# Patient Record
Sex: MALE | Race: WHITE | Employment: OTHER | URBAN - NONMETROPOLITAN AREA
[De-identification: names, ages, dates, MRNs, and addresses within clinical notes are randomized per-mention and may not be internally consistent; named-entity substitution may affect disease eponyms.]

---

## 2018-02-19 ENCOUNTER — HOSPITAL ENCOUNTER (OUTPATIENT)
Age: 73
Setting detail: SPECIMEN
Discharge: HOME | End: 2018-02-19
Payer: MEDICARE

## 2018-02-19 PROCEDURE — 87070 CULTURE OTHR SPECIMN AEROBIC: CPT

## 2018-02-19 PROCEDURE — 87205 SMEAR GRAM STAIN: CPT

## 2018-02-21 LAB
CULTURE: NORMAL
CULTURE: NORMAL
DIRECT EXAM: NORMAL
DIRECT EXAM: NORMAL
Lab: NORMAL
Lab: NORMAL
SPECIMEN DESCRIPTION: NORMAL
SPECIMEN DESCRIPTION: NORMAL
STATUS: NORMAL

## 2020-11-01 ENCOUNTER — APPOINTMENT (OUTPATIENT)
Dept: CT IMAGING | Age: 75
DRG: 065 | End: 2020-11-01
Payer: MEDICARE

## 2020-11-01 ENCOUNTER — APPOINTMENT (OUTPATIENT)
Dept: CT IMAGING | Age: 75
End: 2020-11-01
Payer: MEDICARE

## 2020-11-01 ENCOUNTER — HOSPITAL ENCOUNTER (EMERGENCY)
Age: 75
Discharge: ANOTHER HOSPITAL | End: 2020-11-01
Attending: FAMILY MEDICINE
Payer: MEDICARE

## 2020-11-01 ENCOUNTER — HOSPITAL ENCOUNTER (INPATIENT)
Age: 75
LOS: 2 days | Discharge: HOME | DRG: 065 | End: 2020-11-03
Attending: EMERGENCY MEDICINE | Admitting: PSYCHIATRY & NEUROLOGY
Payer: MEDICARE

## 2020-11-01 ENCOUNTER — APPOINTMENT (OUTPATIENT)
Dept: MRI IMAGING | Age: 75
DRG: 065 | End: 2020-11-01
Payer: MEDICARE

## 2020-11-01 VITALS
TEMPERATURE: 98.6 F | OXYGEN SATURATION: 98 % | WEIGHT: 258 LBS | DIASTOLIC BLOOD PRESSURE: 97 MMHG | HEIGHT: 68 IN | RESPIRATION RATE: 18 BRPM | HEART RATE: 95 BPM | SYSTOLIC BLOOD PRESSURE: 168 MMHG | BODY MASS INDEX: 39.1 KG/M2

## 2020-11-01 PROBLEM — I63.9 STROKE ABORTED BY ADMINISTRATION OF THROMBOLYTIC AGENT (HCC): Status: ACTIVE | Noted: 2020-11-01

## 2020-11-01 LAB
% CKMB: 5.1 % (ref 0–3.5)
ABSOLUTE EOS #: 0.08 K/UL (ref 0–0.44)
ABSOLUTE EOS #: 0.3 K/UL (ref 0–0.4)
ABSOLUTE IMMATURE GRANULOCYTE: 0.08 K/UL (ref 0–0.3)
ABSOLUTE IMMATURE GRANULOCYTE: ABNORMAL K/UL (ref 0–0.3)
ABSOLUTE LYMPH #: 1.5 K/UL (ref 1–4.8)
ABSOLUTE LYMPH #: 1.54 K/UL (ref 1.1–3.7)
ABSOLUTE MONO #: 0.3 K/UL (ref 0–1)
ABSOLUTE MONO #: 0.41 K/UL (ref 0.1–1.2)
ALBUMIN SERPL-MCNC: 4.4 G/DL (ref 3.5–5.2)
ALBUMIN/GLOBULIN RATIO: ABNORMAL (ref 1–2.5)
ALP BLD-CCNC: 72 U/L (ref 40–129)
ALT SERPL-CCNC: 26 U/L (ref 5–41)
ANION GAP SERPL CALCULATED.3IONS-SCNC: 12 MMOL/L (ref 9–17)
ANION GAP SERPL CALCULATED.3IONS-SCNC: 13 MMOL/L (ref 9–17)
AST SERPL-CCNC: 28 U/L
BASOPHILS # BLD: 0 % (ref 0–2)
BASOPHILS # BLD: 1 % (ref 0–2)
BASOPHILS ABSOLUTE: 0 K/UL (ref 0–0.2)
BASOPHILS ABSOLUTE: 0.08 K/UL (ref 0–0.2)
BILIRUB SERPL-MCNC: 1.14 MG/DL (ref 0.3–1.2)
BUN BLDV-MCNC: 21 MG/DL (ref 8–23)
BUN BLDV-MCNC: 23 MG/DL (ref 8–23)
BUN/CREAT BLD: 23 (ref 9–20)
BUN/CREAT BLD: ABNORMAL (ref 9–20)
CALCIUM SERPL-MCNC: 9.3 MG/DL (ref 8.6–10.4)
CALCIUM SERPL-MCNC: 9.8 MG/DL (ref 8.6–10.4)
CHLORIDE BLD-SCNC: 100 MMOL/L (ref 98–107)
CHLORIDE BLD-SCNC: 102 MMOL/L (ref 98–107)
CHP ED QC CHECK: 162
CK MB: 2.9 NG/ML
CKMB INTERPRETATION: ABNORMAL
CO2: 23 MMOL/L (ref 20–31)
CO2: 24 MMOL/L (ref 20–31)
CREAT SERPL-MCNC: 0.83 MG/DL (ref 0.7–1.2)
CREAT SERPL-MCNC: 1.02 MG/DL (ref 0.7–1.2)
DIFFERENTIAL TYPE: ABNORMAL
DIFFERENTIAL TYPE: ABNORMAL
EOSINOPHILS RELATIVE PERCENT: 1 % (ref 1–4)
EOSINOPHILS RELATIVE PERCENT: 5 % (ref 0–5)
GFR AFRICAN AMERICAN: >60 ML/MIN
GFR AFRICAN AMERICAN: >60 ML/MIN
GFR NON-AFRICAN AMERICAN: >60 ML/MIN
GFR NON-AFRICAN AMERICAN: >60 ML/MIN
GFR SERPL CREATININE-BSD FRML MDRD: ABNORMAL ML/MIN/{1.73_M2}
GLUCOSE BLD-MCNC: 160 MG/DL (ref 70–99)
GLUCOSE BLD-MCNC: 162 MG/DL (ref 65–99)
GLUCOSE BLD-MCNC: 171 MG/DL (ref 70–99)
HCT VFR BLD CALC: 33.1 % (ref 41–53)
HCT VFR BLD CALC: 36.3 % (ref 40.7–50.3)
HEMOGLOBIN: 10.6 G/DL (ref 13.5–17.5)
HEMOGLOBIN: 11.4 G/DL (ref 13–17)
IMMATURE GRANULOCYTES: 1 %
IMMATURE GRANULOCYTES: ABNORMAL %
INR BLD: 1
INR BLD: 1.1
LYMPHOCYTES # BLD: 19 % (ref 24–43)
LYMPHOCYTES # BLD: 27 % (ref 13–44)
MCH RBC QN AUTO: 20.3 PG (ref 25.2–33.5)
MCH RBC QN AUTO: 21 PG (ref 26–34)
MCHC RBC AUTO-ENTMCNC: 31.4 G/DL (ref 28.4–34.8)
MCHC RBC AUTO-ENTMCNC: 32 G/DL (ref 31–37)
MCV RBC AUTO: 64.7 FL (ref 82.6–102.9)
MCV RBC AUTO: 65.7 FL (ref 80–100)
MONOCYTES # BLD: 5 % (ref 3–12)
MONOCYTES # BLD: 6 % (ref 5–9)
MORPHOLOGY: ABNORMAL
MYOGLOBIN: 30 NG/ML (ref 28–72)
NRBC AUTOMATED: 0 PER 100 WBC
NRBC AUTOMATED: ABNORMAL PER 100 WBC
PARTIAL THROMBOPLASTIN TIME: 23.5 SEC (ref 20.5–30.5)
PDW BLD-RTO: 17.5 % (ref 12.1–15.2)
PDW BLD-RTO: 18.1 % (ref 11.8–14.4)
PLATELET # BLD: 220 K/UL (ref 140–450)
PLATELET # BLD: ABNORMAL K/UL (ref 138–453)
PLATELET ESTIMATE: ABNORMAL
PLATELET ESTIMATE: ABNORMAL
PLATELET, FLUORESCENCE: 187 K/UL (ref 138–453)
PLATELET, IMMATURE FRACTION: 5.8 % (ref 1.1–10.3)
PMV BLD AUTO: ABNORMAL FL (ref 6–12)
PMV BLD AUTO: ABNORMAL FL (ref 8.1–13.5)
POTASSIUM SERPL-SCNC: 4.2 MMOL/L (ref 3.7–5.3)
POTASSIUM SERPL-SCNC: 4.2 MMOL/L (ref 3.7–5.3)
PROTHROMBIN TIME: 10.1 SEC (ref 9–12)
PROTHROMBIN TIME: 13.6 SEC (ref 11.5–14.2)
RBC # BLD: 5.04 M/UL (ref 4.5–5.9)
RBC # BLD: 5.61 M/UL (ref 4.21–5.77)
RBC # BLD: ABNORMAL 10*6/UL
RBC # BLD: ABNORMAL 10*6/UL
SARS-COV-2, RAPID: NOT DETECTED
SARS-COV-2: NORMAL
SARS-COV-2: NORMAL
SEG NEUTROPHILS: 62 % (ref 39–75)
SEG NEUTROPHILS: 73 % (ref 36–65)
SEGMENTED NEUTROPHILS ABSOLUTE COUNT: 3.6 K/UL (ref 2.1–6.5)
SEGMENTED NEUTROPHILS ABSOLUTE COUNT: 5.91 K/UL (ref 1.5–8.1)
SODIUM BLD-SCNC: 135 MMOL/L (ref 135–144)
SODIUM BLD-SCNC: 139 MMOL/L (ref 135–144)
SOURCE: NORMAL
TOTAL CK: 57 U/L (ref 39–308)
TOTAL PROTEIN: 7.3 G/DL (ref 6.4–8.3)
TROPONIN INTERP: ABNORMAL
TROPONIN INTERP: NORMAL
TROPONIN T: <0.03 NG/ML
TROPONIN T: ABNORMAL NG/ML
TROPONIN, HIGH SENSITIVITY: 18 NG/L (ref 0–22)
TROPONIN, HIGH SENSITIVITY: NORMAL NG/L (ref 0–22)
WBC # BLD: 5.7 K/UL (ref 3.5–11)
WBC # BLD: 8.1 K/UL (ref 3.5–11.3)
WBC # BLD: ABNORMAL 10*3/UL
WBC # BLD: ABNORMAL 10*3/UL

## 2020-11-01 PROCEDURE — 2580000003 HC RX 258: Performed by: PSYCHIATRY & NEUROLOGY

## 2020-11-01 PROCEDURE — 99285 EMERGENCY DEPT VISIT HI MDM: CPT

## 2020-11-01 PROCEDURE — 84484 ASSAY OF TROPONIN QUANT: CPT

## 2020-11-01 PROCEDURE — 36415 COLL VENOUS BLD VENIPUNCTURE: CPT

## 2020-11-01 PROCEDURE — 2580000003 HC RX 258: Performed by: STUDENT IN AN ORGANIZED HEALTH CARE EDUCATION/TRAINING PROGRAM

## 2020-11-01 PROCEDURE — 99291 CRITICAL CARE FIRST HOUR: CPT

## 2020-11-01 PROCEDURE — 85610 PROTHROMBIN TIME: CPT

## 2020-11-01 PROCEDURE — 80048 BASIC METABOLIC PNL TOTAL CA: CPT

## 2020-11-01 PROCEDURE — 70496 CT ANGIOGRAPHY HEAD: CPT

## 2020-11-01 PROCEDURE — 99291 CRITICAL CARE FIRST HOUR: CPT | Performed by: PSYCHIATRY & NEUROLOGY

## 2020-11-01 PROCEDURE — 85055 RETICULATED PLATELET ASSAY: CPT

## 2020-11-01 PROCEDURE — 70551 MRI BRAIN STEM W/O DYE: CPT

## 2020-11-01 PROCEDURE — 6370000000 HC RX 637 (ALT 250 FOR IP): Performed by: PSYCHIATRY & NEUROLOGY

## 2020-11-01 PROCEDURE — 96365 THER/PROPH/DIAG IV INF INIT: CPT

## 2020-11-01 PROCEDURE — 99284 EMERGENCY DEPT VISIT MOD MDM: CPT

## 2020-11-01 PROCEDURE — U0002 COVID-19 LAB TEST NON-CDC: HCPCS

## 2020-11-01 PROCEDURE — C9803 HOPD COVID-19 SPEC COLLECT: HCPCS

## 2020-11-01 PROCEDURE — 83874 ASSAY OF MYOGLOBIN: CPT

## 2020-11-01 PROCEDURE — 99223 1ST HOSP IP/OBS HIGH 75: CPT | Performed by: PSYCHIATRY & NEUROLOGY

## 2020-11-01 PROCEDURE — 82947 ASSAY GLUCOSE BLOOD QUANT: CPT

## 2020-11-01 PROCEDURE — 82550 ASSAY OF CK (CPK): CPT

## 2020-11-01 PROCEDURE — 6370000000 HC RX 637 (ALT 250 FOR IP): Performed by: STUDENT IN AN ORGANIZED HEALTH CARE EDUCATION/TRAINING PROGRAM

## 2020-11-01 PROCEDURE — 96376 TX/PRO/DX INJ SAME DRUG ADON: CPT

## 2020-11-01 PROCEDURE — 80053 COMPREHEN METABOLIC PANEL: CPT

## 2020-11-01 PROCEDURE — 85025 COMPLETE CBC W/AUTO DIFF WBC: CPT

## 2020-11-01 PROCEDURE — 6360000002 HC RX W HCPCS: Performed by: FAMILY MEDICINE

## 2020-11-01 PROCEDURE — 6360000004 HC RX CONTRAST MEDICATION: Performed by: EMERGENCY MEDICINE

## 2020-11-01 PROCEDURE — 93005 ELECTROCARDIOGRAM TRACING: CPT | Performed by: FAMILY MEDICINE

## 2020-11-01 PROCEDURE — 70450 CT HEAD/BRAIN W/O DYE: CPT

## 2020-11-01 PROCEDURE — 82553 CREATINE MB FRACTION: CPT

## 2020-11-01 PROCEDURE — 2000000003 HC NEURO ICU R&B

## 2020-11-01 PROCEDURE — 85730 THROMBOPLASTIN TIME PARTIAL: CPT

## 2020-11-01 RX ORDER — ALLOPURINOL 300 MG/1
300 TABLET ORAL DAILY
COMMUNITY

## 2020-11-01 RX ORDER — PROMETHAZINE HYDROCHLORIDE 12.5 MG/1
12.5 TABLET ORAL EVERY 6 HOURS PRN
Status: DISCONTINUED | OUTPATIENT
Start: 2020-11-01 | End: 2020-11-03 | Stop reason: HOSPADM

## 2020-11-01 RX ORDER — ALLOPURINOL 300 MG/1
300 TABLET ORAL DAILY
Status: DISCONTINUED | OUTPATIENT
Start: 2020-11-01 | End: 2020-11-03 | Stop reason: HOSPADM

## 2020-11-01 RX ORDER — PANTOPRAZOLE SODIUM 40 MG/1
40 TABLET, DELAYED RELEASE ORAL
Status: DISCONTINUED | OUTPATIENT
Start: 2020-11-02 | End: 2020-11-03 | Stop reason: HOSPADM

## 2020-11-01 RX ORDER — DOXYCYCLINE HYCLATE 100 MG
100 TABLET ORAL EVERY 12 HOURS SCHEDULED
Status: DISCONTINUED | OUTPATIENT
Start: 2020-11-01 | End: 2020-11-03 | Stop reason: HOSPADM

## 2020-11-01 RX ORDER — ONDANSETRON 2 MG/ML
4 INJECTION INTRAMUSCULAR; INTRAVENOUS EVERY 6 HOURS PRN
Status: DISCONTINUED | OUTPATIENT
Start: 2020-11-01 | End: 2020-11-03 | Stop reason: HOSPADM

## 2020-11-01 RX ORDER — METOPROLOL TARTRATE 50 MG/1
25 TABLET, FILM COATED ORAL 2 TIMES DAILY
Status: DISCONTINUED | OUTPATIENT
Start: 2020-11-02 | End: 2020-11-03 | Stop reason: HOSPADM

## 2020-11-01 RX ORDER — LUTEIN 10 MG
25 TABLET ORAL
COMMUNITY
End: 2021-10-01

## 2020-11-01 RX ORDER — ASPIRIN 81 MG/1
81 TABLET ORAL DAILY
Status: ON HOLD | COMMUNITY
End: 2020-11-03 | Stop reason: HOSPADM

## 2020-11-01 RX ORDER — VITAMIN B COMPLEX
1 CAPSULE ORAL NIGHTLY
COMMUNITY
End: 2022-07-15

## 2020-11-01 RX ORDER — DOXYCYCLINE HYCLATE 100 MG/1
100 CAPSULE ORAL 2 TIMES DAILY
Status: ON HOLD | COMMUNITY
End: 2020-11-03 | Stop reason: HOSPADM

## 2020-11-01 RX ORDER — VITAMIN B COMPLEX
1 CAPSULE ORAL DAILY
Status: DISCONTINUED | OUTPATIENT
Start: 2020-11-01 | End: 2020-11-01

## 2020-11-01 RX ORDER — ASPIRIN 81 MG/1
81 TABLET ORAL DAILY
Status: DISCONTINUED | OUTPATIENT
Start: 2020-11-02 | End: 2020-11-02

## 2020-11-01 RX ORDER — TAMSULOSIN HYDROCHLORIDE 0.4 MG/1
0.4 CAPSULE ORAL DAILY
COMMUNITY
End: 2020-11-09

## 2020-11-01 RX ORDER — SENNOSIDES 8.8 MG/5ML
5 LIQUID ORAL 2 TIMES DAILY PRN
Status: DISCONTINUED | OUTPATIENT
Start: 2020-11-01 | End: 2020-11-03 | Stop reason: HOSPADM

## 2020-11-01 RX ORDER — SODIUM CHLORIDE 0.9 % (FLUSH) 0.9 %
10 SYRINGE (ML) INJECTION PRN
Status: DISCONTINUED | OUTPATIENT
Start: 2020-11-01 | End: 2020-11-01 | Stop reason: HOSPADM

## 2020-11-01 RX ORDER — OMEPRAZOLE 20 MG/1
20 CAPSULE, DELAYED RELEASE ORAL 2 TIMES DAILY
COMMUNITY

## 2020-11-01 RX ORDER — COVID-19 ANTIGEN TEST
220 KIT MISCELLANEOUS DAILY
COMMUNITY

## 2020-11-01 RX ORDER — SODIUM CHLORIDE 0.9 % (FLUSH) 0.9 %
10 SYRINGE (ML) INJECTION EVERY 12 HOURS SCHEDULED
Status: DISCONTINUED | OUTPATIENT
Start: 2020-11-01 | End: 2020-11-01 | Stop reason: HOSPADM

## 2020-11-01 RX ORDER — 0.9 % SODIUM CHLORIDE 0.9 %
50 INTRAVENOUS SOLUTION INTRAVENOUS ONCE
Status: DISCONTINUED | OUTPATIENT
Start: 2020-11-01 | End: 2020-11-01 | Stop reason: HOSPADM

## 2020-11-01 RX ORDER — DEXTROSE MONOHYDRATE 25 G/50ML
12.5 INJECTION, SOLUTION INTRAVENOUS
Status: DISCONTINUED | OUTPATIENT
Start: 2020-11-01 | End: 2020-11-01 | Stop reason: HOSPADM

## 2020-11-01 RX ORDER — ASPIRIN 81 MG/1
324 TABLET, CHEWABLE ORAL ONCE
Status: DISCONTINUED | OUTPATIENT
Start: 2020-11-01 | End: 2020-11-01 | Stop reason: HOSPADM

## 2020-11-01 RX ORDER — ATORVASTATIN CALCIUM 80 MG/1
80 TABLET, FILM COATED ORAL NIGHTLY
Status: DISCONTINUED | OUTPATIENT
Start: 2020-11-01 | End: 2020-11-03 | Stop reason: HOSPADM

## 2020-11-01 RX ORDER — SODIUM CHLORIDE 9 MG/ML
INJECTION, SOLUTION INTRAVENOUS CONTINUOUS
Status: DISCONTINUED | OUTPATIENT
Start: 2020-11-01 | End: 2020-11-03 | Stop reason: HOSPADM

## 2020-11-01 RX ORDER — SODIUM CHLORIDE 0.9 % (FLUSH) 0.9 %
10 SYRINGE (ML) INJECTION EVERY 12 HOURS SCHEDULED
Status: DISCONTINUED | OUTPATIENT
Start: 2020-11-01 | End: 2020-11-03 | Stop reason: HOSPADM

## 2020-11-01 RX ORDER — TAMSULOSIN HYDROCHLORIDE 0.4 MG/1
0.4 CAPSULE ORAL DAILY
Status: DISCONTINUED | OUTPATIENT
Start: 2020-11-01 | End: 2020-11-03 | Stop reason: HOSPADM

## 2020-11-01 RX ORDER — SODIUM CHLORIDE 0.9 % (FLUSH) 0.9 %
10 SYRINGE (ML) INJECTION PRN
Status: DISCONTINUED | OUTPATIENT
Start: 2020-11-01 | End: 2020-11-03 | Stop reason: HOSPADM

## 2020-11-01 RX ORDER — ASPIRIN 300 MG/1
300 SUPPOSITORY RECTAL DAILY
Status: DISCONTINUED | OUTPATIENT
Start: 2020-11-02 | End: 2020-11-02

## 2020-11-01 RX ADMIN — ALTEPLASE 9 MG: KIT at 10:46

## 2020-11-01 RX ADMIN — ALTEPLASE 81 MG: KIT at 10:47

## 2020-11-01 RX ADMIN — ALLOPURINOL 300 MG: 300 TABLET ORAL at 17:38

## 2020-11-01 RX ADMIN — SODIUM CHLORIDE: 9 INJECTION, SOLUTION INTRAVENOUS at 17:38

## 2020-11-01 RX ADMIN — DOXYCYCLINE HYCLATE 100 MG: 100 TABLET ORAL at 18:55

## 2020-11-01 RX ADMIN — IOPAMIDOL 90 ML: 755 INJECTION, SOLUTION INTRAVENOUS at 12:00

## 2020-11-01 RX ADMIN — ATORVASTATIN CALCIUM 80 MG: 80 TABLET, FILM COATED ORAL at 21:35

## 2020-11-01 RX ADMIN — SODIUM CHLORIDE, PRESERVATIVE FREE 10 ML: 5 INJECTION INTRAVENOUS at 21:36

## 2020-11-01 ASSESSMENT — PAIN SCALES - GENERAL: PAINLEVEL_OUTOF10: 0

## 2020-11-01 NOTE — ED NOTES
Patient to Ct with writer and neuro resident on continuous monitor.      Micah Tipton RN  11/01/20 9896

## 2020-11-01 NOTE — ED NOTES
Patient resting on stretcher, RR even and unlabored. Patient's wife remains at bedside. Awaiting NICU bed at this time.      Jameson Finley RN  11/01/20 5661

## 2020-11-01 NOTE — ED PROVIDER NOTES
[facility]  eMERGENCY dEPARTMENT eNCOUnter          [address] [facility]       Chief Complaint   Patient presents with    Extremity Weakness     left arm weeakness that started at 0700       Nurses Notes reviewed and I agree except as noted in the HPI. HISTORY OF PRESENT ILLNESS    Ann Marie Irving is a 70 y.o. male who presents to the emergency room via private vehicle, patient indicating he awoke 1 hour prior (~0700 hrs) with left upper extremity weakness, states went to bed without difficulties, thinks he awoke and was fine but first noticed the changes left hand when he was scratching his wife's back this morning. Patient denies any weakness in his left leg or contralateral side, denies headache lightheadedness dizziness, denies trauma or falls. Denies pain. PCP: Araceli Interiano    REVIEW OF SYSTEMS     Review of Systems   Neurological: Positive for weakness and numbness. All other systems reviewed and are negative. PAST MEDICAL HISTORY    has a past medical history of Hypertension and Kidney stone. SURGICAL HISTORY      has a past surgical history that includes Cataract removal (Left).     CURRENT MEDICATIONS       Discharge Medication List as of 11/1/2020 11:27 AM      CONTINUE these medications which have NOT CHANGED    Details   aspirin 81 MG EC tablet Take 81 mg by mouth dailyHistorical Med      Saw Palmetto, Serenoa repens, (BL SAW PALMETTO PO) Take 450 mg by mouthHistorical Med      b complex vitamins capsule Take 1 capsule by mouth dailyHistorical Med      Lutein 10 MG TABS Take 25 mg by mouthHistorical Med      tamsulosin (FLOMAX) 0.4 MG capsule Take 0.4 mg by mouth dailyHistorical Med      allopurinol (ZYLOPRIM) 300 MG tablet Take 300 mg by mouth dailyHistorical Med      omeprazole (PRILOSEC) 20 MG delayed release capsule Take 20 mg by mouth dailyHistorical Med      metoprolol tartrate (LOPRESSOR) 25 MG tablet Take 25 mg by mouth 2 times dailyHistorical Med NIHSS=2, CN II-XII grossly intact. Skin: Skin is warm and dry. Patient is not diaphoretic. Psychiatric: Patient has a normal mood and pleasant affect. Patient speech is normal and behavior is normal. Cognition and memory are normal.    DIFFERENTIAL DIAGNOSIS:   CVA/TIA, ICH, neuropraxia,    DIAGNOSTIC RESULTS     EKG: All EKG's are interpreted by the Emergency Department Physician who either signs or Co-signs this chart in the absence of a cardiologist.  EKG    The patient had an EKG which is interpreted by me in the absence of a Cardiologist.   [x] Without comparison to previous. [] With comparison to a previous EKG Dated     EKG @ 0827 hrs -sinus rhythm with first-degree AV block rate 91    normal axis, no prior for comparison      RADIOLOGY: non-plain film images(s) such as CT, Ultrasound and MRI are read by the radiologist.  CT Head WO Contrast   Final Result   No acute intracranial process. Chronic microvascular angiopathic changes have    mildly progressed since 11/13/2011. Results were called to and discussed with Dr. Juan Bird by Dr. Ree Ramirez on 11/1/2020    8:29 AM EST.                 LABS:   Labs Reviewed   CBC WITH AUTO DIFFERENTIAL - Abnormal; Notable for the following components:       Result Value    Hemoglobin 10.6 (*)     Hematocrit 33.1 (*)     MCV 65.7 (*)     MCH 21.0 (*)     RDW 17.5 (*)     All other components within normal limits   COMPREHENSIVE METABOLIC PANEL W/ REFLEX TO MG FOR LOW K - Abnormal; Notable for the following components:    Glucose 171 (*)     Bun/Cre Ratio 23 (*)     All other components within normal limits   GLUCOSE, WHOLE BLOOD - Abnormal; Notable for the following components:    POC Glucose 162 (*)     All other components within normal limits   POCT GLUCOSE - Normal   TROPONIN   PROTIME-INR   COVID-19       EMERGENCY DEPARTMENT COURSE:   Vitals:    Vitals:    11/01/20 1023 11/01/20 1037 11/01/20 1046 11/01/20 1053   BP: (!) 165/96 (!) 163/95 (!) 163/95 (!) 168/97   Pulse: 93 96  95   Resp: 17 20  18   Temp:       TempSrc:       SpO2: 98% 98%  98%   Weight:       Height:         Patient presented via private vehicle and brought to ER room #3, nursing indicates he was able to stand and pivot to bed without difficulty, initial concern for weakness with unknown onset of left upper extremity, patient sent immediately for CT noncontrast head. EMR reviewed, last ER visit 6/2012, no notes available. Upon return from CT, patient evaluated, NIH stroke scale 2 given for left upper extremity weakness primarily in the hand, some numbness in the hand and forearm, CN II-XII grossly intact. Patient significant other present states no change in his voice or mentation.   Discussed with patient concern for potential CVA/TIA, discussed reason for getting CT immediately and work-up including EKG and blood work, IV access, patient sitting low semi-Fowlers in bed, acknowledges    EKG as above    POC Glucose 162    Received phone call from Dr. Emma Thomas, radiology, called ER to advise a CT head negative    Initial lab work-up reviewed    Case was discussed with patient and patient's wife at bedside, regarding overall work-up, discussed my concerns for CVA/TIA, as I cannot exclusively rule these out despite negative work-up at this time, discussed need for transfer to facility with neurology for further work-up, wife stating although her brother was [facility] and she like the facility if she is concerned with distance, asking if [facility] would be possible, will contact the facility to discuss potential transfer, acknowledged    Further discussions with patient, patient decays he does get up at night, at one point there was concern the patient states he may have had some symptoms at 053 0 hours, this was again recanted stating it was after he got up at 070 0 hours, patient decays having some intermittent numbness of the left lower lip and that for a short while upper left upper lip ending and left lower lip that seems to go away, as well as a intermittent episode on his left tongue. Denies any difficulty swallowing or handling his own saliva at this time. I discussed my concern again the patient may be having an evolving stroke, would need to get to an interventional center, I will contact [facility] to discuss with interventional neurology at this time, acknowledged    Case was discussed with Dr. Georgette Nageotte, [facility] interventional neurology, as well as his evaluation patient via telestroke cart, advises recommendation of TPA and patient is agreeable with this, and will [facility] patient to their facility. Due to patient's weight, patient will reach maximum dosing for TPA, 9 mg bolus with an 81 mg via drip. Upon arrival, report given to Lane conroy, patient packaged for transport    Critical Care Time: 35 minutes, including direct patient interaction, discussion with family, discussion with interventional neurology, discussion with [facility] crew        FINAL IMPRESSION      1. Cerebrovascular accident (CVA), unspecified mechanism ([facility] [address]          DISPOSITION/PLAN   trn    PATIENT REFERRED TO:  No follow-up provider specified. DISCHARGE MEDICATIONS:  Discharge Medication List as of 11/1/2020 11:27 AM              Summation      Patient Course: trn    ED Medications administered this visit:    Medications   alteplase (ACTIVASE) injection 9 mg (0 mg Intravenous Patient Transferred to Other Facility 11/1/20 1127)     Followed by   alteplase (ACTIVASE) injection 71.1 mg (0 mg Intravenous Patient Transferred to Other Facility 11/1/20 1117)       New Prescriptions from this visit:    Discharge Medication List as of 11/1/2020 11:27 AM          Follow-up:  No follow-up provider specified. Final Impression:   1.  Cerebrovascular accident (CVA), unspecified mechanism ([address]               (Please note that portions of this note were completed with a voice recognition program.  Efforts were made to edit the dictations but occasionally words are mis-transcribed.)    MD Zaina Vasquez MD  11/01/20 9817

## 2020-11-01 NOTE — ED NOTES
This nurse spoke with pharmacy to clarify alteplase dose on MAR. Pt is suppose to have 81mg per Dow Rubinstein from Pharmacy. Dr. Huerta Just aware of correct dose that was given. Dose of Alteplase double checked with Ayden Murillo.      Kalpana Streeter RN  11/01/20 4303

## 2020-11-01 NOTE — FLOWSHEET NOTE
[facility] CARE DEPARTMENT - [address]     Emergency/Trauma Note    PATIENT NAME: Ira Donnelly    Shift date: 11/1/2020  Shift day: Sunday   Shift # 1    Room # 12/12   Name: Ira Donnelly            Age: 70 y.o. Gender: male          Rastafari: Religion   Place of Yarsani:     Trauma/Incident type: Stroke Alert  Admit Date & Time: 11/1/2020 11:45 AM  TRAUMA NAME:         PATIENT/EVENT DESCRIPTION:  Ira Donnelly is a 70 y.o. male who arrived via [address] from [address] as a Stroke Alert. Patient presents with left sided weakness. Pt to be admitted to 12/12. SPIRITUAL ASSESSMENT/INTERVENTION:   responded to Stroke alert page.  went to ED #12.  was ministry of presence.  informed by [facility] crew patient's wife Lala Mendoza [phone] would be coming to the hospital.   at  bedside. Patient stated he attends a [facility]. Patient hopeful of a positive outcome. Patient stated his brother was here last year for similar situation and had a good outcome.  present when patient given medical update.  prayed with patient. PATIENT BELONGINGS:  None Noted      ANY BELONGINGS OF SIGNIFICANT VALUE NOTED:  None Note    REGISTRATION STAFF NOTIFIED? No      WHAT IS YOUR SPIRITUAL CARE PLAN FOR THIS PATIENT?:   Chaplains will remain available for spiritual and emotional support as needed. Electronically signed by Neela Garcia Resident      11/01/20 3359   Encounter Summary   Services provided to: Patient   Referral/Consult From: Multi-disciplinary team   Support System Spouse; Children   Place of Yazidism   (Religion)   Continue Visiting   (11/1/2020)   Complexity of Encounter Moderate   Length of Encounter 1 hour   Spiritual Assessment Completed Yes   Crisis   Type Stroke Alert   Assessment Calm; Approachable   Intervention Active listening;Sustaining presence/ Ministry of presence   Outcome Expressed gratitude   , on 11/1/2020 at 12:06 PM.  [facility]  [phone]

## 2020-11-01 NOTE — ED NOTES
Pt was up to the bathroom at 0530 to urinate and thought he was fine until wife ask him to scratch back. While scratching back he noticed weakness in left arm.        Rene Worrell RN  11/01/20 5499

## 2020-11-01 NOTE — ED PROVIDER NOTES
[facility] ED  Emergency Department Encounter  EmergencyMedicine Resident     Pt Laura Kinney  MRN: [id number]  [place] 1945  Date of evaluation: 11/1/20  PCP:  Zachery Beltran MD    CHIEF COMPLAINT       No chief complaint on file. stroke s/p tpa    HISTORY OF PRESENT ILLNESS  (Location/Symptom, Timing/Onset, Context/Setting, Quality, Duration, Modifying Factors, Severity.)      Bal Nevarez is a 70 y.o. male who presents with complaint of sudden onset left arm weakness that began at 0700. No ha or other symptoms presented to [facility] ed ED head there was negative started on TPA. Glucose 162 NIH 2 at [facility]. Pt was transferred here a stroke alert. PAST MEDICAL / SURGICAL / SOCIAL / FAMILY HISTORY      has a past medical history of Hypertension and Kidney stone. has a past surgical history that includes Cataract removal (Left). Social History     Socioeconomic History    Marital status:      Spouse name: Not on file    Number of children: Not on file    Years of education: Not on file    Highest education level: Not on file   Occupational History    Not on file   Social Needs    Financial resource strain: Not on file    Food insecurity     Worry: Not on file     Inability: Not on file    Transportation needs     Medical: Not on file     Non-medical: Not on file   Tobacco Use    Smoking status: Never Smoker    Smokeless tobacco: Never Used   Substance and Sexual Activity    Alcohol use:  Yes     Alcohol/week: 10.0 standard drinks     Types: 10 Cans of beer per week    Drug use: Not on file    Sexual activity: Not on file   Lifestyle    Physical activity     Days per week: Not on file     Minutes per session: Not on file    Stress: Not on file   Relationships    Social connections     Talks on phone: Not on file     Gets together: Not on file     Attends Mandaen service: Not on file     Active member of club or organization: Not on file     Attends meetings of clubs or organizations: Not on file     Relationship status: Not on file    Intimate partner violence     Fear of current or ex partner: Not on file     Emotionally abused: Not on file     Physically abused: Not on file     Forced sexual activity: Not on file   Other Topics Concern    Not on file   Social History Narrative    Not on file       No family history on file. Allergies:  Adhesive tape    Home Medications:  Prior to Admission medications    Medication Sig Start Date End Date Taking? Authorizing Provider   aspirin 81 MG EC tablet Take 81 mg by mouth daily    Historical Provider, MD   Saw [place], [place] repens, (BL SAW PALMETTO PO) Take 450 mg by mouth    Historical Provider, MD   b complex vitamins capsule Take 1 capsule by mouth daily    Historical Provider, MD   Lutein 10 MG TABS Take 25 mg by mouth    Historical Provider, MD   tamsulosin (FLOMAX) 0.4 MG capsule Take 0.4 mg by mouth daily    Historical Provider, MD   allopurinol (ZYLOPRIM) 300 MG tablet Take 300 mg by mouth daily    Historical Provider, MD   omeprazole (PRILOSEC) 20 MG delayed release capsule Take 20 mg by mouth daily    Historical Provider, MD   metoprolol tartrate (LOPRESSOR) 25 MG tablet Take 25 mg by mouth 2 times daily    Historical Provider, MD   Naproxen Sodium (ALEVE) 220 MG CAPS Take by mouth    Historical Provider, MD   doxycycline hyclate (VIBRAMYCIN) 100 MG capsule Take 100 mg by mouth 2 times daily    Historical Provider, MD       REVIEW OF SYSTEMS    (2-9 systems for level 4, 10 or more for level 5)      Review of Systems   Constitutional: Negative for chills and fever. HENT: Negative for congestion, rhinorrhea and sore throat. Eyes: Negative for visual disturbance. Respiratory: Negative for shortness of breath. Cardiovascular: Negative for chest pain. Gastrointestinal: Negative for abdominal pain. Genitourinary: Negative for difficulty urinating. Musculoskeletal: Negative for neck pain. Skin: Negative for wound. Neurological: Positive for weakness and numbness. Negative for headaches. Psychiatric/Behavioral: Negative for agitation. PHYSICAL EXAM   (up to 7 for level 4, 8 or more for level 5)      INITIAL VITALS:   BP (!) 114/92   Pulse 92   Wt 258 lb (117 kg)   SpO2 93%   BMI 39.23 kg/m²     Physical Exam  Vitals signs and nursing note reviewed. Constitutional:       General: He is not in acute distress. Appearance: He is obese. He is not ill-appearing, toxic-appearing or diaphoretic. HENT:      Head: Normocephalic and atraumatic. Right Ear: External ear normal.      Left Ear: External ear normal.      Nose: Nose normal.      Mouth/Throat:      Pharynx: Oropharynx is clear. Eyes:      General:         Right eye: No discharge. Left eye: No discharge. Extraocular Movements: Extraocular movements intact. Neck:      Musculoskeletal: Normal range of motion. No muscular tenderness. Cardiovascular:      Rate and Rhythm: Normal rate. Pulses: Normal pulses. Pulmonary:      Effort: Pulmonary effort is normal.      Breath sounds: Normal breath sounds. Abdominal:      Palpations: Abdomen is soft. Tenderness: There is no abdominal tenderness. Neurological:      Mental Status: He is alert. Cranial Nerves: No cranial nerve deficit. Motor: No weakness. Comments: Loss of sensation to left hand   Psychiatric:         Mood and Affect: Mood normal.         DIFFERENTIAL  DIAGNOSIS     PLAN (LABS / IMAGING / EKG):  Orders Placed This Encounter   Procedures    CTA HEAD NECK W CONTRAST    MRI BRAIN WO CONTRAST    CT head without contrast    STROKE PANEL    Immature Platelet Fraction    CBC    Hemoglobin A1c    Lipid panel - fasting    Troponin    Immature Platelet Fraction    External Referral To Physical Therapy    Telemetry Monitoring    Inpatient consult to Neurocritical care    Pharmacy to Dose: Other - See Comments:  The pharmacist will review this patient's medication profile to evaluate IV medications and change all base solutions to 0.9% sodium chloride if possible based on compatibility and product availability. This. .. [address] [place] [address] to change base solutions.     OT eval and treat    PT evaluation and treat    Speech Language Pathology (SLP) eval and treat    Event Monitor    ECHO Complete 2D W Doppler W Color    PATIENT STATUS (FROM ED OR OR/PROCEDURAL) Inpatient    Transfer Patient    Discharge patient    DME Order for Damaris Tasneem as OP       MEDICATIONS ORDERED:  Orders Placed This Encounter   Medications    iopamidol (ISOVUE-370) 76 % injection 90 mL    DISCONTD: allopurinol (ZYLOPRIM) tablet 300 mg    DISCONTD: b complex vitamins capsule 1 capsule    DISCONTD: metoprolol tartrate (LOPRESSOR) tablet 25 mg    DISCONTD: tamsulosin (FLOMAX) capsule 0.4 mg    DISCONTD: sodium chloride flush 0.9 % injection 10 mL    DISCONTD: sodium chloride flush 0.9 % injection 10 mL    DISCONTD: promethazine (PHENERGAN) tablet 12.5 mg    DISCONTD: ondansetron (ZOFRAN) injection 4 mg    DISCONTD: enoxaparin (LOVENOX) injection 40 mg    DISCONTD: aspirin EC tablet 81 mg    DISCONTD: aspirin suppository 300 mg    DISCONTD: senna (SENOKOT) 8.8 MG/5ML syrup 8.8 mg    DISCONTD: atorvastatin (LIPITOR) tablet 80 mg    DISCONTD: pantoprazole (PROTONIX) tablet 40 mg    DISCONTD: 0.9 % sodium chloride infusion    DISCONTD: doxycycline hyclate (VIBRA-TABS) tablet 100 mg     Order Specific Question:   Antimicrobial Indications     Answer:   Surgical Site Infection    DISCONTD: enoxaparin (LOVENOX) injection 40 mg    DISCONTD: aspirin tablet 325 mg    DISCONTD: diphenhydrAMINE (BENADRYL) injection 25 mg    DISCONTD: lisinopril (PRINIVIL;ZESTRIL) tablet 5 mg    aspirin 325 MG tablet     Sig: Take 1 tablet by mouth daily     Dispense:  30 tablet     Refill:  3    atorvastatin (LIPITOR) 80 MG tablet     Sig: Take 1 tablet by mouth nightly     Dispense:  30 tablet     Refill:  3    lisinopril (PRINIVIL;ZESTRIL) 5 MG tablet     Sig: Take 1 tablet by mouth daily     Dispense:  30 tablet     Refill:  3    doxycycline hyclate (VIBRA-TABS) 100 MG tablet     Sig: Take 1 tablet by mouth every 12 hours for 10 days     Dispense:  6 tablet     Refill:  0       DDX: steoke    DIAGNOSTIC RESULTS / EMERGENCY DEPARTMENT COURSE / MDM   LAB RESULTS:  Results for orders placed or performed during the hospital encounter of 11/01/20   STROKE PANEL   Result Value Ref Range    Glucose 160 (H) 70 - 99 mg/dL    BUN 21 8 - 23 mg/dL    CREATININE 0.83 0.70 - 1.20 mg/dL    Bun/Cre Ratio NOT REPORTED 9 - 20    Calcium 9.3 8.6 - 10.4 mg/dL    Sodium 135 135 - 144 mmol/L    Potassium 4.2 3.7 - 5.3 mmol/L    Chloride 100 98 - 107 mmol/L    CO2 23 20 - 31 mmol/L    Anion Gap 12 9 - 17 mmol/L    GFR Non-African American >60 >60 mL/min    GFR African American >60 >60 mL/min    GFR Comment          GFR Staging NOT REPORTED     WBC 8.1 3.5 - 11.3 k/uL    RBC 5.61 4.21 - 5.77 m/uL    Hemoglobin 11.4 (L) 13.0 - 17.0 g/dL    Hematocrit 36.3 (L) 40.7 - 50.3 %    MCV 64.7 (L) 82.6 - 102.9 fL    MCH 20.3 (L) 25.2 - 33.5 pg    MCHC 31.4 28.4 - 34.8 g/dL    RDW 18.1 (H) 11.8 - 14.4 %    Platelets See Reflexed IPF Result 138 - 453 k/uL    MPV NOT REPORTED 8.1 - 13.5 fL    NRBC Automated 0.0 0.0 per 100 WBC    Total CK 57 39 - 308 U/L    CK-MB 2.9 <10.5 ng/mL    % CKMB 5.1 (H) 0.0 - 3.5 %    CKMB Interpretation NORMAL ISOENZYME PATTERN     Differential Type NOT REPORTED     WBC Morphology NOT REPORTED     RBC Morphology NOT REPORTED     Platelet Estimate NOT REPORTED     Immature Granulocytes 1 (H) 0 %    Seg Neutrophils 73 (H) 36 - 65 %    Lymphocytes 19 (L) 24 - 43 %    Monocytes 5 3 - 12 %    Eosinophils % 1 1 - 4 %    Basophils 1 0 - 2 %    Absolute Immature Granulocyte 0.08 0.00 - 0.30 k/uL    Segs Absolute 5.91 1.50 - 8.10 k/uL    Absolute Lymph # 1.54 1.10 - 3.70 k/uL Absolute Mono # 0.41 0.10 - 1.20 k/uL    Absolute Eos # 0.08 0.00 - 0.44 k/uL    Basophils Absolute 0.08 0.00 - 0.20 k/uL    Morphology ANISOCYTOSIS PRESENT     Morphology MICROCYTOSIS PRESENT     Morphology 1+ ELLIPTOCYTES     Morphology 1+ TEARDROPS     Myoglobin 30 28 - 72 ng/mL    Protime 10.1 9.0 - 12.0 sec    INR 1.0     PTT 23.5 20.5 - 30.5 sec    Troponin, High Sensitivity 18 0 - 22 ng/L    Troponin T NOT REPORTED <0.03 ng/mL    Troponin Interp NOT REPORTED    Immature Platelet Fraction   Result Value Ref Range    Platelet, Immature Fraction 5.8 1.1 - 10.3 %    Platelet, Fluorescence 187 138 - 453 k/uL   CBC   Result Value Ref Range    WBC 6.1 3.5 - 11.3 k/uL    RBC 4.95 4.21 - 5.77 m/uL    Hemoglobin 10.2 (L) 13.0 - 17.0 g/dL    Hematocrit 33.0 (L) 40.7 - 50.3 %    MCV 66.7 (L) 82.6 - 102.9 fL    MCH 20.6 (L) 25.2 - 33.5 pg    MCHC 30.9 28.4 - 34.8 g/dL    RDW 17.9 (H) 11.8 - 14.4 %    Platelets See Reflexed IPF Result 138 - 453 k/uL    MPV NOT REPORTED 8.1 - 13.5 fL    NRBC Automated 0.0 0.0 per 100 WBC   Hemoglobin A1c   Result Value Ref Range    Hemoglobin A1C 5.8 4.0 - 6.0 %    Estimated Avg Glucose 120 mg/dL   Lipid panel - fasting   Result Value Ref Range    Cholesterol 141 <200 mg/dL    HDL 38 (L) >40 mg/dL    LDL Cholesterol 77 0 - 130 mg/dL    Chol/HDL Ratio 3.7 <5    Triglycerides 130 <150 mg/dL    VLDL NOT REPORTED 1 - 30 mg/dL   Troponin   Result Value Ref Range    Troponin, High Sensitivity 23 (H) 0 - 22 ng/L    Troponin T NOT REPORTED <0.03 ng/mL    Troponin Interp NOT REPORTED    Immature Platelet Fraction   Result Value Ref Range    Platelet, Immature Fraction 5.2 1.1 - 10.3 %    Platelet, Fluorescence 149 138 - 453 k/uL       IMPRESSION: alert 70year old male gcs 15 s/p tpa administration for stroke with left ue weakness. No cp, sob, ha, no dysarthria, current nih 1. No pronator drift but does have some loss of sensation in his left hand. Plan will be lab and admission to neuro crit care. RADIOLOGY:  Ct Head Wo Contrast    Result Date: 11/1/2020  EXAM: CT HEAD WO CONTRAST STROKE CLINICAL INDICATION: 68-year-old male with left sided weakness COMPARISON: CT head 11/13/2011 TECHNIQUE: Axial CT images of the brain were obtained without contrast. Dose reduction techniques were achieved by using automated exposure control and/or adjustment of mA and/or kV according to patient size and/or use of iterative reconstruction technique. FINDINGS: No intracranial hemorrhage, extra-axial fluid collection, hydrocephalus, midline shift, or acute infarction. No other mass effect. Mild patchy periventricular hypoattenuation is likely on the basis of chronic microvascular angiopathic changes and have mildly progressed since 11/13/2011. Mild symmetric global volume loss without lobar predominance. Commensurate prominence of the ventricular system has also progressed in the interval. No calvarial fracture. No soft tissue abnormalities. Mild mucosal thickening throughout the ethmoid and sphenoid sinuses. Mastoid air cells are well aerated. No acute intracranial process. Chronic microvascular angiopathic changes have mildly progressed since 11/13/2011. Results were called to and discussed with Dr. Valery Giles by Dr. Emma Thomas on 11/1/2020 8:29 AM EST. Cta Head Neck W Contrast    Result Date: 11/1/2020  EXAMINATION: CTA OF THE HEAD AND NECK WITH CONTRAST 11/1/2020 11:54 am: TECHNIQUE: CTA of the head and neck was performed with the administration of intravenous contrast. Multiplanar reformatted images are provided for review. MIP images are provided for review. Stenosis of the internal carotid arteries measured using NASCET criteria. Dose modulation, iterative reconstruction, and/or weight based adjustment of the mA/kV was utilized to reduce the radiation dose to as low as reasonably achievable.  COMPARISON: Noncontrast CT head from earlier today HISTORY: ORDERING SYSTEM PROVIDED HISTORY: stroke TECHNOLOGIST PROVIDED HISTORY: stroke Reason for Exam: stroke Acuity: Unknown Type of Exam: Unknown FINDINGS: CTA NECK: AORTIC ARCH/ARCH VESSELS: No dissection or arterial injury. No significant stenosis of the brachiocephalic or subclavian arteries. CAROTID ARTERIES: No dissection, arterial injury, or hemodynamically significant stenosis by NASCET criteria. VERTEBRAL ARTERIES: No dissection, arterial injury, or significant stenosis. SOFT TISSUES: There is mild airspace opacity in the anterior right upper lobe, may be related to atelectasis versus pneumonia (series 3, image 1). There is bronchial wall thickening, likely related to small airway disease or bronchiolitis. There are calcified mediastinal lymph nodes, likely reactive to prior granulomatous disease. No cervical or superior mediastinal lymphadenopathy. The larynx and pharynx are unremarkable. No acute abnormality of the salivary and thyroid glands. BONES: No acute osseous abnormality. CTA HEAD: ANTERIOR CIRCULATION: No significant stenosis of the intracranial internal carotid, anterior cerebral, or middle cerebral arteries. No aneurysm. POSTERIOR CIRCULATION: No significant stenosis of the vertebral, basilar, or posterior cerebral arteries. No aneurysm. OTHER: No dural venous sinus thrombosis on this non-dedicated study. BRAIN: No mass effect or midline shift. No extra-axial fluid collection. The gray-white differentiation is maintained. No acute abnormality or flow-limiting stenosis of the major arteries of the head and neck. Mild airspace opacity in the anterior right upper lobe, may be related to atelectasis versus pneumonia. Results were sent to radiology results communication. Mri Brain Wo Contrast    Result Date: 11/1/2020  EXAMINATION: MRI OF THE BRAIN WITHOUT CONTRAST  11/1/2020 1:16 pm TECHNIQUE: Multiplanar multisequence MRI of the brain was performed without the administration of intravenous contrast. COMPARISON: None.  HISTORY: ORDERING SYSTEM PROVIDED HISTORY: stroke TECHNOLOGIST PROVIDED HISTORY: stroke FINDINGS: INTRACRANIAL STRUCTURES/VENTRICLES: Acute cortical infarcts are present within the right pre central and post central gyri. There is also an acute lacunar infarct within the adjacent right centrum semiovale. No mass effect or midline shift. No evidence of an acute intracranial hemorrhage. There is generalized volume loss with minimal chronic white matter microvascular ischemic change. The sellar/suprasellar regions appear unremarkable. The normal signal voids within the major intracranial vessels appear maintained. ORBITS: The visualized portion of the orbits demonstrate no acute abnormality. SINUSES: The visualized paranasal sinuses and mastoid air cells are well aerated. BONES/SOFT TISSUES: The bone marrow signal intensity appears normal. The soft tissues demonstrate no acute abnormality. Acute perirolandic microinfarcts, on the right-hand side. The findings were sent to the Radiology Results [address] at 2:10 pm on 11/1/2020to be communicated to a licensed caregiver. EKG  None prior ekg from [facility] showed prolonged pr interval but sinus without acute changes    All EKG's are interpreted by the Emergency Department Physician who either signs or Co-signs this chart in the absence of a cardiologist.    EMERGENCY DEPARTMENT COURSE:  Seen and evaluated in consultation with neuro cta and MRI admitted    PROCEDURES:  none    CONSULTS:  IP CONSULT TO NEUROCRITICAL CARE  IP CONSULT TO PHARMACY  PHARMACY TO CHANGE BASE FLUIDS    CRITICAL CARE:  Please see attending note    FINAL IMPRESSION      1. Stroke aborted by administration of thrombolytic agent ([facility] [address]    2.  Acute ischemic stroke [facility])          DISPOSITION / PLAN     DISPOSITION   admitted to neuro icu    PATIENT REFERRED TO:  Ronaldo Nguyen MD  [address] [place] [address] 2, [address] [place]  [id number]  [phone]    In 3 weeks      [address] [facility] Cardiology Specialist  [address] [place] [address]

## 2020-11-01 NOTE — ED NOTES
MRI checklist reviewed with pt. Checklist labeled and faxed.     TPA checklist initiated     Unknown GWEN Bustillo  11/01/20 [id number]

## 2020-11-01 NOTE — ED NOTES
Patient to ED via [facility] to ED room 12 from [facility]. Patient noticed this morning about 0700 that his left thumb felt numb, the numbness then spread to the other fingers of the left hand. The patient report mild weakness to left arm that he feels has improved some since this morning. The patient received a 9mL bolus of TPA prior to arrival and the 81mg infusion was completing as [facility] arrived to room 12. The patient is greeted in the room by Dr Nat Taveras and the Stroke resident. The patient denies HA, nausea or vision changes.   He is placed on full cardiac monitor upon arrival.     Temo Jensen RN  11/01/20 [id number]

## 2020-11-01 NOTE — PROGRESS NOTES
[facility] [address]  Flight Physician       Pt Yesenia Gamez  MRN: [id number]  [place] 1945  Date of evaluation: 11/1/20  PCP:  Luis Jalloh MD    REASON FOR FLIGHT      Patient was transported from [facility] to [facility] [place] [facility] - [place] due to possible stroke. Flight was indicated for access to neurocritical care post TPA. HISTORY OF PRESENT ILLNESS     Robert Renteria is a 70 y.o. male who presented to outside hospital with left upper extremity weakness, subjective paresthesias/numbness as well as paresthesias in the left face and lips. Unclear last known normal but patient recalls waking up at 7 AM without knowledge of the symptoms and that he recognized them sometime after while scratching his wife's back. Patient was brought to [facility] where head CT showed no intracranial bleed, glucose was within normal limits to hyperglycemic, neurology was consulted, Dr. Nancy Keys, who agreed with outside physician request for TPA. 9 milligram alteplase bolus was given prior to arrival with 81 mg IV infusion going upon arrival of flight team.  NIH at answering facility was 2    No previous history of CVA no recent head injury, no blood thinners but patient is on daily aspirin which she did not take today. History of hypertension for which she takes metoprolol but did not take today. No history of cardiac dysrhythmias including atrial fibrillation. No recent surgeries no history of cancer no history of intracranial bleeds. 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 Denies story of neck surgery, left neck or arm pain history of trauma. Denies chest pain shortness of breath. Patient currently on antibiotics, appears to be doxycycline in chart for soft tissue infection of possible cyst in the right deltoid area. Afebrile hemodynamically stable does not meet sepsis criteria.     Coronavirus swab negative outside facility  [address] [facility] Alteplase 90 mg  Zofran 4 mg  Metoprolol 5 mg bolus    PHYSICAL EXAM        Physical Exam  Constitutional:       General: He is not in acute distress. Appearance: Normal appearance. He is well-developed. He is not diaphoretic. HENT:      Head: Normocephalic and atraumatic. Eyes:      General:         Right eye: No discharge. Left eye: No discharge. Conjunctiva/sclera: Conjunctivae normal.      Comments: Anisocoria, right pupil larger than left, previously known blindness right eye no gaze deficit   Neck:      Trachea: No tracheal deviation. Cardiovascular:      Rate and Rhythm: Normal rate and regular rhythm. Heart sounds: Normal heart sounds. No murmur. No friction rub. Pulmonary:      Effort: No respiratory distress. Breath sounds: No stridor. No wheezing or rales. Abdominal:      General: There is no distension. Tenderness: There is no abdominal tenderness. Musculoskeletal: Normal range of motion. Skin:     General: Skin is warm and dry. Findings: Lesion present. Comments: Area of erythema of the right deltoid area where patient says he had previous cyst, indurated without fluctuance not openly draining. Neurological:      Mental Status: He is alert and oriented to person, place, and time. Cranial Nerves: No cranial nerve deficit. Sensory: Sensory deficit present. Motor: Weakness present. Coordination: Coordination normal.      Comments: Subjective paresthesias to the left forearm below the elbow, objectively patient is able to feel, seems to have issues straightening left fingers. No motor drift of upper extremities no motor drift lower extremities no dysmetria bilateral upper extremities. No facial droop, right eye with previously known blindness. No gaze preference no hemineglect no aphasia, 5 out of 5 strength lower extremities bilaterally. Sensation intact bilaterally lower extremities.   NIH 2 for left upper extremity sensation asymmetry, subjective weakness, 4 out of 5 strength to extension left wrist and fingers         MDM     Transfer request from [facility] to [facility] for neuro ICU status post TPA. NIH 2, unclear last known normal but within TPA window and alteplase given in consult with neurology. ER to ER transport due to lack of neuro ICU beds. Patient blood pressure at goal prior to transfer. During transfer, several blood pressures were above 481 systolic, 5 mg Lopressor was given as patient's blood pressure may have been temporarily high due to anxiety from flight as he was also tachycardic at 115 beats per minute. Zofran given for nausea. No headache or neurologic changes in transport. Patient alert and oriented throughout. Tolerated transport well, arrived at [facility] without issue    PROCEDURES:  None    CRITICAL CARE:  None    Destination     Patient arrived at [facility]. [facility] in stable condition no significant changes in flight, all questions receiving staff had were answered.        Arik Barone MD  [facility] Physician     (Please note that portions of this note were completed with a voicerecognition program.  Efforts were made to edit the dictations but occasionally words are mis-transcribed.)

## 2020-11-01 NOTE — ED NOTES
Bed: 12  Expected date:   Expected time:   Means of arrival:   Comments:  STEFAN Ricketts RN  11/01/20 1146

## 2020-11-01 NOTE — CONSULTS
Department of Endovascular Neurosurgery                                         Resident Consult Note    Reason for Consult:  Left sided weakness  Endovascular Neurosurgeon:   [x]Dr. Kristi Farmer  []Dr. Zoya Diaz  []Dr. Mabel Manzo  []Dr. Cat Cannon  []     History Obtained From:  patient    CHIEF COMPLAINT:       Left sided weakness    HISTORY OF PRESENT ILLNESS:       The patient is a 70 y.o. male who presents with left upper extremity weakness, presented outside hospital, subjective paresthesias/numbness as well as paresthesias in the left face and lips, last known well at 7:00 AM, he recognized the symptoms while scratching his wife's back, patient brought to [facility], initial CT: No intracranial bleed, glucose was within normal limits to hyperglycemic, Dr. Kristi Farmer was consulted who agreed to give TPA,  Initial NIH was 2, patient has no history of CVA, no recent head injury, no blood thinner, patient is on aspirin daily, he did not take it today, he has a history of hypertension for which he takes metoprolol, no history of cardiac arrhythmia or A. fib, no recent surgeries, patient also denies history of neck surgery, left neck or arm pain history of trauma, patient currently on doxycycline for soft tissue infection of possible cyst in the right deltoid area,  Upon our stroke team encounter, patient NIH dial down to 1, for left-sided numbness    LKW: 7 AM, 11/1/2020  TPA: Administered  Endovascular:   Not indicated  CT WO contrast: No acute abnormality  NIHSS: 1 for left-sided numbness   Anticoagulation: None       PAST MEDICAL HISTORY :       Past Medical History:        Diagnosis Date    Hypertension     Kidney stone        Past Surgical History:        Procedure Laterality Date    CATARACT REMOVAL Left        Social History:   Social History     Socioeconomic History    Marital status:      Spouse name: Not on file    Number of children: Not on file    Years of education: Not on file    Highest education level: Not on file   Occupational History    Not on file   Social Needs    Financial resource strain: Not on file    Food insecurity     Worry: Not on file     Inability: Not on file    Transportation needs     Medical: Not on file     Non-medical: Not on file   Tobacco Use    Smoking status: Never Smoker    Smokeless tobacco: Never Used   Substance and Sexual Activity    Alcohol use: Yes     Alcohol/week: 10.0 standard drinks     Types: 10 Cans of beer per week    Drug use: Not on file    Sexual activity: Not on file   Lifestyle    Physical activity     Days per week: Not on file     Minutes per session: Not on file    Stress: Not on file   Relationships    Social connections     Talks on phone: Not on file     Gets together: Not on file     Attends [employer] service: Not on file     Active member of club or organization: Not on file     Attends meetings of clubs or organizations: Not on file     Relationship status: Not on file    Intimate partner violence     Fear of current or ex partner: Not on file     Emotionally abused: Not on file     Physically abused: Not on file     Forced sexual activity: Not on file   Other Topics Concern    Not on file   Social History Narrative    Not on file       Family History:   History reviewed. No pertinent family history. Allergies:  Adhesive tape    Home Medications:  Prior to Admission medications    Medication Sig Start Date End Date Taking?  Authorizing Provider   aspirin 81 MG EC tablet Take 81 mg by mouth daily    Historical Provider, MD   Saw Alda, Serenoa repens, (BL SAW PALMETTO PO) Take 450 mg by mouth    Historical Provider, MD   b complex vitamins capsule Take 1 capsule by mouth daily    Historical Provider, MD   Lutein 10 MG TABS Take 25 mg by mouth    Historical Provider, MD   tamsulosin (FLOMAX) 0.4 MG capsule Take 0.4 mg by mouth daily    Historical Provider, MD   allopurinol (ZYLOPRIM) 300 MG tablet Take 300 mg by mouth daily    Historical Provider, MD   omeprazole (PRILOSEC) 20 MG delayed release capsule Take 20 mg by mouth daily    Historical Provider, MD   metoprolol tartrate (LOPRESSOR) 25 MG tablet Take 25 mg by mouth 2 times daily    Historical Provider, MD   Naproxen Sodium (ALEVE) 220 MG CAPS Take by mouth    Historical Provider, MD   doxycycline hyclate (VIBRAMYCIN) 100 MG capsule Take 100 mg by mouth 2 times daily    Historical Provider, MD       Current Medications:   No current facility-administered medications for this encounter. REVIEW OF SYSTEMS:       CONSTITUTIONAL: negative for fatigue and malaise   EYES: negative for double vision and photophobia    HEENT: negative for tinnitus and sore throat   RESPIRATORY: negative for cough, shortness of breath   CARDIOVASCULAR: negative for chest pain, palpitations   GASTROINTESTINAL: negative for nausea, vomiting   GENITOURINARY: negative for incontinence   MUSCULOSKELETAL: negative for neck or back pain   NEUROLOGICAL: negative for seizures   PSYCHIATRIC: negative for fatigue     Review of systems otherwise negative. PHYSICAL EXAM:       BP (!) 158/104   Pulse 90   Temp 97.2 °F (36.2 °C) (Oral)   Resp 17   Wt 258 lb (117 kg)   SpO2 97%   BMI 39.23 kg/m²     CONSTITUTIONAL:  Well developed, well nourished, alert and oriented x 3, in no acute distress. GCS 15, nontoxic. No dysarthria, no aphasia. EOMI.     HEAD:  normocephalic, atraumatic    EYES:  PERRLA, EOMI.   ENT:  moist mucous membranes   NECK:  supple, symmetric, no midline tenderness to palpation    BACK:  without midline tenderness, step-offs or deformities    LUNGS:  Equal air entry bilaterally   CARDIOVASCULAR:  normal s1 / s2   ABDOMEN:  Soft, no rigidity   NEUROLOGIC:  Mental Status:  A & O x3,awake             Cranial Nerves:    cranial nerves II-XII are grossly intact    Motor Exam:    Drift:  absent  Tone:  normal    Motor exam is symmetrical 5 out of 5 all extremities bilaterally, however left hand weakness, 3/5, patient was not able to grasp efficiently    Sensory:    Touch:    Right Upper Extremity:  normal  Left Upper Extremity:  abnormal -numbness  Right Lower Extremity:  normal  Left Lower Extremity:  normal    Deep Tendon Reflexes:    Right Bicep:  1+  Left Bicep:  2+  Right Knee:  2+  Left Knee:  2+    Plantar Response:  Right:   Not tested  Left:   Not tested    Clonus:  absent  Lozano's:  absent    Coordination/Dysmetria:  Heel to Shin:  Right:  normal  Left:  normal  Finger to Nose:   Right:  normal  Left:  normal   Dysdiadochokinesia:  absent    Gait:   Not tested    INITIAL NIH STROKE SCALE:        1a.  Level of consciousness:  0 - alert; keenly responsive  1b. Level of consciousness questions:  0 - answers both questions correctly  1c. Level of consciousness questions:  0 - performs both tasks correctly  2. Best Gaze:  0 - normal  3. Visual:  0 - no visual loss  4. Facial Palsy:  0 - normal symmetric movement  5a. Motor left arm:  0 - no drift, limb holds 90 (or 45) degrees for full 10 seconds  5b. Motor right arm:  0 - no drift, limb holds 90 (or 45) degrees for full 10 seconds  6a. Motor left le - no drift; leg holds 30 degree position for full 5 seconds  6b. Motor right le - no drift; leg holds 30 degree position for full 5 seconds  7. Limb Ataxia:  0 - absent  8. Sensory:  1 - mild to moderate sensory loss; patient feels pinprick is less sharp or is dull on the affected side; there is a loss of superficial pain with pinprick but patient is aware of being touched   9. Best Language:  0 - no aphasia, normal  10. Dysarthria:  0 - normal  11.   Extinction and Inattention:  0 - no abnormality    TOTAL:  0     SKIN:  no rash      LABS AND IMAGING:     CBC with Differential:    Lab Results   Component Value Date    WBC 8.1 2020    RBC 5.61 2020    RBC 4.77 2011    HGB 11.4 2020    HCT 36.3 2020    PLT See Reflexed IPF Result 2020     11/13/2011    MCV 64.7 11/01/2020    MCH 20.3 11/01/2020    MCHC 31.4 11/01/2020    RDW 18.1 11/01/2020    LYMPHOPCT 19 11/01/2020    MONOPCT 5 11/01/2020    BASOPCT 1 11/01/2020    MONOSABS 0.41 11/01/2020    LYMPHSABS 1.54 11/01/2020    EOSABS 0.08 11/01/2020    BASOSABS 0.08 11/01/2020    DIFFTYPE NOT REPORTED 11/01/2020     BMP:    Lab Results   Component Value Date     11/01/2020    K 4.2 11/01/2020     11/01/2020    CO2 23 11/01/2020    BUN 21 11/01/2020    LABALBU 4.4 11/01/2020    CREATININE 0.83 11/01/2020    CALCIUM 9.3 11/01/2020    GFRAA >60 11/01/2020    LABGLOM >60 11/01/2020    GLUCOSE 160 11/01/2020    GLUCOSE 156 11/13/2011       ASSESSMENT AND PLAN:       Patient Active Problem List   Diagnosis    Stroke aborted by administration of thrombolytic agent [facility])       The patient is a 70 y.o. male who presents with left upper extremity weakness, presented outside hospital, subjective paresthesias/numbness as well as paresthesias in the left face and lips, last known well at 7:00 AM, he recognized the symptoms while scratching his wife's back, patient brought to [facility], initial CT: No intracranial bleed, glucose was within normal limits to hyperglycemic, Dr. Lorrain Spatz was consulted who agreed to give TPA,  Initial NIH was 2, patient has no history of CVA, no recent head injury, no blood thinner, patient is on aspirin daily, he did not take it today, he has a history of hypertension for which he takes metoprolol, no history of cardiac arrhythmia or A. fib, no recent surgeries, patient also denies history of neck surgery, left neck or arm pain history of trauma, patient currently on doxycycline for soft tissue infection of possible cyst in the right deltoid area,  Upon our stroke team encounter, patient NIH dial down to 1, for left-sided numbness      - Discussed with Dr. Lorrain Spatz, after reviewing the CTA, no alarming findings, no LVO, patient will go to the ICU for post TPA monitoring    Imaging  - CTH WO: Unremarkable   - MRI Brain WO: Ordered   - CTA H&N: No acute abnormality or flow-limiting stenosis of the major arteries, mild airspace opacities in the anterior right upper lobe,     Medications   -Hold on anticoagulation and antiplatelets status post TPA              -TPA administered     Labs   - Telemetry    - Neuro checks per protocol  - We recommend SBP <180  - Blood glucose goal less than 180  - Please avoid dextrose containing solutions     Additional recommendations may follow    Please contact [facility] with any changes in patients neurologic status.            Francy Kong MD   11/1/2020  1:08 PM

## 2020-11-01 NOTE — ED PROVIDER NOTES
[facility]  Emergency Department  Faculty Attestation     I performed a history and physical examination of the patient and discussed management with the resident. I reviewed the residents note and agree with the documented findings and plan of care. Any areas of disagreement are noted on the chart. I was personally present for the key portions of any procedures. I have documented in the chart those procedures where I was not present during the key portions. I have reviewed the emergency nurses triage note. I agree with the chief complaint, past medical history, past surgical history, allergies, medications, social and family history as documented unless otherwise noted below. For Physician Assistant/ Nurse Practitioner cases/documentation I have personally evaluated this patient and have completed at least one if not all key elements of the E/M (history, physical exam, and MDM). Additional findings are as noted. Primary Care Physician:  Angelique Johnson MD    Screenings:  [unfilled]    CHIEF COMPLAINT     No chief complaint on file. RECENT VITALS:    ,  Pulse: 92,  , BP: (!) 114/92    LABS:  Labs Reviewed   STROKE PANEL - Abnormal; Notable for the following components:       Result Value    Hemoglobin 11.4 (*)     Hematocrit 36.3 (*)     MCV 64.7 (*)     MCH 20.3 (*)     RDW 18.1 (*)     All other components within normal limits   IMMATURE PLATELET FRACTION       Radiology  CTA HEAD NECK W CONTRAST    (Results Pending)       CRITICAL CARE: There was a high probability of clinically significant/life threatening deterioration in this patient's condition which required my urgent intervention. Total critical care time was 5 minutes. This excludes any time for separately reportable procedures. EKG:      Attending Physician Additional  Notes      Patient transferred from outside hospital for strokelike syndrome.   He was in his normal state this morning at 7 when he had onset of left upper extremity paresthesias and subtle weakness. No involvement of the leg or face. No change in speech. No change in vision. He does have right vision deficits chronically. No palpitations or chest pain. CT brain is negative. NIH stroke score was 2. Decision was made to start TPA which was bolused at 1045 and infusion started at 11 AM.  Patient developed hypertension during transport and was given a dose of Lopressor in route. On arrival here patient is GCS of 15 and in no distress. Vital signs are normal.  Normal pupils conjugate gaze facial symmetry. Normal pulses. Impression is left upper extremity neurologic deficits consider stroke versus cervicogenic issue. Plan is CT angiogram, anticipate admission, MRI. Gabriella Barrett.  Preeti Lockhart MD, [address] [facility]  Attending Emergency  Physician               Brianna Prince MD  11/01/20 1210

## 2020-11-01 NOTE — FLOWSHEET NOTE
Assessment:  Patient is a 70 y.o. male in ED #12. Patient welcomed  and shared his positive expectations. Patient engaged in conversation and shared feelings. Intervention:   escorted patient's family to ED #12.  provided space for patient and family members to share their     11/01/20 1419   Encounter Summary   Services provided to: Patient;Patient and family together   Referral/Consult From: Nurse   Support System Spouse; Children   Continue Visiting   (11/1/2020)   Complexity of Encounter Low   Length of Encounter 30 minutes   Spiritual Assessment Completed Yes   Routine   Type Follow up   Assessment Calm; Approachable   Intervention Active listening;Sustaining presence/ Ministry of presence   Outcome Expressed gratitude    story. Outcome:  Family expressed gratitude for support and prayer. Plan:  Chaplains will remain available for spiritual and emotional support as needed.

## 2020-11-01 NOTE — H&P
[facility]   [address] [place] [address]    HISTORY AND PHYSICAL EXAMINATION            Date:   11/1/2020  Patient name:  Mir Kirby  Date of admission:  11/1/2020 11:45 AM  MRN:   [id number]  Account:  [de-identified]  YOB: 1945  PCP:    Angelique Johnson MD  Room:   12/12  Code Status:    No Order    Chief Complaint:     Right-sided numbness    History Obtained From:     Patient. History of Present Illness: The patient is a right-handed 70 y.o. Non-/non  male who presents with No chief complaint on file. and he is admitted to the hospital for the management of left hand numbness and weakness. Patient has a past medical history of gout, hypertension and a leaky valve who states that approximately 5:30 AM after coming back from the bathroom, he was scratching his wife's back and subsequently noted right hand numbness. Initially the numbness began in the left thumb and progressed to his first and second digit and then midway up his wrist.  He waited a little while to see if his symptoms improve but that his wife drove him to OSH and he was subsequently transferred after receiving TPA. His NIH stroke scale was 2 and CT head/CTA of the head and neck did not show any vessel occlusion. Patient only takes aspirin 81 mg recommended by his primary care physician years ago. He does not have any history of stroke or seizures. He denies any visual changes or speech impairment or similar symptoms in the past.  After receiving TPA, patient was transferred to [facility] for further management. His current NIH stroke scale is 0 and his symptoms have mostly dissipated. At baseline, he is functional and able to perform his daily activities without any significant impairment. Of note, patient had had carpal tunnel surgery years ago on the right hand.     NIH stroke scale-2 for left forearm/hand sensory impairment and left hand weakness. Past Medical History:     Past Medical History:   Diagnosis Date    Hypertension     Kidney stone         Past Surgical History:     Past Surgical History:   Procedure Laterality Date    CATARACT REMOVAL Left         Medications Prior to Admission:     Prior to Admission medications    Medication Sig Start Date End Date Taking? Authorizing Provider   aspirin 81 MG EC tablet Take 81 mg by mouth daily    Historical Provider, MD   Saw [place], Serenoa repens, (BL SAW PALMETTO PO) Take 450 mg by mouth    Historical Provider, MD   b complex vitamins capsule Take 1 capsule by mouth daily    Historical Provider, MD   Lutein 10 MG TABS Take 25 mg by mouth    Historical Provider, MD   tamsulosin (FLOMAX) 0.4 MG capsule Take 0.4 mg by mouth daily    Historical Provider, MD   allopurinol (ZYLOPRIM) 300 MG tablet Take 300 mg by mouth daily    Historical Provider, MD   omeprazole (PRILOSEC) 20 MG delayed release capsule Take 20 mg by mouth daily    Historical Provider, MD   metoprolol tartrate (LOPRESSOR) 25 MG tablet Take 25 mg by mouth 2 times daily    Historical Provider, MD   Naproxen Sodium (ALEVE) 220 MG CAPS Take by mouth    Historical Provider, MD   doxycycline hyclate (VIBRAMYCIN) 100 MG capsule Take 100 mg by mouth 2 times daily    Historical Provider, MD        Allergies:     Adhesive tape    Social History:     Tobacco:    reports that he has never smoked. He has never used smokeless tobacco.  Alcohol:      reports current alcohol use of about 10.0 standard drinks of alcohol per week. Drug Use:  has no history on file for drug. Family History:     History reviewed. No pertinent family history.     Review of Systems:     ROS:  Constitutional  Negative for fever and chills    HEENT  Negative for ear discharge, ear pain, nosebleed    Eyes  Negative for photophobia, pain and discharge    Respiratory  Negative for hemoptysis and sputum    Cardiovascular  Negative for orthopnea, claudication and PND Gastrointestinal  Negative for abdominal pain, diarrhea, blood in stool    Musculoskeletal  Negative for joint pain, negative for myalgia    Skin  Negative for rash or itching    Neurological Negative for seizures, weakness, headache, dysarthria, aphasia   Endo/heme/allergies  Negative for polydipsia, environmental allergy    Psychiatric/behavioral  Negative for suicidal ideation. Patient is not anxious        Physical Exam:   BP (!) 149/95   Pulse 99   Temp 97.2 °F (36.2 °C) (Oral)   Resp 19   Wt 258 lb (117 kg)   SpO2 96%   BMI 39.23 kg/m²   Temp (24hrs), Av.9 °F (36.6 °C), Min:97.2 °F (36.2 °C), Max:98.6 °F (37 °C)    Recent Labs     20  0821   POCGLU 162*     No intake or output data in the 24 hours ending 20 1445    General Appearance:  alert, well appearing, and in no acute distress  HEENT:  normocephalic, atraumatic. Lungs: Bilateral equal air entry, clear to ausculation, no wheezing, rales or rhonchi, normal effort  Cardiovascular: normal rate, regular rhythm, no murmur, gallop, rub. Abdomen: Soft, nontender, nondistended, normal bowel sounds, no hepatomegaly or splenomegaly    NEUROLOGIC EXAMINATION  MENTAL STATUS:  Alert, Oriented x 3 (Person, Place, Time),    Good Mood, Intact memory (Recent, Remote), No confusion,    Normal speech, Normal language (Spontaneous, Comprehension, Naming, Repetition, Reading, Writing)   No hallucination or delusion   Appropriate, Follows 1, 2, 3 step command, cross over   Normal Glabellar Response   CRANIAL NERVES: II     -      Visual fields intact to confrontation (blink to threat) and reporting fingers in all 4 quadrants on each eye, Negative visual extinction  Visual Acuity: Not performed  Fundoscopic Exam: Not tested  Pupillary Reflex (2 & 3): Consensual and corneal reflex present.     III,IV,VI -  EOMs full intact, no ptosis, no diplopia, no nystagmus    V     -     Normal facial sensation bilaterally    VII    -     Normal facial symmetry    VIII   -     Intact hearing bilaterally (Finger Rub, Rinne's and Donnis Agus)    IX,X -     Symmetrical palate    XI    -     Symmetrical shoulder shrug    XII   -     Midline tongue, no atrophy   MOTOR FUNCTION: Observation: No fasciculations, no atrophy, No tremors/involuntary movements in all 4 extremities proximally and distally    Passive Movement: Normal tone and bulk in all 4 extremities proximally and distally    Active Movement:  RUE: Significant for good strength of grade 5/5 in proximal muscle groups on abduction, adduction, flexion, extension, internal and external rotation and grade 5/5 on distal muscle groups on extension and flexion, patient does have slight delay in right distal arm movements.     LUE: Significant for good strength of grade 5/5 in proximal muscle groups on abduction, adduction, flexion, extension, internal and external rotation and grade 5/5 on distal muscle groups on extension and flexion    RLE: Significant for good strength of grade 5/5 in proximal muscle groups on abduction, adduction, flexion, extension, internal and external rotation and grade 5/5 on distal muscle groups on extension and flexion    LLE: Significant for good strength of grade 5/5 in proximal muscle groups on abduction, adduction, flexion, extension, internal and external rotation and grade 5/5 on distal muscle groups on extension and flexion    Drift: No pronator drift   SENSORY FUNCTION:  RUE: Normal sensation to light touch, temperature, pin prick, vibration, proprioception (Joint position sense)     LUE: Normal sensation to light touch, temperature, pin prick, vibration, proprioception(Joint position sense)     RLE: Normal sensation to light touch, temperature, pin prick, vibration, proprioception(Joint position sense)     LLE:Normal sensation to light touch, temperature, pin prick, vibration, proprioception(Joint position sense)   CEREBELLAR FUNCTION:  Intact fine motor control over bilateral upper extremities and bilateral lower extremities (finger to nose, rapid alternating hand movement, finger tapping and heel to shin)   REFLEX FUNCTION:  Right Triceps (C7): 2/2                                             Left Tricep (C7): 2/2   Right Bicep (C5, C6): 2/2                                         Left Bicep (C5, C6): 2/2   Right Brachiocephalic (C6): 2/2                               Left Brachiocephalic (C6): 2/2     Right Patella (L4): 2/2                                              Left Patella (L4): 2/2   Right Achilles (S1): 2/2                                            Left Achilles (S1): 2/2           Negative Kerning & Brudzinski   STATION and GAIT  Normal gait and tandem station, normal tip toes and heel walking, Negative Romberg's     Investigations:      Laboratory Testing:  Recent Results (from the past 24 hour(s))   Glucose, Whole Blood    Collection Time: 11/01/20  8:21 AM   Result Value Ref Range    POC Glucose 162 (H) 65 - 99 mg/dL   CBC Auto Differential    Collection Time: 11/01/20  8:25 AM   Result Value Ref Range    WBC 5.7 3.5 - 11.0 k/uL    RBC 5.04 4.5 - 5.9 m/uL    Hemoglobin 10.6 (L) 13.5 - 17.5 g/dL    Hematocrit 33.1 (L) 41 - 53 %    MCV 65.7 (L) 80 - 100 fL    MCH 21.0 (L) 26 - 34 pg    MCHC 32.0 31 - 37 g/dL    RDW 17.5 (H) 12.1 - 15.2 %    Platelets 381 439 - 274 k/uL    MPV NOT REPORTED 6.0 - 12.0 fL    NRBC Automated NOT REPORTED per 100 WBC    Differential Type NOT REPORTED     Immature Granulocytes NOT REPORTED 0 %    Absolute Immature Granulocyte NOT REPORTED 0.00 - 0.30 k/uL    WBC Morphology NOT REPORTED     RBC Morphology NOT REPORTED     Platelet Estimate NOT REPORTED     Seg Neutrophils 62 39 - 75 %    Lymphocytes 27 13 - 44 %    Monocytes 6 5 - 9 %    Eosinophils % 5 0 - 5 %    Basophils 0 0 - 2 %    Segs Absolute 3.60 2.1 - 6.5 k/uL    Absolute Lymph # 1.50 1.0 - 4.8 k/uL    Absolute Mono # 0.30 0.0 - 1.0 k/uL    Absolute Eos # 0.30 0.0 - 0.4 k/uL    Basophils Absolute 0.00 0.0 - 0.2 k/uL    Morphology MODERATE MICROCYTOSIS     Morphology SLIGHT ANISOCYTOSIS    Comprehensive Metabolic Panel w/ Reflex to MG    Collection Time: 11/01/20  8:25 AM   Result Value Ref Range    Glucose 171 (H) 70 - 99 mg/dL    BUN 23 8 - 23 mg/dL    CREATININE 1.02 0.70 - 1.20 mg/dL    Bun/Cre Ratio 23 (H) 9 - 20    Calcium 9.8 8.6 - 10.4 mg/dL    Sodium 139 135 - 144 mmol/L    Potassium 4.2 3.7 - 5.3 mmol/L    Chloride 102 98 - 107 mmol/L    CO2 24 20 - 31 mmol/L    Anion Gap 13 9 - 17 mmol/L    Alkaline Phosphatase 72 40 - 129 U/L    ALT 26 5 - 41 U/L    AST 28 <40 U/L    Total Bilirubin 1.14 0.30 - 1.20 mg/dL    Total Protein 7.3 6.4 - 8.3 g/dL    Alb 4.4 3.5 - 5.2 g/dL    Albumin/Globulin Ratio NOT REPORTED 1.0 - 2.5    GFR Non-African American >60 >60 mL/min    GFR African American >60 >60 mL/min    GFR Comment          GFR Staging NOT REPORTED    Troponin    Collection Time: 11/01/20  8:25 AM   Result Value Ref Range    Troponin, High Sensitivity NOT REPORTED 0 - 22 ng/L    Troponin T <0.03 <0.03 ng/mL    Troponin Interp         Protime-INR    Collection Time: 11/01/20  8:25 AM   Result Value Ref Range    Protime 13.6 11.5 - 14.2 sec    INR 1.1    EKG 12 Lead    Collection Time: 11/01/20  8:27 AM   Result Value Ref Range    Ventricular Rate 91 BPM    Atrial Rate 91 BPM    P-R Interval 260 ms    QRS Duration 102 ms    Q-T Interval 368 ms    QTc Calculation (Bazett) 452 ms    P Axis 35 degrees    R Axis 28 degrees    T Axis 19 degrees   POCT Glucose    Collection Time: 11/01/20  8:35 AM   Result Value Ref Range    QC OK? 162    COVID-19, PCR    Collection Time: 11/01/20  9:05 AM    Specimen: Other   Result Value Ref Range    SARS-CoV-2          SARS-CoV-2, Rapid Not Detected Not Detected    Source . THROAT     SARS-CoV-2         STROKE PANEL    Collection Time: 11/01/20 11:55 AM   Result Value Ref Range    Glucose 160 (H) 70 - 99 mg/dL    BUN 21 8 - 23 mg/dL    CREATININE 0.83 0.70 - 1.20 mg/dL    Bun/Cre Ratio NOT REPORTED 9 - 20    Calcium 9.3 8.6 - 10.4 mg/dL    Sodium 135 135 - 144 mmol/L    Potassium 4.2 3.7 - 5.3 mmol/L    Chloride 100 98 - 107 mmol/L    CO2 23 20 - 31 mmol/L    Anion Gap 12 9 - 17 mmol/L    GFR Non-African American >60 >60 mL/min    GFR African American >60 >60 mL/min    GFR Comment          GFR Staging NOT REPORTED     WBC 8.1 3.5 - 11.3 k/uL    RBC 5.61 4.21 - 5.77 m/uL    Hemoglobin 11.4 (L) 13.0 - 17.0 g/dL    Hematocrit 36.3 (L) 40.7 - 50.3 %    MCV 64.7 (L) 82.6 - 102.9 fL    MCH 20.3 (L) 25.2 - 33.5 pg    MCHC 31.4 28.4 - 34.8 g/dL    RDW 18.1 (H) 11.8 - 14.4 %    Platelets See Reflexed IPF Result 138 - 453 k/uL    MPV NOT REPORTED 8.1 - 13.5 fL    NRBC Automated 0.0 0.0 per 100 WBC    Total CK 57 39 - 308 U/L    CK-MB 2.9 <10.5 ng/mL    % CKMB 5.1 (H) 0.0 - 3.5 %    CKMB Interpretation NORMAL ISOENZYME PATTERN     Differential Type NOT REPORTED     WBC Morphology NOT REPORTED     RBC Morphology NOT REPORTED     Platelet Estimate NOT REPORTED     Immature Granulocytes 1 (H) 0 %    Seg Neutrophils 73 (H) 36 - 65 %    Lymphocytes 19 (L) 24 - 43 %    Monocytes 5 3 - 12 %    Eosinophils % 1 1 - 4 %    Basophils 1 0 - 2 %    Absolute Immature Granulocyte 0.08 0.00 - 0.30 k/uL    Segs Absolute 5.91 1.50 - 8.10 k/uL    Absolute Lymph # 1.54 1.10 - 3.70 k/uL    Absolute Mono # 0.41 0.10 - 1.20 k/uL    Absolute Eos # 0.08 0.00 - 0.44 k/uL    Basophils Absolute 0.08 0.00 - 0.20 k/uL    Morphology ANISOCYTOSIS PRESENT     Morphology MICROCYTOSIS PRESENT     Morphology 1+ ELLIPTOCYTES     Morphology 1+ TEARDROPS     Myoglobin 30 28 - 72 ng/mL    Protime 10.1 9.0 - 12.0 sec    INR 1.0     PTT 23.5 20.5 - 30.5 sec    Troponin, High Sensitivity 18 0 - 22 ng/L    Troponin T NOT REPORTED <0.03 ng/mL    Troponin Interp NOT REPORTED    Immature Platelet Fraction    Collection Time: 11/01/20 11:55 AM   Result Value Ref Range    Platelet, Immature Fraction 5.8 1.1 - 10.3 %    Platelet, Fluorescence 187 138 - 453 k/uL       Imaging/Diagnostics:    1. Assessment & Differential Dx:      Primary Problem  <principal problem not specified>   Ischemic stroke    Active Hospital Problems    Diagnosis Date Noted    Stroke aborted by administration of thrombolytic agent [facility]) [I63.9] 11/01/2020     42-year-old male with PMH of hypertension who presents after developing sudden onset of numbness in his left hand. Patient has had no similar symptoms in the past and does not have any history of stroke or seizures. He is s/p TPA and currently does not display any focal neurological deficits after being transferred to [facility]. Marcel[facility]. Etiology remains unclear, patient does have some risk factors including hypertension but is not a smoker. Additional stroke work-up is ongoing, patient will be admitted to the ICU x24 hours with a repeat CT pending at that time. Plan:     Neurological-ischemic stroke s/p TPA. Monitor for any neurological changes. Neurochecks as recommended. Follow-up MRI  Repeat CT head in 24 hours. Hold anticoagulation till repeat CT in 24 hours. Stroke work-up including. TSH/A1c/lipid profile. Echocardiogram with bubble study  PT/OT    Cardiovascular. Continue on telemetry as directed. Permissive hypertension x24 hours and then normalize  Labetalol/hydralazine as needed if BP out of parameters. Cleviprex drip if BP persistently elevated. Echocardiogram with bubble study pending  Resume BP medications in a.m. Respiratory. Monitor for any changes in patient's respiratory status. Maintain O2 saturation greater than 94%    GI.  GI prophylaxis. BM regimen. Nursing swallow evaluation and if passed start p.o. diet. Renal.  Follow-up BUN/creatinine. Monitor urine output    Hematology. Follow-up CBC in a.m. Endocrine. Follow-up A1c and TSH    ID. Monitor for hypo and hyperthermia. Tylenol as needed if T-max greater than 101.   Panculture if

## 2020-11-02 ENCOUNTER — APPOINTMENT (OUTPATIENT)
Dept: CT IMAGING | Age: 75
DRG: 065 | End: 2020-11-02
Payer: MEDICARE

## 2020-11-02 LAB
CHOLESTEROL/HDL RATIO: 3.7
CHOLESTEROL: 141 MG/DL
EKG ATRIAL RATE: 91 BPM
EKG P AXIS: 35 DEGREES
EKG P-R INTERVAL: 260 MS
EKG Q-T INTERVAL: 368 MS
EKG QRS DURATION: 102 MS
EKG QTC CALCULATION (BAZETT): 452 MS
EKG R AXIS: 28 DEGREES
EKG T AXIS: 19 DEGREES
EKG VENTRICULAR RATE: 91 BPM
ESTIMATED AVERAGE GLUCOSE: 120 MG/DL
HBA1C MFR BLD: 5.8 % (ref 4–6)
HCT VFR BLD CALC: 33 % (ref 40.7–50.3)
HDLC SERPL-MCNC: 38 MG/DL
HEMOGLOBIN: 10.2 G/DL (ref 13–17)
LDL CHOLESTEROL: 77 MG/DL (ref 0–130)
MCH RBC QN AUTO: 20.6 PG (ref 25.2–33.5)
MCHC RBC AUTO-ENTMCNC: 30.9 G/DL (ref 28.4–34.8)
MCV RBC AUTO: 66.7 FL (ref 82.6–102.9)
NRBC AUTOMATED: 0 PER 100 WBC
PDW BLD-RTO: 17.9 % (ref 11.8–14.4)
PLATELET # BLD: ABNORMAL K/UL (ref 138–453)
PLATELET, FLUORESCENCE: 149 K/UL (ref 138–453)
PLATELET, IMMATURE FRACTION: 5.2 % (ref 1.1–10.3)
PMV BLD AUTO: ABNORMAL FL (ref 8.1–13.5)
RBC # BLD: 4.95 M/UL (ref 4.21–5.77)
TRIGL SERPL-MCNC: 130 MG/DL
TROPONIN INTERP: ABNORMAL
TROPONIN T: ABNORMAL NG/ML
TROPONIN, HIGH SENSITIVITY: 23 NG/L (ref 0–22)
VLDLC SERPL CALC-MCNC: ABNORMAL MG/DL (ref 1–30)
WBC # BLD: 6.1 K/UL (ref 3.5–11.3)

## 2020-11-02 PROCEDURE — 2580000003 HC RX 258: Performed by: PSYCHIATRY & NEUROLOGY

## 2020-11-02 PROCEDURE — 6370000000 HC RX 637 (ALT 250 FOR IP): Performed by: PSYCHIATRY & NEUROLOGY

## 2020-11-02 PROCEDURE — 94761 N-INVAS EAR/PLS OXIMETRY MLT: CPT

## 2020-11-02 PROCEDURE — 85055 RETICULATED PLATELET ASSAY: CPT

## 2020-11-02 PROCEDURE — 99232 SBSQ HOSP IP/OBS MODERATE 35: CPT | Performed by: PSYCHIATRY & NEUROLOGY

## 2020-11-02 PROCEDURE — 84484 ASSAY OF TROPONIN QUANT: CPT

## 2020-11-02 PROCEDURE — 97166 OT EVAL MOD COMPLEX 45 MIN: CPT

## 2020-11-02 PROCEDURE — 83036 HEMOGLOBIN GLYCOSYLATED A1C: CPT

## 2020-11-02 PROCEDURE — 36415 COLL VENOUS BLD VENIPUNCTURE: CPT

## 2020-11-02 PROCEDURE — 97162 PT EVAL MOD COMPLEX 30 MIN: CPT

## 2020-11-02 PROCEDURE — 2580000003 HC RX 258: Performed by: STUDENT IN AN ORGANIZED HEALTH CARE EDUCATION/TRAINING PROGRAM

## 2020-11-02 PROCEDURE — 93010 ELECTROCARDIOGRAM REPORT: CPT | Performed by: INTERNAL MEDICINE

## 2020-11-02 PROCEDURE — 97535 SELF CARE MNGMENT TRAINING: CPT

## 2020-11-02 PROCEDURE — 70450 CT HEAD/BRAIN W/O DYE: CPT

## 2020-11-02 PROCEDURE — 97530 THERAPEUTIC ACTIVITIES: CPT

## 2020-11-02 PROCEDURE — 6370000000 HC RX 637 (ALT 250 FOR IP): Performed by: STUDENT IN AN ORGANIZED HEALTH CARE EDUCATION/TRAINING PROGRAM

## 2020-11-02 PROCEDURE — 6360000002 HC RX W HCPCS: Performed by: STUDENT IN AN ORGANIZED HEALTH CARE EDUCATION/TRAINING PROGRAM

## 2020-11-02 PROCEDURE — 80061 LIPID PANEL: CPT

## 2020-11-02 PROCEDURE — 92523 SPEECH SOUND LANG COMPREHEN: CPT

## 2020-11-02 PROCEDURE — 85027 COMPLETE CBC AUTOMATED: CPT

## 2020-11-02 PROCEDURE — 2060000000 HC ICU INTERMEDIATE R&B

## 2020-11-02 RX ORDER — DIPHENHYDRAMINE HYDROCHLORIDE 50 MG/ML
25 INJECTION INTRAMUSCULAR; INTRAVENOUS NIGHTLY PRN
Status: DISCONTINUED | OUTPATIENT
Start: 2020-11-02 | End: 2020-11-03 | Stop reason: HOSPADM

## 2020-11-02 RX ORDER — ASPIRIN 325 MG
325 TABLET ORAL DAILY
Status: DISCONTINUED | OUTPATIENT
Start: 2020-11-02 | End: 2020-11-03 | Stop reason: HOSPADM

## 2020-11-02 RX ADMIN — METOPROLOL TARTRATE 25 MG: 50 TABLET, FILM COATED ORAL at 20:57

## 2020-11-02 RX ADMIN — PANTOPRAZOLE SODIUM 40 MG: 40 TABLET, DELAYED RELEASE ORAL at 06:08

## 2020-11-02 RX ADMIN — DOXYCYCLINE HYCLATE 100 MG: 100 TABLET ORAL at 20:57

## 2020-11-02 RX ADMIN — SODIUM CHLORIDE, PRESERVATIVE FREE 10 ML: 5 INJECTION INTRAVENOUS at 20:58

## 2020-11-02 RX ADMIN — ASPIRIN 325 MG: 325 TABLET ORAL at 17:15

## 2020-11-02 RX ADMIN — SODIUM CHLORIDE: 9 INJECTION, SOLUTION INTRAVENOUS at 06:21

## 2020-11-02 RX ADMIN — ATORVASTATIN CALCIUM 80 MG: 80 TABLET, FILM COATED ORAL at 20:57

## 2020-11-02 RX ADMIN — ALLOPURINOL 300 MG: 300 TABLET ORAL at 07:59

## 2020-11-02 RX ADMIN — DOXYCYCLINE HYCLATE 100 MG: 100 TABLET ORAL at 08:00

## 2020-11-02 RX ADMIN — ENOXAPARIN SODIUM 40 MG: 40 INJECTION SUBCUTANEOUS at 17:15

## 2020-11-02 RX ADMIN — METOPROLOL TARTRATE 25 MG: 50 TABLET, FILM COATED ORAL at 08:00

## 2020-11-02 ASSESSMENT — PAIN SCALES - GENERAL
PAINLEVEL_OUTOF10: 0

## 2020-11-02 NOTE — PLAN OF CARE
Problem: Infection:  Goal: Will remain free from infection  Description: Will remain free from infection  Outcome: Met This Shift     Problem: Safety:  Goal: Free from accidental physical injury  Description: Free from accidental physical injury  Outcome: Met This Shift  Goal: Free from intentional harm  Description: Free from intentional harm  Outcome: Met This Shift     Problem: Daily Care:  Goal: Daily care needs are met  Description: Daily care needs are met  Outcome: Met This Shift     Problem: Discharge Planning:  Goal: Patients continuum of care needs are met  Description: Patients continuum of care needs are met  Outcome: Met This Shift

## 2020-11-02 NOTE — PROGRESS NOTES
atelectasis versus pneumonia (series 3, image 1). There is bronchial wall thickening, likely related to small airway disease or bronchiolitis. There are calcified mediastinal lymph nodes, likely reactive to prior granulomatous disease. No cervical or superior mediastinal lymphadenopathy. The larynx and pharynx are unremarkable. No acute abnormality of the salivary and thyroid glands. BONES: No acute osseous abnormality. CTA HEAD: ANTERIOR CIRCULATION: No significant stenosis of the intracranial internal carotid, anterior cerebral, or middle cerebral arteries. No aneurysm. POSTERIOR CIRCULATION: No significant stenosis of the vertebral, basilar, or posterior cerebral arteries. No aneurysm. OTHER: No dural venous sinus thrombosis on this non-dedicated study. BRAIN: No mass effect or midline shift. No extra-axial fluid collection. The gray-white differentiation is maintained. No acute abnormality or flow-limiting stenosis of the major arteries of the head and neck. Mild airspace opacity in the anterior right upper lobe, may be related to atelectasis versus pneumonia. Results were sent to radiology results communication. Mri Brain Wo Contrast    Result Date: 11/1/2020  EXAMINATION: MRI OF THE BRAIN WITHOUT CONTRAST  11/1/2020 1:16 pm TECHNIQUE: Multiplanar multisequence MRI of the brain was performed without the administration of intravenous contrast. COMPARISON: None. HISTORY: ORDERING SYSTEM PROVIDED HISTORY: stroke TECHNOLOGIST PROVIDED HISTORY: stroke FINDINGS: INTRACRANIAL STRUCTURES/VENTRICLES: Acute cortical infarcts are present within the right pre central and post central gyri. There is also an acute lacunar infarct within the adjacent right centrum semiovale. No mass effect or midline shift. No evidence of an acute intracranial hemorrhage. There is generalized volume loss with minimal chronic white matter microvascular ischemic change. The sellar/suprasellar regions appear unremarkable.   The normal signal voids within the major intracranial vessels appear maintained. ORBITS: The visualized portion of the orbits demonstrate no acute abnormality. SINUSES: The visualized paranasal sinuses and mastoid air cells are well aerated. BONES/SOFT TISSUES: The bone marrow signal intensity appears normal. The soft tissues demonstrate no acute abnormality. Acute perirolandic microinfarcts, on the right-hand side. The findings were sent to the Radiology Results [address] at 2:10 pm on 11/1/2020to be communicated to a licensed caregiver.      Assessment//Plan           Hospital Problems           Last Modified POA    Stroke aborted by administration of thrombolytic agent ([facility] [address] 11/1/2020 Yes    Acute ischemic stroke ([facility] [address] 11/1/2020 Yes        Assessment & Plan    Electronically signed by Delroy Brennan RN on 11/2/20 at 3:26 AM EST

## 2020-11-02 NOTE — ED NOTES
Attempted report to 5B. RN to call back when available for report.      Kya Hamilton RN  11/01/20 3029

## 2020-11-02 NOTE — PROGRESS NOTES
time to complete  UE Dressing: Stand by assistance; Increased time to complete  LE Dressing: Stand by assistance; Increased time to complete  Toileting: Stand by assistance; Increased time to complete  Additional Comments: Some L hand coordination deficits noted during standing oral care task, pt switched toothpaste to right hand to squeeze it, pt sat for successful BM and performed own sitting bottom-care, pt performed [address] activity using only LUE, and feeding activity at end of session with HOB elevated  Tone RUE  RUE Tone: Normotonic  Tone LUE  LUE Tone: Normotonic  Coordination  Movements Are Fluid And Coordinated: No  Coordination and Movement description: Left UE;Decreased speed;Decreased accuracy  Quality of Movement Other  Comment: Slow finger-to-thumb test, pt performed 6 min [address] func activity with only LUE this date with fair/good results     Bed mobility  Supine to Sit: Contact guard assistance  Sit to Supine: Supervision  Scooting: Supervision  Comment: Pt supine in bed with HOB elevated upon arrival/exit this date  Transfers  Sit to stand: Contact guard assistance  Stand to sit: Stand by assistance     Cognition  Overall Cognitive Status: WFL        Sensation  Overall Sensation Status: WFL      LUE AROM (degrees)  LUE AROM : WFL  RUE AROM (degrees)  RUE AROM : WFL  LUE Strength  Gross LUE Strength: WFL  L Hand General: 5/5  RUE Strength  Gross RUE Strength: WFL  R Hand General: 5/5      Plan   Plan  Times per week: 3-5x    AM-PAC Score        AM-PAC Inpatient Daily Activity Raw Score: 19 (11/02/20 1033)  AM-PAC Inpatient ADL T-Scale Score : 40.22 (11/02/20 1033)  ADL Inpatient CMS 0-100% Score: 42.8 (11/02/20 1033)  ADL Inpatient CMS G-Code Modifier : CK (11/02/20 1033)    Goals  Short term goals  Time Frame for Short term goals: Pt will by discharge  Short term goal 1: demo good safety awareness during func mob around room using LRD and mod I  Short term goal 2: demo [address] func activity using only LUE for 9 min+ to improve ADL performance  Short term goal 3: demo ADL UB/LB dressing/bathing activity using adaptive tech's and mod I only  Short term goal 4: demo bending/reaching func activity using LRD and SUP     Therapy Time   Individual Concurrent Group Co-treatment   Time In 0843         Time Out 0935         Minutes 52         Timed Code Treatment Minutes: [address], OTR/L

## 2020-11-02 NOTE — PROGRESS NOTES
Daily Progress Note  Neuro Critical Care    Patient Name: Gracie Edwards  Patient : 1945  Room/Bed: 8857/9377-03  Code Status: full code  Allergies: Allergies   Allergen Reactions    Adhesive Tape Itching       CHIEF COMPLAINT:     Left hand numbness and weakness     INTERVAL HISTORY    The patient is a right-handed 70 y.o. Non-/non  male who presents with No chief complaint on file. and he is admitted to the hospital for the management of left hand numbness and weakness. Patient has a past medical history of gout, right retinal occlusion, hypertension and a leaky valve who states that approximately 5:30 AM after coming back from the bathroom, he was scratching his wife's back and subsequently noted right hand numbness. Initially the numbness began in the left thumb and progressed to his first and second digit and then midway up his wrist.  He waited a little while to see if his symptoms improve but that his wife drove him to [facility] and he was subsequently transferred after receiving TPA. His NIH stroke scale was 2 and CT head/CTA of the head and neck did not show any vessel occlusion. Patient only takes aspirin 81 mg recommended by his primary care physician years ago. He denies any visual changes or speech impairment or similar symptoms in the past.  After receiving TPA, patient was transferred to [facility] for further management. His current NIH stroke scale is 0 and his symptoms have mostly dissipated. Hospital Course:   See below    Last 24h:   Patient is awake and alert, and able to follow commands, his NIH stroke scale is 1 for left hand numbness. Repeat CT head is pending, at which time we will start patient on antiplatelet therapy and continue his high-dose statin therapy. Additional stroke work-up is pending.     CURRENT MEDICATIONS:  SCHEDULED MEDICATIONS:   allopurinol  300 mg Oral Daily    metoprolol tartrate  25 mg Oral BID    tamsulosin  0.4 mg Oral Daily    sodium chloride flush  10 mL Intravenous 2 times per day    atorvastatin  80 mg Oral Nightly    pantoprazole  40 mg Oral QAM AC    doxycycline hyclate  100 mg Oral 2 times per day     CONTINUOUS INFUSIONS:   sodium chloride 75 mL/hr at 20 0621     PRN MEDICATIONS:   sodium chloride flush, promethazine **OR** ondansetron, senna    VITALS:  Temperature Range: Temp: 97.5 °F (36.4 °C) Temp  Av.8 °F (36.6 °C)  Min: 97.5 °F (36.4 °C)  Max: 98 °F (36.7 °C)  BP Range: Systolic (40GRR), TLJ:311 , Min:101 , GMI:100     Diastolic (34RMY), IJK:02, Min:53, Max:123    Pulse Range: Pulse  Av  Min: 68  Max: 108  Respiration Range: Resp  Av.2  Min: 15  Max: 38  Current Pulse Ox: SpO2: 95 %  24HR Pulse Ox Range: SpO2  Av %  Min: 91 %  Max: 97 %  Patient Vitals for the past 12 hrs:   BP Temp Temp src Pulse Resp SpO2   20 1300 (!) 156/82 -- -- 74 24 95 %   20 1215 -- -- -- -- 20 95 %   20 1210 (!) 146/71 97.5 °F (36.4 °C) Oral 73 20 96 %   20 1000 122/68 -- -- 72 19 95 %   20 0800 (!) 143/63 98 °F (36.7 °C) Oral 70 17 94 %   20 0700 (!) 132/58 -- -- 70 19 93 %   20 0400 (!) 142/62 -- -- 68 16 94 %   20 0300 (!) 123/53 -- -- 71 18 95 %     Estimated body mass index is 39.23 kg/m² as calculated from the following:    Height as of an earlier encounter on 20: 5' 8\" (1.727 m).     Weight as of this encounter: 258 lb (117 kg).  []<16 Severe malnutrition  []16-16.99 Moderate malnutrition  []17-18.49 Mild malnutrition  []18.5-24.9 Normal  [x]25-29.9 Overweight (not obese)  []30-34.9 Obese class 1 (Low Risk)  []35-39.9 Obese class 2 (Moderate Risk)  []?40 Obese class 3 (High Risk)    RECENT LABS:   Lab Results   Component Value Date    WBC 6.1 2020    HGB 10.2 (L) 2020    HCT 33.0 (L) 2020    PLT See Reflexed IPF Result 2020    CHOL 141 2020    TRIG 130 2020    HDL 38 (L) 2020    ALT 26 2020    AST 0 - performs both tasks correctly  2. Best Gaze:  0 - normal  3. Visual:  0 - no visual loss  4. Facial Palsy:  0 - normal symmetric movement  5a. Motor left arm:  0 - no drift, limb holds 90 (or 45) degrees for full 10 seconds  5b. Motor right arm:  0 - no drift, limb holds 90 (or 45) degrees for full 10 seconds  6a. Motor left le - no drift; leg holds 30 degree position for full 5 seconds  6b. Motor right le - no drift; leg holds 30 degree position for full 5 seconds  7. Limb Ataxia:  0 - absent  8. Sensory:  1 - mild to moderate sensory loss; patient feels pinprick is less sharp or is dull on the affected side; there is a loss of superficial pain with pinprick but patient is aware of being touched   9. Best Language:  0 - no aphasia, normal  10. Dysarthria:  0 - normal  11. Extinction and Inattention:  0 - no abnormality  TOTAL:  1    DRAINS:  [x] There are no drains for Neuro Critical Care to monitor at this time. ASSESSMENT AND PLAN:       70-year-old male who presented with left hand numbness and weakness. S/p TPA. Patient following commands and doing well. Stroke work-up along with repeat CT pending. Patient does have small ischemic infarcts in the right cortical region. He has been cleared by PT with echocardiogram pending. Etiology remains unclear at this time. Neurological-ischemic stroke s/p TPA. Continue to monitor for any neurological changes  Repeat CT pending  Hold anticoagulation till repeat CT in 24 hours. Stroke work-up including. TSH  Echocardiogram with bubble study  PT/OT-cleared for ambulation     Cardiovascular. Continue on telemetry as directed. Can normalize BP  Labetalol/hydralazine as needed if BP out of parameters. Cleviprex drip if BP persistently elevated. Echocardiogram with bubble study pending  Continue home BP medications.     Respiratory. Monitor for any changes in patient's respiratory status.   Maintain O2 saturation greater than 94%     GI. GI prophylaxis. BM regimen. Continue p.o. diet as tolerating     Renal.  Follow-up BUN/creatinine. Monitor urine output     Hematology. Follow-up CBC in a.m.     Endocrine. A1c WNL     ID. Monitor for hypo and hyperthermia. Tylenol as needed if T-max greater than 101. Panculture if febrile    PROPHYLAXIS:  Stress ulcer: PPI    DVT PROPHYLAXIS:  - SCD sleeves - Thigh High   -Awaiting repeat CT      DISPOSITION:  [x]Awaiting echocardiogram for possible discharge home  [] OK for out of ICU from Neuro Critical Care standpoint    We will continue to follow along. For any changes in exam or patient status please contact Neuro Critical Care.       Carmita Bazzi MD  Neuro Critical Care  Pager [phone]  11/2/2020     2:00 PM

## 2020-11-02 NOTE — PROGRESS NOTES
Speech Language Pathology  Facility/Department: [facility]  Initial Speech/Language/Cognitive Assessment    NAME: Dominique Ahn  : 1945   MRN: [id number]  ADMISSION DATE: 2020  ADMITTING DIAGNOSIS: has Stroke aborted by administration of thrombolytic agent ([facility] [address] Acute ischemic stroke ([facility] [address] Bilateral carotid artery stenosis; Received tissue plasminogen activator (t-PA) less than 24 hours prior to arrival; and Left hand weakness on their problem list.    Date of Eval: 2020   Evaluating Therapist: Yossi Fernandez    Primary Complaint: The patient is a right-handed 70 y.o. Non-/non  male who presents with No chief complaint on file. and he is admitted to the hospital for the management of left hand numbness and weakness. Patient has a past medical history of gout, hypertension and a leaky valve who states that approximately 5:30 AM after coming back from the bathroom, he was scratching his wife's back and subsequently noted right hand numbness. Initially the numbness began in the left thumb and progressed to his first and second digit and then midway up his wrist.  He waited a little while to see if his symptoms improve but that his wife drove him to OSH and he was subsequently transferred after receiving TPA. His NIH stroke scale was 2 and CT head/CTA of the head and neck did not show any vessel occlusion. Patient only takes aspirin 81 mg recommended by his primary care physician years ago. He does not have any history of stroke or seizures.     He denies any visual changes or speech impairment or similar symptoms in the past.  After receiving TPA, patient was transferred to [facility] for further management. His current NIH stroke scale is 0 and his symptoms have mostly dissipated.     At baseline, he is functional and able to perform his daily activities without any significant impairment.   Of note, patient had had carpal tunnel surgery years ago on the right hand.     NIH stroke scale-2 for left forearm/hand sensory impairment and left hand weakness. Pain:  Pain Assessment  Pain Assessment: 0-10  Pain Level: 0    Assessment: Pt presents with no apparent cognitive deficits at this time. No dysarthria noted, no oral motor deficits. No further ST is recommended. Verbal education provided. Recommendations:  Requires SLP Intervention: No     D/C Recommendations: No therapy recommended at discharge. Subjective:     General  Chart Reviewed: Yes  Patient assessed for rehabilitation services?: Yes  Family / Caregiver Present: No  Social/Functional History  Lives With: Spouse  Active : Yes  Occupation: Retired  Vision  Vision: Within Functional Limits  Hearing  Hearing: Within functional limits           Objective:     Oral/Motor  Oral Motor: Within functional limits              Expression  Primary Mode of Expression: Verbal    Verbal Expression  Verbal Expression: Within functional limits         Motor Speech  Motor Speech: Within Functional Limits         Cognition:      Orientation  Overall Orientation Status: Within Normal Limits  Attention  Attention: Within Functional Limits  Memory  Memory: Within Funtional Limits  Problem Solving  Problem Solving: Within Functional Limits  Abstract Reasoning  Abstract Reasoning: Within Functional Limits  Safety/Judgement  Safety/Judgement: Within Functional Limits  Word Associations: WFL  Word Generation: WFL   Verbal Sequencing: WFL    Prognosis:  Individuals consulted  Consulted and agree with results and recommendations: Patient    Education:  Patient Education: Yes  Patient Education Response: Verbalizes understanding          Therapy Time:   Individual Concurrent Group Co-treatment   Time In 1007         Time Out 1015         Minutes 8               Completed by Gabe Riggins,  Clinician    Co-signed by Gilmer Hobbs A.CCC/SLP   11/2/2020 10:56 AM

## 2020-11-02 NOTE — PROGRESS NOTES
Physical Therapy    Facility/Department: [address]  Initial Assessment    NAME: Deforest Gitelman  : 1945  MRN: [id number]    Date of Service: 2020  No chief complaint on file. Discharge Recommendations:    Further therapy recommended at discharge. PT Equipment Recommendations  Other: CTA    Assessment   Body structures, Functions, Activity limitations: Decreased functional mobility ; Decreased balance;Decreased strength;Decreased endurance  Assessment: Pt ambulated 150' with RW and CGA with no LOB. Continued acute physical therapy is needed to address deficits. Prognosis: Good  Decision Making: Medium Complexity  PT Education: Goals; General Safety;Gait Training;PT Role;Plan of Care;Transfer Training;Functional Mobility Training  REQUIRES PT FOLLOW UP: Yes  Activity Tolerance  Activity Tolerance: Patient Tolerated treatment well;Patient limited by endurance; Patient limited by fatigue       Patient Diagnosis(es): The encounter diagnosis was Stroke aborted by administration of thrombolytic agent ([facility] [address] has a past medical history of Hypertension and Kidney stone. has a past surgical history that includes Cataract removal (Left). 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  Pt laying in bed upon arrival.  Pain Screening  Patient Currently in Pain: No  Vital Signs  Patient Currently in Pain: No Orientation  Orientation  Overall Orientation Status: Within Normal Limits  Social/Functional History  Social/Functional History  Lives With: Spouse  Type of Home: House  Home Layout: Two level, Bed/Bath upstairs, 1/2 bath on main level  Home Access: Stairs to enter without rails  Entrance Stairs - Number of Steps: 1-2 ANNE, 1-2 steps in family room as well  Bathroom Shower/Tub: Tub/Shower unit  Bathroom Toilet: Standard  Bathroom Equipment: Grab bars in shower  Home Equipment: Cane  ADL Assistance: [address] [place] [address] Avenue: Independent  Homemaking Responsibilities: Yes  Ambulation Assistance: Independent  Transfer Assistance: Independent  Active : Yes  Occupation: Retired  Leisure & Hobbies: watch sports, yardwork  Additional Comments: Pt's spouse is retired and home to assist  Cognition   Cognition  Overall Cognitive Status: WFL    Objective        AROM RLE (degrees)  RLE AROM: WFL  AROM LLE (degrees)  LLE AROM : WFL  AROM RUE (degrees)  RUE AROM : WFL  AROM LUE (degrees)  LUE AROM : WFL  Strength RLE  Strength RLE: WFL  Comment: Grossly 4+/5  Strength LLE  Strength LLE: WFL  Comment: Grossly 4+/5  Strength RUE  Strength RUE: WFL  Comment: Grossly 4+/5  Strength LUE  Strength LUE: WFL  Comment: Grossly 4+/5     Sensation  Overall Sensation Status: WFL(Pt denies any numbness or tingling at this time)  Bed mobility  Rolling to Left: Supervision  Rolling to Right: Supervision  Supine to Sit: Contact guard assistance  Sit to Supine: Supervision  Scooting: Supervision  Comment: Pt supine in bed with HOB elevated upon arrival/exit this date  Transfers  Sit to Stand: Contact guard assistance  Stand to sit: Contact guard assistance  Ambulation  Ambulation?: Yes  Ambulation 1  Surface: level tile  Device: Rolling Walker  Assistance: Contact guard assistance  Quality of Gait: Slow pamela, no LOB, increased DORINDA; pt reports mild fatigue after walking but no dizziness  Distance: 150'  Stairs/Curb  Stairs?: No     Balance  Posture: Fair  Sitting - Static: Good  Sitting - Dynamic: Good;-  Standing - Static: Fair;+  Standing - Dynamic: Fair;-  Comments: Standing balance assessed at [address]  Times per week: 5-6x/week  Current Treatment Recommendations: Strengthening, Balance Training, Endurance Training, Functional Mobility Training, Transfer Training, Gait Training, Stair training, Safety Education & Training, Patient/Caregiver Education & Training, Equipment Evaluation, Education, & procurement  Safety Devices  Type of devices: All fall risk precautions in place, Left in bed, Gait belt, Nurse notified, Call light within reach  Restraints  Initially in place: No    AM-PAC Score  AM-PAC Inpatient Mobility Raw Score : 19 (11/02/20 [facility])  AM-PAC Inpatient T-Scale Score : 45.44 (11/02/20 [facility])  Mobility Inpatient CMS 0-100% Score: 41.77 (11/02/20 [facility])  Mobility Inpatient CMS G-Code Modifier : CK (11/02/20 1353)        Goals  Short term goals  Time Frame for Short term goals: 14  Short term goal 1: Pt will perform bed mobility Aly  Short term goal 2: Pt will perform transfers Aly  Short term goal 3: Pt will ambulate 200' with least restrictive AD and CGA  Short term goal 4: Pt will ascend/descend 2 steps without a railing CGA       Therapy Time   Individual Concurrent Group Co-treatment   Time In 1143         Time Out 1207         Minutes 24         Timed Code Treatment Minutes: 8 Minutes       Meggan Link    This treatment/evaluation completed by signing SPT. Signing PT agrees with treatment and documentation.

## 2020-11-02 NOTE — CARE COORDINATION
Case Management Initial Discharge Plan  Mir Kirby             Met with:patient to discuss discharge plans. Information verified: address, contacts, phone number, , insurance Yes    Emergency Contact/Next of Kin name & number: Thomas Zimmerman [phone], Daughter Giuseppe Franco [phone]    PCP: Angelique Johnson MD  Date of last visit: this past week    Insurance Provider: Anjali Ortega    Discharge Planning    Living Arrangements:  Spouse/Significant Other   Support Systems:  Spouse/Significant Other    Home has 2 stories  2 stairs to climb to get into front door, flight stairs to climb to reach second floor  Location of bedroom/bathroom in home upper    Patient able to perform ADL's:Independent    Current Services (outpatient & in home) DME  DME equipment: cane at night to use stairs  DME provider: na    Receiving oral anticoagulation therapy? No    If indicated: baby asa  Physician managing anticoagulation treatment: none  Where does patient obtain lab work for ATC treatment? na      Potential Assistance Needed:  N/A    Patient agreeable to home care: No  [place] of choice provided:  n/a    Prior SNF/Rehab Placement and Facility: none  Agreeable to SNF/Rehab: No  [place] of choice provided: n/a     Evaluation: n/a    Expected Discharge date:  20    Patient expects to be discharged to:  home  Follow Up Appointment: Best Day/ Time:      Transportation provider: Daughter and wife  Transportation arrangements needed for discharge: No    Readmission Risk              Risk of Unplanned Readmission:        7             Does patient have a readmission risk score greater than 14?: No  If yes, follow-up appointment must be made within 7 days of discharge.      Goals of Care: able to do self care      Discharge Plan: Denies deficits, monitor pt/ot needs, probable home independent with spouse          Electronically signed by Nanette Valerio RN on 20 at 12:41 PM EST

## 2020-11-03 VITALS
OXYGEN SATURATION: 96 % | WEIGHT: 258 LBS | TEMPERATURE: 97.4 F | HEART RATE: 71 BPM | BODY MASS INDEX: 39.23 KG/M2 | SYSTOLIC BLOOD PRESSURE: 140 MMHG | RESPIRATION RATE: 20 BRPM | DIASTOLIC BLOOD PRESSURE: 70 MMHG

## 2020-11-03 LAB
LV EF: 47 %
LVEF MODALITY: NORMAL

## 2020-11-03 PROCEDURE — 6370000000 HC RX 637 (ALT 250 FOR IP): Performed by: STUDENT IN AN ORGANIZED HEALTH CARE EDUCATION/TRAINING PROGRAM

## 2020-11-03 PROCEDURE — 97110 THERAPEUTIC EXERCISES: CPT

## 2020-11-03 PROCEDURE — 97535 SELF CARE MNGMENT TRAINING: CPT

## 2020-11-03 PROCEDURE — 97116 GAIT TRAINING THERAPY: CPT

## 2020-11-03 PROCEDURE — 6360000002 HC RX W HCPCS: Performed by: STUDENT IN AN ORGANIZED HEALTH CARE EDUCATION/TRAINING PROGRAM

## 2020-11-03 PROCEDURE — 6370000000 HC RX 637 (ALT 250 FOR IP): Performed by: PSYCHIATRY & NEUROLOGY

## 2020-11-03 PROCEDURE — 93306 TTE W/DOPPLER COMPLETE: CPT

## 2020-11-03 PROCEDURE — 2580000003 HC RX 258: Performed by: PSYCHIATRY & NEUROLOGY

## 2020-11-03 RX ORDER — LISINOPRIL 5 MG/1
5 TABLET ORAL DAILY
Status: DISCONTINUED | OUTPATIENT
Start: 2020-11-03 | End: 2020-11-03 | Stop reason: HOSPADM

## 2020-11-03 RX ORDER — ATORVASTATIN CALCIUM 80 MG/1
80 TABLET, FILM COATED ORAL NIGHTLY
Qty: 30 TABLET | Refills: 3 | Status: SHIPPED | OUTPATIENT
Start: 2020-11-03 | End: 2021-02-07

## 2020-11-03 RX ORDER — LISINOPRIL 5 MG/1
5 TABLET ORAL DAILY
Qty: 30 TABLET | Refills: 3 | Status: SHIPPED | OUTPATIENT
Start: 2020-11-04 | End: 2021-02-07

## 2020-11-03 RX ORDER — DOXYCYCLINE HYCLATE 100 MG
100 TABLET ORAL EVERY 12 HOURS SCHEDULED
Qty: 6 TABLET | Refills: 0 | Status: SHIPPED | OUTPATIENT
Start: 2020-11-03 | End: 2020-11-13

## 2020-11-03 RX ORDER — ASPIRIN 325 MG
325 TABLET ORAL DAILY
Qty: 30 TABLET | Refills: 3 | Status: SHIPPED | OUTPATIENT
Start: 2020-11-04 | End: 2020-11-06 | Stop reason: ALTCHOICE

## 2020-11-03 RX ADMIN — ENOXAPARIN SODIUM 40 MG: 40 INJECTION SUBCUTANEOUS at 10:30

## 2020-11-03 RX ADMIN — ASPIRIN 325 MG: 325 TABLET ORAL at 10:25

## 2020-11-03 RX ADMIN — PANTOPRAZOLE SODIUM 40 MG: 40 TABLET, DELAYED RELEASE ORAL at 10:26

## 2020-11-03 RX ADMIN — SODIUM CHLORIDE, PRESERVATIVE FREE 10 ML: 5 INJECTION INTRAVENOUS at 12:36

## 2020-11-03 RX ADMIN — METOPROLOL TARTRATE 25 MG: 50 TABLET, FILM COATED ORAL at 10:26

## 2020-11-03 RX ADMIN — LISINOPRIL 5 MG: 5 TABLET ORAL at 13:00

## 2020-11-03 RX ADMIN — DOXYCYCLINE HYCLATE 100 MG: 100 TABLET ORAL at 10:25

## 2020-11-03 RX ADMIN — TAMSULOSIN HYDROCHLORIDE 0.4 MG: 0.4 CAPSULE ORAL at 10:25

## 2020-11-03 RX ADMIN — ALLOPURINOL 300 MG: 300 TABLET ORAL at 10:25

## 2020-11-03 ASSESSMENT — PAIN SCALES - GENERAL
PAINLEVEL_OUTOF10: 0

## 2020-11-03 NOTE — PROGRESS NOTES
Physical Therapy  Facility/Department: [facility]  Daily Treatment Note  NAME: Robert Renteria  : 1945  MRN: [id number]    Date of Service: 11/3/2020    Discharge Recommendations:  Patient would benefit from continued therapy after discharge   PT Equipment Recommendations  Equipment Needed: Yes  Mobility Devices: Curvin Prem: Rolling    Assessment   Body structures, Functions, Activity limitations: Decreased functional mobility ; Decreased endurance;Decreased strength;Decreased balance  Assessment: Pt ambulating functional distances with RW. Very unsteady standing without UE support and pt encouraged to utilize RW at home to decrease fall risk. Would benefit from OP PT to address deficits. Prognosis: Good  PT Education: Transfer Training;Home Exercise Program;Gait Training  Patient Education: importance of RW  REQUIRES PT FOLLOW UP: Yes  Activity Tolerance  Activity Tolerance: Patient Tolerated treatment well;Patient limited by endurance  Activity Tolerance: HR in 90s post ambulation; SPO2 WNL on RA     Patient Diagnosis(es): The encounter diagnosis was Stroke aborted by administration of thrombolytic agent ([address] has a past medical history of Hypertension and Kidney stone. has a past surgical history that includes Cataract removal (Left). Restrictions  Restrictions/Precautions  Restrictions/Precautions: General Precautions, Fall Risk  Required Braces or Orthoses?: No  Position Activity Restriction  Other position/activity restrictions: SBP goal <180  Subjective   General  Chart Reviewed: Yes  Response To Previous Treatment: Patient with no complaints from previous session. Family / Caregiver Present: Yes(daughter)  Subjective  Subjective: Pt in bed; offers no c/o pain. Reports improved symptoms since admission. General Comment  Comments: Pt left in chair; call light in reach.   Pain Screening  Patient Currently in Pain: Denies  Vital Signs  Patient Currently in Pain: Denies Orientation  Orientation  Overall Orientation Status: Within Normal Limits  Cognition      Objective   Bed mobility  Supine to Sit: Independent(completed with HOB flat without use of hand rail)  Scooting: Independent  Transfers  Sit to Stand: Stand by assistance(completed twice from low surface; cues for hand placement with good return)  Stand to sit: Stand by assistance  Bed to Chair: Contact guard assistance  Ambulation  Ambulation?: Yes  Ambulation 1  Surface: level tile  Device: Rolling Walker  Assistance: Stand by assistance  Quality of Gait: decreased pace, steady with no LOB  Distance: 240ft  Stairs/Curb  Stairs?: Yes  Stairs  # Steps : 10  Stairs Height: 6\"  Rails: Right ascending  Device: No Device  Assistance: Contact guard assistance  Comment: non reciprocal pattern, cues for safety     Balance  Posture: Fair  Sitting - Static: Good  Sitting - Dynamic: Good;-  Standing - Static: Fair;+(with RW; fair- without AD)  Standing - Dynamic: Fair(required BUE support for standing exercises)      Exercises:  Standing: hip flexion, mini squats x 10 reps, RW for stability, CGA, one small LOB requiring Anup to correct  Seated: LAQS, hip abd. 15x each    Goals  Short term goals  Time Frame for Short term goals: 14  Short term goal 1: Pt will perform bed mobility Aly  Short term goal 2: Pt will perform transfers Aly  Short term goal 3: Pt will ambulate 200' with least restrictive AD and CGA  Short term goal 4: Pt will ascend/descend 2 steps without a railing CGA    Plan    Plan  Times per week: 5-6x/week  Current Treatment Recommendations: Strengthening, Balance Training, Endurance Training, Functional Mobility Training, Transfer Training, Gait Training, Stair training, Safety Education & Training, Patient/Caregiver Education & Training, Equipment Evaluation, Education, & procurement  Safety Devices  Type of devices:  All fall risk precautions in place, Patient at risk for falls, Left in chair, Call light within reach, Gait belt, Nurse notified  Restraints  Initially in place: No     Therapy Time   Individual Concurrent Group Co-treatment   Time In 1140         Time Out 3160         Minutes 27         Timed Code Treatment Minutes: [address], [facility]

## 2020-11-03 NOTE — CARE COORDINATION
Spoke to patient regarding his walker and OP therapies. He states that he has an OP PT/Ot place 5 minutes from his house and he would like to do OP therapy. External referral written. . We discussed if he would like walker sent to his home or would he like to [facility] for delivery to the hospital.  He states that he would like to have it delivered to his home. We discussed his safety for transportation home. He feels that he will be able to get to the home without the walker, he has family that can help. Daughter at bedside agrees.   DME script sent to [facility] for home deilvery

## 2020-11-03 NOTE — PROCEDURES
Patient Health Questionnaire-9 (PHQ-9)    Over the last 2 weeks, how often have you been bothered by any of the following problems? 1. Little Interest or pleasure in doing things? [x] Not at all  [] Several Days  [] More than half the day  []  Nearly every day    2. Feeling down, depressed or hopeless? [x] Not at all  [] Several Days  [] More than half the day  []  Nearly every day    3. Trouble falling or staying asleep, or sleeping too much? [x] Not at all  [] Several Days  [] More than half the day  []  Nearly every day    4. Feeling tired or having little energy? [x] Not at all  [] Several Days  [] More than half the day  []  Nearly every day    5. Poor apettite or overeating? [x] Not at all  [] Several Days  [] More than half the day  []  Nearly every day    6. Feeling bad about yourself-or that you are a failure or have let yourself or your family down? [x] Not at all  [] Several Days  [] More than half the day  []  Nearly every day    7. Trouble concentrating on things, such as reading the newspaper or watching television? [x] Not at all  [] Several Days  [] More than half the day  []  Nearly every day    8. Moving or speaking so slowly that other people could have noticed? Or the opposite-being so fidgety or restless that you have been moving around a lot more than usual?   [x] Not at all  [] Several Days  [] More than half the day  []  Nearly every day    9. Thoughts that you would be better off dead or of hurting yourself in some way? [x] Not at all  [] Several Days  [] More than half the day  []  Nearly every day    Total Score:0    If you checked off any problems, how difficult have these problems made it for you to do your work, take care of things at home, or get along with other people?    [] Not difficult at all  [] Somewhat Difficult  [] Very Difficult  []  Extremely Difficult

## 2020-11-03 NOTE — DISCHARGE INSTR - COC
Continuity of Care Form    Patient Name: Dominique Ahn   :  1945  MRN:  [id number]    Admit date:  2020  Discharge date:  ***    Code Status Order: Full Code   Advance Directives:     Admitting Physician:  Israel Lange MD  PCP: Markell Nelson MD    Discharging Nurse: [facility] Unit/Room#: 1026/8606-11  Discharging Unit Phone Number: ***    Emergency Contact:   Extended Emergency Contact Information  Primary Emergency Contact: Lorenza Lopes  Address: [place] [address] Phone: [phone]  Relation: Spouse  Secondary Emergency Contact: Kristopher Marrero  Phone: [phone]  Relation: Child    Past Surgical History:  Past Surgical History:   Procedure Laterality Date    CATARACT REMOVAL Left        Immunization History: There is no immunization history on file for this patient.     Active Problems:  Patient Active Problem List   Diagnosis Code    Stroke aborted by administration of thrombolytic agent ([facility] [address] I63.9    Acute ischemic stroke ([facility] [address] I63.9    Bilateral carotid artery stenosis I65.23    Received tissue plasminogen activator (t-PA) less than 24 hours prior to arrival Z92.82    Left hand weakness R29.898       Isolation/Infection:   Isolation          No Isolation        Patient Infection Status     None to display          Nurse Assessment:  Last Vital Signs: BP (!) 140/70   Pulse 71   Temp 97.4 °F (36.3 °C) (Oral)   Resp 20   Wt 258 lb (117 kg)   SpO2 96%   BMI 39.23 kg/m²     Last documented pain score (0-10 scale): Pain Level: 0  Last Weight:   Wt Readings from Last 1 Encounters:   20 258 lb (117 kg)     Mental Status:  {IP PT MENTAL STATUS:}    IV Access:  { MARGOT IV ACCESS:274119574}    Nursing Mobility/ADLs:  Walking   {CHP DME HQRT:104106337}  Transfer  {CHP DME CCJX:229489613}  Bathing  {CHP DME EXNK:923125633}  Dressing  {CHP DME MPAC:133386648}  Toileting  {CHP DME OCSU:622278750}  Feeding  {CHP DME PBCW:336918257}  Med Admin  {CHP DME EDNM:523545754}  Med Delivery   { MARGOT MED Delivery:280295961}    Wound Care Documentation and Therapy:        Elimination:  Continence:   · Bowel: {YES / Z}  · Bladder: {YES / EP:66790}  Urinary Catheter: {Urinary Catheter:216621080}   Colostomy/Ileostomy/Ileal Conduit: {YES / GE:61381}       Date of Last BM: ***    Intake/Output Summary (Last 24 hours) at 11/3/2020 1454  Last data filed at 11/3/2020 0631  Gross per 24 hour   Intake --   Output 1500 ml   Net -1500 ml     I/O last 3 completed shifts: In: 18 [P.O.:120;  I.V.:955]  Out: 2450 [Urine:2450]    Safety Concerns:     [address] [employer] Safety Concerns:885331781}    Impairments/Disabilities:      [address] [employer] Impairments/Disabilities:419046945}    Nutrition Therapy:  Current Nutrition Therapy:   [address] [employer] Diet List:066606276}    Routes of Feeding: {[facility] DME Other Feedings:860847279}  Liquids: {Slp liquid thickness:36802}  Daily Fluid Restriction: {CHP DME Yes amt example:103994661}  Last Modified Barium Swallow with Video (Video Swallowing Test): {Done Not Done SANTHOSH:865922095}    Treatments at the Time of Hospital Discharge:   Respiratory Treatments: ***  Oxygen Therapy:  {Therapy; copd oxygen:09845}  Ventilator:    { CC Vent TZTZ:984148858}    Rehab Therapies: {THERAPEUTIC INTERVENTION:0087399447}  Weight Bearing Status/Restrictions: [address] [employer]  Weight Bearin}  Other Medical Equipment (for information only, NOT a DME order):  {EQUIPMENT:447407795}  Other Treatments: ***    Patient's personal belongings (please select all that are sent with patient):  {[facility] DME Belongings:043151713}    RN SIGNATURE:  {Esignature:884127473}    CASE MANAGEMENT/SOCIAL WORK SECTION    Inpatient Status Date: ***    Readmission Risk Assessment Score:  Readmission Risk              Risk of Unplanned Readmission:        8           Discharging to Facility/ Agency   · Name: [facility]  · [phone] · Delivery of walker to front door of home. Dialysis Facility (if applicable)   · Name:  · Address:  · Dialysis Schedule:  · Phone:  · Fax:    / signature: Electronically signed by Valaria Halsted, RN on 11/3/20 at 4:04 PM EST    PHYSICIAN SECTION    Prognosis: {Prognosis:5675218971}    Condition at Discharge: Anand Diaz Patient Condition:931970536}    Rehab Potential (if transferring to Rehab): {Prognosis:5978865560}    Recommended Labs or Other Treatments After Discharge: ***    Physician Certification: I certify the above information and transfer of Eleonora Conner  is necessary for the continuing treatment of the diagnosis listed and that he requires {Admit to Appropriate Level of Care:12655} for {GREATER/LESS:147852770} 30 days.      Update Admission H&P: {CHP DME Changes in WOKLO:031198317}    PHYSICIAN SIGNATURE:  {Esignature:835247109}

## 2020-11-03 NOTE — DISCHARGE SUMMARY
[address] [place] [address]     Department of Neurology    INPATIENT DISCHARGE SUMMARY        Patient Identification:  Nadyne Fabry is a 70 y.o. male. :  1945  MRN: [id number]     Acct: [de-identified]   Admit Date:  2020  Discharge date and time: No discharge date for patient encounter. Attending Provider: Iona Milian MD                                     Admission Diagnoses:   Stroke aborted by administration of thrombolytic agent [facility]) [I63.9]    Discharge Diagnoses: Active Problems:    Stroke aborted by administration of thrombolytic agent ([address]    Acute ischemic stroke [facility])  Resolved Problems:    * No resolved hospital problems. *       Consults:   None    Brief Inpatient course:  70-year-old male who was admitted for left hand numbness and weakness. Patient has a past medical history of gout and hypertension for with a leaky valve along with right retinal artery vision loss from years ago. Patient presents with left-handed numbness that extended from the thumb to the second third digits towards the mid elbow. Imaging studies did not show any vessel occlusion. Patient's NIH stroke scale was 2 and he subsequently received TPA. Patient's NIH stroke scale improved to 1. Patient will remain on statin therapy along with aspirin 325. We will also place the patient on lisinopril 5 mg daily. He will also require an event monitor. Follow-up with neurology in 4 weeks along with cardiology for a low ejection fraction. He is ambulatory but recommended a rolling walker and outpatient PT.       Procedures: None    Any Hospital Acquired Infections: none    Discharge Functional Status:  stable    Disposition: home    Patient Instructions:   Current Discharge Medication List      CONTINUE these medications which have NOT CHANGED    Details   omeprazole (PRILOSEC) 20 MG delayed release capsule Take 20 mg by mouth daily      aspirin 325 MG EC tablet Take  mouth daily      Saw Palmetto, Serenoa repens, (BL SAW PALMETTO PO) Take 450 mg by mouth      b complex vitamins capsule Take 1 capsule by mouth daily      Lutein 10 MG TABS Take 25 mg by mouth      tamsulosin (FLOMAX) 0.4 MG capsule Take 0.4 mg by mouth daily      allopurinol (ZYLOPRIM) 300 MG tablet Take 300 mg by mouth daily      metoprolol tartrate (LOPRESSOR) 25 MG tablet Take 25 mg by mouth 2 times daily      Naproxen Sodium (ALEVE) 220 MG CAPS Take by mouth      doxycycline hyclate (VIBRAMYCIN) 100 MG capsule Take 100 mg by mouth 2 times daily             Activity: no heavy lifting for 2 weeks    Diet: cardiac diet    Follow-up:    Sheree Deleon MD  [facility]  [facility], [address] [place] [address]    In 4 weeks      [facility] Cardiology Specialist  [address]  In 2 weeks  LOW EF    Jessica Quinteros MD  [address] [place] [address],[address] [facility] [address] [phone]    In 1 week  HTN managment      Follow up labs: none       Follow up imaging: none    Note that over 30 minutes was spent in preparing discharge papers, discussing discharge with patient, medication review, etc.      Sheree Deleon MD,  PGY 3 Neurology Resident  Department of Neurology  [address]  11/3/2020, 1:38 PM

## 2020-11-03 NOTE — PROGRESS NOTES
assistance(w/RW)  Standing Balance  Time: stood ~ 15 min for simple grooming and demo functional transfers in room  Toilet Transfers  Toilet - Technique: Ambulating  Equipment Used: Raised toilet seat with rails  Toilet Transfer: Supervision  Bed mobility  Rolling to Left: Independent  Rolling to Right: Independent  Supine to Sit: Independent  Sit to Supine: Independent  Scooting: Independent  Comment: placed bed in supine position for pt to demo bed mob to simulate bed at home  Transfers  Stand Step Transfers: Stand by assistance(w/RW)  Sit to stand: Stand by assistance  Stand to sit: Stand by assistance  Attendance  Participation: Active participation         Plan   Plan  Times per week: 3-5x       Goals  Short term goals  Time Frame for Short term goals: Pt will by discharge  Short term goal 1: demo good safety awareness during func mob around room using LRD and mod I  Short term goal 2: [facility] func activity using only LUE for 9 min+ to improve ADL performance  Short term goal 3: demo ADL UB/LB dressing/bathing activity using adaptive tech's and mod I only  Short term goal 4: demo bending/reaching func activity using LRD and SUP       Therapy Time   Individual Concurrent Group Co-treatment   Time In  15:20         Time Out  15:50         Minutes  30          time code min: 30 min       [address], [place]/L

## 2020-11-04 ENCOUNTER — CARE COORDINATION (OUTPATIENT)
Dept: CASE MANAGEMENT | Age: 75
End: 2020-11-04

## 2020-11-04 ASSESSMENT — ENCOUNTER SYMPTOMS
SHORTNESS OF BREATH: 0
RHINORRHEA: 0
ABDOMINAL PAIN: 0
SORE THROAT: 0

## 2020-11-04 NOTE — CARE COORDINATION
[facility] Transitions Initial Follow Up Call    Call within 2 business days of discharge: Yes    Patient: Gualberto Zarate Patient : 1945   MRN: [id number]  Reason for Admission: CVA/Covid-19 monitoring  Discharge Date: 11/3/20 RARS: Readmission Risk Score: 8      Last Discharge [facility]       Complaint Diagnosis Description Type Department Provider    20 Stroke Alert Stroke aborted by administration of thrombolytic agent ([facility] [address] [facility] . .. ED to Hosp-Admission (Discharged) (ADMITTED) Jackelin Roque MD; Governor Gonzalo. ..    20 Extremity Weakness Cerebrovascular accident (CVA), unspecified mechanism [facility]) ED (TRANSFER) [address] [facility] ED Timothy Rea MD        1st attempt to reach patient for Covid-19 monitoring. [facility] requesting return call. Contact information provided.  [address] Sami Kapoor: Rony Transitions 24 Hour Call    Care Transitions Interventions         Follow Up  Future Appointments   Date Time Provider Oneyda Garrido   12/15/2020  1:00 PM Adria Silva MD Neuro [facility] RN

## 2020-11-05 ENCOUNTER — CARE COORDINATION (OUTPATIENT)
Dept: CASE MANAGEMENT | Age: 75
End: 2020-11-05

## 2020-11-05 NOTE — CARE COORDINATION
[facility] Transitions Initial Follow Up Call    Call within 2 business days of discharge: Yes    Patient: Marleni Mayorga Patient : 1945   MRN: [id number]  Reason for Admission: CVA/Covid-19 monitoring  Discharge Date: 11/3/20 RARS: Readmission Risk Score: 8      Last Discharge [facility]       Complaint Diagnosis Description Type Department Provider    20 Stroke Alert Stroke aborted by administration of thrombolytic agent ([facility] [address] . .. ED to Hosp-Admission (Discharged) (ADMITTED) Dez Lee MD; Olinda Muro. ..    20 Extremity Weakness Cerebrovascular accident (CVA), unspecified mechanism [facility]) ED (TRANSFER) [facility] ED Wilber Araiza MD        Final attempt to reach patient for Covid-19 monitoring. LM requesting return call. Contact information provided. [phone]  Episode resolved at this time.      Facility: [facility]    Care Transitions 24 Hour Call    Care Transitions Interventions         Follow Up  Future Appointments   Date Time Provider Oneyda Garrido   2020  1:00 PM Oneyda Casey EKG [facility] EK Karri Cunningham   12/15/2020  1:00 PM Jessica Shelley MD Neuro [facility] RN

## 2020-11-06 RX ORDER — CLOPIDOGREL BISULFATE 75 MG/1
75 TABLET ORAL DAILY
Qty: 30 TABLET | Refills: 3 | Status: SHIPPED | OUTPATIENT
Start: 2020-11-06 | End: 2021-02-07

## 2020-11-06 RX ORDER — ASPIRIN 81 MG/1
81 TABLET ORAL DAILY
Qty: 90 TABLET | Refills: 1 | Status: SHIPPED | OUTPATIENT
Start: 2020-11-06 | End: 2021-01-04 | Stop reason: ALTCHOICE

## 2020-11-09 ENCOUNTER — OFFICE VISIT (OUTPATIENT)
Dept: CARDIOLOGY CLINIC | Age: 75
End: 2020-11-09
Payer: MEDICARE

## 2020-11-09 ENCOUNTER — HOSPITAL ENCOUNTER (OUTPATIENT)
Dept: NON INVASIVE DIAGNOSTICS | Age: 75
Discharge: HOME | End: 2020-11-09
Payer: MEDICARE

## 2020-11-09 VITALS
DIASTOLIC BLOOD PRESSURE: 80 MMHG | HEART RATE: 75 BPM | BODY MASS INDEX: 40.29 KG/M2 | WEIGHT: 265 LBS | SYSTOLIC BLOOD PRESSURE: 140 MMHG | OXYGEN SATURATION: 95 %

## 2020-11-09 PROCEDURE — 93225 XTRNL ECG REC<48 HRS REC: CPT

## 2020-11-09 PROCEDURE — 1111F DSCHRG MED/CURRENT MED MERGE: CPT | Performed by: INTERNAL MEDICINE

## 2020-11-09 PROCEDURE — 3017F COLORECTAL CA SCREEN DOC REV: CPT | Performed by: INTERNAL MEDICINE

## 2020-11-09 PROCEDURE — G8417 CALC BMI ABV UP PARAM F/U: HCPCS | Performed by: INTERNAL MEDICINE

## 2020-11-09 PROCEDURE — G8427 DOCREV CUR MEDS BY ELIG CLIN: HCPCS | Performed by: INTERNAL MEDICINE

## 2020-11-09 PROCEDURE — 4040F PNEUMOC VAC/ADMIN/RCVD: CPT | Performed by: INTERNAL MEDICINE

## 2020-11-09 PROCEDURE — 93226 XTRNL ECG REC<48 HR SCAN A/R: CPT

## 2020-11-09 PROCEDURE — 1123F ACP DISCUSS/DSCN MKR DOCD: CPT | Performed by: INTERNAL MEDICINE

## 2020-11-09 PROCEDURE — 1036F TOBACCO NON-USER: CPT | Performed by: INTERNAL MEDICINE

## 2020-11-09 PROCEDURE — G8484 FLU IMMUNIZE NO ADMIN: HCPCS | Performed by: INTERNAL MEDICINE

## 2020-11-09 PROCEDURE — 99204 OFFICE O/P NEW MOD 45 MIN: CPT | Performed by: INTERNAL MEDICINE

## 2020-11-09 NOTE — CARE COORDINATION
..Stroke Follow Up Phone Call    Called phone number on record for discharge follow up - No answer.      [x]Attempt #1    []Attempt #2 (final attempt)      Electronically signed by Uvaldo Aparicio RN on 11/9/20 at 12:44 PM EST

## 2020-11-10 NOTE — CARE COORDINATION
.Stroke Follow Up Phone Call    Called phone number on record - No answer. []Attempt #1    [x]Attempt #2 (final attempt)  Answering machine P/U. No name given. No message left.     Electronically signed by Mik Lozano RN on 11/10/20 at 9:47 AM EST

## 2020-11-10 NOTE — PROGRESS NOTES
Beth King M.D. [address] [place], [place] [address]S[place]   [phone]        2020        Tavo Mir, [address] [facility], [address] [place] [address], [address]    RE:   Ira Donnelly  :  1945    Dear Dr. Lenin Yanez:    REASON FOR CONSULT:  1. Cryptogenic CVA on 2020, treated with tPA and hospitalization at [facility]. [facility], question cardiac etiology. 2.  Hypertension. 3.  Mild LV dysfunction. HISTORY OF PRESENT ILLNESS:  I had the pleasure of meeting Mr. Kalia Gomez and his wife in the office on 2020. He is a pleasant 68-year-old gentleman who has really no cardiac history. He was a patient of Dr. Yue Jimenez approximately 10 to 15 years ago. He is now a patient of Dr. Lenin Yanez. He has been treated for hypertension but really has remained very healthy. He does have a history of right retinal artery occlusion approximately 10 years ago, unknown etiology, and at that time, he had a full workup including carotid ultrasound, stress test, etc., which were all negative. He was not anticoagulated. There was no indication of atrial flutter/fibrillation at that time. His symptoms for his CVA were left hand and finger numbness and weakness. This has resolved. He has had weakness over the last approximately 6 months. He has had no syncope or near syncope. Has occasional lightheadedness. He will occasionally feel \"skipped beats\" when checking the pulse and he will also occasionally have some \"fluttering. \"  He does not feel he has had any sustained episodes of fast heartbeat. When he was at [facility] on , he did have an echocardiogram on  that showed mild LV dysfunction, ejection fraction of 47% with a negative bubble study. He had mild aortic stenosis with a mean gradient of 12 mmHg and mild-to-moderate mitral regurgitation. Since being home, he continues to have some fatigue.   He has had no syncope or near syncope. He has developed a fairly large, reddened, erythematous, fluctuant 3 cm cellulitis on his right upper arm. He saw Dr. Juliana Klein, who recommended an appointment with a surgeon for an I and D. He was placed on an antibiotic at that time. [facility] had ordered an event recorder, but insurance has not cleared him as of yet to get the event recorder (unbelievable. ..). He is not on a regular exercise program, although he tries to stay active. CARDIAC RISK FACTORS:  Hypertension:  Positive. Hyperlipidemia:  Mildly positive. Other Family Members:  Positive. Peripheral Vascular Disease:  Negative. Diabetes:  Negative. MEDICATIONS AT THIS TIME:  He is on Zyloprim 300 mg daily, aspirin 81 mg daily, Lipitor 80 mg daily, Plavix 75 mg daily (has not picked up as of yet), doxycycline 100 mg b.i.d., lisinopril 5 mg daily, Lopressor 25 mg b.i.d., Prilosec 20 mg daily. PAST MEDICAL AND SURGICAL HISTORY:  1. 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 2.  Hypertension. 3.  He had cataract surgery of the left eye on 10/14/2020.  4. Appears to have a cellulitis with abscess on his right upper arm. FAMILY HISTORY:  Son had a myocardial infarction in March complicated by a PE.    SOCIAL HISTORY:  He is 70years old. He was a , retired 13 years ago. He drinks 2 beers a day. Does not smoke. He had 2 sons and 2 daughters, although one son Sisi Metzger passed away at age 46 of an MI and PE. His wife worked in a factory. REVIEW OF SYSTEMS:  Cardiac as above. Other systems reviewed including constitutional, eyes, ears, nose and throat, cardiovascular, respiratory, GI, , musculoskeletal, integumentary, neurologic, endocrine, hematologic and allergic/immunologic are negative except for what is described above. He is blind in his right eye. He has had loss of energy. He has some chest pain. He has a history of now mild LV dysfunction, EF of 47%. PHYSICAL EXAMINATION:  VITAL SIGNS:  His blood pressure was 140/80 with a heart rate of 75 and regular. Respiratory rate 18. O2 saturation 95%. Weight 265 pounds. GENERAL:  He is a pleasant 74-year-old gentleman. Denied pain. He was oriented to person, place and time. Answered questions appropriately. SKIN:  No unusual skin changes. HEENT:  The pupils are equally round and intact. NECK:  No JVD. Good carotid pulses. He had transmitted aortic murmur. He had no lymphadenopathy or thyromegaly. CARDIOVASCULAR EXAM:  S1 and S2 were normal.  No S3 or S4. He had a rather loud grade 3/6 systolic ejection murmur. No diastolic murmur. LUNGS:  Quite clear to auscultation and percussion. ABDOMEN:  Soft and nontender. Good bowel sounds. The aorta was not enlarged. No hepatomegaly, splenomegaly. EXTREMITIES:  Good femoral pulses. Good pedal pulses. No pedal edema. Skin was warm and dry. No calf tenderness. Nail beds pink. Good cap refill. PULSES:  Bilateral symmetrical radial, brachial and carotid pulses. No carotid bruits. Good femoral and pedal pulses. NEUROLOGIC EXAM:  Within normal limits. PSYCHIATRIC EXAM:  Within normal limits. LABORATORY DATA:  His white count was 6.1, hemoglobin 10.2 with a platelet count 496,519. His cholesterol was 141 with an HDL of 37, LDL 77, triglycerides 130. Sodium 135, potassium 4.2, BUN 21, creatinine 0.83, calcium was 9.3. Troponin was less than 0.03. EKG showed sinus rhythm with a first-degree AV block. CTA of the head and neck was negative for any carotid artery disease. MRI of the brain showed acute cortical infarcts within the right precentral and postcentral gyri, with an acute lacunar infarct consistent with multiple embolic infarcts.     Echocardiogram showed mild LV dysfunction, ejection fraction of 47% with mild aortic stenosis with a mean gradient of 12 mmHg with mild-to-moderate mitral regurgitation. IMPRESSION:  1. Status post CVA from multiple emboli on 11/01/2020 with negative CTA with negative carotid artery disease, question occult atrial fibrillation. 2.  Mild LV dysfunction, EF of 47% to 48%, question underlying coronary artery disease. 3.  Mild aortic stenosis with a mean gradient of 12 mmHg. 4.  Mild-to-moderate mitral regurgitation. 5.  Status post right retinal artery embolic occlusion 10 years ago with loss of sight in his right eye.  6.  Hypertension. 7.  3 cm abscess on right shoulder    PLAN:  1. We will do a 48-hour Holter monitor. 2.  If this does not show any arrhythmias, we will do a 30-day event recorder again looking for occult atrial fibrillation. 3.  He will fill his Plavix prescription and he will do Plavix and aspirin for 1 month and then aspirin alone thereafter along with his Lipitor. 4.  We will meet with him in 6 weeks to go over the above tests. 5.  If his event recorder is negative for atrial fibrillation, would consider a loop recorder in view of his what appears to be an embolic shower for his CVA. DISCUSSION:  Mr. Capri Arita developed CVA on 11/01 with left hand numbness and weakness. His MRI was concerning as it showed multiple infarcts within the right precentral and postcentral gyri with an acute lacunar infarct. He had no carotid artery disease and he had no ASD on a bubble study with his echocardiogram.  Therefore, atrial fibrillation is a very significant possibility. We will do a 48-hour Holter monitor and if we happen to catch atrial fibrillation at that time, we would have a clear indication to anticoagulate. If there is no atrial fibrillation, then I will do a 30-day event recorder. If no arrhythmias are detected, then I would consider a loop recorder since he did have multiple embolic CVAs.     He does have LV dysfunction, EF of 47%, and we will do a Lexiscan Cardiolite stress test.    Indications for treatment of a cryptogenic

## 2020-11-16 LAB
ACQUISITION DURATION: NORMAL S
AVERAGE HEART RATE: 66 BPM
EKG DIAGNOSIS: NORMAL
FASTEST VENTRICULAR RATE: 97 BPM
HOLTER MAX HEART RATE: 104 BPM
HOOKUP DATE: NORMAL
HOOKUP TIME: NORMAL
LONGEST VE RUN RATE: 97 BPM
MAX HEART RATE TIME/DATE: NORMAL
MIN HEART RATE TIME/DATE: NORMAL
MIN HEART RATE: 45 BPM
NUMBER OF FASTEST VENTRICULAR BEATS: 4
NUMBER OF LONGEST VENTRICULAR BEATS: 4
NUMBER OF QRS COMPLEXES: NORMAL
NUMBER OF SUPRAVENTRICULAR BEATS IN RUNS: 0
NUMBER OF SUPRAVENTRICULAR COUPLETS: 52
NUMBER OF SUPRAVENTRICULAR ECTOPICS: 893
NUMBER OF SUPRAVENTRICULAR ISOLATED BEATS: 789
NUMBER OF SUPRAVENTRICULAR RUNS: 0
NUMBER OF VENTRICULAR BEATS IN RUNS: 7
NUMBER OF VENTRICULAR BIGEMINAL CYCLES: 0
NUMBER OF VENTRICULAR COUPLETS: 3
NUMBER OF VENTRICULAR ECTOPICS: 270
NUMBER OF VENTRICULAR ISOLATED BEATS: 257
NUMBER OF VENTRICULAR RUNS: 2

## 2020-12-02 ENCOUNTER — HOSPITAL ENCOUNTER (OUTPATIENT)
Dept: NUCLEAR MEDICINE | Age: 75
Discharge: HOME | End: 2020-12-04
Payer: MEDICARE

## 2020-12-02 ENCOUNTER — HOSPITAL ENCOUNTER (OUTPATIENT)
Dept: NON INVASIVE DIAGNOSTICS | Age: 75
Discharge: HOME | End: 2020-12-02
Payer: MEDICARE

## 2020-12-02 VITALS — HEART RATE: 63 BPM | SYSTOLIC BLOOD PRESSURE: 162 MMHG | DIASTOLIC BLOOD PRESSURE: 87 MMHG

## 2020-12-02 PROCEDURE — 6360000002 HC RX W HCPCS: Performed by: INTERNAL MEDICINE

## 2020-12-02 PROCEDURE — 93017 CV STRESS TEST TRACING ONLY: CPT

## 2020-12-02 PROCEDURE — 2580000003 HC RX 258: Performed by: INTERNAL MEDICINE

## 2020-12-02 PROCEDURE — A9500 TC99M SESTAMIBI: HCPCS | Performed by: INTERNAL MEDICINE

## 2020-12-02 PROCEDURE — 78452 HT MUSCLE IMAGE SPECT MULT: CPT

## 2020-12-02 PROCEDURE — 3430000000 HC RX DIAGNOSTIC RADIOPHARMACEUTICAL: Performed by: INTERNAL MEDICINE

## 2020-12-02 RX ORDER — 0.9 % SODIUM CHLORIDE 0.9 %
10 VIAL (ML) INJECTION PRN
Status: DISCONTINUED | OUTPATIENT
Start: 2020-12-02 | End: 2020-12-03 | Stop reason: HOSPADM

## 2020-12-02 RX ORDER — AMINOPHYLLINE DIHYDRATE 25 MG/ML
50 INJECTION, SOLUTION INTRAVENOUS
Status: DISCONTINUED | OUTPATIENT
Start: 2020-12-02 | End: 2020-12-02 | Stop reason: HOSPADM

## 2020-12-02 RX ORDER — AMINOPHYLLINE DIHYDRATE 25 MG/ML
100 INJECTION, SOLUTION INTRAVENOUS
Status: DISCONTINUED | OUTPATIENT
Start: 2020-12-02 | End: 2020-12-02 | Stop reason: HOSPADM

## 2020-12-02 RX ADMIN — TETRAKIS(2-METHOXYISOBUTYLISOCYANIDE)COPPER(I) TETRAFLUOROBORATE 10 MILLICURIE: 1 INJECTION, POWDER, LYOPHILIZED, FOR SOLUTION INTRAVENOUS at 09:41

## 2020-12-02 RX ADMIN — TETRAKIS(2-METHOXYISOBUTYLISOCYANIDE)COPPER(I) TETRAFLUOROBORATE 30 MILLICURIE: 1 INJECTION, POWDER, LYOPHILIZED, FOR SOLUTION INTRAVENOUS at 09:42

## 2020-12-02 RX ADMIN — REGADENOSON 0.4 MG: 0.08 INJECTION, SOLUTION INTRAVENOUS at 10:16

## 2020-12-02 RX ADMIN — Medication 10 ML: at 10:16

## 2020-12-02 NOTE — PROGRESS NOTES
Patient reports shortness of breath is subsiding. Reports he had a brief period of feeling pressure in the back of his head - states that feeling has subsided.

## 2020-12-02 NOTE — PROGRESS NOTES
Testing complete - patient tolerated well. Patient denies any shortness of breath - denies any pain/discomfort at this time. Drink and snacks offered/provided.

## 2020-12-02 NOTE — PROGRESS NOTES
Home medications and allergies reviewed/updated with patient. Patient verbalized understanding of test/procedure.

## 2020-12-02 NOTE — PROCEDURES
[place] [address]                              CARDIAC STRESS TEST    PATIENT NAME: Adriana Coe                      :        1945  MED REC NO:   [id number]                              ROOM:  ACCOUNT NO:   [de-identified]                           ADMIT DATE: 2020  PROVIDER:     Phoebe Aldana    DATE OF STUDY:  2020    Cardiovascular Diagnostics Department    Ordering Provider:  Chely Forte MD    Primary Care Provider:  Shukri Lei. Obed Colorado MD    Interpreting Physician:  Taiwo Pham MD    MYOCARDIAL PERFUSION STRESS IMAGING    The stress ECG results are reported separately. NUCLEAR IMAGING RESULTS:  The overall quality of the study is good. Mild attenuation artifact was seen. There is no evidence of abnormal  lung uptake. Additionally, the right ventricle appears normal.  The  left ventricular cavity is noted to be normal in size on stress images. There is no evidence of transient ischemic dilatation (TID) of the left  ventricle. Gated SPECT imaging reveals normal myocardial thickening and wall motion  with a calculated left ventricular ejection fraction (EF) of 60%. The rest images demonstrated a small/moderate perfusion abnormality of  mild intensity in the anterolateral region, which is most likely due to  artifact. On stress imaging, a small perfusion abnormality of mild intensity was  noted in the inferior region, which is most likely due to artifact. IMPRESSION:  1. Most likely normal myocardial perfusion imaging with soft tissue  artifact, but without evidence of significant myocardial ischemia or  infarction. 2.  Global left ventricular systolic function was normal without  regional wall motion abnormalities. Overall these results are most consistent with a low risk for  significant coronary artery disease.     Although the patient's

## 2020-12-06 NOTE — PROCEDURES
[place] [address]                              CARDIAC STRESS TEST    PATIENT NAME: Kerline Barr                      :        1945  MED REC NO:   [id number]                              ROOM:  ACCOUNT NO:   [de-identified]                           ADMIT DATE: 2020  PROVIDER:     Zeke White    DATE OF STUDY:  2020    NAME OF TEST:  Lexiscan Cardiolite stress test.    INDICATION:  Chest pain. IMPRESSION:  1. We gave 0.4 mg of Lexiscan intravenously. 2.  This was followed in 20 seconds by Cardiolite infusion. 3.  There was no chest pain. 4.  There was no ST depression. 5.  It was an overall negative Lexiscan stress test.  6.  Cardiolite to follow.         Aminata Dalton    D: 2020 12:59:36       T: 2020 14:02:26     [id number]  Job#: [id number]     Doc#: [id number]    CC:  Nuzhat Kennedy

## 2020-12-09 ENCOUNTER — TELEPHONE (OUTPATIENT)
Dept: CARDIOLOGY CLINIC | Age: 75
End: 2020-12-09

## 2020-12-15 ENCOUNTER — VIRTUAL VISIT (OUTPATIENT)
Dept: NEUROLOGY | Age: 75
End: 2020-12-15
Payer: MEDICARE

## 2020-12-15 PROCEDURE — 4040F PNEUMOC VAC/ADMIN/RCVD: CPT | Performed by: STUDENT IN AN ORGANIZED HEALTH CARE EDUCATION/TRAINING PROGRAM

## 2020-12-15 PROCEDURE — 99214 OFFICE O/P EST MOD 30 MIN: CPT | Performed by: STUDENT IN AN ORGANIZED HEALTH CARE EDUCATION/TRAINING PROGRAM

## 2020-12-15 PROCEDURE — 3017F COLORECTAL CA SCREEN DOC REV: CPT | Performed by: STUDENT IN AN ORGANIZED HEALTH CARE EDUCATION/TRAINING PROGRAM

## 2020-12-15 PROCEDURE — 1123F ACP DISCUSS/DSCN MKR DOCD: CPT | Performed by: STUDENT IN AN ORGANIZED HEALTH CARE EDUCATION/TRAINING PROGRAM

## 2020-12-15 PROCEDURE — G8427 DOCREV CUR MEDS BY ELIG CLIN: HCPCS | Performed by: STUDENT IN AN ORGANIZED HEALTH CARE EDUCATION/TRAINING PROGRAM

## 2020-12-15 ASSESSMENT — ENCOUNTER SYMPTOMS
COLOR CHANGE: 0
SHORTNESS OF BREATH: 0
EYE DISCHARGE: 0
APNEA: 0
CONSTIPATION: 0
COUGH: 0
BACK PAIN: 0
DIARRHEA: 0
ABDOMINAL PAIN: 0
ABDOMINAL DISTENTION: 0
WHEEZING: 0

## 2020-12-15 NOTE — PROGRESS NOTES
[address] [place] [address], [facility], [facility], [address] [facility], [address] [place] [address]  P: [phone]  F: [phone]    VIRTUAL NEUROLOGY CLINIC NOTE     PATIENT NAME: Artur Karimi  PATIENT MRN: [id number]  PRIMARY CARE PHYSICIAN: Yue Mancera MD    HPI:      Artur Karimi is a 70 y.o. male who is seen for virtual visit. Patient was previously admitted to the hospital for left hand numbness and weakness, he has a past medical history of gout and hypertension along with right retinal artery vision loss from years ago. Patient states that his numbness extended to the left hand including the first and second digits. His NIH stroke scale was 2 on presentation, unable to assess but he says it is 90% better and he is able to flex and extend left hand digits with minimal sensory impairment. He had previously received TPA and is currently on aspirin and Plavix. Patient was also on aspirin 81 mg at home before his current event, he is on minimal dose of lisinopril at 5 mg, cardiology did not recommend any intervention. He is currently seen and evaluated to a virtual visit. He states that he is significantly better and is not having any weakness or numbness in that left hand, most symptoms have dissipated. At this point, it would be prudent to proceed with Plavix as patient was previously on aspirin 81 mg and would consider this a treatment failure. We will continue statin therapy along with Plavix 75 mg. Patient has been also recommended to daily check his blood pressures, he states that he has a blood pressure cuff. We will follow up in 4-8 weeks. He denies any additional weakness, paresthesias or visual impairment. Neurologicallyischemic stroke. Continue to monitor for any neurological changes. Discontinue aspirin 81 mg. Continue statin therapy. Continue Plavix 75 mg as aspirin is considered treatment failure  Frequent blood pressure checks. Obtain a blood pressure cuff if necessary. We will follow up in 6-8 weeks      PREVIOUS WORKUP:     Lab Results   Component Value Date    WBC 6.1 11/02/2020    HGB 10.2 (L) 11/02/2020    HCT 33.0 (L) 11/02/2020    MCV 66.7 (L) 11/02/2020    PLT See Reflexed IPF Result 11/02/2020       Past Medical History:   Diagnosis Date    Hypertension     Kidney stone         Past Surgical History:   Procedure Laterality Date    CATARACT REMOVAL Left         Social History     Socioeconomic History    Marital status:      Spouse name: Not on file    Number of children: Not on file    Years of education: Not on file    Highest education level: Not on file   Occupational History    Not on file   Social Needs    Financial resource strain: Not on file    Food insecurity     Worry: Not on file     Inability: Not on file    Transportation needs     Medical: Not on file     Non-medical: Not on file   Tobacco Use    Smoking status: Never Smoker    Smokeless tobacco: Never Used   Substance and Sexual Activity    Alcohol use:  Yes     Alcohol/week: 10.0 standard drinks     Types: 10 Cans of beer per week    Drug use: Not on file    Sexual activity: Not on file   Lifestyle    Physical activity     Days per week: Not on file     Minutes per session: Not on file    Stress: Not on file   Relationships    Social connections     Talks on phone: Not on file     Gets together: Not on file     Attends [employer] service: Not on file     Active member of club or organization: Not on file     Attends meetings of clubs or organizations: Not on file     Relationship status: Not on file    Intimate partner violence     Fear of current or ex partner: Not on file     Emotionally abused: Not on file     Physically abused: Not on file     Forced sexual activity: Not on file   Other Topics Concern    Not on file   Social History Narrative    Not on file        Current Outpatient Medications   Medication Sig Dispense Refill  aspirin EC 81 MG EC tablet Take 1 tablet by mouth daily 90 tablet 1    clopidogrel (PLAVIX) 75 MG tablet Take 1 tablet by mouth daily 30 tablet 3    atorvastatin (LIPITOR) 80 MG tablet Take 1 tablet by mouth nightly 30 tablet 3    lisinopril (PRINIVIL;ZESTRIL) 5 MG tablet Take 1 tablet by mouth daily 30 tablet 3    Saw Palmetto, Serenoa repens, (BL SAW PALMETTO PO) Take 450 mg by mouth      b complex vitamins capsule Take 1 capsule by mouth daily      Lutein 10 MG TABS Take 25 mg by mouth      allopurinol (ZYLOPRIM) 300 MG tablet Take 300 mg by mouth daily      omeprazole (PRILOSEC) 20 MG delayed release capsule Take 20 mg by mouth daily      metoprolol tartrate (LOPRESSOR) 25 MG tablet Take 25 mg by mouth 2 times daily      Naproxen Sodium (ALEVE) 220 MG CAPS Take 220 mg by mouth        No current facility-administered medications for this visit. Allergies   Allergen Reactions    Adhesive Tape Itching        REVIEW OF SYSTEMS:     Review of Systems   HENT: Negative for congestion, dental problem, drooling, ear discharge and ear pain. Eyes: Negative for discharge. Respiratory: Negative for apnea, cough, shortness of breath and wheezing. Cardiovascular: Negative for chest pain, palpitations and leg swelling. Gastrointestinal: Negative for abdominal distention, abdominal pain, constipation and diarrhea. Endocrine: Negative for cold intolerance. Genitourinary: Negative for difficulty urinating. Musculoskeletal: Negative for arthralgias, back pain and gait problem. Skin: Negative for color change and wound. Neurological: Negative for dizziness, tremors, facial asymmetry, weakness, light-headedness, numbness and headaches. Psychiatric/Behavioral: Negative for agitation, behavioral problems and confusion. VITALS  There were no vitals taken for this visit.      PHYSICAL EXAMINATION:     Physical Exam     NEUROLOGICAL EXAMINATION:     Neurological Exam ASSESSMENT / PLAN:     //       Mr. Yomi Quintanilla received counseling on the following healthy behaviors: medical compliance, smoking cessation, blood pressure control, regular follow up with primary doctor.         Electronically signed by Ирина Farah MD on 12/15/2020 at 1:57 PM

## 2021-01-04 ENCOUNTER — OFFICE VISIT (OUTPATIENT)
Dept: CARDIOLOGY CLINIC | Age: 76
End: 2021-01-04
Payer: MEDICARE

## 2021-01-04 VITALS
BODY MASS INDEX: 40.14 KG/M2 | OXYGEN SATURATION: 96 % | SYSTOLIC BLOOD PRESSURE: 130 MMHG | WEIGHT: 264 LBS | HEART RATE: 78 BPM | DIASTOLIC BLOOD PRESSURE: 70 MMHG

## 2021-01-04 DIAGNOSIS — I63.9 CEREBROVASCULAR ACCIDENT (CVA), UNSPECIFIED MECHANISM (HCC): Primary | ICD-10-CM

## 2021-01-04 PROCEDURE — G8427 DOCREV CUR MEDS BY ELIG CLIN: HCPCS | Performed by: INTERNAL MEDICINE

## 2021-01-04 PROCEDURE — 4040F PNEUMOC VAC/ADMIN/RCVD: CPT | Performed by: INTERNAL MEDICINE

## 2021-01-04 PROCEDURE — 3017F COLORECTAL CA SCREEN DOC REV: CPT | Performed by: INTERNAL MEDICINE

## 2021-01-04 PROCEDURE — 1123F ACP DISCUSS/DSCN MKR DOCD: CPT | Performed by: INTERNAL MEDICINE

## 2021-01-04 PROCEDURE — G8484 FLU IMMUNIZE NO ADMIN: HCPCS | Performed by: INTERNAL MEDICINE

## 2021-01-04 PROCEDURE — 99214 OFFICE O/P EST MOD 30 MIN: CPT | Performed by: INTERNAL MEDICINE

## 2021-01-04 PROCEDURE — 1036F TOBACCO NON-USER: CPT | Performed by: INTERNAL MEDICINE

## 2021-01-04 PROCEDURE — G8417 CALC BMI ABV UP PARAM F/U: HCPCS | Performed by: INTERNAL MEDICINE

## 2021-01-04 NOTE — PROGRESS NOTES
Ov Dr. Francisca Chapin for follow up  C/o bp running high at home  150/160-80-90's  No chest pain  No syncope  Loop recorder was recommended  Will discuss with wife and let us know             Recommend Loop Recorder     Will all us when ready to schedule

## 2021-01-06 NOTE — PROGRESS NOTES
Bal Duque M.D. [address] [place] [address] Albert, Sandra   [phone]        2021        Suresh Hogan MD  [address] [place] [address], [address] [facility], [address]    RE:   Tiffanie Da Silva  :  1945    Dear Dr. Camelia Cheema:    CHIEF COMPLAINT:  1.  Cryptogenic CVA on 2020, question occult atrial fibrillation. 2.  Hypertension. 3.  History of mild LV dysfunction. HISTORY OF PRESENT ILLNESS:  I had the pleasure of seeing Mr. Issac Huerta in our office on 2021. He is a pleasant 77-year-old gentleman, who was a patient of Dr. Julian Sheets 15 years ago, and now sees Dr. Camelia Cheema. 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 He had a full workup, which was negative. Of note, he had been on baby aspirin 81 mg daily and it had been stopped for 10 days because of periodontal bleeding. On the day that he restarted his baby aspirin, he had a retinal artery occlusion. Therefore, he has never, of course, wanted to stop his aspirin. There was no indication of atrial flutter or fibrillation at that time. On 2020, he came to the emergency room with left upper extremity weakness. A CT scan was done that was negative. The case was discussed with Dr. Zi Fam at [facility], who recommended tPA. He was given tPA and was life-flighted to [facility]. Marcel's on . His weakness completely resolved. He was hypertensive and he was placed on Lipitor as well as aspirin and lisinopril. He was already on metoprolol. Plavix was added after he was home and I saw him in followup on 2020. A 30-day event recorder had been ordered but amazingly enough, his insurance would not cover a 30-day event recorder. Therefore, I placed a 48-hour Holter monitor, which was negative. We also did a Lexiscan Cardiolite stress test, which was negative. I brought him back for further discussion. He has had no further episodes of TIA or CVA. He had been called by neurologist, who had recommended stopping aspirin and staying on Plavix alone. Because of his previous retinal occlusion, he has stayed on both baby aspirin and Plavix. He has had no PND, orthopnea or pedal edema. No syncope or near syncope. Denies any chest pain or chest discomfort. No unusual shortness of breath. He is not on a regular exercise program.    He has been taking his blood pressures at home and has noted that it has been higher than normal.  It is generally in the 150 to 160 range at home. He states usually at the doctor's offices, it is lower. CARDIAC RISK FACTORS:  Hypertension:  Positive. Hyperlipidemia:  Positive. Other Family Members:  Positive. Peripheral Vascular Disease:  Negative. Diabetes:  Negative. Smoking:  Negative. MEDICATIONS AT THIS TIME:  He is on Zyloprim 300 mg daily, Lipitor 80 mg daily, Plavix 75 mg daily, Prinivil 5 mg daily, Lopressor 25 mg b.i.d., Prilosec 20 mg daily, aspirin 81 mg daily. PAST MEDICAL AND SURGICAL HISTORY:  1. 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  2.  Hypertension many years. 3.  Cataract surgery in left eye on 10/14/2020.  4.  History of cellulitis with an abscess on his right upper arm, which was lanced. FAMILY HISTORY:  Son had a myocardial infarction in 42/3436, complicated by a PE.    SOCIAL HISTORY:  He is 70years old, , retired 13 years ago. Drinks 2 beers a day. Does not smoke. Has 2 sons and 2 daughters. One son, Emelia Chakraborty, passed away at 46 of an MI and PE. REVIEW OF SYSTEMS:  Cardiac as above. Other systems reviewed including constitutional, eyes, ears, nose and throat, cardiovascular, respiratory, GI, , musculoskeletal, integumentary, neurologic, endocrine, hematologic and allergic/immunologic are negative except for what is described above.     PHYSICAL EXAMINATION: VITAL SIGNS:  His blood pressure was 130/70 in both arms, with a heart rate of 70 and regular. Respiratory rate 18. O2 sat 96%. Weight 264 pounds. GENERAL:  He is a pleasant 79-year-old gentleman. Denied pain. He was oriented to person, place and time. Answered questions appropriately. SKIN:  No unusual skin changes. HEENT:  The pupils are equally round and intact. Mucous membranes were dry. NECK:  No JVD. Good carotid pulses. No carotid bruits. No lymphadenopathy or thyromegaly. CARDIOVASCULAR EXAM:  S1 and S2 were normal.  No S3 or S4. Soft systolic blowing type murmur. No diastolic murmur. PMI was normal.  No lift, thrust, or pericardial friction rub. LUNGS:  Quite clear to auscultation and percussion. ABDOMEN:  Soft and nontender. Good bowel sounds. EXTREMITIES:  Good femoral pulses. Good pedal pulses. No pedal edema. Skin was warm and dry. No calf tenderness. Nail beds pink. Good cap refill. PULSES:  Bilateral symmetrical radial, brachial and carotid pulses. No carotid bruits. Good femoral and pedal pulses. NEUROLOGIC EXAM:  Within normal limits. PSYCHIATRIC EXAM:  Within normal limits. LABORATORY DATA:  Not done at this time. Echocardiogram showed an EF of 47%, with mild aortic stenosis, with a mean gradient of 12 mmHg, with mild-to-moderate mitral regurgitation done at [facility]. [facility] MRI of the brain showed acute cortical infarcts on 11/01/2020. [facility] test was read as normal.    Holter monitor did not show any atrial fibrillation. IMPRESSION:  1. Status post right retinal artery embolic occlusion 10 years ago with loss of sight in his right eye.  2.  Status post CVA for multiple emboli on 11/01/2020, with negative CTA and negative carotid artery disease, question occult atrial fibrillation. 3.  Unremarkable 48-hour Holter monitor. 4.  Mild LV dysfunction, EF of 47% to 50%.   5.  Normal Lexiscan Cardiolite stress test. 6.  Hypertension with his blood pressure cuff at home showing pressure in the 150 to 160 range, although when I took his pressure, it was 130/70. PLAN:  1. Would recommend implantable loop recorder. 2.  If he does not wish to do a loop recorder, we would then do a 30-day event recorder but if this was negative, we would still need a loop recorder because of his 2 episodes of arterial emboli. 3.  Would go on Plavix 75 mg alone without aspirin as there is no benefit after 1 month of being on both aspirin and Plavix. 4.  We will meet in 3 months. 5.  He is meeting with Dr. David Forbes in 3 weeks and will discuss also implantable loop recorder. DISCUSSION:  Mr. Kushal Monique is rather complex. He had retinal artery occlusion 10 years ago and now had embolic CVA with multiple emboli on an MRI on 11/01/2020. I think there still is a fairly high possibility of him having occult atrial fibrillation. I had initially recommended a 30-day event recorder. However, I think it would be most helpful to proceed directly with an implantable loop recorder. If the 30-day event recorder was negative, he would still need to have the implantable loop recorder in view of his 2 arterial embolic episodes. He will discuss implantable loop recorder with both his wife and with Dr. David Forbes and let us know his decision. Again, if he decides not to do the loop recorder, then I would do the 30-day event recorder, and if negative, would still need the implantable loop recorder. We had a blood pressure in the 130 range. Therefore, I have asked him to check the accuracy of his blood pressure cuff at home. I will meet with him in 3 months, but he will call us after his meeting with Dr. David Forbes to let us know the decision concerning the loop recorder. Thank you very much for allowing me the privilege of seeing Mr. Kushal Monique. If you have any questions on my thoughts, please do not hesitate to contact me.      Sincerely, Dottie Lung    D: 01/04/2021 12:43:16     T: 01/04/2021 12:48:07     [email or web address]  Job#: [id number]   Doc#: [id number]

## 2021-02-07 RX ORDER — ATORVASTATIN CALCIUM 80 MG/1
80 TABLET, FILM COATED ORAL NIGHTLY
Qty: 30 TABLET | Refills: 3 | Status: SHIPPED | OUTPATIENT
Start: 2021-02-07 | End: 2021-02-28

## 2021-02-07 RX ORDER — CLOPIDOGREL BISULFATE 75 MG/1
TABLET ORAL
Qty: 30 TABLET | Refills: 3 | Status: SHIPPED | OUTPATIENT
Start: 2021-02-07 | End: 2021-02-28

## 2021-02-07 RX ORDER — LISINOPRIL 5 MG/1
TABLET ORAL
Qty: 30 TABLET | Refills: 3 | Status: SHIPPED | OUTPATIENT
Start: 2021-02-07 | End: 2021-02-28

## 2021-02-14 ENCOUNTER — APPOINTMENT (OUTPATIENT)
Dept: CT IMAGING | Age: 76
End: 2021-02-14
Payer: MEDICARE

## 2021-02-14 ENCOUNTER — APPOINTMENT (OUTPATIENT)
Dept: GENERAL RADIOLOGY | Age: 76
End: 2021-02-14
Payer: MEDICARE

## 2021-02-14 ENCOUNTER — HOSPITAL ENCOUNTER (EMERGENCY)
Age: 76
Discharge: HOME | End: 2021-02-14
Attending: INTERNAL MEDICINE
Payer: MEDICARE

## 2021-02-14 VITALS
RESPIRATION RATE: 25 BRPM | HEIGHT: 69 IN | TEMPERATURE: 98.6 F | OXYGEN SATURATION: 98 % | SYSTOLIC BLOOD PRESSURE: 174 MMHG | BODY MASS INDEX: 39.84 KG/M2 | DIASTOLIC BLOOD PRESSURE: 78 MMHG | HEART RATE: 72 BPM | WEIGHT: 269 LBS

## 2021-02-14 DIAGNOSIS — Z53.29 LEFT AGAINST MEDICAL ADVICE: ICD-10-CM

## 2021-02-14 DIAGNOSIS — I63.89 CEREBROVASCULAR ACCIDENT (CVA) DUE TO OTHER MECHANISM (HCC): Primary | ICD-10-CM

## 2021-02-14 DIAGNOSIS — I10 ESSENTIAL HYPERTENSION: ICD-10-CM

## 2021-02-14 PROBLEM — I63.9 STROKE OF UNKNOWN CAUSE (HCC): Status: ACTIVE | Noted: 2021-02-14

## 2021-02-14 LAB
ABSOLUTE EOS #: 0.3 K/UL (ref 0–0.4)
ABSOLUTE IMMATURE GRANULOCYTE: ABNORMAL K/UL (ref 0–0.3)
ABSOLUTE LYMPH #: 1.2 K/UL (ref 1–4.8)
ABSOLUTE MONO #: 0.4 K/UL (ref 0–1)
ALBUMIN SERPL-MCNC: 4.1 G/DL (ref 3.5–5.2)
ALBUMIN/GLOBULIN RATIO: ABNORMAL (ref 1–2.5)
ALP BLD-CCNC: 91 U/L (ref 40–129)
ALT SERPL-CCNC: 21 U/L (ref 5–41)
ANION GAP SERPL CALCULATED.3IONS-SCNC: 11 MMOL/L (ref 9–17)
AST SERPL-CCNC: 24 U/L
BASOPHILS # BLD: 1 % (ref 0–2)
BASOPHILS ABSOLUTE: 0 K/UL (ref 0–0.2)
BILIRUB SERPL-MCNC: 1.37 MG/DL (ref 0.3–1.2)
BNP INTERPRETATION: ABNORMAL
BUN BLDV-MCNC: 19 MG/DL (ref 8–23)
BUN/CREAT BLD: 19 (ref 9–20)
CALCIUM SERPL-MCNC: 9.3 MG/DL (ref 8.6–10.4)
CHLORIDE BLD-SCNC: 102 MMOL/L (ref 98–107)
CHP ED QC CHECK: NORMAL
CO2: 27 MMOL/L (ref 20–31)
CREAT SERPL-MCNC: 0.99 MG/DL (ref 0.7–1.2)
DIFFERENTIAL TYPE: ABNORMAL
EKG ATRIAL RATE: 69 BPM
EKG P AXIS: 58 DEGREES
EKG P-R INTERVAL: 228 MS
EKG Q-T INTERVAL: 420 MS
EKG QRS DURATION: 100 MS
EKG QTC CALCULATION (BAZETT): 450 MS
EKG R AXIS: 3 DEGREES
EKG T AXIS: 19 DEGREES
EKG VENTRICULAR RATE: 69 BPM
EOSINOPHILS RELATIVE PERCENT: 4 % (ref 0–5)
GFR AFRICAN AMERICAN: >60 ML/MIN
GFR NON-AFRICAN AMERICAN: >60 ML/MIN
GFR SERPL CREATININE-BSD FRML MDRD: ABNORMAL ML/MIN/{1.73_M2}
GFR SERPL CREATININE-BSD FRML MDRD: ABNORMAL ML/MIN/{1.73_M2}
GLUCOSE BLD-MCNC: 161 MG/DL
GLUCOSE BLD-MCNC: 161 MG/DL (ref 65–99)
GLUCOSE BLD-MCNC: 165 MG/DL (ref 70–99)
HCT VFR BLD CALC: 34.2 % (ref 41–53)
HEMOGLOBIN: 10.8 G/DL (ref 13.5–17.5)
IMMATURE GRANULOCYTES: ABNORMAL %
INR BLD: 1.1
LYMPHOCYTES # BLD: 18 % (ref 13–44)
MCH RBC QN AUTO: 20.5 PG (ref 26–34)
MCHC RBC AUTO-ENTMCNC: 31.4 G/DL (ref 31–37)
MCV RBC AUTO: 65.1 FL (ref 80–100)
MONOCYTES # BLD: 6 % (ref 5–9)
MORPHOLOGY: ABNORMAL
NRBC AUTOMATED: ABNORMAL PER 100 WBC
PARTIAL THROMBOPLASTIN TIME: 31.8 SEC (ref 23.9–33.8)
PDW BLD-RTO: 18.2 % (ref 12.1–15.2)
PLATELET # BLD: 124 K/UL (ref 140–450)
PLATELET ESTIMATE: ABNORMAL
PMV BLD AUTO: ABNORMAL FL (ref 6–12)
POTASSIUM SERPL-SCNC: 3.9 MMOL/L (ref 3.7–5.3)
PRO-BNP: 793 PG/ML
PROTHROMBIN TIME: 13.9 SEC (ref 11.5–14.2)
RBC # BLD: 5.26 M/UL (ref 4.5–5.9)
RBC # BLD: ABNORMAL 10*6/UL
SARS-COV-2, RAPID: NOT DETECTED
SARS-COV-2: NORMAL
SARS-COV-2: NORMAL
SEG NEUTROPHILS: 71 % (ref 39–75)
SEGMENTED NEUTROPHILS ABSOLUTE COUNT: 5 K/UL (ref 2.1–6.5)
SODIUM BLD-SCNC: 140 MMOL/L (ref 135–144)
SOURCE: NORMAL
TOTAL PROTEIN: 6.9 G/DL (ref 6.4–8.3)
TROPONIN INTERP: NORMAL
TROPONIN T: <0.03 NG/ML
TROPONIN, HIGH SENSITIVITY: NORMAL NG/L (ref 0–22)
WBC # BLD: 7 K/UL (ref 3.5–11)
WBC # BLD: ABNORMAL 10*3/UL

## 2021-02-14 PROCEDURE — 70498 CT ANGIOGRAPHY NECK: CPT

## 2021-02-14 PROCEDURE — 80053 COMPREHEN METABOLIC PANEL: CPT

## 2021-02-14 PROCEDURE — 6360000004 HC RX CONTRAST MEDICATION: Performed by: INTERNAL MEDICINE

## 2021-02-14 PROCEDURE — 82947 ASSAY GLUCOSE BLOOD QUANT: CPT

## 2021-02-14 PROCEDURE — 93010 ELECTROCARDIOGRAM REPORT: CPT | Performed by: INTERNAL MEDICINE

## 2021-02-14 PROCEDURE — 84484 ASSAY OF TROPONIN QUANT: CPT

## 2021-02-14 PROCEDURE — 83880 ASSAY OF NATRIURETIC PEPTIDE: CPT

## 2021-02-14 PROCEDURE — 85025 COMPLETE CBC W/AUTO DIFF WBC: CPT

## 2021-02-14 PROCEDURE — U0002 COVID-19 LAB TEST NON-CDC: HCPCS

## 2021-02-14 PROCEDURE — 93005 ELECTROCARDIOGRAM TRACING: CPT | Performed by: INTERNAL MEDICINE

## 2021-02-14 PROCEDURE — 99284 EMERGENCY DEPT VISIT MOD MDM: CPT

## 2021-02-14 PROCEDURE — 85730 THROMBOPLASTIN TIME PARTIAL: CPT

## 2021-02-14 PROCEDURE — 71045 X-RAY EXAM CHEST 1 VIEW: CPT

## 2021-02-14 PROCEDURE — 6370000000 HC RX 637 (ALT 250 FOR IP): Performed by: INTERNAL MEDICINE

## 2021-02-14 PROCEDURE — 70450 CT HEAD/BRAIN W/O DYE: CPT

## 2021-02-14 PROCEDURE — 85610 PROTHROMBIN TIME: CPT

## 2021-02-14 PROCEDURE — 99449 NTRPROF PH1/NTRNET/EHR 31/>: CPT | Performed by: PSYCHIATRY & NEUROLOGY

## 2021-02-14 RX ORDER — ASPIRIN 81 MG/1
243 TABLET, CHEWABLE ORAL ONCE
Status: COMPLETED | OUTPATIENT
Start: 2021-02-14 | End: 2021-02-14

## 2021-02-14 RX ADMIN — ASPIRIN 243 MG: 81 TABLET, CHEWABLE ORAL at 18:00

## 2021-02-14 RX ADMIN — IOPAMIDOL 100 ML: 755 INJECTION, SOLUTION INTRAVENOUS at 17:31

## 2021-02-14 NOTE — Clinical Note
The patient has decided to leave against medical advice, because he does not want to be transferred or admitted. He has normal mental status and adequate capacity to make medical decisions. The patient refuses hospital admission and wants to be  discharged. The risks have been explained to the patient, including worsening illness, chronic pain, permanent disability, and death. The benefits of admission have also been explained, including the availability and proximity of nurses, physi cians, monitoring, diagnostic testing, and treatment. The patient was able to understand and state the risks and benefits of hospital admission. This was witnessed by nurse Kal Andrade, patient's wife and me. He had the opportunity to ask questions  about his medical condition. The patient was treated to the extent that he would allow, and knows that he may return for care at any time.

## 2021-02-14 NOTE — VIRTUAL HEALTH
[facility]roke and Vascular Neurology Consult for  [facility] ED Stroke Alert through [address] @ 430pm 2/14/2021 2/14/2021 4:47 PM    Pt Name: Anahy Garrison  MRN: [id number]  YOB: 1945  Date of evaluation: 2/14/2021  Primary Care Physician: Author Krystal MD  Reason for Evaluation: Stroke evaluation with Phone Consult, Discussion and Review of imaging    70yo male with pmh of renal calculi, htn, b/l nonocclusive ica athero, R MCA rolandic stroke 11/1/2020 s/p tpa with no residual deficits. Pt presents again with acute L arm and face numbness and heaviness    At baseline pt has no deficits. lkn was 230pm 2/14/2021. Pt had acute L face numbness which spread into his arm, with L arm heaviness. Pt presented to ED, symptoms were improving with only residual L fingertip numbness. sbp 174, glu 161    Of note pt was admitted 11/1/2020 with similar symptoms, tpa was given at that time. MRI showed R cortical residual stroke. Pt was sent home on dapt and downgraded to plavix alone. Allergies  is allergic to adhesive tape. Medications  Prior to Admission medications    Medication Sig Start Date End Date Taking?  Authorizing Provider   lisinopril (PRINIVIL;ZESTRIL) 5 MG tablet TAKE 1 TABLET BY MOUTH EVERY DAY 2/7/21   Jose Alfredo Brown MD   atorvastatin (LIPITOR) 80 MG tablet TAKE 1 TABLET BY MOUTH NIGHTLY 2/7/21   Jose Alfredo Brown MD   clopidogrel (PLAVIX) 75 MG tablet TAKE 1 TABLET BY MOUTH EVERY DAY 2/7/21   MD Waldemar Goodman Palmetto, Serenoa repens, (BL SAW PALMETTO PO) Take 450 mg by mouth    Historical Provider, MD   b complex vitamins capsule Take 1 capsule by mouth daily    Historical Provider, MD   Lutein 10 MG TABS Take 25 mg by mouth    Historical Provider, MD   allopurinol (ZYLOPRIM) 300 MG tablet Take 300 mg by mouth daily    Historical Provider, MD   omeprazole (PRILOSEC) 20 MG delayed release capsule Take 20 mg by mouth daily    Historical Provider, MD Resp: 25   Temp:    SpO2:         Pt not seen in persona t this time. nihss calculated based on ed md report. Pt has L fingertip numbness. NIH Stroke Scale:   1a  Level of consciousness: 0 - alert; keenly responsive   1b. LOC questions:  0 - answers both questions correctly   1c. LOC commands: 0 - performs both tasks correctly   2. Best Gaze: 0 - normal   3. Visual: 0 - no visual loss   4. Facial Palsy: 0 - normal symmetric movement   5a. Motor left arm: 0 - no drift, limb holds 90 (or 45) degrees for full 10 seconds   5b. Motor right arm: 0 - no drift, limb holds 90 (or 45) degrees for full 10 seconds   6a. Motor left le - no drift; leg holds 30 degree position for full 5 seconds   6b  Motor right le - no drift; leg holds 30 degree position for full 5 seconds   7. Limb Ataxia: 0 - absent   8. Sensory: 1 - mild to moderate sensory loss; patient feels pinprick is less sharp or is dull on the affected side; there is a loss of superficial pain with pinprick but patient is aware of being touched    9. Best Language:  0 - no aphasia, normal   10. Dysarthria: 0 - normal   11. Extinction and Inattention: 0 - no abnormality         Total:   1     Premorbid MRS: 0      Imaging:  Images were personally reviewed with PACS used to review images including:  CT brain without contrast: no large territory hypodensity or bleed    Assessment  70yo male with pmh of renal calculi, htn, b/l nonocclusive ica athero, R MCA rolandic stroke 2020 s/p tpa with no residual deficits. Pt presents again with acute L arm and face numbness and heaviness, rapidly improving with only LUE fingertip numbness. Ct head neg. ddx includes acute stroke, stroke recrudescence, sz.    Recommendations:  --tpa offered, mild symptoms. --no mt, symptoms not suggestive of LVO  --check cta head and neck w con stat. If there is lvo consider mt.  --additional recs to follow.     Addendum: CTA completed, no LVO. After discussion, pt decided against tpa given rapidly improving symptoms. recs updated as below:    --pt took additional asa 81 at home, on plavix 75. Continue plavix 75. Load asa 325 once then continue asa 81  --transfer to [facility] floor (medicine or neurology)  --sbp < 220 x24hr, then sbp < 140  --mri brain wo con. Remainder of workup and care as per neuro. Discussed with ED Physician    At least 30 min of Telemedicine and time in conversation directly with ED staff and physician for the patient who is in imminent and life threatening deterioration without further treatment and evaluation. This Virtual Visit was conducted with patient's (and/or legal guardian's) consent, to provide telestroke consultation and necessary medical care. Time spent examining patient, reviewing the images personally, reviewing the chart, perform high complexity decision making and speaking with the nursing staff regarding recommendations    This is a Phone Consult, I have not seen the patient face to face, the telemedicine device was not utilized.     Tesha Sherman MD PhD  Stroke, Neurocritical Care And/or [address] [place] [address] Stroke Network  [facility]  Electronically signed 2/14/2021 at 4:47 PM
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
## 2021-02-28 RX ORDER — CLOPIDOGREL BISULFATE 75 MG/1
TABLET ORAL
Qty: 30 TABLET | Refills: 3 | Status: SHIPPED | OUTPATIENT
Start: 2021-02-28 | End: 2021-09-27 | Stop reason: ALTCHOICE

## 2021-02-28 RX ORDER — LISINOPRIL 5 MG/1
TABLET ORAL
Qty: 30 TABLET | Refills: 3 | Status: SHIPPED | OUTPATIENT
Start: 2021-02-28 | End: 2022-01-18

## 2021-02-28 RX ORDER — ATORVASTATIN CALCIUM 80 MG/1
80 TABLET, FILM COATED ORAL NIGHTLY
Qty: 30 TABLET | Refills: 3 | Status: SHIPPED | OUTPATIENT
Start: 2021-02-28

## 2021-04-21 ENCOUNTER — OFFICE VISIT (OUTPATIENT)
Dept: CARDIOLOGY CLINIC | Age: 76
End: 2021-04-21
Payer: MEDICARE

## 2021-04-21 VITALS
BODY MASS INDEX: 38.99 KG/M2 | WEIGHT: 264 LBS | HEART RATE: 76 BPM | DIASTOLIC BLOOD PRESSURE: 60 MMHG | SYSTOLIC BLOOD PRESSURE: 140 MMHG | OXYGEN SATURATION: 96 %

## 2021-04-21 DIAGNOSIS — I63.9 CEREBROVASCULAR ACCIDENT (CVA), UNSPECIFIED MECHANISM (HCC): ICD-10-CM

## 2021-04-21 DIAGNOSIS — Z01.818 PRE-OP EXAMINATION: Primary | ICD-10-CM

## 2021-04-21 PROCEDURE — G8417 CALC BMI ABV UP PARAM F/U: HCPCS | Performed by: INTERNAL MEDICINE

## 2021-04-21 PROCEDURE — G8427 DOCREV CUR MEDS BY ELIG CLIN: HCPCS | Performed by: INTERNAL MEDICINE

## 2021-04-21 PROCEDURE — 1123F ACP DISCUSS/DSCN MKR DOCD: CPT | Performed by: INTERNAL MEDICINE

## 2021-04-21 PROCEDURE — 4040F PNEUMOC VAC/ADMIN/RCVD: CPT | Performed by: INTERNAL MEDICINE

## 2021-04-21 PROCEDURE — 3017F COLORECTAL CA SCREEN DOC REV: CPT | Performed by: INTERNAL MEDICINE

## 2021-04-21 PROCEDURE — 99214 OFFICE O/P EST MOD 30 MIN: CPT | Performed by: INTERNAL MEDICINE

## 2021-04-21 PROCEDURE — 1036F TOBACCO NON-USER: CPT | Performed by: INTERNAL MEDICINE

## 2021-04-21 RX ORDER — AMOXICILLIN 500 MG/1
CAPSULE ORAL
Status: ON HOLD | COMMUNITY
Start: 2021-04-16 | End: 2021-05-12 | Stop reason: ALTCHOICE

## 2021-04-21 RX ORDER — ASPIRIN 325 MG
325 TABLET ORAL DAILY
COMMUNITY
End: 2021-10-01

## 2021-04-21 RX ORDER — ASPIRIN 81 MG/1
81 TABLET, CHEWABLE ORAL 2 TIMES DAILY
COMMUNITY

## 2021-04-21 NOTE — PROGRESS NOTES
Ov DR Ioana Ambriz 3 mths. Was in ED on 2/14  Episode like cva  Lt arm numbness   Given aspirin  Better   Wanted pt to be   Transferred pt refused. Then at home started   Taking asa 81 mg in am   And asa 325 mg at night. No further problems. Except some lightheadedness. Will be having a tooth extraction   Next week. Per DR Katheryn Zelaya to hold plavix for 5 days and  Continue taking asa   No chest pain or sob  Pt did talk with Dr Macy Gilmore   Regarding loop recorder   Pt agrees to having this done. Decreased alcohol stopped [place]   Drinking 2 beers a night instead of   3 or 4. Will set up loop recorder insert   For may 12 T F battery change. To hold plavix 5 days prior and   Continue taking asa do not hold.

## 2021-04-25 NOTE — PROGRESS NOTES
Reynaldo Webb M.D. [address] [place] [address] Albert, Sandra   [phone]      2021        Mercedes Rogers MD  [address] [place] [address], [address] [place] [address], [address]      RE:   Steven Hess  :  1945    Dear Dr. Doyle Lee:    CHIEF COMPLAINT:  1.  Cryptogenic CVA on 2020, and on 2021, no etiology found. 2.  Consideration for an implantable loop recorder to monitor for occult atrial fibrillation. HISTORY OF PRESENT ILLNESS:  I had the pleasure of seeing Mr. Katelyn French in our office on 2021. He is a pleasant 82-year-old gentleman, who sees Dr. Doyle Lee. He had seen Dr. Cece Meade approximately 15 years ago. Ten years ago, he had a right retinal artery occlusion of unknown etiology. He had a full workup, which was negative. He had been on baby aspirin 81 mg daily, which was stopped for 10 days because of periodontal bleeding. On the day he restarted aspirin, he had a retinal artery occlusion, and therefore, he has been hesitant to stop his aspirin. There has never been an indication of atrial flutter or fibrillation. On 2020, he went to the emergency room with left upper extremity weakness. A CT scan was negative. Dr. Finch Body at [facility]. Marcel's recommended tPA, which was given, and he was life-flighted to [facility]. Marcel's on 2020, with his weakness fully resolving. He was hypertensive and he was placed on Lipitor as well as aspirin and lisinopril. He was already on metoprolol. Plavix was added and I saw him on 2020. An event recorder has been ordered but not done. We ordered a Lexiscan Cardiolite stress test, which was negative. I saw him then on 2021. At that time, his blood pressure had been elevated. I had recommended an implantable loop recorder, which he did not want to do at that time. He continued Plavix and aspirin.     He had another neurologic episode on 2021, where he had left arm numbness, which resolved. It was recommended for transfer, which he declined. He met with Dr. Etta Santos, who also recommended implantable loop recorder and he is seeing me today to discuss. He has been taking Plavix and 81 mg of aspirin in the morning with 5 grains at night. He has had no further episodes. He does have some lightheadedness. He will be having a tooth extraction this coming week. He denies any chest pain or chest discomfort. He has had no unusual shortness of breath. He has had no PND or orthopnea. No syncope or near syncope. CARDIAC RISK FACTORS:  Hypertension:  Positive. Hyperlipidemia:  Positive. Other Family Members:  Positive. Peripheral Vascular Disease:  Negative. Diabetes:  Negative. Smoking:  Negative. MEDICATIONS AT THIS TIME:  He is on Zyloprim 300 mg daily, amoxicillin 500 mg t.i.d. for 7 days, aspirin 5 grains daily in the evening with 81 mg in the morning, Lipitor 80 mg daily, Plavix 75 mg daily, lisinopril 5 mg daily, Lopressor 25 mg b.i.d., Prilosec 20 mg daily, naproxen p.r.n. PAST MEDICAL AND SURGICAL HISTORY:  1. Retinal occlusion 10 years ago in the right eye.  2.  Hypertension many years. 3.  Cataract surgery in the left eye on 10/14/2020.  4.  Cellulitis with an abscess on the right upper arm, which was lanced. FAMILY HISTORY:  Son had an MI in 98/8988, complicated by a PE.    SOCIAL HISTORY:  He is 78years old, , retired 13 years ago. He was drinking beer and bourbon, he has stopped the bourbon. He drinks 2 beers a day. Does not smoke. He has 2 sons and 2 daughters. One son, Robson Art, passed away at 46 of an MI and PE. He is . REVIEW OF SYSTEMS:  Cardiac as above.   Other systems reviewed including constitutional, eyes, ears, nose and throat, cardiovascular, respiratory, GI, , musculoskeletal, integumentary, neurologic, endocrine, hematologic and allergic/immunologic are negative except for what is described above. PHYSICAL EXAMINATION:  VITAL SIGNS:  His blood pressure was 140/60, with a heart rate of 70 and regular. Respiratory rate 18. O2 sat 96%. Weight 264 pounds. GENERAL:  He is a pleasant 79-year-old gentleman. Denied pain. He was oriented to person, place and time. Answered questions appropriately. SKIN:  No unusual skin changes. HEENT:  The pupils are equally round and intact. Mucous membranes were dry. NECK:  No JVD. Good carotid pulses. No carotid bruits. No lymphadenopathy or thyromegaly. CARDIOVASCULAR EXAM:  S1 and S2 were normal.  No S3 or S4. Soft systolic blowing type murmur. No diastolic murmur. PMI was normal.  No lift, thrust, or pericardial friction rub. LUNGS:  Quite clear to auscultation and percussion. ABDOMEN:  Soft and nontender. Good bowel sounds. The aorta was not enlarged. No hepatomegaly, splenomegaly. EXTREMITIES:  Good femoral pulses. Good pedal pulses. No pedal edema. Skin was warm and dry. No calf tenderness. Nail beds pink. Good cap refill. PULSES:  Bilateral symmetrical radial, brachial and carotid pulses. No carotid bruits. Good femoral and pedal pulses. NEUROLOGIC EXAM:  Within normal limits. PSYCHIATRIC EXAM:  Within normal limits. LABORATORY DATA:  From 02/14, sodium 140, potassium 3.9, BUN 19, creatinine 0.99, GFR greater than 60. Glucose 165, calcium was 9.3. His ALT was 21, AST was 24. White count 7.1, hemoglobin 10.8 with a platelet count of 200,379. His EKG showed normal sinus rhythm with first-degree AV block, otherwise normal.    Echocardiogram on 11/03/2020, showed an EF of 47%, with mild-to-moderate aortic stenosis, with a mean gradient of 12 mmHg, known tricuspid regurgitation. IMPRESSION:  1. Cryptogenic CVA for multiple emboli on 11/01/2020, with negative CTA and negative carotid artery disease. 2.  TIA 02/14/2021, with left arm weakness, which resolved after 2 hours.   3.  History of right retinal artery occlusion 10 years ago. 4.  Hypertension. 5.  Mild LV dysfunction, EF of 47% to 50%. 6.  Mild-to-moderate aortic stenosis. 7.  Normal Lexiscan Cardiolite stress test.    PLAN:  1. We would proceed on with a loop recorder on 05/12/2021 to monitor for atrial fibrillation. 2.  We will hold Plavix but will continue aspirin in view of his history of 2 CVAs and his retinal occlusion. DISCUSSION:  Mr. Giuseppe Garnett had a second cryptogenic CVA, although symptoms resolved after 2 hours. There is still no etiology found. He has agreed to proceed with a loop recorder, which I highly recommend. He is having a tooth removed this coming week. I would hold Plavix for 5 days and aspirin for 1 day in view of his history. He will do the same when we do our implantable loop recorder. He has aortic stenosis and LV function will need to be monitored with echos on a yearly basis. He is on guideline medical therapy with his blood pressure control, aspirin and Lipitor. He does continue to take Plavix, which is not unreasonable in view of his 2 CVAs. Thank you very much for allowing me the privilege of seeing Mr. Giuseppe Garnett. If you have any questions on my thoughts, please do not hesitate to contact me.      Sincerely,      Dom Dean    D: 04/22/2021 0:36:36     T: 04/22/2021 9:58:09     [email or web address]  Job#: [id number]   Doc#: [id number]

## 2021-05-07 ENCOUNTER — HOSPITAL ENCOUNTER (OUTPATIENT)
Age: 76
Discharge: HOME | End: 2021-05-07
Payer: MEDICARE

## 2021-05-07 DIAGNOSIS — Z01.818 PRE-OP EXAMINATION: ICD-10-CM

## 2021-05-07 DIAGNOSIS — I63.9 CEREBROVASCULAR ACCIDENT (CVA), UNSPECIFIED MECHANISM (HCC): ICD-10-CM

## 2021-05-07 LAB
ABSOLUTE EOS #: 0.2 K/UL (ref 0–0.4)
ABSOLUTE IMMATURE GRANULOCYTE: ABNORMAL K/UL (ref 0–0.3)
ABSOLUTE LYMPH #: 1.5 K/UL (ref 1–4.8)
ABSOLUTE MONO #: 0.2 K/UL (ref 0–1)
ALBUMIN SERPL-MCNC: 4.1 G/DL (ref 3.5–5.2)
ALBUMIN/GLOBULIN RATIO: ABNORMAL (ref 1–2.5)
ALP BLD-CCNC: 81 U/L (ref 40–129)
ALT SERPL-CCNC: 25 U/L (ref 5–41)
ANION GAP SERPL CALCULATED.3IONS-SCNC: 11 MMOL/L (ref 9–17)
AST SERPL-CCNC: 26 U/L
BASOPHILS # BLD: 1 % (ref 0–2)
BASOPHILS ABSOLUTE: 0.1 K/UL (ref 0–0.2)
BILIRUB SERPL-MCNC: 1.55 MG/DL (ref 0.3–1.2)
BUN BLDV-MCNC: 16 MG/DL (ref 8–23)
BUN/CREAT BLD: 15 (ref 9–20)
CALCIUM SERPL-MCNC: 9.4 MG/DL (ref 8.6–10.4)
CHLORIDE BLD-SCNC: 105 MMOL/L (ref 98–107)
CHOLESTEROL/HDL RATIO: 2.3
CHOLESTEROL: 88 MG/DL
CO2: 26 MMOL/L (ref 20–31)
CREAT SERPL-MCNC: 1.09 MG/DL (ref 0.7–1.2)
DIFFERENTIAL TYPE: ABNORMAL
EOSINOPHILS RELATIVE PERCENT: 4 % (ref 0–5)
GFR AFRICAN AMERICAN: >60 ML/MIN
GFR NON-AFRICAN AMERICAN: >60 ML/MIN
GFR SERPL CREATININE-BSD FRML MDRD: ABNORMAL ML/MIN/{1.73_M2}
GFR SERPL CREATININE-BSD FRML MDRD: ABNORMAL ML/MIN/{1.73_M2}
GLUCOSE BLD-MCNC: 164 MG/DL (ref 70–99)
HCT VFR BLD CALC: 35.8 % (ref 41–53)
HDLC SERPL-MCNC: 39 MG/DL
HEMOGLOBIN: 11.1 G/DL (ref 13.5–17.5)
IMMATURE GRANULOCYTES: ABNORMAL %
LDL CHOLESTEROL: 27 MG/DL (ref 0–130)
LYMPHOCYTES # BLD: 24 % (ref 13–44)
MCH RBC QN AUTO: 20 PG (ref 26–34)
MCHC RBC AUTO-ENTMCNC: 30.9 G/DL (ref 31–37)
MCV RBC AUTO: 64.7 FL (ref 80–100)
MONOCYTES # BLD: 3 % (ref 5–9)
MORPHOLOGY: ABNORMAL
NRBC AUTOMATED: ABNORMAL PER 100 WBC
PATIENT FASTING?: YES
PDW BLD-RTO: 17.5 % (ref 12.1–15.2)
PLATELET # BLD: 139 K/UL (ref 140–450)
PLATELET ESTIMATE: ABNORMAL
PMV BLD AUTO: ABNORMAL FL (ref 6–12)
POTASSIUM SERPL-SCNC: 3.8 MMOL/L (ref 3.7–5.3)
RBC # BLD: 5.54 M/UL (ref 4.5–5.9)
RBC # BLD: ABNORMAL 10*6/UL
SEG NEUTROPHILS: 68 % (ref 39–75)
SEGMENTED NEUTROPHILS ABSOLUTE COUNT: 4.3 K/UL (ref 2.1–6.5)
SODIUM BLD-SCNC: 142 MMOL/L (ref 135–144)
TOTAL PROTEIN: 7.3 G/DL (ref 6.4–8.3)
TRIGL SERPL-MCNC: 110 MG/DL
URIC ACID: 4.8 MG/DL (ref 3.4–7)
VLDLC SERPL CALC-MCNC: ABNORMAL MG/DL (ref 1–30)
WBC # BLD: 6.3 K/UL (ref 3.5–11)
WBC # BLD: ABNORMAL 10*3/UL

## 2021-05-07 PROCEDURE — 36415 COLL VENOUS BLD VENIPUNCTURE: CPT

## 2021-05-07 PROCEDURE — 85025 COMPLETE CBC W/AUTO DIFF WBC: CPT

## 2021-05-07 PROCEDURE — 80061 LIPID PANEL: CPT

## 2021-05-07 PROCEDURE — 80053 COMPREHEN METABOLIC PANEL: CPT

## 2021-05-07 PROCEDURE — 93005 ELECTROCARDIOGRAM TRACING: CPT

## 2021-05-07 PROCEDURE — 84550 ASSAY OF BLOOD/URIC ACID: CPT

## 2021-05-07 PROCEDURE — 83036 HEMOGLOBIN GLYCOSYLATED A1C: CPT

## 2021-05-08 LAB
ESTIMATED AVERAGE GLUCOSE: 123 MG/DL
HBA1C MFR BLD: 5.9 % (ref 4–6)

## 2021-05-09 LAB
EKG ATRIAL RATE: 76 BPM
EKG Q-T INTERVAL: 410 MS
EKG QRS DURATION: 102 MS
EKG QTC CALCULATION (BAZETT): 416 MS
EKG R AXIS: 18 DEGREES
EKG T AXIS: 72 DEGREES
EKG VENTRICULAR RATE: 62 BPM

## 2021-05-09 PROCEDURE — 93010 ELECTROCARDIOGRAM REPORT: CPT | Performed by: INTERNAL MEDICINE

## 2021-05-10 ENCOUNTER — ANESTHESIA EVENT (OUTPATIENT)
Dept: OPERATING ROOM | Age: 76
End: 2021-05-10
Payer: MEDICARE

## 2021-05-12 ENCOUNTER — HOSPITAL ENCOUNTER (OUTPATIENT)
Dept: PREADMISSION TESTING | Age: 76
Setting detail: SPECIMEN
Discharge: HOME | End: 2021-05-12
Payer: MEDICARE

## 2021-05-12 ENCOUNTER — HOSPITAL ENCOUNTER (OUTPATIENT)
Age: 76
Setting detail: OUTPATIENT SURGERY
Discharge: HOME | End: 2021-05-12
Attending: INTERNAL MEDICINE | Admitting: INTERNAL MEDICINE
Payer: MEDICARE

## 2021-05-12 ENCOUNTER — ANESTHESIA (OUTPATIENT)
Dept: OPERATING ROOM | Age: 76
End: 2021-05-12
Payer: MEDICARE

## 2021-05-12 VITALS — SYSTOLIC BLOOD PRESSURE: 164 MMHG | DIASTOLIC BLOOD PRESSURE: 78 MMHG | OXYGEN SATURATION: 100 %

## 2021-05-12 VITALS
WEIGHT: 262 LBS | BODY MASS INDEX: 39.71 KG/M2 | OXYGEN SATURATION: 100 % | TEMPERATURE: 97.3 F | HEART RATE: 68 BPM | RESPIRATION RATE: 18 BRPM | DIASTOLIC BLOOD PRESSURE: 73 MMHG | HEIGHT: 68 IN | SYSTOLIC BLOOD PRESSURE: 160 MMHG

## 2021-05-12 LAB
SARS-COV-2, RAPID: NOT DETECTED
SPECIMEN DESCRIPTION: NORMAL

## 2021-05-12 PROCEDURE — C9803 HOPD COVID-19 SPEC COLLECT: HCPCS

## 2021-05-12 PROCEDURE — 2500000003 HC RX 250 WO HCPCS: Performed by: NURSE ANESTHETIST, CERTIFIED REGISTERED

## 2021-05-12 PROCEDURE — 87635 SARS-COV-2 COVID-19 AMP PRB: CPT

## 2021-05-12 PROCEDURE — 2580000003 HC RX 258: Performed by: INTERNAL MEDICINE

## 2021-05-12 PROCEDURE — 3600000014 HC SURGERY LEVEL 4 ADDTL 15MIN: Performed by: INTERNAL MEDICINE

## 2021-05-12 PROCEDURE — 7100000011 HC PHASE II RECOVERY - ADDTL 15 MIN: Performed by: INTERNAL MEDICINE

## 2021-05-12 PROCEDURE — 6360000002 HC RX W HCPCS: Performed by: NURSE ANESTHETIST, CERTIFIED REGISTERED

## 2021-05-12 PROCEDURE — 3600000004 HC SURGERY LEVEL 4 BASE: Performed by: INTERNAL MEDICINE

## 2021-05-12 PROCEDURE — 33285 INSJ SUBQ CAR RHYTHM MNTR: CPT | Performed by: INTERNAL MEDICINE

## 2021-05-12 PROCEDURE — 2709999900 HC NON-CHARGEABLE SUPPLY: Performed by: INTERNAL MEDICINE

## 2021-05-12 PROCEDURE — 7100000010 HC PHASE II RECOVERY - FIRST 15 MIN: Performed by: INTERNAL MEDICINE

## 2021-05-12 PROCEDURE — C1764 EVENT RECORDER, CARDIAC: HCPCS | Performed by: INTERNAL MEDICINE

## 2021-05-12 PROCEDURE — 3700000000 HC ANESTHESIA ATTENDED CARE: Performed by: INTERNAL MEDICINE

## 2021-05-12 PROCEDURE — 3700000001 HC ADD 15 MINUTES (ANESTHESIA): Performed by: INTERNAL MEDICINE

## 2021-05-12 PROCEDURE — 2500000003 HC RX 250 WO HCPCS: Performed by: INTERNAL MEDICINE

## 2021-05-12 DEVICE — MONITOR CARD IMPL 12CC 448X72X4MM REVEAL LINQ: Type: IMPLANTABLE DEVICE | Status: FUNCTIONAL

## 2021-05-12 RX ORDER — PROPOFOL 10 MG/ML
INJECTION, EMULSION INTRAVENOUS CONTINUOUS PRN
Status: DISCONTINUED | OUTPATIENT
Start: 2021-05-12 | End: 2021-05-12 | Stop reason: SDUPTHER

## 2021-05-12 RX ORDER — PROPOFOL 10 MG/ML
INJECTION, EMULSION INTRAVENOUS PRN
Status: DISCONTINUED | OUTPATIENT
Start: 2021-05-12 | End: 2021-05-12 | Stop reason: SDUPTHER

## 2021-05-12 RX ORDER — PHENYLEPHRINE HYDROCHLORIDE 10 MG/ML
INJECTION INTRAVENOUS PRN
Status: DISCONTINUED | OUTPATIENT
Start: 2021-05-12 | End: 2021-05-12 | Stop reason: SDUPTHER

## 2021-05-12 RX ORDER — MIDAZOLAM HYDROCHLORIDE 2 MG/2ML
INJECTION, SOLUTION INTRAMUSCULAR; INTRAVENOUS PRN
Status: DISCONTINUED | OUTPATIENT
Start: 2021-05-12 | End: 2021-05-12 | Stop reason: SDUPTHER

## 2021-05-12 RX ORDER — SODIUM CHLORIDE 9 MG/ML
INJECTION, SOLUTION INTRAVENOUS CONTINUOUS
Status: DISCONTINUED | OUTPATIENT
Start: 2021-05-12 | End: 2021-05-12 | Stop reason: HOSPADM

## 2021-05-12 RX ORDER — CEFAZOLIN SODIUM 2 G/50ML
2000 SOLUTION INTRAVENOUS
Status: DISCONTINUED | OUTPATIENT
Start: 2021-05-12 | End: 2021-05-12 | Stop reason: HOSPADM

## 2021-05-12 RX ORDER — FENTANYL CITRATE 50 UG/ML
INJECTION, SOLUTION INTRAMUSCULAR; INTRAVENOUS PRN
Status: DISCONTINUED | OUTPATIENT
Start: 2021-05-12 | End: 2021-05-12 | Stop reason: SDUPTHER

## 2021-05-12 RX ORDER — BUPIVACAINE HYDROCHLORIDE 2.5 MG/ML
INJECTION, SOLUTION EPIDURAL; INFILTRATION; INTRACAUDAL PRN
Status: DISCONTINUED | OUTPATIENT
Start: 2021-05-12 | End: 2021-05-12 | Stop reason: ALTCHOICE

## 2021-05-12 RX ORDER — LIDOCAINE HYDROCHLORIDE 10 MG/ML
INJECTION, SOLUTION EPIDURAL; INFILTRATION; INTRACAUDAL; PERINEURAL PRN
Status: DISCONTINUED | OUTPATIENT
Start: 2021-05-12 | End: 2021-05-12 | Stop reason: SDUPTHER

## 2021-05-12 RX ADMIN — FENTANYL CITRATE 25 MCG: 50 INJECTION, SOLUTION INTRAMUSCULAR; INTRAVENOUS at 09:33

## 2021-05-12 RX ADMIN — FENTANYL CITRATE 25 MCG: 50 INJECTION, SOLUTION INTRAMUSCULAR; INTRAVENOUS at 08:57

## 2021-05-12 RX ADMIN — FENTANYL CITRATE 25 MCG: 50 INJECTION, SOLUTION INTRAMUSCULAR; INTRAVENOUS at 09:19

## 2021-05-12 RX ADMIN — PROPOFOL 40 MG: 10 INJECTION, EMULSION INTRAVENOUS at 09:04

## 2021-05-12 RX ADMIN — PROPOFOL 30 MG: 10 INJECTION, EMULSION INTRAVENOUS at 09:09

## 2021-05-12 RX ADMIN — PHENYLEPHRINE HYDROCHLORIDE 200 MCG: 10 INJECTION INTRAVENOUS at 09:27

## 2021-05-12 RX ADMIN — LIDOCAINE HYDROCHLORIDE 100 MG: 10 INJECTION, SOLUTION EPIDURAL; INFILTRATION; INTRACAUDAL; PERINEURAL at 09:04

## 2021-05-12 RX ADMIN — MIDAZOLAM HYDROCHLORIDE 2 MG: 1 INJECTION, SOLUTION INTRAMUSCULAR; INTRAVENOUS at 08:57

## 2021-05-12 RX ADMIN — FENTANYL CITRATE 25 MCG: 50 INJECTION, SOLUTION INTRAMUSCULAR; INTRAVENOUS at 09:04

## 2021-05-12 RX ADMIN — SODIUM CHLORIDE: 9 INJECTION, SOLUTION INTRAVENOUS at 07:34

## 2021-05-12 RX ADMIN — PROPOFOL 50 MCG/KG/MIN: 10 INJECTION, EMULSION INTRAVENOUS at 09:04

## 2021-05-12 ASSESSMENT — PAIN - FUNCTIONAL ASSESSMENT: PAIN_FUNCTIONAL_ASSESSMENT: 0-10

## 2021-05-12 NOTE — BRIEF OP NOTE
Brief Postoperative Note      Patient: Denise Blanca  YOB: 1945  MRN: [id number]    Date of Procedure: 5/12/2021    Pre-Op Diagnosis: CVA    Post-Op Diagnosis: Same       Procedure(s):  LOOP RECORDER INSERT    Surgeon(s):  Denice Camacho MD    Assistant:  Surgical Assistant: Carmen De La O    Anesthesia: Monitor Anesthesia Care    Estimated Blood Loss (mL): Minimal    Complications: None    Specimens:       Implants:  Implant Name Type Inv.  Item Serial No.  Lot No. LRB No. Used Action   MONITOR CARD IMPL 12 [id number] REVEAL Monserrat Winston CARD IMPL [address] [facility] [id number] [address] [place] [address] [id number] N/A 1 Implanted         Drains: * No LDAs found *    Full note dictated    Electronically signed by Denice Camacho MD on 5/12/2021 at 9:30 AM

## 2021-05-12 NOTE — PROGRESS NOTES
Discharge instructions reviewed with pt and wife, verbalized understanding. Pt able to take liquids and void prior to discharge.  Discharged in wheelchair with wife driving

## 2021-05-12 NOTE — PROGRESS NOTES

## 2021-05-12 NOTE — H&P
Issa Shea MD  [address] [place] [address], [address] [facility], [address]        RE:   Amy Donaldson  :  1945     Dear Dr. Sabrina Recio:  Joshua Fagan:  1.  Cryptogenic CVA on 2020, and on 2021, no etiology found. 2.  Consideration for an implantable loop recorder to monitor for occult atrial fibrillation.     HISTORY OF PRESENT ILLNESS:  I had the pleasure of seeing Mr. Dottie Wright in our office on 2021. He is a pleasant 79-year-old gentleman, who sees Dr. Sabrina Recio. He had seen Dr. Keri Pedraza approximately 15 years ago.     Ten years ago, he had a right retinal artery occlusion of unknown etiology. He had a full workup, which was negative. He had been on baby aspirin 81 mg daily, which was stopped for 10 days because of periodontal bleeding. On the day he restarted aspirin, he had a retinal artery occlusion, and therefore, he has been hesitant to stop his aspirin. There has never been an indication of atrial flutter or fibrillation.     On 2020, he went to the emergency room with left upper extremity weakness. A CT scan was negative. Dr. Quinton French at [facility]. Marcel's recommended tPA, which was given, and he was life-flighted to [facility]. Marcel's on 2020, with his weakness fully resolving. He was hypertensive and he was placed on Lipitor as well as aspirin and lisinopril. He was already on metoprolol. Plavix was added and I saw him on 2020. An event recorder has been ordered but not done. We ordered a Lexiscan Cardiolite stress test, which was negative.     I saw him then on 2021. At that time, his blood pressure had been elevated. I had recommended an implantable loop recorder, which he did not want to do at that time. He continued Plavix and aspirin.     He had another neurologic episode on 2021, where he had left arm numbness, which resolved.   It was recommended for transfer, which he declined.     He met with Dr. Sabrina Recio, who also recommended implantable loop recorder and he is seeing me today to discuss.     He has been taking Plavix and 81 mg of aspirin in the morning with 5 grains at night. He has had no further episodes. He does have some lightheadedness.     He will be having a tooth extraction this coming week.     He denies any chest pain or chest discomfort. He has had no unusual shortness of breath. He has had no PND or orthopnea. No syncope or near syncope.      CARDIAC RISK FACTORS:  Hypertension:  Positive. Hyperlipidemia:  Positive. Other Family Members:  Positive. Peripheral Vascular Disease:  Negative. Diabetes:  Negative. Smoking:  Negative.     MEDICATIONS AT THIS TIME:  He is on Zyloprim 300 mg daily, amoxicillin 500 mg t.i.d. for 7 days, aspirin 5 grains daily in the evening with 81 mg in the morning, Lipitor 80 mg daily, Plavix 75 mg daily, lisinopril 5 mg daily, Lopressor 25 mg b.i.d., Prilosec 20 mg daily, naproxen p.r.n.     PAST MEDICAL AND SURGICAL HISTORY:  1. Retinal occlusion 10 years ago in the right eye.  2.  Hypertension many years. 3.  Cataract surgery in the left eye on 10/14/2020.  4.  Cellulitis with an abscess on the right upper arm, which was lanced.     FAMILY HISTORY:  Son had an MI in 82/6403, complicated by a PE.     SOCIAL HISTORY:  He is 78years old, , retired 13 years ago. He was drinking beer and bourbon, he has stopped the bourbon. He drinks 2 beers a day. Does not smoke. He has 2 sons and 2 daughters. One son, Kayley Salinas, passed away at 46 of an MI and PE. He is .     REVIEW OF SYSTEMS:  Cardiac as above. Other systems reviewed including constitutional, eyes, ears, nose and throat, cardiovascular, respiratory, GI, , musculoskeletal, integumentary, neurologic, endocrine, hematologic and allergic/immunologic are negative except for what is described above.     PHYSICAL EXAMINATION:  VITAL SIGNS:  His blood pressure was 140/60, with a heart rate of 70 and regular. Respiratory rate 18.   O2 sat 96%.  Weight 264 pounds. GENERAL:  He is a pleasant 30-year-old gentleman. Denied pain. He was oriented to person, place and time. Answered questions appropriately. SKIN:  No unusual skin changes. HEENT:  The pupils are equally round and intact. Mucous membranes were dry. NECK:  No JVD. Good carotid pulses. No carotid bruits. No lymphadenopathy or thyromegaly. CARDIOVASCULAR EXAM:  S1 and S2 were normal.  No S3 or S4. Soft systolic blowing type murmur. No diastolic murmur. PMI was normal.  No lift, thrust, or pericardial friction rub. LUNGS:  Quite clear to auscultation and percussion. ABDOMEN:  Soft and nontender. Good bowel sounds. The aorta was not enlarged. No hepatomegaly, splenomegaly. EXTREMITIES:  Good femoral pulses. Good pedal pulses. No pedal edema. Skin was warm and dry. No calf tenderness. Nail beds pink. Good cap refill. PULSES:  Bilateral symmetrical radial, brachial and carotid pulses. No carotid bruits. Good femoral and pedal pulses. NEUROLOGIC EXAM:  Within normal limits. PSYCHIATRIC EXAM:  Within normal limits.     LABORATORY DATA:  From 02/14, sodium 140, potassium 3.9, BUN 19, creatinine 0.99, GFR greater than 60. Glucose 165, calcium was 9.3. His ALT was 21, AST was 24. White count 7.1, hemoglobin 10.8 with a platelet count of 036,104.     His EKG showed normal sinus rhythm with first-degree AV block, otherwise normal.     Echocardiogram on 11/03/2020, showed an EF of 47%, with mild-to-moderate aortic stenosis, with a mean gradient of 12 mmHg, known tricuspid regurgitation.     IMPRESSION:  1. Cryptogenic CVA for multiple emboli on 11/01/2020, with negative CTA and negative carotid artery disease. 2.  TIA 02/14/2021, with left arm weakness, which resolved after 2 hours. 3.  History of right retinal artery occlusion 10 years ago. 4.  Hypertension. 5.  Mild LV dysfunction, EF of 47% to 50%. 6.  Mild-to-moderate aortic stenosis.   7.  Normal Lexiscan Cardiolite stress test.     PLAN:  1. We would proceed on with a loop recorder on 05/12/2021 to monitor for atrial fibrillation. 2.  We will hold Plavix but will continue aspirin in view of his history of 2 CVAs and his retinal occlusion.     DISCUSSION:  Mr. Linnea Merritt had a second cryptogenic CVA, although symptoms resolved after 2 hours. There is still no etiology found.     He has agreed to proceed with a loop recorder, which I highly recommend.     He is having a tooth removed this coming week. I would hold Plavix for 5 days and aspirin for 1 day in view of his history. He will do the same when we do our implantable loop recorder.     He has aortic stenosis and LV function will need to be monitored with echos on a yearly basis. He is on guideline medical therapy with his blood pressure control, aspirin and Lipitor. He does continue to take Plavix, which is not unreasonable in view of his 2 CVAs.     Thank you very much for allowing me the privilege of seeing Mr. Linnea Merritt.   If you have any questions on my thoughts, please do not hesitate to contact me.      Sincerely,        Nadine Hartley

## 2021-05-12 NOTE — PROCEDURES
[place] [address]                            CARDIAC CATHETERIZATION    PATIENT NAME: Khari Canchola                      :        1945  MED REC NO:   [id number]                              ROOM:  ACCOUNT NO:   [de-identified]                           ADMIT DATE: 2021  PROVIDER:     Ramila Cheung      DATE OF PROCEDURE:  2021    NAME OF PROCEDURE:  Implantation of implantable loop recorder. INDICATION FOR PROCEDURE:  Cryptogenic CVA. PROCEDURE:  We brought him to Surgical.  Under light sedation, I made an  incision on the fourth intercostal space just to the left of the  sternum. We then implanted the recording device per protocol under the  skin. Skin was sutured with 3-0 silk. Steri-Strips were applied. There were no complications. Model number is [id number], serial number  [id number],  [employer], implant date 2021. There was  good sensing of R waves. IMPRESSION:  Successful implantation of [employer] implantable loop  recorder for cryptogenic stroke.         Татьяна Mae    D: 2021 9:35:25       T: 2021 10:26:49     [email or web address]  Job#: [id number]     Doc#: [id number]    CC:

## 2021-05-12 NOTE — ANESTHESIA PRE PROCEDURE
Department of Anesthesiology  Preprocedure Note       Name:  Denise Blanca   Age:  78 y.o.  :  1945                                          MRN:  [id number]         Date:  2021      Surgeon: Denis Johnson):  Denice Camacho MD    Procedure: Procedure(s):  LOOP RECORDER INSERT    Medications prior to admission:   Prior to Admission medications    Medication Sig Start Date End Date Taking?  Authorizing Provider   aspirin 81 MG chewable tablet Take 81 mg by mouth daily In AM   Yes Historical Provider, MD   aspirin 325 MG tablet Take 325 mg by mouth daily Evening   Yes Historical Provider, MD   clopidogrel (PLAVIX) 75 MG tablet TAKE 1 TABLET BY MOUTH EVERY DAY 21  Yes Karen Tomlinson MD   lisinopril (PRINIVIL;ZESTRIL) 5 MG tablet TAKE 1 TABLET BY MOUTH EVERY DAY 21  Yes Karen Tomlinson MD   atorvastatin (LIPITOR) 80 MG tablet TAKE 1 TABLET BY MOUTH NIGHTLY 21  Yes MD Waldemar [place] Palmetto, Serenoa repens, (BL SAW PALMETTO PO) Take 450 mg by mouth   Yes Historical Provider, MD   b complex vitamins capsule Take 1 capsule by mouth daily   Yes Historical Provider, MD   Lutein 10 MG TABS Take 25 mg by mouth   Yes Historical Provider, MD   allopurinol (ZYLOPRIM) 300 MG tablet Take 300 mg by mouth daily   Yes Historical Provider, MD   omeprazole (PRILOSEC) 20 MG delayed release capsule Take 20 mg by mouth daily   Yes Historical Provider, MD   metoprolol tartrate (LOPRESSOR) 25 MG tablet Take 25 mg by mouth 2 times daily   Yes Historical Provider, MD   Naproxen Sodium (ALEVE) 220 MG CAPS Take 220 mg by mouth    Yes Historical Provider, MD       Current medications:    Current Facility-Administered Medications   Medication Dose Route Frequency Provider Last Rate Last Admin    0.9 % sodium chloride infusion   Intravenous Continuous Dencie Camacho  mL/hr at 21 0734 New Bag at 21 0734    ceFAZolin (ANCEF) 2000 mg in dextrose 3 % 50 mL IVPB (duplex)  2,000 mg Intravenous On Call to [address] [place] [address] Odilia Saavedra MD           Allergies: Allergies   Allergen Reactions    Adhesive Tape Itching       Problem List:    Patient Active Problem List   Diagnosis Code    Stroke aborted by administration of thrombolytic agent ([facility] I63.9    Acute ischemic stroke ([facility] [address] I63.9    Bilateral carotid artery stenosis I65.23    Received tissue plasminogen activator (t-PA) less than 24 hours prior to arrival Z92.82    Left hand weakness R29.898    Stroke of unknown cause ([facility] [address] [facility] I63.9    Essential hypertension I10    Left against medical advice Z53.29       Past Medical History:        Diagnosis Date    Cerebral artery occlusion with cerebral infarction ([facility]     Hypertension     Kidney stone        Past Surgical History:        Procedure Laterality Date    CATARACT REMOVAL Left     LITHOTRIPSY         Social History:    Social History     Tobacco Use    Smoking status: Never Smoker    Smokeless tobacco: Never Used   Substance Use Topics    Alcohol use: Yes     Alcohol/week: 10.0 standard drinks     Types: 10 Cans of beer per week                                Counseling given: Not Answered      Vital Signs (Current):   Vitals:    05/12/21 0721   BP: (!) 154/55   Pulse: 63   Resp: 18   Temp: 36.1 °C (97 °F)   TempSrc: Temporal   SpO2: 98%   Weight: 262 lb (118.8 kg)   Height: 5' 8\" (1.727 m)                                              BP Readings from Last 3 Encounters:   05/12/21 (!) 154/55   04/21/21 (!) 140/60   02/14/21 (!) 174/78       NPO Status: Time of last liquid consumption: 0600                        Time of last solid consumption: 2030                        Date of last liquid consumption: 05/12/21                        Date of last solid food consumption: 05/11/21    BMI:   Wt Readings from Last 3 Encounters:   05/12/21 262 lb (118.8 kg)   04/21/21 264 lb (119.7 kg)   02/14/21 269 lb (122 kg)     Body mass index is 39.84 kg/m².     CBC:   Lab Results   Component Value Date    WBC 6.3 05/07/2021 RBC 5.54 05/07/2021    RBC 4.77 11/13/2011    HGB 11.1 05/07/2021    HCT 35.8 05/07/2021    MCV 64.7 05/07/2021    RDW 17.5 05/07/2021     05/07/2021     11/13/2011       CMP:   Lab Results   Component Value Date     05/07/2021    K 3.8 05/07/2021     05/07/2021    CO2 26 05/07/2021    BUN 16 05/07/2021    CREATININE 1.09 05/07/2021    GFRAA >60 05/07/2021    LABGLOM >60 05/07/2021    GLUCOSE 164 05/07/2021    GLUCOSE 156 11/13/2011    PROT 7.3 05/07/2021    CALCIUM 9.4 05/07/2021    BILITOT 1.55 05/07/2021    ALKPHOS 81 05/07/2021    AST 26 05/07/2021    ALT 25 05/07/2021       POC Tests: No results for input(s): POCGLU, POCNA, POCK, POCCL, POCBUN, POCHEMO, POCHCT in the last 72 hours.     Coags:   Lab Results   Component Value Date    PROTIME 13.9 02/14/2021    INR 1.1 02/14/2021    APTT 31.8 02/14/2021       HCG (If Applicable): No results found for: PREGTESTUR, PREGSERUM, HCG, HCGQUANT     ABGs: No results found for: PHART, PO2ART, QOV3FHW, EZE3NMC, BEART, D0RGOTNR     Type & Screen (If Applicable):  No results found for: LABABO, LABRH    Drug/Infectious Status (If Applicable):  No results found for: HIV, HEPCAB    COVID-19 Screening (If Applicable):   Lab Results   Component Value Date    COVID19 Not Detected 05/12/2021           Anesthesia Evaluation  Patient summary reviewed and Nursing notes reviewed  Airway: Mallampati: II  TM distance: >3 FB   Neck ROM: full  Mouth opening: > = 3 FB Dental: normal exam         Pulmonary:Negative Pulmonary ROS and normal exam  breath sounds clear to auscultation                             Cardiovascular:  Exercise tolerance: no interval change,   (+) hypertension:,       ECG reviewed  Rhythm: regular  Rate: normal                    Neuro/Psych:   (+) CVA (residual symptoms of right hand numbness and tingling that comes and goes, some occassional facial numbness):, TIA (self history of small strokes after last november), GI/Hepatic/Renal:   (+) renal disease (remote history): kidney stones, morbid obesity          Endo/Other: Negative Endo/Other ROS                    Abdominal:   (+) obese,     Abdomen: soft. Vascular:   + PVD, aortic or cerebral (bilateral carotic artery stenosis), . Anesthesia Plan      MAC     ASA 3       Induction: intravenous. MIPS: Postoperative opioids intended and Prophylactic antiemetics administered. Anesthetic plan and risks discussed with patient. Plan discussed with attending.                   LEXI Colin - CRNA   5/12/2021

## 2021-05-20 ENCOUNTER — OFFICE VISIT (OUTPATIENT)
Dept: CARDIOLOGY CLINIC | Age: 76
End: 2021-05-20
Payer: MEDICARE

## 2021-05-20 DIAGNOSIS — R94.31 EKG, ABNORMAL: ICD-10-CM

## 2021-05-20 DIAGNOSIS — I63.9 CEREBROVASCULAR ACCIDENT (CVA), UNSPECIFIED MECHANISM (HCC): Primary | ICD-10-CM

## 2021-05-20 DIAGNOSIS — E55.9 VITAMIN D DEFICIENCY DISEASE: ICD-10-CM

## 2021-05-20 DIAGNOSIS — I10 ESSENTIAL HYPERTENSION: ICD-10-CM

## 2021-05-20 PROCEDURE — 99999 PR OFFICE/OUTPT VISIT,PROCEDURE ONLY: CPT | Performed by: INTERNAL MEDICINE

## 2021-05-20 PROCEDURE — 93291 INTERROG DEV EVAL SCRMS IP: CPT | Performed by: INTERNAL MEDICINE

## 2021-09-27 ENCOUNTER — TELEPHONE (OUTPATIENT)
Dept: CARDIOLOGY CLINIC | Age: 76
End: 2021-09-27

## 2021-09-27 DIAGNOSIS — I48.91 ATRIAL FIBRILLATION, NEW ONSET (HCC): Primary | ICD-10-CM

## 2021-09-27 RX ORDER — WARFARIN SODIUM 5 MG/1
5 TABLET ORAL DAILY
Qty: 30 TABLET | Refills: 11 | Status: SHIPPED | OUTPATIENT
Start: 2021-09-27 | End: 2021-10-04

## 2021-09-27 NOTE — TELEPHONE ENCOUNTER
Pt called after DR Aubree Mcelroy reviewed loop recorder reading  A fib,  To stop plavix and stay on asa   Start eliquis 5 mg bid

## 2021-09-27 NOTE — TELEPHONE ENCOUNTER
Pt called back unable to afford Eliquis xarelt or pradaxa all over $500   Will change to coumadin 5 mg daily   Pt ok with this, [facility]   Order sent to [facility]madin clinic.

## 2021-09-29 ENCOUNTER — TELEPHONE (OUTPATIENT)
Dept: PHARMACY | Age: 76
End: 2021-09-29

## 2021-09-29 PROBLEM — I48.91 ATRIAL FIBRILLATION (HCC): Status: ACTIVE | Noted: 2021-09-29

## 2021-09-29 NOTE — TELEPHONE ENCOUNTER
Paris Chavez returned phone call. Hasn't picked up warfarin prescription yet but will tonight and will start taking 1 tablet daily in the evening. He has continued plavix and took one today, but will stop after today.  Paris Chavez states he takes BOTH asa 81 mg daily in AM and asa 325 mg daily in PM. Will check INR after 2 doses of warfarin 5 mg on 10/1 230 pm.

## 2021-10-01 ENCOUNTER — HOSPITAL ENCOUNTER (OUTPATIENT)
Dept: PHARMACY | Age: 76
Setting detail: THERAPIES SERIES
Discharge: HOME | End: 2021-10-01
Payer: MEDICARE

## 2021-10-01 VITALS
SYSTOLIC BLOOD PRESSURE: 116 MMHG | DIASTOLIC BLOOD PRESSURE: 59 MMHG | BODY MASS INDEX: 38.89 KG/M2 | WEIGHT: 255.8 LBS | HEART RATE: 68 BPM

## 2021-10-01 DIAGNOSIS — I63.9 STROKE OF UNKNOWN CAUSE (HCC): ICD-10-CM

## 2021-10-01 DIAGNOSIS — I48.91 ATRIAL FIBRILLATION, UNSPECIFIED TYPE (HCC): ICD-10-CM

## 2021-10-01 DIAGNOSIS — I63.9 ACUTE ISCHEMIC STROKE (HCC): Primary | ICD-10-CM

## 2021-10-01 LAB — INR BLD: 1.2

## 2021-10-01 PROCEDURE — 85610 PROTHROMBIN TIME: CPT

## 2021-10-01 PROCEDURE — 99202 OFFICE O/P NEW SF 15 MIN: CPT

## 2021-10-01 RX ORDER — GLUCOSAMINE/CHONDR SU A SOD 750-600 MG
1 TABLET ORAL NIGHTLY
COMMUNITY

## 2021-10-01 RX ORDER — VITC/E/ZINC/COPPER/LUTEIN/ZEAX 250 MG-200
1 TABLET,CHEWABLE ORAL DAILY
COMMUNITY

## 2021-10-01 NOTE — PROGRESS NOTES
[address]/[place]  Medication Management  ANTICOAGULATION    Referring Provider: Dr. Juanjo Law INR: 2-3     TODAY'S INR: 2-3    WARFARIN Dosage: 7.5 mg x 2 doses (10/1/2021, 10/2/2021), then 5 mg x 1 dose (10/3/2021)    INR (no units)   Date Value   10/01/2021 1.2   02/14/2021 1.1   11/01/2020 1.0   11/01/2020 1.1       Medication changes:  Stopped Plavix 75 mg daily on 9/29/2021  Took last dose of Aspirin 325 mg on 9/29/2021, but plans to start taking Aspirin 81 mg nightly as well as continue taking Aspirin 81 mg daily in the AM    Notes:    Initial visit. Patient was extensively educated about warfarin and diet and about the potential for interaction with other medications as well as side effects to monitor while on warfarin therapy. Fingerstick INR drawn per clinic protocol. Patient states no visible blood in urine and no black tarry stool. Diane Betts was started on warfarin 5 mg daily on 9/29/2021 and has had 2 doses as instructed prior to this INR check today. He had been wearing a loop recorder (inserted 5/12/2021 per Dr. Silvana Phelps) and was diagnosed with new onset atrial fibrillation on 9/27/2021. He was to start Eliquis 5 mg BID; however the medication was \"over 500 dollars\" so he was switched to warfarin therapy and referred to our clinic for dose management. Diane Betts had been taking Plavix 75 mg daily, but was told to stop this when he started warfarin so he took his last dose of Plavix on \"9/29/2021\". 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  At this time, he prefers to take Aspirin 81 mg daily in the AM and the PM until his warfarin dose is more stabilized and he can discuss this further with Dr. Mirza Moctezuma. Cedric Aldrich says he plans to start taking Aspirin 81 mg in the PM starting tonight. All other medications reviewed for accuracy. No recent changes in other maintenance medications. Since his INR is 1.2 today after 2 doses, we will have him take 7.5 mg warfarin x 2 doses (10/1/2021 and 10/2/2021) and then 5 mg warfarin x 1 dose (10/3/2021) as noted on his dosing calendar. We will recheck INR in 3 days on 10/4/2021 to reassess further warfarin dosing at that time. Patient acknowledges working in consult agreement with pharmacist as referred by his/her physician.                   For Pharmacy Admin Tracking Only     Intervention Detail: Adherence Monitorin and Dose Adjustment: 1, reason: Therapy Optimization   Total # of Interventions Recommended: 2   Total # of Interventions Accepted: 2   Time Spent (min): [address] Darrene Sofiya Santoyo, [facility], PharmD

## 2021-10-04 ENCOUNTER — HOSPITAL ENCOUNTER (OUTPATIENT)
Dept: PHARMACY | Age: 76
Setting detail: THERAPIES SERIES
Discharge: HOME | End: 2021-10-04
Payer: MEDICARE

## 2021-10-04 VITALS
BODY MASS INDEX: 38.68 KG/M2 | WEIGHT: 254.4 LBS | HEART RATE: 66 BPM | SYSTOLIC BLOOD PRESSURE: 131 MMHG | DIASTOLIC BLOOD PRESSURE: 65 MMHG

## 2021-10-04 DIAGNOSIS — I63.9 ACUTE ISCHEMIC STROKE (HCC): Primary | ICD-10-CM

## 2021-10-04 DIAGNOSIS — I63.9 STROKE OF UNKNOWN CAUSE (HCC): ICD-10-CM

## 2021-10-04 DIAGNOSIS — I48.91 ATRIAL FIBRILLATION, UNSPECIFIED TYPE (HCC): ICD-10-CM

## 2021-10-04 LAB — INR BLD: 2.5

## 2021-10-04 PROCEDURE — 85610 PROTHROMBIN TIME: CPT

## 2021-10-04 PROCEDURE — 99211 OFF/OP EST MAY X REQ PHY/QHP: CPT

## 2021-10-04 RX ORDER — WARFARIN SODIUM 5 MG/1
TABLET ORAL
Qty: 30 TABLET | Refills: 11 | Status: SHIPPED
Start: 2021-10-04 | End: 2021-10-07 | Stop reason: DRUGHIGH

## 2021-10-04 NOTE — PROGRESS NOTES
[address]/[place]  Medication Management  ANTICOAGULATION    Referring Provider: Dr. Shaila Carballo     GOAL INR: 2-3     TODAY'S INR: 2.5     WARFARIN Dosage: 2.5 mg x 1 dose (10/4/2021), then 5 mg x 2 doses (10/5/2021, 10/6/2021)    INR (no units)   Date Value   10/04/2021 2.5   10/01/2021 1.2   02/14/2021 1.1   11/01/2020 1.0   11/01/2020 1.1       Medication changes:  Stopped Plavix 75 mg daily on 9/29/2021  Took last dose of Aspirin 325 mg in the PM on 9/29/2021, but started Aspirin 81 mg nightly on 10/1/2021 and continues to take Aspirin 81 mg daily in the AM    Notes:   Fingerstick INR drawn per clinic protocol. Patient states no visible blood in urine and no black tarry stool. As discussed last week, Gaetano Fuller was started on warfarin 5 mg daily on 9/29/2021 and has had 5 doses (5 mg x 3 doses and 7.5 mg x 2 doses) as instructed prior to this INR check today. He had been wearing a loop recorder (inserted 5/12/2021 per Dr. Niraj Clark) and was diagnosed with new onset atrial fibrillation on 9/27/2021. Gaetano Fuller had been taking Plavix 75 mg daily, but was told to stop this when he started warfarin so he took his last dose of Plavix on \"9/29/2021\". Gaetano Fuller had also been taking Aspirin 81 mg in the AM and Aspirin 325 mg in the PM but says he took his last dose of Aspirin 325 mg on 9/29/2021 as well, but is still taking Aspirin 81 mg daily in the AM. 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 No changes in other maintenance medications or in diet. Since his INR has jumped from 1.2 to 2.5 in the past 3 days, we will decrease warfarin regimen to 2.5 mg warfarin tonight and 5 mg warfarin daily for 2 additional doses.  We will recheck INR again in 3 days on 10/7/2021 and reassess further warfarin dosing at that time.  Patient acknowledges working in consult agreement with pharmacist as referred by his/her physician                 For Pharmacy Admin Tracking Only     Intervention Detail: Adherence Monitorin and Dose Adjustment: 1, reason: Therapy Optimization   Total # of Interventions Recommended: 2   Total # of Interventions Accepted: 2   Time Spent (min): [address] [place] [address], PharmD

## 2021-10-07 ENCOUNTER — HOSPITAL ENCOUNTER (OUTPATIENT)
Dept: PHARMACY | Age: 76
Setting detail: THERAPIES SERIES
Discharge: HOME | End: 2021-10-07
Payer: MEDICARE

## 2021-10-07 VITALS
WEIGHT: 254.2 LBS | HEART RATE: 63 BPM | SYSTOLIC BLOOD PRESSURE: 132 MMHG | DIASTOLIC BLOOD PRESSURE: 71 MMHG | BODY MASS INDEX: 38.65 KG/M2

## 2021-10-07 DIAGNOSIS — I63.9 STROKE OF UNKNOWN CAUSE (HCC): ICD-10-CM

## 2021-10-07 DIAGNOSIS — I63.9 ACUTE ISCHEMIC STROKE (HCC): Primary | ICD-10-CM

## 2021-10-07 LAB — INR BLD: 2.9

## 2021-10-07 PROCEDURE — 99212 OFFICE O/P EST SF 10 MIN: CPT

## 2021-10-07 PROCEDURE — 85610 PROTHROMBIN TIME: CPT

## 2021-10-07 RX ORDER — WARFARIN SODIUM 5 MG/1
TABLET ORAL
Qty: 30 TABLET | Refills: 11 | Status: SHIPPED
Start: 2021-10-07 | End: 2021-10-27 | Stop reason: DRUGHIGH

## 2021-10-07 NOTE — PROGRESS NOTES
[address] [place] [address]/[place]  Medication Management  ANTICOAGULATION    Referring Provider: Dr. Montiel Brain     GOAL INR: 2-3     TODAY'S INR: 2.9     WARFARIN Dosage: 2.5 mg Thur and Sun and 5 mg daily all other days of the week     INR (no units)   Date Value   10/07/2021 2.9   10/04/2021 2.5   10/01/2021 1.2   2021 1.1   2020 1.0   2020 1.1       Medication changes:  none  Notes:    Fingerstick INR drawn per clinic protocol. Patient states no visible blood in urine and no black tarry stool. Denies any missed doses of warfarin. As discussed last week, Meggan Hdz was started on warfarin 5 mg daily on 2021 and has had 8 doses (5 mg x 3 doses and 7.5 mg x 2 doses, 2.5, 5,5) as instructed prior to this INR check today. No change in other maintenance medications or in diet. Since Quique's INR remains therapeutic, we will continue 2.5 mg on Thur and Sun and 5 mg daily all other days and will recheck INR in 1 week because of schedule conflicts. Patient acknowledges working in consult agreement with pharmacist as referred by his/her physician.                   For Pharmacy Admin Tracking Only     Intervention Detail: Adherence Monitorin and Dose Adjustment: 1, reason: Therapy De-escalation   Total # of Interventions Recommended: 1   Total # of Interventions Accepted: 1   Time Spent (min): [address], [facility], PharmD

## 2021-10-14 ENCOUNTER — HOSPITAL ENCOUNTER (OUTPATIENT)
Dept: PHARMACY | Age: 76
Setting detail: THERAPIES SERIES
Discharge: HOME | End: 2021-10-14
Payer: MEDICARE

## 2021-10-14 VITALS
WEIGHT: 254.4 LBS | BODY MASS INDEX: 38.68 KG/M2 | HEART RATE: 67 BPM | DIASTOLIC BLOOD PRESSURE: 68 MMHG | SYSTOLIC BLOOD PRESSURE: 144 MMHG

## 2021-10-14 DIAGNOSIS — I63.9 ACUTE ISCHEMIC STROKE (HCC): Primary | ICD-10-CM

## 2021-10-14 DIAGNOSIS — I63.9 STROKE OF UNKNOWN CAUSE (HCC): ICD-10-CM

## 2021-10-14 LAB — INR BLD: 2.8

## 2021-10-14 PROCEDURE — 85610 PROTHROMBIN TIME: CPT

## 2021-10-14 PROCEDURE — 99211 OFF/OP EST MAY X REQ PHY/QHP: CPT

## 2021-10-14 NOTE — PROGRESS NOTES
[address] [place] [address]/[place]  Medication Management  ANTICOAGULATION    Referring Provider: Dr. Andre Patel     GOAL INR: 2-3     TODAY'S INR: 2.8     WARFARIN Dosage: 2.5 mg Thur and Sun and 5 mg daily all other days of the week     INR (no units)   Date Value   10/14/2021 2.8   10/07/2021 2.9   10/04/2021 2.5   10/01/2021 1.2   2021 1.1   2020 1.0   2020 1.1       Medication changes:  none  Notes:    Fingerstick INR drawn per clinic protocol. Patient states no visible blood in urine and no black tarry stool. Denies any missed doses of warfarin. No change in other maintenance medications or in diet. Constance Potter states he had an episode this week where he felt like some was \"going through his hair\" and he checked his pulse and he felt like his \"heart was out of beat. \" Constance Potter has had 15 doses of warfarin prior to today's INR check. Since Quique's INR remains therapeutic, we will continue current weekly warfarin regimen and will recheck INR in 1 and 1/2 weeks. Patient acknowledges working in consult agreement with pharmacist as referred by his/her physician.                   For Pharmacy Admin Tracking Only     Intervention Detail: Adherence Monitorin   Total # of Interventions Recommended: 0   Total # of Interventions Accepted: 0   Time Spent (min): [address], [facility], PharmD

## 2021-10-27 ENCOUNTER — HOSPITAL ENCOUNTER (OUTPATIENT)
Dept: PHARMACY | Age: 76
Setting detail: THERAPIES SERIES
Discharge: HOME | End: 2021-10-27
Payer: MEDICARE

## 2021-10-27 VITALS
WEIGHT: 256.6 LBS | DIASTOLIC BLOOD PRESSURE: 57 MMHG | HEART RATE: 67 BPM | SYSTOLIC BLOOD PRESSURE: 113 MMHG | BODY MASS INDEX: 39.02 KG/M2

## 2021-10-27 DIAGNOSIS — I63.9 ACUTE ISCHEMIC STROKE (HCC): Primary | ICD-10-CM

## 2021-10-27 DIAGNOSIS — I63.9 STROKE OF UNKNOWN CAUSE (HCC): ICD-10-CM

## 2021-10-27 LAB — INR BLD: 1.9

## 2021-10-27 PROCEDURE — 85610 PROTHROMBIN TIME: CPT

## 2021-10-27 PROCEDURE — 99211 OFF/OP EST MAY X REQ PHY/QHP: CPT

## 2021-10-27 RX ORDER — WARFARIN SODIUM 5 MG/1
TABLET ORAL
Qty: 30 TABLET | Refills: 11 | Status: SHIPPED
Start: 2021-10-27 | End: 2021-12-09 | Stop reason: DRUGHIGH

## 2021-10-27 NOTE — PROGRESS NOTES
[address] [place] [address]/[place]  Medication Management  ANTICOAGULATION    Referring Provider: Dr. Aly Party     GOAL INR: 2-3     TODAY'S INR: 1.9     WARFARIN Dosage: 2.5 mg Geanie Cons and Sun and 5 mg daily all other days of the week    INR (no units)   Date Value   10/27/2021 1.9   10/14/2021 2.8   10/07/2021 2.9   10/04/2021 2.5   10/01/2021 1.2   2021 1.1   2020 1.0       Medication changes:  None    Notes:    Fingerstick INR drawn per clinic protocol. Patient states no visible blood in urine and no black tarry stool. Denies any missed doses of warfarin. No change in other maintenance medications. Upon reviewing his diet, Amanda Le says he did have \"mixed\" salad at a restaurant that had some \"spinach\" leaves this past Monday (2 days ago). He also says he usually drinks \"1 or 2 beers\" about 5 days weekly, but hasn't had any alcohol in the past 2 days. Since his INR has dropped from 2.8 to 1.9 over the course of the past 2 weeks, we will increase current weekly warfarin regimen (8%) as noted on his dosing calendar. He will take 2.5 mg warfarin on  and 5 mg warfarin all other days of the week and we will recheck INR again in 1 week. Patient acknowledges working in consult agreement with pharmacist as referred by his/her physician.                   For Pharmacy Admin Tracking Only     Intervention Detail: Adherence Monitorin and Dose Adjustment: 1, reason: Therapy Optimization   Total # of Interventions Recommended: 2   Total # of Interventions Accepted: 2   Time Spent (min): [address] [facility], PharmD

## 2021-11-02 ENCOUNTER — TELEPHONE (OUTPATIENT)
Dept: PHARMACY | Age: 76
End: 2021-11-02

## 2021-11-02 NOTE — TELEPHONE ENCOUNTER
COVID-19 phone screening     Call placed to screen patient prior to upcoming Medication Management visit for Anticoagulation. Does patient have fever and/or lower respiratory symptoms (SOB, difficulty breathing, cough)? [] Yes    [x] No    If yes, complete Travel Screening and ask the following:   [] [Fever OR s/sxs of lower respiratory illness]  AND  [close contact with lab-confirmed COVID-19 patient within 14 days of symptom onset   [] [Fever AND s/sxs of lower respiratory illness requiring hospitalization] AND [history of travel to [place], [place], [place], [facility], [place] within 14 days of sx onset]  [] [Fever with severe acute lower respiratory illness (I.e. PNA, ARDS) requiring hospitalization and without alternative explanatory diagnosis (I.e. Influenza)] AND [no source of exposure identified]    [x] None of the following; patient confirmed for regularly scheduled INR check and instructed to call the clinic immediately if any symptoms develop prior to upcoming appointment.

## 2021-11-03 ENCOUNTER — HOSPITAL ENCOUNTER (OUTPATIENT)
Dept: PHARMACY | Age: 76
Setting detail: THERAPIES SERIES
Discharge: HOME | End: 2021-11-03
Payer: MEDICARE

## 2021-11-03 VITALS
BODY MASS INDEX: 38.99 KG/M2 | DIASTOLIC BLOOD PRESSURE: 72 MMHG | WEIGHT: 256.4 LBS | HEART RATE: 59 BPM | SYSTOLIC BLOOD PRESSURE: 146 MMHG

## 2021-11-03 DIAGNOSIS — I63.9 STROKE OF UNKNOWN CAUSE (HCC): ICD-10-CM

## 2021-11-03 DIAGNOSIS — I48.91 ATRIAL FIBRILLATION, UNSPECIFIED TYPE (HCC): ICD-10-CM

## 2021-11-03 DIAGNOSIS — I63.9 ACUTE ISCHEMIC STROKE (HCC): Primary | ICD-10-CM

## 2021-11-03 LAB — INR BLD: 2.4

## 2021-11-03 PROCEDURE — 85610 PROTHROMBIN TIME: CPT | Performed by: FAMILY MEDICINE

## 2021-11-03 PROCEDURE — 99211 OFF/OP EST MAY X REQ PHY/QHP: CPT | Performed by: FAMILY MEDICINE

## 2021-11-03 NOTE — PROGRESS NOTES
[address] [place] [address]/[place]  Medication Management  ANTICOAGULATION    Referring Provider: Dr Lesley Fierro INR: 2.0-3.0    TODAY'S INR: 2.4    WARFARIN Dosage: 5mg daily     INR (no units)   Date Value   11/03/2021 2.4   10/27/2021 1.9   10/14/2021 2.8   10/07/2021 2.9   10/04/2021 2.5   10/01/2021 1.2   02/14/2021 1.1       Medication changes:  No changes  Notes:    Fingerstick INR drawn per clinic protocol. Patient states no visible blood in urine and no black tarry stool. Denies any missed doses of warfarin. No change in other maintenance medications or in diet. Will recheck INR in 2 weeks. Patient was originally instructed to take 2.5mg warfarin on Sunday but felt he \"was having some stroke like symptoms so (he) took an extra 2.5mg\". Patient will continue 5mg daily at this time. Patient acknowledges working in consult agreement with pharmacist as referred by his/her physician.                   For Pharmacy Admin Tracking Only     Intervention Detail: Dose Adjustment: 1, reason: Therapy Optimization   Total # of Interventions Recommended: 2   Total # of Interventions Accepted: 2   Time Spent (min): 30    Ning Fisher R.Ph., 11/3/2021,2:44 PM

## 2021-11-03 NOTE — PATIENT INSTRUCTIONS
Please continue to take warfarin 5mg (1 tablet) every day. Continue to monitor for signs of bleeding.  Return to coumadin clinic in 2 weeks

## 2021-11-16 ENCOUNTER — TELEPHONE (OUTPATIENT)
Dept: PHARMACY | Age: 76
End: 2021-11-16

## 2021-11-16 NOTE — TELEPHONE ENCOUNTER
COVID-19 phone screening     Call placed to screen patient prior to upcoming Medication Management visit for Anticoagulation. Does patient have fever and/or lower respiratory symptoms (SOB, difficulty breathing, cough)? [] Yes    [] No    If yes, complete Travel Screening and ask the following:   [] [Fever OR s/sxs of lower respiratory illness]  AND  [close contact with lab-confirmed COVID-19 patient within 14 days of symptom onset   [] [Fever AND s/sxs of lower respiratory illness requiring hospitalization] AND [history of travel to [place], [place], [place], [facility], [place], [place] within 14 days of sx onset]  [] [Fever with severe acute lower respiratory illness (I.e. PNA, ARDS) requiring hospitalization and without alternative explanatory diagnosis (I.e. Influenza)] AND [no source of exposure identified]    [x] None of the following; patient confirmed for regularly scheduled INR check and instructed to call the clinic immediately if any symptoms develop prior to upcoming appointment.

## 2021-11-17 ENCOUNTER — HOSPITAL ENCOUNTER (OUTPATIENT)
Dept: PHARMACY | Age: 76
Setting detail: THERAPIES SERIES
Discharge: HOME | End: 2021-11-17
Payer: MEDICARE

## 2021-11-17 VITALS
BODY MASS INDEX: 39.02 KG/M2 | WEIGHT: 256.6 LBS | DIASTOLIC BLOOD PRESSURE: 66 MMHG | SYSTOLIC BLOOD PRESSURE: 126 MMHG | HEART RATE: 63 BPM

## 2021-11-17 DIAGNOSIS — I63.9 STROKE OF UNKNOWN CAUSE (HCC): ICD-10-CM

## 2021-11-17 DIAGNOSIS — I63.9 ACUTE ISCHEMIC STROKE (HCC): Primary | ICD-10-CM

## 2021-11-17 DIAGNOSIS — I48.91 ATRIAL FIBRILLATION, UNSPECIFIED TYPE (HCC): ICD-10-CM

## 2021-11-17 LAB — INR BLD: 3

## 2021-11-17 PROCEDURE — 85610 PROTHROMBIN TIME: CPT | Performed by: FAMILY MEDICINE

## 2021-11-17 PROCEDURE — 99211 OFF/OP EST MAY X REQ PHY/QHP: CPT | Performed by: FAMILY MEDICINE

## 2021-11-17 NOTE — PROGRESS NOTES
[address]/[place]  Medication Management  ANTICOAGULATION    Referring Provider: Dr Mikey Calvert INR: 2.0-3.0    TODAY'S INR: 3.0    WARFARIN Dosage: 5mg daily    INR (no units)   Date Value   2021 3   2021 2.4   10/27/2021 1.9   10/14/2021 2.8   10/07/2021 2.9   10/04/2021 2.5   10/01/2021 1.2       Medication changes:  No changes  Notes:    Fingerstick INR drawn per clinic protocol. Patient states no visible blood in urine and no black tarry stool. Denies any missed doses of warfarin. No change in other maintenance medications or in diet. Will recheck INR in 3 weeks. Patient acknowledges working in consult agreement with pharmacist as referred by his/her physician.                   For Pharmacy Admin Tracking Only     Intervention Detail: Adherence Monitorin   Total # of Interventions Recommended: 2   Total # of Interventions Accepted: 2   Time Spent (min): 30      KELBY Mathews.Ph., 2021,2:23 PM

## 2021-11-17 NOTE — PATIENT INSTRUCTIONS
Continue warfarin 5mg every day. Continue to monitor for signs of bleeding.  Return to coumadin clinic in 3 weeks

## 2021-11-20 ENCOUNTER — APPOINTMENT (OUTPATIENT)
Dept: CT IMAGING | Age: 76
End: 2021-11-20
Payer: MEDICARE

## 2021-11-20 ENCOUNTER — HOSPITAL ENCOUNTER (EMERGENCY)
Age: 76
Discharge: HOME | End: 2021-11-20
Attending: FAMILY MEDICINE
Payer: MEDICARE

## 2021-11-20 VITALS
SYSTOLIC BLOOD PRESSURE: 116 MMHG | WEIGHT: 248 LBS | BODY MASS INDEX: 37.71 KG/M2 | RESPIRATION RATE: 18 BRPM | DIASTOLIC BLOOD PRESSURE: 67 MMHG | OXYGEN SATURATION: 96 % | HEART RATE: 75 BPM

## 2021-11-20 DIAGNOSIS — I48.0 PAROXYSMAL ATRIAL FIBRILLATION (HCC): ICD-10-CM

## 2021-11-20 DIAGNOSIS — G45.9 TIA (TRANSIENT ISCHEMIC ATTACK): Primary | ICD-10-CM

## 2021-11-20 LAB
ABSOLUTE BANDS #: 0.2 K/UL (ref 0–1)
ABSOLUTE EOS #: ABNORMAL K/UL (ref 0–0.4)
ABSOLUTE IMMATURE GRANULOCYTE: ABNORMAL K/UL (ref 0–0.3)
ABSOLUTE LYMPH #: 0.74 K/UL (ref 1–4.8)
ABSOLUTE MONO #: 0.34 K/UL (ref 0–1)
ALBUMIN SERPL-MCNC: 4.2 G/DL (ref 3.5–5.2)
ALBUMIN/GLOBULIN RATIO: ABNORMAL (ref 1–2.5)
ALP BLD-CCNC: 81 U/L (ref 40–129)
ALT SERPL-CCNC: 31 U/L (ref 5–41)
ANION GAP SERPL CALCULATED.3IONS-SCNC: 13 MMOL/L (ref 9–17)
AST SERPL-CCNC: 28 U/L
BANDS: 3 % (ref 0–10)
BASOPHILS # BLD: ABNORMAL % (ref 0–2)
BASOPHILS ABSOLUTE: ABNORMAL K/UL (ref 0–0.2)
BILIRUB SERPL-MCNC: 1.22 MG/DL (ref 0.3–1.2)
BUN BLDV-MCNC: 19 MG/DL (ref 8–23)
BUN/CREAT BLD: 21 (ref 9–20)
CALCIUM SERPL-MCNC: 9.1 MG/DL (ref 8.6–10.4)
CHLORIDE BLD-SCNC: 100 MMOL/L (ref 98–107)
CO2: 26 MMOL/L (ref 20–31)
CREAT SERPL-MCNC: 0.91 MG/DL (ref 0.7–1.2)
DIFFERENTIAL TYPE: ABNORMAL
EOSINOPHILS RELATIVE PERCENT: ABNORMAL % (ref 0–5)
GFR AFRICAN AMERICAN: >60 ML/MIN
GFR NON-AFRICAN AMERICAN: >60 ML/MIN
GFR SERPL CREATININE-BSD FRML MDRD: ABNORMAL ML/MIN/{1.73_M2}
GFR SERPL CREATININE-BSD FRML MDRD: ABNORMAL ML/MIN/{1.73_M2}
GLUCOSE BLD-MCNC: 128 MG/DL (ref 70–99)
HCT VFR BLD CALC: 35.5 % (ref 41–53)
HEMOGLOBIN: 11.2 G/DL (ref 13.5–17.5)
IMMATURE GRANULOCYTES: ABNORMAL %
INR BLD: 2.8
LYMPHOCYTES # BLD: 11 % (ref 13–44)
MCH RBC QN AUTO: 20.3 PG (ref 26–34)
MCHC RBC AUTO-ENTMCNC: 31.6 G/DL (ref 31–37)
MCV RBC AUTO: 64.4 FL (ref 80–100)
MONOCYTES # BLD: 5 % (ref 5–9)
MORPHOLOGY: ABNORMAL
MORPHOLOGY: ABNORMAL
NRBC AUTOMATED: ABNORMAL PER 100 WBC
PARTIAL THROMBOPLASTIN TIME: 41.9 SEC (ref 23.9–33.8)
PDW BLD-RTO: 18.7 % (ref 12.1–15.2)
PLATELET # BLD: 149 K/UL (ref 140–450)
PLATELET ESTIMATE: ABNORMAL
PMV BLD AUTO: ABNORMAL FL (ref 6–12)
POTASSIUM SERPL-SCNC: 4 MMOL/L (ref 3.7–5.3)
PROTHROMBIN TIME: 28.1 SEC (ref 11.5–14.2)
RBC # BLD: 5.51 M/UL (ref 4.5–5.9)
RBC # BLD: ABNORMAL 10*6/UL
SEG NEUTROPHILS: 81 % (ref 39–75)
SEGMENTED NEUTROPHILS ABSOLUTE COUNT: 5.42 K/UL (ref 2.1–6.5)
SODIUM BLD-SCNC: 139 MMOL/L (ref 135–144)
TOTAL PROTEIN: 7 G/DL (ref 6.4–8.3)
TROPONIN INTERP: NORMAL
TROPONIN T: NORMAL NG/ML
TROPONIN, HIGH SENSITIVITY: 17 NG/L (ref 0–22)
WBC # BLD: 6.7 K/UL (ref 3.5–11)
WBC # BLD: ABNORMAL 10*3/UL

## 2021-11-20 PROCEDURE — 85025 COMPLETE CBC W/AUTO DIFF WBC: CPT

## 2021-11-20 PROCEDURE — 84484 ASSAY OF TROPONIN QUANT: CPT

## 2021-11-20 PROCEDURE — 70450 CT HEAD/BRAIN W/O DYE: CPT

## 2021-11-20 PROCEDURE — 80053 COMPREHEN METABOLIC PANEL: CPT

## 2021-11-20 PROCEDURE — 85610 PROTHROMBIN TIME: CPT

## 2021-11-20 PROCEDURE — 93005 ELECTROCARDIOGRAM TRACING: CPT | Performed by: FAMILY MEDICINE

## 2021-11-20 PROCEDURE — 99283 EMERGENCY DEPT VISIT LOW MDM: CPT

## 2021-11-20 PROCEDURE — 85730 THROMBOPLASTIN TIME PARTIAL: CPT

## 2021-11-21 LAB
EKG ATRIAL RATE: 64 BPM
EKG P AXIS: 28 DEGREES
EKG P-R INTERVAL: 260 MS
EKG Q-T INTERVAL: 432 MS
EKG QRS DURATION: 104 MS
EKG QTC CALCULATION (BAZETT): 445 MS
EKG R AXIS: 5 DEGREES
EKG T AXIS: 54 DEGREES
EKG VENTRICULAR RATE: 64 BPM

## 2021-11-21 PROCEDURE — 93010 ELECTROCARDIOGRAM REPORT: CPT | Performed by: INTERNAL MEDICINE

## 2021-11-21 NOTE — ED PROVIDER NOTES
eMERGENCY dEPARTMENT eNCOUnter        [facility]  Chief Complaint   Patient presents with    Extremity Weakness     Left sided weakness. Last known well 2024. Slurred speach. Patient has history of stroke. Take warfarin for history of afib. Bloos sugar 130       hospitals    Amanda Barr is a 78 y.o. male who presents with left upper extremity weakness and left upper extremity numbness which began 30 minutes prior to presentation to the ER. Symptoms were described as being moderate severity. Patient was also experiencing bradycardia 4 hours prior to this episode. This was occurring off and on. Patient has a history of strokes in the past.  He has a history of atrial fibrillation. Symptoms improved after arriving to the ER. Patient had drooping of his mouth initially, but this resolved upon arrival to the ER. He should also initially had some speech problems, but this also resolved upon arrival to the ER. REVIEW OF SYSTEMS    All systems reviewed and positives are in the HPI. PAST MEDICAL HISTORY    Past Medical History:   Diagnosis Date    Cerebral artery occlusion with cerebral infarction ([address]     Hypertension     Kidney stone        SURGICAL HISTORY    Past Surgical History:   Procedure Laterality Date    CATARACT REMOVAL Left     INSERTABLE CARDIAC MONITOR N/A 5/12/2021    LOOP RECORDER INSERT performed by Cyndie Griffith MD at [facility]    Current Outpatient Rx   Medication Sig Dispense Refill    warfarin (COUMADIN) 5 MG tablet Take 1/2 tablet on Sundays and 1 whole tablet all other days of the week or as directed by [facility] Medication Management.  30 tablet 11    Multiple Vitamins-Minerals (SYSTANE ICAPS AREDS2) CAPS Take 1 capsule by mouth daily      Lutein-Zeaxanthin 25-5 MG CAPS Take 1 capsule by mouth nightly      aspirin 81 MG chewable tablet Take 81 mg by mouth 2 times daily In AM (and PM since stopping 325 mg nightly on 9/29/21)  lisinopril (PRINIVIL;ZESTRIL) 5 MG tablet TAKE 1 TABLET BY MOUTH EVERY DAY 30 tablet 3    atorvastatin (LIPITOR) 80 MG tablet TAKE 1 TABLET BY MOUTH NIGHTLY 30 tablet 3    Saw Palmetto, Serenoa repens, (BL SAW PALMETTO PO) Take 450 mg by mouth nightly       b complex vitamins capsule Take 1 capsule by mouth nightly       allopurinol (ZYLOPRIM) 300 MG tablet Take 300 mg by mouth daily      omeprazole (PRILOSEC) 20 MG delayed release capsule Take 20 mg by mouth nightly       metoprolol tartrate (LOPRESSOR) 25 MG tablet Take 25 mg by mouth 2 times daily      Naproxen Sodium (ALEVE) 220 MG CAPS Take 220 mg by mouth every other day FOR \"JOINTS\"         ALLERGIES    Allergies   Allergen Reactions    Adhesive Tape Itching       FAMILY HISTORY    No family history on file. SOCIAL HISTORY    Social History     Socioeconomic History    Marital status:      Spouse name: Not on file    Number of children: Not on file    Years of education: Not on file    Highest education level: Not on file   Occupational History    Not on file   Tobacco Use    Smoking status: Never Smoker    Smokeless tobacco: Never Used   Substance and Sexual Activity    Alcohol use: Yes     Alcohol/week: 10.0 standard drinks     Types: 10 Cans of beer per week    Drug use: Not on file    Sexual activity: Not on file   Other Topics Concern    Not on file   Social History Narrative    Not on file     Social Determinants of Health     Financial Resource Strain:     Difficulty of Paying Living Expenses: Not on file   Food Insecurity:     Worried About Running Out of Food in the Last Year: Not on file    [place] of Food in the Last Year: Not on file   Transportation Needs:     Lack of Transportation (Medical): Not on file    Lack of Transportation (Non-Medical):  Not on file   Physical Activity:     Days of Exercise per Week: Not on file    Minutes of Exercise per Session: Not on file   Stress:     Feeling of Stress : Not on file   Social Connections:     Frequency of Communication with Friends and Family: Not on file    Frequency of Social Gatherings with Friends and Family: Not on file    Attends Taoism Services: Not on file    Active Member of Clubs or Organizations: Not on file    Attends Club or Organization Meetings: Not on file    Marital Status: Not on file   Intimate Partner Violence:     Fear of Current or Ex-Partner: Not on file    Emotionally Abused: Not on file    Physically Abused: Not on file    Sexually Abused: Not on file   Housing Stability:     Unable to Pay for Housing in the Last Year: Not on file    Number of [place] in the Last Year: Not on file    Unstable Housing in the Last Year: Not on file       PHYSICAL EXAM    VITAL SIGNS: /67   Pulse 75   Resp 18   Wt 248 lb (112.5 kg)   SpO2 96%   BMI 37.71 kg/m²   Constitutional:  Well developed, well nourished, mild acute distress, non-toxic appearance   Eyes:  Normal  HENT:  Atraumatic, external ears normal, nose normal, oropharynx moist. Neck- normal range of motion, no tenderness, supple. Respiratory:  No respiratory distress, normal breath sounds, no rales, no wheezing   Cardiovascular:  Normal rate, normal rhythm, no murmurs, no gallops, no rubs. Carotid exam normal  GI:  Soft, nondistended, normal bowel sounds, nontender, no organomegaly, no mass, no rebound, no guarding   Musculoskeletal:  No edema, no tenderness, no  deformities. Back- no tenderness   Integument:  Well hydrated, no rash   Neurologic: Grossly intact    EKG    Sinus rhythm with marked sinus arrhythmia with first-degree AV block. Incomplete right bundle branch block. RADIOLOGY/PROCEDURES    CT of head was negative for acute changes. ED COURSE & MEDICAL DECISION MAKING    Pertinent Labs & Imaging studies reviewed.  (See chart for details)  Labs Reviewed   CBC WITH AUTO DIFFERENTIAL - Abnormal; Notable for the following components:       Result Value Hemoglobin 11.2 (*)     Hematocrit 35.5 (*)     MCV 64.4 (*)     MCH 20.3 (*)     RDW 18.7 (*)     Seg Neutrophils 81 (*)     Lymphocytes 11 (*)     Absolute Lymph # 0.74 (*)     All other components within normal limits   COMPREHENSIVE METABOLIC PANEL - Abnormal; Notable for the following components:    Glucose 128 (*)     Bun/Cre Ratio 21 (*)     Total Bilirubin 1.22 (*)     All other components within normal limits   PROTIME-INR - Abnormal; Notable for the following components:    Protime 28.1 (*)     All other components within normal limits   APTT - Abnormal; Notable for the following components:    PTT 41.9 (*)     All other components within normal limits   TROPONIN     Neurological exam was normal prior to discharge from the ER. Patient preferred not to be transferred. He was stable on discharge. He will increase his aspirin to 325 mg once daily. Patient was stable on discharge. FINAL IMPRESSION    1. TIA  2. Atrial fibrillation    Summation      Patient Course: Patient was stable on discharge. Neurological symptoms were resolved. Neuro exam was normal prior to discharge. CT of the head was negative for acute changes. ED Medications administered this visit:  Medications - No data to display    New Prescriptions from this visit:    Current Discharge Medication List          Follow-up:  Julisa Vazquez MD  [address] [facility] [address]  [phone]    Call in 2 days      Aiden Wallace MD  [address] [facility] [address]  [phone]    Call in 2 days          Final Impression:   1. TIA (transient ischemic attack)    2.  Paroxysmal atrial fibrillation (HCC)               (Please note that portions of this note were completed with a voice recognition program.  Efforts were made to edit the dictations but occasionally words are mis-transcribed.)     Jammie Blood MD  11/20/21 9049

## 2021-11-24 ENCOUNTER — TELEPHONE (OUTPATIENT)
Dept: CARDIOLOGY CLINIC | Age: 76
End: 2021-11-24

## 2021-11-24 NOTE — TELEPHONE ENCOUNTER
Pt linq read bradycardia dr aware pt   C/o irreg beat. Will decrease lopressor 25 mg bid  To 1/2 tab bid.

## 2021-12-01 ENCOUNTER — TELEPHONE (OUTPATIENT)
Dept: CARDIOLOGY CLINIC | Age: 76
End: 2021-12-01

## 2021-12-01 NOTE — TELEPHONE ENCOUNTER
Received reading from loop recorder episode bradycardia and a 3 sec pause. Reviewed per DR Villalobos Laughter   Will stop metoprolol completely.   Pt has occ dizziness when hr low   bp normal 130/60's

## 2021-12-09 ENCOUNTER — HOSPITAL ENCOUNTER (OUTPATIENT)
Dept: PHARMACY | Age: 76
Setting detail: THERAPIES SERIES
Discharge: HOME | End: 2021-12-09
Payer: MEDICARE

## 2021-12-09 VITALS
BODY MASS INDEX: 39.08 KG/M2 | WEIGHT: 257 LBS | SYSTOLIC BLOOD PRESSURE: 144 MMHG | HEART RATE: 78 BPM | DIASTOLIC BLOOD PRESSURE: 76 MMHG

## 2021-12-09 DIAGNOSIS — I63.9 ACUTE ISCHEMIC STROKE (HCC): Primary | ICD-10-CM

## 2021-12-09 DIAGNOSIS — I48.91 ATRIAL FIBRILLATION, UNSPECIFIED TYPE (HCC): ICD-10-CM

## 2021-12-09 DIAGNOSIS — I63.9 STROKE OF UNKNOWN CAUSE (HCC): ICD-10-CM

## 2021-12-09 LAB — INR BLD: 2.9

## 2021-12-09 PROCEDURE — 85610 PROTHROMBIN TIME: CPT

## 2021-12-09 PROCEDURE — 99211 OFF/OP EST MAY X REQ PHY/QHP: CPT

## 2021-12-09 RX ORDER — WARFARIN SODIUM 5 MG/1
TABLET ORAL
Qty: 30 TABLET | Refills: 11 | Status: SHIPPED
Start: 2021-12-09 | End: 2022-08-15

## 2021-12-09 NOTE — PROGRESS NOTES
[facility]  Medication Management  ANTICOAGULATION    Referring Provider: Dr Mirza Moctezuma     GOAL INR: 2.0-3.0     TODAY'S INR: 2.9     WARFARIN Dosage: 5 mg daily    INR (no units)   Date Value   2021 2.9   2021 2.8   2021 3   2021 2.4   10/27/2021 1.9   10/14/2021 2.8   10/07/2021 2.9       Medication changes:  None    Notes:    Fingerstick INR drawn per clinic protocol. Patient states no visible blood in urine and no black tarry stool. Denies any missed doses of warfarin. Cedric Aldrich says he came to the ER on 2021 and was told he had a TIA. He says his Metoprolol was decreased to 12.5 mg BID on 2021 and then stopped completely on 2021 per Dr. Mirza Moctezuma. No change in other maintenance medications or in diet. Will recheck INR in 4 weeks. Patient acknowledges working in consult agreement with pharmacist as referred by his/her physician.                   For Pharmacy Admin Tracking Only     Intervention Detail: Adherence Monitorin   Total # of Interventions Recommended: 1   Total # of Interventions Accepted: 1   Time Spent (min): [address], [address] [facility], [place]

## 2021-12-15 ENCOUNTER — TELEPHONE (OUTPATIENT)
Dept: CARDIOLOGY CLINIC | Age: 76
End: 2021-12-15

## 2022-01-14 ENCOUNTER — HOSPITAL ENCOUNTER (OUTPATIENT)
Age: 77
Discharge: HOME | End: 2022-01-14
Payer: MEDICARE

## 2022-01-14 ENCOUNTER — HOSPITAL ENCOUNTER (OUTPATIENT)
Dept: GENERAL RADIOLOGY | Age: 77
Discharge: HOME | End: 2022-01-16
Payer: MEDICARE

## 2022-01-14 ENCOUNTER — HOSPITAL ENCOUNTER (OUTPATIENT)
Age: 77
Discharge: HOME | End: 2022-01-16
Payer: MEDICARE

## 2022-01-14 DIAGNOSIS — I63.9 CEREBROVASCULAR ACCIDENT (CVA), UNSPECIFIED MECHANISM (HCC): ICD-10-CM

## 2022-01-14 DIAGNOSIS — R94.31 EKG, ABNORMAL: ICD-10-CM

## 2022-01-14 DIAGNOSIS — I10 ESSENTIAL HYPERTENSION: ICD-10-CM

## 2022-01-14 DIAGNOSIS — E55.9 VITAMIN D DEFICIENCY DISEASE: ICD-10-CM

## 2022-01-14 DIAGNOSIS — Z01.818 PRE-OP EXAMINATION: ICD-10-CM

## 2022-01-14 LAB
ABSOLUTE EOS #: 0.2 K/UL (ref 0–0.4)
ABSOLUTE IMMATURE GRANULOCYTE: ABNORMAL K/UL (ref 0–0.3)
ABSOLUTE LYMPH #: 1.4 K/UL (ref 1–4.8)
ABSOLUTE MONO #: 0.3 K/UL (ref 0–1)
ALBUMIN SERPL-MCNC: 4.1 G/DL (ref 3.5–5.2)
ALBUMIN/GLOBULIN RATIO: ABNORMAL (ref 1–2.5)
ALP BLD-CCNC: 75 U/L (ref 40–129)
ALT SERPL-CCNC: 22 U/L (ref 5–41)
ANION GAP SERPL CALCULATED.3IONS-SCNC: 15 MMOL/L (ref 9–17)
AST SERPL-CCNC: 26 U/L
BASOPHILS # BLD: 1 % (ref 0–2)
BASOPHILS ABSOLUTE: 0.1 K/UL (ref 0–0.2)
BILIRUB SERPL-MCNC: 1.92 MG/DL (ref 0.3–1.2)
BUN BLDV-MCNC: 17 MG/DL (ref 8–23)
BUN/CREAT BLD: 19 (ref 9–20)
CALCIUM SERPL-MCNC: 9 MG/DL (ref 8.6–10.4)
CHLORIDE BLD-SCNC: 103 MMOL/L (ref 98–107)
CHOLESTEROL/HDL RATIO: 2.3
CHOLESTEROL: 100 MG/DL
CO2: 22 MMOL/L (ref 20–31)
CREAT SERPL-MCNC: 0.9 MG/DL (ref 0.7–1.2)
DIFFERENTIAL TYPE: ABNORMAL
EOSINOPHILS RELATIVE PERCENT: 4 % (ref 0–5)
GFR AFRICAN AMERICAN: >60 ML/MIN
GFR NON-AFRICAN AMERICAN: >60 ML/MIN
GFR SERPL CREATININE-BSD FRML MDRD: ABNORMAL ML/MIN/{1.73_M2}
GFR SERPL CREATININE-BSD FRML MDRD: ABNORMAL ML/MIN/{1.73_M2}
GLUCOSE BLD-MCNC: 150 MG/DL (ref 70–99)
HCT VFR BLD CALC: 35 % (ref 41–53)
HDLC SERPL-MCNC: 43 MG/DL
HEMOGLOBIN: 11.1 G/DL (ref 13.5–17.5)
IMMATURE GRANULOCYTES: ABNORMAL %
LDL CHOLESTEROL: 40 MG/DL (ref 0–130)
LYMPHOCYTES # BLD: 25 % (ref 13–44)
MAGNESIUM: 1.7 MG/DL (ref 1.6–2.6)
MCH RBC QN AUTO: 20.4 PG (ref 26–34)
MCHC RBC AUTO-ENTMCNC: 31.6 G/DL (ref 31–37)
MCV RBC AUTO: 64.5 FL (ref 80–100)
MONOCYTES # BLD: 6 % (ref 5–9)
MORPHOLOGY: ABNORMAL
NRBC AUTOMATED: ABNORMAL PER 100 WBC
PATIENT FASTING?: YES
PDW BLD-RTO: 18.4 % (ref 12.1–15.2)
PLATELET # BLD: 152 K/UL (ref 140–450)
PLATELET ESTIMATE: ABNORMAL
PMV BLD AUTO: ABNORMAL FL (ref 6–12)
POTASSIUM SERPL-SCNC: 4 MMOL/L (ref 3.7–5.3)
RBC # BLD: 5.42 M/UL (ref 4.5–5.9)
RBC # BLD: ABNORMAL 10*6/UL
SEG NEUTROPHILS: 64 % (ref 39–75)
SEGMENTED NEUTROPHILS ABSOLUTE COUNT: 3.6 K/UL (ref 2.1–6.5)
SODIUM BLD-SCNC: 140 MMOL/L (ref 135–144)
TOTAL PROTEIN: 7.4 G/DL (ref 6.4–8.3)
TRIGL SERPL-MCNC: 87 MG/DL
TSH SERPL DL<=0.05 MIU/L-ACNC: 1.58 MIU/L (ref 0.3–5)
VITAMIN D 25-HYDROXY: 10.4 NG/ML (ref 30–100)
VLDLC SERPL CALC-MCNC: NORMAL MG/DL (ref 1–30)
WBC # BLD: 5.7 K/UL (ref 3.5–11)
WBC # BLD: ABNORMAL 10*3/UL

## 2022-01-14 PROCEDURE — 82306 VITAMIN D 25 HYDROXY: CPT

## 2022-01-14 PROCEDURE — 84443 ASSAY THYROID STIM HORMONE: CPT

## 2022-01-14 PROCEDURE — 71046 X-RAY EXAM CHEST 2 VIEWS: CPT

## 2022-01-14 PROCEDURE — 36415 COLL VENOUS BLD VENIPUNCTURE: CPT

## 2022-01-14 PROCEDURE — 80061 LIPID PANEL: CPT

## 2022-01-14 PROCEDURE — 83036 HEMOGLOBIN GLYCOSYLATED A1C: CPT

## 2022-01-14 PROCEDURE — 83735 ASSAY OF MAGNESIUM: CPT

## 2022-01-14 PROCEDURE — 93005 ELECTROCARDIOGRAM TRACING: CPT

## 2022-01-14 PROCEDURE — 80053 COMPREHEN METABOLIC PANEL: CPT

## 2022-01-14 PROCEDURE — 85025 COMPLETE CBC W/AUTO DIFF WBC: CPT

## 2022-01-16 LAB
EKG ATRIAL RATE: 77 BPM
EKG P AXIS: 71 DEGREES
EKG P-R INTERVAL: 224 MS
EKG Q-T INTERVAL: 382 MS
EKG QRS DURATION: 102 MS
EKG QTC CALCULATION (BAZETT): 432 MS
EKG R AXIS: 11 DEGREES
EKG T AXIS: 58 DEGREES
EKG VENTRICULAR RATE: 77 BPM

## 2022-01-16 PROCEDURE — 93010 ELECTROCARDIOGRAM REPORT: CPT | Performed by: INTERNAL MEDICINE

## 2022-01-18 ENCOUNTER — OFFICE VISIT (OUTPATIENT)
Dept: CARDIOLOGY CLINIC | Age: 77
End: 2022-01-18
Payer: MEDICARE

## 2022-01-18 ENCOUNTER — HOSPITAL ENCOUNTER (OUTPATIENT)
Dept: PHARMACY | Age: 77
Setting detail: THERAPIES SERIES
Discharge: HOME | End: 2022-01-18
Payer: MEDICARE

## 2022-01-18 VITALS
DIASTOLIC BLOOD PRESSURE: 76 MMHG | BODY MASS INDEX: 39.47 KG/M2 | WEIGHT: 259.6 LBS | SYSTOLIC BLOOD PRESSURE: 137 MMHG | HEART RATE: 80 BPM

## 2022-01-18 VITALS
DIASTOLIC BLOOD PRESSURE: 80 MMHG | SYSTOLIC BLOOD PRESSURE: 170 MMHG | OXYGEN SATURATION: 95 % | WEIGHT: 260 LBS | HEART RATE: 100 BPM | BODY MASS INDEX: 39.53 KG/M2

## 2022-01-18 DIAGNOSIS — I63.9 CEREBROVASCULAR ACCIDENT (CVA), UNSPECIFIED MECHANISM (HCC): ICD-10-CM

## 2022-01-18 DIAGNOSIS — I63.9 ACUTE ISCHEMIC STROKE (HCC): Primary | ICD-10-CM

## 2022-01-18 DIAGNOSIS — E55.9 VITAMIN D DEFICIENCY DISEASE: ICD-10-CM

## 2022-01-18 DIAGNOSIS — R73.09 ELEVATED GLUCOSE: ICD-10-CM

## 2022-01-18 DIAGNOSIS — I63.9 STROKE OF UNKNOWN CAUSE (HCC): ICD-10-CM

## 2022-01-18 DIAGNOSIS — I10 ESSENTIAL HYPERTENSION: ICD-10-CM

## 2022-01-18 DIAGNOSIS — I48.91 ATRIAL FIBRILLATION, NEW ONSET (HCC): Primary | ICD-10-CM

## 2022-01-18 LAB — INR BLD: 2.5

## 2022-01-18 PROCEDURE — 99214 OFFICE O/P EST MOD 30 MIN: CPT | Performed by: INTERNAL MEDICINE

## 2022-01-18 PROCEDURE — 85610 PROTHROMBIN TIME: CPT

## 2022-01-18 PROCEDURE — 3017F COLORECTAL CA SCREEN DOC REV: CPT | Performed by: INTERNAL MEDICINE

## 2022-01-18 PROCEDURE — 1036F TOBACCO NON-USER: CPT | Performed by: INTERNAL MEDICINE

## 2022-01-18 PROCEDURE — G8417 CALC BMI ABV UP PARAM F/U: HCPCS | Performed by: INTERNAL MEDICINE

## 2022-01-18 PROCEDURE — 1123F ACP DISCUSS/DSCN MKR DOCD: CPT | Performed by: INTERNAL MEDICINE

## 2022-01-18 PROCEDURE — 4040F PNEUMOC VAC/ADMIN/RCVD: CPT | Performed by: INTERNAL MEDICINE

## 2022-01-18 PROCEDURE — G8484 FLU IMMUNIZE NO ADMIN: HCPCS | Performed by: INTERNAL MEDICINE

## 2022-01-18 PROCEDURE — 99211 OFF/OP EST MAY X REQ PHY/QHP: CPT

## 2022-01-18 PROCEDURE — G8427 DOCREV CUR MEDS BY ELIG CLIN: HCPCS | Performed by: INTERNAL MEDICINE

## 2022-01-18 RX ORDER — LISINOPRIL 5 MG/1
5 TABLET ORAL 2 TIMES DAILY
Qty: 60 TABLET | Refills: 11 | Status: SHIPPED | OUTPATIENT
Start: 2022-01-18

## 2022-01-18 RX ORDER — TADALAFIL 10 MG/1
10 TABLET ORAL DAILY PRN
Qty: 10 TABLET | Refills: 5 | Status: SHIPPED | OUTPATIENT
Start: 2022-01-18

## 2022-01-18 NOTE — PROGRESS NOTES
Ov DR Karri Canchola 6 mths follow up   We had stopped metoprolol due   To pauses and len. Was in ED on 11/20 for TIA  Lt sided weakness. No episodes since. occ numbness kush hand   And fingers not at the same  Time. occ dizziness. No chest pain  Or Sob. bp been higher since  Stopping metoprolol  Dizziness Is less. Seen DR Veronica Gomez Oct 11 2021  Rt foot numb and cold at times in bed. Start vitamin D 5000 iu daily  Will add A1C today. Will increase lisinopril to 5mg bid  Call in 2 weeks with bp. Will give cialis  See in 6 mths.

## 2022-01-18 NOTE — PROGRESS NOTES
Riri [address] [place] [address]/Dileep  Medication Management  ANTICOAGULATION    Referring Provider: Dr Galan     GOAL INR: 2.0-3.0     TODAY'S INR: 2.5     WARFARIN Dosage: 5 mg daily    INR (no units)   Date Value   2022 2.5   2021 2.9   2021 2.8   2021 3   2021 2.4   10/27/2021 1.9   10/14/2021 2.8       Medication changes:  None    Notes:    Fingerstick INR drawn per clinic protocol. Patient states no visible blood in urine and no black tarry stool. Denies any missed doses of warfarin. No change in other maintenance medications or in diet. Will recheck INR in 5 weeks. Patient acknowledges working in consult agreement with pharmacist as referred by his/her physician.                   For Pharmacy Admin Tracking Only     Intervention Detail: Adherence Monitorin   Total # of Interventions Recommended: 1   Total # of Interventions Accepted: 1   Time Spent (min): [address], [facility], PharmD

## 2022-01-19 LAB
ESTIMATED AVERAGE GLUCOSE: 114 MG/DL
HBA1C MFR BLD: 5.6 % (ref 4–6)

## 2022-01-19 NOTE — PROGRESS NOTES
Gianna Godwin M.D. [address] [place] [address] Sandra Price [address]  [phone]          2022          Issa Shea MD  [address] [place] [address], [address] [place] [address], [address]      RE:   Houston Krishna  :  1945      Dear Dr. Sabrina Recio:    CHIEF COMPLAINT:  1.  Cryptogenic CVA on 2020 and 2021, with no etiology found. 2.  Status post implantable loop recorder placed to monitor for atrial fibrillation. HISTORY OF PRESENT ILLNESS:  I had the pleasure of seeing Mr. Houston Krishna in the office on 2022. He is a very pleasant 79-year-old gentleman who sees Dr. Sabrina Recio. Eleven years ago, he had right retinal artery occlusion of unknown etiology with a full workup, which was negative. He had been on baby aspirin 81 mg, which he stopped for 10 days because of periodontal bleeding. On the day he restarted aspirin 11 years ago, he had retinal artery occlusion, and therefore, he has been hesitant to stop aspirin. There has never been any indication of atrial flutter/fibrillation. On 2020, he went to the emergency room with left upper extremity weakness. CT scan was negative. Dr. Quinton French at [facility]. Marcel's recommended tPA, which was given and he was life-flighted to [facility]. Marcel's on 2020. His weakness fully resolved. He was hypertensive and placed on Lipitor, aspirin, and lisinopril. He was already on metoprolol, Plavix, and Xanax. I saw him on 2020. An event recorder had been ordered but not done. We did a Lexiscan stress test that was negative. I saw him on 2021, and recommended a loop recorder, which he did not want to do. We continued Plavix and aspirin. He had another neurologic event on 2021, where he had left arm numbness, which resolved. He met with Dr. Sabrina Recio, who also recommended an implantable loop recorder, and he came back on 2021 to discuss.     He did agree to proceed with a loop recorder, which was done on 05/12/2021, for atrial fibrillation. We got a notice on 12/01/2021 that he developed bradycardia with heart rates in the 25 range. This was at 9 o'clock in the morning of 12/01. We called him and stopped his Lopressor and we are seeing him today in followup. He has had no further episodes of bradycardia. He has had no syncope or near syncope, lightheadedness or dizziness. He has noted, however, that his pressure has been elevated since going off Lopressor, which had been at 25 mg b.i.d. He has never had a myocardial infarction, never had a cardiac catheterization. We did get a call on his loop recorder on 09/27/2021, that showed that he was in atrial fibrillation. We had him stop Plavix and aspirin. We started Eliquis 5 mg b.i.d. He could not afford Eliquis, and therefore, we changed to Coumadin, which he has remained. CARDIAC RISK FACTORS:  Hypertension:  Positive. Hyperlipidemia:  Positive. Other Family Members:  Positive. Peripheral Vascular Disease:  Negative. Diabetes:  Negative. Smoking:  Negative. MEDICATIONS AT THIS TIME:  He is on Zyloprim 300 mg daily, aspirin 81 mg daily, Lipitor 80 mg daily, lisinopril 5 mg daily, Prilosec 20 mg nightly, Coumadin as directed. PAST MEDICAL AND SURGICAL HISTORY:  1. Retinal occlusion 11 years ago in the right eye.  2.  Hypertension many years. 3.  Cataract surgery of the left eye on 10/14/2020.  4.  Cellulitis with abscess on his right upper arm, which was lanced. FAMILY HISTORY:  Son had an MI in 45/0163 complicated by PE.    SOCIAL HISTORY:  He is 78years old. , retired 14 years ago. He drinks two beers a day. Does not smoke. He has two son and two daughters. One son, Yokasta Ochoa, passed away at 46 of an MI and PE. Mr. Theron Kebede is . REVIEW OF SYSTEMS:  Cardiac as above.   Other systems reviewed including constitutional, eyes, ears, nose and throat, cardiovascular, respiratory, GI, , musculoskeletal, integumentary, neurologic, endocrine, hematologic and allergic/immunologic are negative except for what is described above. No weight loss or weight gain. No change in bowel habits. No blood in stool. No fevers, sweats or chills. He does have erectile dysfunction and wondering about treatment. PHYSICAL EXAMINATION:  VITAL SIGNS:  His blood pressure was 170/80 with a heart rate of 100 and regular. Respiratory rate 18. O2 sat 95%. Weight 260 pounds. GENERAL:  He is a pleasant 44-year-old gentleman. Denied pain. He was oriented to person, place and time. Answered questions appropriately. SKIN:  No unusual skin changes. HEENT:  The pupils are equally round and intact. Mucous membranes were dry. NECK:  No JVD. Good carotid pulses. No carotid bruits. No lymphadenopathy or thyromegaly. CARDIOVASCULAR EXAM:  S1 and S2 were normal.  No S3 or S4. Soft systolic blowing type murmur. No diastolic murmur. PMI was normal.  No lift, thrust, or pericardial friction rub. LUNGS:  Clear to auscultation and percussion. ABDOMEN:  Soft and nontender. Good bowel sounds. EXTREMITIES:  Good femoral pulses. Good pedal pulses. No pedal edema. Skin was warm and dry. No calf tenderness. Nail beds pink. Good cap refill. PULSES:  Bilateral symmetrical radial, brachial and carotid pulses. No carotid bruits. Good femoral and pedal pulses. NEUROLOGIC EXAM:  Within normal limits. PSYCHIATRIC EXAM:  Within normal limits. LABORATORY DATA:  His sodium was 140, potassium 4.0, BUN 17, creatinine 0.90, GFR greater than 60. Magnesium was 1.7, glucose 150. Cholesterol 100, HDL was 43, LDL 40, triglycerides 87. ALT was 22, AST was 26. His TSH was 1.58. Vitamin D was 10.4. White count 5.7, hemoglobin 11.1 with a platelet count 980,825.     His EKG showed sinus rhythm with first-degree AV block, otherwise normal.    In reviewing his loop recorder, he has had no further episodes of bradycardia or tachycardia. His last episode was on 12/01, with his heart rate in the 30 range. This occurred at 9 o'clock in the morning. He was asymptomatic. IMPRESSION:  1. Cryptogenic CVA for multiple emboli on 11/01/2020, with negative CTA and negative carotid artery disease, however, most likely secondary to paroxysmal atrial fibrillation, which was discovered on 09/27/2021, on his loop recorder. 2.  TIA on 02/14/2021 with left arm weakness, which resolved. 3.  Right retinal artery occlusion 11 years ago. 4.  Hypertension, uncontrolled now since Lopressor has been stopped. 5.  Mild LV dysfunction, EF of 50%. 6.  Mild-to-moderate aortic stenosis. 7.  Normal Lexiscan Cardiolite stress test.  8.  Status post loop recorder implantation on 05/12/2021.  9.  Episode of bradycardia on 12/01/2021, with us stopping metoprolol at that time. 10.  Sick sinus syndrome. 11. On Coumadin for anticoagulation. 12.  Sick sinus syndrome with heart rates in the 30 at 9 o'clock in the morning on 12/01, with paroxysmal atrial fibrillation with heart rates in the 90s and 100s when he is in atrial fibrillation. PLAN:  1. No change in medications. 2.  We will see in 6 months. 3.  We will increase lisinopril to 5 mg b.i.d.  4.  He will call in two months with the blood pressures. 5.  We will try Cialis 10 mg p.r.n. for his erectile dysfunction. DISCUSSION:  Mr. Theron Kebede, from a cardiac standpoint, is doing well. He does have sick sinus syndrome and did have bradycardia with heart rates in the 30s at 9 o'clock in the morning on 12/01, while he was on Lopressor 25 mg b.i.d.    I suspect that he will need a pacemaker at some point. However, we will continue to monitor his loop recorder and monitor for symptoms. If he would have any lightheadedness or dizziness, we would want to reevaluate his loop recorder. He has been hypertensive since stopping Lopressor.   We will increase lisinopril to 5 mg b.i.d. and he will call in two weeks with his blood pressures. We may need to increase it to 10 mg b.i.d. We will follow up in 6 months unless he becomes symptomatic. Thank you very much for allowing me the privilege of seeing Mr. Lynette Masterson. If you have any questions on my thoughts, please do not hesitate to contact me.     Sincerely,        Mckenzie Solis    D: 01/18/2022 14:53:31     T: 01/18/2022 15:00:48     [email or web address]  Job#: [id number]   Doc#: [id number]

## 2022-02-22 ENCOUNTER — HOSPITAL ENCOUNTER (OUTPATIENT)
Dept: PHARMACY | Age: 77
Setting detail: THERAPIES SERIES
Discharge: HOME | End: 2022-02-22
Payer: MEDICARE

## 2022-02-22 VITALS
SYSTOLIC BLOOD PRESSURE: 140 MMHG | HEART RATE: 91 BPM | DIASTOLIC BLOOD PRESSURE: 81 MMHG | BODY MASS INDEX: 39.53 KG/M2 | WEIGHT: 260 LBS

## 2022-02-22 DIAGNOSIS — I63.9 ACUTE ISCHEMIC STROKE (HCC): Primary | ICD-10-CM

## 2022-02-22 DIAGNOSIS — I63.9 STROKE OF UNKNOWN CAUSE (HCC): ICD-10-CM

## 2022-02-22 LAB — INR BLD: 2.3

## 2022-02-22 PROCEDURE — 99211 OFF/OP EST MAY X REQ PHY/QHP: CPT

## 2022-02-22 PROCEDURE — 85610 PROTHROMBIN TIME: CPT

## 2022-02-22 RX ORDER — ERGOCALCIFEROL 1.25 MG/1
125 CAPSULE ORAL DAILY
COMMUNITY
Start: 2022-02-15 | End: 2022-10-07 | Stop reason: CLARIF

## 2022-02-22 NOTE — PATIENT INSTRUCTIONS
Continue to monitor urine and stool. Continue to monitor for signs of bleeding. Return to clinic in 6 weeks. Continue warfarin whole 5 mg tablet daily.

## 2022-02-22 NOTE — PROGRESS NOTES
Riri [address] [place] [address]/Dileep  Medication Management  ANTICOAGULATION    Referring Provider: Dr Galan     GOAL INR: 2.0-3.0     TODAY'S INR: 2.3     WARFARIN Dosage: Continue warfarin whole 5 mg tablet daily. INR (no units)   Date Value   2022 2.5   2021 2.9   2021 2.8   2021 3   2021 2.4   10/27/2021 1.9   10/14/2021 2.8     Medication changes:  Started Vitamin D    Notes:    Fingerstick INR drawn per clinic protocol. Patient states no visible blood in urine and no black tarry stool. Denies any missed doses of warfarin. No change in other maintenance medications or in diet. Will recheck INR in 6 weeks. Patient acknowledges working in consult agreement with pharmacist as referred by his/her physician.                   For Pharmacy Admin Tracking Only     Intervention Detail: Adherence Monitorin and New Rx: 1, reason: Needs Additional Therapy   Total # of Interventions Recommended: 1   Total # of Interventions Accepted: 1   Time Spent (min):  Jin Gonzales [facility], PharmD
Call bell

## 2022-04-01 ENCOUNTER — HOSPITAL ENCOUNTER (OUTPATIENT)
Dept: PHARMACY | Age: 77
Setting detail: THERAPIES SERIES
Discharge: HOME | End: 2022-04-01
Payer: MEDICARE

## 2022-04-01 VITALS
WEIGHT: 262 LBS | DIASTOLIC BLOOD PRESSURE: 77 MMHG | HEART RATE: 83 BPM | BODY MASS INDEX: 39.84 KG/M2 | SYSTOLIC BLOOD PRESSURE: 137 MMHG

## 2022-04-01 DIAGNOSIS — I63.9 STROKE OF UNKNOWN CAUSE (HCC): ICD-10-CM

## 2022-04-01 DIAGNOSIS — I63.9 ACUTE ISCHEMIC STROKE (HCC): Primary | ICD-10-CM

## 2022-04-01 DIAGNOSIS — I48.91 ATRIAL FIBRILLATION, UNSPECIFIED TYPE (HCC): ICD-10-CM

## 2022-04-01 LAB — INR BLD: 2.4

## 2022-04-01 PROCEDURE — 85610 PROTHROMBIN TIME: CPT

## 2022-04-01 PROCEDURE — 99211 OFF/OP EST MAY X REQ PHY/QHP: CPT

## 2022-04-01 NOTE — PROGRESS NOTES
Riri [address] [place] [address]/Dileep  Medication Management  ANTICOAGULATION    Referring Provider: Dr Galan     GOAL INR: 2.0-3.0     TODAY'S INR: 2.4     WARFARIN Dosage: Continue warfarin whole 5 mg tablet daily. INR (no units)   Date Value   2022 2.3   2022 2.5   2021 2.9   2021 2.8   2021 3   2021 2.4   10/27/2021 1.9       Medication changes:  Stopped [place]  Increased Protonix to BID    Notes:    Fingerstick INR drawn per clinic protocol. Patient states no visible blood in urine and no black tarry stool. Denies any missed doses of warfarin. No change in other maintenance medications or in diet. Will recheck INR in 6 weeks. Patient acknowledges working in consult agreement with pharmacist as referred by his/her physician.                   For Pharmacy Admin Tracking Only     Intervention Detail: Adherence Monitorin and New Rx: 2, reason: Patient Preference   Total # of Interventions Recommended: 2   Total # of Interventions Accepted: 2   Time Spent (min): 601 [facility], [address] [place] [address], PharmD

## 2022-05-13 ENCOUNTER — HOSPITAL ENCOUNTER (OUTPATIENT)
Dept: PHARMACY | Age: 77
Setting detail: THERAPIES SERIES
Discharge: HOME | End: 2022-05-13
Payer: MEDICARE

## 2022-05-13 VITALS
DIASTOLIC BLOOD PRESSURE: 75 MMHG | HEART RATE: 76 BPM | WEIGHT: 264.2 LBS | BODY MASS INDEX: 40.17 KG/M2 | SYSTOLIC BLOOD PRESSURE: 136 MMHG

## 2022-05-13 DIAGNOSIS — I48.91 ATRIAL FIBRILLATION, UNSPECIFIED TYPE (HCC): ICD-10-CM

## 2022-05-13 DIAGNOSIS — I63.9 STROKE OF UNKNOWN CAUSE (HCC): ICD-10-CM

## 2022-05-13 DIAGNOSIS — I63.9 ACUTE ISCHEMIC STROKE (HCC): Primary | ICD-10-CM

## 2022-05-13 LAB — INR BLD: 2.5

## 2022-05-13 PROCEDURE — 85610 PROTHROMBIN TIME: CPT

## 2022-05-13 PROCEDURE — 99211 OFF/OP EST MAY X REQ PHY/QHP: CPT

## 2022-05-13 NOTE — PROGRESS NOTES
[address]/Dileep  Medication Management  ANTICOAGULATION    Referring Provider: Dr Glo Booker     GOAL INR: 2.0-3.0     TODAY'S INR: 2.5     WARFARIN Dosage: Continue warfarin whole 5 mg tablet daily.       INR (no units)   Date Value   2022 2.5   2022 2.4   2022 2.3   2022 2.5   2021 2.9   2021 2.8   2021 3       Medication changes:  None    Notes:    Fingerstick INR drawn per clinic protocol. Patient states no visible blood in urine and no black tarry stool. Denies any missed doses of warfarin. No change in other maintenance medications or in diet. Will recheck INR in 6 weeks. Patient acknowledges working in consult agreement with pharmacist as referred by his/her physician.                   For Pharmacy Admin Tracking Only     Intervention Detail: Adherence Monitorin   Total # of Interventions Recommended: 1   Total # of Interventions Accepted: 1   Time Spent (min): [address] [place] [address], PharmD

## 2022-05-18 ENCOUNTER — OFFICE VISIT (OUTPATIENT)
Dept: CARDIOLOGY CLINIC | Age: 77
End: 2022-05-18
Payer: MEDICARE

## 2022-05-18 VITALS
BODY MASS INDEX: 40.14 KG/M2 | SYSTOLIC BLOOD PRESSURE: 140 MMHG | OXYGEN SATURATION: 96 % | HEART RATE: 88 BPM | DIASTOLIC BLOOD PRESSURE: 80 MMHG | WEIGHT: 264 LBS

## 2022-05-18 DIAGNOSIS — I48.91 ATRIAL FIBRILLATION, NEW ONSET (HCC): Primary | ICD-10-CM

## 2022-05-18 DIAGNOSIS — I10 ESSENTIAL HYPERTENSION: ICD-10-CM

## 2022-05-18 DIAGNOSIS — I63.9 CEREBROVASCULAR ACCIDENT (CVA), UNSPECIFIED MECHANISM (HCC): ICD-10-CM

## 2022-05-18 PROCEDURE — G8417 CALC BMI ABV UP PARAM F/U: HCPCS | Performed by: INTERNAL MEDICINE

## 2022-05-18 PROCEDURE — 3017F COLORECTAL CA SCREEN DOC REV: CPT | Performed by: INTERNAL MEDICINE

## 2022-05-18 PROCEDURE — 99214 OFFICE O/P EST MOD 30 MIN: CPT | Performed by: INTERNAL MEDICINE

## 2022-05-18 PROCEDURE — 1036F TOBACCO NON-USER: CPT | Performed by: INTERNAL MEDICINE

## 2022-05-18 PROCEDURE — G8427 DOCREV CUR MEDS BY ELIG CLIN: HCPCS | Performed by: INTERNAL MEDICINE

## 2022-05-18 PROCEDURE — 1123F ACP DISCUSS/DSCN MKR DOCD: CPT | Performed by: INTERNAL MEDICINE

## 2022-05-18 NOTE — PROGRESS NOTES
Ov DR Gianni Benson follow up   Having pauses on loop recorder  Reading   No chest pain   C/o sob with walking. Feeling some \"missed beats\"  No dizziness. Discussed pacemaker  Set up for  June 29 at 730   Hold coumadin for 5 days.    Coumadin clinic informed

## 2022-05-23 NOTE — PROGRESS NOTES
Diana Wynn MD  [id number] [facility]  [address] Sandra Price [address]  [phone]      May 18, 2022      Radha Bates MD  [address] [place] [address], [address] [facility], [address]      RE:   Jameson Torres  :  1945      Dear Dr. Sybil Landon:    CHIEF COMPLAINT:  1.  Cryptogenic CVA on 2020 and 2021.  2.  Status post implantable loop recorder, which detected atrial fibrillation on 2021, at which time we started Eliquis, which was subsequently changed to Coumadin. 3.  Sick sinus syndrome with 5 second pauses detected on his implantable loop recorder. HISTORY OF PRESENT ILLNESS:  I had the pleasure of seeing Mr. Maritza Alegria in the office on 2022. He is a pleasant 72-year-old gentleman who had a right retinal artery occlusion with a full workup, which was negative 11 years ago. He had been on baby aspirin, which he had stopped for 10 days because of periodontal bleeding. On the day that he restarted aspirin 11 years ago, he had a retinal artery occlusion and therefore has been very hesitant to stop aspirin. There was never any indication of atrial fib/flutter. On 2020, he went to the emergency room with left upper extremity weakness. CT scan was negative. Dr. Kallie Olmedo at [facility]. Marcel's recommended tPA, which was given, and he was life-flighted to [facility]. Marcel's on 2020. His weakness resolved. He was placed on Lipitor, aspirin, and lisinopril. We did a stress test that was negative. I saw him on 2021, and recommended a loop recorder, which he did not wish to do. He therefore continued Plavix and aspirin. He had another neurologic event on 2021, where he had left arm numbness, which resolved. He met with you and you also recommended an implantable loop recorder and he came back to me on 2021, to discuss. He did agree to proceed with a loop recorder, which was done on 2021.     On 2021, his loop recorder showed that he was in atrial fibrillation. He converted spontaneously back to sinus rhythm. We started Eliquis 5 mg b.i.d. and stopped Plavix and aspirin. His Eliquis was changed to Coumadin. On 12/01, he developed bradycardia with heart rates in the 25 range at 9 o'clock in the morning. We stopped his Lopressor and I saw him on 01/18/2022. At that time, he was doing well. We continued to monitor his loop recorder. However, he began developing spontaneous sinus pauses. Initially, they were in a 3-second range. However, they have been occurring approximately once a month and his last episode of sinus arrest was on 05/07, which was almost a 6-second pause. He has very few symptoms and feels occasionally very mildly lightheaded but otherwise is asymptomatic. He has had no syncope or near syncope. He does have shortness of breath with walking, although it is no significant change. He occasionally feels some \"missed beats. \"    He has never had a myocardial infarction or cardiac catheterization. CARDIAC RISK FACTORS:  Hypertension:  Positive. Hyperlipidemia:  Positive. Other Family Members:  Positive. Peripheral Vascular Disease:  Negative. Diabetes:  Negative. Smoking:  Negative. MEDICATIONS AT THIS TIME:  He is on Zyloprim 300 mg daily, Lipitor 80 mg daily, lisinopril 5 mg b.i.d., Prilosec 20 mg daily, Cialis 10 mg p.r.n., vitamin D 5000 units daily, Coumadin as directed. PAST MEDICAL AND SURGICAL HISTORY:  1. Retinal occlusion 11 years ago on the right eye.  2.  Hypertension for many years. 3.  Cataract surgery in the left eye on 10/14/2020.  4.  Cellulitis with abscess of his right upper arm, which was lanced.   5.  Two CVAs, with the first on 11/01/2020, and the second on 02/14/2021.  6.  Loop recorder as stated previously, which detected atrial fibrillation and now sinus arrest.    FAMILY HISTORY:  Son had an MI in 08/5141, complicated by PE.    SOCIAL HISTORY: He is 78years old, , retired 14 years ago. Drinks two beers a day. Does not smoke. Has two sons and two daughters. One son, Ewa Hidalgo, passed away at 46 of an MI and PE. Mr. Lakhwinder Swain is . REVIEW OF SYSTEMS:  Cardiac as above. Other systems reviewed including constitutional, eyes, ears, nose and throat, cardiovascular, respiratory, GI, , musculoskeletal, integumentary, neurologic, endocrine, hematologic and allergic/immunologic are negative except for what is described above. No weight loss or weight gain. No change in bowel habits. No blood in stools. No fevers, sweats or chills. PHYSICAL EXAMINATION:  VITAL SIGNS:  His blood pressure was 140/80 with a heart rate of 88 and regular. Respirations 18. O2 sat 96%. Weight 264 pounds. GENERAL:  He is a very pleasant 82-year-old gentleman. Denied pain. He was oriented to person, place and time. Answered questions appropriately. SKIN:  No unusual skin changes. HEENT:  The pupils are equally round and intact. Mucous membranes were dry. NECK:  No JVD. Good carotid pulses. No carotid bruits. No lymphadenopathy or thyromegaly. CARDIOVASCULAR EXAM:  S1 and S2 were normal.  No S3 or S4. Soft systolic blowing type murmur. No diastolic murmur. PMI was normal.  No lift, thrust, or pericardial friction rub. LUNGS:  Clear to auscultation and percussion. ABDOMEN:  Soft and nontender. Good bowel sounds. EXTREMITIES:  Good femoral pulses. Good pedal pulses. No pedal edema. Skin was warm and dry. No calf tenderness. Nail beds pink. Good cap refill. PULSES:  Bilateral symmetrical radial, brachial and carotid pulses. No carotid bruits. Good femoral and pedal pulses. NEUROLOGIC EXAM:  Within normal limits. PSYCHIATRIC EXAM:  Within normal limits. IMPRESSION:  1. Sinus pauses occurring approximately once a month, with his last pause on 05/07, of 6 seconds. 2.  Occasional lightheadedness.   3.  Cryptogenic CVA from multiple emboli on 11/01/2020, with negative CTA, negative carotid artery disease, most likely secondary to atrial fibrillation, which was discovered on 09/27/2021, on his loop recorder. 4.  TIA on 02/14/2021, with left arm weakness, which resolved. 5.  Right retinal artery occlusion 11 years ago. 6.  Loop recorder placed on 05/12/2021, with his atrial fibrillation discovered on 09/27/2021 on his loop recorder. 7.  Episode of bradycardia on 12/01/2021, with us stopping metoprolol at that time and no further negative chronotropic medications. 8.  Sinus pauses up to 6 seconds on 05/07/2022.  9.  On Coumadin for anticoagulation. PLAN:  We will proceed with a DDD permanent pacemaker. DISCUSSION:  Mr. Dexter Ibrahim is developing sinus pauses. His last one on 05/07 was almost 6 seconds. He has very mild symptoms. However, they are becoming longer and occurring approximately once a month. We had stopped his Lopressor in December because of bradycardia. Therefore, he is on no negative chronotropic agents. I discussed the risk of sinus pauses with the main risk that of falling with fractures. In addition, I cannot treat his paroxysmal atrial fibrillation. At this point, we cannot place him on any beta-blocker or calcium-channel blocker because of his sinus pauses. He has agreed to proceed with a DDD permanent pacemaker, which we will do in June. He will continue his Coumadin, although we will hold it for five days prior to his pacemaker implantation. We have gone over risks and benefits and he understands. He does not have any chest pain or chest discomfort to indicate that we need to repeat a Lexiscan Cardiolite stress test.    Thank you very much for allowing me the privilege of seeing Mr. Dexter Ibrahim. If you have any questions on my thoughts, please do not hesitate to contact me.     Sincerely,        [facility]    D: 05/18/2022 9:27:50     T: 05/18/2022 9:34:17     [email or web address]  Job#: [id number] Doc#: [id number]

## 2022-06-13 ENCOUNTER — HOSPITAL ENCOUNTER (OUTPATIENT)
Age: 77
Discharge: HOME | End: 2022-06-13
Payer: MEDICARE

## 2022-06-13 DIAGNOSIS — I48.91 ATRIAL FIBRILLATION, NEW ONSET (HCC): ICD-10-CM

## 2022-06-13 DIAGNOSIS — I63.9 CEREBROVASCULAR ACCIDENT (CVA), UNSPECIFIED MECHANISM (HCC): ICD-10-CM

## 2022-06-13 DIAGNOSIS — I10 ESSENTIAL HYPERTENSION: ICD-10-CM

## 2022-06-13 LAB
ABSOLUTE EOS #: 0.4 K/UL (ref 0–0.4)
ABSOLUTE LYMPH #: 1.7 K/UL (ref 1–4.8)
ABSOLUTE MONO #: 0.3 K/UL (ref 0–1)
ALBUMIN SERPL-MCNC: 4.2 G/DL (ref 3.5–5.2)
ALP BLD-CCNC: 69 U/L (ref 40–129)
ALT SERPL-CCNC: 20 U/L (ref 5–41)
ANION GAP SERPL CALCULATED.3IONS-SCNC: 11 MMOL/L (ref 9–17)
AST SERPL-CCNC: 24 U/L
BASOPHILS # BLD: 2 % (ref 0–2)
BASOPHILS ABSOLUTE: 0.1 K/UL (ref 0–0.2)
BILIRUB SERPL-MCNC: 1.49 MG/DL (ref 0.3–1.2)
BUN BLDV-MCNC: 20 MG/DL (ref 8–23)
BUN/CREAT BLD: 19 (ref 9–20)
CALCIUM SERPL-MCNC: 9.4 MG/DL (ref 8.6–10.4)
CHLORIDE BLD-SCNC: 102 MMOL/L (ref 98–107)
CHOLESTEROL/HDL RATIO: 2.7
CHOLESTEROL: 100 MG/DL
CO2: 26 MMOL/L (ref 20–31)
CREAT SERPL-MCNC: 1.04 MG/DL (ref 0.7–1.2)
EKG ATRIAL RATE: 73 BPM
EKG P AXIS: 52 DEGREES
EKG P-R INTERVAL: 226 MS
EKG Q-T INTERVAL: 394 MS
EKG QRS DURATION: 100 MS
EKG QTC CALCULATION (BAZETT): 434 MS
EKG R AXIS: -4 DEGREES
EKG T AXIS: 50 DEGREES
EKG VENTRICULAR RATE: 73 BPM
EOSINOPHILS RELATIVE PERCENT: 7 % (ref 0–5)
GFR AFRICAN AMERICAN: >60 ML/MIN
GFR NON-AFRICAN AMERICAN: >60 ML/MIN
GFR SERPL CREATININE-BSD FRML MDRD: ABNORMAL ML/MIN/{1.73_M2}
GLUCOSE BLD-MCNC: 138 MG/DL (ref 70–99)
HCT VFR BLD CALC: 34.5 % (ref 41–53)
HDLC SERPL-MCNC: 37 MG/DL
HEMOGLOBIN: 10.8 G/DL (ref 13.5–17.5)
LDL CHOLESTEROL: 36 MG/DL (ref 0–130)
LYMPHOCYTES # BLD: 29 % (ref 13–44)
MCH RBC QN AUTO: 20 PG (ref 26–34)
MCHC RBC AUTO-ENTMCNC: 31.5 G/DL (ref 31–37)
MCV RBC AUTO: 63.4 FL (ref 80–100)
MONOCYTES # BLD: 5 % (ref 5–9)
MORPHOLOGY: ABNORMAL
PATIENT FASTING?: YES
PDW BLD-RTO: 17.5 % (ref 12.1–15.2)
PLATELET # BLD: 122 K/UL (ref 140–450)
POTASSIUM SERPL-SCNC: 4.3 MMOL/L (ref 3.7–5.3)
RBC # BLD: 5.44 M/UL (ref 4.5–5.9)
SEG NEUTROPHILS: 57 % (ref 39–75)
SEGMENTED NEUTROPHILS ABSOLUTE COUNT: 3.2 K/UL (ref 2.1–6.5)
SODIUM BLD-SCNC: 139 MMOL/L (ref 135–144)
TOTAL PROTEIN: 7.2 G/DL (ref 6.4–8.3)
TRIGL SERPL-MCNC: 135 MG/DL
URIC ACID: 3.9 MG/DL (ref 3.4–7)
VITAMIN D 25-HYDROXY: 31.8 NG/ML
WBC # BLD: 5.7 K/UL (ref 3.5–11)

## 2022-06-13 PROCEDURE — 93005 ELECTROCARDIOGRAM TRACING: CPT

## 2022-06-13 PROCEDURE — 82306 VITAMIN D 25 HYDROXY: CPT

## 2022-06-13 PROCEDURE — 85025 COMPLETE CBC W/AUTO DIFF WBC: CPT

## 2022-06-13 PROCEDURE — 93010 ELECTROCARDIOGRAM REPORT: CPT | Performed by: INTERNAL MEDICINE

## 2022-06-13 PROCEDURE — 36415 COLL VENOUS BLD VENIPUNCTURE: CPT

## 2022-06-13 PROCEDURE — 83036 HEMOGLOBIN GLYCOSYLATED A1C: CPT

## 2022-06-13 PROCEDURE — 84550 ASSAY OF BLOOD/URIC ACID: CPT

## 2022-06-13 PROCEDURE — 80061 LIPID PANEL: CPT

## 2022-06-13 PROCEDURE — 80053 COMPREHEN METABOLIC PANEL: CPT

## 2022-06-14 LAB
ESTIMATED AVERAGE GLUCOSE: 128 MG/DL
HBA1C MFR BLD: 6.1 % (ref 4–6)

## 2022-06-21 NOTE — PROGRESS NOTES
[facility] YOUNG   Preadmission Testing    Name: Jade Ascnecio  : 1945  Patient Phone: [phone] (home)     Procedure: Pacemaker insertion  Date of Procedure:   Surgeon: Leonard Foreman MD    Ht:  5' 8.5\" (174 cm)  Wt: 260 lb (117.9 kg)  Wt method: Stated    Allergies: Allergies   Allergen Reactions    Adhesive Tape Itching                There were no vitals filed for this visit. No LMP for male patient. Do you take blood thinners? [x] Yes    [] No         Instructed to stop blood thinners prior to procedure? [x] Yes    [] No      [] N/A  Told to stop coumadin x 5 days not mentioned aspirin. ... office called and instructions clarified. Do you have sleep apnea? [] Yes    [x] No     Do you have acid reflux ? [x] Yes    [] No     Do you have  hiatal hernia? [] Yes    [x] No    Do you ever experience motion sickness? [x] Yes    [] No     Have you had a respiratory infection or sore throat in last 4 weeks before surgery? [] Yes    [x] No     Do you have poorly controlled asthma or COPD? Difficulty with intubation in past? [] Yes    [x] No      [] Yes    [x] No       Do you have a history of angina in the last month or symptomatic arrhythmia? [] Yes    [x] No     Do you have significant central nervous system disease? [] Yes    [x] No     Have you had an EKG, labs, or chest xray in last 12 months? If yes provide copies to anesthesia   [x] Yes    [] No       [x] Lab    [x] EKG    [] CXR     Have you had a stress test?     [] Yes    [] No    When/where:    Was it normal?    [] Yes    [] No     Do you or your family have a history of Malignant Hyperthermia? [] Yes    [x] No           Do you smoke? [] Yes    [x] No      Please refrain from smoking on the day of surgery.       Patient instructed on: [x] NPO Status   [x] Meds to Take  [x] Ride Home  [x]No Jewelry/Contact Lenses/Nail [place]  [] Prep/Lax/Clear Liquids    [] Chlorhexidene     DOS Patient Needs [] HCG   [] Blood Sugar  [] PT/INR    [] T&S       COVID Vaccinated? [x] Yes    [] No           PAT Call/Visit Questions  Person Interviewed: Rashmi Space  Relationship to Patient: Patient  Surgery Location Verified: Yes  NPO Status Reinforced: Yes  Ride and Caregiver Arranged: Yes         Patient instructed on the pre-operative, intra-operative, and post-operative process? Yes  Medication instructions reviewed with patient?   Yes

## 2022-06-22 ENCOUNTER — TELEPHONE (OUTPATIENT)
Dept: CARDIOLOGY CLINIC | Age: 77
End: 2022-06-22

## 2022-06-22 NOTE — TELEPHONE ENCOUNTER
Pt called concerned about stopping coumadin and asa for 5 days  Prior   Pt ok with hold coumadin but conserned about asa said stopped asa for short time 12 yrs ago then had a stroke. Talked with DR Abby Sanchez can do 3 days if pt would consider   Pt informed explain risk of bleeding if not holding asa also pt agreed to holding 3 days.

## 2022-06-23 ENCOUNTER — ANESTHESIA EVENT (OUTPATIENT)
Dept: OPERATING ROOM | Age: 77
End: 2022-06-23
Payer: MEDICARE

## 2022-06-29 ENCOUNTER — ANESTHESIA (OUTPATIENT)
Dept: OPERATING ROOM | Age: 77
End: 2022-06-29
Payer: MEDICARE

## 2022-06-29 ENCOUNTER — HOSPITAL ENCOUNTER (OUTPATIENT)
Dept: PREADMISSION TESTING | Age: 77
Setting detail: SPECIMEN
Discharge: HOME | End: 2022-06-29
Payer: MEDICARE

## 2022-06-29 ENCOUNTER — APPOINTMENT (OUTPATIENT)
Dept: GENERAL RADIOLOGY | Age: 77
End: 2022-06-29
Attending: INTERNAL MEDICINE
Payer: MEDICARE

## 2022-06-29 ENCOUNTER — HOSPITAL ENCOUNTER (OUTPATIENT)
Age: 77
Discharge: HOME | End: 2022-06-30
Attending: INTERNAL MEDICINE | Admitting: INTERNAL MEDICINE
Payer: MEDICARE

## 2022-06-29 LAB
INR BLD: 1.1
PROTHROMBIN TIME: 14.7 SEC (ref 11.5–14.2)
SARS-COV-2, RAPID: NOT DETECTED
SPECIMEN DESCRIPTION: NORMAL

## 2022-06-29 PROCEDURE — 2580000003 HC RX 258: Performed by: INTERNAL MEDICINE

## 2022-06-29 PROCEDURE — 94761 N-INVAS EAR/PLS OXIMETRY MLT: CPT

## 2022-06-29 PROCEDURE — 85610 PROTHROMBIN TIME: CPT

## 2022-06-29 PROCEDURE — 3600000004 HC SURGERY LEVEL 4 BASE: Performed by: INTERNAL MEDICINE

## 2022-06-29 PROCEDURE — 33208 INSRT HEART PM ATRIAL & VENT: CPT | Performed by: INTERNAL MEDICINE

## 2022-06-29 PROCEDURE — C1898 LEAD, PMKR, OTHER THAN TRANS: HCPCS | Performed by: INTERNAL MEDICINE

## 2022-06-29 PROCEDURE — 3700000001 HC ADD 15 MINUTES (ANESTHESIA): Performed by: INTERNAL MEDICINE

## 2022-06-29 PROCEDURE — 71045 X-RAY EXAM CHEST 1 VIEW: CPT

## 2022-06-29 PROCEDURE — 6370000000 HC RX 637 (ALT 250 FOR IP): Performed by: INTERNAL MEDICINE

## 2022-06-29 PROCEDURE — 2500000003 HC RX 250 WO HCPCS: Performed by: NURSE ANESTHETIST, CERTIFIED REGISTERED

## 2022-06-29 PROCEDURE — 3700000000 HC ANESTHESIA ATTENDED CARE: Performed by: INTERNAL MEDICINE

## 2022-06-29 PROCEDURE — 2500000003 HC RX 250 WO HCPCS: Performed by: INTERNAL MEDICINE

## 2022-06-29 PROCEDURE — 7100000001 HC PACU RECOVERY - ADDTL 15 MIN: Performed by: INTERNAL MEDICINE

## 2022-06-29 PROCEDURE — C1892 INTRO/SHEATH,FIXED,PEEL-AWAY: HCPCS | Performed by: INTERNAL MEDICINE

## 2022-06-29 PROCEDURE — 6360000002 HC RX W HCPCS: Performed by: NURSE ANESTHETIST, CERTIFIED REGISTERED

## 2022-06-29 PROCEDURE — C1785 PMKR, DUAL, RATE-RESP: HCPCS | Performed by: INTERNAL MEDICINE

## 2022-06-29 PROCEDURE — 7100000000 HC PACU RECOVERY - FIRST 15 MIN: Performed by: INTERNAL MEDICINE

## 2022-06-29 PROCEDURE — 87635 SARS-COV-2 COVID-19 AMP PRB: CPT

## 2022-06-29 PROCEDURE — 76000 FLUOROSCOPY <1 HR PHYS/QHP: CPT

## 2022-06-29 PROCEDURE — 3600000014 HC SURGERY LEVEL 4 ADDTL 15MIN: Performed by: INTERNAL MEDICINE

## 2022-06-29 PROCEDURE — 6360000002 HC RX W HCPCS: Performed by: INTERNAL MEDICINE

## 2022-06-29 PROCEDURE — 2709999900 HC NON-CHARGEABLE SUPPLY: Performed by: INTERNAL MEDICINE

## 2022-06-29 PROCEDURE — C9803 HOPD COVID-19 SPEC COLLECT: HCPCS

## 2022-06-29 DEVICE — LEAD PACE 5.7FR L45CM VENT SIL PLAT INSUL BPLR STR: Type: IMPLANTABLE DEVICE | Status: FUNCTIONAL

## 2022-06-29 DEVICE — LEAD PACE 5.7FR L52CM VENT SIL PLAT INSUL BPLR STEROID: Type: IMPLANTABLE DEVICE | Status: FUNCTIONAL

## 2022-06-29 DEVICE — PACEMAKER CARD 22.5GM W50.8XH46.6MM D7.4MM TI POLYUR SIL: Type: IMPLANTABLE DEVICE | Status: FUNCTIONAL

## 2022-06-29 RX ORDER — SODIUM CHLORIDE 0.9 % (FLUSH) 0.9 %
5-40 SYRINGE (ML) INJECTION PRN
Status: DISCONTINUED | OUTPATIENT
Start: 2022-06-29 | End: 2022-06-30 | Stop reason: HOSPADM

## 2022-06-29 RX ORDER — HEPARIN SODIUM 1000 [USP'U]/ML
INJECTION, SOLUTION INTRAVENOUS; SUBCUTANEOUS PRN
Status: DISCONTINUED | OUTPATIENT
Start: 2022-06-29 | End: 2022-06-29 | Stop reason: HOSPADM

## 2022-06-29 RX ORDER — ACETAMINOPHEN 325 MG/1
650 TABLET ORAL EVERY 4 HOURS PRN
Status: DISCONTINUED | OUTPATIENT
Start: 2022-06-29 | End: 2022-06-30 | Stop reason: HOSPADM

## 2022-06-29 RX ORDER — OXYCODONE HYDROCHLORIDE 5 MG/1
10 TABLET ORAL EVERY 4 HOURS PRN
Status: DISCONTINUED | OUTPATIENT
Start: 2022-06-29 | End: 2022-06-30 | Stop reason: HOSPADM

## 2022-06-29 RX ORDER — LIDOCAINE HYDROCHLORIDE 10 MG/ML
INJECTION, SOLUTION EPIDURAL; INFILTRATION; INTRACAUDAL; PERINEURAL PRN
Status: DISCONTINUED | OUTPATIENT
Start: 2022-06-29 | End: 2022-06-29 | Stop reason: SDUPTHER

## 2022-06-29 RX ORDER — LISINOPRIL 5 MG/1
5 TABLET ORAL 2 TIMES DAILY
Status: DISCONTINUED | OUTPATIENT
Start: 2022-06-29 | End: 2022-06-30 | Stop reason: HOSPADM

## 2022-06-29 RX ORDER — CEFAZOLIN SODIUM 2 G/50ML
2000 SOLUTION INTRAVENOUS
Status: COMPLETED | OUTPATIENT
Start: 2022-06-29 | End: 2022-06-29

## 2022-06-29 RX ORDER — PANTOPRAZOLE SODIUM 40 MG/1
40 TABLET, DELAYED RELEASE ORAL
Status: DISCONTINUED | OUTPATIENT
Start: 2022-06-29 | End: 2022-06-30 | Stop reason: HOSPADM

## 2022-06-29 RX ORDER — ONDANSETRON 2 MG/ML
4 INJECTION INTRAMUSCULAR; INTRAVENOUS EVERY 6 HOURS PRN
Status: DISCONTINUED | OUTPATIENT
Start: 2022-06-29 | End: 2022-06-30 | Stop reason: HOSPADM

## 2022-06-29 RX ORDER — VANCOMYCIN HYDROCHLORIDE 1 G/20ML
INJECTION, POWDER, LYOPHILIZED, FOR SOLUTION INTRAVENOUS PRN
Status: DISCONTINUED | OUTPATIENT
Start: 2022-06-29 | End: 2022-06-29 | Stop reason: HOSPADM

## 2022-06-29 RX ORDER — MIDAZOLAM HYDROCHLORIDE 1 MG/ML
INJECTION INTRAMUSCULAR; INTRAVENOUS PRN
Status: DISCONTINUED | OUTPATIENT
Start: 2022-06-29 | End: 2022-06-29 | Stop reason: SDUPTHER

## 2022-06-29 RX ORDER — SODIUM CHLORIDE 9 MG/ML
INJECTION, SOLUTION INTRAVENOUS CONTINUOUS
Status: DISCONTINUED | OUTPATIENT
Start: 2022-06-29 | End: 2022-06-29 | Stop reason: HOSPADM

## 2022-06-29 RX ORDER — ATORVASTATIN CALCIUM 40 MG/1
80 TABLET, FILM COATED ORAL NIGHTLY
Status: DISCONTINUED | OUTPATIENT
Start: 2022-06-29 | End: 2022-06-30 | Stop reason: HOSPADM

## 2022-06-29 RX ORDER — BUPIVACAINE HYDROCHLORIDE 2.5 MG/ML
INJECTION, SOLUTION EPIDURAL; INFILTRATION; INTRACAUDAL PRN
Status: DISCONTINUED | OUTPATIENT
Start: 2022-06-29 | End: 2022-06-29 | Stop reason: HOSPADM

## 2022-06-29 RX ORDER — FENTANYL CITRATE 50 UG/ML
INJECTION, SOLUTION INTRAMUSCULAR; INTRAVENOUS PRN
Status: DISCONTINUED | OUTPATIENT
Start: 2022-06-29 | End: 2022-06-29 | Stop reason: SDUPTHER

## 2022-06-29 RX ORDER — PROPOFOL 10 MG/ML
INJECTION, EMULSION INTRAVENOUS CONTINUOUS PRN
Status: DISCONTINUED | OUTPATIENT
Start: 2022-06-29 | End: 2022-06-29 | Stop reason: SDUPTHER

## 2022-06-29 RX ORDER — PROPOFOL 10 MG/ML
INJECTION, EMULSION INTRAVENOUS PRN
Status: DISCONTINUED | OUTPATIENT
Start: 2022-06-29 | End: 2022-06-29 | Stop reason: SDUPTHER

## 2022-06-29 RX ORDER — ALLOPURINOL 100 MG/1
300 TABLET ORAL DAILY
Status: DISCONTINUED | OUTPATIENT
Start: 2022-06-29 | End: 2022-06-30 | Stop reason: HOSPADM

## 2022-06-29 RX ORDER — SODIUM CHLORIDE 0.9 % (FLUSH) 0.9 %
5-40 SYRINGE (ML) INJECTION EVERY 12 HOURS SCHEDULED
Status: DISCONTINUED | OUTPATIENT
Start: 2022-06-29 | End: 2022-06-30 | Stop reason: HOSPADM

## 2022-06-29 RX ORDER — SODIUM CHLORIDE 9 MG/ML
INJECTION, SOLUTION INTRAVENOUS PRN
Status: DISCONTINUED | OUTPATIENT
Start: 2022-06-29 | End: 2022-06-30 | Stop reason: HOSPADM

## 2022-06-29 RX ORDER — OXYCODONE HYDROCHLORIDE 5 MG/1
5 TABLET ORAL EVERY 4 HOURS PRN
Status: DISCONTINUED | OUTPATIENT
Start: 2022-06-29 | End: 2022-06-30 | Stop reason: HOSPADM

## 2022-06-29 RX ORDER — ASPIRIN 81 MG/1
81 TABLET, CHEWABLE ORAL 2 TIMES DAILY
Status: DISCONTINUED | OUTPATIENT
Start: 2022-06-29 | End: 2022-06-30 | Stop reason: HOSPADM

## 2022-06-29 RX ADMIN — PROPOFOL 40 MG: 10 INJECTION, EMULSION INTRAVENOUS at 07:32

## 2022-06-29 RX ADMIN — ALLOPURINOL 300 MG: 100 TABLET ORAL at 11:52

## 2022-06-29 RX ADMIN — PANTOPRAZOLE SODIUM 40 MG: 40 TABLET, DELAYED RELEASE ORAL at 16:57

## 2022-06-29 RX ADMIN — SODIUM CHLORIDE: 9 INJECTION, SOLUTION INTRAVENOUS at 06:51

## 2022-06-29 RX ADMIN — ASPIRIN 81 MG 81 MG: 81 TABLET ORAL at 11:52

## 2022-06-29 RX ADMIN — SODIUM CHLORIDE, PRESERVATIVE FREE 10 ML: 5 INJECTION INTRAVENOUS at 21:02

## 2022-06-29 RX ADMIN — PROPOFOL 75 MCG/KG/MIN: 10 INJECTION, EMULSION INTRAVENOUS at 07:32

## 2022-06-29 RX ADMIN — FENTANYL CITRATE 50 MCG: 50 INJECTION, SOLUTION INTRAMUSCULAR; INTRAVENOUS at 07:35

## 2022-06-29 RX ADMIN — SODIUM CHLORIDE, PRESERVATIVE FREE 10 ML: 5 INJECTION INTRAVENOUS at 11:55

## 2022-06-29 RX ADMIN — LISINOPRIL 5 MG: 5 TABLET ORAL at 21:03

## 2022-06-29 RX ADMIN — LIDOCAINE HYDROCHLORIDE 30 MG: 10 INJECTION, SOLUTION EPIDURAL; INFILTRATION; INTRACAUDAL; PERINEURAL at 07:40

## 2022-06-29 RX ADMIN — ACETAMINOPHEN 650 MG: 325 TABLET, FILM COATED ORAL at 21:03

## 2022-06-29 RX ADMIN — ATORVASTATIN CALCIUM 80 MG: 40 TABLET, FILM COATED ORAL at 21:02

## 2022-06-29 RX ADMIN — FENTANYL CITRATE 50 MCG: 50 INJECTION, SOLUTION INTRAMUSCULAR; INTRAVENOUS at 07:28

## 2022-06-29 RX ADMIN — ACETAMINOPHEN 650 MG: 325 TABLET, FILM COATED ORAL at 16:57

## 2022-06-29 RX ADMIN — CEFAZOLIN SODIUM 2000 MG: 2 SOLUTION INTRAVENOUS at 07:24

## 2022-06-29 RX ADMIN — LIDOCAINE HYDROCHLORIDE 50 MG: 10 INJECTION, SOLUTION EPIDURAL; INFILTRATION; INTRACAUDAL; PERINEURAL at 07:32

## 2022-06-29 RX ADMIN — LIDOCAINE HYDROCHLORIDE 30 MG: 10 INJECTION, SOLUTION EPIDURAL; INFILTRATION; INTRACAUDAL; PERINEURAL at 07:47

## 2022-06-29 RX ADMIN — MIDAZOLAM 2 MG: 1 INJECTION INTRAMUSCULAR; INTRAVENOUS at 07:28

## 2022-06-29 RX ADMIN — ASPIRIN 81 MG 81 MG: 81 TABLET ORAL at 21:02

## 2022-06-29 ASSESSMENT — PAIN SCALES - GENERAL
PAINLEVEL_OUTOF10: 0
PAINLEVEL_OUTOF10: 2
PAINLEVEL_OUTOF10: 2
PAINLEVEL_OUTOF10: 0
PAINLEVEL_OUTOF10: 0
PAINLEVEL_OUTOF10: 4
PAINLEVEL_OUTOF10: 0

## 2022-06-29 ASSESSMENT — PAIN - FUNCTIONAL ASSESSMENT
PAIN_FUNCTIONAL_ASSESSMENT: ACTIVITIES ARE NOT PREVENTED
PAIN_FUNCTIONAL_ASSESSMENT: PREVENTS OR INTERFERES WITH ALL ACTIVE AND SOME PASSIVE ACTIVITIES
PAIN_FUNCTIONAL_ASSESSMENT: NONE - DENIES PAIN

## 2022-06-29 ASSESSMENT — PAIN DESCRIPTION - LOCATION
LOCATION: CHEST
LOCATION: CHEST

## 2022-06-29 ASSESSMENT — PAIN DESCRIPTION - ORIENTATION
ORIENTATION: LEFT
ORIENTATION: LEFT

## 2022-06-29 ASSESSMENT — ENCOUNTER SYMPTOMS: DYSPNEA ACTIVITY LEVEL: AFTER AMBULATING 1 FLIGHT OF STAIRS

## 2022-06-29 ASSESSMENT — PAIN DESCRIPTION - DESCRIPTORS
DESCRIPTORS: ACHING
DESCRIPTORS: ACHING

## 2022-06-29 NOTE — ANESTHESIA POSTPROCEDURE EVALUATION
Department of Anesthesiology  Postprocedure Note    Patient: Ira Donnelly  MRN: [id number]  YOB: 1945  Date of evaluation: 6/29/2022      Procedure Summary     Date: 06/29/22 Room / Location: [address] / [facility]    Anesthesia Start: 1142 Anesthesia Stop: 0900    Procedure: PACEMAKER INSERTION PERMANENT (Left Chest) Diagnosis: (SICK SINUS SYNDROME)    Surgeons: Willa Tan MD Responsible Provider: LEXI Hernandez CRNA    Anesthesia Type: MAC ASA Status: 4          Anesthesia Type: No value filed.     Kiya Phase I: Kiya Score: 10    Kiya Phase II:        Anesthesia Post Evaluation    Patient location during evaluation: PACU  Patient participation: complete - patient participated  Level of consciousness: awake and alert  Pain score: 0  Airway patency: patent  Nausea & Vomiting: no nausea and no vomiting  Complications: no  Cardiovascular status: blood pressure returned to baseline and hemodynamically stable  Respiratory status: acceptable, room air and spontaneous ventilation  Hydration status: euvolemic  Multimodal analgesia pain management approach

## 2022-06-29 NOTE — H&P
CHIEF COMPLAINT:  1.  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  2.  Status post implantable loop recorder, which detected atrial fibrillation on 09/27/2021, at which time we started Eliquis, which was subsequently changed to Coumadin. 3.  Sick sinus syndrome with 5 second pauses detected on his implantable loop recorder.     HISTORY OF PRESENT ILLNESS:  I had the pleasure of seeing Mr. Heather Lundberg in the office on 05/18/2022. He is a pleasant 81-year-old gentleman who had a right retinal artery occlusion with a full workup, which was negative 11 years ago. He had been on baby aspirin, which he had stopped for 10 days because of periodontal bleeding. On the day that he restarted aspirin 11 years ago, he had a retinal artery occlusion and therefore has been very hesitant to stop aspirin. There was never any indication of atrial fib/flutter.     On 11/01/2020, he went to the emergency room with left upper extremity weakness. CT scan was negative. Dr. Urbano Roberson at [facility]. [facility] recommended tPA, which was given, and he was life-flighted to [facility]. [facility] on 11/01/2020. His weakness resolved. He was placed on Lipitor, aspirin, and lisinopril. We did a stress test that was negative.     I saw him on 01/04/2021, and recommended a loop recorder, which he did not wish to do. He therefore continued Plavix and aspirin.     He had another neurologic event on 02/14/2021, where he had left arm numbness, which resolved. He met with you and you also recommended an implantable loop recorder and he came back to me on 04/21/2021, to discuss.     He did agree to proceed with a loop recorder, which was done on 05/12/2021.     On 09/27/2021, his loop recorder showed that he was in atrial fibrillation. He converted spontaneously back to sinus rhythm. We started Eliquis 5 mg b.i.d. and stopped Plavix and aspirin.   His Eliquis was changed to Coumadin.     On 12/01, he developed bradycardia with heart rates in the 25 range at 9 including constitutional, eyes, ears, nose and throat, cardiovascular, respiratory, GI, , musculoskeletal, integumentary, neurologic, endocrine, hematologic and allergic/immunologic are negative except for what is described above. No weight loss or weight gain. No change in bowel habits. No blood in stools. No fevers, sweats or chills.     PHYSICAL EXAMINATION:  VITAL SIGNS:  His blood pressure was 140/80 with a heart rate of 88 and regular. Respirations 18. O2 sat 96%. Weight 264 pounds. GENERAL:  He is a very pleasant 51-year-old gentleman. Denied pain. He was oriented to person, place and time. Answered questions appropriately. SKIN:  No unusual skin changes. HEENT:  The pupils are equally round and intact. Mucous membranes were dry. NECK:  No JVD. Good carotid pulses. No carotid bruits. No lymphadenopathy or thyromegaly. CARDIOVASCULAR EXAM:  S1 and S2 were normal.  No S3 or S4. Soft systolic blowing type murmur. No diastolic murmur. PMI was normal.  No lift, thrust, or pericardial friction rub. LUNGS:  Clear to auscultation and percussion. ABDOMEN:  Soft and nontender. Good bowel sounds. EXTREMITIES:  Good femoral pulses. Good pedal pulses. No pedal edema. Skin was warm and dry. No calf tenderness. Nail beds pink. Good cap refill. PULSES:  Bilateral symmetrical radial, brachial and carotid pulses. No carotid bruits. Good femoral and pedal pulses. NEUROLOGIC EXAM:  Within normal limits. PSYCHIATRIC EXAM:  Within normal limits.     IMPRESSION:  1. Sinus pauses occurring approximately once a month, with his last pause on 05/07, of 6 seconds. 2.  Occasional lightheadedness. 3.  Cryptogenic CVA from multiple emboli on 11/01/2020, with negative CTA, negative carotid artery disease, most likely secondary to atrial fibrillation, which was discovered on 09/27/2021, on his loop recorder. 4.  TIA on 02/14/2021, with left arm weakness, which resolved.   5.  Right retinal artery occlusion 11 years ago. 6.  Loop recorder placed on 05/12/2021, with his atrial fibrillation discovered on 09/27/2021 on his loop recorder. 7.  Episode of bradycardia on 12/01/2021, with us stopping metoprolol at that time and no further negative chronotropic medications. 8.  Sinus pauses up to 6 seconds on 05/07/2022.  9.  On Coumadin for anticoagulation.     PLAN:  We will proceed with a DDD permanent pacemaker.     DISCUSSION:  Mr. Nickie Dee is developing sinus pauses. His last one on 05/07 was almost 6 seconds. He has very mild symptoms. However, they are becoming longer and occurring approximately once a month.     We had stopped his Lopressor in December because of bradycardia. Therefore, he is on no negative chronotropic agents.     I discussed the risk of sinus pauses with the main risk that of falling with fractures.     In addition, I cannot treat his paroxysmal atrial fibrillation. At this point, we cannot place him on any beta-blocker or calcium-channel blocker because of his sinus pauses.     He has agreed to proceed with a DDD permanent pacemaker, which we will do in June.     He will continue his Coumadin, although we will hold it for five days prior to his pacemaker implantation. We have gone over risks and benefits and he understands.     He does not have any chest pain or chest discomfort to indicate that we need to repeat a Lexiscan Cardiolite stress test.     Thank you very much for allowing me the privilege of seeing Mr. Nickie Dee.   If you have any questions on my thoughts, please do not hesitate to contact me.     Sincerely,           Chapis King

## 2022-06-29 NOTE — PROGRESS NOTES
Dr. Lidia Lopez appointment made for hoarseness and throat pain per family preference vs [facility].

## 2022-06-29 NOTE — ANESTHESIA PRE PROCEDURE
Department of Anesthesiology  Preprocedure Note       Name:  Jessica Zamudio   Age:  78 y.o.  :  1945                                          MRN:  [id number]         Date:  2022      Surgeon: Shanae White):  Dawit Horan MD    Procedure: Procedure(s):  PACEMAKER INSERTION PERMANENT    Medications prior to admission:   Prior to Admission medications    Medication Sig Start Date End Date Taking? Authorizing Provider   vitamin D (ERGOCALCIFEROL) 1.25 MG (60857 UT) CAPS capsule Take 125 mcg by mouth daily 2/15/22   Historical Provider, MD   lisinopril (PRINIVIL;ZESTRIL) 5 MG tablet Take 1 tablet by mouth 2 times daily 22   Dawit Horan MD   tadalafil (CIALIS) 10 MG tablet Take 1 tablet by mouth daily as needed for Erectile Dysfunction 22   Dawit Horan MD   warfarin (COUMADIN) 5 MG tablet Take 1 whole tablet EVERYDAY of the week or as directed by [facility].  21   Dawit Horan MD   Multiple Vitamins-Minerals (SYSTANE ICAPS AREDS2) CAPS Take 1 capsule by mouth daily    Historical Provider, MD   Lutein-Zeaxanthin 25-5 MG CAPS Take 1 capsule by mouth nightly    Historical Provider, MD   aspirin 81 MG chewable tablet Take 81 mg by mouth 2 times daily In AM (and PM since stopping 325 mg nightly on 21)    Historical Provider, MD   atorvastatin (LIPITOR) 80 MG tablet TAKE 1 TABLET BY MOUTH NIGHTLY 21   Lucy Lee MD   b complex vitamins capsule Take 1 capsule by mouth nightly     Historical Provider, MD   allopurinol (ZYLOPRIM) 300 MG tablet Take 300 mg by mouth daily    Historical Provider, MD   omeprazole (PRILOSEC) 20 MG delayed release capsule Take 20 mg by mouth in the morning and at bedtime     Historical Provider, MD   Naproxen Sodium (ALEVE) 220 MG CAPS Take 220 mg by mouth daily FOR \"JOINTS\"    Historical Provider, MD       Current medications:    Current Facility-Administered Medications   Medication Dose Route Frequency Provider Last Rate Last Admin    0.9 % sodium chloride infusion   IntraVENous Continuous Rohini Weeks  mL/hr at 06/29/22 0651 New Bag at 06/29/22 0651    ceFAZolin (ANCEF) 2000 mg in dextrose 3 % 50 mL IVPB (duplex)  2,000 mg IntraVENous On Call to [address] MD Alissa 100 mL/hr at 06/29/22 0724 2,000 mg at 06/29/22 0724       Allergies: Allergies   Allergen Reactions    Adhesive Tape Itching       Problem List:    Patient Active Problem List   Diagnosis Code    Stroke aborted by administration of thrombolytic agent ([facility] I63.9    Acute ischemic stroke ([facility] I63.9    Bilateral carotid artery stenosis I65.23    Received tissue plasminogen activator (t-PA) less than 24 hours prior to arrival Z92.82    Left hand weakness R29.898    Stroke of unknown cause ([facility] I63.9    Essential hypertension I10    Left against medical advice Z53.29    Atrial fibrillation ([facility] I48.91       Past Medical History:        Diagnosis Date    Cerebral artery occlusion with cerebral infarction ([facility]     Hypertension     Kidney stone        Past Surgical History:        Procedure Laterality Date    CATARACT REMOVAL Left     INSERTABLE CARDIAC MONITOR N/A 5/12/2021    LOOP RECORDER INSERT performed by Rohini Weeks MD at [address] [place] [address] ARTHROSCOPY Right     LITHOTRIPSY         Social History:    Social History     Tobacco Use    Smoking status: Never Smoker    Smokeless tobacco: Never Used   Substance Use Topics    Alcohol use:  Yes     Alcohol/week: 10.0 standard drinks     Types: 10 Cans of beer per week     Comment: 1-2 beers/day states this is cut back                                 Counseling given: Not Answered      Vital Signs (Current):   Vitals:    06/21/22 1102 06/29/22 0633 06/29/22 0643 06/29/22 0646   BP:   137/79    Pulse:   73    Resp:    20   Temp:   36.2 °C (97.2 °F)    TempSrc:   Temporal    SpO2:   95%    Weight: 260 lb (117.9 kg) 261 lb 3.2 oz (118.5 kg)     Height: 5' 8.5\" (1.74 m) 5' 8\" (1.727 m) BP Readings from Last 3 Encounters:   06/29/22 137/79   05/18/22 (!) 140/80   05/13/22 136/75       NPO Status: Time of last liquid consumption: 2100                        Time of last solid consumption: 2100                        Date of last liquid consumption: 06/28/22                        Date of last solid food consumption: 06/28/22    BMI:   Wt Readings from Last 3 Encounters:   06/29/22 261 lb 3.2 oz (118.5 kg)   05/18/22 264 lb (119.7 kg)   05/13/22 264 lb 3.2 oz (119.8 kg)     Body mass index is 39.72 kg/m². CBC:   Lab Results   Component Value Date    WBC 5.7 06/13/2022    RBC 5.44 06/13/2022    RBC 4.77 11/13/2011    HGB 10.8 06/13/2022    HCT 34.5 06/13/2022    MCV 63.4 06/13/2022    RDW 17.5 06/13/2022     06/13/2022     11/13/2011       CMP:   Lab Results   Component Value Date     06/13/2022    K 4.3 06/13/2022     06/13/2022    CO2 26 06/13/2022    BUN 20 06/13/2022    CREATININE 1.04 06/13/2022    GFRAA >60 06/13/2022    LABGLOM >60 06/13/2022    GLUCOSE 138 06/13/2022    GLUCOSE 156 11/13/2011    PROT 7.2 06/13/2022    CALCIUM 9.4 06/13/2022    BILITOT 1.49 06/13/2022    ALKPHOS 69 06/13/2022    AST 24 06/13/2022    ALT 20 06/13/2022       POC Tests: No results for input(s): POCGLU, POCNA, POCK, POCCL, POCBUN, POCHEMO, POCHCT in the last 72 hours.     Coags:   Lab Results   Component Value Date    PROTIME 14.7 06/29/2022    INR 1.1 06/29/2022    APTT 41.9 11/20/2021       HCG (If Applicable): No results found for: PREGTESTUR, PREGSERUM, HCG, HCGQUANT     ABGs: No results found for: PHART, PO2ART, KFP5ZFT, OIC8QXP, BEART, O6UJOSHA     Type & Screen (If Applicable):  No results found for: LABABO, LABRH    Drug/Infectious Status (If Applicable):  No results found for: HIV, HEPCAB    COVID-19 Screening (If Applicable):   Lab Results   Component Value Date    COVID19 Not Detected 06/29/2022           Anesthesia Evaluation  Patient summary reviewed and Nursing notes reviewed  Airway: Mallampati: III  TM distance: >3 FB   Neck ROM: full  Mouth opening: > = 3 FB   Dental:    (+) partials  Comment: Partials left at home    Pulmonary:Negative Pulmonary ROS and normal exam  breath sounds clear to auscultation                             Cardiovascular:  Exercise tolerance: no interval change,   (+) hypertension:, dysrhythmias (history of recorded SSS with 5-6 second pauses): atrial fibrillation, BROTHERS: after ambulating 1 flight of stairs and no interval change,       ECG reviewed  Rhythm: regular  Rate: normal  Echocardiogram reviewed  Stress test reviewed  Cleared by cardiology              Neuro/Psych:   (+) CVA (history of CVA X2): no interval change,             GI/Hepatic/Renal:   (+) renal disease: kidney stones, morbid obesity          Endo/Other: Negative Endo/Other ROS                    Abdominal:   (+) obese,     Abdomen: soft. Vascular: negative vascular ROS. Other Findings:           Anesthesia Plan      MAC     ASA 4       Induction: intravenous. MIPS: Postoperative opioids intended. Anesthetic plan and risks discussed with patient. Plan discussed with attending.                     LEXI Fuentes - CRNA   6/29/2022

## 2022-06-29 NOTE — PROGRESS NOTES
SW met with pt and spouse and daughters. Pt is alert and oriented and cooperative with assessment. Pt is a 78year old  male admitted for placement of DDD permanent pacemaker. Pt lives with his spouse in their home in [facility]. Pt reports that he uses a cane to get around at times but has no other DME. Pt was not utilizing any community services prior to admission. Pt drives and spouse can assist as well to get pt to appointments. Pt is a full code and follows with Dr Ayesha Whitfield as PCP. Dr Sheryle Kell is his cardiologist and he implanted the pacemaker this date. Pt states that he has a POA and his wife is the decision maker if he is unable. Copy requested. ACP note completed with pt. Pt reports that his medications are affordable so far. Pt plans to discharge to home tomorrow with family. Pt and spouse identify no discharge needs or concerns at this time. SW will follow and remain available as needed.  Naa Sandoval MSW PHILLIPW 6/29/2022

## 2022-06-29 NOTE — BRIEF OP NOTE
Brief Postoperative Note      Patient: Paulo Polanco  YOB: 1945  MRN: [id number]    Date of Procedure: 6/29/2022    Pre-Op Diagnosis: SICK SINUS SYNDROME    Post-Op Diagnosis: Same       Procedure(s):  PACEMAKER INSERTION PERMANENT    Surgeon(s):  Tricia Carreno MD    Assistant:  * No surgical staff found *    Anesthesia: Monitor Anesthesia Care    Estimated Blood Loss (mL): less than 50     Complications: None    Specimens:   * No specimens in log *    Implants:  Implant Name Type Inv.  Item Serial No.  Lot No. LRB No. Used Action   LEAD PACE 5.7FR L52CM VENT LISA PLAT INSUL BPLR STEROID - [id number] Cardiac Lead LEAD PACE 5.7FR L52CM VENT LISA PLAT INSUL BPLR STEROID [id number] [facility] CARDIAC [facility]  Left 1 Implanted   LEAD PACE 5.7FR L45CM VENT LISA PLAT INSUL BPLR STR - [id number] Cardiac Lead LEAD PACE 5.7FR L45CM VENT LISA PLAT INSUL BPLR STR [id number] [facility] CARDIAC [facility]  Left 1 Implanted   PACEMAKER CARD 22.5GM W50.8XH46.6MM D7.4MM TI POLYUR LISA - [id number] Cardiac PPM/CRT-P PACEMAKER CARD 22.5GM W50.8XH46.6MM D7.4MM TI POLYUR LISA [id number] [facility]  Left 1 Implanted          Full note dictated      Electronically signed by Tricia Carreno MD on 6/29/2022 at 8:59 AM

## 2022-06-29 NOTE — PROCEDURES
[place] [address]                            CARDIAC CATHETERIZATION    PATIENT NAME: Araceli Oneill                      :        1945  MED REC NO:   [id number]                              ROOM:       1300  ACCOUNT NO:   [de-identified]                           ADMIT DATE: 2022  PROVIDER:     Babar Velasquez    DATE OF PROCEDURE:  2022    NAME OF PROCEDURE:  DDD permanent pacemaker. INDICATION:  Sick sinus syndrome with 5-second pauses with symptoms. PROCEDURE:  We prepped and draped in the usual manner for a pacemaker on  the left subclavicular area. After he was asleep, I used Marcaine for  local analgesia. I made an incision and using blunt dissection, I made  a pocket for the pacemaker. We irrigated this pocket with Ancef  solution. We were careful to cauterize any bleeding. He did have a  fair amount of bleeders, which we carefully cauterized. I then packed it with a gauze. We cannulated the left subclavian vein twice, placing a guidewire each  time. We then placed a sheath and removed the guidewire and through the  sheath placed a ventricular lead. We tested several different areas  before we found an area in the apex that had good thresholds and  sensing. We screwed this in place and sutured the lead with 1 silk. We then placed a sheath in the other wire and through the sheath placed  an atrial lead. We were able to get into the atrial appendage fairly  easily and screwed the lead in place. There was good thresholds and we  sutured this with 1 silk also. We then carefully irrigated the pocket again and cauterized any  bleeding. We attached the generator to the leads, placed the generator  within the pocket and sutured the generator to his posterior wall with 1  silk.   We cauterized again any bleeding on the subcuticular region and  closed with 2-0 Vicryl and 4-0 Vicryl. Steri-Strips were placed and  pressure dressing. He left the OR in good condition. The generator is a Medtronic model number [id number], serial number  [id number]. Implant date, 06/29/2022. Right atrial lead is a model  number [id number], length 45 cm, serial number [id number] in the right  atrial appendage. Right ventricular lead is [id number], length 52 cm,  [id number] Medtronic. Thresholds in atrium 0.4 milliseconds, 0.7 volts,  399 ohms, P wave of 3.375. Right ventricular lead threshold is 0.4  milliseconds, _____ volts, impedance 1178, R wave was 7.750. Final  settings, lower rate 60, upper rate 130. IMPRESSION:  Successful implantation of DDD MRI-compatible Medtronic  pacemaker for sick sinus syndrome without complications. He has an implantable loop recorder, which is a Medtronic [id number], serial  number [id number], implant date was 05/12/2021. We discussed the loop  recorder and since it was causing no distress, he did not want it  removed. Therefore, I did not remove his implantable loop recorder.         Kenya Altamirano    D: 06/29/2022 9:05:40       T: 06/29/2022 9:09:06     [email or web address]  Job#: [id number]     Doc#: [id number]    CC:  Jenn Estrella

## 2022-06-29 NOTE — ACP (ADVANCE CARE PLANNING)
Advance Care Planning     Advance Care Planning Activator (Inpatient)  Conversation Note      Date of ACP Conversation: 6/29/2022     Conversation Conducted with: Patient with Decision Making Capacity    ACP Activator: Sindy Hdez, [address] [facility] Care Decision Maker:     Current Designated Health Care Decision Maker:     Primary Decision Maker: Paulette [facility] [phone]  Click here to complete Healthcare Decision Makers including section of the Healthcare Decision Maker Relationship (ie \"Primary\")  Today we documented Decision Maker(s) consistent with Legal Next of Kin hierarchy. Care Preferences    Ventilation: \"If you were in your present state of health and suddenly became very ill and were unable to breathe on your own, what would your preference be about the use of a ventilator (breathing machine) if it were available to you? \"      Would the patient desire the use of ventilator (breathing machine)?: yes    \"If your health worsens and it becomes clear that your chance of recovery is unlikely, what would your preference be about the use of a ventilator (breathing machine) if it were available to you? \"     Would the patient desire the use of ventilator (breathing machine)?: Pt unsure, would depend on time frame and situation      Resuscitation  \"CPR works best to restart the heart when there is a sudden event, like a heart attack, in someone who is otherwise healthy. Unfortunately, CPR does not typically restart the heart for people who have serious health conditions or who are very sick. \"    \"In the event your heart stopped as a result of an underlying serious health condition, would you want attempts to be made to restart your heart (answer \"yes\" for attempt to resuscitate) or would you prefer a natural death (answer \"no\" for do not attempt to resuscitate)? \" yes       [] Yes   [x] No   Educated Patient / [facility] regarding differences between Advance Directives and portable DNR orders.     Length of ACP Conversation in minutes:  5    Conversation Outcomes:  [x] ACP discussion completed  [] Existing advance directive reviewed with patient; no changes to patient's previously recorded wishes  [] New Advance Directive completed  [] Portable Do Not Rescitate prepared for Provider review and signature  [] POLST/POST/MOLST/MOST prepared for Provider review and signature      Follow-up plan:    [] Schedule follow-up conversation to continue planning  [] Referred individual to Provider for additional questions/concerns   [] Advised patient/agent/surrogate to review completed ACP document and update if needed with changes in condition, patient preferences or care setting    [x] This note routed to one or more involved healthcare providers

## 2022-06-30 ENCOUNTER — APPOINTMENT (OUTPATIENT)
Dept: GENERAL RADIOLOGY | Age: 77
End: 2022-06-30
Attending: INTERNAL MEDICINE
Payer: MEDICARE

## 2022-06-30 VITALS
BODY MASS INDEX: 39.59 KG/M2 | HEART RATE: 70 BPM | TEMPERATURE: 98.6 F | HEIGHT: 68 IN | RESPIRATION RATE: 16 BRPM | OXYGEN SATURATION: 96 % | SYSTOLIC BLOOD PRESSURE: 130 MMHG | WEIGHT: 261.2 LBS | DIASTOLIC BLOOD PRESSURE: 57 MMHG

## 2022-06-30 PROBLEM — I49.5 SSS (SICK SINUS SYNDROME) (HCC): Status: ACTIVE | Noted: 2022-06-30

## 2022-06-30 LAB
ANION GAP SERPL CALCULATED.3IONS-SCNC: 11 MMOL/L (ref 9–17)
BUN BLDV-MCNC: 18 MG/DL (ref 8–23)
BUN/CREAT BLD: 19 (ref 9–20)
CALCIUM SERPL-MCNC: 8.9 MG/DL (ref 8.6–10.4)
CHLORIDE BLD-SCNC: 106 MMOL/L (ref 98–107)
CO2: 26 MMOL/L (ref 20–31)
CREAT SERPL-MCNC: 0.94 MG/DL (ref 0.7–1.2)
GFR AFRICAN AMERICAN: >60 ML/MIN
GFR NON-AFRICAN AMERICAN: >60 ML/MIN
GFR SERPL CREATININE-BSD FRML MDRD: ABNORMAL ML/MIN/{1.73_M2}
GLUCOSE BLD-MCNC: 134 MG/DL (ref 70–99)
HCT VFR BLD CALC: 29.2 % (ref 41–53)
HEMOGLOBIN: 9.2 G/DL (ref 13.5–17.5)
MCH RBC QN AUTO: 20.1 PG (ref 26–34)
MCHC RBC AUTO-ENTMCNC: 31.3 G/DL (ref 31–37)
MCV RBC AUTO: 64.1 FL (ref 80–100)
MORPHOLOGY: NORMAL
PDW BLD-RTO: 18.3 % (ref 12.1–15.2)
PLATELET # BLD: 108 K/UL (ref 140–450)
POTASSIUM SERPL-SCNC: 4 MMOL/L (ref 3.7–5.3)
RBC # BLD: 4.56 M/UL (ref 4.5–5.9)
SODIUM BLD-SCNC: 143 MMOL/L (ref 135–144)
WBC # BLD: 5.8 K/UL (ref 3.5–11)

## 2022-06-30 PROCEDURE — 36415 COLL VENOUS BLD VENIPUNCTURE: CPT

## 2022-06-30 PROCEDURE — 71046 X-RAY EXAM CHEST 2 VIEWS: CPT

## 2022-06-30 PROCEDURE — 93005 ELECTROCARDIOGRAM TRACING: CPT | Performed by: INTERNAL MEDICINE

## 2022-06-30 PROCEDURE — 2580000003 HC RX 258: Performed by: INTERNAL MEDICINE

## 2022-06-30 PROCEDURE — 94761 N-INVAS EAR/PLS OXIMETRY MLT: CPT

## 2022-06-30 PROCEDURE — 80048 BASIC METABOLIC PNL TOTAL CA: CPT

## 2022-06-30 PROCEDURE — 6370000000 HC RX 637 (ALT 250 FOR IP): Performed by: INTERNAL MEDICINE

## 2022-06-30 PROCEDURE — 85027 COMPLETE CBC AUTOMATED: CPT

## 2022-06-30 RX ADMIN — ACETAMINOPHEN 650 MG: 325 TABLET, FILM COATED ORAL at 06:30

## 2022-06-30 RX ADMIN — ALLOPURINOL 300 MG: 100 TABLET ORAL at 08:40

## 2022-06-30 RX ADMIN — ASPIRIN 81 MG 81 MG: 81 TABLET ORAL at 08:39

## 2022-06-30 RX ADMIN — SODIUM CHLORIDE, PRESERVATIVE FREE 10 ML: 5 INJECTION INTRAVENOUS at 08:40

## 2022-06-30 RX ADMIN — PANTOPRAZOLE SODIUM 40 MG: 40 TABLET, DELAYED RELEASE ORAL at 06:30

## 2022-06-30 RX ADMIN — LISINOPRIL 5 MG: 5 TABLET ORAL at 08:40

## 2022-06-30 ASSESSMENT — PAIN SCALES - GENERAL
PAINLEVEL_OUTOF10: 1
PAINLEVEL_OUTOF10: 2
PAINLEVEL_OUTOF10: 0
PAINLEVEL_OUTOF10: 0

## 2022-06-30 ASSESSMENT — PAIN DESCRIPTION - LOCATION: LOCATION: CHEST

## 2022-06-30 ASSESSMENT — PAIN DESCRIPTION - DESCRIPTORS: DESCRIPTORS: ACHING

## 2022-06-30 ASSESSMENT — PAIN DESCRIPTION - ORIENTATION: ORIENTATION: LEFT

## 2022-06-30 NOTE — PROGRESS NOTES
Documents belongings and discharge instructions given, demonstrates understanding of discharge instructions. Transported to private vehicle via wheelchair.

## 2022-06-30 NOTE — PROGRESS NOTES
Cardiology    Resting comfortably. He passed a kidney stone last night! .  No sob. PPM site looks good. Will check ppm.  CXR looks good with good placement of leads. Home today if ppm is ok.     Edgard Martines MD

## 2022-06-30 NOTE — PROGRESS NOTES
Patient HR up to 102 on monitor. RN enters room and patient is up in [facility] with wife. Reports \"I think I passed a kidney stone\". Hx of kidney stones per patient. No pain reported to RN prior to event. Approx. 2-3 mm stone in toilet. Patient reports feeling relieved after. Returned to bed and sling re adjusted on left arm. Denies any needs at this time. HR returned to 60's after passing kidney stone. Call light in reach.

## 2022-07-03 LAB
EKG ATRIAL RATE: 277 BPM
EKG Q-T INTERVAL: 422 MS
EKG QRS DURATION: 100 MS
EKG QTC CALCULATION (BAZETT): 442 MS
EKG R AXIS: -3 DEGREES
EKG T AXIS: 40 DEGREES
EKG VENTRICULAR RATE: 66 BPM

## 2022-07-03 PROCEDURE — 93010 ELECTROCARDIOGRAM REPORT: CPT | Performed by: INTERNAL MEDICINE

## 2022-07-06 ENCOUNTER — OFFICE VISIT (OUTPATIENT)
Dept: CARDIOLOGY CLINIC | Age: 77
End: 2022-07-06
Payer: MEDICARE

## 2022-07-06 DIAGNOSIS — Z95.0 PACEMAKER: ICD-10-CM

## 2022-07-06 PROCEDURE — 93288 INTERROG EVL PM/LDLS PM IP: CPT | Performed by: INTERNAL MEDICINE

## 2022-07-06 RX ORDER — BETAMETHASONE DIPROPIONATE 0.05 %
OINTMENT (GRAM) TOPICAL
Qty: 1 EACH | Refills: 0 | Status: SHIPPED | OUTPATIENT
Start: 2022-07-06 | End: 2022-07-15 | Stop reason: ALTCHOICE

## 2022-07-06 NOTE — PROGRESS NOTES
Pt here for 1 week follow up post pacemaker insert   Restarted coumadin yesterday. Some soreness   Had trouble with pressure drsg staying on. Dressings removed. Will give betamethasone oint.   Will keep appt in August.

## 2022-07-11 NOTE — PROGRESS NOTES
Waleska Pat MD  [facility]  [facility]  [address] Sandra Price [address]  [phone]      2022      Meryle Paddy, MD  [address] [place] [address], [facility], [address]      RE:   Duncan Conception  :  1945      Dear Dr. Chad Lomeli:    CHIEF COMPLAINT:  Sick sinus syndrome. HISTORY OF PRESENT ILLNESS:  I saw Mr. Joseph Fregoso and his wife to take off the bandages for his pacemaker. He did have a reaction to the adhesive tape and does have a red area where the tape was outlined. His incision itself looks good with no infection present. There is also no hematoma. He is back on his Coumadin. He has an appointment in August, which we will keep. I will not do blood work as he just had a complete set of blood work. Thank you very much.      Sincerely,        Cherelle Vera    D: 2022 9:29:21     T: 2022 9:52:03     [email or web address]  Job#: [id number]   Doc#: [id number]

## 2022-07-15 ENCOUNTER — HOSPITAL ENCOUNTER (OUTPATIENT)
Dept: PHARMACY | Age: 77
Setting detail: THERAPIES SERIES
Discharge: HOME | End: 2022-07-15
Payer: MEDICARE

## 2022-07-15 VITALS
HEART RATE: 72 BPM | SYSTOLIC BLOOD PRESSURE: 144 MMHG | WEIGHT: 263 LBS | DIASTOLIC BLOOD PRESSURE: 62 MMHG | BODY MASS INDEX: 39.99 KG/M2

## 2022-07-15 DIAGNOSIS — I63.9 ACUTE ISCHEMIC STROKE (HCC): Primary | ICD-10-CM

## 2022-07-15 DIAGNOSIS — I63.9 STROKE OF UNKNOWN CAUSE (HCC): ICD-10-CM

## 2022-07-15 LAB — INR BLD: 2.4

## 2022-07-15 PROCEDURE — 99211 OFF/OP EST MAY X REQ PHY/QHP: CPT

## 2022-07-15 PROCEDURE — 85610 PROTHROMBIN TIME: CPT

## 2022-07-15 NOTE — PROGRESS NOTES
[address]/[place]  Medication Management  ANTICOAGULATION    Referring Provider: Dr Ronni Ruiz INR: 2.0-3.0     TODAY'S INR: 2.4     WARFARIN Dosage: Continue warfarin whole 5 mg tablet daily. INR (no units)   Date Value   2022 1.1   2022 2.5   2022 2.4   2022 2.3   2022 2.5   2021 2.9   2021 2.8       Medication changes:  Stopped B complex  Stopped betamethasone    Notes:    Fingerstick INR drawn per clinic protocol. Patient states no visible blood in urine and no black tarry stool. Denies any missed doses of warfarin. No change in other maintenance medications or in diet. Will recheck INR in 6 weeks. Patient acknowledges working in consult agreement with pharmacist as referred by his/her physician. Restarted warfarin 22 after pacemaker placement.              For Pharmacy Admin Tracking Only    Intervention Detail: Adherence Monitorin and New Rx: 2, reason: Patient Preference  Total # of Interventions Recommended: 2  Total # of Interventions Accepted: 2  Time Spent (min): Shreya Sebastian 91, [facility], PharmD

## 2022-08-09 ENCOUNTER — OFFICE VISIT (OUTPATIENT)
Dept: CARDIOLOGY CLINIC | Age: 77
End: 2022-08-09
Payer: MEDICARE

## 2022-08-09 VITALS
WEIGHT: 261 LBS | DIASTOLIC BLOOD PRESSURE: 77 MMHG | HEART RATE: 110 BPM | BODY MASS INDEX: 39.68 KG/M2 | OXYGEN SATURATION: 95 % | SYSTOLIC BLOOD PRESSURE: 159 MMHG

## 2022-08-09 DIAGNOSIS — Z95.0 PACEMAKER: Primary | ICD-10-CM

## 2022-08-09 DIAGNOSIS — I48.91 ATRIAL FIBRILLATION, NEW ONSET (HCC): ICD-10-CM

## 2022-08-09 DIAGNOSIS — I10 ESSENTIAL HYPERTENSION: ICD-10-CM

## 2022-08-09 PROCEDURE — G8417 CALC BMI ABV UP PARAM F/U: HCPCS | Performed by: INTERNAL MEDICINE

## 2022-08-09 PROCEDURE — G8427 DOCREV CUR MEDS BY ELIG CLIN: HCPCS | Performed by: INTERNAL MEDICINE

## 2022-08-09 PROCEDURE — 1123F ACP DISCUSS/DSCN MKR DOCD: CPT | Performed by: INTERNAL MEDICINE

## 2022-08-09 PROCEDURE — 99024 POSTOP FOLLOW-UP VISIT: CPT | Performed by: INTERNAL MEDICINE

## 2022-08-09 PROCEDURE — 3017F COLORECTAL CA SCREEN DOC REV: CPT | Performed by: INTERNAL MEDICINE

## 2022-08-09 PROCEDURE — 1036F TOBACCO NON-USER: CPT | Performed by: INTERNAL MEDICINE

## 2022-08-09 NOTE — PROGRESS NOTES
OV DR Goncalves Im follow up   No chest pain or sob  Has some tenderness   At incision site. No ankle edema. Will see in 6 mths.

## 2022-08-14 NOTE — PROGRESS NOTES
Suhas Garland M.D. [address] [place] [address] Sandra Price   [phone]        2022        Ayala Adams MD  [address] [place] [address], [address] [place] [address], [address]    RE:   Mahesh Zuniga  :  1945    Dear Dr. Nathan Quintanilla:    CHIEF COMPLAINT:  1. Sick sinus syndrome. 2.  Five-second pauses. 3.  Status post Medtronic Tori XT MRI-compatible pacemaker placed on 2022. HISTORY OF PRESENT ILLNESS:  I had the pleasure of seeing Mr. Michael Mosquera in the office on 2022. He is a pleasant 53-year-old gentleman who had a right retinal artery occlusion with a full workup, which was negative, 11 years ago. He had been on baby aspirin, which was stopped for 10 days because of periodontal bleeding. On the day he restarted aspirin 11 years ago, he had retinal artery occlusion and therefore he has been very hesitant to stop aspirin. On 2020, he went to the emergency room with left upper extremity weakness. CT scan was negative. Dr. Francisco Judd at [facility]. [facility] recommended tPA, which he was given. He was life-flighted to [facility] [place]. Marcel[facility] on 2020, weakness resolved. He was placed on Lipitor, aspirin, and lisinopril. On 2020, I recommended a loop recorder, which he did not wish to do. He had another neurologic event on 2021, where he had left arm numbness. He met with you and you also recommended an implantable loop recorder. He came back to me on 2021, to discuss. We did do a loop recorder on 2021. On 2021, his loop recorder showed he was in atrial fibrillation and he converted spontaneously to sinus rhythm. We started Eliquis 5 mg b.i.d. and stopped Plavix and aspirin. His Eliquis was changed to Coumadin. On , he developed bradycardia with heart rates at 29 beats per minute. We stopped his Lopressor, and at that time he was doing well. We continue to monitor his loop recorder. However, he developed a spontaneous sinus pauses up in the 3 to 4-second and finally 6-second pause. I saw him on 05/18/2022, and recommended that we proceed with a pacemaker. We did implant a Medtronic [place] MRI-compatible pacemaker on 06/29/2022, without difficulty. I brought him back today to reevaluate. He is doing well with no soreness in his pacemaker. He is now able to do everything. He has had no lightheaded spells or dizzy spells. He denies any syncope or near syncope. Denies any chest pain or chest discomfort. CARDIAC RISK FACTORS:  Hypertension:  Positive. Hyperlipidemia:  Positive. Other Family Members:  Positive. Peripheral Vascular Disease:  Negative. Diabetes:  Negative. Smoking:  Negative. MEDICATIONS AT HOME:  He is currently on Zyloprim daily, aspirin 81 mg daily, Lipitor 80 mg daily, lisinopril 5 mg b.i.d., Cialis 10 mg p.r.n., Coumadin as directed, vitamin D 5000 units daily. PAST MEDICAL AND SURGICAL HISTORY:  1. Retinal occlusion 11 years ago in the right eye.  2.  Hypertension many years. 3.  Cataract surgery in the left eye on 10/14/2021. 4.  Cellulitis with an abscess on the right upper arm, which was lanced. 5.  Two CVAs, one on 11/01/2020, and 02/14/2021.  6.  Sinus arrest, which resolved on a pacemaker. 7.  Atrial fibrillation for which he is on Coumadin. FAMILY HISTORY:  Son had an MI in 62/4713, complicated by a PE.    SOCIAL HISTORY:  He is 78years old, , retired 14 years ago. Drinks two beers a day. He does not smoke. Two sons and two daughters. One son, Inder Dumont, passed away at 46 of an MI and PE. Mr. Mirna Melton is . REVIEW OF SYSTEMS:  Cardiac as above. Other systems reviewed including constitutional, eyes, ears, nose and throat, cardiovascular, respiratory, GI, , musculoskeletal, integumentary, neurologic, endocrine, hematologic and allergic/immunologic are negative except for what is described above.    No weight loss or weight gain. No change in bowel habits. No blood in stools. No fevers, sweats or chills. PHYSICAL EXAMINATION:  VITAL SIGNS:  His blood pressure was 130/70 with a heart rate of 100 and regular. Respirations 18. O2 sat 95%. Weight 261 pounds. GENERAL:  He is a pleasant 72-year-old gentleman. Denied pain. He was oriented to person, place and time. Answered questions appropriately. SKIN:  No unusual skin changes. HEENT:  The pupils are equally round and intact. Mucous membranes were dry. NECK:  No JVD. Good carotid pulses. No carotid bruits. No lymphadenopathy or thyromegaly. CARDIOVASCULAR EXAM:  S1 and S2 were normal.  No S3 or S4. Soft systolic blowing type murmur. No diastolic murmur. PMI was normal.  No lift, thrust, or pericardial friction rub. LUNGS:  Clear to auscultation and percussion. ABDOMEN:  Soft and nontender. Good bowel sounds. EXTREMITIES:  Good femoral pulses. Good pedal pulses. No pedal edema. Skin was warm and dry. No calf tenderness. Nail beds pink. Good cap refill. PULSES:  Bilateral symmetrical radial, brachial and carotid pulses. No carotid bruits. Good femoral and pedal pulses. NEUROLOGIC EXAM:  Within normal limits. PSYCHIATRIC EXAM:  Within normal limits. Pacemaker was interrogated, still at beginning of life. It appears that he may have had episodes of atrial fibrillation. IMPRESSION:  1. Sinus pauses, occurring approximately once a month up to 6 seconds. Pauses up to 6 seconds on 05/07/2022.  2.  Occasional lightheadedness. 3.  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 4.  TIA on 02/14/2021, with left arm weakness, which resolved. 5.  Right retinal artery occlusion 11 years ago.   6.  Loop recorder placed on 05/12/2021, with atrial fibrillation discovered on 09/27/2021, on his loop recorder. 7.  Episode of bradycardia on 12/01/2021.  8.  On Coumadin for anticoagulation. 9.  Status post [place] MRI-compatible pacemaker, DDD implanted on 06/29/2022, working properly. PLAN:  It does appear that he has had atrial fibrillation; I will place him on Lopressor 25 mg b.i.d.    DISCUSSION:  Mr. Eliot King overall is doing well. He has had no chest pain or chest discomfort or any unusual shortness of breath. He really has no complaints as I see him today. His pacemaker site has healed very nicely, still at beginning of life. I suspect that we will need to place him back on Lopressor 25 mg b.i.d. for his atrial fibrillation. We will check this today and make a final determination. Otherwise, we will see him in 6 months and if he is doing well at that time, we will see him on a yearly basis. The pacemaker was interrogated and is working properly with no tachyarrhythmias. We will make no change in medications. Thank you very much for allowing me the privilege of seeing Mr. Eliot King. If you have any questions on my thoughts, please do not hesitate to contact me.     Sincerely,        Jannet Lion    D: 08/09/2022 12:49:38     T: 08/09/2022 12:54:30     [email or web address]  Job#: [id number]   Doc#: [id number]

## 2022-08-15 RX ORDER — WARFARIN SODIUM 5 MG/1
TABLET ORAL
Qty: 30 TABLET | Refills: 11 | Status: SHIPPED | OUTPATIENT
Start: 2022-08-15 | End: 2022-08-15 | Stop reason: SDUPTHER

## 2022-08-15 RX ORDER — WARFARIN SODIUM 5 MG/1
TABLET ORAL
Qty: 90 TABLET | Refills: 3 | Status: SHIPPED | OUTPATIENT
Start: 2022-08-15

## 2022-08-26 ENCOUNTER — HOSPITAL ENCOUNTER (OUTPATIENT)
Dept: PHARMACY | Age: 77
Setting detail: THERAPIES SERIES
Discharge: HOME | End: 2022-08-26
Payer: MEDICARE

## 2022-08-26 VITALS
WEIGHT: 264.2 LBS | HEART RATE: 88 BPM | SYSTOLIC BLOOD PRESSURE: 102 MMHG | BODY MASS INDEX: 40.17 KG/M2 | DIASTOLIC BLOOD PRESSURE: 75 MMHG

## 2022-08-26 DIAGNOSIS — I48.91 ATRIAL FIBRILLATION, UNSPECIFIED TYPE (HCC): ICD-10-CM

## 2022-08-26 DIAGNOSIS — I63.9 STROKE OF UNKNOWN CAUSE (HCC): ICD-10-CM

## 2022-08-26 DIAGNOSIS — I63.9 ACUTE ISCHEMIC STROKE (HCC): Primary | ICD-10-CM

## 2022-08-26 LAB — INR BLD: 2.9

## 2022-08-26 PROCEDURE — 85610 PROTHROMBIN TIME: CPT

## 2022-08-26 PROCEDURE — 99211 OFF/OP EST MAY X REQ PHY/QHP: CPT

## 2022-08-26 RX ORDER — VITAMIN B COMPLEX
1 CAPSULE ORAL DAILY
COMMUNITY

## 2022-08-26 NOTE — PROGRESS NOTES
[address]/[place]  Medication Management  ANTICOAGULATION    Referring Provider: Dr Cira Cruz INR: 2.0-3.0     TODAY'S INR: 2.9     WARFARIN Dosage: Continue warfarin whole 5 mg tablet daily. INR (no units)   Date Value   2022 2.9   07/15/2022 2.4   2022 1.1   2022 2.5   2022 2.4   2022 2.3   2022 2.5       Medication changes:  Was out of B complex for a bit but has purchased more and has restarted taking daily    Notes:    Fingerstick INR drawn per clinic protocol. Patient states no visible blood in urine and no black tarry stool. Denies any missed or extra doses of warfarin. Was out of B complex for a bit but has purchased more and has restarted taking daily. No change in other maintenance medications or in diet. Will recheck INR in 6 weeks. Patient acknowledges working in consult agreement with pharmacist as referred by his/her physician.                   For Pharmacy Admin Tracking Only    Intervention Detail: Adherence Monitorin  Total # of Interventions Recommended: 1  Total # of Interventions Accepted: 1  Time Spent (min): 20    Otf Monahan PharmD 2022 4:15 PM

## 2022-10-07 ENCOUNTER — HOSPITAL ENCOUNTER (OUTPATIENT)
Dept: PHARMACY | Age: 77
Setting detail: THERAPIES SERIES
Discharge: HOME | End: 2022-10-07
Payer: MEDICARE

## 2022-10-07 VITALS
BODY MASS INDEX: 40.9 KG/M2 | HEART RATE: 77 BPM | WEIGHT: 269 LBS | DIASTOLIC BLOOD PRESSURE: 76 MMHG | SYSTOLIC BLOOD PRESSURE: 139 MMHG

## 2022-10-07 DIAGNOSIS — I63.9 ACUTE ISCHEMIC STROKE (HCC): Primary | ICD-10-CM

## 2022-10-07 DIAGNOSIS — I63.9 STROKE OF UNKNOWN CAUSE (HCC): ICD-10-CM

## 2022-10-07 DIAGNOSIS — I48.91 ATRIAL FIBRILLATION, UNSPECIFIED TYPE (HCC): ICD-10-CM

## 2022-10-07 LAB — INR BLD: 2.7

## 2022-10-07 PROCEDURE — 99211 OFF/OP EST MAY X REQ PHY/QHP: CPT | Performed by: FAMILY MEDICINE

## 2022-10-07 PROCEDURE — 85610 PROTHROMBIN TIME: CPT | Performed by: FAMILY MEDICINE

## 2022-10-07 NOTE — PROGRESS NOTES
[address]/[place]  Medication Management  ANTICOAGULATION    Referring Provider: Dr Shala Cardona INR: 2.0-3.0    TODAY'S INR: 2.7    WARFARIN Dosage: continue 5mg daily    INR (no units)   Date Value   10/07/2022 2.7   2022 2.9   07/15/2022 2.4   2022 1.1   2022 2.5   2022 2.4   2022 2.3       Medication changes:  No changes  Notes:    Fingerstick INR drawn per clinic protocol. Patient states no visible blood in urine and no black tarry stool. Denies any missed doses of warfarin. No change in other maintenance medications or in diet. Will recheck INR in 6 weeks. Patient acknowledges working in consult agreement with pharmacist as referred by his/her physician.                   For Pharmacy Admin Tracking Only    Intervention Detail: Adherence Monitorin  Total # of Interventions Recommended: 2  Total # of Interventions Accepted: 2  Time Spent (min): 20      LAURA DunnPh., 10/7/2022,2:25 PM

## 2022-11-18 ENCOUNTER — HOSPITAL ENCOUNTER (OUTPATIENT)
Dept: PHARMACY | Age: 77
Setting detail: THERAPIES SERIES
Discharge: HOME | End: 2022-11-18
Payer: MEDICARE

## 2022-11-18 VITALS
HEART RATE: 96 BPM | BODY MASS INDEX: 40.32 KG/M2 | SYSTOLIC BLOOD PRESSURE: 133 MMHG | DIASTOLIC BLOOD PRESSURE: 72 MMHG | WEIGHT: 265.2 LBS

## 2022-11-18 DIAGNOSIS — I63.9 ACUTE ISCHEMIC STROKE (HCC): Primary | ICD-10-CM

## 2022-11-18 DIAGNOSIS — I63.9 STROKE OF UNKNOWN CAUSE (HCC): ICD-10-CM

## 2022-11-18 DIAGNOSIS — I48.91 ATRIAL FIBRILLATION, UNSPECIFIED TYPE (HCC): ICD-10-CM

## 2022-11-18 LAB — INR BLD: 2.9

## 2022-11-18 PROCEDURE — 99211 OFF/OP EST MAY X REQ PHY/QHP: CPT

## 2022-11-18 PROCEDURE — 85610 PROTHROMBIN TIME: CPT

## 2022-11-18 NOTE — PROGRESS NOTES
[address] [place] [address]/[place]  Medication Management  ANTICOAGULATION    Referring Provider: Dr Geeta Ac INR: 2.0-3.0     TODAY'S INR: 2.9     WARFARIN Dosage: Continue 5 mg tablet daily    INR (no units)   Date Value   2022 2.9   10/07/2022 2.7   2022 2.9   07/15/2022 2.4   2022 1.1   2022 2.5   2022 2.4       Medication changes:  Stopped atorvastatin    Notes:    Fingerstick INR drawn per clinic protocol. Patient states no visible blood in urine and no black tarry stool. Denies any missed or extra doses of warfarin. Stopped warfarin after discussing side effects with PCP. Opted to stop versus lower dose or switch. No change in other maintenance medications or in diet. Continue current dosing. Will recheck INR in 6 weeks. Patient acknowledges working in consult agreement with pharmacist as referred by his/her physician.                   For Pharmacy Admin Tracking Only    Intervention Detail: Adherence Monitorin  Total # of Interventions Recommended: 1  Total # of Interventions Accepted: 1  Time Spent (min): Allen Fernandes, PharmMAIN 2022 2:30 PM

## 2022-11-28 RX ORDER — LISINOPRIL 5 MG/1
5 TABLET ORAL 2 TIMES DAILY
Qty: 60 TABLET | Refills: 11 | Status: SHIPPED | OUTPATIENT
Start: 2022-11-28

## 2022-12-29 ENCOUNTER — HOSPITAL ENCOUNTER (OUTPATIENT)
Dept: PHARMACY | Age: 77
Setting detail: THERAPIES SERIES
Discharge: HOME | End: 2022-12-29
Payer: MEDICARE

## 2022-12-29 DIAGNOSIS — I48.91 ATRIAL FIBRILLATION, UNSPECIFIED TYPE (HCC): ICD-10-CM

## 2022-12-29 DIAGNOSIS — I63.9 STROKE OF UNKNOWN CAUSE (HCC): ICD-10-CM

## 2022-12-29 DIAGNOSIS — I63.9 ACUTE ISCHEMIC STROKE (HCC): Primary | ICD-10-CM

## 2022-12-29 LAB — INR BLD: 2.7

## 2022-12-29 PROCEDURE — 85610 PROTHROMBIN TIME: CPT | Performed by: FAMILY MEDICINE

## 2022-12-29 PROCEDURE — 99211 OFF/OP EST MAY X REQ PHY/QHP: CPT | Performed by: FAMILY MEDICINE

## 2022-12-29 NOTE — PATIENT INSTRUCTIONS
Please continue to take warfarin 5mg (1 tablet) every day. Continue to monitor for signs of bleeding. Return to coumadin clinic in 7 weeks.

## 2022-12-29 NOTE — PROGRESS NOTES
[facility] [address] [place] [address]/[place]  Medication Management  ANTICOAGULATION    Referring Provider: Dr Nini Grijalva INR: 2.0-3.0    TODAY'S INR: 2.7    WARFARIN Dosage: 5mg daily    INR (no units)   Date Value   2022 2.7   2022 2.9   10/07/2022 2.7   2022 2.9   07/15/2022 2.4   2022 1.1   2022 2.5       Medication changes:  Continues to hold atorvastatin due to side effects  Notes:    Fingerstick INR drawn per clinic protocol. Patient states no visible blood in urine and no black tarry stool. Denies any missed doses of warfarin. No change in other maintenance medications or in diet. Will recheck INR in 7 weeks per pt request. Patient states he \"had a mini stroke on Thanksgiving day. I didn't go to the ER but I took a 325mg aspirin right away, and again 2 more times later in the day. \"  States he has left sided head pain then his left arm went numb. States he could not control his arm \"for a little bit\" but his hands \"were fine\". Patient states \"it all came back after a while\". I have strongly encouraged patient to seek medical attention if this occurs again. He states he has had this happen the last 3  and once last February and takes aspirin each time and \"comes out of it\". Patient will discuss atorvastatin with Dr Peace Mosley at next appointment. Has not been taking for several months due to side effects of muscle and joint pain. Patient states his hip pain continues to increase and he will likely see orthopedic for this in future. Patient acknowledges working in consult agreement with pharmacist as referred by his/her physician.                   For Pharmacy Admin Tracking Only    Intervention Detail: Adherence Monitorin  Total # of Interventions Recommended: 2  Total # of Interventions Accepted: 2  Time Spent (min): 20    Liat Ornelas R.Ph., 2022,3:10 PM

## 2023-01-31 ENCOUNTER — HOSPITAL ENCOUNTER (OUTPATIENT)
Age: 78
Discharge: HOME | End: 2023-01-31
Payer: MEDICARE

## 2023-01-31 DIAGNOSIS — I48.91 ATRIAL FIBRILLATION, NEW ONSET (HCC): ICD-10-CM

## 2023-01-31 DIAGNOSIS — Z95.0 PACEMAKER: ICD-10-CM

## 2023-01-31 DIAGNOSIS — I10 ESSENTIAL HYPERTENSION: ICD-10-CM

## 2023-01-31 LAB
ABSOLUTE BANDS #: 0.25 K/UL (ref 0–1)
ABSOLUTE EOS #: 0.57 K/UL (ref 0–0.4)
ABSOLUTE LYMPH #: 1.15 K/UL (ref 1–4.8)
ABSOLUTE MONO #: 0.25 K/UL (ref 0–1)
ALBUMIN SERPL-MCNC: 4.3 G/DL (ref 3.5–5.2)
ALP BLD-CCNC: 73 U/L (ref 40–129)
ALT SERPL-CCNC: 11 U/L (ref 5–41)
ANION GAP SERPL CALCULATED.3IONS-SCNC: 13 MMOL/L (ref 9–17)
AST SERPL-CCNC: 24 U/L
ATYPICAL LYMPHOCYTE ABSOLUTE COUNT: 0.25 K/UL (ref 0–1)
ATYPICAL LYMPHOCYTES: 3 %
BANDS: 3 % (ref 0–10)
BASOPHILS # BLD: ABNORMAL % (ref 0–2)
BASOPHILS ABSOLUTE: ABNORMAL K/UL (ref 0–0.2)
BILIRUB SERPL-MCNC: 1.2 MG/DL (ref 0.3–1.2)
BUN BLDV-MCNC: 19 MG/DL (ref 8–23)
BUN/CREAT BLD: 17 (ref 9–20)
CALCIUM SERPL-MCNC: 9.6 MG/DL (ref 8.6–10.4)
CHLORIDE BLD-SCNC: 101 MMOL/L (ref 98–107)
CHOLEST SERPL-MCNC: 186 MG/DL
CHOLESTEROL/HDL RATIO: 5
CO2: 25 MMOL/L (ref 20–31)
CREAT SERPL-MCNC: 1.1 MG/DL (ref 0.7–1.2)
EOSINOPHILS RELATIVE PERCENT: 7 % (ref 0–5)
GFR SERPL CREATININE-BSD FRML MDRD: >60 ML/MIN/1.73M2
GLUCOSE BLD-MCNC: 164 MG/DL (ref 70–99)
HCT VFR BLD CALC: 36.5 % (ref 41–53)
HDLC SERPL-MCNC: 37 MG/DL
HEMOGLOBIN: 11.4 G/DL (ref 13.5–17.5)
LDLC SERPL CALC-MCNC: 112 MG/DL (ref 0–130)
LYMPHOCYTES # BLD: 14 % (ref 13–44)
MAGNESIUM: 1.8 MG/DL (ref 1.6–2.6)
MCH RBC QN AUTO: 20.2 PG (ref 26–34)
MCHC RBC AUTO-ENTMCNC: 31.2 G/DL (ref 31–37)
MCV RBC AUTO: 64.5 FL (ref 80–100)
MONOCYTES # BLD: 3 % (ref 5–9)
MORPHOLOGY: ABNORMAL
PATIENT FASTING?: YES
PDW BLD-RTO: 18.2 % (ref 12.1–15.2)
PLATELET # BLD: 228 K/UL (ref 140–450)
POTASSIUM SERPL-SCNC: 4.4 MMOL/L (ref 3.7–5.3)
RBC # BLD: 5.66 M/UL (ref 4.5–5.9)
SEG NEUTROPHILS: 70 % (ref 39–75)
SEGMENTED NEUTROPHILS ABSOLUTE COUNT: 5.73 K/UL (ref 2.1–6.5)
SODIUM BLD-SCNC: 139 MMOL/L (ref 135–144)
TOTAL PROTEIN: 7.6 G/DL (ref 6.4–8.3)
TRIGL SERPL-MCNC: 186 MG/DL
TSH SERPL DL<=0.05 MIU/L-ACNC: 1.53 UIU/ML (ref 0.3–5)
WBC # BLD: 8.2 K/UL (ref 3.5–11)

## 2023-01-31 PROCEDURE — 84443 ASSAY THYROID STIM HORMONE: CPT

## 2023-01-31 PROCEDURE — 83735 ASSAY OF MAGNESIUM: CPT

## 2023-01-31 PROCEDURE — 80061 LIPID PANEL: CPT

## 2023-01-31 PROCEDURE — 36415 COLL VENOUS BLD VENIPUNCTURE: CPT

## 2023-01-31 PROCEDURE — 80053 COMPREHEN METABOLIC PANEL: CPT

## 2023-01-31 PROCEDURE — 93005 ELECTROCARDIOGRAM TRACING: CPT

## 2023-01-31 PROCEDURE — 85025 COMPLETE CBC W/AUTO DIFF WBC: CPT

## 2023-02-01 LAB
EKG ATRIAL RATE: 98 BPM
EKG P AXIS: 69 DEGREES
EKG P-R INTERVAL: 244 MS
EKG Q-T INTERVAL: 336 MS
EKG QRS DURATION: 94 MS
EKG QTC CALCULATION (BAZETT): 428 MS
EKG R AXIS: -2 DEGREES
EKG T AXIS: 83 DEGREES
EKG VENTRICULAR RATE: 98 BPM

## 2023-02-01 PROCEDURE — 93010 ELECTROCARDIOGRAM REPORT: CPT | Performed by: INTERNAL MEDICINE

## 2023-02-07 ENCOUNTER — OFFICE VISIT (OUTPATIENT)
Dept: CARDIOLOGY CLINIC | Age: 78
End: 2023-02-07
Payer: MEDICARE

## 2023-02-07 ENCOUNTER — HOSPITAL ENCOUNTER (OUTPATIENT)
Age: 78
Discharge: HOME | End: 2023-02-07
Payer: MEDICARE

## 2023-02-07 VITALS
BODY MASS INDEX: 39.68 KG/M2 | DIASTOLIC BLOOD PRESSURE: 70 MMHG | WEIGHT: 261 LBS | SYSTOLIC BLOOD PRESSURE: 120 MMHG | HEART RATE: 118 BPM | OXYGEN SATURATION: 95 %

## 2023-02-07 DIAGNOSIS — R73.09 ELEVATED GLUCOSE: Primary | ICD-10-CM

## 2023-02-07 DIAGNOSIS — R73.09 ELEVATED GLUCOSE: ICD-10-CM

## 2023-02-07 DIAGNOSIS — I48.91 ATRIAL FIBRILLATION, NEW ONSET (HCC): ICD-10-CM

## 2023-02-07 DIAGNOSIS — Z95.0 PACEMAKER: ICD-10-CM

## 2023-02-07 DIAGNOSIS — I10 ESSENTIAL HYPERTENSION: ICD-10-CM

## 2023-02-07 PROCEDURE — G8417 CALC BMI ABV UP PARAM F/U: HCPCS | Performed by: INTERNAL MEDICINE

## 2023-02-07 PROCEDURE — 36415 COLL VENOUS BLD VENIPUNCTURE: CPT

## 2023-02-07 PROCEDURE — 1123F ACP DISCUSS/DSCN MKR DOCD: CPT | Performed by: INTERNAL MEDICINE

## 2023-02-07 PROCEDURE — 3078F DIAST BP <80 MM HG: CPT | Performed by: INTERNAL MEDICINE

## 2023-02-07 PROCEDURE — G8428 CUR MEDS NOT DOCUMENT: HCPCS | Performed by: INTERNAL MEDICINE

## 2023-02-07 PROCEDURE — G8484 FLU IMMUNIZE NO ADMIN: HCPCS | Performed by: INTERNAL MEDICINE

## 2023-02-07 PROCEDURE — 99214 OFFICE O/P EST MOD 30 MIN: CPT | Performed by: INTERNAL MEDICINE

## 2023-02-07 PROCEDURE — 1036F TOBACCO NON-USER: CPT | Performed by: INTERNAL MEDICINE

## 2023-02-07 PROCEDURE — 3074F SYST BP LT 130 MM HG: CPT | Performed by: INTERNAL MEDICINE

## 2023-02-07 PROCEDURE — 83036 HEMOGLOBIN GLYCOSYLATED A1C: CPT

## 2023-02-07 RX ORDER — ATORVASTATIN CALCIUM 20 MG/1
20 TABLET, FILM COATED ORAL DAILY
Qty: 90 TABLET | Refills: 3 | Status: SHIPPED | OUTPATIENT
Start: 2023-02-07

## 2023-02-07 RX ORDER — CLOPIDOGREL BISULFATE 75 MG/1
75 TABLET ORAL DAILY
Qty: 90 TABLET | Refills: 3 | Status: SHIPPED | OUTPATIENT
Start: 2023-02-07

## 2023-02-07 RX ORDER — ATORVASTATIN CALCIUM 20 MG/1
20 TABLET, FILM COATED ORAL DAILY
COMMUNITY
End: 2023-02-16 | Stop reason: SDUPTHER

## 2023-02-07 NOTE — PATIENT INSTRUCTIONS
Will STOP ASA and SWITCH to   PLAVIX 75 mg one daily     Recommend to restart Atorvastatin    -will start at 20 mg one daily     Will RESTART Metoprolol Tart 25 mg   1/2 tablet twice a day     Monitor BP daily - different times of the day -  Report readings back to us in 2 weeks     Follow up in 6 months

## 2023-02-07 NOTE — PROGRESS NOTES
Ov Dr. Mumtaz Song for 6 month follow up   Pacer checked by [employer]   Had TIA on thanksgiving -left arm - didn't go to er   Not taking Atorvastatin   \"Stoke was caused by a fib not plaque from cholesterol\"   No chest heaviness   Has acid reflux taking omeprazole - \"helps\"   Watches his watch for high HR   No edema    Will STOP ASA and SWITCH to   PLAVIX 75 mg one daily     Recommend to restart Atorvastatin    -will start at 20 mg one daily     Will RESTART Metoprolol Tart 25 mg   1/2 tablet twice a day     Monitor BP daily - different times of the day -  Report readings back to us in 2 weeks     Follow up in 6 months

## 2023-02-08 LAB
EST. AVERAGE GLUCOSE BLD GHB EST-MCNC: 126 MG/DL
HBA1C MFR BLD: 6 % (ref 4–6)

## 2023-02-15 NOTE — PROGRESS NOTES
Meenakshi Hernandez M.D. [address] [place], [place] [address]Sandra [address]  [phone]        2023        Yonis Levy MD  [address] [place] [address], [address] [place] [address], [address]    RE:   Conrad Roger  :  1945    Dear Dr. Maren Valdivia:    CHIEF COMPLAINT:  1. TIA on Thanksgiving with left arm weakness, which resolved. 2.  Five-second pauses status post Medtronic Tori XT MRI-compatible pacemaker placed on 2022. 3.  Paroxysmal atrial fibrillation, on Coumadin. HISTORY OF PRESENT ILLNESS:  I had the pleasure of seeing Mr. Conrad Roger in our office on 2023. He is a pleasant 61-year-old gentleman who had right retinal artery occlusion with a full workup, which was negative 12 years ago. He had been on baby aspirin and was stopped for 10 days for periodontal bleeding. On the day you restarted aspirin 11 years ago, he had a retinal artery occlusion and therefore he has been very reluctant to stop aspirin. On 2020, he went to the emergency room with left upper extremity weakness. CT was negative. Dr. Eric Marr recommended tPA, which he was given, he was [facility]flighted to [facility]. Marcel's on 2020. His weakness resolved. He was placed on Lipitor, aspirin, and lisinopril. On 2020, I recommended a loop recorder, which he did not wish to do. He had another neurologic event on 2021, with left arm numbness. He met with Dr. Maren Valdivia, who recommended an implantable loop recorder. He came back to me on 2021, to discuss and we proceeded with a loop recorder implant on 2021. On 2021, his loop recorder showed he was in atrial fibrillation and converted spontaneously to sinus rhythm. We started Eliquis 5 mg b.i.d. and stopped aspirin and Plavix. His Eliquis was changed to Coumadin because of finances.     On 2021, he developed bradycardia with heart rates in the lowest 29 beats per minute, we stopped his Lopressor. We continued to monitor his loop recorder; however, he developed spontaneous 3 to 4-second pauses and a 6-second pause and I recommended a pacemaker, which was done on 06/29/2022, which was a Medtronic Tori MRI-compatible pacemaker, which is working well. I last saw him on 08/09/2022, and at that time, he was doing well. He denied any lightheadedness or dizzy spells. No syncope or near syncope. On 11/25/2022, he had a TIA on Thanksgiving with his left arm weakness. It was similar to his previous TIAs. He did not go to ER. He had not been taking atorvastatin. He felt that Liam Neil was caused by AFib, not a plaque from cholesterol. \"  He denied any chest heaviness and discomfort. He has had no edema. He continues to be anticoagulated with Coumadin; his Coumadin levels have been therapeutic. He denies any chest pain or chest discomfort. He denies any syncope or near syncope. CARDIAC RISK FACTORS:  Hypertension:  Positive. Hyperlipidemia:  Positive. Other Family Members:  Positive. Peripheral Vascular Disease:  Negative. Diabetes:  Negative. Smoking:  Negative. MEDICATIONS AT THIS TIME:  He is on Zyloprim 300 mg daily, vitamin D 5000 units daily, aspirin 81 mg daily, lisinopril 5 mg b.i.d., metoprolol 25 mg half a tablet b.i.d., omeprazole 20 mg b.i.d., Coumadin as directed. PAST MEDICAL AND SURGICAL HISTORY:  1. Retinal occlusion 12 years ago in the right eye.  2.  Hypertension many years. 3.  Cataract surgery in the left eye on 10/14/2021. 4.  Cellulitis with abscess on the right upper arm, which was lanced. 5.  Two CVAs on 11/01/2020, and 02/14/2021, with a TIA on 11/25/2022.  6.  Sinus arrest, which resolved with pacemaker. 7.  Paroxysmal atrial fibrillation for which he is on Coumadin. FAMILY HISTORY:  Son had an MI in 17/3867, complicated by PE.    SOCIAL HISTORY:  He is 70years old, , retired 15 years ago. Drinks two beers a day.   Does not smoke.  One son passed away at 51 of an MI and PE.  Mr. Wray is .  He had two sons and two daughters.    REVIEW OF SYSTEMS:  Cardiac as above.  Other systems reviewed including constitutional, eyes, ears, nose and throat, cardiovascular, respiratory, GI, , musculoskeletal, integumentary, neurologic, endocrine, hematologic and allergic/immunologic are negative except for what is described above.  No weight loss or weight gain.  No change in bowel habits.  No blood in stool.  No fevers, sweats or chills.    PHYSICAL EXAMINATION:  VITAL SIGNS:  His blood pressure was 120/70 with a heart rate of 118 and regular.  Respirations 18.  O2 sat 95%.  Weight 261 pounds.  GENERAL:  He is a pleasant 78-year-old gentleman.  Denied pain.  He was oriented to person, place and time.  Answered questions appropriately.  SKIN:  No unusual skin changes.  HEENT:  The pupils are equally round and intact.  Mucous membranes were dry.  NECK:  No JVD.  Good carotid pulses.  No carotid bruits.  No lymphadenopathy or thyromegaly.  CARDIOVASCULAR EXAM:  S1 and S2 were normal.  No S3 or S4.  Soft systolic blowing type murmur.  No diastolic murmur.  PMI was normal.  No lift, thrust, or pericardial friction rub.  LUNGS:  Clear to auscultation and percussion.  ABDOMEN:  Soft and nontender.  Good bowel sounds.  EXTREMITIES:  Good femoral pulses.  Good pedal pulses.  No pedal edema.  Skin was warm and dry.  No calf tenderness.  Nail beds pink.  Good cap refill.  PULSES:  Bilateral symmetrical radial, brachial and carotid pulses.  No carotid bruits.  Good femoral and pedal pulses.  NEUROLOGIC EXAM:  Within normal limits.  PSYCHIATRIC EXAM:  Within normal limits.      LABORATORY DATA:  Sodium was 139, potassium 4.4, BUN 19, creatinine 1.10, GFR greater than 60, magnesium 1.8.  His calcium was 9.6.  Cholesterol 186, HDL 37, , triglycerides 186.  ALT was 11, AST was 24.  Hemoglobin A1c was 6.0.  TSH 1.53.  White count 8.2, hemoglobin  11.4 with a platelet count of 091,043. His EKG showed sinus rhythm with first-degree AV block and nonspecific ST changes. IMPRESSION:  1. TIA on 11/25/2022, with left arm weakness, which resolved spontaneously. 2.  Not taking atorvastatin. 3.  Not taking metoprolol. 4.  Status post CVA on 11/01/2020, secondary to paroxysmal atrial fibrillation. 5.  TIA on 02/14/2021, with left arm weakness, which resolved. 6.  Loop recorder placed on 05/12/2021, with atrial fibrillation discovered on 09/21/2021.  7.  Bradycardia on 12/01/2021.  8.  Status post Tori MRI-compatible pacemaker implanted on 06/29/2022, working properly. 9.  On Coumadin for anticoagulation. PLAN:  1. Stop aspirin. 2.  Start Plavix 75 mg daily because of his TIA while he was on Coumadin and aspirin. 3.  Start atorvastatin 20 mg daily. 4.  Restart metoprolol 25 mg half a tablet b.i.d.  5.  Monitor blood pressure daily at different times of the day as well as heart rates and report readings back to us in two weeks. 6.  Follow up in 6 months. DISCUSSION:  Mr. Zahida Clemons had another TIA on 11/25/2022. This was while he was on Coumadin, which was therapeutic, as well as baby aspirin. It is interesting that his strokes and CVA and his TIAs have always involved his left arm. He did not start taking his atorvastatin as he felt his Joseph Mayur was caused by AFib, not a plaque from cholesterol. \"    I went over with him that it would be unlikely that he had a stroke from his atrial fibrillation as he was on full-dose Coumadin and aspirin. Therefore, in spite of what I felt was protection, he still had a TIA. Therefore, I felt it is very important that he start Plavix 75 mg daily and stop his aspirin. I also felt it is very important that he start Lipitor 20 mg daily. I would of course recommend 80 mg but he wished to start at 20 mg. He was fairly tachycardic and he has not been taking his metoprolol.     I believe that he will do the above recommendations. I will plan on seeing him in 6 months in followup. Thank you very much for allowing me the privilege of seeing Mr. Kathy Boone. If you have any questions on my thoughts, please do not hesitate to contact me.      Sincerely,        Searcy Siemens, MD    D: 02/12/2023 7:55:26     T: 02/12/2023 7:59:19     [email or web address]  Job#: [id number]   Doc#: [id number]      <>

## 2023-02-16 ENCOUNTER — HOSPITAL ENCOUNTER (OUTPATIENT)
Dept: PHARMACY | Age: 78
Setting detail: THERAPIES SERIES
Discharge: HOME | End: 2023-02-16
Payer: MEDICARE

## 2023-02-16 VITALS
HEART RATE: 64 BPM | SYSTOLIC BLOOD PRESSURE: 129 MMHG | WEIGHT: 265.4 LBS | BODY MASS INDEX: 40.35 KG/M2 | DIASTOLIC BLOOD PRESSURE: 65 MMHG

## 2023-02-16 DIAGNOSIS — I63.9 ACUTE ISCHEMIC STROKE (HCC): Primary | ICD-10-CM

## 2023-02-16 DIAGNOSIS — I63.9 STROKE OF UNKNOWN CAUSE (HCC): ICD-10-CM

## 2023-02-16 DIAGNOSIS — I48.91 ATRIAL FIBRILLATION, UNSPECIFIED TYPE (HCC): ICD-10-CM

## 2023-02-16 LAB — INR BLD: 4

## 2023-02-16 PROCEDURE — 99211 OFF/OP EST MAY X REQ PHY/QHP: CPT

## 2023-02-16 PROCEDURE — 85610 PROTHROMBIN TIME: CPT

## 2023-02-16 RX ORDER — WARFARIN SODIUM 5 MG/1
TABLET ORAL
Qty: 90 TABLET | Refills: 3 | Status: SHIPPED
Start: 2023-02-16

## 2023-02-16 NOTE — PROGRESS NOTES
[address]/[place]  Medication Management  ANTICOAGULATION    Referring Provider: Dr Ryder Burkett INR: 2.0-3.0     TODAY'S INR: 4.0     WARFARIN Dosage: HOLD x 1, then decrease dosing to 2.5 mg Mondays and 5 mg all other days of the week     INR (no units)   Date Value   02/16/2023 4.0   12/29/2022 2.7   11/18/2022 2.9   10/07/2022 2.7   08/26/2022 2.9   07/15/2022 2.4   06/29/2022 1.1   05/13/2022 2.5       Medication changes:  Stopped aspirin   Started Plavix 75 mg daily as of \"last Wednesday\" (2/8/2023) per Dr. Jesus Blood  Restarted Atorvastatin \"40 mg\" daily (half of 80 mg tablet) last week  Restarted Metoprolol 12.5 mg BID    Notes:    Fingerstick INR drawn per clinic protocol. Patient states no visible blood in urine and no black tarry stool. Denies any missed doses of warfarin. [facility] tells me that he had appointment with Dr. Jesus Blood on 2/7/2023 and was instructed to stop aspirin and start taking Plavix 75 mg daily (which can increase INR and is likely contributing to his supra-therapeutic INR today). He had a TIA on 11/25/2023 while he was taking warfarin and aspirin last fall, but also tells me that he had stopped taking his Atorvastatin \"last October\". Per the instruction of Dr. Jesus Blood, he says he has restarted his Atorvastatin and is currently taking \"40 mg\" daily (half of an 80 mg tablet). He says he will eventually be switching to Atorvastatin 20 mg daily as soon as his 80 mg tablets are gone. Also, he restarted Metoprolol 12.5 mg BID and is monitoring his BP's and HR's at different times of the day to report back to Dr. Jesus Blood in 2 weeks. No change in other maintenance medications or in diet. Since his INR is supra-therapeutic today, we will HOLD his warfarin dosage tonight and decrease weekly warfarin regimen (7%) as noted on his dosing calendar. We will recheck INR in 2 weeks. Patient acknowledges working in consult agreement with pharmacist as referred by his/her physician. For Pharmacy Admin Tracking Only    Intervention Detail: Adherence Monitorin and Dose Adjustment: 1, reason: Therapy Optimization  Total # of Interventions Recommended: 2  Total # of Interventions Accepted: 2  Time Spent (min): [address] [facility], PharmD

## 2023-02-28 ENCOUNTER — TELEPHONE (OUTPATIENT)
Dept: CARDIOLOGY CLINIC | Age: 78
End: 2023-02-28

## 2023-03-03 ENCOUNTER — HOSPITAL ENCOUNTER (OUTPATIENT)
Dept: PHARMACY | Age: 78
Setting detail: THERAPIES SERIES
Discharge: HOME | End: 2023-03-03
Payer: MEDICARE

## 2023-03-03 VITALS
DIASTOLIC BLOOD PRESSURE: 65 MMHG | HEART RATE: 72 BPM | WEIGHT: 266 LBS | SYSTOLIC BLOOD PRESSURE: 114 MMHG | BODY MASS INDEX: 40.45 KG/M2

## 2023-03-03 DIAGNOSIS — I48.91 ATRIAL FIBRILLATION, UNSPECIFIED TYPE (HCC): ICD-10-CM

## 2023-03-03 DIAGNOSIS — I63.9 ACUTE ISCHEMIC STROKE (HCC): Primary | ICD-10-CM

## 2023-03-03 DIAGNOSIS — I63.9 STROKE OF UNKNOWN CAUSE (HCC): ICD-10-CM

## 2023-03-03 LAB — INR BLD: 3.5

## 2023-03-03 PROCEDURE — 85610 PROTHROMBIN TIME: CPT | Performed by: FAMILY MEDICINE

## 2023-03-03 PROCEDURE — 99212 OFFICE O/P EST SF 10 MIN: CPT | Performed by: FAMILY MEDICINE

## 2023-03-03 RX ORDER — MULTIVIT,TX WITH IRON,MINERALS
250 TABLET, EXTENDED RELEASE ORAL 2 TIMES DAILY
COMMUNITY
Start: 2023-02-23

## 2023-03-03 NOTE — PROGRESS NOTES
[address]/[place]  Medication Management  ANTICOAGULATION    Referring Provider: Dr Kamran Quinn INR: 2.0-3.0    TODAY'S INR: 3.5    WARFARIN Dosage: hold x1 then decrease to 2.5mg MF, 5mg all other days    INR (no units)   Date Value   2023 3.5   2023 4   2022 2.7   2022 2.9   10/07/2022 2.7   2022 2.9   07/15/2022 2.4       Medication changes:  Restarted atorvastatin 40mg daily (will decrease to 20mg daily when out of this bottle of medication)  Restarted metoprolol 25mg 1/2 tablet BID  Plavix 75mg daily  Notes:    Fingerstick INR drawn per clinic protocol. Patient states no visible blood in urine and no black tarry stool. Denies any missed doses of warfarin. No change in other maintenance medications or in diet. Will recheck INR in 2 weeks. Patient has initial consult with Dr Ignacia Bal next week for possible hip replacement. Patient acknowledges working in consult agreement with pharmacist as referred by his/her physician.                   For Pharmacy Admin Tracking Only    Intervention Detail: Adherence Monitorin and Dose Adjustment: 2, reason: Therapy De-escalation  Total # of Interventions Recommended: 4  Total # of Interventions Accepted: 4  Time Spent (min): 30    Víctor Ornelas R.Ph., 3/3/2023,3:22 PM

## 2023-03-03 NOTE — PATIENT INSTRUCTIONS
Please do not take warfarin today then decrease your dosing to take 2.5mg (1/2 tablet) on Monday and Friday and 5mg (1 tablet) all other days. Continue to monitor for signs of bleeding. Return to coumadin clinic in 2 weeks.

## 2023-03-17 ENCOUNTER — HOSPITAL ENCOUNTER (OUTPATIENT)
Dept: PHARMACY | Age: 78
Setting detail: THERAPIES SERIES
Discharge: HOME | End: 2023-03-17
Payer: MEDICARE

## 2023-03-17 VITALS
DIASTOLIC BLOOD PRESSURE: 61 MMHG | BODY MASS INDEX: 40.08 KG/M2 | SYSTOLIC BLOOD PRESSURE: 121 MMHG | HEART RATE: 73 BPM | WEIGHT: 263.6 LBS

## 2023-03-17 DIAGNOSIS — I63.9 STROKE OF UNKNOWN CAUSE (HCC): ICD-10-CM

## 2023-03-17 DIAGNOSIS — I63.9 ACUTE ISCHEMIC STROKE (HCC): Primary | ICD-10-CM

## 2023-03-17 DIAGNOSIS — I48.91 ATRIAL FIBRILLATION, UNSPECIFIED TYPE (HCC): ICD-10-CM

## 2023-03-17 LAB — INR BLD: 2.7

## 2023-03-17 PROCEDURE — 85610 PROTHROMBIN TIME: CPT

## 2023-03-17 PROCEDURE — 99211 OFF/OP EST MAY X REQ PHY/QHP: CPT

## 2023-03-17 NOTE — PROGRESS NOTES
[address] [place] [address]/[place]  Medication Management  ANTICOAGULATION    Referring Provider: Dr Ulysses Natter INR: 2.0-3.0     TODAY'S INR: 2.7     WARFARIN Dosage: Continue 2.5mg MF, 5mg all other days    INR (no units)   Date Value   2023 2.7   2023 3.5   2023 4   2022 2.7   2022 2.9   10/07/2022 2.7   2022 2.9       Medication changes:  None    Notes:    Fingerstick INR drawn per clinic protocol. Patient states no visible blood in urine, no black tarry stool and no falls but does state that he has a rash in his groin that is irritated and has bled a few times. No significant bleeding but encouraged him to seek help if his at home treatment doesn't help. Denies any missed or extra doses of warfarin. No change in other maintenance medications. Has started dieting by eating the same foods but smaller portions. Therapeutic INR so will continue current dosing and will recheck INR in 4 weeks. Patient acknowledges working in consult agreement with pharmacist as referred by his/her physician.                   For Pharmacy Admin Tracking Only    Intervention Detail: Adherence Monitorin  Total # of Interventions Recommended: 1  Total # of Interventions Accepted: 1  Time Spent (min): 20    Caroline PerezD 3/17/2023 2:48 PM

## 2023-04-25 RX ORDER — WARFARIN SODIUM 5 MG/1
TABLET ORAL
Qty: 90 TABLET | Refills: 3 | Status: SHIPPED | OUTPATIENT
Start: 2023-04-25

## 2023-05-26 ENCOUNTER — HOSPITAL ENCOUNTER (OUTPATIENT)
Dept: PHARMACY | Age: 78
Setting detail: THERAPIES SERIES
Discharge: HOME | End: 2023-05-26
Payer: MEDICARE

## 2023-05-26 VITALS
HEART RATE: 76 BPM | SYSTOLIC BLOOD PRESSURE: 152 MMHG | WEIGHT: 264.2 LBS | BODY MASS INDEX: 40.17 KG/M2 | DIASTOLIC BLOOD PRESSURE: 75 MMHG

## 2023-05-26 DIAGNOSIS — I63.9 ACUTE ISCHEMIC STROKE (HCC): Primary | ICD-10-CM

## 2023-05-26 DIAGNOSIS — I63.9 STROKE OF UNKNOWN CAUSE (HCC): ICD-10-CM

## 2023-05-26 LAB — INR BLD: 2.4

## 2023-05-26 PROCEDURE — 85610 PROTHROMBIN TIME: CPT

## 2023-05-26 PROCEDURE — 99211 OFF/OP EST MAY X REQ PHY/QHP: CPT

## 2023-05-26 NOTE — PROGRESS NOTES
[address]/[place]  Medication Management  ANTICOAGULATION    Referring Provider: Dr Jose Arenas INR: 2.0-3.0     TODAY'S INR: 2.4     WARFARIN Dosage: Continue 2.5 mg MF, 5 mg all other days    INR (no units)   Date Value   2023 2.4   2023 2.7   2023 2.7   2023 3.5   2023 4   2022 2.7   2022 2.9       Hemoglobin   Date Value Ref Range Status   2023 11.4 (L) 13.5 - 17.5 g/dL Final     Hematocrit   Date Value Ref Range Status   2023 36.5 (L) 41 - 53 % Final     ALT   Date Value Ref Range Status   2023 11 5 - 41 U/L Final     AST   Date Value Ref Range Status   2023 24 <40 U/L Final       Medication changes:  Removed magnesium gluconate and tadalafil from list at pt request (never filled either)    Notes:    Fingerstick INR drawn per clinic protocol. Patient states no visible blood in urine, no black tarry stool, no falls. Denies any missed or extra doses of warfarin. Removed magnesium gluconate and tadalafil from list at patient request as he never filled either. No change in other medications or in diet. States that he is having a residual bladder scan test on . Will notify clinic if he has to have any more invasive procedures after the results. INR remains therapeutic so will continue current dosing of 2.5 mg MF, 5 mg all other days and will recheck INR in 6 weeks. Patient acknowledges working in consult agreement with pharmacist as referred by his/her physician.                   For Pharmacy Admin Tracking Only    Intervention Detail: Adherence Monitorin  Total # of Interventions Recommended: 1  Total # of Interventions Accepted: 1  Time Spent (min): 20    Caroline OrrD 2023 2:45 PM

## 2023-07-11 ENCOUNTER — HOSPITAL ENCOUNTER (OUTPATIENT)
Dept: PHARMACY | Age: 78
Setting detail: THERAPIES SERIES
Discharge: HOME | End: 2023-07-11
Payer: MEDICARE

## 2023-07-11 VITALS
HEART RATE: 72 BPM | SYSTOLIC BLOOD PRESSURE: 106 MMHG | BODY MASS INDEX: 38.04 KG/M2 | WEIGHT: 250.2 LBS | DIASTOLIC BLOOD PRESSURE: 58 MMHG

## 2023-07-11 DIAGNOSIS — I63.9 ACUTE ISCHEMIC STROKE (HCC): Primary | ICD-10-CM

## 2023-07-11 DIAGNOSIS — I63.9 STROKE OF UNKNOWN CAUSE (HCC): ICD-10-CM

## 2023-07-11 DIAGNOSIS — I48.91 ATRIAL FIBRILLATION, UNSPECIFIED TYPE (HCC): ICD-10-CM

## 2023-07-11 LAB — INR BLD: 2.1

## 2023-07-11 PROCEDURE — 99211 OFF/OP EST MAY X REQ PHY/QHP: CPT

## 2023-07-11 PROCEDURE — 85610 PROTHROMBIN TIME: CPT

## 2023-07-11 NOTE — PROGRESS NOTES
[facility]  Medication Management  ANTICOAGULATION    Referring Provider: Dr Blake Chavez INR: 2.0-3.0     TODAY'S INR: 2.1     WARFARIN Dosage: Continue 2.5 mg MF, 5 mg all other days    INR (no units)   Date Value   2023 2.1   2023 2.4   2023 2.7   2023 2.7   2023 3.5   2023 4   2022 2.7   2022 2.9       Hemoglobin   Date Value Ref Range Status   2023 11.4 (L) 13.5 - 17.5 g/dL Final     Hematocrit   Date Value Ref Range Status   2023 36.5 (L) 41 - 53 % Final     ALT   Date Value Ref Range Status   2023 16 5 - 41 U/L Final     AST   Date Value Ref Range Status   2023 21 <40 U/L Final       Medication changes:  None      Notes:    Fingerstick INR drawn per clinic protocol. Patient states no visible blood in urine and no black tarry stool. Denies any missed doses of warfarin. No change in other maintenance medications or in diet. Will recheck INR in 6 weeks. Patient acknowledges working in consult agreement with pharmacist as referred by his/her physician.                   For Pharmacy Admin Tracking Only    Intervention Detail: Adherence Monitorin  Total # of Interventions Recommended: 1  Total # of Interventions Accepted: 1  Time Spent (min): IRIS Garcia [facility], PharmD

## 2023-08-23 ENCOUNTER — HOSPITAL ENCOUNTER (OUTPATIENT)
Dept: PHARMACY | Age: 78
Setting detail: THERAPIES SERIES
Discharge: HOME | End: 2023-08-23
Payer: MEDICARE

## 2023-08-23 VITALS
DIASTOLIC BLOOD PRESSURE: 60 MMHG | WEIGHT: 252.6 LBS | HEART RATE: 74 BPM | BODY MASS INDEX: 38.41 KG/M2 | SYSTOLIC BLOOD PRESSURE: 122 MMHG

## 2023-08-23 DIAGNOSIS — I63.9 ACUTE ISCHEMIC STROKE (HCC): Primary | ICD-10-CM

## 2023-08-23 DIAGNOSIS — I63.9 STROKE OF UNKNOWN CAUSE (HCC): ICD-10-CM

## 2023-08-23 LAB — INR BLD: 2.9

## 2023-08-23 PROCEDURE — 85610 PROTHROMBIN TIME: CPT

## 2023-08-23 PROCEDURE — 99211 OFF/OP EST MAY X REQ PHY/QHP: CPT

## 2023-08-23 NOTE — PROGRESS NOTES
[facility]  [facility]ication [facility]  ANTICOAGULATION    Referring Provider: Dr Anibal Jimenez INR: 2.0-3.0     TODAY'S INR: 2.9     WARFARIN Dosage: 2.5 mg W, 5 mg all other days    INR (no units)   Date Value   2023 2.9   2023 2.1   2023 2.4   2023 2.7   2023 2.7   2023 3.5   2023 4       Hemoglobin   Date Value Ref Range Status   2023 11.4 (L) 13.5 - 17.5 g/dL Final     Hematocrit   Date Value Ref Range Status   2023 36.5 (L) 41 - 53 % Final     ALT   Date Value Ref Range Status   2023 16 5 - 41 U/L Final     AST   Date Value Ref Range Status   2023 21 <40 U/L Final       Medication changes:  None     Notes:    Fingerstick INR drawn per clinic protocol. Patient states no visible blood in urine, black tarry stool, falls or ER visits. Advises that he had epistaxis x 1 about a month ago but very minor and no medical intervention needed. States that he had a hip injection on  and had to hold his Plavix and warfarin for 5 days prior. Since he restarted , he has taken 5 mg daily (was ordered to take 2.5 mg on MF at last visit) because he was concerned he was going to drop subtherapeutic. He denies any other missed or extra doses of warfarin. Encouraged him to contact our clinic if he needs to hold for any other future procedures. No change in other maintenance medications or in diet. Surprisingly his INR is therapeutic today so will meet in the middle on dosing and will order 2.5 mg W, 5 mg all other days. Will recheck INR in 6 weeks. Patient acknowledges working in consult agreement with pharmacist as referred by his/her physician.      For Pharmacy Admin Tracking Only    Intervention Detail: Adherence Monitorin and Dose Adjustment: 1, reason: Therapy Optimization  Total # of Interventions Recommended: 2  Total # of Interventions Accepted: 2  Time Spent (min): 30    Pati Hunter PharmD 2023 2:42

## 2023-09-07 ENCOUNTER — HOSPITAL ENCOUNTER (OUTPATIENT)
Age: 78
Discharge: HOME | End: 2023-09-07
Payer: MEDICARE

## 2023-09-07 ENCOUNTER — HOSPITAL ENCOUNTER (OUTPATIENT)
Age: 78
Discharge: HOME | End: 2023-09-09
Payer: MEDICARE

## 2023-09-07 DIAGNOSIS — R73.09 ELEVATED GLUCOSE: ICD-10-CM

## 2023-09-07 DIAGNOSIS — I10 ESSENTIAL HYPERTENSION: ICD-10-CM

## 2023-09-07 DIAGNOSIS — Z95.0 PACEMAKER: ICD-10-CM

## 2023-09-07 DIAGNOSIS — I48.91 ATRIAL FIBRILLATION, NEW ONSET (HCC): ICD-10-CM

## 2023-09-07 LAB
ALBUMIN SERPL-MCNC: 4 G/DL (ref 3.5–5.2)
ALP SERPL-CCNC: 63 U/L (ref 40–129)
ALT SERPL-CCNC: 16 U/L (ref 5–41)
ANION GAP SERPL CALCULATED.3IONS-SCNC: 12 MMOL/L (ref 9–17)
AST SERPL-CCNC: 19 U/L
BASOPHILS # BLD: ABNORMAL K/UL (ref 0–0.2)
BASOPHILS NFR BLD: ABNORMAL % (ref 0–2)
BILIRUB SERPL-MCNC: 1.4 MG/DL (ref 0.3–1.2)
BUN SERPL-MCNC: 20 MG/DL (ref 8–23)
BUN/CREAT SERPL: 22 (ref 9–20)
CALCIUM SERPL-MCNC: 9.4 MG/DL (ref 8.6–10.4)
CHLORIDE SERPL-SCNC: 102 MMOL/L (ref 98–107)
CO2 SERPL-SCNC: 25 MMOL/L (ref 20–31)
CREAT SERPL-MCNC: 0.9 MG/DL (ref 0.7–1.2)
EOSINOPHIL # BLD: 0.17 K/UL (ref 0–0.4)
EOSINOPHILS RELATIVE PERCENT: 3 % (ref 0–5)
ERYTHROCYTE [DISTWIDTH] IN BLOOD BY AUTOMATED COUNT: 19 % (ref 12.1–15.2)
GFR SERPL CREATININE-BSD FRML MDRD: >60 ML/MIN/1.73M2
GLUCOSE SERPL-MCNC: 140 MG/DL (ref 70–99)
HCT VFR BLD AUTO: 31.9 % (ref 41–53)
HGB BLD-MCNC: 9.9 G/DL (ref 13.5–17.5)
IMM GRANULOCYTES # BLD AUTO: ABNORMAL K/UL (ref 0–0.3)
IMM GRANULOCYTES NFR BLD: ABNORMAL %
LYMPHOCYTES NFR BLD: 1.18 K/UL (ref 1–4.8)
LYMPHOCYTES RELATIVE PERCENT: 21 % (ref 13–44)
MAGNESIUM SERPL-MCNC: 1.8 MG/DL (ref 1.6–2.6)
MCH RBC QN AUTO: 20.6 PG (ref 26–34)
MCHC RBC AUTO-ENTMCNC: 31.2 G/DL (ref 31–37)
MCV RBC AUTO: 66.1 FL (ref 80–100)
MONOCYTES NFR BLD: 0.45 K/UL (ref 0–1)
MONOCYTES NFR BLD: 8 % (ref 5–9)
MORPHOLOGY: ABNORMAL
NEUTROPHILS NFR BLD: 68 % (ref 39–75)
NEUTS SEG NFR BLD: 3.8 K/UL (ref 2.1–6.5)
PATIENT FASTING?: YES
PLATELET # BLD AUTO: 190 K/UL (ref 140–450)
POTASSIUM SERPL-SCNC: 4.1 MMOL/L (ref 3.7–5.3)
PROT SERPL-MCNC: 6.7 G/DL (ref 6.4–8.3)
RBC # BLD AUTO: 4.82 M/UL (ref 4.5–5.9)
SODIUM SERPL-SCNC: 139 MMOL/L (ref 135–144)
TSH SERPL DL<=0.05 MIU/L-ACNC: 1.36 UIU/ML (ref 0.3–5)
WBC OTHER # BLD: 5.6 K/UL (ref 3.5–11)

## 2023-09-07 PROCEDURE — 80053 COMPREHEN METABOLIC PANEL: CPT

## 2023-09-07 PROCEDURE — 80061 LIPID PANEL: CPT

## 2023-09-07 PROCEDURE — 83735 ASSAY OF MAGNESIUM: CPT

## 2023-09-07 PROCEDURE — 36415 COLL VENOUS BLD VENIPUNCTURE: CPT

## 2023-09-07 PROCEDURE — 83036 HEMOGLOBIN GLYCOSYLATED A1C: CPT

## 2023-09-07 PROCEDURE — 84443 ASSAY THYROID STIM HORMONE: CPT

## 2023-09-07 PROCEDURE — 85025 COMPLETE CBC W/AUTO DIFF WBC: CPT

## 2023-09-07 PROCEDURE — 93005 ELECTROCARDIOGRAM TRACING: CPT

## 2023-09-08 LAB
CHOLEST SERPL-MCNC: 108 MG/DL
CHOLESTEROL/HDL RATIO: 2.9
EKG ATRIAL RATE: 68 BPM
EKG P-R INTERVAL: 178 MS
EKG Q-T INTERVAL: 462 MS
EKG QRS DURATION: 174 MS
EKG QTC CALCULATION (BAZETT): 502 MS
EKG R AXIS: -75 DEGREES
EKG T AXIS: 85 DEGREES
EKG VENTRICULAR RATE: 71 BPM
EST. AVERAGE GLUCOSE BLD GHB EST-MCNC: 111 MG/DL
HBA1C MFR BLD: 5.5 % (ref 4–6)
HDLC SERPL-MCNC: 37 MG/DL
LDLC SERPL CALC-MCNC: 48 MG/DL (ref 0–130)
TRIGL SERPL-MCNC: 115 MG/DL

## 2023-09-12 ENCOUNTER — OFFICE VISIT (OUTPATIENT)
Dept: CARDIOLOGY CLINIC | Age: 78
End: 2023-09-12

## 2023-09-12 VITALS
SYSTOLIC BLOOD PRESSURE: 130 MMHG | OXYGEN SATURATION: 96 % | BODY MASS INDEX: 38.32 KG/M2 | DIASTOLIC BLOOD PRESSURE: 60 MMHG | HEART RATE: 89 BPM | WEIGHT: 252 LBS

## 2023-09-12 DIAGNOSIS — Z95.0 PACEMAKER: ICD-10-CM

## 2023-09-12 DIAGNOSIS — E55.9 VITAMIN D DEFICIENCY DISEASE: ICD-10-CM

## 2023-09-12 DIAGNOSIS — Z95.0 ARTIFICIAL PACEMAKER: Primary | ICD-10-CM

## 2023-09-12 DIAGNOSIS — I48.91 ATRIAL FIBRILLATION, NEW ONSET (HCC): ICD-10-CM

## 2023-09-12 DIAGNOSIS — I10 ESSENTIAL HYPERTENSION: ICD-10-CM

## 2023-09-12 NOTE — PROGRESS NOTES
Ov Dr. Diane Madsen for 6 month follow up   Trying to hip replacement but getting   Run around   Started with Dr. Jose Barrera - didn't get any answers  Went to [facility] -CNP had injections   Which have worn off   Has another appt with CNP next week   Still has not seen Dr. Durand Scheuermann -  Also seeing Dr. Anton Reed - Mri done -  Not seeing him again   Pacer checked by Chetna Tripp today   No chest pain   No palpitations   Sob d/t pain back/knee  Has [employer] box by bed   To transmit       No changes    Follow up in one year

## 2023-10-05 ENCOUNTER — HOSPITAL ENCOUNTER (OUTPATIENT)
Dept: PHARMACY | Age: 78
Setting detail: THERAPIES SERIES
Discharge: HOME | End: 2023-10-05
Payer: MEDICARE

## 2023-10-05 VITALS
WEIGHT: 255.6 LBS | SYSTOLIC BLOOD PRESSURE: 126 MMHG | DIASTOLIC BLOOD PRESSURE: 62 MMHG | BODY MASS INDEX: 38.86 KG/M2 | HEART RATE: 72 BPM

## 2023-10-05 DIAGNOSIS — I63.9 STROKE OF UNKNOWN CAUSE (HCC): ICD-10-CM

## 2023-10-05 DIAGNOSIS — I48.91 ATRIAL FIBRILLATION, UNSPECIFIED TYPE (HCC): ICD-10-CM

## 2023-10-05 DIAGNOSIS — I63.9 ACUTE ISCHEMIC STROKE (HCC): Primary | ICD-10-CM

## 2023-10-05 LAB — INR BLD: 3.2

## 2023-10-05 PROCEDURE — 99211 OFF/OP EST MAY X REQ PHY/QHP: CPT

## 2023-10-05 PROCEDURE — 85610 PROTHROMBIN TIME: CPT

## 2023-10-05 NOTE — PROGRESS NOTES
[facility]  Medication Management  ANTICOAGULATION    Referring Provider: Dr Melissa Lewis INR: 2.0 - 3.0     TODAY'S INR: 3.2     WARFARIN Dosage: decrease dosage to 2.5 mg  and  and 5 mg all other days of week     INR (no units)   Date Value   10/05/2023 3.2   2023 2.9   2023 2.1   2023 2.4   2023 2.7   2023 2.7   2023 3.5       Hemoglobin   Date Value Ref Range Status   2023 9.9 (L) 13.5 - 17.5 g/dL Final     Hematocrit   Date Value Ref Range Status   2023 31.9 (L) 41 - 53 % Final     ALT   Date Value Ref Range Status   2023 16 5 - 41 U/L Final     AST   Date Value Ref Range Status   2023 19 <40 U/L Final       Medication changes:  None      Notes:    Fingerstick INR drawn per clinic protocol. Patient states no visible blood in urine and no black tarry stool. Denies any missed doses of warfarin. No change in other maintenance medications or in diet. Since his INR has jumped supra-therapeutic today, we will decrease weekly warfarin regimen (7.7%) back to previously stable dosage of 2.5 mg  and  and 5 mg all other days of the week. We will recheck INR in 5 weeks. Patient acknowledges working in consult agreement with pharmacist as referred by his/her physician.                   For Pharmacy Admin Tracking Only    Intervention Detail: Adherence Monitorin and Dose Adjustment: 1, reason: Therapy Optimization  Total # of Interventions Recommended: 2  Total # of Interventions Accepted: 2  Time Spent (min): [address] [facility]Los [facility], PharmD

## 2023-10-12 ENCOUNTER — HOSPITAL ENCOUNTER (OUTPATIENT)
Dept: PHYSICAL THERAPY | Age: 78
Setting detail: THERAPIES SERIES
Discharge: HOME | End: 2023-10-12
Payer: MEDICARE

## 2023-10-12 PROCEDURE — 97110 THERAPEUTIC EXERCISES: CPT

## 2023-10-12 PROCEDURE — 97162 PT EVAL MOD COMPLEX 30 MIN: CPT

## 2023-10-12 ASSESSMENT — PAIN DESCRIPTION - LOCATION: LOCATION: HIP;KNEE

## 2023-10-12 ASSESSMENT — PAIN SCALES - GENERAL: PAINLEVEL_OUTOF10: 6

## 2023-10-13 NOTE — PROGRESS NOTES
Phone: [phone] [facility]         Fax: [phone]                      Outpatient Physical Therapy                                                                      Evaluation    Date: 10/12/2023  Patient: Rosi Nolasco  : 1945  ACCT #: [de-identified]    Referring Provider (secondary): Pedro Vickers CNP    Diagnosis: Primary osteoarthritis of right hip : M16.11    Treatment Diagnosis: R hip pain, Left knee pain  Onset Date: 23 (Referral)  PT Insurance Information: MCR, [employer]  Total # of Visits Approved: 8 Per Physician Order  Total # of Visits to Date: 1     Subjective     Additional Pertinent Hx: Patient main c/o R hip pain. Has pain in L hip and knee as well; but R hip is the worse. Has two story house, bedroom and shower up stairs. Uses cane everywhere. For longer distances uses wheelchair. Doesn't go places that will be far walk. Patient reports has had hip pain several yrs, but worsened in past 6 months. Did have hip injection in July but only worked for 2 wks. Pain 1/10 with sitting and increases to 6/10 with walking . Can only walk or stand 5 minutes before needs to sit. Hx- chronic low back pain, OA, obesity, R knee scope, Afib, pacemaker, stroke, on blood thinners. 2023 is scheduled for R hip replacment with Dr. Yasmine Altamirano.   Pain Assessment  Pain Level: 6  Pain Location: Hip, Knee  Social/Functional History  Occupation: Retired       Objective           Strength RLE  Strength RLE: Exception  R Hip Flexion: 3+/5  R Hip Extension: 3+/5  R Hip ABduction: 3+/5  R Hip ADduction: 4/5  R Knee Flexion: 5/5  R Knee Extension: 5/5  R Ankle Dorsiflexion: 5/5  Strength LLE  Strength LLE: WFL                PROM LLE (degrees)  LLE PROM: Exceptions  L Knee Extension (0): lacks full extension; -8 degrees from neutral  PROM RLE (degrees)  RLE PROM: [facility]     Additional Measures  Special Tests: LEFS score 23/80           WB Status: cane and

## 2023-10-13 NOTE — PLAN OF CARE
[place] [facility]       Phone: [phone]   Date: 10/12/2023                      Outpatient Physical Therapy  Fax: [phone]    ACCT #: [de-identified]                     Plan of Care  [facility]#: [id number]  Patient: Bubba Le  : 1945    Referring Provider (secondary): Yadiel Parson CNP    Diagnosis: Primary osteoarthritis of right hip : M16.11  Onset Date: 23 (Referral)  Treatment Diagnosis: R hip pain, Left knee pain    Assessment  Body Structures, Functions, Activity Limitations Requiring Skilled Therapeutic Intervention: Decreased functional mobility , Decreased ROM, Decreased strength, Increased pain, Decreased balance  Assessment: Patient presents with c/o pain in multiple  joints from OA- worse R hip and L knee. Completed manual therapy and therex per Doc Flow. Educated patient on and issued hEP handout. Plan for therex, HEP, manual therapy. Therapy Prognosis: Good    Treatment Plan   Days: 2 times per week Weeks: 4 weeks Total # of Visits Approved: 8    Patient Education/HEP, Therapeutic Exercise, and Manual Therapy: Mobilization/Manipulation     Goals  Short Term Goals  Time Frame for Short Term Goals: 6  Short Term Goal 1: Patient candice be educated on and independent with HEP  Short Term Goal 2: Increase ROM L knee extension within 4 degrees of neutral to improve gait pattern  Long Term Goals  Time Frame for Long Term Goals : 8  Long Term Goal 1: Increase strength R hip abd 4/5 to improve stability with gait  Long Term Goal 2: Improve functional mobility with LEFS score >30/80     Lucie Burnett, PT   Date: 10/12/2023    ______________________________________ Date: 10/12/2023  Physician Signature  By signing above or cosigning electronically, I have reviewed this Plan of Care and certify a need for medically necessary rehabilitation services.

## 2023-10-17 ENCOUNTER — HOSPITAL ENCOUNTER (OUTPATIENT)
Dept: PHYSICAL THERAPY | Age: 78
Setting detail: THERAPIES SERIES
End: 2023-10-17
Payer: MEDICARE

## 2023-10-18 ENCOUNTER — HOSPITAL ENCOUNTER (OUTPATIENT)
Dept: PHYSICAL THERAPY | Age: 78
Setting detail: THERAPIES SERIES
Discharge: HOME | End: 2023-10-18
Payer: MEDICARE

## 2023-10-18 PROCEDURE — 97110 THERAPEUTIC EXERCISES: CPT

## 2023-10-18 PROCEDURE — 97140 MANUAL THERAPY 1/> REGIONS: CPT

## 2023-10-18 ASSESSMENT — PAIN DESCRIPTION - LOCATION: LOCATION: HIP;KNEE

## 2023-10-18 ASSESSMENT — PAIN SCALES - GENERAL: PAINLEVEL_OUTOF10: 5

## 2023-10-18 NOTE — PROGRESS NOTES
Minutes  Total Treatment Time: [address] [facility] Lalit, PT     Date: 10/18/2023

## 2023-10-23 ENCOUNTER — HOSPITAL ENCOUNTER (OUTPATIENT)
Dept: PHYSICAL THERAPY | Age: 78
Setting detail: THERAPIES SERIES
Discharge: HOME | End: 2023-10-23
Payer: MEDICARE

## 2023-10-23 NOTE — PROGRESS NOTES
Physical Therapy  [address] [facility]    Date: 10/23/2023  Patient Name: Bubba Le        : 1945       Patient is not able to make appointment, he has Covid at this time.       Thomas Contreras Shock Date: 10/23/2023

## 2023-10-25 ENCOUNTER — APPOINTMENT (OUTPATIENT)
Dept: PHYSICAL THERAPY | Age: 78
End: 2023-10-25
Payer: MEDICARE

## 2023-11-02 RX ORDER — LISINOPRIL 5 MG/1
5 TABLET ORAL 2 TIMES DAILY
Qty: 60 TABLET | Refills: 11 | Status: SHIPPED | OUTPATIENT
Start: 2023-11-02

## 2023-11-10 ENCOUNTER — HOSPITAL ENCOUNTER (OUTPATIENT)
Dept: PHARMACY | Age: 78
Setting detail: THERAPIES SERIES
Discharge: HOME | End: 2023-11-10
Payer: MEDICARE

## 2023-11-10 VITALS — BODY MASS INDEX: 38.59 KG/M2 | WEIGHT: 253.8 LBS

## 2023-11-10 DIAGNOSIS — I63.9 STROKE OF UNKNOWN CAUSE (HCC): ICD-10-CM

## 2023-11-10 DIAGNOSIS — I63.9 ACUTE ISCHEMIC STROKE (HCC): Primary | ICD-10-CM

## 2023-11-10 LAB — INR BLD: 6.3

## 2023-11-10 PROCEDURE — 85610 PROTHROMBIN TIME: CPT

## 2023-11-10 PROCEDURE — 99211 OFF/OP EST MAY X REQ PHY/QHP: CPT

## 2023-11-10 RX ORDER — ENOXAPARIN SODIUM 300 MG/3ML
30 INJECTION INTRAVENOUS; SUBCUTANEOUS 2 TIMES DAILY
COMMUNITY
Start: 2023-11-11 | End: 2023-11-13

## 2023-11-10 NOTE — PROGRESS NOTES
[facility]  Medication Management  ANTICOAGULATION    Referring Provider: Dr David Tena     GOAL INR: 2.0-3.0     TODAY'S INR: 6.3     WARFARIN Dosage: Follow surgeon's orders    INR (no units)   Date Value   11/10/2023 6.3   10/05/2023 3.2   2023 2.9   2023 2.1   2023 2.4   2023 2.7   2023 2.7       Hemoglobin   Date Value Ref Range Status   2023 9.9 (L) 13.5 - 17.5 g/dL Final     Hematocrit   Date Value Ref Range Status   2023 31.9 (L) 41 - 53 % Final     ALT   Date Value Ref Range Status   2023 16 5 - 41 U/L Final     AST   Date Value Ref Range Status   2023 19 <40 U/L Final       Medication changes: Will be taking Lovenox 30 mg BID from -    Notes:    Fingerstick INR drawn per clinic protocol. Patient states no visible blood in urine, black tarry stool, falls or ER visits and denies any missed or extra doses of warfarin. Will be taking Lovenox 30 mg BID from -. No change in other maintenance medications or in diet. He will be holding his warfarin starting today through his hip replacement surgery with Dr Juan Miguel Vences on .  is handling the warfarin dosing and bridging. Surprisingly his INR is very supratherapeutic today but he will be holding for the procedure so nothing additional can be done at this time and he is not having any bruising or bleeding issues so no Vitamin K is needed. Will recheck his INR 1 week after restarting. Patient acknowledges working in consult agreement with pharmacist as referred by his/her physician.     For Pharmacy Admin Tracking Only    Intervention Detail: Adherence Monitorin and Dose Adjustment: 1, reason: Therapy Optimization  Total # of Interventions Recommended: 2  Total # of Interventions Accepted: 2  Time Spent (min): [address], PharmD 11/10/2023 3:25 PM

## 2023-11-10 NOTE — PATIENT INSTRUCTIONS
Continue to monitor urine and stool for signs and symptoms of bleeding. Please notify the clinic of any medication changes. Please remember to bring all medications (both prescription and OTC) to your next visit. Kindly notify the clinic if you are unable to make to your next appointment. Follow warfarin dosing instructions on dosing calendar provided.

## 2023-11-17 LAB — INTERNATIONAL NORMALIZATION RATIO, POC: 2.4

## 2023-11-20 ENCOUNTER — TELEPHONE (OUTPATIENT)
Dept: PHARMACY | Age: 78
End: 2023-11-20

## 2023-11-20 NOTE — TELEPHONE ENCOUNTER
Spoke with patient regarding INR check in our clinic scheduled for 11/21/2023. Patient states he had hip replacement surgery per Dr. Evan Ferguson on 11/14/2023 and was discharged home on 11/17/2023 with [facility]. I spoke with Belinda Loredo at [facility] who tells me that Dr. Evan Ferguson had called their office and stated that the [facility] and warfarin therapy management would be done through Dr. Evan Ferguson for the next \"2 weeks\" while he has [facility] (2 times weekly x 2 weeks). After 2 weeks, he will be released from Dr. Evan Ferguson and can make a follow up appointment with our clinic at that time. I spoke with Esther Lindsey regarding Dr. Chula Cervantes instructions and he voices understanding at this time.

## 2023-11-21 ENCOUNTER — TELEPHONE (OUTPATIENT)
Dept: PHARMACY | Age: 78
End: 2023-11-21

## 2023-11-21 ENCOUNTER — HOSPITAL ENCOUNTER (OUTPATIENT)
Dept: PHARMACY | Age: 78
Setting detail: THERAPIES SERIES
Discharge: HOME | End: 2023-11-21
Payer: MEDICARE

## 2023-11-21 DIAGNOSIS — I63.9 ACUTE ISCHEMIC STROKE (HCC): Primary | ICD-10-CM

## 2023-11-21 DIAGNOSIS — I63.9 STROKE OF UNKNOWN CAUSE (HCC): ICD-10-CM

## 2023-11-21 LAB — INTERNATIONAL NORMALIZATION RATIO, POC: 2.9

## 2023-11-21 NOTE — PROGRESS NOTES
[address] [facility]  Medication Management  ANTICOAGULATION    Referring Provider: Dr Ema Medrano INR: 2.0-3.0    TODAY'S INR: 2.9     WARFARIN Dosage: Continue 2.5 mg MF, 5 mg all other days    INR (no units)   Date Value   11/10/2023 6.3   10/05/2023 3.2   08/23/2023 2.9   07/11/2023 2.1   05/26/2023 2.4   04/14/2023 2.7   03/17/2023 2.7       Hemoglobin   Date Value Ref Range Status   09/07/2023 9.9 (L) 13.5 - 17.5 g/dL Final     Hematocrit   Date Value Ref Range Status   09/07/2023 31.9 (L) 41 - 53 % Final     ALT   Date Value Ref Range Status   09/07/2023 16 5 - 41 U/L Final     AST   Date Value Ref Range Status   09/07/2023 19 <40 U/L Final       Medication changes:  None     Notes:   Fingerstick INR drawn per [facility] protocol and result called to Coumadin Clinic. All communication is with the patient's nurse, Petra Stewart, who verifies no visible blood in urine, black tarry stool, falls or ER visits. Confirms no missed or extra doses of warfarin and that he took 2.5 mg last night as recommended by clinic. He had a hip replacement with Dr Luis Alberto Sears who typically takes over warfarin dosing for a couple of weeks post-op. Per [facility] nurse, Dr Luis Alberto Sears declined to further monitor this patient's warfarin at this time stating that the patient doesn't take the dose that he tells him to and turned Rafia Landis back over to the Coumadin Clinic. Per notes, his surgeon ordered him to take 5 mg daily. Previous clinic dosing was 2.5 mg MF, 5 mg all other days. A clinic pharmacist recommended to the patient to only take 2.5 mg vs 5 mg since his INR was already up to 2.4 on Friday, 3 days after restarting. [facility] was concerned that he would go supratherapeutic if he took another 5 mg dose. The patient did take as recommended fortunately and was 2.9 today. No change in medication or diet.  Now that he is back with the clinic and has a therapeutic INR, we will resume his previous dosing and will

## 2023-11-28 ENCOUNTER — APPOINTMENT (OUTPATIENT)
Dept: PHARMACY | Age: 78
End: 2023-11-28
Payer: MEDICARE

## 2023-11-28 DIAGNOSIS — I63.9 ACUTE ISCHEMIC STROKE (HCC): Primary | ICD-10-CM

## 2023-11-28 DIAGNOSIS — I63.9 STROKE OF UNKNOWN CAUSE (HCC): ICD-10-CM

## 2023-11-28 LAB — INTERNATIONAL NORMALIZATION RATIO, POC: 2.6

## 2023-11-28 NOTE — PROGRESS NOTES
[address] [facility]  Medication Management  ANTICOAGULATION    Referring Provider: Dr Giacomo Davis INR: 2.0-3.0     TODAY'S INR: 2.6     WARFARIN Dosage: Continue 2.5 mg MF, 5 mg all other days    INR (no units)   Date Value   11/10/2023 6.3   10/05/2023 3.2   2023 2.9   2023 2.1   2023 2.4   2023 2.7   2023 2.7     INR,(POC) (no units)   Date Value   2023 2.6   2023 2.9   2023 2.4       Hemoglobin   Date Value Ref Range Status   2023 9.9 (L) 13.5 - 17.5 g/dL Final     Hematocrit   Date Value Ref Range Status   2023 31.9 (L) 41 - 53 % Final     ALT   Date Value Ref Range Status   2023 16 5 - 41 U/L Final     AST   Date Value Ref Range Status   2023 19 <40 U/L Final       Medication changes:  None     Notes:   Fingerstick INR drawn per [facility] protocol and result noted in patient's electronic health record. All communication is with the patient's nurse, Aydin Jack and his wife who verify no visible blood in urine, black tarry stool, falls or ER visits. Confirms no missed or extra doses of warfarin. No change in medication or diet. INR is therapeutic today so will continue 2.5 mg MF, 5 mg all other days and will recheck INR in 2 weeks. Patient acknowledges working in consult agreement with pharmacist as referred by his/her physician. We want to confirm that, for purposes of billing, this is a virtual visit with your provider for which we will submit a claim for reimbursement with your insurance company. You may be responsible for any copays, coinsurance amounts or other amounts not covered by your insurance company. If you do not accept this, unfortunately we will not be able to schedule a virtual visit with the provider.               For Pharmacy Admin Tracking Only    Intervention Detail: Adherence Monitorin  Total # of Interventions Recommended: 1  Total # of Interventions Accepted:

## 2023-12-13 ENCOUNTER — HOSPITAL ENCOUNTER (OUTPATIENT)
Dept: PHARMACY | Age: 78
Setting detail: THERAPIES SERIES
Discharge: HOME | End: 2023-12-13
Payer: MEDICARE

## 2023-12-13 VITALS
BODY MASS INDEX: 37.46 KG/M2 | HEART RATE: 85 BPM | SYSTOLIC BLOOD PRESSURE: 115 MMHG | WEIGHT: 246.4 LBS | DIASTOLIC BLOOD PRESSURE: 65 MMHG

## 2023-12-13 DIAGNOSIS — I63.9 ACUTE ISCHEMIC STROKE (HCC): Primary | ICD-10-CM

## 2023-12-13 DIAGNOSIS — I63.9 STROKE OF UNKNOWN CAUSE (HCC): ICD-10-CM

## 2023-12-13 LAB — INR BLD: 2.6

## 2023-12-13 PROCEDURE — 85610 PROTHROMBIN TIME: CPT

## 2023-12-13 PROCEDURE — 99211 OFF/OP EST MAY X REQ PHY/QHP: CPT

## 2023-12-13 RX ORDER — ACETAMINOPHEN 500 MG
1000 TABLET ORAL EVERY 6 HOURS PRN
COMMUNITY

## 2023-12-13 RX ORDER — HYDROCODONE BITARTRATE AND ACETAMINOPHEN 5; 325 MG/1; MG/1
1 TABLET ORAL EVERY 6 HOURS PRN
COMMUNITY

## 2023-12-13 RX ORDER — ACETAMINOPHEN/DIPHENHYDRAMINE 500MG-25MG
1 TABLET ORAL NIGHTLY PRN
COMMUNITY

## 2023-12-13 NOTE — PROGRESS NOTES
[facility]  Medication Management  ANTICOAGULATION    Referring Provider: Dr Daniela Roberson INR: 2.0-3.0     TODAY'S INR: 2.6     WARFARIN Dosage: Continue 2.5 mg MF, 5 mg all other days    INR (no units)   Date Value   2023 2.6   11/10/2023 6.3   10/05/2023 3.2   2023 2.9   2023 2.1   2023 2.4   2023 2.7     INR,(POC) (no units)   Date Value   2023 2.6   2023 2.9   2023 2.4       Hemoglobin   Date Value Ref Range Status   2023 9.9 (L) 13.5 - 17.5 g/dL Final     Hematocrit   Date Value Ref Range Status   2023 31.9 (L) 41 - 53 % Final     ALT   Date Value Ref Range Status   2023 16 5 - 41 U/L Final     AST   Date Value Ref Range Status   2023 19 <40 U/L Final       Medication changes:  Stopped Plavix and Aleve prior to surgery, neither restarted at this time  Currently using APAP, Norco or Tylenol PM for pain    Notes:    Fingerstick INR drawn per clinic protocol. Patient states no visible blood in urine, black tarry stool, falls (gently slid out of a chair x 1 with no injuries) or ER visits and denies any missed or extra doses of warfarin. Dr Efren Vera stopped his Plavix and Aleve prior to the surgery but neither one has been restarted at this time. Encouraged him to try to stay off of the Aleve if possible. Also encouraged him to contact Dr Efren Vera, who wanted him to stay off of the Plavix for \"a couple more weeks,\" to see when he is ok to restart. He is currently using APAP, Norco or Tylenol PM for pain and averages around 1500 mg/day at this time. No change in other maintenance medications or in diet. INR is therapeutic so will continue 2.5 mg MF, 5 mg all other days and will recheck his INR in 4 weeks. Patient acknowledges working in consult agreement with pharmacist as referred by his/her physician.     For Pharmacy Admin Tracking Only    Intervention Detail: Adherence Monitorin  Total # of

## 2024-01-17 ENCOUNTER — HOSPITAL ENCOUNTER (OUTPATIENT)
Dept: PHARMACY | Age: 79
Setting detail: THERAPIES SERIES
Discharge: HOME | End: 2024-01-17
Payer: MEDICARE

## 2024-01-17 VITALS — BODY MASS INDEX: 37.92 KG/M2 | WEIGHT: 249.4 LBS

## 2024-01-17 DIAGNOSIS — I63.9 ACUTE ISCHEMIC STROKE (HCC): Primary | ICD-10-CM

## 2024-01-17 DIAGNOSIS — I63.9 STROKE OF UNKNOWN CAUSE (HCC): ICD-10-CM

## 2024-01-17 LAB — INR BLD: 3.3

## 2024-01-17 PROCEDURE — 99211 OFF/OP EST MAY X REQ PHY/QHP: CPT

## 2024-01-17 PROCEDURE — 85610 PROTHROMBIN TIME: CPT

## 2024-01-17 NOTE — PROGRESS NOTES
[facility]  [facility]ication [facility]  ANTICOAGULATION    Referring Provider: Dr Davide Galan     GOAL INR: 2.0-3.0     TODAY'S INR: 3.3     WARFARIN Dosage: Decrease dose to 2.5 mg MWF, 5 mg all other days    INR (no units)   Date Value   2024 3.3   2023 2.6   11/10/2023 6.3   10/05/2023 3.2   2023 2.9   2023 2.1   2023 2.4     INR,(POC) (no units)   Date Value   2023 2.6   2023 2.9   2023 2.4       Hemoglobin   Date Value Ref Range Status   2023 9.9 (L) 13.5 - 17.5 g/dL Final     Hematocrit   Date Value Ref Range Status   2023 31.9 (L) 41 - 53 % Final     ALT   Date Value Ref Range Status   2023 16 5 - 41 U/L Final     AST   Date Value Ref Range Status   2023 19 <40 U/L Final       Medication changes:  restarted Plavix on     Notes:    Fingerstick INR drawn per clinic protocol. Patient states no visible blood in urine, black tarry stool, falls or ER visits and denies any missed or extra doses of warfarin. He still is having L knee pain but has been able to decrease the amount of acetaminophen he has been taking as his hip replacement pain is much better. Advises he did restart Plavix on  but no other changes in medications or diet. With the restart of Plavix, the decreased acetaminophen usage and the supratherapeutic INR today, a dose decrease down to 2.5 mg MWF, 5 mg all other days was ordered and a recheck of his INR was scheduled for 4 weeks. Patient acknowledges working in consult agreement with pharmacist as referred by his/her physician.    For Pharmacy Admin Tracking Only    Intervention Detail: Adherence Monitorin and Dose Adjustment: 1, reason: Therapy Optimization  Total # of Interventions Recommended: 2  Total # of Interventions Accepted: 2  Time Spent (min): 30    Giuseppe Blakely PharmD 2024 3:45 PM

## 2024-01-17 NOTE — PATIENT INSTRUCTIONS
Decrease current dose of warfarin as instructed on dosing calendar provided.   Continue to monitor urine and stool for signs and symptoms of bleeding.   Please notify the clinic of any medication changes.   Please remember to bring all medications (both prescription and OTC) to your next visit. Kindly notify the clinic if you are unable to make to your next appointment.

## 2024-02-16 ENCOUNTER — HOSPITAL ENCOUNTER (OUTPATIENT)
Dept: PHARMACY | Age: 79
Setting detail: THERAPIES SERIES
Discharge: HOME | End: 2024-02-16
Payer: MEDICARE

## 2024-02-16 VITALS
DIASTOLIC BLOOD PRESSURE: 72 MMHG | SYSTOLIC BLOOD PRESSURE: 130 MMHG | HEART RATE: 78 BPM | BODY MASS INDEX: 38.01 KG/M2 | WEIGHT: 250 LBS

## 2024-02-16 DIAGNOSIS — I63.9 STROKE OF UNKNOWN CAUSE (HCC): ICD-10-CM

## 2024-02-16 DIAGNOSIS — I63.9 ACUTE ISCHEMIC STROKE (HCC): Primary | ICD-10-CM

## 2024-02-16 LAB — INR BLD: 2.4

## 2024-02-16 PROCEDURE — 85610 PROTHROMBIN TIME: CPT

## 2024-02-16 PROCEDURE — 99211 OFF/OP EST MAY X REQ PHY/QHP: CPT

## 2024-02-16 NOTE — PROGRESS NOTES
[facility]  Medication Management  ANTICOAGULATION    Referring Provider: Dr Davide Galan     GOAL INR: 2.0-3.0     TODAY'S INR: 2.4     WARFARIN Dosage: Continue 2.5 mg MWF, 5 mg all other days    INR (no units)   Date Value   2024 2.4   2024 3.3   2023 2.6   11/10/2023 6.3   10/05/2023 3.2   2023 2.9   2023 2.1     INR,(POC) (no units)   Date Value   2023 2.6   2023 2.9   2023 2.4       Hemoglobin   Date Value Ref Range Status   2023 9.9 (L) 13.5 - 17.5 g/dL Final     Hematocrit   Date Value Ref Range Status   2023 31.9 (L) 41 - 53 % Final     ALT   Date Value Ref Range Status   2023 16 5 - 41 U/L Final     AST   Date Value Ref Range Status   2023 19 <40 U/L Final       Medication changes:  Needing/taking less pain medication  Wants to try diclofenac gel OTC for knee pain in nikko of frankincense oil    Notes:    Fingerstick INR drawn per clinic protocol. Patient states no visible blood in urine, black tarry stool, falls or ER visits and denies any missed or extra doses of warfarin. States that he is needing/taking less pain medication (Norco and Tylenol) and stated that he wants to try diclofenac gel OTC for knee pain in nikko of frankincense oil. This should have no effect on INR. No other changes in maintenance medications or diet. INR is therapeutic so will continue 2.5 mg MWF, 5 mg all other days and will recheck INR in 6 weeks. He states that he is considering having a surgery for hammer toes in a month or 2 but will advise the clinic if he does get scheduled. Patient acknowledges working in consult agreement with pharmacist as referred by his/her physician.    For Pharmacy Admin Tracking Only    Intervention Detail: Adherence Monitorin  Total # of Interventions Recommended: 1  Total # of Interventions Accepted: 1  Time Spent (min): 20    Giuseppe Blakely, PharmD 2024 2:26 PM

## 2024-03-27 ENCOUNTER — HOSPITAL ENCOUNTER (OUTPATIENT)
Dept: PHARMACY | Age: 79
Setting detail: THERAPIES SERIES
Discharge: HOME | End: 2024-03-27
Payer: MEDICARE

## 2024-03-27 VITALS
WEIGHT: 252 LBS | SYSTOLIC BLOOD PRESSURE: 125 MMHG | BODY MASS INDEX: 38.32 KG/M2 | HEART RATE: 82 BPM | DIASTOLIC BLOOD PRESSURE: 70 MMHG

## 2024-03-27 DIAGNOSIS — I63.9 ACUTE ISCHEMIC STROKE (HCC): Primary | ICD-10-CM

## 2024-03-27 DIAGNOSIS — I63.9 STROKE OF UNKNOWN CAUSE (HCC): ICD-10-CM

## 2024-03-27 LAB — INR BLD: 2.1

## 2024-03-27 PROCEDURE — 85610 PROTHROMBIN TIME: CPT

## 2024-03-27 PROCEDURE — 99211 OFF/OP EST MAY X REQ PHY/QHP: CPT

## 2024-03-27 NOTE — PROGRESS NOTES
[facility]  [facility]ication Management  ANTICOAGULATION    Referring Provider: Dr Davide Galan     GOAL INR: 2.0-3.0     TODAY'S INR: 2.1     WARFARIN Dosage: Continue 2.5 mg MWF, 5 mg all other days    INR (no units)   Date Value   2024 2.1   2024 2.4   2024 3.3   2023 2.6   11/10/2023 6.3   10/05/2023 3.2   2023 2.9     INR,(POC) (no units)   Date Value   2023 2.6   2023 2.9   2023 2.4       Hemoglobin   Date Value Ref Range Status   2023 9.9 (L) 13.5 - 17.5 g/dL Final     Hematocrit   Date Value Ref Range Status   2023 31.9 (L) 41 - 53 % Final     ALT   Date Value Ref Range Status   2023 16 5 - 41 U/L Final     AST   Date Value Ref Range Status   2023 19 <40 U/L Final       Medication changes:  Stopped using Frankincense Oil and started using Voltaren Gel OTC on his joints at night    Notes:    Fingerstick INR drawn per clinic protocol. Patient states no visible blood in urine, black tarry stool, falls or ER visits and denies any missed or extra doses of warfarin. States he stopped using Frankincense Oil and started using Voltaren Gel OTC on his joints at night but no other changes in medication or diet. INR is therapeutic so will continue 2.5 mg MWF, 5 mg all other days and will recheck INR in 6 weeks. Patient acknowledges working in consult agreement with pharmacist as referred by his/her physician.    For Pharmacy Admin Tracking Only    Intervention Detail: Adherence Monitorin  Total # of Interventions Recommended: 1  Total # of Interventions Accepted: 1  Time Spent (min): 20    Giuseppe Blakely, PharmD 3/27/2024 4:14 PM

## 2024-04-15 RX ORDER — ATORVASTATIN CALCIUM 20 MG/1
20 TABLET, FILM COATED ORAL DAILY
Qty: 90 TABLET | Refills: 5 | Status: SHIPPED | OUTPATIENT
Start: 2024-04-15

## 2024-04-16 RX ORDER — WARFARIN SODIUM 5 MG/1
TABLET ORAL
Qty: 90 TABLET | Refills: 3 | Status: SHIPPED | OUTPATIENT
Start: 2024-04-16

## 2024-04-30 RX ORDER — CLOPIDOGREL BISULFATE 75 MG/1
75 TABLET ORAL DAILY
Qty: 90 TABLET | Refills: 3 | Status: SHIPPED | OUTPATIENT
Start: 2024-04-30

## 2024-05-08 ENCOUNTER — HOSPITAL ENCOUNTER (OUTPATIENT)
Dept: PHARMACY | Age: 79
Setting detail: THERAPIES SERIES
Discharge: HOME | End: 2024-05-08
Payer: MEDICARE

## 2024-05-08 VITALS
DIASTOLIC BLOOD PRESSURE: 66 MMHG | SYSTOLIC BLOOD PRESSURE: 119 MMHG | WEIGHT: 249.8 LBS | HEART RATE: 83 BPM | BODY MASS INDEX: 37.98 KG/M2

## 2024-05-08 DIAGNOSIS — I63.9 STROKE OF UNKNOWN CAUSE (HCC): ICD-10-CM

## 2024-05-08 DIAGNOSIS — I63.9 ACUTE ISCHEMIC STROKE (HCC): Primary | ICD-10-CM

## 2024-05-08 LAB — INR BLD: 2.5

## 2024-05-08 PROCEDURE — 85610 PROTHROMBIN TIME: CPT

## 2024-05-08 PROCEDURE — 99211 OFF/OP EST MAY X REQ PHY/QHP: CPT

## 2024-05-08 RX ORDER — PREDNISONE 5 MG/1
5 TABLET ORAL DAILY
COMMUNITY
Start: 2024-05-09 | End: 2024-05-23

## 2024-05-08 NOTE — PROGRESS NOTES
[facility]  [facility]ication [facility]  ANTICOAGULATION    Referring Provider: Dr Davide Galan     GOAL INR: 2.0-3.0     TODAY'S INR: 2.5     WARFARIN Dosage: 2.5 mg x 2 days before resuming 2.5 mg MWF, 5 mg all other days    INR (no units)   Date Value   2024 2.5   2024 2.1   2024 2.4   2024 3.3   2023 2.6   11/10/2023 6.3   10/05/2023 3.2     INR,(POC) (no units)   Date Value   2023 2.6   2023 2.9   2023 2.4       Hemoglobin   Date Value Ref Range Status   2023 9.9 (L) 13.5 - 17.5 g/dL Final     Hematocrit   Date Value Ref Range Status   2023 31.9 (L) 41 - 53 % Final     ALT   Date Value Ref Range Status   2023 16 5 - 41 U/L Final     AST   Date Value Ref Range Status   2023 19 <40 U/L Final       Medication changes:  Starting a 15 day prednisone taper tomorrow for joint and hand swelling/pain    Notes:    Fingerstick INR drawn per clinic protocol. Patient states no visible blood in urine, black tarry stool, falls or ER visits. States he missed a half tablet a couple of weeks ago (found the half tablet on the floor) but denies any other known missed or extra doses of warfarin. He is starting a 15 day prednisone taper tomorrow for joint and hand swelling and pain but confirms no other changes in medication or diet. INR is therapeutic but ordering only 2.5 mg tomorrow vs 5 mg because of the steroid (has never taken prednisone before) and will continue 2.5 mg MWF, 5 mg all other days thereafter. Will recheck INR in 1 week. Patient acknowledges working in consult agreement with pharmacist as referred by his/her physician.    For Pharmacy Admin Tracking Only    Intervention Detail: Adherence Monitorin and Dose Adjustment: 1, reason: Therapy Optimization  Total # of Interventions Recommended: 2  Total # of Interventions Accepted: 2  Time Spent (min): 30    Giuseppe Blakely, PharmD 2024 2:18 PM

## 2024-05-17 ENCOUNTER — APPOINTMENT (OUTPATIENT)
Dept: PHARMACY | Age: 79
End: 2024-05-17
Payer: MEDICARE

## 2024-05-17 VITALS
DIASTOLIC BLOOD PRESSURE: 70 MMHG | HEART RATE: 70 BPM | SYSTOLIC BLOOD PRESSURE: 141 MMHG | BODY MASS INDEX: 38.23 KG/M2 | WEIGHT: 251.4 LBS

## 2024-05-17 DIAGNOSIS — I48.91 ATRIAL FIBRILLATION, UNSPECIFIED TYPE (HCC): ICD-10-CM

## 2024-05-17 DIAGNOSIS — I63.9 ACUTE ISCHEMIC STROKE (HCC): Primary | ICD-10-CM

## 2024-05-17 DIAGNOSIS — I63.9 STROKE OF UNKNOWN CAUSE (HCC): ICD-10-CM

## 2024-05-17 LAB — INR BLD: 2.2

## 2024-05-17 PROCEDURE — 99211 OFF/OP EST MAY X REQ PHY/QHP: CPT | Performed by: FAMILY MEDICINE

## 2024-05-17 PROCEDURE — 85610 PROTHROMBIN TIME: CPT | Performed by: FAMILY MEDICINE

## 2024-05-17 NOTE — PATIENT INSTRUCTIONS
Please continue to take warfarin 2.5mg (1/2 tablet) Monday Wednesday and Friday and 5mg (1 tablet) all other days.  Continue to monitor urine and stool for signs and symptoms of bleeding.   Please notify the clinic of any medication changes.   Please remember to bring all medications (both prescription and OTC) to your next visit.   Kindly notify the clinic if you are unable to make to your next appointment.   Follow warfarin dosing instructions on dosing calendar provided.

## 2024-05-17 NOTE — PROGRESS NOTES
[facility]  ANTICOAGULATION    Referring Provider: Dr Galan    GOAL INR: 2.0-3.0    TODAY'S INR: 2.2    WARFARIN Dosage: continue 2.5mg MWF, 5mg all other days    INR (no units)   Date Value   2024 2.2   2024 2.5   2024 2.1   2024 2.4   2024 3.3   2023 2.6   11/10/2023 6.3     INR,(POC) (no units)   Date Value   2023 2.6   2023 2.9   2023 2.4       Hemoglobin   Date Value Ref Range Status   2023 9.9 (L) 13.5 - 17.5 g/dL Final     Hematocrit   Date Value Ref Range Status   2023 31.9 (L) 41 - 53 % Final     ALT   Date Value Ref Range Status   2023 16 5 - 41 U/L Final     AST   Date Value Ref Range Status   2023 19 <40 U/L Final       Medication changes:  Continues prednisone taper, will complete on     Notes:    Fingerstick INR drawn per clinic protocol. Patient states no visible blood in urine and no black tarry stool. Denies any missed doses of warfarin. No change in other maintenance medications or in diet. Will recheck INR in 2 weeks. Patient has followed warfarin dosing adjustments as instructed for prednisone taper. Patient states he is able to make fists with both hands in the last several days which he has been unable to do prior to prednisone taper.  Patient is to complete prednisone taper on  and would like to continue prednisone daily. We discussed this potential medication would be determined by orthopedic provider at his follow up appointment. Patient did have MOHS procedure early this week on left cheek just below eye with no complications. Patient acknowledges working in consult agreement with pharmacist as referred by his/her physician.                  For Pharmacy Admin Tracking Only    Intervention Detail: Adherence Monitorin  Total # of Interventions Recommended: 2  Total # of Interventions Accepted: 2  Time Spent (min): 20      KELBY Robles.Ph.,

## 2024-05-30 ENCOUNTER — TELEPHONE (OUTPATIENT)
Dept: CARDIOLOGY CLINIC | Age: 79
End: 2024-05-30

## 2024-05-30 NOTE — TELEPHONE ENCOUNTER
Pt called had small runs vtach on pacemaker   Reviewed per Dr Galan   Will increase metoprolol to a full tab bid  Pt informed no palpitations or dizziness  Felt fine.

## 2024-06-18 ENCOUNTER — HOSPITAL ENCOUNTER (OUTPATIENT)
Dept: PHARMACY | Age: 79
Setting detail: THERAPIES SERIES
Discharge: HOME | End: 2024-06-18
Payer: MEDICARE

## 2024-06-18 VITALS
HEART RATE: 65 BPM | SYSTOLIC BLOOD PRESSURE: 110 MMHG | DIASTOLIC BLOOD PRESSURE: 54 MMHG | WEIGHT: 248.4 LBS | BODY MASS INDEX: 37.77 KG/M2

## 2024-06-18 DIAGNOSIS — I48.91 ATRIAL FIBRILLATION, UNSPECIFIED TYPE (HCC): ICD-10-CM

## 2024-06-18 DIAGNOSIS — I63.9 ACUTE ISCHEMIC STROKE (HCC): Primary | ICD-10-CM

## 2024-06-18 DIAGNOSIS — I63.9 STROKE OF UNKNOWN CAUSE (HCC): ICD-10-CM

## 2024-06-18 LAB — INR BLD: 2.4

## 2024-06-18 PROCEDURE — 85610 PROTHROMBIN TIME: CPT

## 2024-06-18 PROCEDURE — 99211 OFF/OP EST MAY X REQ PHY/QHP: CPT

## 2024-06-18 NOTE — PROGRESS NOTES
[facility]  [facility]  ANTICOAGULATION    Referring Provider: Dr Galan     GOAL INR: 2.0 - 3.0     TODAY'S INR: 2.4     WARFARIN Dosage: continue 2.5mg MWF, 5mg all other days      INR (no units)   Date Value   05/17/2024 2.2   05/08/2024 2.5   03/27/2024 2.1   02/16/2024 2.4   01/17/2024 3.3   12/13/2023 2.6   11/10/2023 6.3     INR,(POC) (no units)   Date Value   11/28/2023 2.6   11/21/2023 2.9   11/17/2023 2.4       Hemoglobin   Date Value Ref Range Status   09/07/2023 9.9 (L) 13.5 - 17.5 g/dL Final     Hematocrit   Date Value Ref Range Status   09/07/2023 31.9 (L) 41 - 53 % Final     ALT   Date Value Ref Range Status   09/07/2023 16 5 - 41 U/L Final     AST   Date Value Ref Range Status   09/07/2023 19 <40 U/L Final       Medication changes:  None      Notes:    Fingerstick INR drawn per clinic protocol. Patient states no visible blood in urine and no black tarry stool. Denies any missed doses of warfarin. Quique had been taking a Prednisone taper for bilateral hand numbness and tingling per DONATO Earl in orthopedics at [facility]. He completed the course of this regimen on 5/29/2024. Then, he received a call from Cassie Wei LPN at Dr. Galan's office on 5/30/2024 noting that he had small runs of Vtach on his pacemaker and was instructed to increase his Metoprolol from 12.5 mg BID to 25 mg BID at that time. Quique and his wife are wondering if the Prednisone could have caused his tachycardia at the time. I have recommended that he check his BP's and HR's over the course of the next 2 weeks and report back to Dr. Galan's office to see if any additional changes are necessary. No change in other maintenance medications or in diet. Since his INR remains therapeutic today, we will continue current weekly warfarin regimen and recheck INR in 6 weeks. Patient acknowledges working in consult agreement with pharmacist as referred by his/her physician.

## 2024-07-31 ENCOUNTER — HOSPITAL ENCOUNTER (OUTPATIENT)
Dept: PHARMACY | Age: 79
Setting detail: THERAPIES SERIES
Discharge: HOME | End: 2024-07-31
Payer: MEDICARE

## 2024-07-31 VITALS
WEIGHT: 253.6 LBS | DIASTOLIC BLOOD PRESSURE: 65 MMHG | SYSTOLIC BLOOD PRESSURE: 130 MMHG | BODY MASS INDEX: 38.56 KG/M2 | HEART RATE: 65 BPM

## 2024-07-31 DIAGNOSIS — I63.9 ACUTE ISCHEMIC STROKE (HCC): Primary | ICD-10-CM

## 2024-07-31 DIAGNOSIS — I63.9 STROKE OF UNKNOWN CAUSE (HCC): ICD-10-CM

## 2024-07-31 LAB — INR BLD: 2.5

## 2024-07-31 PROCEDURE — 99211 OFF/OP EST MAY X REQ PHY/QHP: CPT | Performed by: PHARMACIST

## 2024-07-31 PROCEDURE — 85610 PROTHROMBIN TIME: CPT | Performed by: PHARMACIST

## 2024-07-31 NOTE — PROGRESS NOTES
[facility]  ANTICOAGULATION    Referring Provider: Dr Davide Galan     GOAL INR: 2.0-3.0     TODAY'S INR: 2.5     WARFARIN Dosage: Continue 2.5 mg MWF, 5 mg all other days    INR (no units)   Date Value   2024 2.5   2024 2.4   2024 2.2   2024 2.5   2024 2.1   2024 2.4   2024 3.3       Hemoglobin   Date Value Ref Range Status   2023 9.9 (L) 13.5 - 17.5 g/dL Final     Hematocrit   Date Value Ref Range Status   2023 31.9 (L) 41 - 53 % Final     ALT   Date Value Ref Range Status   2023 16 5 - 41 U/L Final     AST   Date Value Ref Range Status   2023 19 <40 U/L Final       Medication changes:  None     Notes:    Fingerstick INR drawn per clinic protocol. Patient states no visible blood in urine, black tarry stool, falls or ER visits and denies any missed or extra doses of warfarin. No change in other maintenance medications or in diet. INR is therapeutic so will continue 2.5 mg MWF, 5 mg all other days and will recheck INR in 6 weeks. Patient acknowledges working in consult agreement with pharmacist as referred by his/her physician.    For Pharmacy Admin Tracking Only    Intervention Detail: Adherence Monitorin  Total # of Interventions Recommended: 1  Total # of Interventions Accepted: 1  Time Spent (min): 20    Giuseppe Blakely, PharmD 2024 3:17 PM

## 2024-09-05 ENCOUNTER — HOSPITAL ENCOUNTER (OUTPATIENT)
Dept: GENERAL RADIOLOGY | Age: 79
Discharge: HOME | End: 2024-09-07
Payer: MEDICARE

## 2024-09-05 ENCOUNTER — HOSPITAL ENCOUNTER (OUTPATIENT)
Age: 79
Discharge: HOME | End: 2024-09-07
Payer: MEDICARE

## 2024-09-05 ENCOUNTER — HOSPITAL ENCOUNTER (OUTPATIENT)
Age: 79
Discharge: HOME | End: 2024-09-05
Payer: MEDICARE

## 2024-09-05 DIAGNOSIS — I48.91 ATRIAL FIBRILLATION, NEW ONSET (HCC): ICD-10-CM

## 2024-09-05 DIAGNOSIS — E55.9 VITAMIN D DEFICIENCY DISEASE: ICD-10-CM

## 2024-09-05 DIAGNOSIS — Z95.0 ARTIFICIAL PACEMAKER: ICD-10-CM

## 2024-09-05 DIAGNOSIS — I10 ESSENTIAL HYPERTENSION: ICD-10-CM

## 2024-09-05 DIAGNOSIS — Z95.0 PACEMAKER: ICD-10-CM

## 2024-09-05 LAB
ALBUMIN SERPL-MCNC: 4.2 G/DL (ref 3.5–5.2)
ALP SERPL-CCNC: 78 U/L (ref 40–129)
ALT SERPL-CCNC: 14 U/L (ref 5–41)
ANION GAP SERPL CALCULATED.3IONS-SCNC: 16 MMOL/L (ref 9–17)
AST SERPL-CCNC: 21 U/L
BASOPHILS # BLD: 0.05 K/UL (ref 0–0.2)
BASOPHILS NFR BLD: 1 % (ref 0–2)
BILIRUB SERPL-MCNC: 1.6 MG/DL (ref 0.3–1.2)
BUN SERPL-MCNC: 16 MG/DL (ref 8–23)
BUN/CREAT SERPL: 13 (ref 9–20)
CALCIUM SERPL-MCNC: 9.2 MG/DL (ref 8.6–10.4)
CHLORIDE SERPL-SCNC: 101 MMOL/L (ref 98–107)
CO2 SERPL-SCNC: 24 MMOL/L (ref 20–31)
CREAT SERPL-MCNC: 1.2 MG/DL (ref 0.7–1.2)
EKG ATRIAL RATE: 75 BPM
EKG P-R INTERVAL: 176 MS
EKG Q-T INTERVAL: 454 MS
EKG QRS DURATION: 174 MS
EKG QTC CALCULATION (BAZETT): 506 MS
EKG R AXIS: -78 DEGREES
EKG T AXIS: 87 DEGREES
EKG VENTRICULAR RATE: 75 BPM
EOSINOPHIL # BLD: 0.17 K/UL (ref 0–0.4)
EOSINOPHILS RELATIVE PERCENT: 2 % (ref 0–5)
ERYTHROCYTE [DISTWIDTH] IN BLOOD BY AUTOMATED COUNT: 18.7 % (ref 12.1–15.2)
GFR, ESTIMATED: 62 ML/MIN/1.73M2
GLUCOSE SERPL-MCNC: 155 MG/DL (ref 70–99)
HCT VFR BLD AUTO: 33.6 % (ref 41–53)
HGB BLD-MCNC: 11 G/DL (ref 13.5–17.5)
IMM GRANULOCYTES # BLD AUTO: 0.02 K/UL (ref 0–0.3)
IMM GRANULOCYTES NFR BLD: 0 % (ref 0–5)
LYMPHOCYTES NFR BLD: 1.61 K/UL (ref 1–4.8)
LYMPHOCYTES RELATIVE PERCENT: 22 % (ref 13–44)
MAGNESIUM SERPL-MCNC: 1.7 MG/DL (ref 1.6–2.6)
MCH RBC QN AUTO: 20.4 PG (ref 26–34)
MCHC RBC AUTO-ENTMCNC: 32.7 G/DL (ref 31–37)
MCV RBC AUTO: 62.2 FL (ref 80–100)
MONOCYTES NFR BLD: 0.42 K/UL (ref 0–1)
MONOCYTES NFR BLD: 6 % (ref 5–9)
MORPHOLOGY: ABNORMAL
NEUTROPHILS NFR BLD: 70 % (ref 39–75)
NEUTS SEG NFR BLD: 5.18 K/UL (ref 2.1–6.5)
PLATELET # BLD AUTO: 291 K/UL (ref 140–450)
PMV BLD AUTO: ABNORMAL FL (ref 6–12)
POTASSIUM SERPL-SCNC: 4.1 MMOL/L (ref 3.7–5.3)
PROT SERPL-MCNC: 7.1 G/DL (ref 6.4–8.3)
RBC # BLD AUTO: 5.4 M/UL (ref 4.5–5.9)
SODIUM SERPL-SCNC: 141 MMOL/L (ref 135–144)
TSH SERPL DL<=0.05 MIU/L-ACNC: 1.69 UIU/ML (ref 0.3–5)
WBC OTHER # BLD: 7.5 K/UL (ref 3.5–11)

## 2024-09-05 PROCEDURE — 84443 ASSAY THYROID STIM HORMONE: CPT

## 2024-09-05 PROCEDURE — 85025 COMPLETE CBC W/AUTO DIFF WBC: CPT

## 2024-09-05 PROCEDURE — 36415 COLL VENOUS BLD VENIPUNCTURE: CPT

## 2024-09-05 PROCEDURE — 71046 X-RAY EXAM CHEST 2 VIEWS: CPT

## 2024-09-05 PROCEDURE — 80061 LIPID PANEL: CPT

## 2024-09-05 PROCEDURE — 80053 COMPREHEN METABOLIC PANEL: CPT

## 2024-09-05 PROCEDURE — 83735 ASSAY OF MAGNESIUM: CPT

## 2024-09-05 PROCEDURE — 82306 VITAMIN D 25 HYDROXY: CPT

## 2024-09-05 PROCEDURE — 93005 ELECTROCARDIOGRAM TRACING: CPT

## 2024-09-06 LAB
25(OH)D3 SERPL-MCNC: 40.8 NG/ML (ref 30–100)
CHOLEST SERPL-MCNC: 117 MG/DL (ref 0–199)
CHOLESTEROL/HDL RATIO: 3
HDLC SERPL-MCNC: 39 MG/DL
LDLC SERPL CALC-MCNC: 50 MG/DL (ref 0–100)
TRIGL SERPL-MCNC: 140 MG/DL
VLDLC SERPL CALC-MCNC: 28 MG/DL

## 2024-09-10 ENCOUNTER — OFFICE VISIT (OUTPATIENT)
Dept: CARDIOLOGY CLINIC | Age: 79
End: 2024-09-10
Payer: MEDICARE

## 2024-09-10 VITALS — DIASTOLIC BLOOD PRESSURE: 70 MMHG | SYSTOLIC BLOOD PRESSURE: 120 MMHG

## 2024-09-10 DIAGNOSIS — Z95.0 ARTIFICIAL PACEMAKER: Primary | ICD-10-CM

## 2024-09-10 DIAGNOSIS — E55.9 VITAMIN D DEFICIENCY DISEASE: ICD-10-CM

## 2024-09-10 DIAGNOSIS — I48.91 ATRIAL FIBRILLATION, NEW ONSET (HCC): ICD-10-CM

## 2024-09-10 DIAGNOSIS — R73.09 ELEVATED GLUCOSE: ICD-10-CM

## 2024-09-10 DIAGNOSIS — I10 ESSENTIAL HYPERTENSION: ICD-10-CM

## 2024-09-10 LAB
EST. AVERAGE GLUCOSE BLD GHB EST-MCNC: 120 MG/DL
HBA1C MFR BLD: 5.8 % (ref 4–6)

## 2024-09-10 PROCEDURE — 3078F DIAST BP <80 MM HG: CPT | Performed by: INTERNAL MEDICINE

## 2024-09-10 PROCEDURE — 1036F TOBACCO NON-USER: CPT | Performed by: INTERNAL MEDICINE

## 2024-09-10 PROCEDURE — 1123F ACP DISCUSS/DSCN MKR DOCD: CPT | Performed by: INTERNAL MEDICINE

## 2024-09-10 PROCEDURE — G8427 DOCREV CUR MEDS BY ELIG CLIN: HCPCS | Performed by: INTERNAL MEDICINE

## 2024-09-10 PROCEDURE — 3074F SYST BP LT 130 MM HG: CPT | Performed by: INTERNAL MEDICINE

## 2024-09-10 PROCEDURE — G8417 CALC BMI ABV UP PARAM F/U: HCPCS | Performed by: INTERNAL MEDICINE

## 2024-09-10 PROCEDURE — 99214 OFFICE O/P EST MOD 30 MIN: CPT | Performed by: INTERNAL MEDICINE

## 2024-09-10 RX ORDER — LISINOPRIL 5 MG/1
5 TABLET ORAL 2 TIMES DAILY
Qty: 180 TABLET | Refills: 3 | Status: SHIPPED | OUTPATIENT
Start: 2024-09-10

## 2024-09-13 ENCOUNTER — ANTI-COAG VISIT (OUTPATIENT)
Age: 79
End: 2024-09-13
Payer: MEDICARE

## 2024-09-13 VITALS
WEIGHT: 255 LBS | BODY MASS INDEX: 38.77 KG/M2 | HEART RATE: 64 BPM | SYSTOLIC BLOOD PRESSURE: 117 MMHG | DIASTOLIC BLOOD PRESSURE: 60 MMHG

## 2024-09-13 DIAGNOSIS — I63.9 STROKE OF UNKNOWN CAUSE (HCC): ICD-10-CM

## 2024-09-13 DIAGNOSIS — I63.9 ACUTE ISCHEMIC STROKE (HCC): Primary | ICD-10-CM

## 2024-09-13 LAB
INTERNATIONAL NORMALIZATION RATIO, POC: 2.9
PROTHROMBIN TIME, POC: 0

## 2024-09-13 PROCEDURE — 99211 OFF/OP EST MAY X REQ PHY/QHP: CPT | Performed by: PHARMACIST

## 2024-09-13 PROCEDURE — 85610 PROTHROMBIN TIME: CPT | Performed by: PHARMACIST

## 2024-09-16 RX ORDER — METOPROLOL TARTRATE 25 MG/1
25 TABLET, FILM COATED ORAL 2 TIMES DAILY
Qty: 180 TABLET | Refills: 5 | Status: SHIPPED | OUTPATIENT
Start: 2024-09-16

## 2024-10-13 ENCOUNTER — HOSPITAL ENCOUNTER (EMERGENCY)
Age: 79
Discharge: ANOTHER HOSPITAL | End: 2024-10-14
Attending: FAMILY MEDICINE
Payer: MEDICARE

## 2024-10-13 DIAGNOSIS — G45.9 TIA (TRANSIENT ISCHEMIC ATTACK): Primary | ICD-10-CM

## 2024-10-13 DIAGNOSIS — Z79.01 LONG TERM CURRENT USE OF ANTICOAGULANT: ICD-10-CM

## 2024-10-13 DIAGNOSIS — Z86.73 HISTORY OF TIA (TRANSIENT ISCHEMIC ATTACK): ICD-10-CM

## 2024-10-13 PROCEDURE — 82947 ASSAY GLUCOSE BLOOD QUANT: CPT

## 2024-10-13 PROCEDURE — 99285 EMERGENCY DEPT VISIT HI MDM: CPT

## 2024-10-13 ASSESSMENT — PAIN - FUNCTIONAL ASSESSMENT: PAIN_FUNCTIONAL_ASSESSMENT: NONE - DENIES PAIN

## 2024-10-13 ASSESSMENT — LIFESTYLE VARIABLES
HOW MANY STANDARD DRINKS CONTAINING ALCOHOL DO YOU HAVE ON A TYPICAL DAY: PATIENT DOES NOT DRINK
HOW OFTEN DO YOU HAVE A DRINK CONTAINING ALCOHOL: NEVER

## 2024-10-14 ENCOUNTER — APPOINTMENT (OUTPATIENT)
Dept: CT IMAGING | Age: 79
End: 2024-10-14
Payer: MEDICARE

## 2024-10-14 VITALS
TEMPERATURE: 97.4 F | HEART RATE: 71 BPM | OXYGEN SATURATION: 96 % | DIASTOLIC BLOOD PRESSURE: 77 MMHG | HEIGHT: 68 IN | SYSTOLIC BLOOD PRESSURE: 154 MMHG | WEIGHT: 225.5 LBS | RESPIRATION RATE: 16 BRPM | BODY MASS INDEX: 34.17 KG/M2

## 2024-10-14 LAB
ALBUMIN SERPL-MCNC: 3.9 G/DL (ref 3.5–5.2)
ALP SERPL-CCNC: 83 U/L (ref 40–129)
ALT SERPL-CCNC: 14 U/L (ref 5–41)
ANION GAP SERPL CALCULATED.3IONS-SCNC: 10 MMOL/L (ref 9–17)
AST SERPL-CCNC: 21 U/L
BASOPHILS # BLD: 0.03 K/UL (ref 0–0.2)
BASOPHILS NFR BLD: 0 % (ref 0–2)
BILIRUB SERPL-MCNC: 1.1 MG/DL (ref 0.3–1.2)
BUN SERPL-MCNC: 16 MG/DL (ref 8–23)
CALCIUM SERPL-MCNC: 9 MG/DL (ref 8.6–10.4)
CHLORIDE SERPL-SCNC: 106 MMOL/L (ref 98–107)
CO2 SERPL-SCNC: 28 MMOL/L (ref 20–31)
CREAT SERPL-MCNC: 1.1 MG/DL (ref 0.7–1.2)
EKG ATRIAL RATE: 73 BPM
EKG P AXIS: 41 DEGREES
EKG Q-T INTERVAL: 460 MS
EKG QRS DURATION: 186 MS
EKG QTC CALCULATION (BAZETT): 489 MS
EKG R AXIS: -69 DEGREES
EKG T AXIS: 102 DEGREES
EKG VENTRICULAR RATE: 68 BPM
EOSINOPHIL # BLD: 0.23 K/UL (ref 0–0.4)
EOSINOPHILS RELATIVE PERCENT: 3 % (ref 0–5)
ERYTHROCYTE [DISTWIDTH] IN BLOOD BY AUTOMATED COUNT: 19.1 % (ref 12.1–15.2)
GFR, ESTIMATED: 69 ML/MIN/1.73M2
GLUCOSE BLD-MCNC: 180 MG/DL (ref 65–99)
GLUCOSE SERPL-MCNC: 175 MG/DL (ref 70–99)
HCT VFR BLD AUTO: 32.3 % (ref 41–53)
HGB BLD-MCNC: 10.6 G/DL (ref 13.5–17.5)
IMM GRANULOCYTES # BLD AUTO: 0.02 K/UL (ref 0–0.3)
IMM GRANULOCYTES NFR BLD: 0 % (ref 0–5)
INR PPP: 3.3
LYMPHOCYTES NFR BLD: 2.01 K/UL (ref 1–4.8)
LYMPHOCYTES RELATIVE PERCENT: 26 % (ref 13–44)
MCH RBC QN AUTO: 20.2 PG (ref 26–34)
MCHC RBC AUTO-ENTMCNC: 32.8 G/DL (ref 31–37)
MCV RBC AUTO: 61.4 FL (ref 80–100)
MONOCYTES NFR BLD: 0.52 K/UL (ref 0–1)
MONOCYTES NFR BLD: 7 % (ref 5–9)
NEUTROPHILS NFR BLD: 63 % (ref 39–75)
NEUTS SEG NFR BLD: 4.85 K/UL (ref 2.1–6.5)
PLATELET # BLD AUTO: 274 K/UL (ref 140–450)
PMV BLD AUTO: ABNORMAL FL (ref 6–12)
POTASSIUM SERPL-SCNC: 3.7 MMOL/L (ref 3.7–5.3)
PROT SERPL-MCNC: 6.9 G/DL (ref 6.4–8.3)
PROTHROMBIN TIME: 33.1 SEC (ref 11.5–14.2)
RBC # BLD AUTO: 5.26 M/UL (ref 4.5–5.9)
SODIUM SERPL-SCNC: 144 MMOL/L (ref 135–144)
TROPONIN I SERPL HS-MCNC: 32 NG/L (ref 0–22)
TROPONIN I SERPL HS-MCNC: 35 NG/L (ref 0–22)
WBC OTHER # BLD: 7.7 K/UL (ref 3.5–11)

## 2024-10-14 PROCEDURE — 93010 ELECTROCARDIOGRAM REPORT: CPT | Performed by: INTERNAL MEDICINE

## 2024-10-14 PROCEDURE — 85025 COMPLETE CBC W/AUTO DIFF WBC: CPT

## 2024-10-14 PROCEDURE — 70450 CT HEAD/BRAIN W/O DYE: CPT

## 2024-10-14 PROCEDURE — 36415 COLL VENOUS BLD VENIPUNCTURE: CPT

## 2024-10-14 PROCEDURE — 93005 ELECTROCARDIOGRAM TRACING: CPT | Performed by: FAMILY MEDICINE

## 2024-10-14 PROCEDURE — 70498 CT ANGIOGRAPHY NECK: CPT

## 2024-10-14 PROCEDURE — 6360000004 HC RX CONTRAST MEDICATION: Performed by: FAMILY MEDICINE

## 2024-10-14 PROCEDURE — 85610 PROTHROMBIN TIME: CPT

## 2024-10-14 PROCEDURE — 80053 COMPREHEN METABOLIC PANEL: CPT

## 2024-10-14 PROCEDURE — 84484 ASSAY OF TROPONIN QUANT: CPT

## 2024-10-14 RX ORDER — IOPAMIDOL 755 MG/ML
100 INJECTION, SOLUTION INTRAVASCULAR
Status: COMPLETED | OUTPATIENT
Start: 2024-10-14 | End: 2024-10-14

## 2024-10-14 RX ADMIN — IOPAMIDOL 100 ML: 755 INJECTION, SOLUTION INTRAVENOUS at 00:26

## 2024-10-14 NOTE — ED PROVIDER NOTES
[facility]  EMERGENCY DEPARTMENT ENCOUNTER      Pt Name: Quique Wray  MRN: [id number]  Birthdate 1945  Date of evaluation: 10/13/2024  Provider: Gurmeet Bryant MD    CHIEF COMPLAINT       Chief Complaint   Patient presents with    Transient Ischemic Attack     LKW was 2300. Pt stated previous stoke in past and just started having symptom of weakness when going to bed. Left arm unable to move         HISTORY OF PRESENT ILLNESS      Quique Wray is a 79 y.o. male who presents to the emergency department to the emergency room via private vehicle, patient states that 2300 hrs. began noticing left upper extremity weakness, states has a history of TIAs in the past.  Patient states he does take blood thinners, did take 2 aspirin prior to arrival.  Denies trauma falls, denies alcohol or drug use.    PCP: Rayo  Cards: Adama        REVIEW OF SYSTEMS       Review of Systems   All other systems reviewed and are negative.        PAST MEDICAL HISTORY     Past Medical History:   Diagnosis Date    Cerebral artery occlusion with cerebral infarction (HCC)     Hypertension     Kidney stone          SURGICAL HISTORY       Past Surgical History:   Procedure Laterality Date    CATARACT REMOVAL Left     INSERTABLE CARDIAC MONITOR N/A 5/12/2021    LOOP RECORDER INSERT performed by Andrea Galan MD at [facility] OR    KNEE ARTHROSCOPY Right     LITHOTRIPSY      PACEMAKER INSERTION Left 6/29/2022    PACEMAKER INSERTION PERMANENT performed by Andrea Galan MD at [facility] OR         CURRENT MEDICATIONS       Previous Medications    ACETAMINOPHEN (TYLENOL) 500 MG TABLET    Take 2 tablets by mouth every 6 hours as needed for Pain    ALLOPURINOL (ZYLOPRIM) 300 MG TABLET    Take 1 tablet by mouth daily    ATORVASTATIN (LIPITOR) 20 MG TABLET    TAKE 1 TABLET BY MOUTH DAILY    B COMPLEX VITAMINS CAPSULE    Take 1 capsule by mouth daily    CHOLECALCIFEROL (VITAMIN D3) 125 MCG (5000 UT) TABS    Take 1 tablet by mouth daily

## 2024-10-17 ENCOUNTER — TELEPHONE (OUTPATIENT)
Dept: CARDIOLOGY CLINIC | Age: 79
End: 2024-10-17

## 2024-10-17 NOTE — TELEPHONE ENCOUNTER
Quique called to state that he was getting released from [facility] today (TIA/CVA)? He said that he went to the ED at [place] and was transported to [facility].  He wanted Dr. Galan to know that his Atorvastatin was changed back to 80mg and his Plavix was discontinued and Aspirin 81 was restarted. He also wanted to know if he needed an appointment to see Dr. Galan.      Please call Quique

## 2024-10-21 ENCOUNTER — HOSPITAL ENCOUNTER (OUTPATIENT)
Dept: PHYSICAL THERAPY | Age: 79
Setting detail: THERAPIES SERIES
Discharge: HOME | End: 2024-10-21
Payer: MEDICARE

## 2024-10-21 PROCEDURE — 97110 THERAPEUTIC EXERCISES: CPT

## 2024-10-21 PROCEDURE — 97163 PT EVAL HIGH COMPLEX 45 MIN: CPT

## 2024-10-21 ASSESSMENT — PAIN DESCRIPTION - LOCATION: LOCATION: HIP;KNEE;NECK

## 2024-10-21 ASSESSMENT — PAIN SCALES - GENERAL: PAINLEVEL_OUTOF10: 2

## 2024-10-21 NOTE — THERAPY EVALUATION
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 Completed therex per Doc Flow.Plan for therex, HEP, manual therapy.  Therapy Prognosis: Good    Clinical Presentation:  Evolving  The Following Comorbities will impact the patient’s progression and Plan of Care:   Stroke, Cardiac Disease/Pacemaker, Obesity, and Previous Orthopedic Injury/Surgery       High Complexity    Education: On POC        Goals  Short Term Goals  Time Frame for Short Term Goals: 8  Short Term Goal 1: Patient to be educated on and independent with HEP  Short Term Goal 2: Increase strength R hip flexion 4+/5 to drive without difficulty  Short Term Goal 3: Increase strength L shld flexion 4+/5 to lift overhead    Long Term Goals  Time Frame for Long Term Goals : 10  Long Term Goal 1: Improve functional mobility with LEFS score >36/80 (from 29/80)    Patient's Goal:   Regain strength in R LE to drive without difficulty    Timed Code Treatment Minutes: 15 Minutes  Total Treatment Time: 45     Time In: 14:36  Time Out: 15:21    Carmen Cohn, PT Date: 10/21/2024

## 2024-10-21 NOTE — PLAN OF CARE
[facility] [place]       Phone: [phone]   Date: 10/21/2024                      Outpatient Physical Therapy  Fax: [phone]    ACCT #: [id number]                     Plan of Care  [facility]#: [id number]  Patient: Quique Wray  : 1945    Referring Provider (secondary): Dr. 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 Completed therex per Doc Flow.Plan for therex, HEP, manual therapy.  Therapy Prognosis: Good    Treatment Plan   Days: 2 times per week Weeks: 5 weeks Total # of Visits Approved: 10    Patient Education/HEP, Therapeutic Exercise, Manual Therapy: Myofacial Release/Cupping, Manual Therapy: Mobilization/Manipulation, and Neuro Re-ed     Goals  Short Term Goals  Time Frame for Short Term Goals: 8  Short Term Goal 1: Patient to be educated on and independent with HEP  Short Term Goal 2: Increase strength R hip flexion 4+/5 to drive without difficulty  Short Term Goal 3: Increase strength L shld flexion 4+/5 to lift overhead  Long Term Goals  Time Frame for Long Term Goals : 10  Long Term Goal 1: Improve functional mobility with LEFS score >36/80 (from )     Carmen Cohn, PT   Date: 10/21/2024    ______________________________________ Date: 10/21/2024  Physician Signature  By signing above or cosigning electronically, I have reviewed this Plan of Care and certify a need for medically necessary rehabilitation services.

## 2024-10-24 ENCOUNTER — HOSPITAL ENCOUNTER (OUTPATIENT)
Dept: PHYSICAL THERAPY | Age: 79
Setting detail: THERAPIES SERIES
Discharge: HOME | End: 2024-10-24
Payer: MEDICARE

## 2024-10-24 PROCEDURE — 97110 THERAPEUTIC EXERCISES: CPT

## 2024-10-24 PROCEDURE — 97112 NEUROMUSCULAR REEDUCATION: CPT

## 2024-10-24 ASSESSMENT — PAIN SCALES - GENERAL: PAINLEVEL_OUTOF10: 1

## 2024-10-24 ASSESSMENT — PAIN DESCRIPTION - LOCATION: LOCATION: KNEE

## 2024-10-24 NOTE — PROGRESS NOTES
Phone: [phone]                 [facility]      Fax: [phone]                            Outpatient Physical Therapy                                                                            Daily Note    Date: 10/24/2024  Patient Name: Quique Wray        MRN: [id number]   ACCT#:  [id number]  : 1945  (79 y.o.)    Referring Provider (secondary): Dr. Cade         Diagnosis: Generalized Weakness, CVA  Treatment Diagnosis: Generalized weakness    Onset Date: 10/13/24  PT Insurance Information: MCR, [employer]  Total # of Visits Approved: 10 Per Physician Order  Total # of Visits to Date: 2  Plan of Care/Certification Expiration Date: 24    Pre-Treatment Pain:  1/10     Assessment  Assessment: Patient reports pain 1-2/10 in knees. Completed therex and neuro re-ed per Doc Flow. Educated patient on and issued handout for HEP. Patient reports he has not ex on own in a long time, that he tires easily and doesn't feel motivated. Discussed goal setting and working toward goal . Continue per plan.    Plan  Continue with current plan of care    Exercises/Modalities/Manual:  See DocFlow Sheet    Education: Access Code: [email or web address]  URL: [email or web address]  Date: 10/24/2024  Prepared by: Carmen Cohn    Exercises  - Active Straight Leg Raise with Quad Set  - 1 x daily - 7 x weekly - 10 reps  - Supine Bridge  - 1 x daily - 7 x weekly - 10 reps  - Heel Raises with Counter Support  - 1 x daily - 7 x weekly - 20 reps  - Side Stepping with Resistance at Feet  - 1 x daily - 7 x weekly - 10 reps          Goals  (Total # of Visits to Date: 2)   Short Term Goals  Time Frame for Short Term Goals: 8  Short Term Goal 1: Patient to be educated on and independent with HEP  Short Term Goal 2: Increase strength R hip flexion 4+/5 to drive without difficulty  Short Term Goal 3: Increase strength L shld flexion 4+/5 to lift overhead    Long Term Goals  Time Frame for Long Term Goals : 10  Long Term Goal 1:

## 2024-10-25 ENCOUNTER — ANTI-COAG VISIT (OUTPATIENT)
Age: 79
End: 2024-10-25
Payer: MEDICARE

## 2024-10-25 VITALS
DIASTOLIC BLOOD PRESSURE: 69 MMHG | HEART RATE: 63 BPM | WEIGHT: 258.2 LBS | BODY MASS INDEX: 39.26 KG/M2 | SYSTOLIC BLOOD PRESSURE: 135 MMHG

## 2024-10-25 DIAGNOSIS — I63.9 ACUTE ISCHEMIC STROKE (HCC): Primary | ICD-10-CM

## 2024-10-25 DIAGNOSIS — I63.9 STROKE OF UNKNOWN CAUSE (HCC): ICD-10-CM

## 2024-10-25 LAB
INTERNATIONAL NORMALIZATION RATIO, POC: 2.8
PROTHROMBIN TIME, POC: 0

## 2024-10-25 PROCEDURE — 99211 OFF/OP EST MAY X REQ PHY/QHP: CPT | Performed by: PHARMACIST

## 2024-10-25 PROCEDURE — 85610 PROTHROMBIN TIME: CPT | Performed by: PHARMACIST

## 2024-10-25 RX ORDER — ASPIRIN 81 MG/1
81 TABLET ORAL DAILY
COMMUNITY

## 2024-10-25 NOTE — PROGRESS NOTES
[facility]  Medication Management  ANTICOAGULATION    Referring Provider: Dr Davide Galan     GOAL INR: 2.0-3.0     TODAY'S INR: 2.8     WARFARIN Dosage: Continue 2.5 mg MWF, 5 mg all other days    INR (no units)   Date Value   10/14/2024 3.3   2024 2.5   2024 2.4   2024 2.2   2024 2.5   2024 2.1   2024 2.4     INR,(POC) (no units)   Date Value   10/25/2024 2.8   2024 2.9       Hemoglobin   Date Value Ref Range Status   10/14/2024 10.6 (L) 13.5 - 17.5 g/dL Final     Hematocrit   Date Value Ref Range Status   10/14/2024 32.3 (L) 41.0 - 53.0 % Final     ALT   Date Value Ref Range Status   10/14/2024 14 5 - 41 U/L Final     AST   Date Value Ref Range Status   10/14/2024 21 <40 U/L Final       Medication changes:  Plavix stopped, ASA 81 mg started    Notes:    Fingerstick INR drawn per clinic protocol. Patient states no visible blood in urine, black tarry stool or falls. Denies any missed or extra doses of warfarin. He did have an ER visit on 10/13 where he was diagnoses as having a TIA. He was transferred to [place] for an inpatient stay where his Plavix was stopped and he was put back on Aspirin 81 mg daily. No other changes in medication or diet. INR is therapeutic so will continue 2.5 mg MWF, 5 mg all other days and will recheck INR in 6 weeks. He is tentatively scheduled for a hammer toe operation on 12/10 and he will keep clinic up to date with any changes. Patient acknowledges working in consult agreement with pharmacist as referred by his/her physician.    For Pharmacy Admin Tracking Only    Intervention Detail: Adherence Monitorin  Total # of Interventions Recommended: 1  Total # of Interventions Accepted: 1  Time Spent (min): 20    Giuseppe Blakely, Patricia 10/25/2024 2:28 PM

## 2024-10-28 ENCOUNTER — HOSPITAL ENCOUNTER (OUTPATIENT)
Dept: PHYSICAL THERAPY | Age: 79
Setting detail: THERAPIES SERIES
Discharge: HOME | End: 2024-10-28
Payer: MEDICARE

## 2024-10-28 PROCEDURE — 97110 THERAPEUTIC EXERCISES: CPT

## 2024-10-28 PROCEDURE — 97112 NEUROMUSCULAR REEDUCATION: CPT

## 2024-10-28 ASSESSMENT — PAIN SCALES - GENERAL: PAINLEVEL_OUTOF10: 2

## 2024-10-28 ASSESSMENT — PAIN DESCRIPTION - LOCATION: LOCATION: KNEE;HIP;BACK

## 2024-10-28 NOTE — PROGRESS NOTES
Phone: [phone]                 [facility]      Fax: [phone]                            Outpatient Physical Therapy                                                                            Daily Note    Date: 10/28/2024  Patient Name: Quique Wray        MRN: [id number]   ACCT#:  [id number]  : 1945  (79 y.o.)    Referring Provider (secondary): Dr. Cade         Diagnosis: Generalized Weakness, CVA  Treatment Diagnosis: Generalized weakness    Onset Date: 10/13/24  PT Insurance Information: MCR, Aetna  Total # of Visits Approved: 10 Per Physician Order  Total # of Visits to Date: 3  Plan of Care/Certification Expiration Date: 24    Pre-Treatment Pain:  2/10     Assessment  Assessment: Patient c/o knee pain was aggrivated after llast PT session. Pain 2/10 in knees and hips today. Completed therex and neuro re-ed per Doc flow. Decrease resistance with shuttle due to c/o knee pain. Patient c/o difficulty moving R leg with driving persist.Strength R hip flexion 4-/5. continue per plan.    Plan  Continue with current plan of care    Exercises/Modalities/Manual:  See DocFlow Sheet    Education: On ex form and importance of stretching ex to help decrease pain       Goals  (Total # of Visits to Date: 3)   Short Term Goals  Time Frame for Short Term Goals: 8  Short Term Goal 1: Patient to be educated on and independent with HEP  Short Term Goal 2: Increase strength R hip flexion 4+/5 to drive without difficulty  Short Term Goal 3: Increase strength L shld flexion 4+/5 to lift overhead    Long Term Goals  Time Frame for Long Term Goals : 10  Long Term Goal 1: Improve functional mobility with LEFS score >36/80 (from 29/80)    Post Treatment Pain:  2/10    Time In: 15;30    Time Out : 16;15        Timed Code Treatment Minutes: 45 Minutes  Total Treatment Time: 45 Minutes    Carmen Cohn, PT     Date: 10/28/2024

## 2024-10-31 ENCOUNTER — APPOINTMENT (OUTPATIENT)
Dept: PHYSICAL THERAPY | Age: 79
End: 2024-10-31
Payer: MEDICARE

## 2024-11-01 ENCOUNTER — HOSPITAL ENCOUNTER (OUTPATIENT)
Dept: PHYSICAL THERAPY | Age: 79
Setting detail: THERAPIES SERIES
Discharge: HOME | End: 2024-11-01
Payer: MEDICARE

## 2024-11-01 PROCEDURE — 97110 THERAPEUTIC EXERCISES: CPT

## 2024-11-01 ASSESSMENT — PAIN SCALES - GENERAL: PAINLEVEL_OUTOF10: 2

## 2024-11-01 NOTE — PROGRESS NOTES
Phone: [phone]                 [facility]      Fax: [phone]                            Outpatient Physical Therapy                                                                            Daily Note    Date: 2024  Patient Name: Quique Wray        MRN: [id number]   ACCT#:  [id number]  : 1945  (79 y.o.)    Referring Provider (secondary): Dr. 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  Ambulates with cane for stability and also due to hip weakness.  Good tolerance to ex however weakness and decreased control noted of left UE.   Progress with strengthening    Plan  Continue with current plan of care    Exercises/Modalities/Manual:  See DocFlow Sheet    Education: Bridges          Goals  (Total # of Visits to Date: 4)   Short Term Goals  Time Frame for Short Term Goals: 8  Short Term Goal 1: Patient to be educated on and independent with HEP  Short Term Goal 2: Increase strength R hip flexion 4+/5 to drive without difficulty  Short Term Goal 3: Increase strength L shld flexion 4+/5 to lift overhead    Long Term Goals  Time Frame for Long Term Goals : 10  Long Term Goal 1: Improve functional mobility with LEFS score >36/80 (from 29/80)    Post Treatment Pain:  4-5/10    Time In: 1415    Time Out : 1505        Timed Code Treatment Minutes: 45 Minutes  Total Treatment Time: 50  Minutes    CASSIE MOREAU, PT     Date: 2024

## 2024-11-05 ENCOUNTER — HOSPITAL ENCOUNTER (OUTPATIENT)
Dept: PHYSICAL THERAPY | Age: 79
Setting detail: THERAPIES SERIES
Discharge: HOME | End: 2024-11-05
Payer: MEDICARE

## 2024-11-05 PROCEDURE — 97112 NEUROMUSCULAR REEDUCATION: CPT

## 2024-11-05 PROCEDURE — 97110 THERAPEUTIC EXERCISES: CPT

## 2024-11-05 NOTE — PROGRESS NOTES
Phone: [phone]                 [facility]      Fax: [phone]                            Outpatient Physical Therapy                                                                            Daily Note    Date: 2024  Patient Name: Quique Wray        MRN: [id number]   ACCT#:  [id number]  : 1945  (79 y.o.)    Referring Provider (secondary): Dr. Cade         Diagnosis: Generalized Weakness, CVA  Treatment Diagnosis: Generalized weakness    Onset Date: 10/13/24  PT Insurance Information: MCR, Aetna  Total # of Visits Approved: 10 Per Physician Order  Total # of Visits to Date: 5  Plan of Care/Certification Expiration Date: 24    Pre-Treatment Pain:  5/10     Assessment  Assessment: Patient reports experiencing a mild TIA on Saturday affecting the left hand/UE.   Utilized 1# around left wrist for increased proprioception.   Good tolerance to additional LE strengthening ex noting only mild left knee discomfort related to advanced DJD.   Did require several seated rest breaks due to progressive lumbar pain with standing.    Plan  Continue with current plan of care    Exercises/Modalities/Manual:  See DocFlow Sheet    Education:  Purpose of weight around left wrist          Goals  (Total # of Visits to Date: 5)   Short Term Goals  Time Frame for Short Term Goals: 8  Short Term Goal 1: Patient to be educated on and independent with HEP  Short Term Goal 2: Increase strength R hip flexion 4+/5 to drive without difficulty  Short Term Goal 3: Increase strength L shld flexion 4+/5 to lift overhead    Long Term Goals  Time Frame for Long Term Goals : 10  Long Term Goal 1: Improve functional mobility with LEFS score >36/80 (from 29/80)    Post Treatment Pain:  4-10    Time In: 1415    Time Out : 1505        Timed Code Treatment Minutes: 45 Minutes  Total Treatment Time: 50 Minutes    CASSIE MOREAU, PT     Date: 2024

## 2024-11-08 ENCOUNTER — HOSPITAL ENCOUNTER (OUTPATIENT)
Dept: PHYSICAL THERAPY | Age: 79
Setting detail: THERAPIES SERIES
Discharge: HOME | End: 2024-11-08
Payer: MEDICARE

## 2024-11-08 PROCEDURE — 97110 THERAPEUTIC EXERCISES: CPT

## 2024-11-08 PROCEDURE — 97112 NEUROMUSCULAR REEDUCATION: CPT

## 2024-11-08 ASSESSMENT — PAIN SCALES - GENERAL: PAINLEVEL_OUTOF10: 3

## 2024-11-08 NOTE — PROGRESS NOTES
Phone: [phone]                 [facility]      Fax: [phone]                            Outpatient Physical Therapy                                                                            Daily Note    Date: 2024  Patient Name: Quique Wray        MRN: [id number]   ACCT#:  [id number]  : 1945  (79 y.o.)  Referring Provider (secondary): Dr. Cade         Diagnosis: Generalized Weakness, CVA  Treatment Diagnosis: Generalized weakness    Onset Date: 10/13/24  PT Insurance Information: MCR, Aetna  Total # of Visits Approved: 10 Per Physician Order  Total # of Visits to Date: 6  Plan of Care/Certification Expiration Date: 24    Pre-Treatment Pain:  3/10     Assessment  Assessment: Pain 3/10 in low back today, a little worse than usual. Patient walking with cane and moderate limp. Completed therex and neuro re-ed per Doc Flow. Patient admits to non-compliance with HEP. Patient reports he was able to walk up and down flight of stairs at dentist office, so feels he is getting a little stronger. Continue per plan.    Plan  Continue with current plan of care    Exercises/Modalities/Manual:  See DocFlow Sheet    Education: On ex form and importance of regular ex       Goals  (Total # of Visits to Date: 6)   Short Term Goals  Time Frame for Short Term Goals: 8  Short Term Goal 1: Patient to be educated on and independent with HEP  Short Term Goal 2: Increase strength R hip flexion 4+/5 to drive without difficulty  Short Term Goal 3: Increase strength L shld flexion 4+/5 to lift overhead    Long Term Goals  Time Frame for Long Term Goals : 10  Long Term Goal 1: Improve functional mobility with LEFS score >36/80 (from 29/80)    Post Treatment Pain:  3/10    Time In: 14;20    Time Out : 15:05        Timed Code Treatment Minutes: 45 Minutes  Total Treatment Time: 45 Minutes    Carmen Cohn, PT     Date: 2024

## 2024-11-12 ENCOUNTER — HOSPITAL ENCOUNTER (OUTPATIENT)
Dept: PHYSICAL THERAPY | Age: 79
Setting detail: THERAPIES SERIES
Discharge: HOME | End: 2024-11-12
Payer: MEDICARE

## 2024-11-12 ENCOUNTER — TELEPHONE (OUTPATIENT)
Dept: CARDIOLOGY CLINIC | Age: 79
End: 2024-11-12

## 2024-11-12 NOTE — PROGRESS NOTES
Physical Therapy  [facility]    Date: 2024  Patient Name: Quique Wray        : 1945       Patient called and he is not feeling like he is able to make it today.  He will return on Friday.        Juliana S Shock Date: 2024

## 2024-11-12 NOTE — TELEPHONE ENCOUNTER
Pt called in stating he is having hammer toe surgery per DR Almodovar on 12/10  Needing cleared and to hold coumadin   Dr Galan made aware pt had a recent   TIA    Per DR Galan pt to hold coumadin for 5 days  And will need bridged.      Coumadin clinic called informed Perez of bridging.

## 2024-11-15 ENCOUNTER — HOSPITAL ENCOUNTER (OUTPATIENT)
Dept: PHYSICAL THERAPY | Age: 79
Setting detail: THERAPIES SERIES
Discharge: HOME | End: 2024-11-15
Payer: MEDICARE

## 2024-11-15 PROCEDURE — 97110 THERAPEUTIC EXERCISES: CPT

## 2024-11-15 ASSESSMENT — PAIN SCALES - GENERAL: PAINLEVEL_OUTOF10: 3

## 2024-11-15 NOTE — PROGRESS NOTES
Phone: [phone]                 [facility]      Fax: [phone]                            Outpatient Physical Therapy                                                                            Daily Note    Date: 11/15/2024  Patient Name: Quique Wray        MRN: [id number]   ACCT#:  [id number]  : 1945  (79 y.o.)  Referring Provider (secondary): Dr. Cade         Diagnosis: Generalized Weakness, CVA  Treatment Diagnosis: Generalized weakness    Onset Date: 10/13/24  PT Insurance Information: MCR, Aetna  Total # of Visits Approved: 10 Per Physician Order  Total # of Visits to Date: 7  Canceled Appointment: 1  Plan of Care/Certification Expiration Date: 24    Pre-Treatment Pain:  3/10     Assessment  Assessment: Pain 3/10 in low back and legs. Patient walking with cane and moderate limp. Completed therex and neuro re-ed per Doc Flow. Strength L shld flexion 4+/5. Patient c/o L hand feels clumbsy/ lack of coordination makes activities difficult. Continue per plan.    Plan  Continue with current plan of care    Exercises/Modalities/Manual:  See DocFlow Sheet    Education: On ex form        Goals  (Total # of Visits to Date: 7)   Short Term Goals  Time Frame for Short Term Goals: 8  Short Term Goal 1: Patient to be educated on and independent with HEP  Short Term Goal 2: Increase strength R hip flexion 4+/5 to drive without difficulty  Short Term Goal 3: Increase strength L shld flexion 4+/5 to lift overhead-Met    Long Term Goals  Time Frame for Long Term Goals : 10  Long Term Goal 1: Improve functional mobility with LEFS score >36/80 (from 29/80)    Post Treatment Pain:  4/10    Time In: 1;35    Time Out : 14:20        Timed Code Treatment Minutes: 45 Minutes  Total Treatment Time: 45 Minutes    Carmen Cohn, PT     Date: 11/15/2024

## 2024-11-20 ENCOUNTER — HOSPITAL ENCOUNTER (OUTPATIENT)
Dept: PHYSICAL THERAPY | Age: 79
Setting detail: THERAPIES SERIES
Discharge: HOME | End: 2024-11-20
Payer: MEDICARE

## 2024-11-20 PROCEDURE — 97110 THERAPEUTIC EXERCISES: CPT

## 2024-11-20 RX ORDER — ENOXAPARIN SODIUM 150 MG/ML
120 INJECTION SUBCUTANEOUS EVERY 12 HOURS
Qty: 25 EACH | Refills: 1 | OUTPATIENT
Start: 2024-12-06

## 2024-11-20 ASSESSMENT — PAIN SCALES - GENERAL: PAINLEVEL_OUTOF10: 2

## 2024-11-20 NOTE — PROGRESS NOTES
Phone: [phone]                 [facility]      Fax: [phone]                            Outpatient Physical Therapy                                                                            Daily Note    Date: 2024  Patient Name: Quique Wray        MRN: [id number]   ACCT#:  [id number]  : 1945  (79 y.o.)    Referring Provider (secondary): Dr. Cade         Diagnosis: Generalized Weakness, CVA  Treatment Diagnosis: Generalized weakness    Onset Date: 10/13/24  PT Insurance Information: MCR, Aetna  Total # of Visits Approved: 10 Per Physician Order  Total # of Visits to Date: 8  Canceled Appointment: 1  Plan of Care/Certification Expiration Date: 24    Pre-Treatment Pain:  2/10     Assessment  Assessment: pt reports going to chiropractor d/t increased pain in his back. After adjustment feels improved but not completely better. Modified activity d/t pt request. Good tolerance overall.    Plan  Continue with current plan of care    Exercises/Modalities/Manual:  See DocFlow Sheet    Education: HEP          Goals  (Total # of Visits to Date: 8)   Short Term Goals  Time Frame for Short Term Goals: 8  Short Term Goal 1: Patient to be educated on and independent with HEP  STG Goal 1 Status:: Met  Short Term Goal 2: Increase strength R hip flexion 4+/5 to drive without difficulty  STG Goal 2 Status:: Not Met  Short Term Goal 3: Increase strength L shld flexion 4+/5 to lift overhead-Met    Long Term Goals  Time Frame for Long Term Goals : 10  Long Term Goal 1: Improve functional mobility with LEFS score >36/80 (from 29/80)    Post Treatment Pain:  2-3/10    Time In: 1417  Time Out: 1457  Timed Code Treatment Minutes: 40 Minutes  Total Treatment Time: 40 Minutes    Tomas Oreilly PTA     Date: 2024

## 2024-11-20 NOTE — PROGRESS NOTES
[facility]  Medication Management  ANTICOAGULATION BRIDGE        Referring Doctor: Dr Almodovar    Procedure: Hammer Toe Surgery     Procedure date: 12/10/24    Provider Clearance Dr Galan on 11/12/24     Bleeding risk (HasBled): 3    [id number] Score for Atrial Fibrillation Stroke Risk   Risk   Factors  Component Value   C CHF No 0   H HTN Yes 1   A2 Age >= 75 Yes,  (79 y.o.) 2   D DM No 0   S2 Prior Stroke/TIA Yes 2   V Vascular Disease No 0   A Age 65-74 No,  (79 y.o.) 0   Sc Sex male 0    [id number]  Score  5   Score last updated 11/20/24 8:42 AM EST    Click here for a link to the UpToDate guideline \"Atrial Fibrillation: Anticoagulation therapy to prevent embolization    Disclaimer: Risk Score calculation is dependent on accuracy of patient problem list and past encounter diagnosis.     Mr. Wray is a 79 y.o. male    Wt Readings from Last 1 Encounters:   10/25/24 117.1 kg (258 lb 3.2 oz)      Ht Readings from Last 1 Encounters:   10/14/24 1.727 m (5' 8\")     Ideal body weight: 68.4 kg (150 lb 12.7 oz)  Adjusted ideal body weight: 87.9 kg (193 lb 12.1 oz)      Creatinine (mg/dL)   Date Value   10/14/2024 1.1   09/05/2024 1.2   09/07/2023 0.9      Estimated Creatinine Clearance: 70 mL/min (based on SCr of 1.1 mg/dL).    INR (no units)   Date Value   10/14/2024 3.3   07/31/2024 2.5   06/18/2024 2.4   05/17/2024 2.2     INR,(POC) (no units)   Date Value   10/25/2024 2.8   09/13/2024 2.9       Hemoglobin (g/dL)   Date Value   10/14/2024 10.6 (L)   09/05/2024 11.0 (L)   09/07/2023 9.9 (L)     Hematocrit (%)   Date Value   10/14/2024 32.3 (L)   09/05/2024 33.6 (L)   09/07/2023 31.9 (L)     Platelet Count (k/uL)   Date Value   11/13/2011 137     Platelets (k/uL)   Date Value   10/14/2024 274   09/05/2024 291   09/07/2023 190         Lovenox Bridge Protocol      Patient to hold warfarin 5 days prior to surgery.  Last warfarin dose on 12/4/24.    Start Lovenox 120 mg twice daily (wt

## 2024-11-22 ENCOUNTER — HOSPITAL ENCOUNTER (OUTPATIENT)
Dept: PHYSICAL THERAPY | Age: 79
Setting detail: THERAPIES SERIES
Discharge: HOME | End: 2024-11-22
Payer: MEDICARE

## 2024-11-22 PROCEDURE — 97530 THERAPEUTIC ACTIVITIES: CPT

## 2024-11-22 ASSESSMENT — PAIN SCALES - GENERAL: PAINLEVEL_OUTOF10: 4

## 2024-11-22 NOTE — PROGRESS NOTES
Phone: [phone]                 [facility]      Fax: [phone]                            Outpatient Physical Therapy                                                                            Daily Note    Date: 2024  Patient Name: Quique Wray        MRN: [id number]   ACCT#:  [id number]  : 1945  (79 y.o.)    Referring Provider (secondary): Dr. Cade         Diagnosis: Generalized Weakness, CVA  Treatment Diagnosis: Generalized weakness    Onset Date: 10/13/24  PT Insurance Information: MCR, Aetna  Total # of Visits Approved: 10 Per Physician Order  Total # of Visits to Date: 9  Canceled Appointment: 1  Plan of Care/Certification Expiration Date: 24    Pre-Treatment Pain:  10     Assessment  Assessment: Pt reports increased hip pain left today. He fatigues with session requiring rests. Modified exercises with band as c/o too much pain in hip. Fair tolerance overall.    Plan  Continue with current plan of care    Exercises/Modalities/Manual:  See DocFlow Sheet    Education: HEP      Goals  (Total # of Visits to Date: 9)   Short Term Goals  Time Frame for Short Term Goals: 8  Short Term Goal 1: Patient to be educated on and independent with HEP  STG Goal 1 Status:: Met  Short Term Goal 2: Increase strength R hip flexion 4+/5 to drive without difficulty  STG Goal 2 Status:: Not Met  Short Term Goal 3: Increase strength L shld flexion 4+/5 to lift overhead-Met    Long Term Goals  Time Frame for Long Term Goals : 10  Long Term Goal 1: Improve functional mobility with LEFS score >36/80 (from 29/80)    Post Treatment Pain:  4-5/10    Time In: 1418  Time Out: 1457  Timed Code Treatment Minutes: 39 Minutes  Total Treatment Time: 39 Minutes    Tomas Oreilly PTA     Date: 2024

## 2024-11-25 ENCOUNTER — HOSPITAL ENCOUNTER (OUTPATIENT)
Dept: PHYSICAL THERAPY | Age: 79
Setting detail: THERAPIES SERIES
Discharge: HOME | End: 2024-11-25
Payer: MEDICARE

## 2024-11-25 PROCEDURE — 97110 THERAPEUTIC EXERCISES: CPT

## 2024-11-25 ASSESSMENT — PAIN SCALES - GENERAL: PAINLEVEL_OUTOF10: 4

## 2024-11-25 NOTE — PROGRESS NOTES
Phone: [phone]                 [facility]      Fax: [phone]                            Outpatient Physical Therapy                                                                            Daily Note    Date: 2024  Patient Name: Quique Wray        MRN: [id number]   ACCT#:  [id number]  : 1945  (79 y.o.)    Referring Provider (secondary): Dr. Cade         Diagnosis: Generalized Weakness, CVA  Treatment Diagnosis: Generalized weakness    Onset Date: 10/13/24  PT Insurance Information: MCR, Aetna  Total # of Visits Approved: 10 Per Physician Order  Total # of Visits to Date: 10  Canceled Appointment: 1  Plan of Care/Certification Expiration Date: 24    Pre-Treatment Pain:  4/10     Assessment  Assessment: Patient has completed 10 treatment sessions consisting of strengthening ex for the UE/LEs.  Subjectively, he notes improved UE strength however continues to use a cane with deviated gait pattern noting left LE weakness and sciatica pain.    Patient was issued written HEP Access Code [email or web address] for UE strengthening.   Based on limited progress as well as follow through at home, plan to hold/discharge further PT.    Plan  Place pt on hold/Discharge    Exercises/Modalities/Manual:  See DocFlow Sheet    Education: Issued written HEP for UE strengthening with weights and tband Access Code [email or web address]          Goals  (Total # of Visits to Date: 10)   Short Term Goals  Time Frame for Short Term Goals: 8  Short Term Goal 1: Patient to be educated on and independent with HEP  STG Goal 1 Status:: Met  Short Term Goal 2: Increase strength R hip flexion 4+/5 to drive without difficulty  STG Goal 2 Status:: Not Met  Short Term Goal 3: Increase strength L shld flexion 4+/5 to lift overhead-Met    Long Term Goals  Time Frame for Long Term Goals : 10  Long Term Goal 1: Improve functional mobility with LEFS score >36/80 (from )- NOT MET    Post Treatment Pain:  4/10    Time In: 1305    Time

## 2024-12-04 ENCOUNTER — ANTI-COAG VISIT (OUTPATIENT)
Age: 79
End: 2024-12-04
Payer: MEDICARE

## 2024-12-04 VITALS
DIASTOLIC BLOOD PRESSURE: 79 MMHG | WEIGHT: 256.2 LBS | SYSTOLIC BLOOD PRESSURE: 137 MMHG | HEART RATE: 73 BPM | BODY MASS INDEX: 38.96 KG/M2

## 2024-12-04 DIAGNOSIS — I63.9 STROKE OF UNKNOWN CAUSE (HCC): ICD-10-CM

## 2024-12-04 DIAGNOSIS — I63.9 ACUTE ISCHEMIC STROKE (HCC): Primary | ICD-10-CM

## 2024-12-04 LAB
INTERNATIONAL NORMALIZATION RATIO, POC: 2.9
PROTHROMBIN TIME, POC: 0

## 2024-12-04 PROCEDURE — 99211 OFF/OP EST MAY X REQ PHY/QHP: CPT | Performed by: PHARMACIST

## 2024-12-04 PROCEDURE — 85610 PROTHROMBIN TIME: CPT | Performed by: PHARMACIST

## 2024-12-04 RX ORDER — HYDROCODONE BITARTRATE AND ACETAMINOPHEN 5; 325 MG/1; MG/1
1 TABLET ORAL EVERY 6 HOURS PRN
COMMUNITY

## 2024-12-04 NOTE — PROGRESS NOTES
[facility]  Medication Management  ANTICOAGULATION    Referring Provider: Dr Davide Galan     GOAL INR: 2.0-3.0     TODAY'S INR: 2.9     WARFARIN Dosage: Continue 2.5 mg MWF, 5 mg all other days    INR (no units)   Date Value   10/14/2024 3.3   2024 2.5   2024 2.4   2024 2.2   2024 2.5   2024 2.1   2024 2.4     INR,(POC) (no units)   Date Value   2024 2.9   10/25/2024 2.8   2024 2.9       Hemoglobin   Date Value Ref Range Status   10/14/2024 10.6 (L) 13.5 - 17.5 g/dL Final     Hematocrit   Date Value Ref Range Status   10/14/2024 32.3 (L) 41.0 - 53.0 % Final     ALT   Date Value Ref Range Status   10/14/2024 14 5 - 41 U/L Final     AST   Date Value Ref Range Status   10/14/2024 21 <40 U/L Final       Medication changes:  Restarted Norco 5/325 mg and is taking 1 at bedtime as needed    Notes:    Fingerstick INR drawn per clinic protocol. Patient states no visible blood in urine, black tarry stool, falls or ER visits. Advises he had a slight bloody nose on  so only took 2.5 mg (vs 5 mg) on  but denies any other missed or extra doses of warfarin. He was restarted on Norco 5/325 mg and is taking 1 at bedtime as needed. No change in other maintenance medications or diet. Unfortunately his foot surgery scheduled for 12/10 was cancelled due to kidney stones so he will be scheduled for a lithotripsy prior to the foot surgery but no scheduled date yet. For now, his INR is therapeutic so will continue 2.5 mg MWF, 5 mg all other days and will recheck INR in 6 weeks. Patient acknowledges working in consult agreement with pharmacist as referred by his/her physician.    For Pharmacy Admin Tracking Only    Intervention Detail: Adherence Monitorin  Total # of Interventions Recommended: 1  Total # of Interventions Accepted: 1  Time Spent (min): 45    Giuseppe Blakely, PharmD 2024 2:51 PM

## 2024-12-11 ENCOUNTER — PROCEDURE VISIT (OUTPATIENT)
Dept: CARDIOLOGY CLINIC | Age: 79
End: 2024-12-11

## 2024-12-11 DIAGNOSIS — Z95.0 PACEMAKER: Primary | ICD-10-CM

## 2024-12-17 ENCOUNTER — APPOINTMENT (OUTPATIENT)
Age: 79
End: 2024-12-17
Payer: MEDICARE

## 2025-01-15 ENCOUNTER — ANTI-COAG VISIT (OUTPATIENT)
Age: 80
End: 2025-01-15
Payer: MEDICARE

## 2025-01-15 ENCOUNTER — HOSPITAL ENCOUNTER (OUTPATIENT)
Age: 80
Discharge: HOME | End: 2025-01-15
Payer: MEDICARE

## 2025-01-15 VITALS — BODY MASS INDEX: 39.35 KG/M2 | WEIGHT: 258.8 LBS

## 2025-01-15 DIAGNOSIS — I63.9 ACUTE ISCHEMIC STROKE (HCC): Primary | ICD-10-CM

## 2025-01-15 DIAGNOSIS — I63.9 STROKE OF UNKNOWN CAUSE (HCC): ICD-10-CM

## 2025-01-15 LAB
ANION GAP SERPL CALCULATED.3IONS-SCNC: 9 MMOL/L (ref 9–17)
BASOPHILS # BLD: 0.06 K/UL (ref 0–0.2)
BASOPHILS NFR BLD: 1 % (ref 0–2)
BUN SERPL-MCNC: 16 MG/DL (ref 8–23)
BUN/CREAT SERPL: 15 (ref 9–20)
CALCIUM SERPL-MCNC: 9 MG/DL (ref 8.6–10.4)
CHLORIDE SERPL-SCNC: 101 MMOL/L (ref 98–107)
CO2 SERPL-SCNC: 29 MMOL/L (ref 20–31)
CREAT SERPL-MCNC: 1.1 MG/DL (ref 0.7–1.2)
EOSINOPHIL # BLD: 0.22 K/UL (ref 0–0.4)
EOSINOPHILS RELATIVE PERCENT: 3 % (ref 0–5)
ERYTHROCYTE [DISTWIDTH] IN BLOOD BY AUTOMATED COUNT: 19 % (ref 12.1–15.2)
GFR, ESTIMATED: 69 ML/MIN/1.73M2
GLUCOSE SERPL-MCNC: 127 MG/DL (ref 70–99)
HCT VFR BLD AUTO: 33.5 % (ref 41–53)
HGB BLD-MCNC: 10.9 G/DL (ref 13.5–17.5)
IMM GRANULOCYTES # BLD AUTO: 0.01 K/UL (ref 0–0.3)
IMM GRANULOCYTES NFR BLD: 0 % (ref 0–5)
INTERNATIONAL NORMALIZATION RATIO, POC: 3.2
LYMPHOCYTES NFR BLD: 1.72 K/UL (ref 1–4.8)
LYMPHOCYTES RELATIVE PERCENT: 23 % (ref 13–44)
MCH RBC QN AUTO: 19.4 PG (ref 26–34)
MCHC RBC AUTO-ENTMCNC: 32.5 G/DL (ref 31–37)
MCV RBC AUTO: 59.6 FL (ref 80–100)
MONOCYTES NFR BLD: 0.46 K/UL (ref 0–1)
MONOCYTES NFR BLD: 6 % (ref 5–9)
MORPHOLOGY: ABNORMAL
NEUTROPHILS NFR BLD: 67 % (ref 39–75)
NEUTS SEG NFR BLD: 4.95 K/UL (ref 2.1–6.5)
PLATELET # BLD AUTO: 256 K/UL (ref 140–450)
PMV BLD AUTO: ABNORMAL FL (ref 6–12)
POTASSIUM SERPL-SCNC: 4.4 MMOL/L (ref 3.7–5.3)
PROTHROMBIN TIME, POC: 0
RBC # BLD AUTO: 5.62 M/UL (ref 4.5–5.9)
SODIUM SERPL-SCNC: 139 MMOL/L (ref 135–144)
WBC OTHER # BLD: 7.4 K/UL (ref 3.5–11)

## 2025-01-15 PROCEDURE — 99211 OFF/OP EST MAY X REQ PHY/QHP: CPT | Performed by: PHARMACIST

## 2025-01-15 PROCEDURE — 36415 COLL VENOUS BLD VENIPUNCTURE: CPT

## 2025-01-15 PROCEDURE — 80048 BASIC METABOLIC PNL TOTAL CA: CPT

## 2025-01-15 PROCEDURE — 85025 COMPLETE CBC W/AUTO DIFF WBC: CPT

## 2025-01-15 PROCEDURE — 85610 PROTHROMBIN TIME: CPT | Performed by: PHARMACIST

## 2025-01-16 PROCEDURE — 99211 OFF/OP EST MAY X REQ PHY/QHP: CPT | Performed by: PHARMACIST

## 2025-01-16 PROCEDURE — 85610 PROTHROMBIN TIME: CPT | Performed by: PHARMACIST

## 2025-01-16 NOTE — PROGRESS NOTES
physician.    For Pharmacy Admin Tracking Only    Intervention Detail: Adherence Monitorin and Dose Adjustment: 1, reason: Therapy Optimization  Total # of Interventions Recommended: 2  Total # of Interventions Accepted: 2  Time Spent (min): 30    Giuseppe Blakely PharmD 2025 12:12 PM

## 2025-01-29 ENCOUNTER — ANTI-COAG VISIT (OUTPATIENT)
Age: 80
End: 2025-01-29
Payer: MEDICARE

## 2025-01-29 VITALS
HEART RATE: 73 BPM | SYSTOLIC BLOOD PRESSURE: 103 MMHG | BODY MASS INDEX: 38.35 KG/M2 | WEIGHT: 252.2 LBS | DIASTOLIC BLOOD PRESSURE: 52 MMHG

## 2025-01-29 DIAGNOSIS — I63.9 STROKE OF UNKNOWN CAUSE (HCC): ICD-10-CM

## 2025-01-29 DIAGNOSIS — I63.9 ACUTE ISCHEMIC STROKE (HCC): Primary | ICD-10-CM

## 2025-01-29 LAB
INTERNATIONAL NORMALIZATION RATIO, POC: 1.8
PROTHROMBIN TIME, POC: 0

## 2025-01-29 PROCEDURE — 99211 OFF/OP EST MAY X REQ PHY/QHP: CPT | Performed by: PHARMACIST

## 2025-01-29 PROCEDURE — 85610 PROTHROMBIN TIME: CPT | Performed by: PHARMACIST

## 2025-01-29 NOTE — PROGRESS NOTES
[facility]ication [facility]  ANTICOAGULATION    Referring Provider: Dr Davide Galan     GOAL INR: 2.0-3.0     TODAY'S INR: 1.8     WARFARIN Dosage: 5 mg x 1, then resume 2.5 mg MWF, 5 mg all other days       INR (no units)   Date Value   10/14/2024 3.3   2024 2.5   2024 2.4   2024 2.2   2024 2.5   2024 2.1   2024 2.4     INR,(POC) (no units)   Date Value   2025 1.8   01/15/2025 3.2   2024 2.9   10/25/2024 2.8   2024 2.9       Hemoglobin   Date Value Ref Range Status   01/15/2025 10.9 (L) 13.5 - 17.5 g/dL Final     Hematocrit   Date Value Ref Range Status   01/15/2025 33.5 (L) 41.0 - 53.0 % Final     ALT   Date Value Ref Range Status   10/14/2024 14 5 - 41 U/L Final     AST   Date Value Ref Range Status   10/14/2024 21 <40 U/L Final       Medication changes:  None     Notes:    Fingerstick INR drawn per clinic protocol. Patient states no visible black tarry stool, falls or ER visits. Advises he has bruising from Lovenox shots and that he had blood in his urine after the procedure, as expected, which did clear up. States he only took 5 mg vs 7.5 mg on  because of the blood that he saw in his urine but denies any other missed or extra doses of warfarin. No change in other maintenance medications or in diet. INR is slightly subtherapeutic so will order 5 mg this evening before resuming 2.5 mg MWF, 5 mg all other days and will recheck INR in 3 weeks. Patient acknowledges working in consult agreement with pharmacist as referred by his/her physician.    For Pharmacy Admin Tracking Only    Intervention Detail: Adherence Monitorin and Dose Adjustment: 1, reason: Therapy Optimization  Total # of Interventions Recommended: 2  Total # of Interventions Accepted: 2  Time Spent (min): 20    Giuseppe Blakely, PharmD 2025 1:37 PM

## 2025-02-19 ENCOUNTER — ANTI-COAG VISIT (OUTPATIENT)
Age: 80
End: 2025-02-19
Payer: MEDICARE

## 2025-02-19 VITALS
DIASTOLIC BLOOD PRESSURE: 57 MMHG | HEART RATE: 81 BPM | WEIGHT: 242 LBS | SYSTOLIC BLOOD PRESSURE: 110 MMHG | BODY MASS INDEX: 36.8 KG/M2

## 2025-02-19 DIAGNOSIS — I63.9 STROKE OF UNKNOWN CAUSE (HCC): ICD-10-CM

## 2025-02-19 DIAGNOSIS — I63.9 ACUTE ISCHEMIC STROKE (HCC): Primary | ICD-10-CM

## 2025-02-19 LAB
INTERNATIONAL NORMALIZATION RATIO, POC: 7.6
PROTHROMBIN TIME, POC: 0

## 2025-02-19 PROCEDURE — 99211 OFF/OP EST MAY X REQ PHY/QHP: CPT | Performed by: PHARMACIST

## 2025-02-19 PROCEDURE — 85610 PROTHROMBIN TIME: CPT | Performed by: PHARMACIST

## 2025-02-19 RX ORDER — ACETAMINOPHEN/DIPHENHYDRAMINE 500MG-25MG
1 TABLET ORAL NIGHTLY PRN
COMMUNITY

## 2025-02-19 NOTE — PROGRESS NOTES
[facility]  Medication Management  ANTICOAGULATION    Referring Provider: Dr Davide Galan     GOAL INR: 2.0-3.0     TODAY'S INR: 7.6     WARFARIN Dosage: HOLD x 3, 2.5 mg x 1, then resume 2.5 mg MWF, 5 mg all other days     INR (no units)   Date Value   10/14/2024 3.3   07/31/2024 2.5   06/18/2024 2.4   05/17/2024 2.2   05/08/2024 2.5   03/27/2024 2.1   02/16/2024 2.4     INR,(POC) (no units)   Date Value   02/19/2025 7.6   01/29/2025 1.8   01/15/2025 3.2   12/04/2024 2.9   10/25/2024 2.8   09/13/2024 2.9       Hemoglobin   Date Value Ref Range Status   01/15/2025 10.9 (L) 13.5 - 17.5 g/dL Final     Hematocrit   Date Value Ref Range Status   01/15/2025 33.5 (L) 41.0 - 53.0 % Final     ALT   Date Value Ref Range Status   10/14/2024 14 5 - 41 U/L Final     AST   Date Value Ref Range Status   10/14/2024 21 <40 U/L Final       Medication changes:  Was on nitrofurantoin 100 mg BID from 2/7-2/14  Started drinking a BOOST daily  Started taking Tylenol PM at bedtime for sleep    Notes:    Fingerstick INR drawn per clinic protocol. Patient states no visible blood in urine, black tarry stool, falls or ER visits and denies any missed or extra doses of warfarin. States he was on nitrofurantoin 100 mg twice daily from 2/7-2/14 for a UTI. Has a follow up appointment on Friday. States he also started drinking a BOOST daily, started taking Tylenol PM at bedtime for sleep and that he has had decreased appetite/consumption. He confirms no other change in medication or diet. INR is very supratherapeutic today so will hold his dose for 3 days and will restart on Saturday with a half dose of 2.5 mg. After this, he is to resume 2.5 mg MWF, 5 mg all other days and will recheck INR in 2 weeks. He is also instructed to try to eat some high Vitamin K containing foods over the next couple days. Patient acknowledges working in consult agreement with pharmacist as referred by his/her physician.    For Pharmacy

## 2025-03-05 ENCOUNTER — ANTI-COAG VISIT (OUTPATIENT)
Age: 80
End: 2025-03-05
Payer: MEDICARE

## 2025-03-05 VITALS
WEIGHT: 239.6 LBS | DIASTOLIC BLOOD PRESSURE: 61 MMHG | SYSTOLIC BLOOD PRESSURE: 110 MMHG | HEART RATE: 87 BPM | BODY MASS INDEX: 36.43 KG/M2

## 2025-03-05 DIAGNOSIS — I63.9 ACUTE ISCHEMIC STROKE (HCC): Primary | ICD-10-CM

## 2025-03-05 DIAGNOSIS — I63.9 STROKE OF UNKNOWN CAUSE (HCC): ICD-10-CM

## 2025-03-05 LAB
INTERNATIONAL NORMALIZATION RATIO, POC: 3.8
PROTHROMBIN TIME, POC: 0

## 2025-03-05 PROCEDURE — 99211 OFF/OP EST MAY X REQ PHY/QHP: CPT | Performed by: PHARMACIST

## 2025-03-05 PROCEDURE — 85610 PROTHROMBIN TIME: CPT | Performed by: PHARMACIST

## 2025-03-05 NOTE — PROGRESS NOTES
[facility]ication Management  ANTICOAGULATION    Referring Provider: Dr Davide Galan     GOAL INR: 2.0-3.0     TODAY'S INR: 3.8     WARFARIN Dosage: HOLD x 1, then decrease dose to 2.5 mg MWF, 5 mg all other days     INR (no units)   Date Value   10/14/2024 3.3   2024 2.5   2024 2.4   2024 2.2   2024 2.5   2024 2.1   2024 2.4     INR,(POC) (no units)   Date Value   2025 3.8   2025 7.6   2025 1.8   01/15/2025 3.2   2024 2.9   10/25/2024 2.8   2024 2.9       Hemoglobin   Date Value Ref Range Status   01/15/2025 10.9 (L) 13.5 - 17.5 g/dL Final     Hematocrit   Date Value Ref Range Status   01/15/2025 33.5 (L) 41.0 - 53.0 % Final     ALT   Date Value Ref Range Status   10/14/2024 14 5 - 41 U/L Final     AST   Date Value Ref Range Status   10/14/2024 21 <40 U/L Final       Medication changes:  Was on an antibiotic from - but unsure of what it was    Notes:    Fingerstick INR drawn per clinic protocol. Patient states no visible blood in urine, black tarry stool, falls or ER visits and denies any missed or extra doses of warfarin. Was on an antibiotic from - but unsure of what it was. Also states that he stopped drinking a daily Boost. No other changes in medication or diet. INR is still supratherapeutic so will hold tonight's dose before decreasing his maintenance dose to 5 mg SuTT, 2.5 mg all other days and will recheck INR in 3 weeks. Patient acknowledges working in consult agreement with pharmacist as referred by his/her physician.    For Pharmacy Admin Tracking Only    Intervention Detail: Adherence Monitorin and Dose Adjustment: 1, reason: Therapy Optimization  Total # of Interventions Recommended: 2  Total # of Interventions Accepted: 2  Time Spent (min): 20    Caroline SmartD 3/5/2025 1:20 PM

## 2025-03-22 ENCOUNTER — HOSPITAL ENCOUNTER (EMERGENCY)
Age: 80
Discharge: HOME | End: 2025-03-22
Attending: FAMILY MEDICINE
Payer: MEDICARE

## 2025-03-22 ENCOUNTER — APPOINTMENT (OUTPATIENT)
Dept: GENERAL RADIOLOGY | Age: 80
End: 2025-03-22
Payer: MEDICARE

## 2025-03-22 VITALS
OXYGEN SATURATION: 94 % | RESPIRATION RATE: 18 BRPM | HEIGHT: 68 IN | HEART RATE: 83 BPM | DIASTOLIC BLOOD PRESSURE: 74 MMHG | BODY MASS INDEX: 35.46 KG/M2 | TEMPERATURE: 98.7 F | SYSTOLIC BLOOD PRESSURE: 121 MMHG | WEIGHT: 234 LBS

## 2025-03-22 DIAGNOSIS — R07.9 CHEST PAIN, UNSPECIFIED TYPE: Primary | ICD-10-CM

## 2025-03-22 LAB
ANION GAP SERPL CALCULATED.3IONS-SCNC: 10 MMOL/L (ref 9–17)
BASOPHILS # BLD: 0.03 K/UL (ref 0–0.2)
BASOPHILS NFR BLD: 0 % (ref 0–2)
BUN SERPL-MCNC: 15 MG/DL (ref 8–23)
CALCIUM SERPL-MCNC: 8.9 MG/DL (ref 8.6–10.4)
CHLORIDE SERPL-SCNC: 97 MMOL/L (ref 98–107)
CO2 SERPL-SCNC: 27 MMOL/L (ref 20–31)
CREAT SERPL-MCNC: 1.2 MG/DL (ref 0.7–1.2)
EKG ATRIAL RATE: 99 BPM
EKG P AXIS: 64 DEGREES
EKG P-R INTERVAL: 216 MS
EKG Q-T INTERVAL: 344 MS
EKG Q-T INTERVAL: 350 MS
EKG QRS DURATION: 106 MS
EKG QRS DURATION: 108 MS
EKG QTC CALCULATION (BAZETT): 418 MS
EKG QTC CALCULATION (BAZETT): 441 MS
EKG R AXIS: -3 DEGREES
EKG R AXIS: -6 DEGREES
EKG T AXIS: -106 DEGREES
EKG T AXIS: 104 DEGREES
EKG VENTRICULAR RATE: 86 BPM
EKG VENTRICULAR RATE: 99 BPM
EOSINOPHIL # BLD: 0.04 K/UL (ref 0–0.4)
EOSINOPHILS RELATIVE PERCENT: 0 % (ref 0–5)
ERYTHROCYTE [DISTWIDTH] IN BLOOD BY AUTOMATED COUNT: 20.4 % (ref 12.1–15.2)
GFR, ESTIMATED: 62 ML/MIN/1.73M2
GLUCOSE SERPL-MCNC: 164 MG/DL (ref 70–99)
HCT VFR BLD AUTO: 27.2 % (ref 41–53)
HGB BLD-MCNC: 8.6 G/DL (ref 13.5–17.5)
IMM GRANULOCYTES # BLD AUTO: 0.04 K/UL (ref 0–0.3)
IMM GRANULOCYTES NFR BLD: 0 % (ref 0–5)
INR PPP: 3.3
LYMPHOCYTES NFR BLD: 0.98 K/UL (ref 1–4.8)
LYMPHOCYTES RELATIVE PERCENT: 11 % (ref 13–44)
MCH RBC QN AUTO: 18.4 PG (ref 26–34)
MCHC RBC AUTO-ENTMCNC: 31.6 G/DL (ref 31–37)
MCV RBC AUTO: 58.1 FL (ref 80–100)
MONOCYTES NFR BLD: 0.53 K/UL (ref 0–1)
MONOCYTES NFR BLD: 6 % (ref 5–9)
MORPHOLOGY: ABNORMAL
NEUTROPHILS NFR BLD: 83 % (ref 39–75)
NEUTS SEG NFR BLD: 7.72 K/UL (ref 2.1–6.5)
PLATELET # BLD AUTO: 356 K/UL (ref 140–450)
PMV BLD AUTO: ABNORMAL FL (ref 6–12)
POTASSIUM SERPL-SCNC: 4 MMOL/L (ref 3.7–5.3)
PROTHROMBIN TIME: 32.6 SEC (ref 11.5–14.2)
RBC # BLD AUTO: 4.68 M/UL (ref 4.5–5.9)
SODIUM SERPL-SCNC: 134 MMOL/L (ref 135–144)
TROPONIN I SERPL HS-MCNC: 54 NG/L (ref 0–22)
TROPONIN I SERPL HS-MCNC: 58 NG/L (ref 0–22)
TROPONIN I SERPL HS-MCNC: 60 NG/L (ref 0–22)
WBC OTHER # BLD: 9.3 K/UL (ref 3.5–11)

## 2025-03-22 PROCEDURE — 80048 BASIC METABOLIC PNL TOTAL CA: CPT

## 2025-03-22 PROCEDURE — 71045 X-RAY EXAM CHEST 1 VIEW: CPT

## 2025-03-22 PROCEDURE — 36415 COLL VENOUS BLD VENIPUNCTURE: CPT

## 2025-03-22 PROCEDURE — 85610 PROTHROMBIN TIME: CPT

## 2025-03-22 PROCEDURE — 84484 ASSAY OF TROPONIN QUANT: CPT

## 2025-03-22 PROCEDURE — 99285 EMERGENCY DEPT VISIT HI MDM: CPT

## 2025-03-22 PROCEDURE — 85025 COMPLETE CBC W/AUTO DIFF WBC: CPT

## 2025-03-22 PROCEDURE — 93005 ELECTROCARDIOGRAM TRACING: CPT | Performed by: FAMILY MEDICINE

## 2025-03-22 RX ORDER — ASPIRIN 81 MG/1
324 TABLET, CHEWABLE ORAL ONCE
Status: DISCONTINUED | OUTPATIENT
Start: 2025-03-22 | End: 2025-03-23 | Stop reason: HOSPADM

## 2025-03-22 ASSESSMENT — PAIN - FUNCTIONAL ASSESSMENT: PAIN_FUNCTIONAL_ASSESSMENT: 0-10

## 2025-03-22 ASSESSMENT — HEART SCORE: ECG: NORMAL

## 2025-03-22 ASSESSMENT — PAIN DESCRIPTION - LOCATION: LOCATION: CHEST

## 2025-03-22 ASSESSMENT — PAIN DESCRIPTION - PAIN TYPE: TYPE: ACUTE PAIN

## 2025-03-22 ASSESSMENT — PAIN DESCRIPTION - DESCRIPTORS: DESCRIPTORS: ACHING

## 2025-03-22 ASSESSMENT — PAIN SCALES - GENERAL: PAINLEVEL_OUTOF10: 2

## 2025-03-22 ASSESSMENT — PAIN DESCRIPTION - ORIENTATION: ORIENTATION: LEFT

## 2025-03-22 ASSESSMENT — PAIN DESCRIPTION - FREQUENCY: FREQUENCY: INTERMITTENT

## 2025-03-22 NOTE — ED PROVIDER NOTES
[facility]  EMERGENCY DEPARTMENT ENCOUNTER      Pt Name: Quique Wray  MRN: [id number]  Birthdate 1945  Date of evaluation: 3/22/2025  Provider: Gurmeet Bryant MD    CHIEF COMPLAINT       Chief Complaint   Patient presents with    Chest Pain     Pt here for L sided cp, pt states pain began on Thursday. Saw a chiropracter today and pt reports improved pain.          HISTORY OF PRESENT ILLNESS      Quique Wray is a 80 y.o. male who presents to the emergency department via private vehicle with wife, patient requiring assistance to get from the vehicle to wheelchair, patient states been having some pain in the hand posterior left shoulder area 2 days prior, with some pain rating towards his left anterior lower chest, patient chiropractor actually came out and saw him and adjusted him, states initially had improvement of the discomfort though there was still some chest pain, he was advised to go to emergency room for evaluation.  Patient Nuys any shortness of breath nausea or diaphoresis.  Denies trauma or falls.  Patient does have known cardiac history including pacer device.    PCP: Rayo  Cards: Adama  Chiro: Stamp (sp?)        REVIEW OF SYSTEMS       Review of Systems   All other systems reviewed and are negative.        PAST MEDICAL HISTORY     Past Medical History:   Diagnosis Date    Cerebral artery occlusion with cerebral infarction (HCC)     Hypertension     Kidney stone          SURGICAL HISTORY       Past Surgical History:   Procedure Laterality Date    CATARACT REMOVAL Left     INSERTABLE CARDIAC MONITOR N/A 5/12/2021    LOOP RECORDER INSERT performed by Andrea Galan MD at [facility] OR    KNEE ARTHROSCOPY Right     LITHOTRIPSY      PACEMAKER INSERTION Left 6/29/2022    PACEMAKER INSERTION PERMANENT performed by Andrea Galan MD at [facility] OR         CURRENT MEDICATIONS       Discharge Medication List as of 3/22/2025 10:22 PM        CONTINUE these medications which have NOT CHANGED

## 2025-03-24 ENCOUNTER — TELEPHONE (OUTPATIENT)
Dept: CARDIOLOGY CLINIC | Age: 80
End: 2025-03-24

## 2025-03-24 LAB
EKG ATRIAL RATE: 99 BPM
EKG P AXIS: 64 DEGREES
EKG P-R INTERVAL: 216 MS
EKG Q-T INTERVAL: 344 MS
EKG Q-T INTERVAL: 350 MS
EKG QRS DURATION: 106 MS
EKG QRS DURATION: 108 MS
EKG QTC CALCULATION (BAZETT): 418 MS
EKG QTC CALCULATION (BAZETT): 441 MS
EKG R AXIS: -3 DEGREES
EKG R AXIS: -6 DEGREES
EKG T AXIS: -106 DEGREES
EKG T AXIS: 104 DEGREES
EKG VENTRICULAR RATE: 86 BPM
EKG VENTRICULAR RATE: 99 BPM

## 2025-03-24 PROCEDURE — 93010 ELECTROCARDIOGRAM REPORT: CPT | Performed by: INTERNAL MEDICINE

## 2025-03-24 NOTE — TELEPHONE ENCOUNTER
LAITH Bryant stopped over from ED this morning.  He had seen patient over the weekend.  Please review chart. He ordered a Lexiscan Stress test to be done and asked that we follow up to make sure scheduled.   The test has been scheduled for 3/31/25. Patient does not have an appt scheduled until Sept 2025.  Please advise if needs sooner appt.

## 2025-03-25 ENCOUNTER — APPOINTMENT (OUTPATIENT)
Dept: CT IMAGING | Age: 80
End: 2025-03-25
Payer: MEDICARE

## 2025-03-25 ENCOUNTER — HOSPITAL ENCOUNTER (EMERGENCY)
Age: 80
Discharge: ANOTHER HOSPITAL | End: 2025-03-25
Attending: EMERGENCY MEDICINE
Payer: MEDICARE

## 2025-03-25 VITALS
BODY MASS INDEX: 35.58 KG/M2 | RESPIRATION RATE: 22 BRPM | HEART RATE: 84 BPM | WEIGHT: 234 LBS | SYSTOLIC BLOOD PRESSURE: 119 MMHG | TEMPERATURE: 98.7 F | OXYGEN SATURATION: 94 % | DIASTOLIC BLOOD PRESSURE: 65 MMHG

## 2025-03-25 DIAGNOSIS — D50.9 IRON DEFICIENCY ANEMIA, UNSPECIFIED IRON DEFICIENCY ANEMIA TYPE: ICD-10-CM

## 2025-03-25 DIAGNOSIS — M54.59 INTRACTABLE LOW BACK PAIN: Primary | ICD-10-CM

## 2025-03-25 DIAGNOSIS — N30.00 ACUTE CYSTITIS WITHOUT HEMATURIA: ICD-10-CM

## 2025-03-25 LAB
-: ABNORMAL
ALBUMIN SERPL-MCNC: 3.1 G/DL (ref 3.5–5.2)
ALBUMIN/GLOB SERPL: 0.7 {RATIO} (ref 1–2.5)
ALP SERPL-CCNC: 104 U/L (ref 40–129)
ALT SERPL-CCNC: 33 U/L (ref 5–41)
ANION GAP SERPL CALCULATED.3IONS-SCNC: 12 MMOL/L (ref 9–17)
AST SERPL-CCNC: 53 U/L
BACTERIA URNS QL MICRO: ABNORMAL
BASOPHILS # BLD: 0.03 K/UL (ref 0–0.2)
BASOPHILS NFR BLD: 0 % (ref 0–2)
BILIRUB SERPL-MCNC: 1.6 MG/DL (ref 0.3–1.2)
BILIRUB UR QL STRIP: NEGATIVE
BUN SERPL-MCNC: 16 MG/DL (ref 8–23)
CALCIUM SERPL-MCNC: 8.7 MG/DL (ref 8.6–10.4)
CHLORIDE SERPL-SCNC: 95 MMOL/L (ref 98–107)
CLARITY UR: ABNORMAL
CO2 SERPL-SCNC: 23 MMOL/L (ref 20–31)
COLOR UR: YELLOW
COMMENT: ABNORMAL
CREAT SERPL-MCNC: 1.1 MG/DL (ref 0.7–1.2)
CRP SERPL HS-MCNC: 96.9 MG/L (ref 0–5)
EOSINOPHIL # BLD: 0.01 K/UL (ref 0–0.4)
EOSINOPHILS RELATIVE PERCENT: 0 % (ref 0–5)
EPI CELLS #/AREA URNS HPF: ABNORMAL /HPF
ERYTHROCYTE [DISTWIDTH] IN BLOOD BY AUTOMATED COUNT: 20.3 % (ref 12.1–15.2)
GFR, ESTIMATED: 69 ML/MIN/1.73M2
GLUCOSE SERPL-MCNC: 207 MG/DL (ref 70–99)
GLUCOSE UR STRIP-MCNC: NEGATIVE MG/DL
HCT VFR BLD AUTO: 25.8 % (ref 41–53)
HGB BLD-MCNC: 8.6 G/DL (ref 13.5–17.5)
HGB UR QL STRIP.AUTO: ABNORMAL
IMM GRANULOCYTES # BLD AUTO: 0.07 K/UL (ref 0–0.3)
IMM GRANULOCYTES NFR BLD: 1 % (ref 0–5)
INR PPP: 5
KETONES UR STRIP-MCNC: ABNORMAL MG/DL
LEUKOCYTE ESTERASE UR QL STRIP: ABNORMAL
LYMPHOCYTES NFR BLD: 1.29 K/UL (ref 1–4.8)
LYMPHOCYTES RELATIVE PERCENT: 10 % (ref 13–44)
MCH RBC QN AUTO: 18.8 PG (ref 26–34)
MCHC RBC AUTO-ENTMCNC: 33.3 G/DL (ref 31–37)
MCV RBC AUTO: 56.5 FL (ref 80–100)
MONOCYTES NFR BLD: 0.71 K/UL (ref 0–1)
MONOCYTES NFR BLD: 6 % (ref 5–9)
MORPHOLOGY: ABNORMAL
NEUTROPHILS NFR BLD: 83 % (ref 39–75)
NEUTS SEG NFR BLD: 10.53 K/UL (ref 2.1–6.5)
NITRITE UR QL STRIP: NEGATIVE
PH UR STRIP: 5 [PH] (ref 5–8)
PLATELET # BLD AUTO: 276 K/UL (ref 140–450)
PMV BLD AUTO: ABNORMAL FL (ref 6–12)
POTASSIUM SERPL-SCNC: 4 MMOL/L (ref 3.7–5.3)
PROT SERPL-MCNC: 7.5 G/DL (ref 6.4–8.3)
PROT UR STRIP-MCNC: ABNORMAL MG/DL
PROTHROMBIN TIME: 45.2 SEC (ref 11.5–14.2)
RBC # BLD AUTO: 4.57 M/UL (ref 4.5–5.9)
RBC #/AREA URNS HPF: ABNORMAL /HPF (ref 0–2)
SODIUM SERPL-SCNC: 130 MMOL/L (ref 135–144)
SP GR UR STRIP: 1.01 (ref 1–1.03)
TROPONIN I SERPL HS-MCNC: 69 NG/L (ref 0–22)
UROBILINOGEN UR STRIP-ACNC: ABNORMAL EU/DL (ref 0–1)
WBC #/AREA URNS HPF: ABNORMAL /HPF
WBC OTHER # BLD: 12.6 K/UL (ref 3.5–11)

## 2025-03-25 PROCEDURE — 86140 C-REACTIVE PROTEIN: CPT

## 2025-03-25 PROCEDURE — 80053 COMPREHEN METABOLIC PANEL: CPT

## 2025-03-25 PROCEDURE — 85610 PROTHROMBIN TIME: CPT

## 2025-03-25 PROCEDURE — 84484 ASSAY OF TROPONIN QUANT: CPT

## 2025-03-25 PROCEDURE — 96375 TX/PRO/DX INJ NEW DRUG ADDON: CPT

## 2025-03-25 PROCEDURE — 99285 EMERGENCY DEPT VISIT HI MDM: CPT

## 2025-03-25 PROCEDURE — 2500000003 HC RX 250 WO HCPCS: Performed by: EMERGENCY MEDICINE

## 2025-03-25 PROCEDURE — 96374 THER/PROPH/DIAG INJ IV PUSH: CPT

## 2025-03-25 PROCEDURE — 96376 TX/PRO/DX INJ SAME DRUG ADON: CPT

## 2025-03-25 PROCEDURE — 72131 CT LUMBAR SPINE W/O DYE: CPT

## 2025-03-25 PROCEDURE — 6360000002 HC RX W HCPCS: Performed by: EMERGENCY MEDICINE

## 2025-03-25 PROCEDURE — 74177 CT ABD & PELVIS W/CONTRAST: CPT

## 2025-03-25 PROCEDURE — 6360000004 HC RX CONTRAST MEDICATION: Performed by: EMERGENCY MEDICINE

## 2025-03-25 PROCEDURE — 87077 CULTURE AEROBIC IDENTIFY: CPT

## 2025-03-25 PROCEDURE — 87186 SC STD MICRODIL/AGAR DIL: CPT

## 2025-03-25 PROCEDURE — 81001 URINALYSIS AUTO W/SCOPE: CPT

## 2025-03-25 PROCEDURE — 87086 URINE CULTURE/COLONY COUNT: CPT

## 2025-03-25 PROCEDURE — 85025 COMPLETE CBC W/AUTO DIFF WBC: CPT

## 2025-03-25 PROCEDURE — 93005 ELECTROCARDIOGRAM TRACING: CPT | Performed by: EMERGENCY MEDICINE

## 2025-03-25 PROCEDURE — 2580000003 HC RX 258: Performed by: EMERGENCY MEDICINE

## 2025-03-25 RX ORDER — 0.9 % SODIUM CHLORIDE 0.9 %
1000 INTRAVENOUS SOLUTION INTRAVENOUS ONCE
Status: COMPLETED | OUTPATIENT
Start: 2025-03-25 | End: 2025-03-25

## 2025-03-25 RX ORDER — IOPAMIDOL 755 MG/ML
75 INJECTION, SOLUTION INTRAVASCULAR
Status: COMPLETED | OUTPATIENT
Start: 2025-03-25 | End: 2025-03-25

## 2025-03-25 RX ORDER — FENTANYL CITRATE 50 UG/ML
100 INJECTION, SOLUTION INTRAMUSCULAR; INTRAVENOUS ONCE
Refills: 0 | Status: COMPLETED | OUTPATIENT
Start: 2025-03-25 | End: 2025-03-25

## 2025-03-25 RX ORDER — ONDANSETRON 2 MG/ML
4 INJECTION INTRAMUSCULAR; INTRAVENOUS ONCE
Status: COMPLETED | OUTPATIENT
Start: 2025-03-25 | End: 2025-03-25

## 2025-03-25 RX ADMIN — ONDANSETRON 4 MG: 2 INJECTION INTRAMUSCULAR; INTRAVENOUS at 10:56

## 2025-03-25 RX ADMIN — ONDANSETRON 4 MG: 2 INJECTION INTRAMUSCULAR; INTRAVENOUS at 17:16

## 2025-03-25 RX ADMIN — SODIUM CHLORIDE 1000 ML: 9 INJECTION, SOLUTION INTRAVENOUS at 10:58

## 2025-03-25 RX ADMIN — IOPAMIDOL 75 ML: 755 INJECTION, SOLUTION INTRAVENOUS at 09:56

## 2025-03-25 RX ADMIN — HYDROMORPHONE HYDROCHLORIDE 1 MG: 1 INJECTION, SOLUTION INTRAMUSCULAR; INTRAVENOUS; SUBCUTANEOUS at 17:16

## 2025-03-25 RX ADMIN — FENTANYL CITRATE 100 MCG: 50 INJECTION, SOLUTION INTRAMUSCULAR; INTRAVENOUS at 09:18

## 2025-03-25 RX ADMIN — HYDROMORPHONE HYDROCHLORIDE 1 MG: 1 INJECTION, SOLUTION INTRAMUSCULAR; INTRAVENOUS; SUBCUTANEOUS at 12:19

## 2025-03-25 RX ADMIN — WATER 1000 MG: 1 INJECTION INTRAMUSCULAR; INTRAVENOUS; SUBCUTANEOUS at 15:48

## 2025-03-25 ASSESSMENT — PAIN SCALES - GENERAL
PAINLEVEL_OUTOF10: 2
PAINLEVEL_OUTOF10: 4
PAINLEVEL_OUTOF10: 3
PAINLEVEL_OUTOF10: 3
PAINLEVEL_OUTOF10: 2
PAINLEVEL_OUTOF10: 2

## 2025-03-25 ASSESSMENT — PAIN DESCRIPTION - LOCATION
LOCATION: BACK

## 2025-03-25 ASSESSMENT — PAIN DESCRIPTION - ORIENTATION
ORIENTATION: LOWER;MID
ORIENTATION: MID;LOWER
ORIENTATION: LOWER;MID

## 2025-03-25 ASSESSMENT — PAIN DESCRIPTION - PAIN TYPE
TYPE: ACUTE PAIN

## 2025-03-25 ASSESSMENT — PAIN DESCRIPTION - DESCRIPTORS
DESCRIPTORS: DULL
DESCRIPTORS: DULL;SHARP
DESCRIPTORS: DULL;SHARP
DESCRIPTORS: DULL

## 2025-03-25 ASSESSMENT — LIFESTYLE VARIABLES
HOW OFTEN DO YOU HAVE A DRINK CONTAINING ALCOHOL: NEVER
HOW MANY STANDARD DRINKS CONTAINING ALCOHOL DO YOU HAVE ON A TYPICAL DAY: PATIENT DOES NOT DRINK

## 2025-03-25 ASSESSMENT — PAIN - FUNCTIONAL ASSESSMENT
PAIN_FUNCTIONAL_ASSESSMENT: PREVENTS OR INTERFERES WITH ALL ACTIVE AND SOME PASSIVE ACTIVITIES
PAIN_FUNCTIONAL_ASSESSMENT: 0-10

## 2025-03-25 ASSESSMENT — PAIN DESCRIPTION - FREQUENCY: FREQUENCY: INTERMITTENT

## 2025-03-25 NOTE — ED NOTES
Pt unable to sit up in cart due to pain when moved. States that he feels nauseous. Dr. Werner aware.

## 2025-03-25 NOTE — ED NOTES
Attempted to raise head of bed and pt could not tolerate movement. He screamed out in pain. Dr. Werner at cart side.

## 2025-03-25 NOTE — ED PROVIDER NOTES
EMERGENCY DEPARTMENT ENCOUNTER      CHIEF COMPLAINT    Chief Complaint   Patient presents with    Back Pain     Back pain since Friday. Here over the weekend with chest pain after an appointment with chiropractor.       HPI    Quique Wray is a 80 y.o. male who presentsto ED via EMS with low back pain since Friday.  Patient was seen by chiropractor on Friday.  Patient was seen on Saturday for chest pain.  Today patient was not able to get out of bed.  Patient has severe low back pain.  Patient has prior history of hypertension kidney stones and CVA without any residual deficits.  PAST MEDICAL HISTORY    Past Medical History:   Diagnosis Date    Cerebral artery occlusion with cerebral infarction (HCC)     Hypertension     Kidney stone        SURGICAL HISTORY    Past Surgical History:   Procedure Laterality Date    CATARACT REMOVAL Left     INSERTABLE CARDIAC MONITOR N/A 5/12/2021    LOOP RECORDER INSERT performed by Andrea Galan MD at [facility]    KNEE ARTHROSCOPY Right     LITHOTRIPSY      PACEMAKER INSERTION Left 6/29/2022    PACEMAKER INSERTION PERMANENT performed by Andrea Galan MD at [facility] OR       CURRENT MEDICATIONS    Current Outpatient Rx   Medication Sig Dispense Refill    diphenhydrAMINE-APAP, sleep, (TYLENOL PM EXTRA STRENGTH)  MG tablet Take 1 tablet by mouth nightly as needed for Sleep      aspirin 81 MG EC tablet Take 1 tablet by mouth daily      metoprolol tartrate (LOPRESSOR) 25 MG tablet Take 1 tablet by mouth 2 times daily 180 tablet 5    lisinopril (PRINIVIL;ZESTRIL) 5 MG tablet Take 1 tablet by mouth 2 times daily 180 tablet 3    warfarin (COUMADIN) 5 MG tablet TAKE 1 TABLET BY MOUTH DAILY 90 tablet 3    atorvastatin (LIPITOR) 20 MG tablet TAKE 1 TABLET BY MOUTH DAILY 90 tablet 5    diphenhydrAMINE-APAP, sleep, (TYLENOL PM EXTRA STRENGTH)  MG tablet Take 1 tablet by mouth nightly as needed for Sleep      Cholecalciferol (VITAMIN D3) 125 MCG (5000 UT) TABS Take 1 tablet by mouth

## 2025-03-26 LAB
EKG ATRIAL RATE: 93 BPM
EKG P AXIS: 28 DEGREES
EKG P-R INTERVAL: 202 MS
EKG Q-T INTERVAL: 358 MS
EKG QRS DURATION: 122 MS
EKG QTC CALCULATION (BAZETT): 445 MS
EKG R AXIS: -28 DEGREES
EKG T AXIS: 88 DEGREES
EKG VENTRICULAR RATE: 93 BPM

## 2025-03-26 PROCEDURE — 93010 ELECTROCARDIOGRAM REPORT: CPT | Performed by: INTERNAL MEDICINE

## 2025-03-27 ENCOUNTER — RESULTS FOLLOW-UP (OUTPATIENT)
Dept: EMERGENCY DEPT | Age: 80
End: 2025-03-27

## 2025-03-27 LAB
MICROORGANISM SPEC CULT: ABNORMAL
SPECIMEN DESCRIPTION: ABNORMAL

## 2025-03-27 RX ORDER — AMINOPHYLLINE 25 MG/ML
50 INJECTION, SOLUTION INTRAVENOUS PRN
OUTPATIENT
Start: 2025-03-27 | End: 2025-03-27

## 2025-03-27 RX ORDER — REGADENOSON 0.08 MG/ML
0.4 INJECTION, SOLUTION INTRAVENOUS
OUTPATIENT
Start: 2025-03-27

## 2025-03-27 RX ORDER — SODIUM CHLORIDE 0.9 % (FLUSH) 0.9 %
5-40 SYRINGE (ML) INJECTION PRN
OUTPATIENT
Start: 2025-03-27 | End: 2025-03-27

## 2025-03-28 ENCOUNTER — APPOINTMENT (OUTPATIENT)
Age: 80
End: 2025-03-28
Payer: MEDICARE

## 2025-03-31 ENCOUNTER — HOSPITAL ENCOUNTER (OUTPATIENT)
Age: 80
Discharge: HOME | End: 2025-03-31
Attending: FAMILY MEDICINE

## 2025-04-11 ENCOUNTER — HOSPITAL ENCOUNTER (INPATIENT)
Age: 80
LOS: 38 days | Discharge: HOME HEALTH | DRG: 947 | End: 2025-05-19
Attending: INTERNAL MEDICINE | Admitting: INTERNAL MEDICINE
Payer: MEDICARE

## 2025-04-11 DIAGNOSIS — R53.1 WEAKNESS: ICD-10-CM

## 2025-04-11 DIAGNOSIS — I10 ESSENTIAL HYPERTENSION: ICD-10-CM

## 2025-04-11 DIAGNOSIS — E44.0 MODERATE MALNUTRITION: Primary | ICD-10-CM

## 2025-04-11 LAB
C DIFF GDH + TOXINS A+B STL QL IA.RAPID: NEGATIVE
INR PPP: 1.9
PROTHROMBIN TIME: 22.7 SEC (ref 11.5–14.2)
SPECIMEN DESCRIPTION: NORMAL

## 2025-04-11 PROCEDURE — 1200000002 HC SEMI PRIVATE SWING BED

## 2025-04-11 PROCEDURE — 85610 PROTHROMBIN TIME: CPT

## 2025-04-11 PROCEDURE — 6360000002 HC RX W HCPCS: Performed by: INTERNAL MEDICINE

## 2025-04-11 PROCEDURE — 87324 CLOSTRIDIUM AG IA: CPT

## 2025-04-11 PROCEDURE — 2580000003 HC RX 258: Performed by: INTERNAL MEDICINE

## 2025-04-11 PROCEDURE — 87449 NOS EACH ORGANISM AG IA: CPT

## 2025-04-11 PROCEDURE — 6370000000 HC RX 637 (ALT 250 FOR IP): Performed by: INTERNAL MEDICINE

## 2025-04-11 PROCEDURE — 36415 COLL VENOUS BLD VENIPUNCTURE: CPT

## 2025-04-11 PROCEDURE — 2500000003 HC RX 250 WO HCPCS: Performed by: INTERNAL MEDICINE

## 2025-04-11 RX ORDER — SODIUM CHLORIDE 0.9 % (FLUSH) 0.9 %
5-40 SYRINGE (ML) INJECTION PRN
Status: DISCONTINUED | OUTPATIENT
Start: 2025-04-11 | End: 2025-05-19 | Stop reason: HOSPADM

## 2025-04-11 RX ORDER — POLYETHYLENE GLYCOL 3350 17 G/17G
17 POWDER, FOR SOLUTION ORAL DAILY
Status: DISPENSED | OUTPATIENT
Start: 2025-04-11 | End: 2025-04-26

## 2025-04-11 RX ORDER — ATORVASTATIN CALCIUM 20 MG/1
20 TABLET, FILM COATED ORAL NIGHTLY
Status: DISCONTINUED | OUTPATIENT
Start: 2025-04-11 | End: 2025-05-19 | Stop reason: HOSPADM

## 2025-04-11 RX ORDER — ALLOPURINOL 100 MG/1
300 TABLET ORAL
Status: DISCONTINUED | OUTPATIENT
Start: 2025-04-11 | End: 2025-05-19 | Stop reason: HOSPADM

## 2025-04-11 RX ORDER — GLUCOSAMINE/CHONDR SU A SOD 750-600 MG
1 TABLET ORAL NIGHTLY
Status: DISCONTINUED | OUTPATIENT
Start: 2025-04-11 | End: 2025-04-11 | Stop reason: CLARIF

## 2025-04-11 RX ORDER — QUETIAPINE FUMARATE 25 MG/1
25 TABLET, FILM COATED ORAL NIGHTLY
Status: DISCONTINUED | OUTPATIENT
Start: 2025-04-11 | End: 2025-05-19 | Stop reason: HOSPADM

## 2025-04-11 RX ORDER — VITAMIN B COMPLEX
5000 TABLET ORAL
Status: DISCONTINUED | OUTPATIENT
Start: 2025-04-11 | End: 2025-05-19 | Stop reason: HOSPADM

## 2025-04-11 RX ORDER — LANSOPRAZOLE 30 MG/1
30 TABLET, ORALLY DISINTEGRATING, DELAYED RELEASE ORAL
Status: DISCONTINUED | OUTPATIENT
Start: 2025-04-12 | End: 2025-05-19 | Stop reason: HOSPADM

## 2025-04-11 RX ORDER — SODIUM CHLORIDE 0.9 % (FLUSH) 0.9 %
5-40 SYRINGE (ML) INJECTION EVERY 12 HOURS SCHEDULED
Status: DISCONTINUED | OUTPATIENT
Start: 2025-04-11 | End: 2025-05-19 | Stop reason: HOSPADM

## 2025-04-11 RX ORDER — VITAMIN B COMPLEX/FOLIC ACID 0.4 MG
1 TABLET ORAL
Status: DISCONTINUED | OUTPATIENT
Start: 2025-04-11 | End: 2025-05-19 | Stop reason: HOSPADM

## 2025-04-11 RX ORDER — WARFARIN SODIUM 5 MG/1
5 TABLET ORAL
Status: COMPLETED | OUTPATIENT
Start: 2025-04-11 | End: 2025-04-11

## 2025-04-11 RX ORDER — SODIUM CHLORIDE 9 MG/ML
INJECTION, SOLUTION INTRAVENOUS PRN
Status: DISCONTINUED | OUTPATIENT
Start: 2025-04-11 | End: 2025-05-19 | Stop reason: HOSPADM

## 2025-04-11 RX ORDER — SENNA AND DOCUSATE SODIUM 50; 8.6 MG/1; MG/1
1 TABLET, FILM COATED ORAL 2 TIMES DAILY
Status: DISCONTINUED | OUTPATIENT
Start: 2025-04-11 | End: 2025-05-19 | Stop reason: HOSPADM

## 2025-04-11 RX ORDER — VITS A,C,E/LUTEIN/MINERALS 300MCG-200
1 TABLET ORAL
Status: DISCONTINUED | OUTPATIENT
Start: 2025-04-11 | End: 2025-05-19 | Stop reason: HOSPADM

## 2025-04-11 RX ORDER — LACTOBACILLUS RHAMNOSUS GG 10B CELL
1 CAPSULE ORAL 2 TIMES DAILY WITH MEALS
Status: DISCONTINUED | OUTPATIENT
Start: 2025-04-11 | End: 2025-05-19 | Stop reason: HOSPADM

## 2025-04-11 RX ORDER — METOPROLOL TARTRATE 25 MG/1
25 TABLET, FILM COATED ORAL 2 TIMES DAILY
Status: DISCONTINUED | OUTPATIENT
Start: 2025-04-11 | End: 2025-05-19 | Stop reason: HOSPADM

## 2025-04-11 RX ADMIN — METOPROLOL TARTRATE 25 MG: 25 TABLET, FILM COATED ORAL at 20:42

## 2025-04-11 RX ADMIN — Medication 1 TABLET: at 15:47

## 2025-04-11 RX ADMIN — ATORVASTATIN CALCIUM 20 MG: 20 TABLET, FILM COATED ORAL at 20:42

## 2025-04-11 RX ADMIN — ALLOPURINOL 300 MG: 100 TABLET ORAL at 15:48

## 2025-04-11 RX ADMIN — SODIUM CHLORIDE, PRESERVATIVE FREE 10 ML: 5 INJECTION INTRAVENOUS at 20:42

## 2025-04-11 RX ADMIN — WATER 2000 MG: 1 INJECTION INTRAMUSCULAR; INTRAVENOUS; SUBCUTANEOUS at 20:42

## 2025-04-11 RX ADMIN — Medication 1 CAPSULE: at 15:48

## 2025-04-11 RX ADMIN — QUETIAPINE 25 MG: 25 TABLET ORAL at 20:43

## 2025-04-11 RX ADMIN — WATER 2000 MG: 1 INJECTION INTRAMUSCULAR; INTRAVENOUS; SUBCUTANEOUS at 15:42

## 2025-04-11 RX ADMIN — AMPICILLIN SODIUM 2000 MG: 2 INJECTION, POWDER, FOR SOLUTION INTRAMUSCULAR; INTRAVENOUS at 21:22

## 2025-04-11 RX ADMIN — Medication 1 TABLET: at 15:51

## 2025-04-11 RX ADMIN — WARFARIN SODIUM 5 MG: 5 TABLET ORAL at 18:27

## 2025-04-11 RX ADMIN — CHOLECALCIFEROL TAB 25 MCG (1000 UNIT) 5000 UNITS: 25 TAB at 15:47

## 2025-04-11 RX ADMIN — SENNOSIDES AND DOCUSATE SODIUM 1 TABLET: 50; 8.6 TABLET ORAL at 20:42

## 2025-04-11 ASSESSMENT — PAIN SCALES - GENERAL
PAINLEVEL_OUTOF10: 0
PAINLEVEL_OUTOF10: 4

## 2025-04-12 LAB
ANION GAP SERPL CALCULATED.3IONS-SCNC: 11 MMOL/L (ref 9–17)
BASOPHILS # BLD: 0.03 K/UL (ref 0–0.2)
BASOPHILS NFR BLD: 1 % (ref 0–2)
BUN SERPL-MCNC: 15 MG/DL (ref 8–23)
CALCIUM SERPL-MCNC: 8.9 MG/DL (ref 8.6–10.4)
CHLORIDE SERPL-SCNC: 101 MMOL/L (ref 98–107)
CO2 SERPL-SCNC: 25 MMOL/L (ref 20–31)
CREAT SERPL-MCNC: 1.2 MG/DL (ref 0.7–1.2)
CRP SERPL HS-MCNC: 17.7 MG/L (ref 0–5)
EOSINOPHIL # BLD: 0.22 K/UL (ref 0–0.4)
EOSINOPHILS RELATIVE PERCENT: 4 % (ref 0–5)
ERYTHROCYTE [DISTWIDTH] IN BLOOD BY AUTOMATED COUNT: 27.6 % (ref 12.1–15.2)
ERYTHROCYTE [SEDIMENTATION RATE] IN BLOOD BY PHOTOMETRIC METHOD: 123 MM/HR (ref 0–20)
GFR, ESTIMATED: 62 ML/MIN/1.73M2
GLUCOSE SERPL-MCNC: 146 MG/DL (ref 70–99)
HCT VFR BLD AUTO: 25.9 % (ref 41–53)
HGB BLD-MCNC: 8.3 G/DL (ref 13.5–17.5)
IMM GRANULOCYTES # BLD AUTO: 0.03 K/UL (ref 0–0.3)
IMM GRANULOCYTES NFR BLD: 1 % (ref 0–5)
INR PPP: 2.2
LYMPHOCYTES NFR BLD: 1.2 K/UL (ref 1–4.8)
LYMPHOCYTES RELATIVE PERCENT: 19 % (ref 13–44)
MCH RBC QN AUTO: 20.5 PG (ref 26–34)
MCHC RBC AUTO-ENTMCNC: 32 G/DL (ref 31–37)
MCV RBC AUTO: 64.1 FL (ref 80–100)
MONOCYTES NFR BLD: 0.34 K/UL (ref 0–1)
MONOCYTES NFR BLD: 5 % (ref 5–9)
MORPHOLOGY: ABNORMAL
NEUTROPHILS NFR BLD: 71 % (ref 39–75)
NEUTS SEG NFR BLD: 4.44 K/UL (ref 2.1–6.5)
PLATELET # BLD AUTO: 212 K/UL (ref 140–450)
PMV BLD AUTO: ABNORMAL FL (ref 6–12)
POTASSIUM SERPL-SCNC: 4.3 MMOL/L (ref 3.7–5.3)
PROTHROMBIN TIME: 25.1 SEC (ref 11.5–14.2)
RBC # BLD AUTO: 4.04 M/UL (ref 4.5–5.9)
SODIUM SERPL-SCNC: 137 MMOL/L (ref 135–144)
WBC OTHER # BLD: 6.3 K/UL (ref 3.5–11)

## 2025-04-12 PROCEDURE — 94664 DEMO&/EVAL PT USE INHALER: CPT

## 2025-04-12 PROCEDURE — 6360000002 HC RX W HCPCS: Performed by: INTERNAL MEDICINE

## 2025-04-12 PROCEDURE — 97162 PT EVAL MOD COMPLEX 30 MIN: CPT

## 2025-04-12 PROCEDURE — 85025 COMPLETE CBC W/AUTO DIFF WBC: CPT

## 2025-04-12 PROCEDURE — 1200000002 HC SEMI PRIVATE SWING BED

## 2025-04-12 PROCEDURE — 2580000003 HC RX 258: Performed by: INTERNAL MEDICINE

## 2025-04-12 PROCEDURE — 85610 PROTHROMBIN TIME: CPT

## 2025-04-12 PROCEDURE — 94761 N-INVAS EAR/PLS OXIMETRY MLT: CPT

## 2025-04-12 PROCEDURE — 2500000003 HC RX 250 WO HCPCS: Performed by: INTERNAL MEDICINE

## 2025-04-12 PROCEDURE — 6370000000 HC RX 637 (ALT 250 FOR IP): Performed by: INTERNAL MEDICINE

## 2025-04-12 PROCEDURE — 97166 OT EVAL MOD COMPLEX 45 MIN: CPT

## 2025-04-12 PROCEDURE — 36415 COLL VENOUS BLD VENIPUNCTURE: CPT

## 2025-04-12 PROCEDURE — 94640 AIRWAY INHALATION TREATMENT: CPT

## 2025-04-12 PROCEDURE — 85652 RBC SED RATE AUTOMATED: CPT

## 2025-04-12 PROCEDURE — 80048 BASIC METABOLIC PNL TOTAL CA: CPT

## 2025-04-12 PROCEDURE — 86140 C-REACTIVE PROTEIN: CPT

## 2025-04-12 RX ORDER — WARFARIN SODIUM 2.5 MG/1
2.5 TABLET ORAL
Status: COMPLETED | OUTPATIENT
Start: 2025-04-12 | End: 2025-04-12

## 2025-04-12 RX ORDER — LIDOCAINE 4 G/G
1 PATCH TOPICAL DAILY
Status: DISCONTINUED | OUTPATIENT
Start: 2025-04-12 | End: 2025-04-30

## 2025-04-12 RX ORDER — ACETAMINOPHEN 325 MG/1
650 TABLET ORAL EVERY 4 HOURS PRN
Status: DISCONTINUED | OUTPATIENT
Start: 2025-04-12 | End: 2025-05-19 | Stop reason: HOSPADM

## 2025-04-12 RX ADMIN — Medication 5 MG: at 21:59

## 2025-04-12 RX ADMIN — METOPROLOL TARTRATE 25 MG: 25 TABLET, FILM COATED ORAL at 08:26

## 2025-04-12 RX ADMIN — ATORVASTATIN CALCIUM 20 MG: 20 TABLET, FILM COATED ORAL at 19:55

## 2025-04-12 RX ADMIN — WATER 2000 MG: 1 INJECTION INTRAMUSCULAR; INTRAVENOUS; SUBCUTANEOUS at 08:24

## 2025-04-12 RX ADMIN — LANSOPRAZOLE 30 MG: 30 TABLET, ORALLY DISINTEGRATING, DELAYED RELEASE ORAL at 06:12

## 2025-04-12 RX ADMIN — Medication 1 TABLET: at 11:30

## 2025-04-12 RX ADMIN — AMPICILLIN SODIUM 2000 MG: 2 INJECTION, POWDER, FOR SOLUTION INTRAMUSCULAR; INTRAVENOUS at 14:37

## 2025-04-12 RX ADMIN — AMPICILLIN SODIUM 2000 MG: 2 INJECTION, POWDER, FOR SOLUTION INTRAMUSCULAR; INTRAVENOUS at 03:38

## 2025-04-12 RX ADMIN — QUETIAPINE 25 MG: 25 TABLET ORAL at 19:55

## 2025-04-12 RX ADMIN — ALLOPURINOL 300 MG: 100 TABLET ORAL at 11:30

## 2025-04-12 RX ADMIN — ACETAMINOPHEN 650 MG: 325 TABLET, FILM COATED ORAL at 14:39

## 2025-04-12 RX ADMIN — AMPICILLIN SODIUM 2000 MG: 2 INJECTION, POWDER, FOR SOLUTION INTRAMUSCULAR; INTRAVENOUS at 21:28

## 2025-04-12 RX ADMIN — WARFARIN SODIUM 2.5 MG: 2.5 TABLET ORAL at 17:02

## 2025-04-12 RX ADMIN — Medication 1 CAPSULE: at 17:02

## 2025-04-12 RX ADMIN — METOPROLOL TARTRATE 25 MG: 25 TABLET, FILM COATED ORAL at 19:55

## 2025-04-12 RX ADMIN — Medication 1 CAPSULE: at 08:25

## 2025-04-12 RX ADMIN — SODIUM CHLORIDE, PRESERVATIVE FREE 10 ML: 5 INJECTION INTRAVENOUS at 08:25

## 2025-04-12 RX ADMIN — CHOLECALCIFEROL TAB 25 MCG (1000 UNIT) 5000 UNITS: 25 TAB at 11:30

## 2025-04-12 RX ADMIN — WATER 2000 MG: 1 INJECTION INTRAMUSCULAR; INTRAVENOUS; SUBCUTANEOUS at 20:31

## 2025-04-12 RX ADMIN — SODIUM CHLORIDE, PRESERVATIVE FREE 10 ML: 5 INJECTION INTRAVENOUS at 19:55

## 2025-04-12 RX ADMIN — AMPICILLIN SODIUM 2000 MG: 2 INJECTION, POWDER, FOR SOLUTION INTRAMUSCULAR; INTRAVENOUS at 09:07

## 2025-04-12 ASSESSMENT — PAIN DESCRIPTION - ORIENTATION: ORIENTATION: RIGHT

## 2025-04-12 ASSESSMENT — PAIN SCALES - GENERAL
PAINLEVEL_OUTOF10: 0
PAINLEVEL_OUTOF10: 5
PAINLEVEL_OUTOF10: 4
PAINLEVEL_OUTOF10: 0

## 2025-04-12 ASSESSMENT — PAIN DESCRIPTION - DESCRIPTORS: DESCRIPTORS: ACHING;DULL

## 2025-04-12 ASSESSMENT — PAIN DESCRIPTION - LOCATION: LOCATION: HIP

## 2025-04-12 ASSESSMENT — PAIN - FUNCTIONAL ASSESSMENT: PAIN_FUNCTIONAL_ASSESSMENT: PREVENTS OR INTERFERES SOME ACTIVE ACTIVITIES AND ADLS

## 2025-04-12 NOTE — THERAPY EVALUATION
[facility]  Phone: [phone]    Occupational Therapy Evaluation    Date: 2025  Patient Name: Quique Wray        MRN: [id number]    : 1945  (80 y.o.)  Gender: male   Referring Practitioner: Dr. Bowens  Diagnosis: Weakness  Additional Pertinent Hx: Admitted to [facility] on 2025 due to weakness. Referred to OT services to assess ability to care for self.    Past Medical History:   Diagnosis Date    Cerebral artery occlusion with cerebral infarction (HCC)     Hypertension     Kidney stone      Past Surgical History:   Procedure Laterality Date    CATARACT REMOVAL Left     INSERTABLE CARDIAC MONITOR N/A 2021    LOOP RECORDER INSERT performed by Andrea Galan MD at [facility] OR    KNEE ARTHROSCOPY Right     LITHOTRIPSY      PACEMAKER INSERTION Left 2022    PACEMAKER INSERTION PERMANENT performed by Andrea Galan MD at [facility] OR     Subjective:     Subjective: Patient was lying supine in bed upon OT arrival. Was agreeable to OT evaluation.    Objective:     Social/Functional History:  Lives With: Spouse  Type of Home: House  Home Layout: Two level, Bed/Bath upstairs  Home Access: Stairs to enter with rails  Bathroom Shower/Tub: Walk-in shower  Bathroom Toilet: Handicap height  Bathroom Equipment: Grab bars around toilet, Shower chair  Home Equipment: Cane    PLOF:  Receives Help From: Family  Prior Level of Assist for ADLs: Needs assistance  Prior Level of Assist for Homemaking: Needs assistance  Prior Level of Assist for Transfers: Needs assistance    ADLs:  Feeding: Setup, Supervision  Grooming: Minimal assistance (in sitting due to safety, MIN A due to decreased sitting balance)  UE Bathing: Minimal assistance (due to decreased sitting balance)  LE Bathing: Maximum assistance  UE Dressing: Minimal assistance  LE Dressing: Maximum assistance  Toileting: Maximum assistance    Transfers:  Supine to Sit: 2 Person assistance, Substantial/Maximal assistance  Sit to Supine:

## 2025-04-12 NOTE — H&P
reconciliation; the patient is in an emergent medical situation where delaying treatment would jeopardize the patient's health.  (MIPS Performance exception / exclusion)  ( )  I did not confirm, update or review the patient's current list of medications today.  (Does not satisfy MIPS Performance)        Advanced Care Plan    (x)  I confirmed that the patient's Advanced Care Plan is present, code status documented, or surrogate decision maker is listed in the patient's medical record.  ( )  The patient's advanced care plan is not present because:  (select)   ( ) I confirmed today that the patient does not wish or was not able to name a surrogate decision maker or provide an Advance Care Plan.   ( ) Hospice care is currently being provided or has been provided this calender year.  ( )  I did not confirm today the presence of an Advance Care Plan or surrogate decision maker documented within the patient's medical record. (Does not satisfy MIPS performance).            Rob Bowens MD, MD  Admitting Hospitalist

## 2025-04-12 NOTE — THERAPY EVALUATION
[facility]  Physical Therapy  Evaluation  Date: 2025  Patient Name: Quique Wray        MRN: [id number]    : 1945  (80 y.o.)  Gender: male         Additional Pertinent Hx: Patient admitted with weakness and confusion.  Gives prior level of function but often off topic. Appears confused, question accuracy today.  Will have to verify with his wife if able.   Past Medical History:   Diagnosis Date    Cerebral artery occlusion with cerebral infarction (HCC)     Hypertension     Kidney stone      Past Surgical History:   Procedure Laterality Date    CATARACT REMOVAL Left     INSERTABLE CARDIAC MONITOR N/A 2021    LOOP RECORDER INSERT performed by Andrea Galan MD at [facility] OR    KNEE ARTHROSCOPY Right     LITHOTRIPSY      PACEMAKER INSERTION Left 2022    PACEMAKER INSERTION PERMANENT performed by Anrdea Galan MD at [facility] OR       Restrictions         Subjective         Pain Level: 0    Orientation       Home Living / Prior Level of Function  Social/Functional History  Lives With: Spouse  Type of Home: House  Home Layout: Two level, Bed/Bath upstairs  Home Access: Stairs to enter with rails  Entrance Stairs - Number of Steps: 3 with railing on top 2  Bathroom Shower/Tub: Walk-in shower  Bathroom Toilet: Handicap height  Bathroom Equipment: Grab bars around toilet, Shower chair  Home Equipment: Cane  Receives Help From: Family  Prior Level of Assist for ADLs: Needs assistance  Prior Level of Assist for Homemaking: Needs assistance  Prior Level of Assist for Ambulation: Independent household ambulator, with or without device  Prior Level of Assist for Transfers: Needs assistance    Objective                                     Bed mobility  Supine to Sit: 2 Person assistance, Substantial/Maximal assistance  Sit to Supine: Partial/Moderate assistance (impulsivity noted)  Bed Mobility Comments: Unsafe with sit to supine.  Move abruptly to lie onto right side.  Requires Max A to roll from

## 2025-04-13 ENCOUNTER — APPOINTMENT (OUTPATIENT)
Dept: GENERAL RADIOLOGY | Age: 80
DRG: 947 | End: 2025-04-13
Attending: INTERNAL MEDICINE
Payer: MEDICARE

## 2025-04-13 LAB
INR PPP: 2.8
PROTHROMBIN TIME: 30.5 SEC (ref 11.5–14.2)

## 2025-04-13 PROCEDURE — 2580000003 HC RX 258: Performed by: INTERNAL MEDICINE

## 2025-04-13 PROCEDURE — 85610 PROTHROMBIN TIME: CPT

## 2025-04-13 PROCEDURE — 1200000002 HC SEMI PRIVATE SWING BED

## 2025-04-13 PROCEDURE — 71045 X-RAY EXAM CHEST 1 VIEW: CPT

## 2025-04-13 PROCEDURE — 94640 AIRWAY INHALATION TREATMENT: CPT

## 2025-04-13 PROCEDURE — 2500000003 HC RX 250 WO HCPCS: Performed by: INTERNAL MEDICINE

## 2025-04-13 PROCEDURE — 94761 N-INVAS EAR/PLS OXIMETRY MLT: CPT

## 2025-04-13 PROCEDURE — 6370000000 HC RX 637 (ALT 250 FOR IP): Performed by: INTERNAL MEDICINE

## 2025-04-13 PROCEDURE — 36415 COLL VENOUS BLD VENIPUNCTURE: CPT

## 2025-04-13 PROCEDURE — 97110 THERAPEUTIC EXERCISES: CPT

## 2025-04-13 PROCEDURE — 6360000002 HC RX W HCPCS: Performed by: INTERNAL MEDICINE

## 2025-04-13 RX ORDER — TRAMADOL HYDROCHLORIDE 50 MG/1
50 TABLET ORAL EVERY 6 HOURS PRN
Status: DISCONTINUED | OUTPATIENT
Start: 2025-04-13 | End: 2025-04-18

## 2025-04-13 RX ORDER — SODIUM CHLORIDE 9 MG/ML
INJECTION, SOLUTION INTRAVENOUS CONTINUOUS
Status: DISCONTINUED | OUTPATIENT
Start: 2025-04-13 | End: 2025-04-18

## 2025-04-13 RX ORDER — WARFARIN SODIUM 1 MG/1
0.5 TABLET ORAL
Status: COMPLETED | OUTPATIENT
Start: 2025-04-13 | End: 2025-04-13

## 2025-04-13 RX ADMIN — ACETAMINOPHEN 650 MG: 325 TABLET, FILM COATED ORAL at 03:34

## 2025-04-13 RX ADMIN — WATER 2000 MG: 1 INJECTION INTRAMUSCULAR; INTRAVENOUS; SUBCUTANEOUS at 10:16

## 2025-04-13 RX ADMIN — CHOLECALCIFEROL TAB 25 MCG (1000 UNIT) 5000 UNITS: 25 TAB at 12:41

## 2025-04-13 RX ADMIN — WARFARIN SODIUM 0.5 MG: 1 TABLET ORAL at 18:47

## 2025-04-13 RX ADMIN — WATER 2000 MG: 1 INJECTION INTRAMUSCULAR; INTRAVENOUS; SUBCUTANEOUS at 20:49

## 2025-04-13 RX ADMIN — AMPICILLIN SODIUM 2000 MG: 2 INJECTION, POWDER, FOR SOLUTION INTRAMUSCULAR; INTRAVENOUS at 10:28

## 2025-04-13 RX ADMIN — METOPROLOL TARTRATE 25 MG: 25 TABLET, FILM COATED ORAL at 10:17

## 2025-04-13 RX ADMIN — Medication 1 CAPSULE: at 10:16

## 2025-04-13 RX ADMIN — METOPROLOL TARTRATE 25 MG: 25 TABLET, FILM COATED ORAL at 20:48

## 2025-04-13 RX ADMIN — TRAMADOL HYDROCHLORIDE 50 MG: 50 TABLET, COATED ORAL at 18:49

## 2025-04-13 RX ADMIN — ATORVASTATIN CALCIUM 20 MG: 20 TABLET, FILM COATED ORAL at 20:49

## 2025-04-13 RX ADMIN — AMPICILLIN SODIUM 2000 MG: 2 INJECTION, POWDER, FOR SOLUTION INTRAMUSCULAR; INTRAVENOUS at 15:57

## 2025-04-13 RX ADMIN — SODIUM CHLORIDE, PRESERVATIVE FREE 10 ML: 5 INJECTION INTRAVENOUS at 11:00

## 2025-04-13 RX ADMIN — SODIUM CHLORIDE, PRESERVATIVE FREE 10 ML: 5 INJECTION INTRAVENOUS at 20:49

## 2025-04-13 RX ADMIN — Medication 1 TABLET: at 12:42

## 2025-04-13 RX ADMIN — SODIUM CHLORIDE, PRESERVATIVE FREE 10 ML: 5 INJECTION INTRAVENOUS at 10:18

## 2025-04-13 RX ADMIN — Medication 5 MG: at 20:48

## 2025-04-13 RX ADMIN — AMPICILLIN SODIUM 2000 MG: 2 INJECTION, POWDER, FOR SOLUTION INTRAMUSCULAR; INTRAVENOUS at 20:52

## 2025-04-13 RX ADMIN — SODIUM CHLORIDE: 0.9 INJECTION, SOLUTION INTRAVENOUS at 15:53

## 2025-04-13 RX ADMIN — LANSOPRAZOLE 30 MG: 30 TABLET, ORALLY DISINTEGRATING, DELAYED RELEASE ORAL at 06:07

## 2025-04-13 RX ADMIN — Medication 1 TABLET: at 12:41

## 2025-04-13 RX ADMIN — ALLOPURINOL 300 MG: 100 TABLET ORAL at 12:42

## 2025-04-13 RX ADMIN — ACETAMINOPHEN 650 MG: 325 TABLET, FILM COATED ORAL at 15:51

## 2025-04-13 RX ADMIN — Medication 1 CAPSULE: at 18:47

## 2025-04-13 RX ADMIN — ACETAMINOPHEN 650 MG: 325 TABLET, FILM COATED ORAL at 10:17

## 2025-04-13 RX ADMIN — QUETIAPINE 25 MG: 25 TABLET ORAL at 21:08

## 2025-04-13 RX ADMIN — AMPICILLIN SODIUM 2000 MG: 2 INJECTION, POWDER, FOR SOLUTION INTRAMUSCULAR; INTRAVENOUS at 03:31

## 2025-04-13 ASSESSMENT — PAIN DESCRIPTION - ORIENTATION
ORIENTATION: RIGHT;LEFT
ORIENTATION: RIGHT

## 2025-04-13 ASSESSMENT — PAIN - FUNCTIONAL ASSESSMENT
PAIN_FUNCTIONAL_ASSESSMENT: PREVENTS OR INTERFERES SOME ACTIVE ACTIVITIES AND ADLS
PAIN_FUNCTIONAL_ASSESSMENT: ACTIVITIES ARE NOT PREVENTED
PAIN_FUNCTIONAL_ASSESSMENT: PREVENTS OR INTERFERES SOME ACTIVE ACTIVITIES AND ADLS

## 2025-04-13 ASSESSMENT — PAIN DESCRIPTION - DESCRIPTORS
DESCRIPTORS: ACHING

## 2025-04-13 ASSESSMENT — PAIN SCALES - GENERAL
PAINLEVEL_OUTOF10: 1
PAINLEVEL_OUTOF10: 2
PAINLEVEL_OUTOF10: 7
PAINLEVEL_OUTOF10: 3
PAINLEVEL_OUTOF10: 4

## 2025-04-13 ASSESSMENT — PAIN DESCRIPTION - LOCATION
LOCATION: LEG
LOCATION: BACK
LOCATION: BACK
LOCATION: HIP

## 2025-04-13 NOTE — FLOWSHEET NOTE
04/13/25 1525   Treatment Team Notification   Reason for Communication Review case   Name of Team Member Notified Dr. Bowens   Treatment Team Role Attending Provider   Method of Communication Secure Message     Decreased urine output for this shift. Bladder scan reveals 185 mL. Patient's mouth is dry. Oral intake encouraged by this RN and family numerous times. Fine crackles to bases. Dr. Bowens notified of such. New order given to start IVF and CXR.

## 2025-04-14 PROBLEM — E44.0 MODERATE MALNUTRITION: Status: ACTIVE | Noted: 2025-04-14

## 2025-04-14 LAB
ANION GAP SERPL CALCULATED.3IONS-SCNC: 10 MMOL/L (ref 9–17)
BASOPHILS # BLD: ABNORMAL K/UL (ref 0–0.2)
BASOPHILS NFR BLD: ABNORMAL % (ref 0–2)
BUN SERPL-MCNC: 15 MG/DL (ref 8–23)
CALCIUM SERPL-MCNC: 8.3 MG/DL (ref 8.6–10.4)
CHLORIDE SERPL-SCNC: 102 MMOL/L (ref 98–107)
CO2 SERPL-SCNC: 26 MMOL/L (ref 20–31)
CREAT SERPL-MCNC: 0.9 MG/DL (ref 0.7–1.2)
EOSINOPHIL # BLD: 0.34 K/UL (ref 0–0.4)
EOSINOPHILS RELATIVE PERCENT: 6 % (ref 0–5)
ERYTHROCYTE [DISTWIDTH] IN BLOOD BY AUTOMATED COUNT: 26.9 % (ref 12.1–15.2)
GFR, ESTIMATED: 87 ML/MIN/1.73M2
GLUCOSE SERPL-MCNC: 126 MG/DL (ref 70–99)
HCT VFR BLD AUTO: 23.8 % (ref 41–53)
HGB BLD-MCNC: 7.6 G/DL (ref 13.5–17.5)
IMM GRANULOCYTES # BLD AUTO: ABNORMAL K/UL (ref 0–0.3)
IMM GRANULOCYTES NFR BLD: ABNORMAL %
INR PPP: 2.9
LYMPHOCYTES NFR BLD: 1.34 K/UL (ref 1–4.8)
LYMPHOCYTES RELATIVE PERCENT: 24 % (ref 13–44)
MCH RBC QN AUTO: 20.7 PG (ref 26–34)
MCHC RBC AUTO-ENTMCNC: 31.9 G/DL (ref 31–37)
MCV RBC AUTO: 64.9 FL (ref 80–100)
MONOCYTES NFR BLD: 0.22 K/UL (ref 0–1)
MONOCYTES NFR BLD: 4 % (ref 5–9)
MORPHOLOGY: ABNORMAL
NEUTROPHILS NFR BLD: 66 % (ref 39–75)
NEUTS SEG NFR BLD: 3.7 K/UL (ref 2.1–6.5)
PLATELET # BLD AUTO: 200 K/UL (ref 140–450)
PMV BLD AUTO: ABNORMAL FL (ref 6–12)
POTASSIUM SERPL-SCNC: 4.1 MMOL/L (ref 3.7–5.3)
PROTHROMBIN TIME: 31.2 SEC (ref 11.5–14.2)
RBC # BLD AUTO: 3.67 M/UL (ref 4.5–5.9)
SODIUM SERPL-SCNC: 138 MMOL/L (ref 135–144)
WBC OTHER # BLD: 5.6 K/UL (ref 3.5–11)

## 2025-04-14 PROCEDURE — 94640 AIRWAY INHALATION TREATMENT: CPT

## 2025-04-14 PROCEDURE — 92610 EVALUATE SWALLOWING FUNCTION: CPT

## 2025-04-14 PROCEDURE — 2500000003 HC RX 250 WO HCPCS: Performed by: INTERNAL MEDICINE

## 2025-04-14 PROCEDURE — 97535 SELF CARE MNGMENT TRAINING: CPT

## 2025-04-14 PROCEDURE — 36415 COLL VENOUS BLD VENIPUNCTURE: CPT

## 2025-04-14 PROCEDURE — 94761 N-INVAS EAR/PLS OXIMETRY MLT: CPT

## 2025-04-14 PROCEDURE — 80048 BASIC METABOLIC PNL TOTAL CA: CPT

## 2025-04-14 PROCEDURE — 6360000002 HC RX W HCPCS: Performed by: INTERNAL MEDICINE

## 2025-04-14 PROCEDURE — 1200000002 HC SEMI PRIVATE SWING BED

## 2025-04-14 PROCEDURE — 85025 COMPLETE CBC W/AUTO DIFF WBC: CPT

## 2025-04-14 PROCEDURE — 97530 THERAPEUTIC ACTIVITIES: CPT

## 2025-04-14 PROCEDURE — 2580000003 HC RX 258: Performed by: INTERNAL MEDICINE

## 2025-04-14 PROCEDURE — 6370000000 HC RX 637 (ALT 250 FOR IP): Performed by: INTERNAL MEDICINE

## 2025-04-14 PROCEDURE — 85610 PROTHROMBIN TIME: CPT

## 2025-04-14 RX ORDER — WARFARIN SODIUM 1 MG/1
2 TABLET ORAL
Status: COMPLETED | OUTPATIENT
Start: 2025-04-14 | End: 2025-04-14

## 2025-04-14 RX ADMIN — LANSOPRAZOLE 30 MG: 30 TABLET, ORALLY DISINTEGRATING, DELAYED RELEASE ORAL at 06:03

## 2025-04-14 RX ADMIN — AMPICILLIN SODIUM 2000 MG: 2 INJECTION, POWDER, FOR SOLUTION INTRAMUSCULAR; INTRAVENOUS at 09:01

## 2025-04-14 RX ADMIN — CHOLECALCIFEROL TAB 25 MCG (1000 UNIT) 5000 UNITS: 25 TAB at 11:46

## 2025-04-14 RX ADMIN — TRAMADOL HYDROCHLORIDE 50 MG: 50 TABLET, COATED ORAL at 11:46

## 2025-04-14 RX ADMIN — AMPICILLIN SODIUM 2000 MG: 2 INJECTION, POWDER, FOR SOLUTION INTRAMUSCULAR; INTRAVENOUS at 21:43

## 2025-04-14 RX ADMIN — AMPICILLIN SODIUM 2000 MG: 2 INJECTION, POWDER, FOR SOLUTION INTRAMUSCULAR; INTRAVENOUS at 15:36

## 2025-04-14 RX ADMIN — AMPICILLIN SODIUM 2000 MG: 2 INJECTION, POWDER, FOR SOLUTION INTRAMUSCULAR; INTRAVENOUS at 04:12

## 2025-04-14 RX ADMIN — ALLOPURINOL 300 MG: 100 TABLET ORAL at 11:46

## 2025-04-14 RX ADMIN — WATER 2000 MG: 1 INJECTION INTRAMUSCULAR; INTRAVENOUS; SUBCUTANEOUS at 08:14

## 2025-04-14 RX ADMIN — Medication 1 CAPSULE: at 15:36

## 2025-04-14 RX ADMIN — ACETAMINOPHEN 650 MG: 325 TABLET, FILM COATED ORAL at 15:40

## 2025-04-14 RX ADMIN — WATER 2000 MG: 1 INJECTION INTRAMUSCULAR; INTRAVENOUS; SUBCUTANEOUS at 21:23

## 2025-04-14 RX ADMIN — SODIUM CHLORIDE: 0.9 INJECTION, SOLUTION INTRAVENOUS at 13:59

## 2025-04-14 RX ADMIN — METOPROLOL TARTRATE 25 MG: 25 TABLET, FILM COATED ORAL at 08:13

## 2025-04-14 RX ADMIN — ACETAMINOPHEN 650 MG: 325 TABLET, FILM COATED ORAL at 08:13

## 2025-04-14 RX ADMIN — ATORVASTATIN CALCIUM 20 MG: 20 TABLET, FILM COATED ORAL at 21:24

## 2025-04-14 RX ADMIN — SODIUM CHLORIDE, PRESERVATIVE FREE 10 ML: 5 INJECTION INTRAVENOUS at 21:23

## 2025-04-14 RX ADMIN — Medication 1 CAPSULE: at 08:13

## 2025-04-14 RX ADMIN — TRAMADOL HYDROCHLORIDE 50 MG: 50 TABLET, COATED ORAL at 04:12

## 2025-04-14 RX ADMIN — QUETIAPINE 25 MG: 25 TABLET ORAL at 21:24

## 2025-04-14 RX ADMIN — Medication 1 TABLET: at 11:49

## 2025-04-14 RX ADMIN — Medication 1 TABLET: at 11:46

## 2025-04-14 RX ADMIN — METOPROLOL TARTRATE 25 MG: 25 TABLET, FILM COATED ORAL at 21:24

## 2025-04-14 RX ADMIN — WARFARIN SODIUM 2 MG: 1 TABLET ORAL at 17:40

## 2025-04-14 ASSESSMENT — PAIN SCALES - GENERAL
PAINLEVEL_OUTOF10: 0
PAINLEVEL_OUTOF10: 4
PAINLEVEL_OUTOF10: 2
PAINLEVEL_OUTOF10: 3
PAINLEVEL_OUTOF10: 7
PAINLEVEL_OUTOF10: 0
PAINLEVEL_OUTOF10: 2
PAINLEVEL_OUTOF10: 0

## 2025-04-14 ASSESSMENT — PAIN DESCRIPTION - LOCATION
LOCATION: BACK
LOCATION: HIP

## 2025-04-14 ASSESSMENT — PAIN - FUNCTIONAL ASSESSMENT
PAIN_FUNCTIONAL_ASSESSMENT: NONE - DENIES PAIN
PAIN_FUNCTIONAL_ASSESSMENT: ACTIVITIES ARE NOT PREVENTED
PAIN_FUNCTIONAL_ASSESSMENT: PREVENTS OR INTERFERES SOME ACTIVE ACTIVITIES AND ADLS
PAIN_FUNCTIONAL_ASSESSMENT: PREVENTS OR INTERFERES SOME ACTIVE ACTIVITIES AND ADLS

## 2025-04-14 ASSESSMENT — PAIN DESCRIPTION - ORIENTATION
ORIENTATION: MID;LOWER
ORIENTATION: LOWER
ORIENTATION: MID
ORIENTATION: RIGHT

## 2025-04-14 ASSESSMENT — PAIN DESCRIPTION - DESCRIPTORS
DESCRIPTORS: ACHING

## 2025-04-14 NOTE — CARE COORDINATION
Case Management Assessment  Initial Evaluation    Date/Time of Evaluation: 4/14/2025 2:16 PM  Assessment Completed by: Rick Altamirano RN    If patient is discharged prior to next notation, then this note serves as note for discharge by case management.    Patient Name: Quique Wray                   YOB: 1945  Diagnosis: Weakness [R53.1]                   Date / Time: 4/11/2025  1:00 PM    Patient Admission Status: SKILLED IP   Readmission Risk (Low < 19, Mod (19-27), High > 27): Readmission Risk Score: 20.3    Current PCP: Vernon Cade MD  PCP verified by CM? (P) Yes (Dr Cade)    Chart Reviewed: Yes      History Provided by: (P) Patient, Significant Other  Patient Orientation: (P) Alert and Oriented, Person, Place, Situation, Self    Patient Cognition: (P) Alert    Hospitalization in the last 30 days (Readmission):  No    If yes, Readmission Assessment in CM Navigator will be completed.    Advance Directives:      Code Status: Full Code   Patient's Primary Decision Maker is: (P) Named in Scanned ACP Document    Primary Decision Maker (Active): Sherie Wray - Spouse - [phone]    Discharge Planning:    Patient lives with: (P) Spouse/Significant Other Type of Home: (P) House  Primary Care Giver: (P) Self  Patient Support Systems include: (P) Spouse/Significant Other, Children, Family Members   Current Financial resources: (P) Medicare  Current community resources: (P) None  Current services prior to admission: (P) None            Current DME:              Type of Home Care services:  (P) None    ADLS  Prior functional level: (P) Independent in ADLs/IADLs  Current functional level: (P) Assistance with the following:, Bathing, Dressing, Toileting, Cooking, Housework, Shopping, Mobility    PT AM-PAC:   /24  OT AM-PAC:   /24    Family can provide assistance at DC: (P) No  Would you like Case Management to discuss the discharge plan with any other family members/significant others, and

## 2025-04-14 NOTE — ACP (ADVANCE CARE PLANNING)
orders.    Length of ACP Conversation in minutes:  5    Conversation Outcomes:  ACP discussion completed    Follow-up plan:    [] Schedule follow-up conversation to continue planning  [] Referred individual to Provider for additional questions/concerns   [] Advised patient/agent/surrogate to review completed ACP document and update if needed with changes in condition, patient preferences or care setting    [x] This note routed to one or more involved healthcare providers

## 2025-04-15 LAB
HCT VFR BLD AUTO: 23.9 % (ref 41–53)
HGB BLD-MCNC: 7.6 G/DL (ref 13.5–17.5)
INR PPP: 2.8
PROTHROMBIN TIME: 30.5 SEC (ref 11.5–14.2)

## 2025-04-15 PROCEDURE — 6360000002 HC RX W HCPCS: Performed by: INTERNAL MEDICINE

## 2025-04-15 PROCEDURE — 1200000002 HC SEMI PRIVATE SWING BED

## 2025-04-15 PROCEDURE — 97530 THERAPEUTIC ACTIVITIES: CPT

## 2025-04-15 PROCEDURE — 94640 AIRWAY INHALATION TREATMENT: CPT

## 2025-04-15 PROCEDURE — 85014 HEMATOCRIT: CPT

## 2025-04-15 PROCEDURE — 85610 PROTHROMBIN TIME: CPT

## 2025-04-15 PROCEDURE — 85018 HEMOGLOBIN: CPT

## 2025-04-15 PROCEDURE — 97535 SELF CARE MNGMENT TRAINING: CPT

## 2025-04-15 PROCEDURE — 6370000000 HC RX 637 (ALT 250 FOR IP): Performed by: INTERNAL MEDICINE

## 2025-04-15 PROCEDURE — 2580000003 HC RX 258: Performed by: INTERNAL MEDICINE

## 2025-04-15 PROCEDURE — 2500000003 HC RX 250 WO HCPCS: Performed by: INTERNAL MEDICINE

## 2025-04-15 PROCEDURE — 94761 N-INVAS EAR/PLS OXIMETRY MLT: CPT

## 2025-04-15 PROCEDURE — 36415 COLL VENOUS BLD VENIPUNCTURE: CPT

## 2025-04-15 RX ORDER — WARFARIN SODIUM 2.5 MG/1
2.5 TABLET ORAL
Status: COMPLETED | OUTPATIENT
Start: 2025-04-15 | End: 2025-04-15

## 2025-04-15 RX ADMIN — AMPICILLIN SODIUM 2000 MG: 2 INJECTION, POWDER, FOR SOLUTION INTRAMUSCULAR; INTRAVENOUS at 04:22

## 2025-04-15 RX ADMIN — SODIUM CHLORIDE: 0.9 INJECTION, SOLUTION INTRAVENOUS at 12:32

## 2025-04-15 RX ADMIN — WARFARIN SODIUM 2.5 MG: 2.5 TABLET ORAL at 18:01

## 2025-04-15 RX ADMIN — QUETIAPINE 25 MG: 25 TABLET ORAL at 21:17

## 2025-04-15 RX ADMIN — Medication 1 CAPSULE: at 15:53

## 2025-04-15 RX ADMIN — METOPROLOL TARTRATE 25 MG: 25 TABLET, FILM COATED ORAL at 21:17

## 2025-04-15 RX ADMIN — Medication 1 TABLET: at 12:25

## 2025-04-15 RX ADMIN — ALLOPURINOL 300 MG: 100 TABLET ORAL at 12:25

## 2025-04-15 RX ADMIN — TRAMADOL HYDROCHLORIDE 50 MG: 50 TABLET, COATED ORAL at 21:17

## 2025-04-15 RX ADMIN — WATER 2000 MG: 1 INJECTION INTRAMUSCULAR; INTRAVENOUS; SUBCUTANEOUS at 09:10

## 2025-04-15 RX ADMIN — SENNOSIDES AND DOCUSATE SODIUM 1 TABLET: 50; 8.6 TABLET ORAL at 21:17

## 2025-04-15 RX ADMIN — WATER 2000 MG: 1 INJECTION INTRAMUSCULAR; INTRAVENOUS; SUBCUTANEOUS at 21:16

## 2025-04-15 RX ADMIN — SODIUM CHLORIDE, PRESERVATIVE FREE 10 ML: 5 INJECTION INTRAVENOUS at 21:16

## 2025-04-15 RX ADMIN — TRAMADOL HYDROCHLORIDE 50 MG: 50 TABLET, COATED ORAL at 13:27

## 2025-04-15 RX ADMIN — Medication 1 CAPSULE: at 09:09

## 2025-04-15 RX ADMIN — METOPROLOL TARTRATE 25 MG: 25 TABLET, FILM COATED ORAL at 09:10

## 2025-04-15 RX ADMIN — ATORVASTATIN CALCIUM 20 MG: 20 TABLET, FILM COATED ORAL at 21:17

## 2025-04-15 RX ADMIN — AMPICILLIN SODIUM 2000 MG: 2 INJECTION, POWDER, FOR SOLUTION INTRAMUSCULAR; INTRAVENOUS at 21:33

## 2025-04-15 RX ADMIN — LANSOPRAZOLE 30 MG: 30 TABLET, ORALLY DISINTEGRATING, DELAYED RELEASE ORAL at 05:28

## 2025-04-15 RX ADMIN — ACETAMINOPHEN 650 MG: 325 TABLET, FILM COATED ORAL at 12:28

## 2025-04-15 RX ADMIN — CHOLECALCIFEROL TAB 25 MCG (1000 UNIT) 5000 UNITS: 25 TAB at 12:25

## 2025-04-15 RX ADMIN — AMPICILLIN SODIUM 2000 MG: 2 INJECTION, POWDER, FOR SOLUTION INTRAMUSCULAR; INTRAVENOUS at 15:32

## 2025-04-15 RX ADMIN — SENNOSIDES AND DOCUSATE SODIUM 1 TABLET: 50; 8.6 TABLET ORAL at 09:10

## 2025-04-15 RX ADMIN — Medication 5 MG: at 21:17

## 2025-04-15 RX ADMIN — AMPICILLIN SODIUM 2000 MG: 2 INJECTION, POWDER, FOR SOLUTION INTRAMUSCULAR; INTRAVENOUS at 09:22

## 2025-04-15 ASSESSMENT — PAIN DESCRIPTION - ORIENTATION
ORIENTATION: LEFT
ORIENTATION: RIGHT

## 2025-04-15 ASSESSMENT — PAIN - FUNCTIONAL ASSESSMENT
PAIN_FUNCTIONAL_ASSESSMENT: NONE - DENIES PAIN
PAIN_FUNCTIONAL_ASSESSMENT: NONE - DENIES PAIN
PAIN_FUNCTIONAL_ASSESSMENT: PREVENTS OR INTERFERES SOME ACTIVE ACTIVITIES AND ADLS
PAIN_FUNCTIONAL_ASSESSMENT: ACTIVITIES ARE NOT PREVENTED

## 2025-04-15 ASSESSMENT — PAIN DESCRIPTION - LOCATION
LOCATION: HIP

## 2025-04-15 ASSESSMENT — PAIN SCALES - GENERAL
PAINLEVEL_OUTOF10: 7
PAINLEVEL_OUTOF10: 7
PAINLEVEL_OUTOF10: 5
PAINLEVEL_OUTOF10: 5
PAINLEVEL_OUTOF10: 0
PAINLEVEL_OUTOF10: 7
PAINLEVEL_OUTOF10: 4
PAINLEVEL_OUTOF10: 3
PAINLEVEL_OUTOF10: 5
PAINLEVEL_OUTOF10: 7

## 2025-04-15 ASSESSMENT — PAIN DESCRIPTION - DESCRIPTORS
DESCRIPTORS: ACHING

## 2025-04-16 LAB
INR PPP: 2.3
PROTHROMBIN TIME: 26.2 SEC (ref 11.5–14.2)

## 2025-04-16 PROCEDURE — 97530 THERAPEUTIC ACTIVITIES: CPT

## 2025-04-16 PROCEDURE — 36415 COLL VENOUS BLD VENIPUNCTURE: CPT

## 2025-04-16 PROCEDURE — 2580000003 HC RX 258: Performed by: INTERNAL MEDICINE

## 2025-04-16 PROCEDURE — 97110 THERAPEUTIC EXERCISES: CPT

## 2025-04-16 PROCEDURE — 6370000000 HC RX 637 (ALT 250 FOR IP): Performed by: INTERNAL MEDICINE

## 2025-04-16 PROCEDURE — 85610 PROTHROMBIN TIME: CPT

## 2025-04-16 PROCEDURE — 2500000003 HC RX 250 WO HCPCS: Performed by: INTERNAL MEDICINE

## 2025-04-16 PROCEDURE — 94761 N-INVAS EAR/PLS OXIMETRY MLT: CPT

## 2025-04-16 PROCEDURE — 6360000002 HC RX W HCPCS: Performed by: INTERNAL MEDICINE

## 2025-04-16 PROCEDURE — 6360000002 HC RX W HCPCS

## 2025-04-16 PROCEDURE — 94640 AIRWAY INHALATION TREATMENT: CPT

## 2025-04-16 PROCEDURE — 1200000002 HC SEMI PRIVATE SWING BED

## 2025-04-16 RX ORDER — WATER 10 ML/10ML
2.2 INJECTION INTRAMUSCULAR; INTRAVENOUS; SUBCUTANEOUS ONCE
Status: DISCONTINUED | OUTPATIENT
Start: 2025-04-16 | End: 2025-05-19 | Stop reason: HOSPADM

## 2025-04-16 RX ORDER — WATER 10 ML/10ML
INJECTION INTRAMUSCULAR; INTRAVENOUS; SUBCUTANEOUS
Status: DISPENSED
Start: 2025-04-16 | End: 2025-04-17

## 2025-04-16 RX ORDER — WARFARIN SODIUM 5 MG/1
5 TABLET ORAL
Status: COMPLETED | OUTPATIENT
Start: 2025-04-16 | End: 2025-04-16

## 2025-04-16 RX ADMIN — METOPROLOL TARTRATE 25 MG: 25 TABLET, FILM COATED ORAL at 08:23

## 2025-04-16 RX ADMIN — WARFARIN SODIUM 5 MG: 5 TABLET ORAL at 17:09

## 2025-04-16 RX ADMIN — WATER 2000 MG: 1 INJECTION INTRAMUSCULAR; INTRAVENOUS; SUBCUTANEOUS at 20:56

## 2025-04-16 RX ADMIN — SODIUM CHLORIDE, PRESERVATIVE FREE 10 ML: 5 INJECTION INTRAVENOUS at 08:24

## 2025-04-16 RX ADMIN — SENNOSIDES AND DOCUSATE SODIUM 1 TABLET: 50; 8.6 TABLET ORAL at 20:48

## 2025-04-16 RX ADMIN — ALLOPURINOL 300 MG: 100 TABLET ORAL at 12:02

## 2025-04-16 RX ADMIN — Medication 1 CAPSULE: at 17:09

## 2025-04-16 RX ADMIN — AMPICILLIN SODIUM 2000 MG: 2 INJECTION, POWDER, FOR SOLUTION INTRAMUSCULAR; INTRAVENOUS at 21:15

## 2025-04-16 RX ADMIN — Medication 1 TABLET: at 12:02

## 2025-04-16 RX ADMIN — METOPROLOL TARTRATE 25 MG: 25 TABLET, FILM COATED ORAL at 20:48

## 2025-04-16 RX ADMIN — WATER 2000 MG: 1 INJECTION INTRAMUSCULAR; INTRAVENOUS; SUBCUTANEOUS at 08:23

## 2025-04-16 RX ADMIN — SODIUM CHLORIDE: 0.9 INJECTION, SOLUTION INTRAVENOUS at 10:59

## 2025-04-16 RX ADMIN — TRAMADOL HYDROCHLORIDE 50 MG: 50 TABLET, COATED ORAL at 08:22

## 2025-04-16 RX ADMIN — Medication 5 MG: at 20:48

## 2025-04-16 RX ADMIN — SENNOSIDES AND DOCUSATE SODIUM 1 TABLET: 50; 8.6 TABLET ORAL at 08:22

## 2025-04-16 RX ADMIN — Medication 1 CAPSULE: at 08:23

## 2025-04-16 RX ADMIN — AMPICILLIN SODIUM 2000 MG: 2 INJECTION, POWDER, FOR SOLUTION INTRAMUSCULAR; INTRAVENOUS at 09:02

## 2025-04-16 RX ADMIN — Medication 1 TABLET: at 12:05

## 2025-04-16 RX ADMIN — AMPICILLIN SODIUM 2000 MG: 2 INJECTION, POWDER, FOR SOLUTION INTRAMUSCULAR; INTRAVENOUS at 03:28

## 2025-04-16 RX ADMIN — ALTEPLASE 2 MG: 2.2 INJECTION, POWDER, LYOPHILIZED, FOR SOLUTION INTRAVENOUS at 18:45

## 2025-04-16 RX ADMIN — LANSOPRAZOLE 30 MG: 30 TABLET, ORALLY DISINTEGRATING, DELAYED RELEASE ORAL at 05:49

## 2025-04-16 RX ADMIN — CHOLECALCIFEROL TAB 25 MCG (1000 UNIT) 5000 UNITS: 25 TAB at 12:02

## 2025-04-16 RX ADMIN — QUETIAPINE 25 MG: 25 TABLET ORAL at 20:48

## 2025-04-16 RX ADMIN — ATORVASTATIN CALCIUM 20 MG: 20 TABLET, FILM COATED ORAL at 20:48

## 2025-04-16 RX ADMIN — ALTEPLASE 2 MG: 2.2 INJECTION, POWDER, LYOPHILIZED, FOR SOLUTION INTRAVENOUS at 15:36

## 2025-04-16 RX ADMIN — SODIUM CHLORIDE, PRESERVATIVE FREE 10 ML: 5 INJECTION INTRAVENOUS at 20:57

## 2025-04-16 ASSESSMENT — PAIN DESCRIPTION - LOCATION: LOCATION: BACK

## 2025-04-16 ASSESSMENT — PAIN DESCRIPTION - DESCRIPTORS: DESCRIPTORS: ACHING

## 2025-04-16 ASSESSMENT — PAIN SCALES - GENERAL
PAINLEVEL_OUTOF10: 7
PAINLEVEL_OUTOF10: 3

## 2025-04-16 ASSESSMENT — PAIN - FUNCTIONAL ASSESSMENT
PAIN_FUNCTIONAL_ASSESSMENT: NONE - DENIES PAIN
PAIN_FUNCTIONAL_ASSESSMENT: PREVENTS OR INTERFERES SOME ACTIVE ACTIVITIES AND ADLS

## 2025-04-17 LAB
INR PPP: 2.5
PROTHROMBIN TIME: 28.2 SEC (ref 11.5–14.2)

## 2025-04-17 PROCEDURE — 94761 N-INVAS EAR/PLS OXIMETRY MLT: CPT

## 2025-04-17 PROCEDURE — 85610 PROTHROMBIN TIME: CPT

## 2025-04-17 PROCEDURE — 36415 COLL VENOUS BLD VENIPUNCTURE: CPT

## 2025-04-17 PROCEDURE — 2500000003 HC RX 250 WO HCPCS: Performed by: INTERNAL MEDICINE

## 2025-04-17 PROCEDURE — 6370000000 HC RX 637 (ALT 250 FOR IP): Performed by: INTERNAL MEDICINE

## 2025-04-17 PROCEDURE — 6360000002 HC RX W HCPCS: Performed by: INTERNAL MEDICINE

## 2025-04-17 PROCEDURE — 1200000002 HC SEMI PRIVATE SWING BED

## 2025-04-17 PROCEDURE — 97530 THERAPEUTIC ACTIVITIES: CPT

## 2025-04-17 PROCEDURE — 94640 AIRWAY INHALATION TREATMENT: CPT

## 2025-04-17 PROCEDURE — 97110 THERAPEUTIC EXERCISES: CPT

## 2025-04-17 PROCEDURE — 2580000003 HC RX 258: Performed by: INTERNAL MEDICINE

## 2025-04-17 RX ORDER — WARFARIN SODIUM 5 MG/1
5 TABLET ORAL
Status: COMPLETED | OUTPATIENT
Start: 2025-04-17 | End: 2025-04-17

## 2025-04-17 RX ADMIN — LANSOPRAZOLE 30 MG: 30 TABLET, ORALLY DISINTEGRATING, DELAYED RELEASE ORAL at 06:14

## 2025-04-17 RX ADMIN — SODIUM CHLORIDE, PRESERVATIVE FREE 10 ML: 5 INJECTION INTRAVENOUS at 16:35

## 2025-04-17 RX ADMIN — AMPICILLIN SODIUM 2000 MG: 2 INJECTION, POWDER, FOR SOLUTION INTRAMUSCULAR; INTRAVENOUS at 11:32

## 2025-04-17 RX ADMIN — CHOLECALCIFEROL TAB 25 MCG (1000 UNIT) 5000 UNITS: 25 TAB at 13:02

## 2025-04-17 RX ADMIN — WATER 2000 MG: 1 INJECTION INTRAMUSCULAR; INTRAVENOUS; SUBCUTANEOUS at 09:17

## 2025-04-17 RX ADMIN — SENNOSIDES AND DOCUSATE SODIUM 1 TABLET: 50; 8.6 TABLET ORAL at 20:21

## 2025-04-17 RX ADMIN — ALLOPURINOL 300 MG: 100 TABLET ORAL at 13:02

## 2025-04-17 RX ADMIN — WARFARIN SODIUM 5 MG: 5 TABLET ORAL at 17:47

## 2025-04-17 RX ADMIN — TRAMADOL HYDROCHLORIDE 50 MG: 50 TABLET, COATED ORAL at 07:48

## 2025-04-17 RX ADMIN — AMPICILLIN SODIUM 2000 MG: 2 INJECTION, POWDER, FOR SOLUTION INTRAMUSCULAR; INTRAVENOUS at 16:03

## 2025-04-17 RX ADMIN — Medication 1 TABLET: at 13:02

## 2025-04-17 RX ADMIN — Medication 1 CAPSULE: at 17:47

## 2025-04-17 RX ADMIN — METOPROLOL TARTRATE 25 MG: 25 TABLET, FILM COATED ORAL at 09:16

## 2025-04-17 RX ADMIN — TRAMADOL HYDROCHLORIDE 50 MG: 50 TABLET, COATED ORAL at 21:52

## 2025-04-17 RX ADMIN — QUETIAPINE 25 MG: 25 TABLET ORAL at 20:21

## 2025-04-17 RX ADMIN — SODIUM CHLORIDE, PRESERVATIVE FREE 10 ML: 5 INJECTION INTRAVENOUS at 20:20

## 2025-04-17 RX ADMIN — WATER 2000 MG: 1 INJECTION INTRAMUSCULAR; INTRAVENOUS; SUBCUTANEOUS at 20:20

## 2025-04-17 RX ADMIN — AMPICILLIN SODIUM 2000 MG: 2 INJECTION, POWDER, FOR SOLUTION INTRAMUSCULAR; INTRAVENOUS at 03:34

## 2025-04-17 RX ADMIN — METOPROLOL TARTRATE 25 MG: 25 TABLET, FILM COATED ORAL at 20:21

## 2025-04-17 RX ADMIN — ATORVASTATIN CALCIUM 20 MG: 20 TABLET, FILM COATED ORAL at 20:21

## 2025-04-17 RX ADMIN — Medication 1 CAPSULE: at 09:09

## 2025-04-17 RX ADMIN — ACETAMINOPHEN 650 MG: 325 TABLET, FILM COATED ORAL at 20:21

## 2025-04-17 RX ADMIN — TRAMADOL HYDROCHLORIDE 50 MG: 50 TABLET, COATED ORAL at 15:58

## 2025-04-17 RX ADMIN — AMPICILLIN SODIUM 2000 MG: 2 INJECTION, POWDER, FOR SOLUTION INTRAMUSCULAR; INTRAVENOUS at 21:20

## 2025-04-17 RX ADMIN — ACETAMINOPHEN 650 MG: 325 TABLET, FILM COATED ORAL at 09:10

## 2025-04-17 RX ADMIN — SODIUM CHLORIDE, PRESERVATIVE FREE 10 ML: 5 INJECTION INTRAVENOUS at 13:02

## 2025-04-17 RX ADMIN — SENNOSIDES AND DOCUSATE SODIUM 1 TABLET: 50; 8.6 TABLET ORAL at 09:09

## 2025-04-17 RX ADMIN — Medication 5 MG: at 20:21

## 2025-04-17 ASSESSMENT — PAIN DESCRIPTION - LOCATION
LOCATION: BACK

## 2025-04-17 ASSESSMENT — PAIN SCALES - GENERAL
PAINLEVEL_OUTOF10: 2
PAINLEVEL_OUTOF10: 2
PAINLEVEL_OUTOF10: 6
PAINLEVEL_OUTOF10: 0
PAINLEVEL_OUTOF10: 7
PAINLEVEL_OUTOF10: 2
PAINLEVEL_OUTOF10: 3
PAINLEVEL_OUTOF10: 4
PAINLEVEL_OUTOF10: 1

## 2025-04-17 ASSESSMENT — PAIN DESCRIPTION - DESCRIPTORS
DESCRIPTORS: DISCOMFORT;GNAWING;ACHING
DESCRIPTORS: ACHING;SHARP;STABBING

## 2025-04-17 ASSESSMENT — PAIN DESCRIPTION - ORIENTATION
ORIENTATION: LEFT;POSTERIOR;LOWER
ORIENTATION: POSTERIOR;LOWER

## 2025-04-18 LAB
HCT VFR BLD AUTO: 25.6 % (ref 41–53)
HGB BLD-MCNC: 8 G/DL (ref 13.5–17.5)
INR PPP: 2.9
PROTHROMBIN TIME: 31.7 SEC (ref 11.5–14.2)

## 2025-04-18 PROCEDURE — 2580000003 HC RX 258: Performed by: INTERNAL MEDICINE

## 2025-04-18 PROCEDURE — 2500000003 HC RX 250 WO HCPCS: Performed by: INTERNAL MEDICINE

## 2025-04-18 PROCEDURE — 1200000002 HC SEMI PRIVATE SWING BED

## 2025-04-18 PROCEDURE — 6370000000 HC RX 637 (ALT 250 FOR IP): Performed by: INTERNAL MEDICINE

## 2025-04-18 PROCEDURE — 36415 COLL VENOUS BLD VENIPUNCTURE: CPT

## 2025-04-18 PROCEDURE — 97530 THERAPEUTIC ACTIVITIES: CPT

## 2025-04-18 PROCEDURE — 94761 N-INVAS EAR/PLS OXIMETRY MLT: CPT

## 2025-04-18 PROCEDURE — 94640 AIRWAY INHALATION TREATMENT: CPT

## 2025-04-18 PROCEDURE — 97110 THERAPEUTIC EXERCISES: CPT

## 2025-04-18 PROCEDURE — 85610 PROTHROMBIN TIME: CPT

## 2025-04-18 PROCEDURE — 97535 SELF CARE MNGMENT TRAINING: CPT

## 2025-04-18 PROCEDURE — 85018 HEMOGLOBIN: CPT

## 2025-04-18 PROCEDURE — 6360000002 HC RX W HCPCS: Performed by: INTERNAL MEDICINE

## 2025-04-18 PROCEDURE — 85014 HEMATOCRIT: CPT

## 2025-04-18 RX ORDER — HYDROCODONE BITARTRATE AND ACETAMINOPHEN 5; 325 MG/1; MG/1
1 TABLET ORAL EVERY 6 HOURS PRN
Status: DISCONTINUED | OUTPATIENT
Start: 2025-04-18 | End: 2025-05-19 | Stop reason: HOSPADM

## 2025-04-18 RX ORDER — WARFARIN SODIUM 2.5 MG/1
1.25 TABLET ORAL
Status: COMPLETED | OUTPATIENT
Start: 2025-04-18 | End: 2025-04-18

## 2025-04-18 RX ADMIN — WATER 2000 MG: 1 INJECTION INTRAMUSCULAR; INTRAVENOUS; SUBCUTANEOUS at 09:17

## 2025-04-18 RX ADMIN — HYDROCODONE BITARTRATE AND ACETAMINOPHEN 1 TABLET: 5; 325 TABLET ORAL at 09:42

## 2025-04-18 RX ADMIN — ATORVASTATIN CALCIUM 20 MG: 20 TABLET, FILM COATED ORAL at 20:46

## 2025-04-18 RX ADMIN — AMPICILLIN SODIUM 2000 MG: 2 INJECTION, POWDER, FOR SOLUTION INTRAMUSCULAR; INTRAVENOUS at 15:23

## 2025-04-18 RX ADMIN — POLYETHYLENE GLYCOL 3350 17 G: 17 POWDER, FOR SOLUTION ORAL at 10:14

## 2025-04-18 RX ADMIN — LANSOPRAZOLE 30 MG: 30 TABLET, ORALLY DISINTEGRATING, DELAYED RELEASE ORAL at 06:41

## 2025-04-18 RX ADMIN — SENNOSIDES AND DOCUSATE SODIUM 1 TABLET: 50; 8.6 TABLET ORAL at 20:46

## 2025-04-18 RX ADMIN — Medication 1 TABLET: at 12:05

## 2025-04-18 RX ADMIN — METOPROLOL TARTRATE 25 MG: 25 TABLET, FILM COATED ORAL at 09:17

## 2025-04-18 RX ADMIN — SODIUM CHLORIDE, PRESERVATIVE FREE 10 ML: 5 INJECTION INTRAVENOUS at 09:21

## 2025-04-18 RX ADMIN — Medication 5 MG: at 20:46

## 2025-04-18 RX ADMIN — SODIUM CHLORIDE, PRESERVATIVE FREE 10 ML: 5 INJECTION INTRAVENOUS at 15:22

## 2025-04-18 RX ADMIN — Medication 1 CAPSULE: at 17:32

## 2025-04-18 RX ADMIN — WATER 2000 MG: 1 INJECTION INTRAMUSCULAR; INTRAVENOUS; SUBCUTANEOUS at 20:46

## 2025-04-18 RX ADMIN — METOPROLOL TARTRATE 25 MG: 25 TABLET, FILM COATED ORAL at 20:46

## 2025-04-18 RX ADMIN — AMPICILLIN SODIUM 2000 MG: 2 INJECTION, POWDER, FOR SOLUTION INTRAMUSCULAR; INTRAVENOUS at 09:56

## 2025-04-18 RX ADMIN — QUETIAPINE 25 MG: 25 TABLET ORAL at 20:46

## 2025-04-18 RX ADMIN — AMPICILLIN SODIUM 2000 MG: 2 INJECTION, POWDER, FOR SOLUTION INTRAMUSCULAR; INTRAVENOUS at 03:44

## 2025-04-18 RX ADMIN — Medication 1 CAPSULE: at 09:17

## 2025-04-18 RX ADMIN — WARFARIN SODIUM 1.25 MG: 2.5 TABLET ORAL at 17:32

## 2025-04-18 RX ADMIN — SODIUM CHLORIDE, PRESERVATIVE FREE 10 ML: 5 INJECTION INTRAVENOUS at 20:47

## 2025-04-18 RX ADMIN — AMPICILLIN SODIUM 2000 MG: 2 INJECTION, POWDER, FOR SOLUTION INTRAMUSCULAR; INTRAVENOUS at 20:57

## 2025-04-18 RX ADMIN — HYDROCODONE BITARTRATE AND ACETAMINOPHEN 1 TABLET: 5; 325 TABLET ORAL at 22:50

## 2025-04-18 RX ADMIN — SENNOSIDES AND DOCUSATE SODIUM 1 TABLET: 50; 8.6 TABLET ORAL at 09:17

## 2025-04-18 RX ADMIN — SODIUM CHLORIDE, PRESERVATIVE FREE 10 ML: 5 INJECTION INTRAVENOUS at 10:26

## 2025-04-18 RX ADMIN — CHOLECALCIFEROL TAB 25 MCG (1000 UNIT) 5000 UNITS: 25 TAB at 12:05

## 2025-04-18 RX ADMIN — ALLOPURINOL 300 MG: 100 TABLET ORAL at 12:05

## 2025-04-18 ASSESSMENT — PAIN SCALES - GENERAL
PAINLEVEL_OUTOF10: 2
PAINLEVEL_OUTOF10: 0
PAINLEVEL_OUTOF10: 2
PAINLEVEL_OUTOF10: 6
PAINLEVEL_OUTOF10: 0
PAINLEVEL_OUTOF10: 0
PAINLEVEL_OUTOF10: 6

## 2025-04-18 ASSESSMENT — PAIN SCALES - PAIN ASSESSMENT IN ADVANCED DEMENTIA (PAINAD)
BREATHING: NORMAL
TOTALSCORE: 6
CONSOLABILITY: DISTRACTED OR REASSURED BY VOICE/TOUCH
BODYLANGUAGE: RIGID, FISTS CLENCHED, KNEES UP, PUSHING/PULLING AWAY, STRIKES OUT
NEGVOCALIZATION: OCCASIONAL MOAN/GROAN, LOW SPEECH, NEGATIVE/DISAPPROVING QUALITY
FACIALEXPRESSION: FACIAL GRIMACING

## 2025-04-18 ASSESSMENT — PAIN DESCRIPTION - PAIN TYPE
TYPE: CHRONIC PAIN
TYPE: CHRONIC PAIN

## 2025-04-18 ASSESSMENT — PAIN - FUNCTIONAL ASSESSMENT
PAIN_FUNCTIONAL_ASSESSMENT: ACTIVITIES ARE NOT PREVENTED
PAIN_FUNCTIONAL_ASSESSMENT: ACTIVITIES ARE NOT PREVENTED
PAIN_FUNCTIONAL_ASSESSMENT: PREVENTS OR INTERFERES SOME ACTIVE ACTIVITIES AND ADLS

## 2025-04-18 ASSESSMENT — PAIN DESCRIPTION - LOCATION
LOCATION: BACK

## 2025-04-18 ASSESSMENT — PAIN DESCRIPTION - DESCRIPTORS
DESCRIPTORS: ACHING

## 2025-04-18 ASSESSMENT — PAIN DESCRIPTION - ONSET
ONSET: ON-GOING
ONSET: ON-GOING

## 2025-04-18 ASSESSMENT — PAIN DESCRIPTION - FREQUENCY
FREQUENCY: CONTINUOUS
FREQUENCY: CONTINUOUS

## 2025-04-18 ASSESSMENT — PAIN DESCRIPTION - ORIENTATION
ORIENTATION: LOWER
ORIENTATION: RIGHT;MID
ORIENTATION: LOWER

## 2025-04-19 LAB
ANION GAP SERPL CALCULATED.3IONS-SCNC: 9 MMOL/L (ref 9–17)
BASOPHILS # BLD: 0.03 K/UL (ref 0–0.2)
BASOPHILS NFR BLD: 1 % (ref 0–2)
BUN SERPL-MCNC: 12 MG/DL (ref 8–23)
CALCIUM SERPL-MCNC: 8.9 MG/DL (ref 8.6–10.4)
CHLORIDE SERPL-SCNC: 100 MMOL/L (ref 98–107)
CO2 SERPL-SCNC: 29 MMOL/L (ref 20–31)
CREAT SERPL-MCNC: 0.8 MG/DL (ref 0.7–1.2)
CRP SERPL HS-MCNC: 16.6 MG/L (ref 0–5)
EOSINOPHIL # BLD: 0.25 K/UL (ref 0–0.4)
EOSINOPHILS RELATIVE PERCENT: 6 % (ref 0–5)
ERYTHROCYTE [DISTWIDTH] IN BLOOD BY AUTOMATED COUNT: 26.8 % (ref 12.1–15.2)
ERYTHROCYTE [SEDIMENTATION RATE] IN BLOOD BY PHOTOMETRIC METHOD: 96 MM/HR (ref 0–20)
GFR, ESTIMATED: >90 ML/MIN/1.73M2
GLUCOSE SERPL-MCNC: 128 MG/DL (ref 70–99)
HCT VFR BLD AUTO: 25.2 % (ref 41–53)
HGB BLD-MCNC: 7.9 G/DL (ref 13.5–17.5)
IMM GRANULOCYTES # BLD AUTO: 0.01 K/UL (ref 0–0.3)
IMM GRANULOCYTES NFR BLD: 0 % (ref 0–5)
INR PPP: 3.1
LYMPHOCYTES NFR BLD: 1.11 K/UL (ref 1–4.8)
LYMPHOCYTES RELATIVE PERCENT: 28 % (ref 13–44)
MCH RBC QN AUTO: 20.2 PG (ref 26–34)
MCHC RBC AUTO-ENTMCNC: 31.3 G/DL (ref 31–37)
MCV RBC AUTO: 64.3 FL (ref 80–100)
MONOCYTES NFR BLD: 0.26 K/UL (ref 0–1)
MONOCYTES NFR BLD: 7 % (ref 5–9)
MORPHOLOGY: ABNORMAL
MORPHOLOGY: ABNORMAL
NEUTROPHILS NFR BLD: 58 % (ref 39–75)
NEUTS SEG NFR BLD: 2.28 K/UL (ref 2.1–6.5)
PLATELET # BLD AUTO: 205 K/UL (ref 140–450)
PMV BLD AUTO: ABNORMAL FL (ref 6–12)
POTASSIUM SERPL-SCNC: 3.8 MMOL/L (ref 3.7–5.3)
PROTHROMBIN TIME: 33.5 SEC (ref 11.5–14.2)
RBC # BLD AUTO: 3.92 M/UL (ref 4.5–5.9)
SODIUM SERPL-SCNC: 138 MMOL/L (ref 135–144)
WBC OTHER # BLD: 3.9 K/UL (ref 3.5–11)

## 2025-04-19 PROCEDURE — 6370000000 HC RX 637 (ALT 250 FOR IP): Performed by: INTERNAL MEDICINE

## 2025-04-19 PROCEDURE — 2500000003 HC RX 250 WO HCPCS: Performed by: INTERNAL MEDICINE

## 2025-04-19 PROCEDURE — 85025 COMPLETE CBC W/AUTO DIFF WBC: CPT

## 2025-04-19 PROCEDURE — 86140 C-REACTIVE PROTEIN: CPT

## 2025-04-19 PROCEDURE — 97110 THERAPEUTIC EXERCISES: CPT

## 2025-04-19 PROCEDURE — 2580000003 HC RX 258: Performed by: INTERNAL MEDICINE

## 2025-04-19 PROCEDURE — 1200000002 HC SEMI PRIVATE SWING BED

## 2025-04-19 PROCEDURE — 97530 THERAPEUTIC ACTIVITIES: CPT

## 2025-04-19 PROCEDURE — 6360000002 HC RX W HCPCS: Performed by: INTERNAL MEDICINE

## 2025-04-19 PROCEDURE — 85610 PROTHROMBIN TIME: CPT

## 2025-04-19 PROCEDURE — 36415 COLL VENOUS BLD VENIPUNCTURE: CPT

## 2025-04-19 PROCEDURE — 85652 RBC SED RATE AUTOMATED: CPT

## 2025-04-19 PROCEDURE — 80048 BASIC METABOLIC PNL TOTAL CA: CPT

## 2025-04-19 PROCEDURE — 94640 AIRWAY INHALATION TREATMENT: CPT

## 2025-04-19 PROCEDURE — 94761 N-INVAS EAR/PLS OXIMETRY MLT: CPT

## 2025-04-19 RX ADMIN — SODIUM CHLORIDE, PRESERVATIVE FREE 10 ML: 5 INJECTION INTRAVENOUS at 08:48

## 2025-04-19 RX ADMIN — WATER 2000 MG: 1 INJECTION INTRAMUSCULAR; INTRAVENOUS; SUBCUTANEOUS at 08:48

## 2025-04-19 RX ADMIN — SENNOSIDES AND DOCUSATE SODIUM 1 TABLET: 50; 8.6 TABLET ORAL at 08:48

## 2025-04-19 RX ADMIN — AMPICILLIN SODIUM 2000 MG: 2 INJECTION, POWDER, FOR SOLUTION INTRAMUSCULAR; INTRAVENOUS at 04:28

## 2025-04-19 RX ADMIN — AMPICILLIN SODIUM 2000 MG: 2 INJECTION, POWDER, FOR SOLUTION INTRAMUSCULAR; INTRAVENOUS at 15:38

## 2025-04-19 RX ADMIN — Medication 1 CAPSULE: at 17:18

## 2025-04-19 RX ADMIN — WATER 2000 MG: 1 INJECTION INTRAMUSCULAR; INTRAVENOUS; SUBCUTANEOUS at 20:39

## 2025-04-19 RX ADMIN — Medication 1 CAPSULE: at 08:48

## 2025-04-19 RX ADMIN — Medication 5 MG: at 20:40

## 2025-04-19 RX ADMIN — ALLOPURINOL 300 MG: 100 TABLET ORAL at 11:04

## 2025-04-19 RX ADMIN — Medication 1 TABLET: at 11:05

## 2025-04-19 RX ADMIN — LANSOPRAZOLE 30 MG: 30 TABLET, ORALLY DISINTEGRATING, DELAYED RELEASE ORAL at 05:00

## 2025-04-19 RX ADMIN — POLYETHYLENE GLYCOL 3350 17 G: 17 POWDER, FOR SOLUTION ORAL at 08:48

## 2025-04-19 RX ADMIN — METOPROLOL TARTRATE 25 MG: 25 TABLET, FILM COATED ORAL at 20:39

## 2025-04-19 RX ADMIN — HYDROCODONE BITARTRATE AND ACETAMINOPHEN 1 TABLET: 5; 325 TABLET ORAL at 20:39

## 2025-04-19 RX ADMIN — METOPROLOL TARTRATE 25 MG: 25 TABLET, FILM COATED ORAL at 08:48

## 2025-04-19 RX ADMIN — SENNOSIDES AND DOCUSATE SODIUM 1 TABLET: 50; 8.6 TABLET ORAL at 20:39

## 2025-04-19 RX ADMIN — ATORVASTATIN CALCIUM 20 MG: 20 TABLET, FILM COATED ORAL at 20:40

## 2025-04-19 RX ADMIN — HYDROCODONE BITARTRATE AND ACETAMINOPHEN 1 TABLET: 5; 325 TABLET ORAL at 11:04

## 2025-04-19 RX ADMIN — QUETIAPINE 25 MG: 25 TABLET ORAL at 20:40

## 2025-04-19 RX ADMIN — HYDROCODONE BITARTRATE AND ACETAMINOPHEN 1 TABLET: 5; 325 TABLET ORAL at 04:51

## 2025-04-19 RX ADMIN — AMPICILLIN SODIUM 2000 MG: 2 INJECTION, POWDER, FOR SOLUTION INTRAMUSCULAR; INTRAVENOUS at 20:50

## 2025-04-19 RX ADMIN — CHOLECALCIFEROL TAB 25 MCG (1000 UNIT) 5000 UNITS: 25 TAB at 11:04

## 2025-04-19 RX ADMIN — ACETAMINOPHEN 650 MG: 325 TABLET, FILM COATED ORAL at 08:54

## 2025-04-19 RX ADMIN — AMPICILLIN SODIUM 2000 MG: 2 INJECTION, POWDER, FOR SOLUTION INTRAMUSCULAR; INTRAVENOUS at 09:19

## 2025-04-19 RX ADMIN — SODIUM CHLORIDE, PRESERVATIVE FREE 10 ML: 5 INJECTION INTRAVENOUS at 20:44

## 2025-04-19 ASSESSMENT — PAIN SCALES - GENERAL
PAINLEVEL_OUTOF10: 6
PAINLEVEL_OUTOF10: 6
PAINLEVEL_OUTOF10: 2
PAINLEVEL_OUTOF10: 4
PAINLEVEL_OUTOF10: 0
PAINLEVEL_OUTOF10: 0
PAINLEVEL_OUTOF10: 4
PAINLEVEL_OUTOF10: 0
PAINLEVEL_OUTOF10: 2
PAINLEVEL_OUTOF10: 2

## 2025-04-19 ASSESSMENT — PAIN DESCRIPTION - DESCRIPTORS
DESCRIPTORS: ACHING

## 2025-04-19 ASSESSMENT — PAIN DESCRIPTION - ORIENTATION
ORIENTATION: MID
ORIENTATION: MID;LOWER
ORIENTATION: LOWER
ORIENTATION: LOWER

## 2025-04-19 ASSESSMENT — PAIN DESCRIPTION - LOCATION
LOCATION: BACK

## 2025-04-19 ASSESSMENT — PAIN DESCRIPTION - PAIN TYPE: TYPE: CHRONIC PAIN

## 2025-04-19 ASSESSMENT — PAIN - FUNCTIONAL ASSESSMENT
PAIN_FUNCTIONAL_ASSESSMENT: PREVENTS OR INTERFERES SOME ACTIVE ACTIVITIES AND ADLS

## 2025-04-19 ASSESSMENT — PAIN DESCRIPTION - ONSET: ONSET: SUDDEN

## 2025-04-19 ASSESSMENT — PAIN DESCRIPTION - FREQUENCY: FREQUENCY: INTERMITTENT

## 2025-04-20 LAB
INR PPP: 2.8
PROTHROMBIN TIME: 30.2 SEC (ref 11.5–14.2)

## 2025-04-20 PROCEDURE — 2500000003 HC RX 250 WO HCPCS: Performed by: INTERNAL MEDICINE

## 2025-04-20 PROCEDURE — 6370000000 HC RX 637 (ALT 250 FOR IP): Performed by: INTERNAL MEDICINE

## 2025-04-20 PROCEDURE — 85610 PROTHROMBIN TIME: CPT

## 2025-04-20 PROCEDURE — 94640 AIRWAY INHALATION TREATMENT: CPT

## 2025-04-20 PROCEDURE — 97110 THERAPEUTIC EXERCISES: CPT

## 2025-04-20 PROCEDURE — 36415 COLL VENOUS BLD VENIPUNCTURE: CPT

## 2025-04-20 PROCEDURE — 6360000002 HC RX W HCPCS: Performed by: INTERNAL MEDICINE

## 2025-04-20 PROCEDURE — 97530 THERAPEUTIC ACTIVITIES: CPT

## 2025-04-20 PROCEDURE — 2580000003 HC RX 258: Performed by: INTERNAL MEDICINE

## 2025-04-20 PROCEDURE — 94761 N-INVAS EAR/PLS OXIMETRY MLT: CPT

## 2025-04-20 PROCEDURE — 1200000002 HC SEMI PRIVATE SWING BED

## 2025-04-20 RX ORDER — WARFARIN SODIUM 2.5 MG/1
2.5 TABLET ORAL
Status: COMPLETED | OUTPATIENT
Start: 2025-04-20 | End: 2025-04-20

## 2025-04-20 RX ADMIN — Medication 1 TABLET: at 11:48

## 2025-04-20 RX ADMIN — WATER 2000 MG: 1 INJECTION INTRAMUSCULAR; INTRAVENOUS; SUBCUTANEOUS at 08:51

## 2025-04-20 RX ADMIN — SENNOSIDES AND DOCUSATE SODIUM 1 TABLET: 50; 8.6 TABLET ORAL at 20:38

## 2025-04-20 RX ADMIN — SENNOSIDES AND DOCUSATE SODIUM 1 TABLET: 50; 8.6 TABLET ORAL at 08:51

## 2025-04-20 RX ADMIN — HYDROCODONE BITARTRATE AND ACETAMINOPHEN 1 TABLET: 5; 325 TABLET ORAL at 08:54

## 2025-04-20 RX ADMIN — Medication 1 CAPSULE: at 18:11

## 2025-04-20 RX ADMIN — CHOLECALCIFEROL TAB 25 MCG (1000 UNIT) 5000 UNITS: 25 TAB at 11:47

## 2025-04-20 RX ADMIN — LANSOPRAZOLE 30 MG: 30 TABLET, ORALLY DISINTEGRATING, DELAYED RELEASE ORAL at 06:34

## 2025-04-20 RX ADMIN — Medication 1 CAPSULE: at 08:51

## 2025-04-20 RX ADMIN — SODIUM CHLORIDE, PRESERVATIVE FREE 10 ML: 5 INJECTION INTRAVENOUS at 20:39

## 2025-04-20 RX ADMIN — AMPICILLIN SODIUM 2000 MG: 2 INJECTION, POWDER, FOR SOLUTION INTRAMUSCULAR; INTRAVENOUS at 03:33

## 2025-04-20 RX ADMIN — AMPICILLIN SODIUM 2000 MG: 2 INJECTION, POWDER, FOR SOLUTION INTRAMUSCULAR; INTRAVENOUS at 15:40

## 2025-04-20 RX ADMIN — Medication 1 TABLET: at 11:47

## 2025-04-20 RX ADMIN — ALLOPURINOL 300 MG: 100 TABLET ORAL at 11:48

## 2025-04-20 RX ADMIN — AMPICILLIN SODIUM 2000 MG: 2 INJECTION, POWDER, FOR SOLUTION INTRAMUSCULAR; INTRAVENOUS at 21:16

## 2025-04-20 RX ADMIN — WATER 2000 MG: 1 INJECTION INTRAMUSCULAR; INTRAVENOUS; SUBCUTANEOUS at 20:39

## 2025-04-20 RX ADMIN — METOPROLOL TARTRATE 25 MG: 25 TABLET, FILM COATED ORAL at 20:38

## 2025-04-20 RX ADMIN — ATORVASTATIN CALCIUM 20 MG: 20 TABLET, FILM COATED ORAL at 20:38

## 2025-04-20 RX ADMIN — METOPROLOL TARTRATE 25 MG: 25 TABLET, FILM COATED ORAL at 08:51

## 2025-04-20 RX ADMIN — SODIUM CHLORIDE, PRESERVATIVE FREE 10 ML: 5 INJECTION INTRAVENOUS at 08:51

## 2025-04-20 RX ADMIN — AMPICILLIN SODIUM 2000 MG: 2 INJECTION, POWDER, FOR SOLUTION INTRAMUSCULAR; INTRAVENOUS at 09:09

## 2025-04-20 RX ADMIN — HYDROCODONE BITARTRATE AND ACETAMINOPHEN 1 TABLET: 5; 325 TABLET ORAL at 20:38

## 2025-04-20 RX ADMIN — POLYETHYLENE GLYCOL 3350 17 G: 17 POWDER, FOR SOLUTION ORAL at 08:51

## 2025-04-20 RX ADMIN — QUETIAPINE 25 MG: 25 TABLET ORAL at 20:39

## 2025-04-20 RX ADMIN — WARFARIN SODIUM 2.5 MG: 2.5 TABLET ORAL at 18:11

## 2025-04-20 ASSESSMENT — PAIN SCALES - GENERAL
PAINLEVEL_OUTOF10: 3
PAINLEVEL_OUTOF10: 0
PAINLEVEL_OUTOF10: 0
PAINLEVEL_OUTOF10: 4
PAINLEVEL_OUTOF10: 5

## 2025-04-20 ASSESSMENT — PAIN - FUNCTIONAL ASSESSMENT
PAIN_FUNCTIONAL_ASSESSMENT: PREVENTS OR INTERFERES SOME ACTIVE ACTIVITIES AND ADLS
PAIN_FUNCTIONAL_ASSESSMENT: PREVENTS OR INTERFERES SOME ACTIVE ACTIVITIES AND ADLS

## 2025-04-20 ASSESSMENT — PAIN DESCRIPTION - DESCRIPTORS
DESCRIPTORS: ACHING
DESCRIPTORS: ACHING

## 2025-04-20 ASSESSMENT — PAIN DESCRIPTION - LOCATION
LOCATION: BACK
LOCATION: BACK

## 2025-04-20 ASSESSMENT — PAIN DESCRIPTION - ORIENTATION
ORIENTATION: LOWER
ORIENTATION: MID;LOWER

## 2025-04-21 LAB
INR PPP: 2.4
PROTHROMBIN TIME: 26.5 SEC (ref 11.5–14.2)

## 2025-04-21 PROCEDURE — 6370000000 HC RX 637 (ALT 250 FOR IP): Performed by: INTERNAL MEDICINE

## 2025-04-21 PROCEDURE — 97110 THERAPEUTIC EXERCISES: CPT

## 2025-04-21 PROCEDURE — 97530 THERAPEUTIC ACTIVITIES: CPT

## 2025-04-21 PROCEDURE — 94761 N-INVAS EAR/PLS OXIMETRY MLT: CPT

## 2025-04-21 PROCEDURE — 36569 INSJ PICC 5 YR+ W/O IMAGING: CPT

## 2025-04-21 PROCEDURE — 2580000003 HC RX 258: Performed by: INTERNAL MEDICINE

## 2025-04-21 PROCEDURE — 02HV33Z INSERTION OF INFUSION DEVICE INTO SUPERIOR VENA CAVA, PERCUTANEOUS APPROACH: ICD-10-PCS | Performed by: INTERNAL MEDICINE

## 2025-04-21 PROCEDURE — 2500000003 HC RX 250 WO HCPCS: Performed by: INTERNAL MEDICINE

## 2025-04-21 PROCEDURE — 1200000002 HC SEMI PRIVATE SWING BED

## 2025-04-21 PROCEDURE — 6360000002 HC RX W HCPCS: Performed by: INTERNAL MEDICINE

## 2025-04-21 PROCEDURE — 85610 PROTHROMBIN TIME: CPT

## 2025-04-21 PROCEDURE — 76937 US GUIDE VASCULAR ACCESS: CPT

## 2025-04-21 PROCEDURE — 94640 AIRWAY INHALATION TREATMENT: CPT

## 2025-04-21 PROCEDURE — C1751 CATH, INF, PER/CENT/MIDLINE: HCPCS

## 2025-04-21 PROCEDURE — 97535 SELF CARE MNGMENT TRAINING: CPT

## 2025-04-21 PROCEDURE — 36415 COLL VENOUS BLD VENIPUNCTURE: CPT

## 2025-04-21 RX ORDER — SODIUM CHLORIDE 0.9 % (FLUSH) 0.9 %
5-40 SYRINGE (ML) INJECTION EVERY 12 HOURS SCHEDULED
Status: DISCONTINUED | OUTPATIENT
Start: 2025-04-21 | End: 2025-04-25 | Stop reason: SDUPTHER

## 2025-04-21 RX ORDER — LIDOCAINE HYDROCHLORIDE 10 MG/ML
50 INJECTION, SOLUTION EPIDURAL; INFILTRATION; INTRACAUDAL; PERINEURAL ONCE
Status: DISCONTINUED | OUTPATIENT
Start: 2025-04-21 | End: 2025-05-19 | Stop reason: HOSPADM

## 2025-04-21 RX ORDER — SODIUM CHLORIDE 9 MG/ML
INJECTION, SOLUTION INTRAVENOUS PRN
Status: DISCONTINUED | OUTPATIENT
Start: 2025-04-21 | End: 2025-05-19 | Stop reason: HOSPADM

## 2025-04-21 RX ORDER — WARFARIN SODIUM 5 MG/1
5 TABLET ORAL
Status: COMPLETED | OUTPATIENT
Start: 2025-04-21 | End: 2025-04-21

## 2025-04-21 RX ORDER — SODIUM CHLORIDE 0.9 % (FLUSH) 0.9 %
5-40 SYRINGE (ML) INJECTION PRN
Status: DISCONTINUED | OUTPATIENT
Start: 2025-04-21 | End: 2025-05-19 | Stop reason: HOSPADM

## 2025-04-21 RX ADMIN — SENNOSIDES AND DOCUSATE SODIUM 1 TABLET: 50; 8.6 TABLET ORAL at 20:05

## 2025-04-21 RX ADMIN — WARFARIN SODIUM 5 MG: 5 TABLET ORAL at 17:33

## 2025-04-21 RX ADMIN — QUETIAPINE 25 MG: 25 TABLET ORAL at 20:05

## 2025-04-21 RX ADMIN — Medication 1 CAPSULE: at 17:33

## 2025-04-21 RX ADMIN — AMPICILLIN SODIUM 2000 MG: 2 INJECTION, POWDER, FOR SOLUTION INTRAMUSCULAR; INTRAVENOUS at 10:14

## 2025-04-21 RX ADMIN — Medication 1 TABLET: at 12:56

## 2025-04-21 RX ADMIN — WATER 2000 MG: 1 INJECTION INTRAMUSCULAR; INTRAVENOUS; SUBCUTANEOUS at 20:31

## 2025-04-21 RX ADMIN — SENNOSIDES AND DOCUSATE SODIUM 1 TABLET: 50; 8.6 TABLET ORAL at 08:24

## 2025-04-21 RX ADMIN — SODIUM CHLORIDE, PRESERVATIVE FREE 10 ML: 5 INJECTION INTRAVENOUS at 08:25

## 2025-04-21 RX ADMIN — Medication 1 CAPSULE: at 08:22

## 2025-04-21 RX ADMIN — AMPICILLIN SODIUM 2000 MG: 2 INJECTION, POWDER, FOR SOLUTION INTRAMUSCULAR; INTRAVENOUS at 14:35

## 2025-04-21 RX ADMIN — SODIUM CHLORIDE, PRESERVATIVE FREE 10 ML: 5 INJECTION INTRAVENOUS at 03:59

## 2025-04-21 RX ADMIN — WATER 2000 MG: 1 INJECTION INTRAMUSCULAR; INTRAVENOUS; SUBCUTANEOUS at 08:35

## 2025-04-21 RX ADMIN — ALLOPURINOL 300 MG: 100 TABLET ORAL at 12:54

## 2025-04-21 RX ADMIN — Medication 1 TABLET: at 12:54

## 2025-04-21 RX ADMIN — POLYETHYLENE GLYCOL 3350 17 G: 17 POWDER, FOR SOLUTION ORAL at 08:22

## 2025-04-21 RX ADMIN — METOPROLOL TARTRATE 25 MG: 25 TABLET, FILM COATED ORAL at 08:22

## 2025-04-21 RX ADMIN — HYDROCODONE BITARTRATE AND ACETAMINOPHEN 1 TABLET: 5; 325 TABLET ORAL at 12:56

## 2025-04-21 RX ADMIN — SODIUM CHLORIDE, PRESERVATIVE FREE 10 ML: 5 INJECTION INTRAVENOUS at 03:30

## 2025-04-21 RX ADMIN — AMPICILLIN SODIUM 2000 MG: 2 INJECTION, POWDER, FOR SOLUTION INTRAMUSCULAR; INTRAVENOUS at 21:56

## 2025-04-21 RX ADMIN — LANSOPRAZOLE 30 MG: 30 TABLET, ORALLY DISINTEGRATING, DELAYED RELEASE ORAL at 05:10

## 2025-04-21 RX ADMIN — SODIUM CHLORIDE, PRESERVATIVE FREE 10 ML: 5 INJECTION INTRAVENOUS at 20:32

## 2025-04-21 RX ADMIN — AMPICILLIN SODIUM 2000 MG: 2 INJECTION, POWDER, FOR SOLUTION INTRAMUSCULAR; INTRAVENOUS at 03:30

## 2025-04-21 RX ADMIN — ATORVASTATIN CALCIUM 20 MG: 20 TABLET, FILM COATED ORAL at 20:05

## 2025-04-21 RX ADMIN — CHOLECALCIFEROL TAB 25 MCG (1000 UNIT) 5000 UNITS: 25 TAB at 12:54

## 2025-04-21 RX ADMIN — ACETAMINOPHEN 650 MG: 325 TABLET, FILM COATED ORAL at 10:15

## 2025-04-21 RX ADMIN — METOPROLOL TARTRATE 25 MG: 25 TABLET, FILM COATED ORAL at 20:05

## 2025-04-21 ASSESSMENT — PAIN SCALES - GENERAL
PAINLEVEL_OUTOF10: 1
PAINLEVEL_OUTOF10: 0
PAINLEVEL_OUTOF10: 3
PAINLEVEL_OUTOF10: 2
PAINLEVEL_OUTOF10: 0
PAINLEVEL_OUTOF10: 5

## 2025-04-21 ASSESSMENT — PAIN DESCRIPTION - DESCRIPTORS
DESCRIPTORS: ACHING
DESCRIPTORS: ACHING

## 2025-04-21 ASSESSMENT — PAIN DESCRIPTION - LOCATION
LOCATION: BACK
LOCATION: BACK

## 2025-04-21 ASSESSMENT — PAIN DESCRIPTION - ORIENTATION: ORIENTATION: LOWER

## 2025-04-21 ASSESSMENT — PAIN - FUNCTIONAL ASSESSMENT
PAIN_FUNCTIONAL_ASSESSMENT: PREVENTS OR INTERFERES WITH MANY ACTIVE NOT PASSIVE ACTIVITIES
PAIN_FUNCTIONAL_ASSESSMENT: PREVENTS OR INTERFERES WITH MANY ACTIVE NOT PASSIVE ACTIVITIES

## 2025-04-21 NOTE — PROCEDURES
PROCEDURE NOTE  Date: 4/21/2025   Name: Quique Wray  YOB: 1945    Procedures    Picc placement order:    Benefits include stable, long term intravenous access. Blood draws. Peripheral vein preservation. Safely deliver vesicant medications.    Risks include failure to obtain the desired result(s) of the procedure, discomfort, injury, the need for additional procedures/therapies, permanent loss of body function, bleeding/bruising, arterial puncture, air embolism, nerve damage, hematoma, phlebitis, catheter fracture/rupture, catheter embolism, catheter occlusion, catheter migration, catheter site infection, unintentional/accidental removal of catheter, bloodstream infection, infiltration, cardiac arrhythmia, vein thrombosis, difficult catheter removal.   Alternatives discussed including centrally inserted central catheter, as well as less invasive procedures such as multiple peripheral IVs, extended dwell catheters and midline placements.     Consent obtained by proceduralist, signed by Patient/DPOA.      Prescribed therapy: IV therapy x 6 weeks  Peripheral ultrasound assessment done. Plan for left basilic vein insertion.   Product type: Antimicrobial/Antithrombogenic Arrow non tapered power injectable PICC  History/Labs/Allergies Reviewed  Placed By: Cristal - [facility] IV Team  Assistant - Staff-RN  Time out Performed using Two Identifiers  Catheter info: 4.5french - single lumen  Total length: 49 cm  External catheter length: 3 cm  Extremity circumference at site: 34 cm  Number of attempts: 1  Estimated blood loss: 1 ml  Placement verified by: positive blood return & flushes easily  Special equipment used: [facility] ECG Tip tracker system, ultrasound, MST, and micro-introducer needle.   Catheter securement: Adhesive securement device  Catheter adhesive (SecureportIV) utilized at insertion site  Dressing applied: Tegaderm CHG  Lidocaine administered intradermally conc.1%: Approx 2 mL    RN aware picc placed with VPS

## 2025-04-22 LAB
INR PPP: 2.2
PROTHROMBIN TIME: 24.8 SEC (ref 11.5–14.2)

## 2025-04-22 PROCEDURE — 1200000002 HC SEMI PRIVATE SWING BED

## 2025-04-22 PROCEDURE — 85610 PROTHROMBIN TIME: CPT

## 2025-04-22 PROCEDURE — 2580000003 HC RX 258: Performed by: INTERNAL MEDICINE

## 2025-04-22 PROCEDURE — 6360000002 HC RX W HCPCS: Performed by: INTERNAL MEDICINE

## 2025-04-22 PROCEDURE — 6370000000 HC RX 637 (ALT 250 FOR IP): Performed by: INTERNAL MEDICINE

## 2025-04-22 PROCEDURE — 0HBRXZZ EXCISION OF TOE NAIL, EXTERNAL APPROACH: ICD-10-PCS | Performed by: PODIATRIST

## 2025-04-22 PROCEDURE — 2500000003 HC RX 250 WO HCPCS: Performed by: INTERNAL MEDICINE

## 2025-04-22 PROCEDURE — 97530 THERAPEUTIC ACTIVITIES: CPT

## 2025-04-22 PROCEDURE — 94761 N-INVAS EAR/PLS OXIMETRY MLT: CPT

## 2025-04-22 PROCEDURE — 36415 COLL VENOUS BLD VENIPUNCTURE: CPT

## 2025-04-22 PROCEDURE — 97110 THERAPEUTIC EXERCISES: CPT

## 2025-04-22 PROCEDURE — 94640 AIRWAY INHALATION TREATMENT: CPT

## 2025-04-22 PROCEDURE — 97535 SELF CARE MNGMENT TRAINING: CPT

## 2025-04-22 RX ORDER — TRIAMCINOLONE ACETONIDE 1 MG/G
CREAM TOPICAL 2 TIMES DAILY
Status: DISCONTINUED | OUTPATIENT
Start: 2025-04-22 | End: 2025-05-15 | Stop reason: ALTCHOICE

## 2025-04-22 RX ORDER — WARFARIN SODIUM 5 MG/1
5 TABLET ORAL
Status: COMPLETED | OUTPATIENT
Start: 2025-04-22 | End: 2025-04-22

## 2025-04-22 RX ADMIN — METOPROLOL TARTRATE 25 MG: 25 TABLET, FILM COATED ORAL at 08:48

## 2025-04-22 RX ADMIN — Medication 1 TABLET: at 11:58

## 2025-04-22 RX ADMIN — HYDROCODONE BITARTRATE AND ACETAMINOPHEN 1 TABLET: 5; 325 TABLET ORAL at 01:27

## 2025-04-22 RX ADMIN — ATORVASTATIN CALCIUM 20 MG: 20 TABLET, FILM COATED ORAL at 20:03

## 2025-04-22 RX ADMIN — HYDROCODONE BITARTRATE AND ACETAMINOPHEN 1 TABLET: 5; 325 TABLET ORAL at 09:08

## 2025-04-22 RX ADMIN — SODIUM CHLORIDE, PRESERVATIVE FREE 10 ML: 5 INJECTION INTRAVENOUS at 08:49

## 2025-04-22 RX ADMIN — LANSOPRAZOLE 30 MG: 30 TABLET, ORALLY DISINTEGRATING, DELAYED RELEASE ORAL at 06:03

## 2025-04-22 RX ADMIN — METOPROLOL TARTRATE 25 MG: 25 TABLET, FILM COATED ORAL at 20:03

## 2025-04-22 RX ADMIN — WATER 2000 MG: 1 INJECTION INTRAMUSCULAR; INTRAVENOUS; SUBCUTANEOUS at 08:48

## 2025-04-22 RX ADMIN — TRIAMCINOLONE ACETONIDE: 1 CREAM TOPICAL at 21:06

## 2025-04-22 RX ADMIN — TRIAMCINOLONE ACETONIDE: 1 CREAM TOPICAL at 11:58

## 2025-04-22 RX ADMIN — WARFARIN SODIUM 5 MG: 5 TABLET ORAL at 17:10

## 2025-04-22 RX ADMIN — WATER 2000 MG: 1 INJECTION INTRAMUSCULAR; INTRAVENOUS; SUBCUTANEOUS at 21:02

## 2025-04-22 RX ADMIN — AMPICILLIN SODIUM 2000 MG: 2 INJECTION, POWDER, FOR SOLUTION INTRAMUSCULAR; INTRAVENOUS at 22:00

## 2025-04-22 RX ADMIN — Medication 1 CAPSULE: at 08:48

## 2025-04-22 RX ADMIN — ALLOPURINOL 300 MG: 100 TABLET ORAL at 11:58

## 2025-04-22 RX ADMIN — AMPICILLIN SODIUM 2000 MG: 2 INJECTION, POWDER, FOR SOLUTION INTRAMUSCULAR; INTRAVENOUS at 08:58

## 2025-04-22 RX ADMIN — QUETIAPINE 25 MG: 25 TABLET ORAL at 20:03

## 2025-04-22 RX ADMIN — AMPICILLIN SODIUM 2000 MG: 2 INJECTION, POWDER, FOR SOLUTION INTRAMUSCULAR; INTRAVENOUS at 03:23

## 2025-04-22 RX ADMIN — SENNOSIDES AND DOCUSATE SODIUM 1 TABLET: 50; 8.6 TABLET ORAL at 08:48

## 2025-04-22 RX ADMIN — SENNOSIDES AND DOCUSATE SODIUM 1 TABLET: 50; 8.6 TABLET ORAL at 20:03

## 2025-04-22 RX ADMIN — POLYETHYLENE GLYCOL 3350 17 G: 17 POWDER, FOR SOLUTION ORAL at 08:47

## 2025-04-22 RX ADMIN — Medication 1 CAPSULE: at 17:10

## 2025-04-22 RX ADMIN — AMPICILLIN SODIUM 2000 MG: 2 INJECTION, POWDER, FOR SOLUTION INTRAMUSCULAR; INTRAVENOUS at 15:27

## 2025-04-22 RX ADMIN — CHOLECALCIFEROL TAB 25 MCG (1000 UNIT) 5000 UNITS: 25 TAB at 11:57

## 2025-04-22 RX ADMIN — SODIUM CHLORIDE, PRESERVATIVE FREE 10 ML: 5 INJECTION INTRAVENOUS at 20:03

## 2025-04-22 ASSESSMENT — PAIN DESCRIPTION - LOCATION
LOCATION: BACK
LOCATION: GENERALIZED

## 2025-04-22 ASSESSMENT — PAIN SCALES - GENERAL
PAINLEVEL_OUTOF10: 0
PAINLEVEL_OUTOF10: 5
PAINLEVEL_OUTOF10: 5
PAINLEVEL_OUTOF10: 6

## 2025-04-22 ASSESSMENT — PAIN DESCRIPTION - DESCRIPTORS
DESCRIPTORS: ACHING
DESCRIPTORS: ACHING

## 2025-04-23 LAB
INR PPP: 2.3
PROTHROMBIN TIME: 26.4 SEC (ref 11.5–14.2)

## 2025-04-23 PROCEDURE — 1200000002 HC SEMI PRIVATE SWING BED

## 2025-04-23 PROCEDURE — 94640 AIRWAY INHALATION TREATMENT: CPT

## 2025-04-23 PROCEDURE — 6370000000 HC RX 637 (ALT 250 FOR IP): Performed by: INTERNAL MEDICINE

## 2025-04-23 PROCEDURE — 97535 SELF CARE MNGMENT TRAINING: CPT

## 2025-04-23 PROCEDURE — 85610 PROTHROMBIN TIME: CPT

## 2025-04-23 PROCEDURE — 2500000003 HC RX 250 WO HCPCS: Performed by: INTERNAL MEDICINE

## 2025-04-23 PROCEDURE — 97110 THERAPEUTIC EXERCISES: CPT

## 2025-04-23 PROCEDURE — 36415 COLL VENOUS BLD VENIPUNCTURE: CPT

## 2025-04-23 PROCEDURE — 97530 THERAPEUTIC ACTIVITIES: CPT

## 2025-04-23 PROCEDURE — 94761 N-INVAS EAR/PLS OXIMETRY MLT: CPT

## 2025-04-23 PROCEDURE — 6360000002 HC RX W HCPCS: Performed by: INTERNAL MEDICINE

## 2025-04-23 PROCEDURE — 2580000003 HC RX 258: Performed by: INTERNAL MEDICINE

## 2025-04-23 RX ORDER — WARFARIN SODIUM 2.5 MG/1
2.5 TABLET ORAL
Status: COMPLETED | OUTPATIENT
Start: 2025-04-23 | End: 2025-04-23

## 2025-04-23 RX ADMIN — SENNOSIDES AND DOCUSATE SODIUM 1 TABLET: 50; 8.6 TABLET ORAL at 08:00

## 2025-04-23 RX ADMIN — AMPICILLIN SODIUM 2000 MG: 2 INJECTION, POWDER, FOR SOLUTION INTRAMUSCULAR; INTRAVENOUS at 22:04

## 2025-04-23 RX ADMIN — QUETIAPINE 25 MG: 25 TABLET ORAL at 21:17

## 2025-04-23 RX ADMIN — WATER 2000 MG: 1 INJECTION INTRAMUSCULAR; INTRAVENOUS; SUBCUTANEOUS at 21:17

## 2025-04-23 RX ADMIN — TRIAMCINOLONE ACETONIDE: 1 CREAM TOPICAL at 22:05

## 2025-04-23 RX ADMIN — ATORVASTATIN CALCIUM 20 MG: 20 TABLET, FILM COATED ORAL at 21:17

## 2025-04-23 RX ADMIN — Medication 1 TABLET: at 11:48

## 2025-04-23 RX ADMIN — METOPROLOL TARTRATE 25 MG: 25 TABLET, FILM COATED ORAL at 08:00

## 2025-04-23 RX ADMIN — METOPROLOL TARTRATE 25 MG: 25 TABLET, FILM COATED ORAL at 21:17

## 2025-04-23 RX ADMIN — WATER 2000 MG: 1 INJECTION INTRAMUSCULAR; INTRAVENOUS; SUBCUTANEOUS at 08:55

## 2025-04-23 RX ADMIN — SENNOSIDES AND DOCUSATE SODIUM 1 TABLET: 50; 8.6 TABLET ORAL at 21:17

## 2025-04-23 RX ADMIN — SODIUM CHLORIDE, PRESERVATIVE FREE 10 ML: 5 INJECTION INTRAVENOUS at 09:02

## 2025-04-23 RX ADMIN — HYDROCODONE BITARTRATE AND ACETAMINOPHEN 1 TABLET: 5; 325 TABLET ORAL at 08:00

## 2025-04-23 RX ADMIN — ALLOPURINOL 300 MG: 100 TABLET ORAL at 11:48

## 2025-04-23 RX ADMIN — LANSOPRAZOLE 30 MG: 30 TABLET, ORALLY DISINTEGRATING, DELAYED RELEASE ORAL at 05:54

## 2025-04-23 RX ADMIN — WARFARIN SODIUM 2.5 MG: 2.5 TABLET ORAL at 18:38

## 2025-04-23 RX ADMIN — AMPICILLIN SODIUM 2000 MG: 2 INJECTION, POWDER, FOR SOLUTION INTRAMUSCULAR; INTRAVENOUS at 09:40

## 2025-04-23 RX ADMIN — SODIUM CHLORIDE, PRESERVATIVE FREE 10 ML: 5 INJECTION INTRAVENOUS at 16:15

## 2025-04-23 RX ADMIN — AMPICILLIN SODIUM 2000 MG: 2 INJECTION, POWDER, FOR SOLUTION INTRAMUSCULAR; INTRAVENOUS at 03:50

## 2025-04-23 RX ADMIN — CHOLECALCIFEROL TAB 25 MCG (1000 UNIT) 5000 UNITS: 25 TAB at 11:48

## 2025-04-23 RX ADMIN — SODIUM CHLORIDE, PRESERVATIVE FREE 10 ML: 5 INJECTION INTRAVENOUS at 08:54

## 2025-04-23 RX ADMIN — SODIUM CHLORIDE, PRESERVATIVE FREE 10 ML: 5 INJECTION INTRAVENOUS at 09:39

## 2025-04-23 RX ADMIN — TRIAMCINOLONE ACETONIDE: 1 CREAM TOPICAL at 09:03

## 2025-04-23 RX ADMIN — Medication 1 CAPSULE: at 16:15

## 2025-04-23 RX ADMIN — Medication 1 CAPSULE: at 08:00

## 2025-04-23 RX ADMIN — SODIUM CHLORIDE, PRESERVATIVE FREE 10 ML: 5 INJECTION INTRAVENOUS at 21:20

## 2025-04-23 RX ADMIN — AMPICILLIN SODIUM 2000 MG: 2 INJECTION, POWDER, FOR SOLUTION INTRAMUSCULAR; INTRAVENOUS at 16:15

## 2025-04-23 ASSESSMENT — PAIN DESCRIPTION - DESCRIPTORS: DESCRIPTORS: ACHING

## 2025-04-23 ASSESSMENT — PAIN DESCRIPTION - ORIENTATION: ORIENTATION: LOWER

## 2025-04-23 ASSESSMENT — PAIN SCALES - GENERAL
PAINLEVEL_OUTOF10: 4
PAINLEVEL_OUTOF10: 5
PAINLEVEL_OUTOF10: 0
PAINLEVEL_OUTOF10: 5

## 2025-04-23 ASSESSMENT — PAIN DESCRIPTION - LOCATION: LOCATION: BACK

## 2025-04-23 NOTE — THERAPY RECERTIFICATION
[facility]  Phone: [phone]   Date: 2025                  Swingbed Physical Therapy Fax: [phone]    ACCT #: [id number]                  Recertification  [facility]#: [id number]  Patient: Quique Wray  : 1945                    Treatment Diagnosis: Weakness      Assessment: Patient continues with skilled therapy in swingbed program.  Limited participation due to fatigue and weakness with very slow progress noted.   Patient requires min or mod assist for basic transfers OOB and sit to stand however inconsistent on effort and amount of assist needed.  Ambulating to chair or up to 5-6ft with wh walker with min/mod assist x 2.   Will continue with strengthening and functional mobility however may require extended rehab at [facility] before patient is safe to return home with spouse  Therapy Prognosis: Fair    Treatment Plan: 1-2x per day: 1x und            X 14 days   Patient Education/HEP, Therapeutic Exercise, Neuro Re-ed, Gait Training, and Therapeutic Activity    Goals  Short Term Goals  Time Frame for Short Term Goals: 7 days - expires 25  Short Term Goal 1: Transfer consistently sit to stand with min assist  Short Term Goal 2: Ambulate with wh walker 15 ft to bathroom for self-care and toileting needs consistently with min assist x 2 for safety  Short Term Goal 3: Stand up to 2 minutes to assist with self-care needs  Long Term Goals  Time Frame for Long Term Goals : 14 days - expires 25  Long Term Goal 1: Ambulate with wh walker 25ft with min assist x 1  Long Term Goal 2: Transfer sit to stand with CGA/SBA x 1  Long Term Goal 3: Patient will transfer bed to chair with Min A-NOT MET    CASSIE MOREAU, PT    Date: 2025        ______________________________________ Date: 2025  Physician Signature

## 2025-04-24 LAB
HCT VFR BLD AUTO: 26.3 % (ref 41–53)
HGB BLD-MCNC: 8.4 G/DL (ref 13.5–17.5)
INR PPP: 2.6
PROTHROMBIN TIME: 28.7 SEC (ref 11.5–14.2)

## 2025-04-24 PROCEDURE — 6370000000 HC RX 637 (ALT 250 FOR IP): Performed by: INTERNAL MEDICINE

## 2025-04-24 PROCEDURE — 97110 THERAPEUTIC EXERCISES: CPT

## 2025-04-24 PROCEDURE — 97535 SELF CARE MNGMENT TRAINING: CPT

## 2025-04-24 PROCEDURE — 2500000003 HC RX 250 WO HCPCS: Performed by: INTERNAL MEDICINE

## 2025-04-24 PROCEDURE — 36415 COLL VENOUS BLD VENIPUNCTURE: CPT

## 2025-04-24 PROCEDURE — 85610 PROTHROMBIN TIME: CPT

## 2025-04-24 PROCEDURE — 85014 HEMATOCRIT: CPT

## 2025-04-24 PROCEDURE — 97530 THERAPEUTIC ACTIVITIES: CPT

## 2025-04-24 PROCEDURE — 6360000002 HC RX W HCPCS: Performed by: INTERNAL MEDICINE

## 2025-04-24 PROCEDURE — 85018 HEMOGLOBIN: CPT

## 2025-04-24 PROCEDURE — 2580000003 HC RX 258: Performed by: INTERNAL MEDICINE

## 2025-04-24 PROCEDURE — 94640 AIRWAY INHALATION TREATMENT: CPT

## 2025-04-24 PROCEDURE — 94761 N-INVAS EAR/PLS OXIMETRY MLT: CPT

## 2025-04-24 PROCEDURE — 1200000002 HC SEMI PRIVATE SWING BED

## 2025-04-24 RX ORDER — WARFARIN SODIUM 5 MG/1
5 TABLET ORAL
Status: COMPLETED | OUTPATIENT
Start: 2025-04-24 | End: 2025-04-24

## 2025-04-24 RX ADMIN — SODIUM CHLORIDE, PRESERVATIVE FREE 10 ML: 5 INJECTION INTRAVENOUS at 09:40

## 2025-04-24 RX ADMIN — AMPICILLIN SODIUM 2000 MG: 2 INJECTION, POWDER, FOR SOLUTION INTRAMUSCULAR; INTRAVENOUS at 09:39

## 2025-04-24 RX ADMIN — AMPICILLIN SODIUM 2000 MG: 2 INJECTION, POWDER, FOR SOLUTION INTRAMUSCULAR; INTRAVENOUS at 15:24

## 2025-04-24 RX ADMIN — WATER 2000 MG: 1 INJECTION INTRAMUSCULAR; INTRAVENOUS; SUBCUTANEOUS at 20:21

## 2025-04-24 RX ADMIN — TRIAMCINOLONE ACETONIDE: 1 CREAM TOPICAL at 09:41

## 2025-04-24 RX ADMIN — ACETAMINOPHEN 650 MG: 325 TABLET, FILM COATED ORAL at 20:27

## 2025-04-24 RX ADMIN — TRIAMCINOLONE ACETONIDE: 1 CREAM TOPICAL at 20:35

## 2025-04-24 RX ADMIN — SODIUM CHLORIDE, PRESERVATIVE FREE 10 ML: 5 INJECTION INTRAVENOUS at 20:35

## 2025-04-24 RX ADMIN — SODIUM CHLORIDE, PRESERVATIVE FREE 10 ML: 5 INJECTION INTRAVENOUS at 15:57

## 2025-04-24 RX ADMIN — QUETIAPINE 25 MG: 25 TABLET ORAL at 20:22

## 2025-04-24 RX ADMIN — Medication 1 CAPSULE: at 09:10

## 2025-04-24 RX ADMIN — SENNOSIDES AND DOCUSATE SODIUM 1 TABLET: 50; 8.6 TABLET ORAL at 20:21

## 2025-04-24 RX ADMIN — Medication 5 MG: at 20:22

## 2025-04-24 RX ADMIN — Medication 1 TABLET: at 12:16

## 2025-04-24 RX ADMIN — WARFARIN SODIUM 5 MG: 5 TABLET ORAL at 17:07

## 2025-04-24 RX ADMIN — LANSOPRAZOLE 30 MG: 30 TABLET, ORALLY DISINTEGRATING, DELAYED RELEASE ORAL at 06:17

## 2025-04-24 RX ADMIN — CHOLECALCIFEROL TAB 25 MCG (1000 UNIT) 5000 UNITS: 25 TAB at 12:16

## 2025-04-24 RX ADMIN — ATORVASTATIN CALCIUM 20 MG: 20 TABLET, FILM COATED ORAL at 20:22

## 2025-04-24 RX ADMIN — METOPROLOL TARTRATE 25 MG: 25 TABLET, FILM COATED ORAL at 20:22

## 2025-04-24 RX ADMIN — SODIUM CHLORIDE, PRESERVATIVE FREE 10 ML: 5 INJECTION INTRAVENOUS at 09:11

## 2025-04-24 RX ADMIN — WATER 2000 MG: 1 INJECTION INTRAMUSCULAR; INTRAVENOUS; SUBCUTANEOUS at 09:11

## 2025-04-24 RX ADMIN — Medication 1 CAPSULE: at 17:07

## 2025-04-24 RX ADMIN — AMPICILLIN SODIUM 2000 MG: 2 INJECTION, POWDER, FOR SOLUTION INTRAMUSCULAR; INTRAVENOUS at 21:41

## 2025-04-24 RX ADMIN — ACETAMINOPHEN 650 MG: 325 TABLET, FILM COATED ORAL at 09:44

## 2025-04-24 RX ADMIN — SODIUM CHLORIDE, PRESERVATIVE FREE 10 ML: 5 INJECTION INTRAVENOUS at 04:47

## 2025-04-24 RX ADMIN — ALLOPURINOL 300 MG: 100 TABLET ORAL at 12:16

## 2025-04-24 RX ADMIN — METOPROLOL TARTRATE 25 MG: 25 TABLET, FILM COATED ORAL at 09:10

## 2025-04-24 RX ADMIN — AMPICILLIN SODIUM 2000 MG: 2 INJECTION, POWDER, FOR SOLUTION INTRAMUSCULAR; INTRAVENOUS at 04:15

## 2025-04-24 RX ADMIN — SENNOSIDES AND DOCUSATE SODIUM 1 TABLET: 50; 8.6 TABLET ORAL at 09:10

## 2025-04-24 ASSESSMENT — PAIN SCALES - GENERAL
PAINLEVEL_OUTOF10: 1
PAINLEVEL_OUTOF10: 3
PAINLEVEL_OUTOF10: 1
PAINLEVEL_OUTOF10: 0

## 2025-04-24 ASSESSMENT — PAIN - FUNCTIONAL ASSESSMENT
PAIN_FUNCTIONAL_ASSESSMENT: PREVENTS OR INTERFERES SOME ACTIVE ACTIVITIES AND ADLS
PAIN_FUNCTIONAL_ASSESSMENT: ACTIVITIES ARE NOT PREVENTED
PAIN_FUNCTIONAL_ASSESSMENT: NONE - DENIES PAIN

## 2025-04-24 ASSESSMENT — PAIN DESCRIPTION - LOCATION
LOCATION: BACK
LOCATION: BACK

## 2025-04-24 ASSESSMENT — PAIN DESCRIPTION - DESCRIPTORS
DESCRIPTORS: ACHING
DESCRIPTORS: ACHING

## 2025-04-24 ASSESSMENT — PAIN DESCRIPTION - ORIENTATION: ORIENTATION: LOWER

## 2025-04-25 LAB
-: NORMAL
BILIRUB UR QL STRIP: NEGATIVE
CASTS #/AREA URNS LPF: NORMAL /LPF
CLARITY UR: CLEAR
COLOR UR: ABNORMAL
COMMENT: ABNORMAL
GLUCOSE UR STRIP-MCNC: NEGATIVE MG/DL
HGB UR QL STRIP.AUTO: NEGATIVE
INR PPP: 2.7
KETONES UR STRIP-MCNC: NEGATIVE MG/DL
LEUKOCYTE ESTERASE UR QL STRIP: ABNORMAL
NITRITE UR QL STRIP: NEGATIVE
PH UR STRIP: 6 [PH] (ref 5–8)
PROT UR STRIP-MCNC: ABNORMAL MG/DL
PROTHROMBIN TIME: 29.9 SEC (ref 11.5–14.2)
RBC #/AREA URNS HPF: NORMAL /HPF (ref 0–2)
SP GR UR STRIP: 1.02 (ref 1–1.03)
UROBILINOGEN UR STRIP-ACNC: NORMAL EU/DL (ref 0–1)
WBC #/AREA URNS HPF: NORMAL /HPF

## 2025-04-25 PROCEDURE — 94761 N-INVAS EAR/PLS OXIMETRY MLT: CPT

## 2025-04-25 PROCEDURE — 97110 THERAPEUTIC EXERCISES: CPT

## 2025-04-25 PROCEDURE — 6360000002 HC RX W HCPCS: Performed by: INTERNAL MEDICINE

## 2025-04-25 PROCEDURE — 6370000000 HC RX 637 (ALT 250 FOR IP): Performed by: INTERNAL MEDICINE

## 2025-04-25 PROCEDURE — 94640 AIRWAY INHALATION TREATMENT: CPT

## 2025-04-25 PROCEDURE — 81001 URINALYSIS AUTO W/SCOPE: CPT

## 2025-04-25 PROCEDURE — 85610 PROTHROMBIN TIME: CPT

## 2025-04-25 PROCEDURE — 51798 US URINE CAPACITY MEASURE: CPT

## 2025-04-25 PROCEDURE — 97530 THERAPEUTIC ACTIVITIES: CPT

## 2025-04-25 PROCEDURE — 1200000002 HC SEMI PRIVATE SWING BED

## 2025-04-25 PROCEDURE — 2580000003 HC RX 258: Performed by: INTERNAL MEDICINE

## 2025-04-25 PROCEDURE — 36415 COLL VENOUS BLD VENIPUNCTURE: CPT

## 2025-04-25 PROCEDURE — 2500000003 HC RX 250 WO HCPCS: Performed by: INTERNAL MEDICINE

## 2025-04-25 PROCEDURE — 87086 URINE CULTURE/COLONY COUNT: CPT

## 2025-04-25 RX ORDER — TAMSULOSIN HYDROCHLORIDE 0.4 MG/1
0.4 CAPSULE ORAL DAILY
Status: DISCONTINUED | OUTPATIENT
Start: 2025-04-25 | End: 2025-05-19 | Stop reason: HOSPADM

## 2025-04-25 RX ORDER — WARFARIN SODIUM 2.5 MG/1
2.5 TABLET ORAL
Status: COMPLETED | OUTPATIENT
Start: 2025-04-25 | End: 2025-04-25

## 2025-04-25 RX ADMIN — Medication 5 MG: at 20:48

## 2025-04-25 RX ADMIN — SODIUM CHLORIDE, PRESERVATIVE FREE 10 ML: 5 INJECTION INTRAVENOUS at 20:47

## 2025-04-25 RX ADMIN — Medication 1 CAPSULE: at 19:03

## 2025-04-25 RX ADMIN — Medication 1 TABLET: at 12:53

## 2025-04-25 RX ADMIN — SERTRALINE HYDROCHLORIDE 25 MG: 50 TABLET ORAL at 12:54

## 2025-04-25 RX ADMIN — TAMSULOSIN HYDROCHLORIDE 0.4 MG: 0.4 CAPSULE ORAL at 04:57

## 2025-04-25 RX ADMIN — WATER 2000 MG: 1 INJECTION INTRAMUSCULAR; INTRAVENOUS; SUBCUTANEOUS at 20:48

## 2025-04-25 RX ADMIN — Medication 1 CAPSULE: at 09:50

## 2025-04-25 RX ADMIN — SENNOSIDES AND DOCUSATE SODIUM 1 TABLET: 50; 8.6 TABLET ORAL at 09:51

## 2025-04-25 RX ADMIN — POLYETHYLENE GLYCOL 3350 17 G: 17 POWDER, FOR SOLUTION ORAL at 09:51

## 2025-04-25 RX ADMIN — CHOLECALCIFEROL TAB 25 MCG (1000 UNIT) 5000 UNITS: 25 TAB at 12:53

## 2025-04-25 RX ADMIN — AMPICILLIN SODIUM 2000 MG: 2 INJECTION, POWDER, FOR SOLUTION INTRAMUSCULAR; INTRAVENOUS at 15:47

## 2025-04-25 RX ADMIN — ALLOPURINOL 300 MG: 100 TABLET ORAL at 12:53

## 2025-04-25 RX ADMIN — METOPROLOL TARTRATE 25 MG: 25 TABLET, FILM COATED ORAL at 09:51

## 2025-04-25 RX ADMIN — AMPICILLIN SODIUM 2000 MG: 2 INJECTION, POWDER, FOR SOLUTION INTRAMUSCULAR; INTRAVENOUS at 21:03

## 2025-04-25 RX ADMIN — ACETAMINOPHEN 650 MG: 325 TABLET, FILM COATED ORAL at 20:47

## 2025-04-25 RX ADMIN — AMPICILLIN SODIUM 2000 MG: 2 INJECTION, POWDER, FOR SOLUTION INTRAMUSCULAR; INTRAVENOUS at 09:55

## 2025-04-25 RX ADMIN — Medication 1 TABLET: at 12:55

## 2025-04-25 RX ADMIN — SODIUM CHLORIDE, PRESERVATIVE FREE 10 ML: 5 INJECTION INTRAVENOUS at 09:58

## 2025-04-25 RX ADMIN — LANSOPRAZOLE 30 MG: 30 TABLET, ORALLY DISINTEGRATING, DELAYED RELEASE ORAL at 06:18

## 2025-04-25 RX ADMIN — WARFARIN SODIUM 2.5 MG: 2.5 TABLET ORAL at 19:03

## 2025-04-25 RX ADMIN — AMPICILLIN SODIUM 2000 MG: 2 INJECTION, POWDER, FOR SOLUTION INTRAMUSCULAR; INTRAVENOUS at 03:55

## 2025-04-25 RX ADMIN — METOPROLOL TARTRATE 25 MG: 25 TABLET, FILM COATED ORAL at 20:48

## 2025-04-25 RX ADMIN — ATORVASTATIN CALCIUM 20 MG: 20 TABLET, FILM COATED ORAL at 20:47

## 2025-04-25 RX ADMIN — SODIUM CHLORIDE, PRESERVATIVE FREE 10 ML: 5 INJECTION INTRAVENOUS at 16:21

## 2025-04-25 RX ADMIN — WATER 2000 MG: 1 INJECTION INTRAMUSCULAR; INTRAVENOUS; SUBCUTANEOUS at 09:45

## 2025-04-25 RX ADMIN — QUETIAPINE 25 MG: 25 TABLET ORAL at 20:48

## 2025-04-25 RX ADMIN — SENNOSIDES AND DOCUSATE SODIUM 1 TABLET: 50; 8.6 TABLET ORAL at 20:48

## 2025-04-25 ASSESSMENT — PAIN SCALES - GENERAL
PAINLEVEL_OUTOF10: 0
PAINLEVEL_OUTOF10: 3
PAINLEVEL_OUTOF10: 2
PAINLEVEL_OUTOF10: 2
PAINLEVEL_OUTOF10: 3

## 2025-04-25 ASSESSMENT — PAIN DESCRIPTION - LOCATION
LOCATION: SHOULDER
LOCATION: SHOULDER
LOCATION: HIP
LOCATION: HIP

## 2025-04-25 ASSESSMENT — PAIN DESCRIPTION - ORIENTATION
ORIENTATION: LEFT;RIGHT
ORIENTATION: LEFT;RIGHT

## 2025-04-25 ASSESSMENT — PAIN DESCRIPTION - DESCRIPTORS
DESCRIPTORS: ACHING
DESCRIPTORS: ACHING

## 2025-04-25 ASSESSMENT — PAIN DESCRIPTION - PAIN TYPE: TYPE: CHRONIC PAIN

## 2025-04-26 LAB
ANION GAP SERPL CALCULATED.3IONS-SCNC: 8 MMOL/L (ref 9–17)
BASOPHILS # BLD: 0.03 K/UL (ref 0–0.2)
BASOPHILS NFR BLD: 1 % (ref 0–2)
BUN SERPL-MCNC: 17 MG/DL (ref 8–23)
CALCIUM SERPL-MCNC: 8.9 MG/DL (ref 8.6–10.4)
CHLORIDE SERPL-SCNC: 101 MMOL/L (ref 98–107)
CO2 SERPL-SCNC: 29 MMOL/L (ref 20–31)
CREAT SERPL-MCNC: 0.9 MG/DL (ref 0.7–1.2)
CRP SERPL HS-MCNC: 11.8 MG/L (ref 0–5)
EOSINOPHIL # BLD: 0.15 K/UL (ref 0–0.4)
EOSINOPHILS RELATIVE PERCENT: 3 % (ref 0–5)
ERYTHROCYTE [DISTWIDTH] IN BLOOD BY AUTOMATED COUNT: 26.1 % (ref 12.1–15.2)
ERYTHROCYTE [SEDIMENTATION RATE] IN BLOOD BY PHOTOMETRIC METHOD: 87 MM/HR (ref 0–20)
GFR, ESTIMATED: 87 ML/MIN/1.73M2
GLUCOSE SERPL-MCNC: 134 MG/DL (ref 70–99)
HCT VFR BLD AUTO: 24.3 % (ref 41–53)
HGB BLD-MCNC: 7.8 G/DL (ref 13.5–17.5)
IMM GRANULOCYTES # BLD AUTO: 0.01 K/UL (ref 0–0.3)
IMM GRANULOCYTES NFR BLD: 0 % (ref 0–5)
INR PPP: 2.9
LYMPHOCYTES NFR BLD: 1.19 K/UL (ref 1–4.8)
LYMPHOCYTES RELATIVE PERCENT: 26 % (ref 13–44)
MCH RBC QN AUTO: 20.2 PG (ref 26–34)
MCHC RBC AUTO-ENTMCNC: 32.1 G/DL (ref 31–37)
MCV RBC AUTO: 63 FL (ref 80–100)
MONOCYTES NFR BLD: 0.25 K/UL (ref 0–1)
MONOCYTES NFR BLD: 6 % (ref 5–9)
MORPHOLOGY: ABNORMAL
NEUTROPHILS NFR BLD: 64 % (ref 39–75)
NEUTS SEG NFR BLD: 2.9 K/UL (ref 2.1–6.5)
PLATELET # BLD AUTO: 236 K/UL (ref 140–450)
PMV BLD AUTO: ABNORMAL FL (ref 6–12)
POTASSIUM SERPL-SCNC: 3.8 MMOL/L (ref 3.7–5.3)
PROTHROMBIN TIME: 31.8 SEC (ref 11.5–14.2)
RBC # BLD AUTO: 3.86 M/UL (ref 4.5–5.9)
SODIUM SERPL-SCNC: 138 MMOL/L (ref 135–144)
WBC OTHER # BLD: 4.5 K/UL (ref 3.5–11)

## 2025-04-26 PROCEDURE — 85652 RBC SED RATE AUTOMATED: CPT

## 2025-04-26 PROCEDURE — 85025 COMPLETE CBC W/AUTO DIFF WBC: CPT

## 2025-04-26 PROCEDURE — 1200000002 HC SEMI PRIVATE SWING BED

## 2025-04-26 PROCEDURE — 2500000003 HC RX 250 WO HCPCS: Performed by: INTERNAL MEDICINE

## 2025-04-26 PROCEDURE — 97116 GAIT TRAINING THERAPY: CPT

## 2025-04-26 PROCEDURE — 85610 PROTHROMBIN TIME: CPT

## 2025-04-26 PROCEDURE — 97530 THERAPEUTIC ACTIVITIES: CPT

## 2025-04-26 PROCEDURE — 6360000002 HC RX W HCPCS: Performed by: INTERNAL MEDICINE

## 2025-04-26 PROCEDURE — 94640 AIRWAY INHALATION TREATMENT: CPT

## 2025-04-26 PROCEDURE — 2580000003 HC RX 258: Performed by: INTERNAL MEDICINE

## 2025-04-26 PROCEDURE — 6370000000 HC RX 637 (ALT 250 FOR IP): Performed by: INTERNAL MEDICINE

## 2025-04-26 PROCEDURE — 80048 BASIC METABOLIC PNL TOTAL CA: CPT

## 2025-04-26 PROCEDURE — 94761 N-INVAS EAR/PLS OXIMETRY MLT: CPT

## 2025-04-26 PROCEDURE — 86140 C-REACTIVE PROTEIN: CPT

## 2025-04-26 RX ORDER — WARFARIN SODIUM 2.5 MG/1
2.5 TABLET ORAL
Status: COMPLETED | OUTPATIENT
Start: 2025-04-26 | End: 2025-04-26

## 2025-04-26 RX ADMIN — AMPICILLIN SODIUM 2000 MG: 2 INJECTION, POWDER, FOR SOLUTION INTRAMUSCULAR; INTRAVENOUS at 15:16

## 2025-04-26 RX ADMIN — METOPROLOL TARTRATE 25 MG: 25 TABLET, FILM COATED ORAL at 21:14

## 2025-04-26 RX ADMIN — Medication 5 MG: at 21:14

## 2025-04-26 RX ADMIN — QUETIAPINE 25 MG: 25 TABLET ORAL at 21:14

## 2025-04-26 RX ADMIN — Medication 1 TABLET: at 12:47

## 2025-04-26 RX ADMIN — ATORVASTATIN CALCIUM 20 MG: 20 TABLET, FILM COATED ORAL at 21:14

## 2025-04-26 RX ADMIN — WATER 2000 MG: 1 INJECTION INTRAMUSCULAR; INTRAVENOUS; SUBCUTANEOUS at 21:11

## 2025-04-26 RX ADMIN — TAMSULOSIN HYDROCHLORIDE 0.4 MG: 0.4 CAPSULE ORAL at 10:15

## 2025-04-26 RX ADMIN — TRIAMCINOLONE ACETONIDE: 1 CREAM TOPICAL at 10:16

## 2025-04-26 RX ADMIN — WATER 2000 MG: 1 INJECTION INTRAMUSCULAR; INTRAVENOUS; SUBCUTANEOUS at 10:15

## 2025-04-26 RX ADMIN — Medication 1 CAPSULE: at 16:52

## 2025-04-26 RX ADMIN — WARFARIN SODIUM 2.5 MG: 2.5 TABLET ORAL at 16:55

## 2025-04-26 RX ADMIN — Medication 1 CAPSULE: at 10:15

## 2025-04-26 RX ADMIN — AMPICILLIN SODIUM 2000 MG: 2 INJECTION, POWDER, FOR SOLUTION INTRAMUSCULAR; INTRAVENOUS at 10:37

## 2025-04-26 RX ADMIN — SENNOSIDES AND DOCUSATE SODIUM 1 TABLET: 50; 8.6 TABLET ORAL at 10:15

## 2025-04-26 RX ADMIN — AMPICILLIN SODIUM 2000 MG: 2 INJECTION, POWDER, FOR SOLUTION INTRAMUSCULAR; INTRAVENOUS at 04:15

## 2025-04-26 RX ADMIN — LANSOPRAZOLE 30 MG: 30 TABLET, ORALLY DISINTEGRATING, DELAYED RELEASE ORAL at 06:19

## 2025-04-26 RX ADMIN — AMPICILLIN SODIUM 2000 MG: 2 INJECTION, POWDER, FOR SOLUTION INTRAMUSCULAR; INTRAVENOUS at 21:19

## 2025-04-26 RX ADMIN — ALLOPURINOL 300 MG: 100 TABLET ORAL at 12:46

## 2025-04-26 RX ADMIN — SERTRALINE HYDROCHLORIDE 25 MG: 50 TABLET ORAL at 10:15

## 2025-04-26 RX ADMIN — SODIUM CHLORIDE, PRESERVATIVE FREE 10 ML: 5 INJECTION INTRAVENOUS at 21:11

## 2025-04-26 RX ADMIN — SODIUM CHLORIDE, PRESERVATIVE FREE 10 ML: 5 INJECTION INTRAVENOUS at 10:15

## 2025-04-26 RX ADMIN — CHOLECALCIFEROL TAB 25 MCG (1000 UNIT) 5000 UNITS: 25 TAB at 12:46

## 2025-04-26 RX ADMIN — SENNOSIDES AND DOCUSATE SODIUM 1 TABLET: 50; 8.6 TABLET ORAL at 21:14

## 2025-04-26 RX ADMIN — METOPROLOL TARTRATE 25 MG: 25 TABLET, FILM COATED ORAL at 10:15

## 2025-04-26 ASSESSMENT — PAIN SCALES - GENERAL: PAINLEVEL_OUTOF10: 0

## 2025-04-27 LAB
INR PPP: 3.1
MICROORGANISM SPEC CULT: NO GROWTH
PROTHROMBIN TIME: 33.3 SEC (ref 11.5–14.2)
SPECIMEN DESCRIPTION: NORMAL

## 2025-04-27 PROCEDURE — 85610 PROTHROMBIN TIME: CPT

## 2025-04-27 PROCEDURE — 2580000003 HC RX 258: Performed by: INTERNAL MEDICINE

## 2025-04-27 PROCEDURE — 97116 GAIT TRAINING THERAPY: CPT

## 2025-04-27 PROCEDURE — 6360000002 HC RX W HCPCS: Performed by: INTERNAL MEDICINE

## 2025-04-27 PROCEDURE — 1200000002 HC SEMI PRIVATE SWING BED

## 2025-04-27 PROCEDURE — 2500000003 HC RX 250 WO HCPCS: Performed by: INTERNAL MEDICINE

## 2025-04-27 PROCEDURE — 6370000000 HC RX 637 (ALT 250 FOR IP): Performed by: INTERNAL MEDICINE

## 2025-04-27 PROCEDURE — 97535 SELF CARE MNGMENT TRAINING: CPT

## 2025-04-27 PROCEDURE — 94640 AIRWAY INHALATION TREATMENT: CPT

## 2025-04-27 PROCEDURE — 94761 N-INVAS EAR/PLS OXIMETRY MLT: CPT

## 2025-04-27 PROCEDURE — 97530 THERAPEUTIC ACTIVITIES: CPT

## 2025-04-27 RX ORDER — WARFARIN SODIUM 1 MG/1
1 TABLET ORAL
Status: COMPLETED | OUTPATIENT
Start: 2025-04-27 | End: 2025-04-27

## 2025-04-27 RX ADMIN — SENNOSIDES AND DOCUSATE SODIUM 1 TABLET: 50; 8.6 TABLET ORAL at 08:54

## 2025-04-27 RX ADMIN — AMPICILLIN SODIUM 2000 MG: 2 INJECTION, POWDER, FOR SOLUTION INTRAMUSCULAR; INTRAVENOUS at 03:28

## 2025-04-27 RX ADMIN — METOPROLOL TARTRATE 25 MG: 25 TABLET, FILM COATED ORAL at 08:54

## 2025-04-27 RX ADMIN — QUETIAPINE 25 MG: 25 TABLET ORAL at 21:19

## 2025-04-27 RX ADMIN — AMPICILLIN SODIUM 2000 MG: 2 INJECTION, POWDER, FOR SOLUTION INTRAMUSCULAR; INTRAVENOUS at 21:37

## 2025-04-27 RX ADMIN — WARFARIN SODIUM 1 MG: 1 TABLET ORAL at 18:01

## 2025-04-27 RX ADMIN — METOPROLOL TARTRATE 25 MG: 25 TABLET, FILM COATED ORAL at 21:18

## 2025-04-27 RX ADMIN — AMPICILLIN SODIUM 2000 MG: 2 INJECTION, POWDER, FOR SOLUTION INTRAMUSCULAR; INTRAVENOUS at 15:29

## 2025-04-27 RX ADMIN — SODIUM CHLORIDE, PRESERVATIVE FREE 10 ML: 5 INJECTION INTRAVENOUS at 21:28

## 2025-04-27 RX ADMIN — ACETAMINOPHEN 650 MG: 325 TABLET, FILM COATED ORAL at 08:43

## 2025-04-27 RX ADMIN — ALLOPURINOL 300 MG: 100 TABLET ORAL at 11:09

## 2025-04-27 RX ADMIN — WATER 2000 MG: 1 INJECTION INTRAMUSCULAR; INTRAVENOUS; SUBCUTANEOUS at 21:19

## 2025-04-27 RX ADMIN — ATORVASTATIN CALCIUM 20 MG: 20 TABLET, FILM COATED ORAL at 21:19

## 2025-04-27 RX ADMIN — CHOLECALCIFEROL TAB 25 MCG (1000 UNIT) 5000 UNITS: 25 TAB at 11:10

## 2025-04-27 RX ADMIN — LANSOPRAZOLE 30 MG: 30 TABLET, ORALLY DISINTEGRATING, DELAYED RELEASE ORAL at 05:53

## 2025-04-27 RX ADMIN — Medication 1 TABLET: at 11:09

## 2025-04-27 RX ADMIN — SENNOSIDES AND DOCUSATE SODIUM 1 TABLET: 50; 8.6 TABLET ORAL at 21:19

## 2025-04-27 RX ADMIN — Medication 1 CAPSULE: at 08:53

## 2025-04-27 RX ADMIN — TAMSULOSIN HYDROCHLORIDE 0.4 MG: 0.4 CAPSULE ORAL at 08:54

## 2025-04-27 RX ADMIN — SERTRALINE HYDROCHLORIDE 25 MG: 50 TABLET ORAL at 09:04

## 2025-04-27 RX ADMIN — Medication 1 CAPSULE: at 15:24

## 2025-04-27 RX ADMIN — AMPICILLIN SODIUM 2000 MG: 2 INJECTION, POWDER, FOR SOLUTION INTRAMUSCULAR; INTRAVENOUS at 08:58

## 2025-04-27 RX ADMIN — WATER 2000 MG: 1 INJECTION INTRAMUSCULAR; INTRAVENOUS; SUBCUTANEOUS at 09:06

## 2025-04-27 RX ADMIN — SODIUM CHLORIDE, PRESERVATIVE FREE 10 ML: 5 INJECTION INTRAVENOUS at 08:55

## 2025-04-27 ASSESSMENT — PAIN DESCRIPTION - ORIENTATION
ORIENTATION: LOWER
ORIENTATION: LOWER

## 2025-04-27 ASSESSMENT — PAIN - FUNCTIONAL ASSESSMENT: PAIN_FUNCTIONAL_ASSESSMENT: NONE - DENIES PAIN

## 2025-04-27 ASSESSMENT — PAIN DESCRIPTION - DESCRIPTORS
DESCRIPTORS: ACHING
DESCRIPTORS: ACHING

## 2025-04-27 ASSESSMENT — PAIN SCALES - GENERAL
PAINLEVEL_OUTOF10: 3
PAINLEVEL_OUTOF10: 1

## 2025-04-27 ASSESSMENT — PAIN DESCRIPTION - LOCATION
LOCATION: BACK
LOCATION: BACK

## 2025-04-28 LAB
INR PPP: 2.9
PROTHROMBIN TIME: 31.5 SEC (ref 11.5–14.2)

## 2025-04-28 PROCEDURE — 6360000002 HC RX W HCPCS: Performed by: INTERNAL MEDICINE

## 2025-04-28 PROCEDURE — 6370000000 HC RX 637 (ALT 250 FOR IP): Performed by: INTERNAL MEDICINE

## 2025-04-28 PROCEDURE — 97530 THERAPEUTIC ACTIVITIES: CPT

## 2025-04-28 PROCEDURE — 2580000003 HC RX 258: Performed by: INTERNAL MEDICINE

## 2025-04-28 PROCEDURE — 97110 THERAPEUTIC EXERCISES: CPT

## 2025-04-28 PROCEDURE — 94761 N-INVAS EAR/PLS OXIMETRY MLT: CPT

## 2025-04-28 PROCEDURE — 2500000003 HC RX 250 WO HCPCS: Performed by: INTERNAL MEDICINE

## 2025-04-28 PROCEDURE — 1200000002 HC SEMI PRIVATE SWING BED

## 2025-04-28 PROCEDURE — 97116 GAIT TRAINING THERAPY: CPT

## 2025-04-28 PROCEDURE — 36415 COLL VENOUS BLD VENIPUNCTURE: CPT

## 2025-04-28 PROCEDURE — 94640 AIRWAY INHALATION TREATMENT: CPT

## 2025-04-28 PROCEDURE — 85610 PROTHROMBIN TIME: CPT

## 2025-04-28 RX ORDER — WARFARIN SODIUM 2.5 MG/1
2.5 TABLET ORAL
Status: COMPLETED | OUTPATIENT
Start: 2025-04-28 | End: 2025-04-28

## 2025-04-28 RX ADMIN — WARFARIN SODIUM 2.5 MG: 2.5 TABLET ORAL at 17:08

## 2025-04-28 RX ADMIN — SODIUM CHLORIDE, PRESERVATIVE FREE 10 ML: 5 INJECTION INTRAVENOUS at 21:33

## 2025-04-28 RX ADMIN — TAMSULOSIN HYDROCHLORIDE 0.4 MG: 0.4 CAPSULE ORAL at 08:08

## 2025-04-28 RX ADMIN — HYDROCODONE BITARTRATE AND ACETAMINOPHEN 1 TABLET: 5; 325 TABLET ORAL at 08:08

## 2025-04-28 RX ADMIN — ALLOPURINOL 300 MG: 100 TABLET ORAL at 12:13

## 2025-04-28 RX ADMIN — SENNOSIDES AND DOCUSATE SODIUM 1 TABLET: 50; 8.6 TABLET ORAL at 08:08

## 2025-04-28 RX ADMIN — WATER 2000 MG: 1 INJECTION INTRAMUSCULAR; INTRAVENOUS; SUBCUTANEOUS at 08:11

## 2025-04-28 RX ADMIN — TRIAMCINOLONE ACETONIDE: 1 CREAM TOPICAL at 08:10

## 2025-04-28 RX ADMIN — AMPICILLIN SODIUM 2000 MG: 2 INJECTION, POWDER, FOR SOLUTION INTRAMUSCULAR; INTRAVENOUS at 04:18

## 2025-04-28 RX ADMIN — CHOLECALCIFEROL TAB 25 MCG (1000 UNIT) 5000 UNITS: 25 TAB at 12:13

## 2025-04-28 RX ADMIN — AMPICILLIN SODIUM 2000 MG: 2 INJECTION, POWDER, FOR SOLUTION INTRAMUSCULAR; INTRAVENOUS at 08:24

## 2025-04-28 RX ADMIN — Medication 1 TABLET: at 12:13

## 2025-04-28 RX ADMIN — SERTRALINE HYDROCHLORIDE 25 MG: 50 TABLET ORAL at 08:08

## 2025-04-28 RX ADMIN — WATER 2000 MG: 1 INJECTION INTRAMUSCULAR; INTRAVENOUS; SUBCUTANEOUS at 21:32

## 2025-04-28 RX ADMIN — AMPICILLIN SODIUM 2000 MG: 2 INJECTION, POWDER, FOR SOLUTION INTRAMUSCULAR; INTRAVENOUS at 14:38

## 2025-04-28 RX ADMIN — METOPROLOL TARTRATE 25 MG: 25 TABLET, FILM COATED ORAL at 08:08

## 2025-04-28 RX ADMIN — SODIUM CHLORIDE, PRESERVATIVE FREE 10 ML: 5 INJECTION INTRAVENOUS at 08:09

## 2025-04-28 RX ADMIN — Medication 5 MG: at 21:33

## 2025-04-28 RX ADMIN — Medication 1 CAPSULE: at 08:08

## 2025-04-28 RX ADMIN — AMPICILLIN SODIUM 2000 MG: 2 INJECTION, POWDER, FOR SOLUTION INTRAMUSCULAR; INTRAVENOUS at 21:49

## 2025-04-28 RX ADMIN — Medication 1 CAPSULE: at 17:07

## 2025-04-28 RX ADMIN — METOPROLOL TARTRATE 25 MG: 25 TABLET, FILM COATED ORAL at 21:33

## 2025-04-28 RX ADMIN — ATORVASTATIN CALCIUM 20 MG: 20 TABLET, FILM COATED ORAL at 21:33

## 2025-04-28 RX ADMIN — SENNOSIDES AND DOCUSATE SODIUM 1 TABLET: 50; 8.6 TABLET ORAL at 21:33

## 2025-04-28 RX ADMIN — QUETIAPINE 25 MG: 25 TABLET ORAL at 21:33

## 2025-04-28 ASSESSMENT — PAIN SCALES - GENERAL
PAINLEVEL_OUTOF10: 1
PAINLEVEL_OUTOF10: 4

## 2025-04-28 ASSESSMENT — PAIN - FUNCTIONAL ASSESSMENT: PAIN_FUNCTIONAL_ASSESSMENT: NONE - DENIES PAIN

## 2025-04-28 ASSESSMENT — PAIN DESCRIPTION - ORIENTATION: ORIENTATION: LOWER

## 2025-04-28 ASSESSMENT — PAIN DESCRIPTION - LOCATION: LOCATION: BACK

## 2025-04-29 LAB
ANION GAP SERPL CALCULATED.3IONS-SCNC: 14 MMOL/L (ref 9–17)
BASOPHILS # BLD: 0.06 K/UL (ref 0–0.2)
BASOPHILS NFR BLD: 1 % (ref 0–2)
BUN SERPL-MCNC: 12 MG/DL (ref 8–23)
CALCIUM SERPL-MCNC: 8.9 MG/DL (ref 8.6–10.4)
CHLORIDE SERPL-SCNC: 99 MMOL/L (ref 98–107)
CO2 SERPL-SCNC: 28 MMOL/L (ref 20–31)
CREAT SERPL-MCNC: 0.9 MG/DL (ref 0.7–1.2)
EOSINOPHIL # BLD: 0.3 K/UL (ref 0–0.4)
EOSINOPHILS RELATIVE PERCENT: 5 % (ref 0–5)
ERYTHROCYTE [DISTWIDTH] IN BLOOD BY AUTOMATED COUNT: 25.7 % (ref 12.1–15.2)
GFR, ESTIMATED: 87 ML/MIN/1.73M2
GLUCOSE SERPL-MCNC: 150 MG/DL (ref 70–99)
HCT VFR BLD AUTO: 27.2 % (ref 41–53)
HGB BLD-MCNC: 8.6 G/DL (ref 13.5–17.5)
INR PPP: 2.6
LYMPHOCYTES NFR BLD: 0.96 K/UL (ref 1–4.8)
LYMPHOCYTES RELATIVE PERCENT: 16 % (ref 13–44)
MCH RBC QN AUTO: 20 PG (ref 26–34)
MCHC RBC AUTO-ENTMCNC: 31.6 G/DL (ref 31–37)
MCV RBC AUTO: 63.4 FL (ref 80–100)
MONOCYTES NFR BLD: 0.3 K/UL (ref 0–1)
MONOCYTES NFR BLD: 5 % (ref 5–9)
MORPHOLOGY: ABNORMAL
NEUTROPHILS NFR BLD: 73 % (ref 39–75)
NEUTS SEG NFR BLD: 4.38 K/UL (ref 2.1–6.5)
PLATELET # BLD AUTO: 227 K/UL (ref 140–450)
POTASSIUM SERPL-SCNC: 3.9 MMOL/L (ref 3.7–5.3)
PROTHROMBIN TIME: 28.7 SEC (ref 11.5–14.2)
RBC # BLD AUTO: 4.29 M/UL (ref 4.5–5.9)
SODIUM SERPL-SCNC: 141 MMOL/L (ref 135–144)
TROPONIN I SERPL HS-MCNC: 69 NG/L (ref 0–22)
WBC OTHER # BLD: 6 K/UL (ref 3.5–11)

## 2025-04-29 PROCEDURE — 80048 BASIC METABOLIC PNL TOTAL CA: CPT

## 2025-04-29 PROCEDURE — 36415 COLL VENOUS BLD VENIPUNCTURE: CPT

## 2025-04-29 PROCEDURE — 85610 PROTHROMBIN TIME: CPT

## 2025-04-29 PROCEDURE — 84484 ASSAY OF TROPONIN QUANT: CPT

## 2025-04-29 PROCEDURE — 85025 COMPLETE CBC W/AUTO DIFF WBC: CPT

## 2025-04-29 PROCEDURE — 97530 THERAPEUTIC ACTIVITIES: CPT

## 2025-04-29 PROCEDURE — 6360000002 HC RX W HCPCS: Performed by: INTERNAL MEDICINE

## 2025-04-29 PROCEDURE — 97116 GAIT TRAINING THERAPY: CPT

## 2025-04-29 PROCEDURE — 97110 THERAPEUTIC EXERCISES: CPT

## 2025-04-29 PROCEDURE — 1200000002 HC SEMI PRIVATE SWING BED

## 2025-04-29 PROCEDURE — 94761 N-INVAS EAR/PLS OXIMETRY MLT: CPT

## 2025-04-29 PROCEDURE — 93005 ELECTROCARDIOGRAM TRACING: CPT | Performed by: INTERNAL MEDICINE

## 2025-04-29 PROCEDURE — 2500000003 HC RX 250 WO HCPCS: Performed by: INTERNAL MEDICINE

## 2025-04-29 PROCEDURE — 2580000003 HC RX 258: Performed by: INTERNAL MEDICINE

## 2025-04-29 PROCEDURE — 94640 AIRWAY INHALATION TREATMENT: CPT

## 2025-04-29 PROCEDURE — 6370000000 HC RX 637 (ALT 250 FOR IP): Performed by: INTERNAL MEDICINE

## 2025-04-29 RX ORDER — WARFARIN SODIUM 2.5 MG/1
2.5 TABLET ORAL
Status: COMPLETED | OUTPATIENT
Start: 2025-04-29 | End: 2025-04-29

## 2025-04-29 RX ADMIN — AMPICILLIN SODIUM 2000 MG: 2 INJECTION, POWDER, FOR SOLUTION INTRAMUSCULAR; INTRAVENOUS at 09:11

## 2025-04-29 RX ADMIN — Medication 1 CAPSULE: at 08:19

## 2025-04-29 RX ADMIN — ATORVASTATIN CALCIUM 20 MG: 20 TABLET, FILM COATED ORAL at 21:07

## 2025-04-29 RX ADMIN — SODIUM CHLORIDE, PRESERVATIVE FREE 10 ML: 5 INJECTION INTRAVENOUS at 08:21

## 2025-04-29 RX ADMIN — TRIAMCINOLONE ACETONIDE: 1 CREAM TOPICAL at 08:20

## 2025-04-29 RX ADMIN — SERTRALINE HYDROCHLORIDE 25 MG: 50 TABLET ORAL at 08:20

## 2025-04-29 RX ADMIN — TAMSULOSIN HYDROCHLORIDE 0.4 MG: 0.4 CAPSULE ORAL at 08:19

## 2025-04-29 RX ADMIN — WATER 2000 MG: 1 INJECTION INTRAMUSCULAR; INTRAVENOUS; SUBCUTANEOUS at 08:21

## 2025-04-29 RX ADMIN — HYDROCODONE BITARTRATE AND ACETAMINOPHEN 1 TABLET: 5; 325 TABLET ORAL at 08:18

## 2025-04-29 RX ADMIN — QUETIAPINE 25 MG: 25 TABLET ORAL at 21:07

## 2025-04-29 RX ADMIN — CHOLECALCIFEROL TAB 25 MCG (1000 UNIT) 5000 UNITS: 25 TAB at 11:16

## 2025-04-29 RX ADMIN — AMPICILLIN SODIUM 2000 MG: 2 INJECTION, POWDER, FOR SOLUTION INTRAMUSCULAR; INTRAVENOUS at 04:01

## 2025-04-29 RX ADMIN — Medication 1 TABLET: at 11:16

## 2025-04-29 RX ADMIN — SODIUM CHLORIDE, PRESERVATIVE FREE 10 ML: 5 INJECTION INTRAVENOUS at 21:19

## 2025-04-29 RX ADMIN — Medication 5 MG: at 21:07

## 2025-04-29 RX ADMIN — METOPROLOL TARTRATE 25 MG: 25 TABLET, FILM COATED ORAL at 21:07

## 2025-04-29 RX ADMIN — WATER 2000 MG: 1 INJECTION INTRAMUSCULAR; INTRAVENOUS; SUBCUTANEOUS at 21:06

## 2025-04-29 RX ADMIN — ALLOPURINOL 300 MG: 100 TABLET ORAL at 11:16

## 2025-04-29 RX ADMIN — METOPROLOL TARTRATE 25 MG: 25 TABLET, FILM COATED ORAL at 08:19

## 2025-04-29 RX ADMIN — Medication 1 CAPSULE: at 17:06

## 2025-04-29 RX ADMIN — WARFARIN SODIUM 2.5 MG: 2.5 TABLET ORAL at 17:06

## 2025-04-29 RX ADMIN — AMPICILLIN SODIUM 2000 MG: 2 INJECTION, POWDER, FOR SOLUTION INTRAMUSCULAR; INTRAVENOUS at 15:37

## 2025-04-29 RX ADMIN — AMPICILLIN SODIUM 2000 MG: 2 INJECTION, POWDER, FOR SOLUTION INTRAMUSCULAR; INTRAVENOUS at 21:18

## 2025-04-29 ASSESSMENT — PAIN - FUNCTIONAL ASSESSMENT
PAIN_FUNCTIONAL_ASSESSMENT: NONE - DENIES PAIN
PAIN_FUNCTIONAL_ASSESSMENT: NONE - DENIES PAIN

## 2025-04-29 ASSESSMENT — PAIN DESCRIPTION - ORIENTATION: ORIENTATION: LOWER

## 2025-04-29 ASSESSMENT — PAIN DESCRIPTION - LOCATION: LOCATION: BACK

## 2025-04-29 ASSESSMENT — PAIN SCALES - GENERAL
PAINLEVEL_OUTOF10: 0
PAINLEVEL_OUTOF10: 4
PAINLEVEL_OUTOF10: 2
PAINLEVEL_OUTOF10: 3

## 2025-04-30 LAB
INR PPP: 2.5
PROTHROMBIN TIME: 28 SEC (ref 11.5–14.2)

## 2025-04-30 PROCEDURE — 85610 PROTHROMBIN TIME: CPT

## 2025-04-30 PROCEDURE — 2500000003 HC RX 250 WO HCPCS: Performed by: INTERNAL MEDICINE

## 2025-04-30 PROCEDURE — 1200000002 HC SEMI PRIVATE SWING BED

## 2025-04-30 PROCEDURE — 97530 THERAPEUTIC ACTIVITIES: CPT

## 2025-04-30 PROCEDURE — 6360000002 HC RX W HCPCS: Performed by: INTERNAL MEDICINE

## 2025-04-30 PROCEDURE — 94761 N-INVAS EAR/PLS OXIMETRY MLT: CPT

## 2025-04-30 PROCEDURE — 94640 AIRWAY INHALATION TREATMENT: CPT

## 2025-04-30 PROCEDURE — 6370000000 HC RX 637 (ALT 250 FOR IP): Performed by: INTERNAL MEDICINE

## 2025-04-30 PROCEDURE — 2580000003 HC RX 258: Performed by: INTERNAL MEDICINE

## 2025-04-30 PROCEDURE — 97116 GAIT TRAINING THERAPY: CPT

## 2025-04-30 RX ORDER — WARFARIN SODIUM 2.5 MG/1
3.75 TABLET ORAL
Status: COMPLETED | OUTPATIENT
Start: 2025-04-30 | End: 2025-04-30

## 2025-04-30 RX ORDER — LIDOCAINE 4 G/G
2 PATCH TOPICAL DAILY
Status: DISCONTINUED | OUTPATIENT
Start: 2025-04-30 | End: 2025-05-19 | Stop reason: HOSPADM

## 2025-04-30 RX ADMIN — AMPICILLIN SODIUM 2000 MG: 2 INJECTION, POWDER, FOR SOLUTION INTRAMUSCULAR; INTRAVENOUS at 19:12

## 2025-04-30 RX ADMIN — ACETAMINOPHEN 650 MG: 325 TABLET, FILM COATED ORAL at 03:45

## 2025-04-30 RX ADMIN — SODIUM CHLORIDE, PRESERVATIVE FREE 10 ML: 5 INJECTION INTRAVENOUS at 09:02

## 2025-04-30 RX ADMIN — Medication 1 CAPSULE: at 07:57

## 2025-04-30 RX ADMIN — METOPROLOL TARTRATE 25 MG: 25 TABLET, FILM COATED ORAL at 07:58

## 2025-04-30 RX ADMIN — WATER 2000 MG: 1 INJECTION INTRAMUSCULAR; INTRAVENOUS; SUBCUTANEOUS at 20:45

## 2025-04-30 RX ADMIN — Medication 5 MG: at 21:00

## 2025-04-30 RX ADMIN — TRIAMCINOLONE ACETONIDE: 1 CREAM TOPICAL at 19:15

## 2025-04-30 RX ADMIN — TAMSULOSIN HYDROCHLORIDE 0.4 MG: 0.4 CAPSULE ORAL at 07:57

## 2025-04-30 RX ADMIN — ACETAMINOPHEN 650 MG: 325 TABLET, FILM COATED ORAL at 09:08

## 2025-04-30 RX ADMIN — METOPROLOL TARTRATE 25 MG: 25 TABLET, FILM COATED ORAL at 19:12

## 2025-04-30 RX ADMIN — ALLOPURINOL 300 MG: 100 TABLET ORAL at 11:59

## 2025-04-30 RX ADMIN — SENNOSIDES AND DOCUSATE SODIUM 1 TABLET: 50; 8.6 TABLET ORAL at 07:58

## 2025-04-30 RX ADMIN — Medication 1 TABLET: at 12:00

## 2025-04-30 RX ADMIN — AMPICILLIN SODIUM 2000 MG: 2 INJECTION, POWDER, FOR SOLUTION INTRAMUSCULAR; INTRAVENOUS at 09:51

## 2025-04-30 RX ADMIN — SODIUM CHLORIDE, PRESERVATIVE FREE 10 ML: 5 INJECTION INTRAVENOUS at 20:47

## 2025-04-30 RX ADMIN — QUETIAPINE 25 MG: 25 TABLET ORAL at 19:12

## 2025-04-30 RX ADMIN — ACETAMINOPHEN 650 MG: 325 TABLET, FILM COATED ORAL at 20:59

## 2025-04-30 RX ADMIN — AMPICILLIN SODIUM 2000 MG: 2 INJECTION, POWDER, FOR SOLUTION INTRAMUSCULAR; INTRAVENOUS at 03:44

## 2025-04-30 RX ADMIN — ATORVASTATIN CALCIUM 20 MG: 20 TABLET, FILM COATED ORAL at 19:13

## 2025-04-30 RX ADMIN — LANSOPRAZOLE 30 MG: 30 TABLET, ORALLY DISINTEGRATING, DELAYED RELEASE ORAL at 08:01

## 2025-04-30 RX ADMIN — SODIUM CHLORIDE, PRESERVATIVE FREE 10 ML: 5 INJECTION INTRAVENOUS at 19:12

## 2025-04-30 RX ADMIN — WARFARIN SODIUM 3.75 MG: 2.5 TABLET ORAL at 19:13

## 2025-04-30 RX ADMIN — Medication 1 TABLET: at 11:59

## 2025-04-30 RX ADMIN — SENNOSIDES AND DOCUSATE SODIUM 1 TABLET: 50; 8.6 TABLET ORAL at 19:12

## 2025-04-30 RX ADMIN — CHOLECALCIFEROL TAB 25 MCG (1000 UNIT) 5000 UNITS: 25 TAB at 11:59

## 2025-04-30 RX ADMIN — SERTRALINE HYDROCHLORIDE 25 MG: 50 TABLET ORAL at 07:58

## 2025-04-30 RX ADMIN — Medication 1 CAPSULE: at 19:12

## 2025-04-30 RX ADMIN — WATER 2000 MG: 1 INJECTION INTRAMUSCULAR; INTRAVENOUS; SUBCUTANEOUS at 08:48

## 2025-04-30 ASSESSMENT — PAIN DESCRIPTION - ORIENTATION
ORIENTATION: LOWER
ORIENTATION: LOWER
ORIENTATION: RIGHT

## 2025-04-30 ASSESSMENT — PAIN DESCRIPTION - LOCATION
LOCATION: BACK
LOCATION: BACK
LOCATION: KNEE

## 2025-04-30 ASSESSMENT — PAIN SCALES - GENERAL
PAINLEVEL_OUTOF10: 1
PAINLEVEL_OUTOF10: 0
PAINLEVEL_OUTOF10: 3
PAINLEVEL_OUTOF10: 1
PAINLEVEL_OUTOF10: 0
PAINLEVEL_OUTOF10: 4
PAINLEVEL_OUTOF10: 1
PAINLEVEL_OUTOF10: 0

## 2025-04-30 ASSESSMENT — PAIN DESCRIPTION - PAIN TYPE: TYPE: CHRONIC PAIN

## 2025-04-30 ASSESSMENT — PAIN DESCRIPTION - DESCRIPTORS
DESCRIPTORS: ACHING

## 2025-04-30 ASSESSMENT — PAIN - FUNCTIONAL ASSESSMENT: PAIN_FUNCTIONAL_ASSESSMENT: 0-10

## 2025-04-30 NOTE — FLOWSHEET NOTE
Pt. Returns from therapy. Resting on the exam cot.  No acute distress noted.  TV on.  Call light within reach.

## 2025-04-30 NOTE — FLOWSHEET NOTE
Pt. Resting sitting up in the chair. Dietary team at bedside assisting with dinner order.  ABC's stable.  No acute distress noted.  Family remains at bedside.

## 2025-05-01 LAB
EKG ATRIAL RATE: 83 BPM
EKG P AXIS: 51 DEGREES
EKG P-R INTERVAL: 186 MS
EKG Q-T INTERVAL: 450 MS
EKG QRS DURATION: 182 MS
EKG QTC CALCULATION (BAZETT): 528 MS
EKG R AXIS: -76 DEGREES
EKG T AXIS: 93 DEGREES
EKG VENTRICULAR RATE: 83 BPM
INR PPP: 2.4
PROTHROMBIN TIME: 27 SEC (ref 11.5–14.2)

## 2025-05-01 PROCEDURE — 2500000003 HC RX 250 WO HCPCS: Performed by: INTERNAL MEDICINE

## 2025-05-01 PROCEDURE — 36415 COLL VENOUS BLD VENIPUNCTURE: CPT

## 2025-05-01 PROCEDURE — 97530 THERAPEUTIC ACTIVITIES: CPT

## 2025-05-01 PROCEDURE — 6370000000 HC RX 637 (ALT 250 FOR IP): Performed by: INTERNAL MEDICINE

## 2025-05-01 PROCEDURE — 94761 N-INVAS EAR/PLS OXIMETRY MLT: CPT

## 2025-05-01 PROCEDURE — 94640 AIRWAY INHALATION TREATMENT: CPT

## 2025-05-01 PROCEDURE — 6360000002 HC RX W HCPCS: Performed by: INTERNAL MEDICINE

## 2025-05-01 PROCEDURE — 97116 GAIT TRAINING THERAPY: CPT

## 2025-05-01 PROCEDURE — 93010 ELECTROCARDIOGRAM REPORT: CPT | Performed by: INTERNAL MEDICINE

## 2025-05-01 PROCEDURE — 85610 PROTHROMBIN TIME: CPT

## 2025-05-01 PROCEDURE — 97110 THERAPEUTIC EXERCISES: CPT

## 2025-05-01 PROCEDURE — 1200000002 HC SEMI PRIVATE SWING BED

## 2025-05-01 PROCEDURE — 2580000003 HC RX 258: Performed by: INTERNAL MEDICINE

## 2025-05-01 RX ORDER — WARFARIN SODIUM 5 MG/1
5 TABLET ORAL
Status: COMPLETED | OUTPATIENT
Start: 2025-05-01 | End: 2025-05-01

## 2025-05-01 RX ADMIN — QUETIAPINE 25 MG: 25 TABLET ORAL at 20:28

## 2025-05-01 RX ADMIN — AMPICILLIN SODIUM 2000 MG: 2 INJECTION, POWDER, FOR SOLUTION INTRAMUSCULAR; INTRAVENOUS at 00:36

## 2025-05-01 RX ADMIN — HYDROCODONE BITARTRATE AND ACETAMINOPHEN 1 TABLET: 5; 325 TABLET ORAL at 12:11

## 2025-05-01 RX ADMIN — Medication 1 CAPSULE: at 08:42

## 2025-05-01 RX ADMIN — Medication 5 MG: at 20:28

## 2025-05-01 RX ADMIN — SODIUM CHLORIDE, PRESERVATIVE FREE 10 ML: 5 INJECTION INTRAVENOUS at 06:43

## 2025-05-01 RX ADMIN — SENNOSIDES AND DOCUSATE SODIUM 1 TABLET: 50; 8.6 TABLET ORAL at 08:42

## 2025-05-01 RX ADMIN — METOPROLOL TARTRATE 25 MG: 25 TABLET, FILM COATED ORAL at 20:28

## 2025-05-01 RX ADMIN — AMPICILLIN SODIUM 2000 MG: 2 INJECTION, POWDER, FOR SOLUTION INTRAMUSCULAR; INTRAVENOUS at 06:42

## 2025-05-01 RX ADMIN — SERTRALINE HYDROCHLORIDE 25 MG: 50 TABLET ORAL at 08:43

## 2025-05-01 RX ADMIN — ACETAMINOPHEN 650 MG: 325 TABLET, FILM COATED ORAL at 20:28

## 2025-05-01 RX ADMIN — Medication 1 TABLET: at 12:11

## 2025-05-01 RX ADMIN — WATER 2000 MG: 1 INJECTION INTRAMUSCULAR; INTRAVENOUS; SUBCUTANEOUS at 08:42

## 2025-05-01 RX ADMIN — AMPICILLIN SODIUM 2000 MG: 2 INJECTION, POWDER, FOR SOLUTION INTRAMUSCULAR; INTRAVENOUS at 18:17

## 2025-05-01 RX ADMIN — TAMSULOSIN HYDROCHLORIDE 0.4 MG: 0.4 CAPSULE ORAL at 08:42

## 2025-05-01 RX ADMIN — WATER 2000 MG: 1 INJECTION INTRAMUSCULAR; INTRAVENOUS; SUBCUTANEOUS at 20:31

## 2025-05-01 RX ADMIN — ALLOPURINOL 300 MG: 100 TABLET ORAL at 12:11

## 2025-05-01 RX ADMIN — METOPROLOL TARTRATE 25 MG: 25 TABLET, FILM COATED ORAL at 08:42

## 2025-05-01 RX ADMIN — SODIUM CHLORIDE, PRESERVATIVE FREE 10 ML: 5 INJECTION INTRAVENOUS at 20:31

## 2025-05-01 RX ADMIN — LANSOPRAZOLE 30 MG: 30 TABLET, ORALLY DISINTEGRATING, DELAYED RELEASE ORAL at 06:34

## 2025-05-01 RX ADMIN — SODIUM CHLORIDE, PRESERVATIVE FREE 10 ML: 5 INJECTION INTRAVENOUS at 08:42

## 2025-05-01 RX ADMIN — ATORVASTATIN CALCIUM 20 MG: 20 TABLET, FILM COATED ORAL at 20:28

## 2025-05-01 RX ADMIN — CHOLECALCIFEROL TAB 25 MCG (1000 UNIT) 5000 UNITS: 25 TAB at 12:11

## 2025-05-01 RX ADMIN — TRIAMCINOLONE ACETONIDE: 1 CREAM TOPICAL at 20:31

## 2025-05-01 RX ADMIN — SODIUM CHLORIDE, PRESERVATIVE FREE 10 ML: 5 INJECTION INTRAVENOUS at 00:36

## 2025-05-01 RX ADMIN — AMPICILLIN SODIUM 2000 MG: 2 INJECTION, POWDER, FOR SOLUTION INTRAMUSCULAR; INTRAVENOUS at 13:21

## 2025-05-01 RX ADMIN — SENNOSIDES AND DOCUSATE SODIUM 1 TABLET: 50; 8.6 TABLET ORAL at 20:28

## 2025-05-01 RX ADMIN — SODIUM CHLORIDE, PRESERVATIVE FREE 10 ML: 5 INJECTION INTRAVENOUS at 18:16

## 2025-05-01 RX ADMIN — Medication 1 CAPSULE: at 18:15

## 2025-05-01 RX ADMIN — WARFARIN SODIUM 5 MG: 5 TABLET ORAL at 18:15

## 2025-05-01 ASSESSMENT — PAIN DESCRIPTION - FREQUENCY: FREQUENCY: INTERMITTENT

## 2025-05-01 ASSESSMENT — PAIN SCALES - GENERAL
PAINLEVEL_OUTOF10: 4
PAINLEVEL_OUTOF10: 0
PAINLEVEL_OUTOF10: 0
PAINLEVEL_OUTOF10: 2
PAINLEVEL_OUTOF10: 3
PAINLEVEL_OUTOF10: 0
PAINLEVEL_OUTOF10: 1
PAINLEVEL_OUTOF10: 0

## 2025-05-01 ASSESSMENT — PAIN DESCRIPTION - LOCATION
LOCATION: BACK

## 2025-05-01 ASSESSMENT — PAIN DESCRIPTION - DESCRIPTORS
DESCRIPTORS: ACHING

## 2025-05-01 ASSESSMENT — PAIN DESCRIPTION - ONSET: ONSET: ON-GOING

## 2025-05-01 ASSESSMENT — PAIN DESCRIPTION - ORIENTATION
ORIENTATION: LOWER
ORIENTATION: LOWER

## 2025-05-01 ASSESSMENT — PAIN DESCRIPTION - PAIN TYPE: TYPE: CHRONIC PAIN

## 2025-05-01 ASSESSMENT — PAIN - FUNCTIONAL ASSESSMENT: PAIN_FUNCTIONAL_ASSESSMENT: PREVENTS OR INTERFERES SOME ACTIVE ACTIVITIES AND ADLS

## 2025-05-02 LAB
INR PPP: 2.7
PROTHROMBIN TIME: 29.7 SEC (ref 11.5–14.2)

## 2025-05-02 PROCEDURE — 6370000000 HC RX 637 (ALT 250 FOR IP): Performed by: INTERNAL MEDICINE

## 2025-05-02 PROCEDURE — 97116 GAIT TRAINING THERAPY: CPT

## 2025-05-02 PROCEDURE — 2500000003 HC RX 250 WO HCPCS: Performed by: INTERNAL MEDICINE

## 2025-05-02 PROCEDURE — 36415 COLL VENOUS BLD VENIPUNCTURE: CPT

## 2025-05-02 PROCEDURE — 6360000002 HC RX W HCPCS: Performed by: INTERNAL MEDICINE

## 2025-05-02 PROCEDURE — 2580000003 HC RX 258: Performed by: INTERNAL MEDICINE

## 2025-05-02 PROCEDURE — 1200000002 HC SEMI PRIVATE SWING BED

## 2025-05-02 PROCEDURE — 97110 THERAPEUTIC EXERCISES: CPT

## 2025-05-02 PROCEDURE — 94640 AIRWAY INHALATION TREATMENT: CPT

## 2025-05-02 PROCEDURE — 85610 PROTHROMBIN TIME: CPT

## 2025-05-02 PROCEDURE — 94761 N-INVAS EAR/PLS OXIMETRY MLT: CPT

## 2025-05-02 RX ORDER — WARFARIN SODIUM 2.5 MG/1
2.5 TABLET ORAL
Status: COMPLETED | OUTPATIENT
Start: 2025-05-02 | End: 2025-05-02

## 2025-05-02 RX ADMIN — Medication 1 CAPSULE: at 08:25

## 2025-05-02 RX ADMIN — ALLOPURINOL 300 MG: 100 TABLET ORAL at 12:46

## 2025-05-02 RX ADMIN — TAMSULOSIN HYDROCHLORIDE 0.4 MG: 0.4 CAPSULE ORAL at 08:25

## 2025-05-02 RX ADMIN — AMPICILLIN SODIUM 2000 MG: 2 INJECTION, POWDER, FOR SOLUTION INTRAMUSCULAR; INTRAVENOUS at 06:35

## 2025-05-02 RX ADMIN — ACETAMINOPHEN 650 MG: 325 TABLET, FILM COATED ORAL at 15:58

## 2025-05-02 RX ADMIN — AMPICILLIN SODIUM 2000 MG: 2 INJECTION, POWDER, FOR SOLUTION INTRAMUSCULAR; INTRAVENOUS at 00:31

## 2025-05-02 RX ADMIN — WATER 2000 MG: 1 INJECTION INTRAMUSCULAR; INTRAVENOUS; SUBCUTANEOUS at 08:25

## 2025-05-02 RX ADMIN — Medication 1 CAPSULE: at 15:58

## 2025-05-02 RX ADMIN — METOPROLOL TARTRATE 25 MG: 25 TABLET, FILM COATED ORAL at 20:04

## 2025-05-02 RX ADMIN — SENNOSIDES AND DOCUSATE SODIUM 1 TABLET: 50; 8.6 TABLET ORAL at 20:03

## 2025-05-02 RX ADMIN — TRIAMCINOLONE ACETONIDE: 1 CREAM TOPICAL at 08:45

## 2025-05-02 RX ADMIN — SODIUM CHLORIDE, PRESERVATIVE FREE 10 ML: 5 INJECTION INTRAVENOUS at 08:34

## 2025-05-02 RX ADMIN — ATORVASTATIN CALCIUM 20 MG: 20 TABLET, FILM COATED ORAL at 20:04

## 2025-05-02 RX ADMIN — CHOLECALCIFEROL TAB 25 MCG (1000 UNIT) 5000 UNITS: 25 TAB at 12:47

## 2025-05-02 RX ADMIN — Medication 1 TABLET: at 12:46

## 2025-05-02 RX ADMIN — SODIUM CHLORIDE, PRESERVATIVE FREE 10 ML: 5 INJECTION INTRAVENOUS at 00:32

## 2025-05-02 RX ADMIN — WARFARIN SODIUM 2.5 MG: 2.5 TABLET ORAL at 18:45

## 2025-05-02 RX ADMIN — HYDROCODONE BITARTRATE AND ACETAMINOPHEN 1 TABLET: 5; 325 TABLET ORAL at 07:57

## 2025-05-02 RX ADMIN — AMPICILLIN SODIUM 2000 MG: 2 INJECTION, POWDER, FOR SOLUTION INTRAMUSCULAR; INTRAVENOUS at 13:51

## 2025-05-02 RX ADMIN — LANSOPRAZOLE 30 MG: 30 TABLET, ORALLY DISINTEGRATING, DELAYED RELEASE ORAL at 06:32

## 2025-05-02 RX ADMIN — METOPROLOL TARTRATE 25 MG: 25 TABLET, FILM COATED ORAL at 08:25

## 2025-05-02 RX ADMIN — SODIUM CHLORIDE, PRESERVATIVE FREE 10 ML: 5 INJECTION INTRAVENOUS at 20:05

## 2025-05-02 RX ADMIN — TRIAMCINOLONE ACETONIDE: 1 CREAM TOPICAL at 20:05

## 2025-05-02 RX ADMIN — SERTRALINE HYDROCHLORIDE 25 MG: 50 TABLET ORAL at 08:25

## 2025-05-02 RX ADMIN — SODIUM CHLORIDE, PRESERVATIVE FREE 10 ML: 5 INJECTION INTRAVENOUS at 06:35

## 2025-05-02 RX ADMIN — Medication 5 MG: at 20:03

## 2025-05-02 RX ADMIN — AMPICILLIN SODIUM 2000 MG: 2 INJECTION, POWDER, FOR SOLUTION INTRAMUSCULAR; INTRAVENOUS at 18:47

## 2025-05-02 RX ADMIN — WATER 2000 MG: 1 INJECTION INTRAMUSCULAR; INTRAVENOUS; SUBCUTANEOUS at 20:01

## 2025-05-02 RX ADMIN — QUETIAPINE 25 MG: 25 TABLET ORAL at 20:04

## 2025-05-02 ASSESSMENT — PAIN DESCRIPTION - ORIENTATION
ORIENTATION: LOWER
ORIENTATION: LOWER

## 2025-05-02 ASSESSMENT — PAIN SCALES - GENERAL
PAINLEVEL_OUTOF10: 0
PAINLEVEL_OUTOF10: 0
PAINLEVEL_OUTOF10: 4
PAINLEVEL_OUTOF10: 1
PAINLEVEL_OUTOF10: 2
PAINLEVEL_OUTOF10: 1
PAINLEVEL_OUTOF10: 2

## 2025-05-02 ASSESSMENT — PAIN DESCRIPTION - DESCRIPTORS
DESCRIPTORS: ACHING
DESCRIPTORS: ACHING

## 2025-05-02 ASSESSMENT — PAIN DESCRIPTION - LOCATION
LOCATION: BACK

## 2025-05-03 LAB
ANION GAP SERPL CALCULATED.3IONS-SCNC: 9 MMOL/L (ref 9–17)
BASOPHILS # BLD: 0.04 K/UL (ref 0–0.2)
BASOPHILS NFR BLD: 1 % (ref 0–2)
BUN SERPL-MCNC: 13 MG/DL (ref 8–23)
CALCIUM SERPL-MCNC: 8.5 MG/DL (ref 8.6–10.4)
CHLORIDE SERPL-SCNC: 102 MMOL/L (ref 98–107)
CO2 SERPL-SCNC: 30 MMOL/L (ref 20–31)
CREAT SERPL-MCNC: 0.8 MG/DL (ref 0.7–1.2)
CRP SERPL HS-MCNC: 6.4 MG/L (ref 0–5)
EOSINOPHIL # BLD: 0.18 K/UL (ref 0–0.4)
EOSINOPHILS RELATIVE PERCENT: 4 % (ref 0–5)
ERYTHROCYTE [DISTWIDTH] IN BLOOD BY AUTOMATED COUNT: 25.2 % (ref 12.1–15.2)
ERYTHROCYTE [SEDIMENTATION RATE] IN BLOOD BY PHOTOMETRIC METHOD: 58 MM/HR (ref 0–20)
GFR, ESTIMATED: >90 ML/MIN/1.73M2
GLUCOSE SERPL-MCNC: 124 MG/DL (ref 70–99)
HCT VFR BLD AUTO: 24.3 % (ref 41–53)
HGB BLD-MCNC: 7.6 G/DL (ref 13.5–17.5)
IMM GRANULOCYTES # BLD AUTO: 0.03 K/UL (ref 0–0.3)
IMM GRANULOCYTES NFR BLD: 1 % (ref 0–5)
INR PPP: 3.1
LYMPHOCYTES NFR BLD: 1.05 K/UL (ref 1–4.8)
LYMPHOCYTES RELATIVE PERCENT: 25 % (ref 13–44)
MCH RBC QN AUTO: 19.8 PG (ref 26–34)
MCHC RBC AUTO-ENTMCNC: 31.3 G/DL (ref 31–37)
MCV RBC AUTO: 63.4 FL (ref 80–100)
MONOCYTES NFR BLD: 0.27 K/UL (ref 0–1)
MONOCYTES NFR BLD: 6 % (ref 5–9)
MORPHOLOGY: ABNORMAL
MORPHOLOGY: ABNORMAL
NEUTROPHILS NFR BLD: 63 % (ref 39–75)
NEUTS SEG NFR BLD: 2.64 K/UL (ref 2.1–6.5)
PLATELET # BLD AUTO: 163 K/UL (ref 140–450)
PMV BLD AUTO: ABNORMAL FL (ref 6–12)
POTASSIUM SERPL-SCNC: 3.8 MMOL/L (ref 3.7–5.3)
PROTHROMBIN TIME: 32.9 SEC (ref 11.5–14.2)
RBC # BLD AUTO: 3.83 M/UL (ref 4.5–5.9)
SODIUM SERPL-SCNC: 141 MMOL/L (ref 135–144)
WBC OTHER # BLD: 4.2 K/UL (ref 3.5–11)

## 2025-05-03 PROCEDURE — 94640 AIRWAY INHALATION TREATMENT: CPT

## 2025-05-03 PROCEDURE — 2500000003 HC RX 250 WO HCPCS: Performed by: INTERNAL MEDICINE

## 2025-05-03 PROCEDURE — 6370000000 HC RX 637 (ALT 250 FOR IP): Performed by: INTERNAL MEDICINE

## 2025-05-03 PROCEDURE — 85610 PROTHROMBIN TIME: CPT

## 2025-05-03 PROCEDURE — 2580000003 HC RX 258: Performed by: INTERNAL MEDICINE

## 2025-05-03 PROCEDURE — 94761 N-INVAS EAR/PLS OXIMETRY MLT: CPT

## 2025-05-03 PROCEDURE — 97110 THERAPEUTIC EXERCISES: CPT

## 2025-05-03 PROCEDURE — 85652 RBC SED RATE AUTOMATED: CPT

## 2025-05-03 PROCEDURE — 6360000002 HC RX W HCPCS: Performed by: INTERNAL MEDICINE

## 2025-05-03 PROCEDURE — 86140 C-REACTIVE PROTEIN: CPT

## 2025-05-03 PROCEDURE — 85025 COMPLETE CBC W/AUTO DIFF WBC: CPT

## 2025-05-03 PROCEDURE — 1200000002 HC SEMI PRIVATE SWING BED

## 2025-05-03 PROCEDURE — 80048 BASIC METABOLIC PNL TOTAL CA: CPT

## 2025-05-03 RX ORDER — WARFARIN SODIUM 2.5 MG/1
1.25 TABLET ORAL
Status: COMPLETED | OUTPATIENT
Start: 2025-05-03 | End: 2025-05-03

## 2025-05-03 RX ADMIN — HYDROCODONE BITARTRATE AND ACETAMINOPHEN 1 TABLET: 5; 325 TABLET ORAL at 08:41

## 2025-05-03 RX ADMIN — IRON SUCROSE 200 MG: 20 INJECTION, SOLUTION INTRAVENOUS at 09:32

## 2025-05-03 RX ADMIN — AMPICILLIN SODIUM 2000 MG: 2 INJECTION, POWDER, FOR SOLUTION INTRAMUSCULAR; INTRAVENOUS at 18:29

## 2025-05-03 RX ADMIN — Medication 1 CAPSULE: at 18:19

## 2025-05-03 RX ADMIN — AMPICILLIN SODIUM 2000 MG: 2 INJECTION, POWDER, FOR SOLUTION INTRAMUSCULAR; INTRAVENOUS at 01:42

## 2025-05-03 RX ADMIN — SODIUM CHLORIDE, PRESERVATIVE FREE 10 ML: 5 INJECTION INTRAVENOUS at 01:44

## 2025-05-03 RX ADMIN — LANSOPRAZOLE 30 MG: 30 TABLET, ORALLY DISINTEGRATING, DELAYED RELEASE ORAL at 06:25

## 2025-05-03 RX ADMIN — Medication 1 CAPSULE: at 08:41

## 2025-05-03 RX ADMIN — ATORVASTATIN CALCIUM 20 MG: 20 TABLET, FILM COATED ORAL at 20:48

## 2025-05-03 RX ADMIN — SERTRALINE HYDROCHLORIDE 25 MG: 50 TABLET ORAL at 08:41

## 2025-05-03 RX ADMIN — SENNOSIDES AND DOCUSATE SODIUM 1 TABLET: 50; 8.6 TABLET ORAL at 08:41

## 2025-05-03 RX ADMIN — WATER 2000 MG: 1 INJECTION INTRAMUSCULAR; INTRAVENOUS; SUBCUTANEOUS at 08:42

## 2025-05-03 RX ADMIN — AMPICILLIN SODIUM 2000 MG: 2 INJECTION, POWDER, FOR SOLUTION INTRAMUSCULAR; INTRAVENOUS at 06:30

## 2025-05-03 RX ADMIN — QUETIAPINE 25 MG: 25 TABLET ORAL at 20:47

## 2025-05-03 RX ADMIN — Medication 1 TABLET: at 11:36

## 2025-05-03 RX ADMIN — METOPROLOL TARTRATE 25 MG: 25 TABLET, FILM COATED ORAL at 20:47

## 2025-05-03 RX ADMIN — SODIUM CHLORIDE, PRESERVATIVE FREE 10 ML: 5 INJECTION INTRAVENOUS at 08:43

## 2025-05-03 RX ADMIN — ALLOPURINOL 300 MG: 100 TABLET ORAL at 11:36

## 2025-05-03 RX ADMIN — WATER 2000 MG: 1 INJECTION INTRAMUSCULAR; INTRAVENOUS; SUBCUTANEOUS at 20:47

## 2025-05-03 RX ADMIN — ACETAMINOPHEN 650 MG: 325 TABLET, FILM COATED ORAL at 20:47

## 2025-05-03 RX ADMIN — SODIUM CHLORIDE, PRESERVATIVE FREE 10 ML: 5 INJECTION INTRAVENOUS at 20:48

## 2025-05-03 RX ADMIN — METOPROLOL TARTRATE 25 MG: 25 TABLET, FILM COATED ORAL at 08:41

## 2025-05-03 RX ADMIN — Medication 5 MG: at 20:48

## 2025-05-03 RX ADMIN — TAMSULOSIN HYDROCHLORIDE 0.4 MG: 0.4 CAPSULE ORAL at 08:41

## 2025-05-03 RX ADMIN — CHOLECALCIFEROL TAB 25 MCG (1000 UNIT) 5000 UNITS: 25 TAB at 11:36

## 2025-05-03 RX ADMIN — SENNOSIDES AND DOCUSATE SODIUM 1 TABLET: 50; 8.6 TABLET ORAL at 20:48

## 2025-05-03 RX ADMIN — AMPICILLIN SODIUM 2000 MG: 2 INJECTION, POWDER, FOR SOLUTION INTRAMUSCULAR; INTRAVENOUS at 12:51

## 2025-05-03 RX ADMIN — WARFARIN SODIUM 1.25 MG: 2.5 TABLET ORAL at 18:19

## 2025-05-03 ASSESSMENT — PAIN DESCRIPTION - DESCRIPTORS
DESCRIPTORS: ACHING

## 2025-05-03 ASSESSMENT — PAIN SCALES - GENERAL
PAINLEVEL_OUTOF10: 0
PAINLEVEL_OUTOF10: 4
PAINLEVEL_OUTOF10: 0
PAINLEVEL_OUTOF10: 2
PAINLEVEL_OUTOF10: 2
PAINLEVEL_OUTOF10: 3
PAINLEVEL_OUTOF10: 2
PAINLEVEL_OUTOF10: 2
PAINLEVEL_OUTOF10: 3

## 2025-05-03 ASSESSMENT — PAIN DESCRIPTION - PAIN TYPE: TYPE: CHRONIC PAIN

## 2025-05-03 ASSESSMENT — PAIN DESCRIPTION - FREQUENCY: FREQUENCY: CONTINUOUS

## 2025-05-03 ASSESSMENT — PAIN - FUNCTIONAL ASSESSMENT
PAIN_FUNCTIONAL_ASSESSMENT: ACTIVITIES ARE NOT PREVENTED
PAIN_FUNCTIONAL_ASSESSMENT: ACTIVITIES ARE NOT PREVENTED

## 2025-05-03 ASSESSMENT — PAIN DESCRIPTION - ONSET: ONSET: ON-GOING

## 2025-05-03 ASSESSMENT — PAIN DESCRIPTION - LOCATION
LOCATION: BACK

## 2025-05-03 ASSESSMENT — PAIN DESCRIPTION - ORIENTATION
ORIENTATION: LOWER
ORIENTATION: MID;LOWER
ORIENTATION: LOWER

## 2025-05-04 LAB
GLUCOSE BLD-MCNC: 172 MG/DL (ref 65–99)
INR PPP: 2.8
PROTHROMBIN TIME: 30.2 SEC (ref 11.5–14.2)

## 2025-05-04 PROCEDURE — 85610 PROTHROMBIN TIME: CPT

## 2025-05-04 PROCEDURE — 36415 COLL VENOUS BLD VENIPUNCTURE: CPT

## 2025-05-04 PROCEDURE — 1200000002 HC SEMI PRIVATE SWING BED

## 2025-05-04 PROCEDURE — 82947 ASSAY GLUCOSE BLOOD QUANT: CPT

## 2025-05-04 PROCEDURE — 2500000003 HC RX 250 WO HCPCS: Performed by: INTERNAL MEDICINE

## 2025-05-04 PROCEDURE — 97110 THERAPEUTIC EXERCISES: CPT

## 2025-05-04 PROCEDURE — 6370000000 HC RX 637 (ALT 250 FOR IP): Performed by: INTERNAL MEDICINE

## 2025-05-04 PROCEDURE — 6360000002 HC RX W HCPCS: Performed by: INTERNAL MEDICINE

## 2025-05-04 PROCEDURE — 2580000003 HC RX 258: Performed by: INTERNAL MEDICINE

## 2025-05-04 PROCEDURE — 94640 AIRWAY INHALATION TREATMENT: CPT

## 2025-05-04 PROCEDURE — 97116 GAIT TRAINING THERAPY: CPT

## 2025-05-04 PROCEDURE — 94761 N-INVAS EAR/PLS OXIMETRY MLT: CPT

## 2025-05-04 RX ORDER — WARFARIN SODIUM 5 MG/1
5 TABLET ORAL
Status: COMPLETED | OUTPATIENT
Start: 2025-05-04 | End: 2025-05-04

## 2025-05-04 RX ORDER — AMPICILLIN 2 G/1
INJECTION, POWDER, FOR SOLUTION INTRAVENOUS
Status: DISPENSED
Start: 2025-05-04 | End: 2025-05-04

## 2025-05-04 RX ADMIN — AMPICILLIN SODIUM 2000 MG: 2 INJECTION, POWDER, FOR SOLUTION INTRAMUSCULAR; INTRAVENOUS at 01:01

## 2025-05-04 RX ADMIN — SENNOSIDES AND DOCUSATE SODIUM 1 TABLET: 50; 8.6 TABLET ORAL at 08:23

## 2025-05-04 RX ADMIN — QUETIAPINE 25 MG: 25 TABLET ORAL at 20:28

## 2025-05-04 RX ADMIN — TAMSULOSIN HYDROCHLORIDE 0.4 MG: 0.4 CAPSULE ORAL at 08:23

## 2025-05-04 RX ADMIN — WATER 2000 MG: 1 INJECTION INTRAMUSCULAR; INTRAVENOUS; SUBCUTANEOUS at 20:28

## 2025-05-04 RX ADMIN — SERTRALINE HYDROCHLORIDE 25 MG: 50 TABLET ORAL at 08:30

## 2025-05-04 RX ADMIN — Medication 1 CAPSULE: at 08:23

## 2025-05-04 RX ADMIN — CHOLECALCIFEROL TAB 25 MCG (1000 UNIT) 5000 UNITS: 25 TAB at 12:03

## 2025-05-04 RX ADMIN — ATORVASTATIN CALCIUM 20 MG: 20 TABLET, FILM COATED ORAL at 20:28

## 2025-05-04 RX ADMIN — SENNOSIDES AND DOCUSATE SODIUM 1 TABLET: 50; 8.6 TABLET ORAL at 20:28

## 2025-05-04 RX ADMIN — LANSOPRAZOLE 30 MG: 30 TABLET, ORALLY DISINTEGRATING, DELAYED RELEASE ORAL at 06:25

## 2025-05-04 RX ADMIN — Medication 1 TABLET: at 12:03

## 2025-05-04 RX ADMIN — AMPICILLIN SODIUM 2000 MG: 2 INJECTION, POWDER, FOR SOLUTION INTRAMUSCULAR; INTRAVENOUS at 12:48

## 2025-05-04 RX ADMIN — Medication 5 MG: at 20:28

## 2025-05-04 RX ADMIN — WARFARIN SODIUM 5 MG: 5 TABLET ORAL at 18:09

## 2025-05-04 RX ADMIN — AMPICILLIN SODIUM 2000 MG: 2 INJECTION, POWDER, FOR SOLUTION INTRAMUSCULAR; INTRAVENOUS at 06:32

## 2025-05-04 RX ADMIN — METOPROLOL TARTRATE 25 MG: 25 TABLET, FILM COATED ORAL at 20:28

## 2025-05-04 RX ADMIN — AMPICILLIN SODIUM 2000 MG: 2 INJECTION, POWDER, FOR SOLUTION INTRAMUSCULAR; INTRAVENOUS at 18:56

## 2025-05-04 RX ADMIN — ALLOPURINOL 300 MG: 100 TABLET ORAL at 12:03

## 2025-05-04 RX ADMIN — WATER 2000 MG: 1 INJECTION INTRAMUSCULAR; INTRAVENOUS; SUBCUTANEOUS at 08:23

## 2025-05-04 RX ADMIN — SODIUM CHLORIDE, PRESERVATIVE FREE 10 ML: 5 INJECTION INTRAVENOUS at 13:21

## 2025-05-04 RX ADMIN — SODIUM CHLORIDE, PRESERVATIVE FREE 10 ML: 5 INJECTION INTRAVENOUS at 20:28

## 2025-05-04 RX ADMIN — TRIAMCINOLONE ACETONIDE: 1 CREAM TOPICAL at 08:23

## 2025-05-04 RX ADMIN — TRIAMCINOLONE ACETONIDE: 1 CREAM TOPICAL at 20:28

## 2025-05-04 RX ADMIN — Medication 1 CAPSULE: at 17:14

## 2025-05-04 RX ADMIN — METOPROLOL TARTRATE 25 MG: 25 TABLET, FILM COATED ORAL at 08:23

## 2025-05-04 RX ADMIN — ACETAMINOPHEN 650 MG: 325 TABLET, FILM COATED ORAL at 09:12

## 2025-05-04 RX ADMIN — SODIUM CHLORIDE, PRESERVATIVE FREE 10 ML: 5 INJECTION INTRAVENOUS at 08:24

## 2025-05-04 ASSESSMENT — PAIN SCALES - GENERAL
PAINLEVEL_OUTOF10: 4
PAINLEVEL_OUTOF10: 0
PAINLEVEL_OUTOF10: 2
PAINLEVEL_OUTOF10: 0

## 2025-05-04 ASSESSMENT — PAIN DESCRIPTION - ORIENTATION: ORIENTATION: LOWER

## 2025-05-04 ASSESSMENT — PAIN - FUNCTIONAL ASSESSMENT: PAIN_FUNCTIONAL_ASSESSMENT: ACTIVITIES ARE NOT PREVENTED

## 2025-05-04 ASSESSMENT — PAIN DESCRIPTION - PAIN TYPE: TYPE: CHRONIC PAIN

## 2025-05-04 ASSESSMENT — PAIN DESCRIPTION - LOCATION
LOCATION: BACK
LOCATION: BACK

## 2025-05-04 ASSESSMENT — PAIN DESCRIPTION - DESCRIPTORS
DESCRIPTORS: ACHING
DESCRIPTORS: ACHING

## 2025-05-05 LAB
HCT VFR BLD AUTO: 24.7 % (ref 41–53)
HGB BLD-MCNC: 7.7 G/DL (ref 13.5–17.5)
INR PPP: 2.6
PROTHROMBIN TIME: 29 SEC (ref 11.5–14.2)

## 2025-05-05 PROCEDURE — 6370000000 HC RX 637 (ALT 250 FOR IP): Performed by: INTERNAL MEDICINE

## 2025-05-05 PROCEDURE — 97110 THERAPEUTIC EXERCISES: CPT

## 2025-05-05 PROCEDURE — 85018 HEMOGLOBIN: CPT

## 2025-05-05 PROCEDURE — 2580000003 HC RX 258: Performed by: INTERNAL MEDICINE

## 2025-05-05 PROCEDURE — 85610 PROTHROMBIN TIME: CPT

## 2025-05-05 PROCEDURE — 2500000003 HC RX 250 WO HCPCS: Performed by: INTERNAL MEDICINE

## 2025-05-05 PROCEDURE — 85014 HEMATOCRIT: CPT

## 2025-05-05 PROCEDURE — 97530 THERAPEUTIC ACTIVITIES: CPT

## 2025-05-05 PROCEDURE — 94640 AIRWAY INHALATION TREATMENT: CPT

## 2025-05-05 PROCEDURE — 97116 GAIT TRAINING THERAPY: CPT

## 2025-05-05 PROCEDURE — 1200000002 HC SEMI PRIVATE SWING BED

## 2025-05-05 PROCEDURE — 6360000002 HC RX W HCPCS: Performed by: INTERNAL MEDICINE

## 2025-05-05 PROCEDURE — 94761 N-INVAS EAR/PLS OXIMETRY MLT: CPT

## 2025-05-05 RX ORDER — WARFARIN SODIUM 2.5 MG/1
2.5 TABLET ORAL
Status: COMPLETED | OUTPATIENT
Start: 2025-05-05 | End: 2025-05-05

## 2025-05-05 RX ADMIN — Medication 1 CAPSULE: at 17:42

## 2025-05-05 RX ADMIN — ALLOPURINOL 300 MG: 100 TABLET ORAL at 11:56

## 2025-05-05 RX ADMIN — TAMSULOSIN HYDROCHLORIDE 0.4 MG: 0.4 CAPSULE ORAL at 09:19

## 2025-05-05 RX ADMIN — SODIUM CHLORIDE, PRESERVATIVE FREE 10 ML: 5 INJECTION INTRAVENOUS at 09:19

## 2025-05-05 RX ADMIN — SERTRALINE HYDROCHLORIDE 25 MG: 50 TABLET ORAL at 09:19

## 2025-05-05 RX ADMIN — HYDROCODONE BITARTRATE AND ACETAMINOPHEN 1 TABLET: 5; 325 TABLET ORAL at 09:21

## 2025-05-05 RX ADMIN — CHOLECALCIFEROL TAB 25 MCG (1000 UNIT) 5000 UNITS: 25 TAB at 11:56

## 2025-05-05 RX ADMIN — SENNOSIDES AND DOCUSATE SODIUM 1 TABLET: 50; 8.6 TABLET ORAL at 20:44

## 2025-05-05 RX ADMIN — ATORVASTATIN CALCIUM 20 MG: 20 TABLET, FILM COATED ORAL at 20:44

## 2025-05-05 RX ADMIN — Medication 1 TABLET: at 11:56

## 2025-05-05 RX ADMIN — AMPICILLIN SODIUM 2000 MG: 2 INJECTION, POWDER, FOR SOLUTION INTRAMUSCULAR; INTRAVENOUS at 06:08

## 2025-05-05 RX ADMIN — QUETIAPINE 25 MG: 25 TABLET ORAL at 20:43

## 2025-05-05 RX ADMIN — Medication 5 MG: at 20:48

## 2025-05-05 RX ADMIN — AMPICILLIN SODIUM 2000 MG: 2 INJECTION, POWDER, FOR SOLUTION INTRAMUSCULAR; INTRAVENOUS at 18:13

## 2025-05-05 RX ADMIN — METOPROLOL TARTRATE 25 MG: 25 TABLET, FILM COATED ORAL at 09:18

## 2025-05-05 RX ADMIN — Medication 1 CAPSULE: at 09:19

## 2025-05-05 RX ADMIN — SODIUM CHLORIDE, PRESERVATIVE FREE 10 ML: 5 INJECTION INTRAVENOUS at 20:48

## 2025-05-05 RX ADMIN — SODIUM CHLORIDE, PRESERVATIVE FREE 10 ML: 5 INJECTION INTRAVENOUS at 13:02

## 2025-05-05 RX ADMIN — LANSOPRAZOLE 30 MG: 30 TABLET, ORALLY DISINTEGRATING, DELAYED RELEASE ORAL at 06:07

## 2025-05-05 RX ADMIN — WARFARIN SODIUM 2.5 MG: 2.5 TABLET ORAL at 17:42

## 2025-05-05 RX ADMIN — WATER 2000 MG: 1 INJECTION INTRAMUSCULAR; INTRAVENOUS; SUBCUTANEOUS at 09:19

## 2025-05-05 RX ADMIN — AMPICILLIN SODIUM 2000 MG: 2 INJECTION, POWDER, FOR SOLUTION INTRAMUSCULAR; INTRAVENOUS at 12:34

## 2025-05-05 RX ADMIN — WATER 2000 MG: 1 INJECTION INTRAMUSCULAR; INTRAVENOUS; SUBCUTANEOUS at 20:43

## 2025-05-05 RX ADMIN — METOPROLOL TARTRATE 25 MG: 25 TABLET, FILM COATED ORAL at 20:44

## 2025-05-05 RX ADMIN — AMPICILLIN SODIUM 2000 MG: 2 INJECTION, POWDER, FOR SOLUTION INTRAMUSCULAR; INTRAVENOUS at 01:11

## 2025-05-05 ASSESSMENT — PAIN - FUNCTIONAL ASSESSMENT: PAIN_FUNCTIONAL_ASSESSMENT: PREVENTS OR INTERFERES SOME ACTIVE ACTIVITIES AND ADLS

## 2025-05-05 ASSESSMENT — PAIN SCALES - GENERAL
PAINLEVEL_OUTOF10: 1
PAINLEVEL_OUTOF10: 4
PAINLEVEL_OUTOF10: 0
PAINLEVEL_OUTOF10: 0

## 2025-05-05 ASSESSMENT — PAIN DESCRIPTION - ORIENTATION: ORIENTATION: LOWER

## 2025-05-05 ASSESSMENT — PAIN DESCRIPTION - DESCRIPTORS: DESCRIPTORS: ACHING

## 2025-05-05 ASSESSMENT — PAIN DESCRIPTION - LOCATION: LOCATION: BACK

## 2025-05-06 LAB
INR PPP: 3
PROTHROMBIN TIME: 31.9 SEC (ref 11.5–14.2)

## 2025-05-06 PROCEDURE — 97530 THERAPEUTIC ACTIVITIES: CPT

## 2025-05-06 PROCEDURE — 6370000000 HC RX 637 (ALT 250 FOR IP): Performed by: INTERNAL MEDICINE

## 2025-05-06 PROCEDURE — 1200000002 HC SEMI PRIVATE SWING BED

## 2025-05-06 PROCEDURE — 94640 AIRWAY INHALATION TREATMENT: CPT

## 2025-05-06 PROCEDURE — 97535 SELF CARE MNGMENT TRAINING: CPT

## 2025-05-06 PROCEDURE — 97116 GAIT TRAINING THERAPY: CPT

## 2025-05-06 PROCEDURE — 85610 PROTHROMBIN TIME: CPT

## 2025-05-06 PROCEDURE — 2580000003 HC RX 258: Performed by: INTERNAL MEDICINE

## 2025-05-06 PROCEDURE — 97110 THERAPEUTIC EXERCISES: CPT

## 2025-05-06 PROCEDURE — 6360000002 HC RX W HCPCS: Performed by: INTERNAL MEDICINE

## 2025-05-06 PROCEDURE — 94761 N-INVAS EAR/PLS OXIMETRY MLT: CPT

## 2025-05-06 PROCEDURE — 2500000003 HC RX 250 WO HCPCS: Performed by: INTERNAL MEDICINE

## 2025-05-06 RX ORDER — WARFARIN SODIUM 2.5 MG/1
2.5 TABLET ORAL
Status: COMPLETED | OUTPATIENT
Start: 2025-05-06 | End: 2025-05-06

## 2025-05-06 RX ADMIN — SODIUM CHLORIDE, PRESERVATIVE FREE 10 ML: 5 INJECTION INTRAVENOUS at 21:11

## 2025-05-06 RX ADMIN — SENNOSIDES AND DOCUSATE SODIUM 1 TABLET: 50; 8.6 TABLET ORAL at 21:07

## 2025-05-06 RX ADMIN — Medication 5 MG: at 21:07

## 2025-05-06 RX ADMIN — AMPICILLIN SODIUM 2000 MG: 2 INJECTION, POWDER, FOR SOLUTION INTRAMUSCULAR; INTRAVENOUS at 12:39

## 2025-05-06 RX ADMIN — SENNOSIDES AND DOCUSATE SODIUM 1 TABLET: 50; 8.6 TABLET ORAL at 08:06

## 2025-05-06 RX ADMIN — METOPROLOL TARTRATE 25 MG: 25 TABLET, FILM COATED ORAL at 21:07

## 2025-05-06 RX ADMIN — ACETAMINOPHEN 650 MG: 325 TABLET, FILM COATED ORAL at 08:09

## 2025-05-06 RX ADMIN — WATER 2000 MG: 1 INJECTION INTRAMUSCULAR; INTRAVENOUS; SUBCUTANEOUS at 08:06

## 2025-05-06 RX ADMIN — Medication 1 TABLET: at 12:35

## 2025-05-06 RX ADMIN — TRIAMCINOLONE ACETONIDE: 1 CREAM TOPICAL at 08:07

## 2025-05-06 RX ADMIN — ATORVASTATIN CALCIUM 20 MG: 20 TABLET, FILM COATED ORAL at 21:07

## 2025-05-06 RX ADMIN — CHOLECALCIFEROL TAB 25 MCG (1000 UNIT) 5000 UNITS: 25 TAB at 12:35

## 2025-05-06 RX ADMIN — Medication 1 CAPSULE: at 16:54

## 2025-05-06 RX ADMIN — ALLOPURINOL 300 MG: 100 TABLET ORAL at 12:35

## 2025-05-06 RX ADMIN — TAMSULOSIN HYDROCHLORIDE 0.4 MG: 0.4 CAPSULE ORAL at 08:06

## 2025-05-06 RX ADMIN — LANSOPRAZOLE 30 MG: 30 TABLET, ORALLY DISINTEGRATING, DELAYED RELEASE ORAL at 06:54

## 2025-05-06 RX ADMIN — SERTRALINE HYDROCHLORIDE 25 MG: 50 TABLET ORAL at 08:06

## 2025-05-06 RX ADMIN — WARFARIN SODIUM 2.5 MG: 2.5 TABLET ORAL at 16:54

## 2025-05-06 RX ADMIN — SODIUM CHLORIDE, PRESERVATIVE FREE 10 ML: 5 INJECTION INTRAVENOUS at 07:20

## 2025-05-06 RX ADMIN — AMPICILLIN SODIUM 2000 MG: 2 INJECTION, POWDER, FOR SOLUTION INTRAMUSCULAR; INTRAVENOUS at 00:56

## 2025-05-06 RX ADMIN — QUETIAPINE 25 MG: 25 TABLET ORAL at 21:07

## 2025-05-06 RX ADMIN — WATER 2000 MG: 1 INJECTION INTRAMUSCULAR; INTRAVENOUS; SUBCUTANEOUS at 21:07

## 2025-05-06 RX ADMIN — AMPICILLIN SODIUM 2000 MG: 2 INJECTION, POWDER, FOR SOLUTION INTRAMUSCULAR; INTRAVENOUS at 06:51

## 2025-05-06 RX ADMIN — AMPICILLIN SODIUM 2000 MG: 2 INJECTION, POWDER, FOR SOLUTION INTRAMUSCULAR; INTRAVENOUS at 18:53

## 2025-05-06 RX ADMIN — Medication 1 CAPSULE: at 08:06

## 2025-05-06 RX ADMIN — METOPROLOL TARTRATE 25 MG: 25 TABLET, FILM COATED ORAL at 08:07

## 2025-05-06 ASSESSMENT — PAIN - FUNCTIONAL ASSESSMENT: PAIN_FUNCTIONAL_ASSESSMENT: NONE - DENIES PAIN

## 2025-05-06 ASSESSMENT — PAIN DESCRIPTION - DESCRIPTORS: DESCRIPTORS: ACHING

## 2025-05-06 ASSESSMENT — PAIN SCALES - GENERAL
PAINLEVEL_OUTOF10: 2
PAINLEVEL_OUTOF10: 2

## 2025-05-06 ASSESSMENT — PAIN DESCRIPTION - LOCATION: LOCATION: BACK

## 2025-05-06 NOTE — THERAPY RECERTIFICATION
[facility]  Phone: [phone]   Date: 2025                  Outpatient Physical Therapy Fax: [phone]    ACCT #: [id number]                  Recertification  [facility]#: [id number]  Patient: Quique Wray  : 1945                    Treatment Diagnosis: Weakness    Assessment  Assessment: Patient making slow progress with functional mobility including ambulatory distances, transfers, and assistance needed.  Plan to continue as tolerated; scheduling home evaluation for mid next week to determine ability and assistance  needed to return home as well as needed DME.  Therapy Prognosis: Fair    Treatment Plan :   1-2x per day;  1x Saturday/              Patient Education/HEP, Therapeutic Exercise, Neuro Re-ed, Gait Training, and Therapeutic Activity;  Home evaluation    Goals      Short Term Goal 1: STG=LTG  Long Term Goals  Time Frame for Long Term Goals : 2 weeks - expires 25  Long Term Goal 1: Ambulate with wh walker x 50-75ft consistently with SBA to safely negotiate home environment  Long Term Goal 2: Complete sit to stand transfers from chair or commode with CG or min assist consistently to safely return home witih spouse assist  Long Term Goal 3: Up/down steps with left handrail/grab bar and 1 min assist to safely enter/exit home  Long Term Goal 4: Good dynamic standing balance to assist with self -care tasks    CASSIE MOREAU PT    Date: 2025        ______________________________________ Date: 2025  Physician Signature

## 2025-05-07 LAB
INR PPP: 2.6
PROTHROMBIN TIME: 28.9 SEC (ref 11.5–14.2)

## 2025-05-07 PROCEDURE — 97530 THERAPEUTIC ACTIVITIES: CPT

## 2025-05-07 PROCEDURE — 97116 GAIT TRAINING THERAPY: CPT

## 2025-05-07 PROCEDURE — 6370000000 HC RX 637 (ALT 250 FOR IP): Performed by: INTERNAL MEDICINE

## 2025-05-07 PROCEDURE — 2580000003 HC RX 258: Performed by: INTERNAL MEDICINE

## 2025-05-07 PROCEDURE — 85610 PROTHROMBIN TIME: CPT

## 2025-05-07 PROCEDURE — 6360000002 HC RX W HCPCS: Performed by: INTERNAL MEDICINE

## 2025-05-07 PROCEDURE — 2500000003 HC RX 250 WO HCPCS: Performed by: INTERNAL MEDICINE

## 2025-05-07 PROCEDURE — 1200000002 HC SEMI PRIVATE SWING BED

## 2025-05-07 PROCEDURE — 97535 SELF CARE MNGMENT TRAINING: CPT

## 2025-05-07 PROCEDURE — 94640 AIRWAY INHALATION TREATMENT: CPT

## 2025-05-07 PROCEDURE — 94761 N-INVAS EAR/PLS OXIMETRY MLT: CPT

## 2025-05-07 RX ORDER — WARFARIN SODIUM 2.5 MG/1
3.75 TABLET ORAL
Status: COMPLETED | OUTPATIENT
Start: 2025-05-07 | End: 2025-05-07

## 2025-05-07 RX ADMIN — AMPICILLIN SODIUM 2000 MG: 2 INJECTION, POWDER, FOR SOLUTION INTRAMUSCULAR; INTRAVENOUS at 18:43

## 2025-05-07 RX ADMIN — ALLOPURINOL 300 MG: 100 TABLET ORAL at 13:03

## 2025-05-07 RX ADMIN — SODIUM CHLORIDE, PRESERVATIVE FREE 10 ML: 5 INJECTION INTRAVENOUS at 09:39

## 2025-05-07 RX ADMIN — QUETIAPINE 25 MG: 25 TABLET ORAL at 21:04

## 2025-05-07 RX ADMIN — SENNOSIDES AND DOCUSATE SODIUM 1 TABLET: 50; 8.6 TABLET ORAL at 21:03

## 2025-05-07 RX ADMIN — ATORVASTATIN CALCIUM 20 MG: 20 TABLET, FILM COATED ORAL at 21:04

## 2025-05-07 RX ADMIN — AMPICILLIN SODIUM 2000 MG: 2 INJECTION, POWDER, FOR SOLUTION INTRAMUSCULAR; INTRAVENOUS at 01:01

## 2025-05-07 RX ADMIN — WATER 2000 MG: 1 INJECTION INTRAMUSCULAR; INTRAVENOUS; SUBCUTANEOUS at 09:38

## 2025-05-07 RX ADMIN — METOPROLOL TARTRATE 25 MG: 25 TABLET, FILM COATED ORAL at 21:04

## 2025-05-07 RX ADMIN — SERTRALINE HYDROCHLORIDE 25 MG: 50 TABLET ORAL at 09:29

## 2025-05-07 RX ADMIN — ACETAMINOPHEN 650 MG: 325 TABLET, FILM COATED ORAL at 14:54

## 2025-05-07 RX ADMIN — AMPICILLIN SODIUM 2000 MG: 2 INJECTION, POWDER, FOR SOLUTION INTRAMUSCULAR; INTRAVENOUS at 14:08

## 2025-05-07 RX ADMIN — TAMSULOSIN HYDROCHLORIDE 0.4 MG: 0.4 CAPSULE ORAL at 09:29

## 2025-05-07 RX ADMIN — Medication 1 TABLET: at 13:07

## 2025-05-07 RX ADMIN — Medication 1 CAPSULE: at 09:29

## 2025-05-07 RX ADMIN — ACETAMINOPHEN 650 MG: 325 TABLET, FILM COATED ORAL at 09:36

## 2025-05-07 RX ADMIN — Medication 5 MG: at 21:04

## 2025-05-07 RX ADMIN — WARFARIN SODIUM 3.75 MG: 2.5 TABLET ORAL at 17:53

## 2025-05-07 RX ADMIN — SODIUM CHLORIDE, PRESERVATIVE FREE 10 ML: 5 INJECTION INTRAVENOUS at 21:04

## 2025-05-07 RX ADMIN — Medication 1 CAPSULE: at 17:53

## 2025-05-07 RX ADMIN — Medication 1 TABLET: at 13:03

## 2025-05-07 RX ADMIN — CHOLECALCIFEROL TAB 25 MCG (1000 UNIT) 5000 UNITS: 25 TAB at 13:03

## 2025-05-07 RX ADMIN — WATER 2000 MG: 1 INJECTION INTRAMUSCULAR; INTRAVENOUS; SUBCUTANEOUS at 21:04

## 2025-05-07 RX ADMIN — AMPICILLIN SODIUM 2000 MG: 2 INJECTION, POWDER, FOR SOLUTION INTRAMUSCULAR; INTRAVENOUS at 06:39

## 2025-05-07 ASSESSMENT — PAIN SCALES - GENERAL
PAINLEVEL_OUTOF10: 6
PAINLEVEL_OUTOF10: 1
PAINLEVEL_OUTOF10: 3
PAINLEVEL_OUTOF10: 0
PAINLEVEL_OUTOF10: 5

## 2025-05-07 ASSESSMENT — PAIN DESCRIPTION - DESCRIPTORS
DESCRIPTORS: ACHING
DESCRIPTORS: ACHING

## 2025-05-07 ASSESSMENT — PAIN DESCRIPTION - LOCATION
LOCATION: BACK
LOCATION: BACK

## 2025-05-07 ASSESSMENT — PAIN DESCRIPTION - ORIENTATION: ORIENTATION: LOWER

## 2025-05-07 ASSESSMENT — PAIN - FUNCTIONAL ASSESSMENT
PAIN_FUNCTIONAL_ASSESSMENT: PREVENTS OR INTERFERES SOME ACTIVE ACTIVITIES AND ADLS
PAIN_FUNCTIONAL_ASSESSMENT: NONE - DENIES PAIN
PAIN_FUNCTIONAL_ASSESSMENT: ACTIVITIES ARE NOT PREVENTED

## 2025-05-07 NOTE — FLOWSHEET NOTE
05/07/25 1821   Output (mL)   Urine 200 mL   Urine Assessment   Urinary Status Voiding   Urinary Incontinence Present   Urine Color Tea   Urine Appearance Clear   Urine Odor No odor     Patient voices need to urinate, encouraged patient to ambulate to BR, however, patient declines. Requests to use urinal in bed. Offered to stand EOB, however, patient declines that also. Education provided regarding need to ambulate, cannot spend the entire evening in bed. Voids 200 mL clear, tea colored urine in urinal. Encouraged patient to increase fluid intake, voices understanding.

## 2025-05-08 LAB
HCT VFR BLD AUTO: 24.5 % (ref 41–53)
HGB BLD-MCNC: 7.8 G/DL (ref 13.5–17.5)
INR PPP: 2.5
PROTHROMBIN TIME: 27.9 SEC (ref 11.5–14.2)

## 2025-05-08 PROCEDURE — 6370000000 HC RX 637 (ALT 250 FOR IP): Performed by: INTERNAL MEDICINE

## 2025-05-08 PROCEDURE — 2580000003 HC RX 258: Performed by: INTERNAL MEDICINE

## 2025-05-08 PROCEDURE — 94640 AIRWAY INHALATION TREATMENT: CPT

## 2025-05-08 PROCEDURE — 97116 GAIT TRAINING THERAPY: CPT

## 2025-05-08 PROCEDURE — 97530 THERAPEUTIC ACTIVITIES: CPT

## 2025-05-08 PROCEDURE — 2500000003 HC RX 250 WO HCPCS: Performed by: INTERNAL MEDICINE

## 2025-05-08 PROCEDURE — 85018 HEMOGLOBIN: CPT

## 2025-05-08 PROCEDURE — 94761 N-INVAS EAR/PLS OXIMETRY MLT: CPT

## 2025-05-08 PROCEDURE — 1200000002 HC SEMI PRIVATE SWING BED

## 2025-05-08 PROCEDURE — 85014 HEMATOCRIT: CPT

## 2025-05-08 PROCEDURE — 97110 THERAPEUTIC EXERCISES: CPT

## 2025-05-08 PROCEDURE — 85610 PROTHROMBIN TIME: CPT

## 2025-05-08 PROCEDURE — 6360000002 HC RX W HCPCS: Performed by: INTERNAL MEDICINE

## 2025-05-08 RX ORDER — WARFARIN SODIUM 5 MG/1
5 TABLET ORAL
Status: COMPLETED | OUTPATIENT
Start: 2025-05-08 | End: 2025-05-08

## 2025-05-08 RX ADMIN — TAMSULOSIN HYDROCHLORIDE 0.4 MG: 0.4 CAPSULE ORAL at 09:01

## 2025-05-08 RX ADMIN — SODIUM CHLORIDE, PRESERVATIVE FREE 10 ML: 5 INJECTION INTRAVENOUS at 21:00

## 2025-05-08 RX ADMIN — ACETAMINOPHEN 650 MG: 325 TABLET, FILM COATED ORAL at 09:01

## 2025-05-08 RX ADMIN — Medication 1 CAPSULE: at 18:59

## 2025-05-08 RX ADMIN — WATER 2000 MG: 1 INJECTION INTRAMUSCULAR; INTRAVENOUS; SUBCUTANEOUS at 09:00

## 2025-05-08 RX ADMIN — SENNOSIDES AND DOCUSATE SODIUM 1 TABLET: 50; 8.6 TABLET ORAL at 09:01

## 2025-05-08 RX ADMIN — METOPROLOL TARTRATE 25 MG: 25 TABLET, FILM COATED ORAL at 21:00

## 2025-05-08 RX ADMIN — LANSOPRAZOLE 30 MG: 30 TABLET, ORALLY DISINTEGRATING, DELAYED RELEASE ORAL at 06:37

## 2025-05-08 RX ADMIN — AMPICILLIN SODIUM 2000 MG: 2 INJECTION, POWDER, FOR SOLUTION INTRAMUSCULAR; INTRAVENOUS at 19:07

## 2025-05-08 RX ADMIN — SENNOSIDES AND DOCUSATE SODIUM 1 TABLET: 50; 8.6 TABLET ORAL at 20:59

## 2025-05-08 RX ADMIN — ATORVASTATIN CALCIUM 20 MG: 20 TABLET, FILM COATED ORAL at 20:59

## 2025-05-08 RX ADMIN — QUETIAPINE 25 MG: 25 TABLET ORAL at 20:59

## 2025-05-08 RX ADMIN — Medication 1 TABLET: at 12:53

## 2025-05-08 RX ADMIN — WARFARIN SODIUM 5 MG: 5 TABLET ORAL at 18:59

## 2025-05-08 RX ADMIN — TRIAMCINOLONE ACETONIDE: 1 CREAM TOPICAL at 21:10

## 2025-05-08 RX ADMIN — AMPICILLIN SODIUM 2000 MG: 2 INJECTION, POWDER, FOR SOLUTION INTRAMUSCULAR; INTRAVENOUS at 06:37

## 2025-05-08 RX ADMIN — ALLOPURINOL 300 MG: 100 TABLET ORAL at 12:53

## 2025-05-08 RX ADMIN — AMPICILLIN SODIUM 2000 MG: 2 INJECTION, POWDER, FOR SOLUTION INTRAMUSCULAR; INTRAVENOUS at 13:06

## 2025-05-08 RX ADMIN — Medication 5 MG: at 20:59

## 2025-05-08 RX ADMIN — Medication 1 CAPSULE: at 09:01

## 2025-05-08 RX ADMIN — SODIUM CHLORIDE, PRESERVATIVE FREE 10 ML: 5 INJECTION INTRAVENOUS at 09:01

## 2025-05-08 RX ADMIN — AMPICILLIN SODIUM 2000 MG: 2 INJECTION, POWDER, FOR SOLUTION INTRAMUSCULAR; INTRAVENOUS at 01:16

## 2025-05-08 RX ADMIN — WATER 2000 MG: 1 INJECTION INTRAMUSCULAR; INTRAVENOUS; SUBCUTANEOUS at 20:57

## 2025-05-08 RX ADMIN — METOPROLOL TARTRATE 25 MG: 25 TABLET, FILM COATED ORAL at 09:01

## 2025-05-08 RX ADMIN — SERTRALINE HYDROCHLORIDE 25 MG: 50 TABLET ORAL at 09:01

## 2025-05-08 RX ADMIN — CHOLECALCIFEROL TAB 25 MCG (1000 UNIT) 5000 UNITS: 25 TAB at 12:53

## 2025-05-08 ASSESSMENT — PAIN DESCRIPTION - LOCATION: LOCATION: BACK

## 2025-05-08 ASSESSMENT — PAIN SCALES - GENERAL: PAINLEVEL_OUTOF10: 2

## 2025-05-08 ASSESSMENT — PAIN DESCRIPTION - ORIENTATION: ORIENTATION: LOWER

## 2025-05-08 ASSESSMENT — PAIN - FUNCTIONAL ASSESSMENT
PAIN_FUNCTIONAL_ASSESSMENT: NONE - DENIES PAIN
PAIN_FUNCTIONAL_ASSESSMENT: ACTIVITIES ARE NOT PREVENTED

## 2025-05-08 ASSESSMENT — PAIN DESCRIPTION - DESCRIPTORS: DESCRIPTORS: ACHING

## 2025-05-09 LAB
INR PPP: 2.7
PROTHROMBIN TIME: 29.8 SEC (ref 11.5–14.2)

## 2025-05-09 PROCEDURE — 85610 PROTHROMBIN TIME: CPT

## 2025-05-09 PROCEDURE — 94640 AIRWAY INHALATION TREATMENT: CPT

## 2025-05-09 PROCEDURE — 1200000002 HC SEMI PRIVATE SWING BED

## 2025-05-09 PROCEDURE — 97110 THERAPEUTIC EXERCISES: CPT

## 2025-05-09 PROCEDURE — 2500000003 HC RX 250 WO HCPCS: Performed by: INTERNAL MEDICINE

## 2025-05-09 PROCEDURE — 6360000002 HC RX W HCPCS: Performed by: INTERNAL MEDICINE

## 2025-05-09 PROCEDURE — 97116 GAIT TRAINING THERAPY: CPT

## 2025-05-09 PROCEDURE — 6370000000 HC RX 637 (ALT 250 FOR IP): Performed by: INTERNAL MEDICINE

## 2025-05-09 PROCEDURE — 94761 N-INVAS EAR/PLS OXIMETRY MLT: CPT

## 2025-05-09 PROCEDURE — 2580000003 HC RX 258: Performed by: INTERNAL MEDICINE

## 2025-05-09 RX ORDER — WARFARIN SODIUM 2.5 MG/1
2.5 TABLET ORAL
Status: DISCONTINUED | OUTPATIENT
Start: 2025-05-09 | End: 2025-05-16

## 2025-05-09 RX ORDER — WARFARIN SODIUM 5 MG/1
5 TABLET ORAL
Status: DISCONTINUED | OUTPATIENT
Start: 2025-05-11 | End: 2025-05-16

## 2025-05-09 RX ADMIN — AMPICILLIN SODIUM 2000 MG: 2 INJECTION, POWDER, FOR SOLUTION INTRAMUSCULAR; INTRAVENOUS at 06:16

## 2025-05-09 RX ADMIN — TAMSULOSIN HYDROCHLORIDE 0.4 MG: 0.4 CAPSULE ORAL at 08:13

## 2025-05-09 RX ADMIN — LANSOPRAZOLE 30 MG: 30 TABLET, ORALLY DISINTEGRATING, DELAYED RELEASE ORAL at 06:18

## 2025-05-09 RX ADMIN — ATORVASTATIN CALCIUM 20 MG: 20 TABLET, FILM COATED ORAL at 21:10

## 2025-05-09 RX ADMIN — SERTRALINE HYDROCHLORIDE 25 MG: 50 TABLET ORAL at 08:13

## 2025-05-09 RX ADMIN — AMPICILLIN SODIUM 2000 MG: 2 INJECTION, POWDER, FOR SOLUTION INTRAMUSCULAR; INTRAVENOUS at 00:44

## 2025-05-09 RX ADMIN — Medication 1 TABLET: at 11:59

## 2025-05-09 RX ADMIN — Medication 1 CAPSULE: at 08:13

## 2025-05-09 RX ADMIN — Medication 1 CAPSULE: at 17:03

## 2025-05-09 RX ADMIN — TRIAMCINOLONE ACETONIDE: 1 CREAM TOPICAL at 08:13

## 2025-05-09 RX ADMIN — METOPROLOL TARTRATE 25 MG: 25 TABLET, FILM COATED ORAL at 21:10

## 2025-05-09 RX ADMIN — WATER 2000 MG: 1 INJECTION INTRAMUSCULAR; INTRAVENOUS; SUBCUTANEOUS at 21:10

## 2025-05-09 RX ADMIN — WARFARIN SODIUM 2.5 MG: 2.5 TABLET ORAL at 17:54

## 2025-05-09 RX ADMIN — QUETIAPINE 25 MG: 25 TABLET ORAL at 21:10

## 2025-05-09 RX ADMIN — SODIUM CHLORIDE, PRESERVATIVE FREE 10 ML: 5 INJECTION INTRAVENOUS at 21:11

## 2025-05-09 RX ADMIN — AMPICILLIN SODIUM 2000 MG: 2 INJECTION, POWDER, FOR SOLUTION INTRAMUSCULAR; INTRAVENOUS at 13:00

## 2025-05-09 RX ADMIN — METOPROLOL TARTRATE 25 MG: 25 TABLET, FILM COATED ORAL at 08:13

## 2025-05-09 RX ADMIN — SENNOSIDES AND DOCUSATE SODIUM 1 TABLET: 50; 8.6 TABLET ORAL at 21:10

## 2025-05-09 RX ADMIN — SENNOSIDES AND DOCUSATE SODIUM 1 TABLET: 50; 8.6 TABLET ORAL at 08:13

## 2025-05-09 RX ADMIN — ALLOPURINOL 300 MG: 100 TABLET ORAL at 11:59

## 2025-05-09 RX ADMIN — SODIUM CHLORIDE, PRESERVATIVE FREE 10 ML: 5 INJECTION INTRAVENOUS at 08:13

## 2025-05-09 RX ADMIN — WATER 2000 MG: 1 INJECTION INTRAMUSCULAR; INTRAVENOUS; SUBCUTANEOUS at 08:12

## 2025-05-09 RX ADMIN — Medication 5 MG: at 21:10

## 2025-05-09 RX ADMIN — CHOLECALCIFEROL TAB 25 MCG (1000 UNIT) 5000 UNITS: 25 TAB at 11:59

## 2025-05-09 RX ADMIN — AMPICILLIN SODIUM 2000 MG: 2 INJECTION, POWDER, FOR SOLUTION INTRAMUSCULAR; INTRAVENOUS at 18:32

## 2025-05-09 ASSESSMENT — PAIN - FUNCTIONAL ASSESSMENT: PAIN_FUNCTIONAL_ASSESSMENT: NONE - DENIES PAIN

## 2025-05-10 LAB
ANION GAP SERPL CALCULATED.3IONS-SCNC: 10 MMOL/L (ref 9–17)
BASOPHILS # BLD: ABNORMAL K/UL (ref 0–0.2)
BASOPHILS NFR BLD: ABNORMAL % (ref 0–2)
BUN SERPL-MCNC: 12 MG/DL (ref 8–23)
CALCIUM SERPL-MCNC: 8.8 MG/DL (ref 8.6–10.4)
CHLORIDE SERPL-SCNC: 102 MMOL/L (ref 98–107)
CO2 SERPL-SCNC: 30 MMOL/L (ref 20–31)
CREAT SERPL-MCNC: 0.8 MG/DL (ref 0.7–1.2)
CRP SERPL HS-MCNC: 5.4 MG/L (ref 0–5)
EOSINOPHIL # BLD: 0.04 K/UL (ref 0–0.4)
EOSINOPHILS RELATIVE PERCENT: 1 % (ref 0–5)
ERYTHROCYTE [DISTWIDTH] IN BLOOD BY AUTOMATED COUNT: 25.3 % (ref 12.1–15.2)
ERYTHROCYTE [SEDIMENTATION RATE] IN BLOOD BY PHOTOMETRIC METHOD: 53 MM/HR (ref 0–20)
GFR, ESTIMATED: >90 ML/MIN/1.73M2
GLUCOSE SERPL-MCNC: 115 MG/DL (ref 70–99)
HCT VFR BLD AUTO: 25.5 % (ref 41–53)
HGB BLD-MCNC: 8.1 G/DL (ref 13.5–17.5)
IMM GRANULOCYTES # BLD AUTO: ABNORMAL K/UL (ref 0–0.3)
IMM GRANULOCYTES NFR BLD: ABNORMAL %
LYMPHOCYTES NFR BLD: 1.06 K/UL (ref 1–4.8)
LYMPHOCYTES RELATIVE PERCENT: 24 % (ref 13–44)
MCH RBC QN AUTO: 20.3 PG (ref 26–34)
MCHC RBC AUTO-ENTMCNC: 31.8 G/DL (ref 31–37)
MCV RBC AUTO: 63.9 FL (ref 80–100)
MONOCYTES NFR BLD: 0.13 K/UL (ref 0–1)
MONOCYTES NFR BLD: 3 % (ref 5–9)
MORPHOLOGY: ABNORMAL
NEUTROPHILS NFR BLD: 72 % (ref 39–75)
NEUTS SEG NFR BLD: 3.17 K/UL (ref 2.1–6.5)
PLATELET # BLD AUTO: 208 K/UL (ref 140–450)
PMV BLD AUTO: ABNORMAL FL (ref 6–12)
POTASSIUM SERPL-SCNC: 4 MMOL/L (ref 3.7–5.3)
RBC # BLD AUTO: 3.99 M/UL (ref 4.5–5.9)
SODIUM SERPL-SCNC: 142 MMOL/L (ref 135–144)
WBC OTHER # BLD: 4.4 K/UL (ref 3.5–11)

## 2025-05-10 PROCEDURE — 94640 AIRWAY INHALATION TREATMENT: CPT

## 2025-05-10 PROCEDURE — 86140 C-REACTIVE PROTEIN: CPT

## 2025-05-10 PROCEDURE — 6370000000 HC RX 637 (ALT 250 FOR IP): Performed by: INTERNAL MEDICINE

## 2025-05-10 PROCEDURE — 2580000003 HC RX 258: Performed by: INTERNAL MEDICINE

## 2025-05-10 PROCEDURE — 97116 GAIT TRAINING THERAPY: CPT

## 2025-05-10 PROCEDURE — 97110 THERAPEUTIC EXERCISES: CPT

## 2025-05-10 PROCEDURE — 85025 COMPLETE CBC W/AUTO DIFF WBC: CPT

## 2025-05-10 PROCEDURE — 6360000002 HC RX W HCPCS: Performed by: INTERNAL MEDICINE

## 2025-05-10 PROCEDURE — 2500000003 HC RX 250 WO HCPCS: Performed by: INTERNAL MEDICINE

## 2025-05-10 PROCEDURE — 1200000002 HC SEMI PRIVATE SWING BED

## 2025-05-10 PROCEDURE — 97530 THERAPEUTIC ACTIVITIES: CPT

## 2025-05-10 PROCEDURE — 94761 N-INVAS EAR/PLS OXIMETRY MLT: CPT

## 2025-05-10 PROCEDURE — 85652 RBC SED RATE AUTOMATED: CPT

## 2025-05-10 PROCEDURE — 80048 BASIC METABOLIC PNL TOTAL CA: CPT

## 2025-05-10 PROCEDURE — 36415 COLL VENOUS BLD VENIPUNCTURE: CPT

## 2025-05-10 RX ADMIN — Medication 1 TABLET: at 13:04

## 2025-05-10 RX ADMIN — AMPICILLIN SODIUM 2000 MG: 2 INJECTION, POWDER, FOR SOLUTION INTRAMUSCULAR; INTRAVENOUS at 02:56

## 2025-05-10 RX ADMIN — AMPICILLIN SODIUM 2000 MG: 2 INJECTION, POWDER, FOR SOLUTION INTRAMUSCULAR; INTRAVENOUS at 18:33

## 2025-05-10 RX ADMIN — SERTRALINE HYDROCHLORIDE 25 MG: 50 TABLET ORAL at 09:06

## 2025-05-10 RX ADMIN — AMPICILLIN SODIUM 2000 MG: 2 INJECTION, POWDER, FOR SOLUTION INTRAMUSCULAR; INTRAVENOUS at 13:13

## 2025-05-10 RX ADMIN — WARFARIN SODIUM 2.5 MG: 2.5 TABLET ORAL at 18:27

## 2025-05-10 RX ADMIN — ATORVASTATIN CALCIUM 20 MG: 20 TABLET, FILM COATED ORAL at 21:18

## 2025-05-10 RX ADMIN — WATER 2000 MG: 1 INJECTION INTRAMUSCULAR; INTRAVENOUS; SUBCUTANEOUS at 09:07

## 2025-05-10 RX ADMIN — AMPICILLIN SODIUM 2000 MG: 2 INJECTION, POWDER, FOR SOLUTION INTRAMUSCULAR; INTRAVENOUS at 06:51

## 2025-05-10 RX ADMIN — Medication 1 TABLET: at 13:03

## 2025-05-10 RX ADMIN — QUETIAPINE 25 MG: 25 TABLET ORAL at 21:18

## 2025-05-10 RX ADMIN — WATER 2 MG: 1 INJECTION INTRAMUSCULAR; INTRAVENOUS; SUBCUTANEOUS at 09:07

## 2025-05-10 RX ADMIN — TAMSULOSIN HYDROCHLORIDE 0.4 MG: 0.4 CAPSULE ORAL at 09:06

## 2025-05-10 RX ADMIN — Medication 1 CAPSULE: at 18:27

## 2025-05-10 RX ADMIN — WATER 2000 MG: 1 INJECTION INTRAMUSCULAR; INTRAVENOUS; SUBCUTANEOUS at 21:17

## 2025-05-10 RX ADMIN — SODIUM CHLORIDE, PRESERVATIVE FREE 10 ML: 5 INJECTION INTRAVENOUS at 09:07

## 2025-05-10 RX ADMIN — METOPROLOL TARTRATE 25 MG: 25 TABLET, FILM COATED ORAL at 21:18

## 2025-05-10 RX ADMIN — CHOLECALCIFEROL TAB 25 MCG (1000 UNIT) 5000 UNITS: 25 TAB at 13:04

## 2025-05-10 RX ADMIN — SENNOSIDES AND DOCUSATE SODIUM 1 TABLET: 50; 8.6 TABLET ORAL at 09:06

## 2025-05-10 RX ADMIN — Medication 1 CAPSULE: at 09:06

## 2025-05-10 RX ADMIN — METOPROLOL TARTRATE 25 MG: 25 TABLET, FILM COATED ORAL at 09:06

## 2025-05-10 RX ADMIN — LANSOPRAZOLE 30 MG: 30 TABLET, ORALLY DISINTEGRATING, DELAYED RELEASE ORAL at 06:52

## 2025-05-10 RX ADMIN — SENNOSIDES AND DOCUSATE SODIUM 1 TABLET: 50; 8.6 TABLET ORAL at 21:18

## 2025-05-10 RX ADMIN — Medication 5 MG: at 21:18

## 2025-05-10 RX ADMIN — ALLOPURINOL 300 MG: 100 TABLET ORAL at 13:04

## 2025-05-10 ASSESSMENT — PAIN DESCRIPTION - ONSET: ONSET: ON-GOING

## 2025-05-10 ASSESSMENT — PAIN SCALES - GENERAL: PAINLEVEL_OUTOF10: 2

## 2025-05-10 ASSESSMENT — PAIN DESCRIPTION - DESCRIPTORS: DESCRIPTORS: ACHING

## 2025-05-10 ASSESSMENT — PAIN - FUNCTIONAL ASSESSMENT: PAIN_FUNCTIONAL_ASSESSMENT: ACTIVITIES ARE NOT PREVENTED

## 2025-05-10 ASSESSMENT — PAIN DESCRIPTION - PAIN TYPE: TYPE: CHRONIC PAIN

## 2025-05-10 ASSESSMENT — PAIN DESCRIPTION - ORIENTATION: ORIENTATION: LOWER

## 2025-05-10 ASSESSMENT — PAIN DESCRIPTION - LOCATION: LOCATION: BACK

## 2025-05-10 ASSESSMENT — PAIN DESCRIPTION - FREQUENCY: FREQUENCY: INTERMITTENT

## 2025-05-11 PROCEDURE — 6370000000 HC RX 637 (ALT 250 FOR IP): Performed by: INTERNAL MEDICINE

## 2025-05-11 PROCEDURE — 97110 THERAPEUTIC EXERCISES: CPT

## 2025-05-11 PROCEDURE — 2580000003 HC RX 258: Performed by: INTERNAL MEDICINE

## 2025-05-11 PROCEDURE — 97530 THERAPEUTIC ACTIVITIES: CPT

## 2025-05-11 PROCEDURE — 2500000003 HC RX 250 WO HCPCS: Performed by: INTERNAL MEDICINE

## 2025-05-11 PROCEDURE — 94640 AIRWAY INHALATION TREATMENT: CPT

## 2025-05-11 PROCEDURE — 1200000002 HC SEMI PRIVATE SWING BED

## 2025-05-11 PROCEDURE — 6360000002 HC RX W HCPCS: Performed by: INTERNAL MEDICINE

## 2025-05-11 PROCEDURE — 94761 N-INVAS EAR/PLS OXIMETRY MLT: CPT

## 2025-05-11 RX ADMIN — ATORVASTATIN CALCIUM 20 MG: 20 TABLET, FILM COATED ORAL at 20:50

## 2025-05-11 RX ADMIN — Medication 5 MG: at 20:50

## 2025-05-11 RX ADMIN — WATER 2000 MG: 1 INJECTION INTRAMUSCULAR; INTRAVENOUS; SUBCUTANEOUS at 08:28

## 2025-05-11 RX ADMIN — WARFARIN SODIUM 5 MG: 5 TABLET ORAL at 17:07

## 2025-05-11 RX ADMIN — QUETIAPINE 25 MG: 25 TABLET ORAL at 20:50

## 2025-05-11 RX ADMIN — SENNOSIDES AND DOCUSATE SODIUM 1 TABLET: 50; 8.6 TABLET ORAL at 08:29

## 2025-05-11 RX ADMIN — SERTRALINE HYDROCHLORIDE 25 MG: 50 TABLET ORAL at 08:39

## 2025-05-11 RX ADMIN — LANSOPRAZOLE 30 MG: 30 TABLET, ORALLY DISINTEGRATING, DELAYED RELEASE ORAL at 06:51

## 2025-05-11 RX ADMIN — TAMSULOSIN HYDROCHLORIDE 0.4 MG: 0.4 CAPSULE ORAL at 08:29

## 2025-05-11 RX ADMIN — SODIUM CHLORIDE, PRESERVATIVE FREE 10 ML: 5 INJECTION INTRAVENOUS at 08:29

## 2025-05-11 RX ADMIN — SENNOSIDES AND DOCUSATE SODIUM 1 TABLET: 50; 8.6 TABLET ORAL at 20:50

## 2025-05-11 RX ADMIN — AMPICILLIN SODIUM 2000 MG: 2 INJECTION, POWDER, FOR SOLUTION INTRAMUSCULAR; INTRAVENOUS at 18:30

## 2025-05-11 RX ADMIN — AMPICILLIN SODIUM 2000 MG: 2 INJECTION, POWDER, FOR SOLUTION INTRAMUSCULAR; INTRAVENOUS at 03:51

## 2025-05-11 RX ADMIN — METOPROLOL TARTRATE 25 MG: 25 TABLET, FILM COATED ORAL at 08:29

## 2025-05-11 RX ADMIN — CHOLECALCIFEROL TAB 25 MCG (1000 UNIT) 5000 UNITS: 25 TAB at 13:03

## 2025-05-11 RX ADMIN — Medication 1 CAPSULE: at 08:29

## 2025-05-11 RX ADMIN — ALLOPURINOL 300 MG: 100 TABLET ORAL at 13:03

## 2025-05-11 RX ADMIN — Medication 1 TABLET: at 13:06

## 2025-05-11 RX ADMIN — Medication 1 CAPSULE: at 17:06

## 2025-05-11 RX ADMIN — AMPICILLIN SODIUM 2000 MG: 2 INJECTION, POWDER, FOR SOLUTION INTRAMUSCULAR; INTRAVENOUS at 13:08

## 2025-05-11 RX ADMIN — ACETAMINOPHEN 650 MG: 325 TABLET, FILM COATED ORAL at 08:29

## 2025-05-11 RX ADMIN — AMPICILLIN SODIUM 2000 MG: 2 INJECTION, POWDER, FOR SOLUTION INTRAMUSCULAR; INTRAVENOUS at 06:52

## 2025-05-11 RX ADMIN — WATER 2000 MG: 1 INJECTION INTRAMUSCULAR; INTRAVENOUS; SUBCUTANEOUS at 20:50

## 2025-05-11 RX ADMIN — Medication 1 TABLET: at 13:03

## 2025-05-11 ASSESSMENT — PAIN SCALES - GENERAL
PAINLEVEL_OUTOF10: 2
PAINLEVEL_OUTOF10: 1

## 2025-05-11 ASSESSMENT — PAIN DESCRIPTION - DESCRIPTORS: DESCRIPTORS: ACHING

## 2025-05-11 ASSESSMENT — PAIN - FUNCTIONAL ASSESSMENT: PAIN_FUNCTIONAL_ASSESSMENT: ACTIVITIES ARE NOT PREVENTED

## 2025-05-11 ASSESSMENT — PAIN DESCRIPTION - ORIENTATION: ORIENTATION: POSTERIOR

## 2025-05-11 ASSESSMENT — PAIN DESCRIPTION - LOCATION: LOCATION: BACK

## 2025-05-12 LAB
INR PPP: 2.4
PROTHROMBIN TIME: 26.5 SEC (ref 11.5–14.2)

## 2025-05-12 PROCEDURE — 6360000002 HC RX W HCPCS: Performed by: INTERNAL MEDICINE

## 2025-05-12 PROCEDURE — 2500000003 HC RX 250 WO HCPCS: Performed by: INTERNAL MEDICINE

## 2025-05-12 PROCEDURE — 97530 THERAPEUTIC ACTIVITIES: CPT

## 2025-05-12 PROCEDURE — 6370000000 HC RX 637 (ALT 250 FOR IP): Performed by: INTERNAL MEDICINE

## 2025-05-12 PROCEDURE — 97116 GAIT TRAINING THERAPY: CPT

## 2025-05-12 PROCEDURE — 1200000002 HC SEMI PRIVATE SWING BED

## 2025-05-12 PROCEDURE — 97110 THERAPEUTIC EXERCISES: CPT

## 2025-05-12 PROCEDURE — 94761 N-INVAS EAR/PLS OXIMETRY MLT: CPT

## 2025-05-12 PROCEDURE — 85610 PROTHROMBIN TIME: CPT

## 2025-05-12 PROCEDURE — 94640 AIRWAY INHALATION TREATMENT: CPT

## 2025-05-12 PROCEDURE — 2580000003 HC RX 258: Performed by: INTERNAL MEDICINE

## 2025-05-12 PROCEDURE — 36415 COLL VENOUS BLD VENIPUNCTURE: CPT

## 2025-05-12 RX ADMIN — TAMSULOSIN HYDROCHLORIDE 0.4 MG: 0.4 CAPSULE ORAL at 09:03

## 2025-05-12 RX ADMIN — AMPICILLIN SODIUM 2000 MG: 2 INJECTION, POWDER, FOR SOLUTION INTRAMUSCULAR; INTRAVENOUS at 01:52

## 2025-05-12 RX ADMIN — WARFARIN SODIUM 2.5 MG: 2.5 TABLET ORAL at 17:56

## 2025-05-12 RX ADMIN — CHOLECALCIFEROL TAB 25 MCG (1000 UNIT) 5000 UNITS: 25 TAB at 12:29

## 2025-05-12 RX ADMIN — SODIUM CHLORIDE, PRESERVATIVE FREE 10 ML: 5 INJECTION INTRAVENOUS at 09:03

## 2025-05-12 RX ADMIN — AMPICILLIN SODIUM 2000 MG: 2 INJECTION, POWDER, FOR SOLUTION INTRAMUSCULAR; INTRAVENOUS at 12:47

## 2025-05-12 RX ADMIN — Medication 1 CAPSULE: at 09:03

## 2025-05-12 RX ADMIN — ATORVASTATIN CALCIUM 20 MG: 20 TABLET, FILM COATED ORAL at 20:37

## 2025-05-12 RX ADMIN — METOPROLOL TARTRATE 25 MG: 25 TABLET, FILM COATED ORAL at 20:37

## 2025-05-12 RX ADMIN — AMPICILLIN SODIUM 2000 MG: 2 INJECTION, POWDER, FOR SOLUTION INTRAMUSCULAR; INTRAVENOUS at 06:48

## 2025-05-12 RX ADMIN — LANSOPRAZOLE 30 MG: 30 TABLET, ORALLY DISINTEGRATING, DELAYED RELEASE ORAL at 06:48

## 2025-05-12 RX ADMIN — ALLOPURINOL 300 MG: 100 TABLET ORAL at 12:30

## 2025-05-12 RX ADMIN — WATER 2000 MG: 1 INJECTION INTRAMUSCULAR; INTRAVENOUS; SUBCUTANEOUS at 09:03

## 2025-05-12 RX ADMIN — SENNOSIDES AND DOCUSATE SODIUM 1 TABLET: 50; 8.6 TABLET ORAL at 20:36

## 2025-05-12 RX ADMIN — WATER 2000 MG: 1 INJECTION INTRAMUSCULAR; INTRAVENOUS; SUBCUTANEOUS at 20:36

## 2025-05-12 RX ADMIN — Medication 1 CAPSULE: at 17:56

## 2025-05-12 RX ADMIN — QUETIAPINE 25 MG: 25 TABLET ORAL at 20:37

## 2025-05-12 RX ADMIN — AMPICILLIN SODIUM 2000 MG: 2 INJECTION, POWDER, FOR SOLUTION INTRAMUSCULAR; INTRAVENOUS at 18:03

## 2025-05-12 RX ADMIN — SENNOSIDES AND DOCUSATE SODIUM 1 TABLET: 50; 8.6 TABLET ORAL at 09:03

## 2025-05-12 RX ADMIN — Medication 5 MG: at 20:37

## 2025-05-12 RX ADMIN — SODIUM CHLORIDE, PRESERVATIVE FREE 10 ML: 5 INJECTION INTRAVENOUS at 02:20

## 2025-05-12 RX ADMIN — Medication 1 TABLET: at 12:29

## 2025-05-12 RX ADMIN — Medication 1 TABLET: at 12:30

## 2025-05-12 RX ADMIN — ACETAMINOPHEN 650 MG: 325 TABLET, FILM COATED ORAL at 09:04

## 2025-05-12 RX ADMIN — SODIUM CHLORIDE, PRESERVATIVE FREE 10 ML: 5 INJECTION INTRAVENOUS at 20:36

## 2025-05-12 RX ADMIN — METOPROLOL TARTRATE 25 MG: 25 TABLET, FILM COATED ORAL at 09:07

## 2025-05-12 RX ADMIN — SERTRALINE HYDROCHLORIDE 25 MG: 50 TABLET ORAL at 09:03

## 2025-05-12 ASSESSMENT — PAIN SCALES - GENERAL
PAINLEVEL_OUTOF10: 0
PAINLEVEL_OUTOF10: 0
PAINLEVEL_OUTOF10: 2
PAINLEVEL_OUTOF10: 0

## 2025-05-12 ASSESSMENT — PAIN - FUNCTIONAL ASSESSMENT: PAIN_FUNCTIONAL_ASSESSMENT: PREVENTS OR INTERFERES SOME ACTIVE ACTIVITIES AND ADLS

## 2025-05-12 ASSESSMENT — PAIN DESCRIPTION - DESCRIPTORS: DESCRIPTORS: ACHING

## 2025-05-12 ASSESSMENT — PAIN DESCRIPTION - LOCATION: LOCATION: BACK

## 2025-05-12 ASSESSMENT — PAIN DESCRIPTION - ORIENTATION: ORIENTATION: LOWER

## 2025-05-13 PROCEDURE — 6370000000 HC RX 637 (ALT 250 FOR IP): Performed by: INTERNAL MEDICINE

## 2025-05-13 PROCEDURE — 1200000002 HC SEMI PRIVATE SWING BED

## 2025-05-13 PROCEDURE — 97110 THERAPEUTIC EXERCISES: CPT

## 2025-05-13 PROCEDURE — 2500000003 HC RX 250 WO HCPCS: Performed by: INTERNAL MEDICINE

## 2025-05-13 PROCEDURE — 2580000003 HC RX 258: Performed by: INTERNAL MEDICINE

## 2025-05-13 PROCEDURE — 6360000002 HC RX W HCPCS: Performed by: INTERNAL MEDICINE

## 2025-05-13 PROCEDURE — 94761 N-INVAS EAR/PLS OXIMETRY MLT: CPT

## 2025-05-13 PROCEDURE — 94640 AIRWAY INHALATION TREATMENT: CPT

## 2025-05-13 RX ADMIN — WATER 2000 MG: 1 INJECTION INTRAMUSCULAR; INTRAVENOUS; SUBCUTANEOUS at 21:30

## 2025-05-13 RX ADMIN — METOPROLOL TARTRATE 25 MG: 25 TABLET, FILM COATED ORAL at 21:30

## 2025-05-13 RX ADMIN — SODIUM CHLORIDE, PRESERVATIVE FREE 10 ML: 5 INJECTION INTRAVENOUS at 19:56

## 2025-05-13 RX ADMIN — SERTRALINE HYDROCHLORIDE 25 MG: 50 TABLET ORAL at 08:33

## 2025-05-13 RX ADMIN — LANSOPRAZOLE 30 MG: 30 TABLET, ORALLY DISINTEGRATING, DELAYED RELEASE ORAL at 06:38

## 2025-05-13 RX ADMIN — WATER 2000 MG: 1 INJECTION INTRAMUSCULAR; INTRAVENOUS; SUBCUTANEOUS at 08:33

## 2025-05-13 RX ADMIN — SODIUM CHLORIDE, PRESERVATIVE FREE 10 ML: 5 INJECTION INTRAVENOUS at 08:33

## 2025-05-13 RX ADMIN — AMPICILLIN SODIUM 2000 MG: 2 INJECTION, POWDER, FOR SOLUTION INTRAMUSCULAR; INTRAVENOUS at 00:33

## 2025-05-13 RX ADMIN — Medication 1 CAPSULE: at 17:03

## 2025-05-13 RX ADMIN — CHOLECALCIFEROL TAB 25 MCG (1000 UNIT) 5000 UNITS: 25 TAB at 11:38

## 2025-05-13 RX ADMIN — SENNOSIDES AND DOCUSATE SODIUM 1 TABLET: 50; 8.6 TABLET ORAL at 08:33

## 2025-05-13 RX ADMIN — Medication 1 TABLET: at 11:38

## 2025-05-13 RX ADMIN — METOPROLOL TARTRATE 25 MG: 25 TABLET, FILM COATED ORAL at 08:33

## 2025-05-13 RX ADMIN — Medication 5 MG: at 21:30

## 2025-05-13 RX ADMIN — Medication 1 TABLET: at 11:39

## 2025-05-13 RX ADMIN — ACETAMINOPHEN 650 MG: 325 TABLET, FILM COATED ORAL at 11:38

## 2025-05-13 RX ADMIN — TAMSULOSIN HYDROCHLORIDE 0.4 MG: 0.4 CAPSULE ORAL at 08:33

## 2025-05-13 RX ADMIN — SODIUM CHLORIDE, PRESERVATIVE FREE 10 ML: 5 INJECTION INTRAVENOUS at 13:30

## 2025-05-13 RX ADMIN — SENNOSIDES AND DOCUSATE SODIUM 1 TABLET: 50; 8.6 TABLET ORAL at 21:30

## 2025-05-13 RX ADMIN — Medication 1 CAPSULE: at 08:33

## 2025-05-13 RX ADMIN — AMPICILLIN SODIUM 2000 MG: 2 INJECTION, POWDER, FOR SOLUTION INTRAMUSCULAR; INTRAVENOUS at 12:57

## 2025-05-13 RX ADMIN — AMPICILLIN SODIUM 2000 MG: 2 INJECTION, POWDER, FOR SOLUTION INTRAMUSCULAR; INTRAVENOUS at 18:29

## 2025-05-13 RX ADMIN — AMPICILLIN SODIUM 2000 MG: 2 INJECTION, POWDER, FOR SOLUTION INTRAMUSCULAR; INTRAVENOUS at 06:44

## 2025-05-13 RX ADMIN — QUETIAPINE 25 MG: 25 TABLET ORAL at 21:30

## 2025-05-13 RX ADMIN — ATORVASTATIN CALCIUM 20 MG: 20 TABLET, FILM COATED ORAL at 21:30

## 2025-05-13 RX ADMIN — WARFARIN SODIUM 2.5 MG: 2.5 TABLET ORAL at 18:24

## 2025-05-13 RX ADMIN — ALLOPURINOL 300 MG: 100 TABLET ORAL at 11:38

## 2025-05-13 ASSESSMENT — PAIN DESCRIPTION - DESCRIPTORS: DESCRIPTORS: ACHING

## 2025-05-13 ASSESSMENT — PAIN SCALES - GENERAL
PAINLEVEL_OUTOF10: 3
PAINLEVEL_OUTOF10: 0

## 2025-05-13 ASSESSMENT — PAIN DESCRIPTION - LOCATION: LOCATION: BACK

## 2025-05-13 ASSESSMENT — PAIN - FUNCTIONAL ASSESSMENT: PAIN_FUNCTIONAL_ASSESSMENT: NONE - DENIES PAIN

## 2025-05-13 ASSESSMENT — PAIN DESCRIPTION - ORIENTATION: ORIENTATION: MID

## 2025-05-14 LAB
INR PPP: 2.2
PROTHROMBIN TIME: 24.9 SEC (ref 11.5–14.2)

## 2025-05-14 PROCEDURE — 94640 AIRWAY INHALATION TREATMENT: CPT

## 2025-05-14 PROCEDURE — 1200000002 HC SEMI PRIVATE SWING BED

## 2025-05-14 PROCEDURE — 36415 COLL VENOUS BLD VENIPUNCTURE: CPT

## 2025-05-14 PROCEDURE — 97116 GAIT TRAINING THERAPY: CPT

## 2025-05-14 PROCEDURE — 85610 PROTHROMBIN TIME: CPT

## 2025-05-14 PROCEDURE — 6370000000 HC RX 637 (ALT 250 FOR IP): Performed by: INTERNAL MEDICINE

## 2025-05-14 PROCEDURE — 94761 N-INVAS EAR/PLS OXIMETRY MLT: CPT

## 2025-05-14 PROCEDURE — 2500000003 HC RX 250 WO HCPCS: Performed by: INTERNAL MEDICINE

## 2025-05-14 PROCEDURE — 6360000002 HC RX W HCPCS: Performed by: INTERNAL MEDICINE

## 2025-05-14 PROCEDURE — 2580000003 HC RX 258: Performed by: INTERNAL MEDICINE

## 2025-05-14 PROCEDURE — 97530 THERAPEUTIC ACTIVITIES: CPT

## 2025-05-14 RX ADMIN — AMPICILLIN SODIUM 2000 MG: 2 INJECTION, POWDER, FOR SOLUTION INTRAMUSCULAR; INTRAVENOUS at 01:08

## 2025-05-14 RX ADMIN — TAMSULOSIN HYDROCHLORIDE 0.4 MG: 0.4 CAPSULE ORAL at 08:28

## 2025-05-14 RX ADMIN — SODIUM CHLORIDE, PRESERVATIVE FREE 10 ML: 5 INJECTION INTRAVENOUS at 19:10

## 2025-05-14 RX ADMIN — ATORVASTATIN CALCIUM 20 MG: 20 TABLET, FILM COATED ORAL at 20:33

## 2025-05-14 RX ADMIN — ACETAMINOPHEN 650 MG: 325 TABLET, FILM COATED ORAL at 15:30

## 2025-05-14 RX ADMIN — SODIUM CHLORIDE, PRESERVATIVE FREE 10 ML: 5 INJECTION INTRAVENOUS at 12:42

## 2025-05-14 RX ADMIN — Medication 5 MG: at 20:32

## 2025-05-14 RX ADMIN — SODIUM CHLORIDE, PRESERVATIVE FREE 10 ML: 5 INJECTION INTRAVENOUS at 08:29

## 2025-05-14 RX ADMIN — WATER 2000 MG: 1 INJECTION INTRAMUSCULAR; INTRAVENOUS; SUBCUTANEOUS at 08:28

## 2025-05-14 RX ADMIN — METOPROLOL TARTRATE 25 MG: 25 TABLET, FILM COATED ORAL at 20:32

## 2025-05-14 RX ADMIN — METOPROLOL TARTRATE 25 MG: 25 TABLET, FILM COATED ORAL at 08:28

## 2025-05-14 RX ADMIN — AMPICILLIN SODIUM 2000 MG: 2 INJECTION, POWDER, FOR SOLUTION INTRAMUSCULAR; INTRAVENOUS at 12:43

## 2025-05-14 RX ADMIN — AMPICILLIN SODIUM 2000 MG: 2 INJECTION, POWDER, FOR SOLUTION INTRAMUSCULAR; INTRAVENOUS at 06:48

## 2025-05-14 RX ADMIN — SENNOSIDES AND DOCUSATE SODIUM 1 TABLET: 50; 8.6 TABLET ORAL at 20:33

## 2025-05-14 RX ADMIN — WATER 2000 MG: 1 INJECTION INTRAMUSCULAR; INTRAVENOUS; SUBCUTANEOUS at 20:32

## 2025-05-14 RX ADMIN — ALLOPURINOL 300 MG: 100 TABLET ORAL at 12:19

## 2025-05-14 RX ADMIN — Medication 1 CAPSULE: at 15:29

## 2025-05-14 RX ADMIN — AMPICILLIN SODIUM 2000 MG: 2 INJECTION, POWDER, FOR SOLUTION INTRAMUSCULAR; INTRAVENOUS at 18:36

## 2025-05-14 RX ADMIN — SERTRALINE HYDROCHLORIDE 25 MG: 50 TABLET ORAL at 08:28

## 2025-05-14 RX ADMIN — Medication 1 TABLET: at 12:19

## 2025-05-14 RX ADMIN — QUETIAPINE 25 MG: 25 TABLET ORAL at 20:33

## 2025-05-14 RX ADMIN — Medication 1 CAPSULE: at 08:28

## 2025-05-14 RX ADMIN — LANSOPRAZOLE 30 MG: 30 TABLET, ORALLY DISINTEGRATING, DELAYED RELEASE ORAL at 06:53

## 2025-05-14 RX ADMIN — CHOLECALCIFEROL TAB 25 MCG (1000 UNIT) 5000 UNITS: 25 TAB at 12:19

## 2025-05-14 RX ADMIN — WARFARIN SODIUM 2.5 MG: 2.5 TABLET ORAL at 18:29

## 2025-05-14 RX ADMIN — SODIUM CHLORIDE, PRESERVATIVE FREE 10 ML: 5 INJECTION INTRAVENOUS at 20:44

## 2025-05-14 RX ADMIN — SENNOSIDES AND DOCUSATE SODIUM 1 TABLET: 50; 8.6 TABLET ORAL at 08:28

## 2025-05-14 ASSESSMENT — PAIN DESCRIPTION - DESCRIPTORS: DESCRIPTORS: ACHING

## 2025-05-14 ASSESSMENT — PAIN - FUNCTIONAL ASSESSMENT
PAIN_FUNCTIONAL_ASSESSMENT: PREVENTS OR INTERFERES SOME ACTIVE ACTIVITIES AND ADLS
PAIN_FUNCTIONAL_ASSESSMENT: NONE - DENIES PAIN

## 2025-05-14 ASSESSMENT — PAIN SCALES - GENERAL
PAINLEVEL_OUTOF10: 3
PAINLEVEL_OUTOF10: 3
PAINLEVEL_OUTOF10: 2

## 2025-05-14 ASSESSMENT — PAIN DESCRIPTION - ORIENTATION: ORIENTATION: LOWER

## 2025-05-14 ASSESSMENT — PAIN DESCRIPTION - LOCATION: LOCATION: BACK

## 2025-05-15 PROCEDURE — 94761 N-INVAS EAR/PLS OXIMETRY MLT: CPT

## 2025-05-15 PROCEDURE — 2580000003 HC RX 258: Performed by: INTERNAL MEDICINE

## 2025-05-15 PROCEDURE — 6360000002 HC RX W HCPCS: Performed by: INTERNAL MEDICINE

## 2025-05-15 PROCEDURE — 94640 AIRWAY INHALATION TREATMENT: CPT

## 2025-05-15 PROCEDURE — 2500000003 HC RX 250 WO HCPCS: Performed by: INTERNAL MEDICINE

## 2025-05-15 PROCEDURE — 97116 GAIT TRAINING THERAPY: CPT

## 2025-05-15 PROCEDURE — 6370000000 HC RX 637 (ALT 250 FOR IP): Performed by: INTERNAL MEDICINE

## 2025-05-15 PROCEDURE — 97110 THERAPEUTIC EXERCISES: CPT

## 2025-05-15 PROCEDURE — 1200000002 HC SEMI PRIVATE SWING BED

## 2025-05-15 RX ADMIN — ATORVASTATIN CALCIUM 20 MG: 20 TABLET, FILM COATED ORAL at 20:51

## 2025-05-15 RX ADMIN — Medication 1 CAPSULE: at 09:04

## 2025-05-15 RX ADMIN — SENNOSIDES AND DOCUSATE SODIUM 1 TABLET: 50; 8.6 TABLET ORAL at 09:04

## 2025-05-15 RX ADMIN — AMPICILLIN SODIUM 2000 MG: 2 INJECTION, POWDER, FOR SOLUTION INTRAMUSCULAR; INTRAVENOUS at 06:13

## 2025-05-15 RX ADMIN — WARFARIN SODIUM 5 MG: 5 TABLET ORAL at 17:09

## 2025-05-15 RX ADMIN — ACETAMINOPHEN 650 MG: 325 TABLET, FILM COATED ORAL at 09:17

## 2025-05-15 RX ADMIN — Medication 5 MG: at 20:51

## 2025-05-15 RX ADMIN — METOPROLOL TARTRATE 25 MG: 25 TABLET, FILM COATED ORAL at 09:04

## 2025-05-15 RX ADMIN — Medication 1 TABLET: at 12:36

## 2025-05-15 RX ADMIN — QUETIAPINE 25 MG: 25 TABLET ORAL at 20:51

## 2025-05-15 RX ADMIN — SENNOSIDES AND DOCUSATE SODIUM 1 TABLET: 50; 8.6 TABLET ORAL at 20:50

## 2025-05-15 RX ADMIN — CHOLECALCIFEROL TAB 25 MCG (1000 UNIT) 5000 UNITS: 25 TAB at 12:29

## 2025-05-15 RX ADMIN — LANSOPRAZOLE 30 MG: 30 TABLET, ORALLY DISINTEGRATING, DELAYED RELEASE ORAL at 06:13

## 2025-05-15 RX ADMIN — AMPICILLIN SODIUM 2000 MG: 2 INJECTION, POWDER, FOR SOLUTION INTRAMUSCULAR; INTRAVENOUS at 19:26

## 2025-05-15 RX ADMIN — SERTRALINE HYDROCHLORIDE 25 MG: 50 TABLET ORAL at 09:07

## 2025-05-15 RX ADMIN — SODIUM CHLORIDE, PRESERVATIVE FREE 10 ML: 5 INJECTION INTRAVENOUS at 19:56

## 2025-05-15 RX ADMIN — WATER 2000 MG: 1 INJECTION INTRAMUSCULAR; INTRAVENOUS; SUBCUTANEOUS at 09:04

## 2025-05-15 RX ADMIN — METOPROLOL TARTRATE 25 MG: 25 TABLET, FILM COATED ORAL at 20:51

## 2025-05-15 RX ADMIN — SODIUM CHLORIDE, PRESERVATIVE FREE 10 ML: 5 INJECTION INTRAVENOUS at 09:05

## 2025-05-15 RX ADMIN — WATER 2000 MG: 1 INJECTION INTRAMUSCULAR; INTRAVENOUS; SUBCUTANEOUS at 21:00

## 2025-05-15 RX ADMIN — ALLOPURINOL 300 MG: 100 TABLET ORAL at 12:29

## 2025-05-15 RX ADMIN — AMPICILLIN SODIUM 2000 MG: 2 INJECTION, POWDER, FOR SOLUTION INTRAMUSCULAR; INTRAVENOUS at 12:34

## 2025-05-15 RX ADMIN — Medication 1 TABLET: at 12:29

## 2025-05-15 RX ADMIN — TAMSULOSIN HYDROCHLORIDE 0.4 MG: 0.4 CAPSULE ORAL at 09:04

## 2025-05-15 RX ADMIN — Medication 1 CAPSULE: at 17:09

## 2025-05-15 RX ADMIN — AMPICILLIN SODIUM 2000 MG: 2 INJECTION, POWDER, FOR SOLUTION INTRAMUSCULAR; INTRAVENOUS at 00:51

## 2025-05-15 ASSESSMENT — PAIN SCALES - GENERAL
PAINLEVEL_OUTOF10: 3
PAINLEVEL_OUTOF10: 0
PAINLEVEL_OUTOF10: 3
PAINLEVEL_OUTOF10: 0
PAINLEVEL_OUTOF10: 2

## 2025-05-15 ASSESSMENT — PAIN DESCRIPTION - LOCATION: LOCATION: BACK

## 2025-05-15 ASSESSMENT — PAIN DESCRIPTION - ORIENTATION: ORIENTATION: LOWER

## 2025-05-15 ASSESSMENT — PAIN - FUNCTIONAL ASSESSMENT: PAIN_FUNCTIONAL_ASSESSMENT: NONE - DENIES PAIN

## 2025-05-15 ASSESSMENT — PAIN SCALES - WONG BAKER: WONGBAKER_NUMERICALRESPONSE: NO HURT

## 2025-05-15 ASSESSMENT — PAIN DESCRIPTION - DESCRIPTORS: DESCRIPTORS: ACHING

## 2025-05-16 LAB
INR PPP: 2
PROTHROMBIN TIME: 23.1 SEC (ref 11.5–14.2)

## 2025-05-16 PROCEDURE — 36415 COLL VENOUS BLD VENIPUNCTURE: CPT

## 2025-05-16 PROCEDURE — 97535 SELF CARE MNGMENT TRAINING: CPT

## 2025-05-16 PROCEDURE — 1200000002 HC SEMI PRIVATE SWING BED

## 2025-05-16 PROCEDURE — 94761 N-INVAS EAR/PLS OXIMETRY MLT: CPT

## 2025-05-16 PROCEDURE — 6370000000 HC RX 637 (ALT 250 FOR IP): Performed by: INTERNAL MEDICINE

## 2025-05-16 PROCEDURE — 97110 THERAPEUTIC EXERCISES: CPT

## 2025-05-16 PROCEDURE — 85610 PROTHROMBIN TIME: CPT

## 2025-05-16 PROCEDURE — 97116 GAIT TRAINING THERAPY: CPT

## 2025-05-16 PROCEDURE — 94640 AIRWAY INHALATION TREATMENT: CPT

## 2025-05-16 PROCEDURE — 2500000003 HC RX 250 WO HCPCS: Performed by: INTERNAL MEDICINE

## 2025-05-16 PROCEDURE — 2580000003 HC RX 258: Performed by: INTERNAL MEDICINE

## 2025-05-16 PROCEDURE — 6360000002 HC RX W HCPCS: Performed by: INTERNAL MEDICINE

## 2025-05-16 RX ORDER — WARFARIN SODIUM 2.5 MG/1
3.75 TABLET ORAL
Status: COMPLETED | OUTPATIENT
Start: 2025-05-16 | End: 2025-05-16

## 2025-05-16 RX ORDER — WARFARIN SODIUM 2.5 MG/1
2.5 TABLET ORAL
Status: DISCONTINUED | OUTPATIENT
Start: 2025-05-17 | End: 2025-05-19 | Stop reason: HOSPADM

## 2025-05-16 RX ORDER — WARFARIN SODIUM 5 MG/1
5 TABLET ORAL
Status: DISCONTINUED | OUTPATIENT
Start: 2025-05-18 | End: 2025-05-19 | Stop reason: HOSPADM

## 2025-05-16 RX ADMIN — METOPROLOL TARTRATE 25 MG: 25 TABLET, FILM COATED ORAL at 20:42

## 2025-05-16 RX ADMIN — SENNOSIDES AND DOCUSATE SODIUM 1 TABLET: 50; 8.6 TABLET ORAL at 20:42

## 2025-05-16 RX ADMIN — AMPICILLIN SODIUM 2000 MG: 2 INJECTION, POWDER, FOR SOLUTION INTRAMUSCULAR; INTRAVENOUS at 19:07

## 2025-05-16 RX ADMIN — Medication 1 CAPSULE: at 19:04

## 2025-05-16 RX ADMIN — LANSOPRAZOLE 30 MG: 30 TABLET, ORALLY DISINTEGRATING, DELAYED RELEASE ORAL at 06:25

## 2025-05-16 RX ADMIN — Medication 1 TABLET: at 13:18

## 2025-05-16 RX ADMIN — SODIUM CHLORIDE, PRESERVATIVE FREE 10 ML: 5 INJECTION INTRAVENOUS at 08:16

## 2025-05-16 RX ADMIN — SERTRALINE HYDROCHLORIDE 25 MG: 50 TABLET ORAL at 08:15

## 2025-05-16 RX ADMIN — CHOLECALCIFEROL TAB 25 MCG (1000 UNIT) 5000 UNITS: 25 TAB at 13:14

## 2025-05-16 RX ADMIN — METOPROLOL TARTRATE 25 MG: 25 TABLET, FILM COATED ORAL at 08:15

## 2025-05-16 RX ADMIN — ATORVASTATIN CALCIUM 20 MG: 20 TABLET, FILM COATED ORAL at 20:42

## 2025-05-16 RX ADMIN — Medication 1 TABLET: at 13:14

## 2025-05-16 RX ADMIN — WARFARIN SODIUM 3.75 MG: 2.5 TABLET ORAL at 19:03

## 2025-05-16 RX ADMIN — SODIUM CHLORIDE, PRESERVATIVE FREE 10 ML: 5 INJECTION INTRAVENOUS at 20:40

## 2025-05-16 RX ADMIN — AMPICILLIN SODIUM 2000 MG: 2 INJECTION, POWDER, FOR SOLUTION INTRAMUSCULAR; INTRAVENOUS at 00:58

## 2025-05-16 RX ADMIN — AMPICILLIN SODIUM 2000 MG: 2 INJECTION, POWDER, FOR SOLUTION INTRAMUSCULAR; INTRAVENOUS at 06:31

## 2025-05-16 RX ADMIN — WATER 2000 MG: 1 INJECTION INTRAMUSCULAR; INTRAVENOUS; SUBCUTANEOUS at 20:40

## 2025-05-16 RX ADMIN — SENNOSIDES AND DOCUSATE SODIUM 1 TABLET: 50; 8.6 TABLET ORAL at 08:15

## 2025-05-16 RX ADMIN — Medication 1 CAPSULE: at 08:15

## 2025-05-16 RX ADMIN — WATER 2000 MG: 1 INJECTION INTRAMUSCULAR; INTRAVENOUS; SUBCUTANEOUS at 08:15

## 2025-05-16 RX ADMIN — Medication 5 MG: at 20:42

## 2025-05-16 RX ADMIN — ACETAMINOPHEN 650 MG: 325 TABLET, FILM COATED ORAL at 08:21

## 2025-05-16 RX ADMIN — ALLOPURINOL 300 MG: 100 TABLET ORAL at 13:14

## 2025-05-16 RX ADMIN — QUETIAPINE 25 MG: 25 TABLET ORAL at 20:42

## 2025-05-16 RX ADMIN — TAMSULOSIN HYDROCHLORIDE 0.4 MG: 0.4 CAPSULE ORAL at 08:15

## 2025-05-16 RX ADMIN — AMPICILLIN SODIUM 2000 MG: 2 INJECTION, POWDER, FOR SOLUTION INTRAMUSCULAR; INTRAVENOUS at 13:24

## 2025-05-16 ASSESSMENT — PAIN - FUNCTIONAL ASSESSMENT
PAIN_FUNCTIONAL_ASSESSMENT: NONE - DENIES PAIN
PAIN_FUNCTIONAL_ASSESSMENT: PREVENTS OR INTERFERES WITH MANY ACTIVE NOT PASSIVE ACTIVITIES

## 2025-05-16 ASSESSMENT — PAIN DESCRIPTION - ORIENTATION: ORIENTATION: LOWER

## 2025-05-16 ASSESSMENT — PAIN DESCRIPTION - DESCRIPTORS: DESCRIPTORS: ACHING

## 2025-05-16 ASSESSMENT — PAIN SCALES - GENERAL
PAINLEVEL_OUTOF10: 0
PAINLEVEL_OUTOF10: 3
PAINLEVEL_OUTOF10: 0
PAINLEVEL_OUTOF10: 2

## 2025-05-16 ASSESSMENT — PAIN DESCRIPTION - LOCATION: LOCATION: BACK

## 2025-05-16 ASSESSMENT — PAIN SCALES - WONG BAKER: WONGBAKER_NUMERICALRESPONSE: NO HURT

## 2025-05-16 NOTE — CARE COORDINATION
05/16/25 1018   IMM Letter   Notice of Medicare Non-Coverage Letter date given: 05/16/25   Notice of Medicare Non-Coverage Time Given 1015   Notice of Medicare Non-Coverage Letter Given By Abbey LEONG LSW     Medicare letter of Non Coverage provided to pt with discharge of skilled services on Monday 5/19/2025. Pt would like to go home Monday afternoon. SW reviewed letter with pt and he signs in agreement with discharge plan of home with [facility] services. Copy of letter made and placed in paper chart and provided to pt. Abbey Bolton, SALO, LSW 5/16/2025

## 2025-05-16 NOTE — CONSULTS
[facility]  Pharmacy Department    Clinical Pharmacy Note-Warfarin Follow-up       Patient:  Quique Wray  MRN: [id number]    Warfarin consult follow-up:    INR (no units)   Date Value   04/19/2025 3.1   04/18/2025 2.9   04/17/2025 2.5   04/16/2025 2.3   04/15/2025 2.8   04/14/2025 2.9   04/13/2025 2.8       Hemoglobin (g/dL)   Date Value   04/19/2025 7.9 (LL)   04/18/2025 8.0 (LL)   04/15/2025 7.6 (LL)     Hematocrit (%)   Date Value   04/19/2025 25.2 (L)   04/18/2025 25.6 (L)   04/15/2025 23.9 (L)     Platelet Count (k/uL)   Date Value   11/13/2011 137     Platelets (k/uL)   Date Value   04/19/2025 205   04/14/2025 200   04/12/2025 212       Target INR Range: 2-3    Significant Drug-Drug Interactions:  Will be on Rocephin and Ampicillin 6 weeks      Notes:                   Hold Warfarin today for INR of 3.1  Daily PT/INR until stable within therapeutic range.       Ndaine Eagle [facility],   4/19/2025, 11:13 AM    
    [facility]  Pharmacy Department    Clinical Pharmacy Note-Warfarin Follow-up       Patient:  Quique Wray  MRN: [id number]    Warfarin consult follow-up:    INR (no units)   Date Value   04/20/2025 2.8   04/19/2025 3.1   04/18/2025 2.9   04/17/2025 2.5   04/16/2025 2.3   04/15/2025 2.8   04/14/2025 2.9       Hemoglobin (g/dL)   Date Value   04/19/2025 7.9 (LL)   04/18/2025 8.0 (LL)   04/15/2025 7.6 (LL)     Hematocrit (%)   Date Value   04/19/2025 25.2 (L)   04/18/2025 25.6 (L)   04/15/2025 23.9 (L)     Platelet Count (k/uL)   Date Value   11/13/2011 137     Platelets (k/uL)   Date Value   04/19/2025 205   04/14/2025 200   04/12/2025 212       Target INR Range: 2-3    Significant Drug-Drug Interactions:  Will be on Rocephin and Ampicillin 6 weeks      Notes:                   Warfarin 2.5 mg po today.   Daily PT/INR until stable within therapeutic range.       Nadine Eagle [facility],   4/20/2025, 10:23 AM    
Clinical Pharmacy Note    Quique Wray is a 80 y.o. male for whom pharmacy has been asked to manage warfarin therapy.     Consulting Physician: Dr. Rob Bowens      Warfarin dose prior to admission: 5 mg (5 mg x 1) every Sun, Tue, Thu; 2.5 mg (5 mg x 0.5) all other days     Warfarin indication: Atrial fibrillation  Target INR range: 2 - 3     No results for input(s): \"HGB\", \"HCT\", \"PLT\" in the last 72 hours.    Current warfarin drug-drug interactions: Ceftriaxone, Seroquel, Lansoprazole, Allopurinol, Atorvastatin      Date             INR        Dose   4/11/2025         1.9     5 mg      Will give 5 mg warfarin tonight and order daily PT/INR until stable and within therapeutic range.     Thank you for the consult. If you have any questions or concerns, please [place] pharmacy at [phone].     Orlando Mccrary, CarolineD    
Clinical Pharmacy Note    Warfarin consult follow-up.      INR (no units)   Date Value   05/14/2025 2.2   05/12/2025 2.4   05/09/2025 2.7   05/08/2025 2.5   05/07/2025 2.6   05/06/2025 3.0   05/05/2025 2.6       Hemoglobin (g/dL)   Date Value   05/10/2025 8.1 (L)   05/08/2025 7.8 (LL)   05/05/2025 7.7 (LL)     Hematocrit (%)   Date Value   05/10/2025 25.5 (L)   05/08/2025 24.5 (L)   05/05/2025 24.7 (L)     Platelets (k/uL)   Date Value   05/10/2025 208   05/03/2025 163   04/29/2025 227         Potential Warfarin Drug-Drug Interactions:  Current drug-drug interactions: Sertraline 25 mg daily (started 4/25); IV Ceftriaxone and IV Ampicillin, Acetaminophen PRN, Seroquel, Lansoprazole, Allopurinol, and Atorvastatin   Discontinued drug-drug interactions: none      Assessment/ Plan:                     Therapeutic INR of 2.2 this AM. Will give ordered dose of 2.5 mg warfarin tonight and continue current dosing of 5 mg on Sunday and Thursday and 2.5 mg all other days of the week (22.5 mg weekly) To minimize frequency of lab draws, INR checks have been adjusted to 3 times weekly on Mon/Wed/Fri per physician request. PT/INR order changed to 3 times weekly. Pharmacy will continue to monitor.     Thank you for the consult. If you have any questions, please call pharmacy at [phone].    
Clinical Pharmacy Note    Warfarin consult follow-up.    Date                      INR           Warfarin Dose Given  4/18/2025             2.9                    1.25 mg  4/17/2025             2.5                     5 mg  4/16/2025             2.3                     5 mg  4/15/2025             2.8                     2.5 mg  4/14/2025             2.9                     2 mg  4/13/2025             2.8                     0.5 mg  4/12/2025             2.2                     2.5 mg  4/11/2025             1.9                     5 mg      Hemoglobin (g/dL)   Date Value   04/15/2025 7.6 (LL)   04/14/2025 7.6 (LL)   04/12/2025 8.3 (L)     Hematocrit (%)   Date Value   04/15/2025 23.9 (L)   04/14/2025 23.8 (L)   04/12/2025 25.9 (L)     Platelets (k/uL)   Date Value   04/14/2025 200   04/12/2025 212   03/25/2025 276         Potential Warfarin Drug-Drug Interactions:  Current drug-drug interactions: IV Ceftriaxone and IV Ampicillin, Acetaminophen PRN, Seroquel, Lansoprazole, Allopurinol, and Atorvastatin   Discontinued drug-drug interactions: none      Plan:                     Since INR has jumped to 2.9 today, we will give only 1.25 mg warfarin tonight and continue daily PT/INR a ordered until stable and within therapeutic range.      Thank you for the consult. If you have any questions or concerns, please contact pharmacy at [phone].      Caroline EganD    
Clinical Pharmacy Note    Warfarin consult follow-up.    Date                      INR           Warfarin Dose Given  4/21/2025             2.4                    5 mg  4/20/2025             2.8                    2.5 mg  4/19/2025             3.1                    HELD  4/18/2025             2.9                    1.25 mg  4/17/2025             2.5                     5 mg  4/16/2025             2.3                     5 mg  4/15/2025             2.8                     2.5 mg  4/14/2025             2.9                     2 mg  4/13/2025             2.8                     0.5 mg  4/12/2025             2.2                     2.5 mg  4/11/2025             1.9                     5 mg      Hemoglobin (g/dL)   Date Value   04/19/2025 7.9 (LL)   04/18/2025 8.0 (LL)   04/15/2025 7.6 (LL)     Hematocrit (%)   Date Value   04/19/2025 25.2 (L)   04/18/2025 25.6 (L)   04/15/2025 23.9 (L)     Platelets (k/uL)   Date Value   04/19/2025 205   04/14/2025 200   04/12/2025 212         Potential Warfarin Drug-Drug Interactions:  Current drug-drug interactions: IV Ceftriaxone and IV Ampicillin, Acetaminophen PRN, Seroquel, Lansoprazole, Allopurinol, and Atorvastatin   Discontinued drug-drug interactions: none      Plan:                     Since INR has dropped from 2.8 to 2.4 today, we will give 5 mg warfarin tonight and continue daily PT/INR as ordered until stable and within therapeutic range.      Thank you for the consult. If you have any questions or concerns, please [place] pharmacy at [phone].      Orlando Mccrary, CarolineD    
Clinical Pharmacy Note    Warfarin consult follow-up.    Date                      INR           Warfarin Dose Given  4/23/2025             2.3                    2.5 mg  4/22/2025             2.2                    5 mg  4/21/2025             2.4                    5 mg  4/20/2025             2.8                    2.5 mg  4/19/2025             3.1                    HELD  4/18/2025             2.9                    1.25 mg  4/17/2025             2.5                     5 mg  4/16/2025             2.3                     5 mg  4/15/2025             2.8                     2.5 mg  4/14/2025             2.9                     2 mg  4/13/2025             2.8                     0.5 mg  4/12/2025             2.2                     2.5 mg  4/11/2025             1.9                     5 mg      Hemoglobin (g/dL)   Date Value   04/19/2025 7.9 (LL)   04/18/2025 8.0 (LL)   04/15/2025 7.6 (LL)     Hematocrit (%)   Date Value   04/19/2025 25.2 (L)   04/18/2025 25.6 (L)   04/15/2025 23.9 (L)     Platelets (k/uL)   Date Value   04/19/2025 205   04/14/2025 200   04/12/2025 212         Potential Warfarin Drug-Drug Interactions:  Current drug-drug interactions: IV Ceftriaxone and IV Ampicillin, Acetaminophen PRN, Seroquel, Lansoprazole, Allopurinol, and Atorvastatin   Discontinued drug-drug interactions: none      Warfarin dose prior to admission: 5 mg (5 mg x 1) every Sun, Tue, Thu; 2.5 mg (5 mg x 0.5) all other days     Plan:                     We will give home dose of 2.5 mg warfarin tonight and continue daily PT/INR as ordered until stable and within therapeutic range.      Thank you for the consult. If you have any questions or concerns, please contact pharmacy at [phone].      Caroline EganD    
Clinical Pharmacy Note    Warfarin consult follow-up.    Date                      INR           Warfarin Dose Given  4/25/2025             2.7                    2.5 mg  4/24/2025             2.6                    5 mg  4/23/2025             2.3                    2.5 mg  4/22/2025             2.2                    5 mg  4/21/2025             2.4                    5 mg  4/20/2025             2.8                    2.5 mg  4/19/2025             3.1                    HELD  4/18/2025             2.9                    1.25 mg  4/17/2025             2.5                     5 mg  4/16/2025             2.3                     5 mg  4/15/2025             2.8                     2.5 mg  4/14/2025             2.9                     2 mg  4/13/2025             2.8                     0.5 mg  4/12/2025             2.2                     2.5 mg  4/11/2025             1.9                     5 mg      Hemoglobin (g/dL)   Date Value   04/24/2025 8.4 (L)   04/19/2025 7.9 (LL)   04/18/2025 8.0 (LL)     Hematocrit (%)   Date Value   04/24/2025 26.3 (L)   04/19/2025 25.2 (L)   04/18/2025 25.6 (L)     Platelets (k/uL)   Date Value   04/19/2025 205   04/14/2025 200   04/12/2025 212         Potential Warfarin Drug-Drug Interactions:  Current drug-drug interactions: Sertraline 25 mg daily (started 4/25); IV Ceftriaxone and IV Ampicillin, Acetaminophen PRN, Seroquel, Lansoprazole, Allopurinol, and Atorvastatin   Discontinued drug-drug interactions: none      Plan:                     We will give home dose of 2.5 mg warfarin tonight and continue daily PT/INR as ordered until stable and within therapeutic range.      Thank you for the consult. If you have any questions or concerns, please contact pharmacy at [phone].      Caroline EganD    
Clinical Pharmacy Note    Warfarin consult follow-up.    Date                      INR           Warfarin Dose Given  4/30/2025             2.5                   3.75 mg  4/29/2025             2.6                   2.5 mg  4/28/2025             2.9                   2.5 mg  4/27/2025             3.1                   1 mg  4/26/2025             2.9                    2.5 mg  4/25/2025             2.7                    2.5 mg  4/24/2025             2.6                    5 mg  4/23/2025             2.3                    2.5 mg  4/22/2025             2.2                    5 mg  4/21/2025             2.4                    5 mg  4/20/2025             2.8                    2.5 mg  4/19/2025             3.1                    HELD  4/18/2025             2.9                    1.25 mg  4/17/2025             2.5                     5 mg  4/16/2025             2.3                     5 mg  4/15/2025             2.8                     2.5 mg  4/14/2025             2.9                     2 mg  4/13/2025             2.8                     0.5 mg  4/12/2025             2.2                     2.5 mg  4/11/2025             1.9                     5 mg      Hemoglobin (g/dL)   Date Value   04/29/2025 8.6 (L)   04/26/2025 7.8 (LL)   04/24/2025 8.4 (L)     Hematocrit (%)   Date Value   04/29/2025 27.2 (L)   04/26/2025 24.3 (L)   04/24/2025 26.3 (L)     Platelets (k/uL)   Date Value   04/29/2025 227   04/26/2025 236   04/19/2025 205         Potential Warfarin Drug-Drug Interactions:  Current drug-drug interactions: Sertraline 25 mg daily (started 4/25); IV Ceftriaxone and IV Ampicillin, Acetaminophen PRN, Seroquel, Lansoprazole, Allopurinol, and Atorvastatin   Discontinued drug-drug interactions: none      Plan:                     Will give 3.75 mg warfarin tonight and continue daily PT/INR as ordered until stable and therapeutic.    Thank you for the consult. If you have any questions, please call pharmacy at [phone].    Orlando 
Clinical Pharmacy Note    Warfarin consult follow-up.    Date                      INR           Warfarin Dose Given  5/3/2025               3.1                  1.25 mg  5/2/2025               2.7                   2.5 mg  5/1/2025               2.4                   5 mg  4/30/2025             2.5                   3.75 mg  4/29/2025             2.6                   2.5 mg  4/28/2025             2.9                   2.5 mg  4/27/2025             3.1                   1 mg  4/26/2025             2.9                    2.5 mg  4/25/2025             2.7                    2.5 mg  4/24/2025             2.6                    5 mg  4/23/2025             2.3                    2.5 mg  4/22/2025             2.2                    5 mg  4/21/2025             2.4                    5 mg  4/20/2025             2.8                    2.5 mg  4/19/2025             3.1                    HELD  4/18/2025             2.9                    1.25 mg  4/17/2025             2.5                     5 mg  4/16/2025             2.3                     5 mg  4/15/2025             2.8                     2.5 mg  4/14/2025             2.9                     2 mg  4/13/2025             2.8                     0.5 mg  4/12/2025             2.2                     2.5 mg  4/11/2025             1.9                     5 mg     Hemoglobin (g/dL)   Date Value   05/03/2025 7.6 (LL)   04/29/2025 8.6 (L)   04/26/2025 7.8 (LL)     Hematocrit (%)   Date Value   05/03/2025 24.3 (L)   04/29/2025 27.2 (L)   04/26/2025 24.3 (L)     Platelets (k/uL)   Date Value   05/03/2025 163   04/29/2025 227   04/26/2025 236       Potential Warfarin Drug-Drug Interactions:  Current drug-drug interactions: Sertraline 25 mg daily (started 4/25); IV Ceftriaxone and IV Ampicillin, Acetaminophen PRN, Seroquel, Lansoprazole, Allopurinol, and Atorvastatin   Discontinued drug-drug interactions: none      Plan:                     Will give 1.25 mg warfarin tonight and 
Clinical Pharmacy Note    Warfarin consult follow-up.    Date                      INR           Warfarin Dose Given  5/4/2025               2.8                   5 mg  5/3/2025               3.1                  1.25 mg  5/2/2025               2.7                   2.5 mg  5/1/2025               2.4                   5 mg  4/30/2025             2.5                   3.75 mg  4/29/2025             2.6                   2.5 mg  4/28/2025             2.9                   2.5 mg  4/27/2025             3.1                   1 mg  4/26/2025             2.9                    2.5 mg  4/25/2025             2.7                    2.5 mg  4/24/2025             2.6                    5 mg  4/23/2025             2.3                    2.5 mg  4/22/2025             2.2                    5 mg  4/21/2025             2.4                    5 mg  4/20/2025             2.8                    2.5 mg  4/19/2025             3.1                    HELD  4/18/2025             2.9                    1.25 mg  4/17/2025             2.5                     5 mg  4/16/2025             2.3                     5 mg  4/15/2025             2.8                     2.5 mg  4/14/2025             2.9                     2 mg  4/13/2025             2.8                     0.5 mg  4/12/2025             2.2                     2.5 mg  4/11/2025             1.9                     5 mg    Hemoglobin (g/dL)   Date Value   05/03/2025 7.6 (LL)   04/29/2025 8.6 (L)   04/26/2025 7.8 (LL)     Hematocrit (%)   Date Value   05/03/2025 24.3 (L)   04/29/2025 27.2 (L)   04/26/2025 24.3 (L)     Platelets (k/uL)   Date Value   05/03/2025 163   04/29/2025 227   04/26/2025 236         Potential Warfarin Drug-Drug Interactions:  Current drug-drug interactions: Sertraline 25 mg daily (started 4/25); IV Ceftriaxone and IV Ampicillin, Acetaminophen PRN, Seroquel, Lansoprazole, Allopurinol, and Atorvastatin   Discontinued drug-drug interactions: none      Plan:            
Clinical Pharmacy Note    Warfarin consult follow-up.    Date        INR           Warfarin Dose Given  4/16/2025             2.3                     5 mg  4/15/2025             2.8                     2.5 mg  4/14/2025             2.9                     2 mg  4/13/2025             2.8                     0.5 mg  4/12/2025             2.2                     2.5 mg  4/11/2025             1.9                     5 mg      Hemoglobin (g/dL)   Date Value   04/15/2025 7.6 (LL)   04/14/2025 7.6 (LL)   04/12/2025 8.3 (L)     Hematocrit (%)   Date Value   04/15/2025 23.9 (L)   04/14/2025 23.8 (L)   04/12/2025 25.9 (L)     Platelets (k/uL)   Date Value   04/14/2025 200   04/12/2025 212   03/25/2025 276         Potential Warfarin Drug-Drug Interactions:  Current drug-drug interactions: IV Ceftriaxone and IV Ampicillin, Acetaminophen PRN, Seroquel, Lansoprazole, Allopurinol, and Atorvastatin   Discontinued drug-drug interactions: none      Plan:                     Will give 5 mg warfarin tonight and order daily PT/INR until stable and within therapeutic range.      Thank you for the consult. If you have any questions or concerns, please [place] pharmacy at [phone].      Caroline EganD    
Clinical Pharmacy Note    Warfarin consult follow-up.    Date       INR              Warfarin Dose Given  5/7/2025               2.6                  3.75 mg  5/6/2025               3.0                  2.5 mg  5/5/2025               2.6                  2.5 mg  5/4/2025               2.8                   5 mg  5/3/2025               3.1                  1.25 mg  5/2/2025               2.7                   2.5 mg  5/1/2025               2.4                   5 mg  4/30/2025             2.5                   3.75 mg  4/29/2025             2.6                   2.5 mg  4/28/2025             2.9                   2.5 mg  4/27/2025             3.1                   1 mg  4/26/2025             2.9                    2.5 mg  4/25/2025             2.7                    2.5 mg  4/24/2025             2.6                    5 mg  4/23/2025             2.3                    2.5 mg  4/22/2025             2.2                    5 mg  4/21/2025             2.4                    5 mg  4/20/2025             2.8                    2.5 mg  4/19/2025             3.1                    HELD  4/18/2025             2.9                    1.25 mg  4/17/2025             2.5                     5 mg  4/16/2025             2.3                     5 mg  4/15/2025             2.8                     2.5 mg  4/14/2025             2.9                     2 mg  4/13/2025             2.8                     0.5 mg  4/12/2025             2.2                     2.5 mg  4/11/2025             1.9                     5 mg        Hemoglobin (g/dL)   Date Value   05/05/2025 7.7 (LL)   05/03/2025 7.6 (LL)   04/29/2025 8.6 (L)     Hematocrit (%)   Date Value   05/05/2025 24.7 (L)   05/03/2025 24.3 (L)   04/29/2025 27.2 (L)     Platelets (k/uL)   Date Value   05/03/2025 163   04/29/2025 227   04/26/2025 236         Potential Warfarin Drug-Drug Interactions:  Current drug-drug interactions: Sertraline 25 mg daily (started 4/25); IV Ceftriaxone and IV 
Clinical Pharmacy Note    Warfarin consult follow-up.    INR (no units)   Date Value   05/16/2025 2.0   05/14/2025 2.2   05/12/2025 2.4   05/09/2025 2.7   05/08/2025 2.5   05/07/2025 2.6   05/06/2025 3.0       Hemoglobin (g/dL)   Date Value   05/10/2025 8.1 (L)   05/08/2025 7.8 (LL)   05/05/2025 7.7 (LL)     Hematocrit (%)   Date Value   05/10/2025 25.5 (L)   05/08/2025 24.5 (L)   05/05/2025 24.7 (L)     Platelets (k/uL)   Date Value   05/10/2025 208   05/03/2025 163   04/29/2025 227         Potential Warfarin Drug-Drug Interactions:  Current drug-drug interactions: Sertraline 25 mg daily (started 4/25); IV Ceftriaxone and IV Ampicillin, Acetaminophen PRN, Seroquel, Lansoprazole, Allopurinol, and Atorvastatin   Discontinued drug-drug interactions: none      Plan:                     INR has dropped to 2.0 today. Will give dose of 3.75 mg warfarin tonight and then adjust weekly warfarin dosing back to previously stable dosage of 5 mg on Sunday, Tuesday, Thursday and 2.5 mg all other days of the week. To minimize frequency of lab draws, INR checks have been adjusted to 3 times weekly on Mon/Wed/Fri per physician request. Pharmacy will continue to monitor.     Thank you for the consult. If you have any questions, please call pharmacy at [phone].    Orlando Mccrary, CarolineD      
Podiatry Consult        Reason for Consult:  DM foot care  Requesting Physician:  Back    CHIEF COMPLAINT:  Nails and corn need care    HISTORY OF PRESENT ILLNESS:      The patient is a 80 y.o. male with significant past medical history of Recent UTI with back pain and lengthy hospital course who presents with request for DM foot care- well known from office, last visit 1/21/25    Past Medical History:        Diagnosis Date    Cerebral artery occlusion with cerebral infarction (HCC)     Hypertension     Kidney stone      Past Surgical History:        Procedure Laterality Date    CATARACT REMOVAL Left     INSERTABLE CARDIAC MONITOR N/A 5/12/2021    LOOP RECORDER INSERT performed by Andrea Galan MD at [facility] OR    KNEE ARTHROSCOPY Right     LITHOTRIPSY      PACEMAKER INSERTION Left 6/29/2022    PACEMAKER INSERTION PERMANENT performed by Andrea Galan MD at [facility] OR     Current Medications:    Current Facility-Administered Medications: sodium chloride flush 0.9 % injection 5-40 mL, 5-40 mL, IntraVENous, 2 times per day  sodium chloride flush 0.9 % injection 5-40 mL, 5-40 mL, IntraVENous, PRN  0.9 % sodium chloride infusion, , IntraVENous, PRN  lidocaine PF 1 % injection 50 mg, 50 mg, IntraDERmal, Once  sodium chloride flush 0.9 % injection 5-40 mL, 5-40 mL, IntraVENous, 2 times per day  sodium chloride flush 0.9 % injection 5-40 mL, 5-40 mL, IntraVENous, PRN  0.9 % sodium chloride infusion, , IntraVENous, PRN  lidocaine PF 1 % injection 50 mg, 50 mg, IntraDERmal, Once  HYDROcodone-acetaminophen (NORCO) 5-325 MG per tablet 1 tablet, 1 tablet, Oral, Q6H PRN  sterile water injection 2.2 mL, 2.2 mL, Injection, Once  ALTEplase (CATHFLO) 2 mg in sterile water 2 mL injection, 2 mg, IntraCATHeter, Once  melatonin tablet 5 mg, 5 mg, Oral, Nightly PRN  lidocaine 4 % external patch 1 patch, 1 patch, TransDERmal, Daily  acetaminophen (TYLENOL) tablet 650 mg, 650 mg, Oral, Q4H PRN  allopurinol (ZYLOPRIM) tablet 300 mg, 300 mg, 
Drug-Drug Interactions:  Current drug-drug interactions: Sertraline 25 mg daily (started 4/25); IV Ceftriaxone and IV Ampicillin, Acetaminophen PRN, Seroquel, Lansoprazole, Allopurinol, and Atorvastatin   Discontinued drug-drug interactions: none      Plan:                     Will give home dose of 2.5 mg warfarin tonight. To minimize frequency of lab draws, we will adjust INR checks to 3 times weekly on Mon/Wed/Fri per physician request. We will order weekly dose of 5 mg on Sunday and Thursday and 2.5 mg all other days of the week (22.5 mg weekly) and adjust accordingly, if indicated. PT/INR order changed to 3 times weekly. Pharmacy will continue to monitor.     Thank you for the consult. If you have any questions, please call pharmacy at [phone].    Orlando Mccrary, PharmD

## 2025-05-16 NOTE — DISCHARGE INSTR - COC
Continuity of Care Form    Patient Name: Quique Wray   :  1945  MRN:  [id number]    Admit date:  2025  Discharge date:  25    Code Status Order: Full Code   Advance Directives:     Admitting Physician:  Rob Bowens MD  PCP: Vernon Cade MD    Discharging Nurse: GWEN Tilley  Discharging Hospital Unit/Room#: 0268/0268-01  Discharging Unit Phone Number: [phone]    Emergency Contact:   Extended Emergency Contact Information  Primary Emergency Contact: Sherie Wray  Address: [address] [place] [address] [facility]  Home Phone: [phone]  Mobile Phone: [phone]  Relation: Spouse  Secondary Emergency Contact: Elizabeth Kapadia  Home Phone: [phone]  Mobile Phone: [phone]  Relation: Child    Past Surgical History:  Past Surgical History:   Procedure Laterality Date    CATARACT REMOVAL Left     INSERTABLE CARDIAC MONITOR N/A 2021    LOOP RECORDER INSERT performed by Andrea Galan MD at [facility] OR    KNEE ARTHROSCOPY Right     LITHOTRIPSY      PACEMAKER INSERTION Left 2022    PACEMAKER INSERTION PERMANENT performed by Andrea Galan MD at [facility] OR       Immunization History:   Immunization History   Administered Date(s) Administered    COVID-19, MODERNA BLUE border, Primary or Immunocompromised, (age 12y+), IM, 100 mcg/0.5mL 2021, 2021, 2021       Active Problems:  Patient Active Problem List   Diagnosis Code    Stroke aborted by administration of thrombolytic agent ([facility]) I63.9    Acute ischemic stroke ([facility]) I63.9    Bilateral carotid artery stenosis I65.23    Received tissue plasminogen activator (t-PA) less than 24 hours prior to arrival Z92.82    Left hand weakness R29.898    Stroke of unknown cause ([facility]) I63.9    Essential hypertension I10    Left against medical advice Z53.29    Atrial fibrillation ([facility]) I48.91    SSS (sick sinus syndrome) ([facility]) I49.5    Weakness R53.1    Moderate malnutrition E44.0

## 2025-05-17 LAB
ANION GAP SERPL CALCULATED.3IONS-SCNC: 8 MMOL/L (ref 9–17)
BASOPHILS # BLD: 0.03 K/UL (ref 0–0.2)
BASOPHILS NFR BLD: 1 % (ref 0–2)
BUN SERPL-MCNC: 10 MG/DL (ref 8–23)
CALCIUM SERPL-MCNC: 8.9 MG/DL (ref 8.6–10.4)
CHLORIDE SERPL-SCNC: 102 MMOL/L (ref 98–107)
CO2 SERPL-SCNC: 29 MMOL/L (ref 20–31)
CREAT SERPL-MCNC: 0.8 MG/DL (ref 0.7–1.2)
CRP SERPL HS-MCNC: 4.1 MG/L (ref 0–5)
EOSINOPHIL # BLD: 0.2 K/UL (ref 0–0.4)
EOSINOPHILS RELATIVE PERCENT: 5 % (ref 0–5)
ERYTHROCYTE [DISTWIDTH] IN BLOOD BY AUTOMATED COUNT: 23.7 % (ref 12.1–15.2)
ERYTHROCYTE [SEDIMENTATION RATE] IN BLOOD BY PHOTOMETRIC METHOD: 51 MM/HR (ref 0–20)
GFR, ESTIMATED: >90 ML/MIN/1.73M2
GLUCOSE SERPL-MCNC: 116 MG/DL (ref 70–99)
HCT VFR BLD AUTO: 25.8 % (ref 41–53)
HGB BLD-MCNC: 8.2 G/DL (ref 13.5–17.5)
IMM GRANULOCYTES # BLD AUTO: 0.01 K/UL (ref 0–0.3)
IMM GRANULOCYTES NFR BLD: 0 % (ref 0–5)
LYMPHOCYTES NFR BLD: 1.09 K/UL (ref 1–4.8)
LYMPHOCYTES RELATIVE PERCENT: 25 % (ref 13–44)
MCH RBC QN AUTO: 20.4 PG (ref 26–34)
MCHC RBC AUTO-ENTMCNC: 31.8 G/DL (ref 31–37)
MCV RBC AUTO: 64.3 FL (ref 80–100)
MONOCYTES NFR BLD: 0.31 K/UL (ref 0–1)
MONOCYTES NFR BLD: 7 % (ref 5–9)
MORPHOLOGY: ABNORMAL
NEUTROPHILS NFR BLD: 63 % (ref 39–75)
NEUTS SEG NFR BLD: 2.77 K/UL (ref 2.1–6.5)
PLATELET # BLD AUTO: 147 K/UL (ref 140–450)
PMV BLD AUTO: ABNORMAL FL (ref 6–12)
POTASSIUM SERPL-SCNC: 3.9 MMOL/L (ref 3.7–5.3)
RBC # BLD AUTO: 4.01 M/UL (ref 4.5–5.9)
SODIUM SERPL-SCNC: 139 MMOL/L (ref 135–144)
WBC OTHER # BLD: 4.4 K/UL (ref 3.5–11)

## 2025-05-17 PROCEDURE — 2500000003 HC RX 250 WO HCPCS: Performed by: INTERNAL MEDICINE

## 2025-05-17 PROCEDURE — 80048 BASIC METABOLIC PNL TOTAL CA: CPT

## 2025-05-17 PROCEDURE — 97116 GAIT TRAINING THERAPY: CPT

## 2025-05-17 PROCEDURE — 6370000000 HC RX 637 (ALT 250 FOR IP): Performed by: INTERNAL MEDICINE

## 2025-05-17 PROCEDURE — 1200000002 HC SEMI PRIVATE SWING BED

## 2025-05-17 PROCEDURE — 85652 RBC SED RATE AUTOMATED: CPT

## 2025-05-17 PROCEDURE — 97110 THERAPEUTIC EXERCISES: CPT

## 2025-05-17 PROCEDURE — 2580000003 HC RX 258: Performed by: INTERNAL MEDICINE

## 2025-05-17 PROCEDURE — 86140 C-REACTIVE PROTEIN: CPT

## 2025-05-17 PROCEDURE — 36415 COLL VENOUS BLD VENIPUNCTURE: CPT

## 2025-05-17 PROCEDURE — 94761 N-INVAS EAR/PLS OXIMETRY MLT: CPT

## 2025-05-17 PROCEDURE — 6360000002 HC RX W HCPCS: Performed by: INTERNAL MEDICINE

## 2025-05-17 PROCEDURE — 85025 COMPLETE CBC W/AUTO DIFF WBC: CPT

## 2025-05-17 PROCEDURE — 94640 AIRWAY INHALATION TREATMENT: CPT

## 2025-05-17 RX ADMIN — SENNOSIDES AND DOCUSATE SODIUM 1 TABLET: 50; 8.6 TABLET ORAL at 09:42

## 2025-05-17 RX ADMIN — ALLOPURINOL 300 MG: 100 TABLET ORAL at 12:53

## 2025-05-17 RX ADMIN — Medication 1 CAPSULE: at 09:42

## 2025-05-17 RX ADMIN — METOPROLOL TARTRATE 25 MG: 25 TABLET, FILM COATED ORAL at 20:34

## 2025-05-17 RX ADMIN — Medication 1 TABLET: at 12:57

## 2025-05-17 RX ADMIN — SODIUM CHLORIDE, PRESERVATIVE FREE 10 ML: 5 INJECTION INTRAVENOUS at 20:33

## 2025-05-17 RX ADMIN — SERTRALINE HYDROCHLORIDE 25 MG: 50 TABLET ORAL at 09:49

## 2025-05-17 RX ADMIN — AMPICILLIN SODIUM 2000 MG: 2 INJECTION, POWDER, FOR SOLUTION INTRAMUSCULAR; INTRAVENOUS at 00:45

## 2025-05-17 RX ADMIN — CHOLECALCIFEROL TAB 25 MCG (1000 UNIT) 5000 UNITS: 25 TAB at 12:53

## 2025-05-17 RX ADMIN — AMPICILLIN SODIUM 2000 MG: 2 INJECTION, POWDER, FOR SOLUTION INTRAMUSCULAR; INTRAVENOUS at 18:32

## 2025-05-17 RX ADMIN — Medication 1 CAPSULE: at 17:29

## 2025-05-17 RX ADMIN — LANSOPRAZOLE 30 MG: 30 TABLET, ORALLY DISINTEGRATING, DELAYED RELEASE ORAL at 06:39

## 2025-05-17 RX ADMIN — TAMSULOSIN HYDROCHLORIDE 0.4 MG: 0.4 CAPSULE ORAL at 09:42

## 2025-05-17 RX ADMIN — WATER 2000 MG: 1 INJECTION INTRAMUSCULAR; INTRAVENOUS; SUBCUTANEOUS at 09:52

## 2025-05-17 RX ADMIN — QUETIAPINE 25 MG: 25 TABLET ORAL at 20:34

## 2025-05-17 RX ADMIN — HYDROCODONE BITARTRATE AND ACETAMINOPHEN 1 TABLET: 5; 325 TABLET ORAL at 09:48

## 2025-05-17 RX ADMIN — SENNOSIDES AND DOCUSATE SODIUM 1 TABLET: 50; 8.6 TABLET ORAL at 20:34

## 2025-05-17 RX ADMIN — Medication 1 TABLET: at 12:53

## 2025-05-17 RX ADMIN — SODIUM CHLORIDE, PRESERVATIVE FREE 10 ML: 5 INJECTION INTRAVENOUS at 09:51

## 2025-05-17 RX ADMIN — Medication 5 MG: at 20:34

## 2025-05-17 RX ADMIN — WARFARIN SODIUM 2.5 MG: 2.5 TABLET ORAL at 17:29

## 2025-05-17 RX ADMIN — AMPICILLIN SODIUM 2000 MG: 2 INJECTION, POWDER, FOR SOLUTION INTRAMUSCULAR; INTRAVENOUS at 06:39

## 2025-05-17 RX ADMIN — AMPICILLIN SODIUM 2000 MG: 2 INJECTION, POWDER, FOR SOLUTION INTRAMUSCULAR; INTRAVENOUS at 13:13

## 2025-05-17 RX ADMIN — ATORVASTATIN CALCIUM 20 MG: 20 TABLET, FILM COATED ORAL at 20:34

## 2025-05-17 RX ADMIN — WATER 2000 MG: 1 INJECTION INTRAMUSCULAR; INTRAVENOUS; SUBCUTANEOUS at 20:33

## 2025-05-17 ASSESSMENT — PAIN DESCRIPTION - DESCRIPTORS: DESCRIPTORS: ACHING

## 2025-05-17 ASSESSMENT — PAIN SCALES - GENERAL
PAINLEVEL_OUTOF10: 3
PAINLEVEL_OUTOF10: 0
PAINLEVEL_OUTOF10: 0
PAINLEVEL_OUTOF10: 4
PAINLEVEL_OUTOF10: 2
PAINLEVEL_OUTOF10: 0

## 2025-05-17 ASSESSMENT — PAIN - FUNCTIONAL ASSESSMENT: PAIN_FUNCTIONAL_ASSESSMENT: PREVENTS OR INTERFERES SOME ACTIVE ACTIVITIES AND ADLS

## 2025-05-17 NOTE — FLOWSHEET NOTE
05/17/25 0943   Treatment Team Notification   Reason for Communication Medication concern   Name of Team Member Notified Dr. Ware   Treatment Team Role Attending Provider   Method of Communication Secure Message     Notified of present vitals. Ok to hold metoprolol dose for BP.

## 2025-05-18 PROCEDURE — 97110 THERAPEUTIC EXERCISES: CPT

## 2025-05-18 PROCEDURE — 6370000000 HC RX 637 (ALT 250 FOR IP): Performed by: INTERNAL MEDICINE

## 2025-05-18 PROCEDURE — 94640 AIRWAY INHALATION TREATMENT: CPT

## 2025-05-18 PROCEDURE — 2580000003 HC RX 258: Performed by: INTERNAL MEDICINE

## 2025-05-18 PROCEDURE — 2500000003 HC RX 250 WO HCPCS: Performed by: INTERNAL MEDICINE

## 2025-05-18 PROCEDURE — 6360000002 HC RX W HCPCS: Performed by: INTERNAL MEDICINE

## 2025-05-18 PROCEDURE — 97116 GAIT TRAINING THERAPY: CPT

## 2025-05-18 PROCEDURE — 1200000002 HC SEMI PRIVATE SWING BED

## 2025-05-18 PROCEDURE — 94761 N-INVAS EAR/PLS OXIMETRY MLT: CPT

## 2025-05-18 RX ADMIN — WATER 2000 MG: 1 INJECTION INTRAMUSCULAR; INTRAVENOUS; SUBCUTANEOUS at 09:28

## 2025-05-18 RX ADMIN — SODIUM CHLORIDE, PRESERVATIVE FREE 10 ML: 5 INJECTION INTRAVENOUS at 20:10

## 2025-05-18 RX ADMIN — AMPICILLIN SODIUM 2000 MG: 2 INJECTION, POWDER, FOR SOLUTION INTRAMUSCULAR; INTRAVENOUS at 18:34

## 2025-05-18 RX ADMIN — LANSOPRAZOLE 30 MG: 30 TABLET, ORALLY DISINTEGRATING, DELAYED RELEASE ORAL at 06:46

## 2025-05-18 RX ADMIN — METOPROLOL TARTRATE 25 MG: 25 TABLET, FILM COATED ORAL at 20:06

## 2025-05-18 RX ADMIN — SENNOSIDES AND DOCUSATE SODIUM 1 TABLET: 50; 8.6 TABLET ORAL at 20:06

## 2025-05-18 RX ADMIN — WARFARIN SODIUM 5 MG: 5 TABLET ORAL at 18:28

## 2025-05-18 RX ADMIN — Medication 1 TABLET: at 12:03

## 2025-05-18 RX ADMIN — QUETIAPINE 25 MG: 25 TABLET ORAL at 20:06

## 2025-05-18 RX ADMIN — AMPICILLIN SODIUM 2000 MG: 2 INJECTION, POWDER, FOR SOLUTION INTRAMUSCULAR; INTRAVENOUS at 01:22

## 2025-05-18 RX ADMIN — Medication 1 CAPSULE: at 16:42

## 2025-05-18 RX ADMIN — SENNOSIDES AND DOCUSATE SODIUM 1 TABLET: 50; 8.6 TABLET ORAL at 07:59

## 2025-05-18 RX ADMIN — ALLOPURINOL 300 MG: 100 TABLET ORAL at 12:03

## 2025-05-18 RX ADMIN — ATORVASTATIN CALCIUM 20 MG: 20 TABLET, FILM COATED ORAL at 20:05

## 2025-05-18 RX ADMIN — ACETAMINOPHEN 650 MG: 325 TABLET, FILM COATED ORAL at 12:07

## 2025-05-18 RX ADMIN — SODIUM CHLORIDE, PRESERVATIVE FREE 10 ML: 5 INJECTION INTRAVENOUS at 07:59

## 2025-05-18 RX ADMIN — CHOLECALCIFEROL TAB 25 MCG (1000 UNIT) 5000 UNITS: 25 TAB at 12:03

## 2025-05-18 RX ADMIN — SERTRALINE HYDROCHLORIDE 25 MG: 50 TABLET ORAL at 07:59

## 2025-05-18 RX ADMIN — Medication 5 MG: at 20:10

## 2025-05-18 RX ADMIN — Medication 1 CAPSULE: at 07:58

## 2025-05-18 RX ADMIN — WATER 2000 MG: 1 INJECTION INTRAMUSCULAR; INTRAVENOUS; SUBCUTANEOUS at 20:05

## 2025-05-18 RX ADMIN — METOPROLOL TARTRATE 25 MG: 25 TABLET, FILM COATED ORAL at 07:59

## 2025-05-18 RX ADMIN — AMPICILLIN SODIUM 2000 MG: 2 INJECTION, POWDER, FOR SOLUTION INTRAMUSCULAR; INTRAVENOUS at 13:35

## 2025-05-18 RX ADMIN — ACETAMINOPHEN 650 MG: 325 TABLET, FILM COATED ORAL at 07:59

## 2025-05-18 RX ADMIN — AMPICILLIN SODIUM 2000 MG: 2 INJECTION, POWDER, FOR SOLUTION INTRAMUSCULAR; INTRAVENOUS at 06:46

## 2025-05-18 RX ADMIN — TAMSULOSIN HYDROCHLORIDE 0.4 MG: 0.4 CAPSULE ORAL at 07:59

## 2025-05-18 ASSESSMENT — PAIN DESCRIPTION - LOCATION
LOCATION: BACK
LOCATION: BACK

## 2025-05-18 ASSESSMENT — PAIN SCALES - GENERAL
PAINLEVEL_OUTOF10: 3
PAINLEVEL_OUTOF10: 0
PAINLEVEL_OUTOF10: 1

## 2025-05-18 ASSESSMENT — PAIN DESCRIPTION - DESCRIPTORS
DESCRIPTORS: ACHING
DESCRIPTORS: ACHING

## 2025-05-18 ASSESSMENT — PAIN DESCRIPTION - ORIENTATION: ORIENTATION: RIGHT

## 2025-05-18 NOTE — PLAN OF CARE
[facility]  Phone: [phone]    Swingbed Occupational Therapy Plan of Care  OT Orders Received and Evaluation Complete    Date: 2025  Patient Name: Quique Wray        MRN: [id number]    : 1945  (80 y.o.)  Referring Practitioner: Dr. Bowens  Diagnosis: Weakness  Treatment Diagnosis: Weakness    Identified Problem Areas:     Performance deficits / Impairments: Decreased functional mobility , Decreased ADL status, Decreased ROM, Decreased strength, Decreased safe awareness, Decreased cognition, Decreased endurance, Decreased balance, Decreased vision/visual deficit, Decreased high-level IADLs, Decreased posture     Justification for Skilled Services:     [x] Complete Daily Tasks Safely  [x] Improve Balance   [x] Return to Prior Level of Function  [x] Family/Caregiver Education    [x] Improve UE strength  [x] Patient Education: [x] Adaptive Equipment   [x] Home Exercise Program and Progression    Treatment Plan:     Times Per Week: 2-5x.  Discharge recommendation: SNF    [] Modalities:  [x] Therapeutic Exercise   [x] Therapeutic Activity  [] Splinting:     [] Home Safety Evaluation         [x] ADL Retraining                       [] Muscle Re-education [] Cognitive Retraining            [] Sensory Integration  [x] Patient Education [x] Home Exercise Program [x] Fine Motor Coordination    Goals:     Short term goals:  Time Frame for Short Term Goals: 11 days (2025)  Short Term Goal 1: Patient will complete a BSC transfer while using DME PRN with MIN A.  Short Term Goal 2: Patient will complete upper body dressing and/or bathing while using adaptive equipment/techniques PRN with SBA.  Short Term Goal 3: Patient will complete lower body dressing and/or bathing while using adaptive equipment/techniques PRN with MIN A.  Short Term Goal 4: To increase UE strength for ADLs and functional transfers, patient will engage in 10 minutes of BUE ther ex with no more than 3 cues for the correct 
       [facility]  Phone: [phone]    Swingbed Occupational Therapy Plan of Care  OT Orders Received and Evaluation Complete    Date: 2025  Patient Name: Quique Wray        MRN: [id number]    : 1945  (80 y.o.)  Referring Practitioner: Dr. Bowens  Diagnosis: Weakness  Treatment Diagnosis: Weakness    Identified Problem Areas:     Performance deficits / Impairments: Decreased functional mobility , Decreased ADL status, Decreased ROM, Decreased strength, Decreased safe awareness, Decreased cognition, Decreased endurance, Decreased balance, Decreased vision/visual deficit, Decreased high-level IADLs, Decreased posture     Justification for Skilled Services:     [x] Complete Daily Tasks Safely  [x] Improve Balance   [x] Return to Prior Level of Function  [x] Family/Caregiver Education    [x] Improve UE strength  [x] Patient Education: [x] Adaptive Equipment   [x] Home Exercise Program and Progression    Treatment Plan:     Times Per Week: 2-5x.  Discharge recommendation: Home with home health services    [] Modalities:  [x] Therapeutic Exercise   [x] Therapeutic Activity  [] Splinting:     [] Home Safety Evaluation         [x] ADL Retraining                       [] Muscle Re-education [] Cognitive Retraining            [] Sensory Integration  [x] Patient Education [x] Home Exercise Program [x] Fine Motor Coordination    Goals:     Short term goals:  Time Frame for Short Term Goals: 6 days (2025)  Short Term Goal 1: Patient will complete a commode transfer while using DME PRN with CGA.  Short Term Goal 2: Patient will complete lower body dressing and/or bathing while using adaptive equipment/techniques PRN with MIN A.  Short Term Goal 3: To increase UE strength for ADLs and functional transfers, patient will engage in 15 minutes of BUE ther ex without a rest break.  Short Term Goal 4: To increase independence and safety with IADLs, patient will tolerate static/dynamic standing x5 minutes 
       [facility]  Phone: [phone]    Swingbed Occupational Therapy Plan of Care  OT Orders Received and Evaluation Complete    Date: 2025  Patient Name: Quique Wray        MRN: [id number]    : 1945  (80 y.o.)  Referring Practitioner: Dr. Bowens  Diagnosis: Weakness  Treatment Diagnosis: Weakness    Identified Problem Areas:     Performance deficits / Impairments: Decreased functional mobility , Decreased ADL status, Decreased ROM, Decreased strength, Decreased safe awareness, Decreased cognition, Decreased endurance, Decreased balance, Decreased vision/visual deficit, Decreased high-level IADLs, Decreased posture     Justification for Skilled Services:     [x] Complete Daily Tasks Safely  [x] Improve Balance   [x] Return to Prior Level of Function  [x] Family/Caregiver Education    [x] Improve UE strength  [x] Patient Education: [x] Adaptive Equipment   [x] Home Exercise Program and Progression    Treatment Plan:     Times Per Week: 2-8x.  Discharge recommendation: SNF    [] Modalities:  [x] Therapeutic Exercise   [x] Therapeutic Activity  [] Splinting:     [] Home Safety Evaluation         [x] ADL Retraining                       [] Muscle Re-education [] Cognitive Retraining            [] Sensory Integration  [x] Patient Education [x] Home Exercise Program [x] Fine Motor Coordination    Goals:     Short term goals:  Time Frame for Short Term Goals: 7 days (2025)  Short Term Goal 1: Patient will complete a BSC transfer while using DME PRN with MIN A.  Short Term Goal 2: Patient will complete upper body dressing and/or bathing while using adaptive equipment/techniques PRN with SBA.  Short Term Goal 3: Patient will complete lower body dressing and/or bathing while using adaptive equipment/techniques PRN with MIN A.  Short Term Goal 4: To increase UE strength for ADLs and functional transfers, patient will engage in 10 minutes of BUE ther ex with no more than 3 cues for the correct 
       [facility]  Phone: [phone]    Swingbed Occupational Therapy Plan of Care  OT Orders Received and Evaluation Complete    Date: 2025  Patient Name: Quique Wray        MRN: [id number]    : 1945  (80 y.o.)  Referring Practitioner: Dr. Bowens  Diagnosis: Weakness  Treatment Diagnosis: Weakness    Identified Problem Areas:     Performance deficits / Impairments: Decreased functional mobility , Decreased ADL status, Decreased ROM, Decreased strength, Decreased safe awareness, Decreased cognition, Decreased endurance, Decreased balance, Decreased vision/visual deficit, Decreased high-level IADLs, Decreased posture     Justification for Skilled Services:     [x] Complete Daily Tasks Safely  [x] Improve Balance   [x] Return to Prior Level of Function  [x] Family/Caregiver Education    [x] Improve UE strength  [x] Patient Education: [x] Adaptive Equipment   [x] Home Exercise Program and Progression    Treatment Plan:     Times Per Week: 2-5x.  Discharge recommendation: Home with home health services     [] Modalities:  [x] Therapeutic Exercise   [x] Therapeutic Activity  [] Splinting:     [] Home Safety Evaluation         [x] ADL Retraining                       [] Muscle Re-education [] Cognitive Retraining            [] Sensory Integration  [x] Patient Education [x] Home Exercise Program [x] Fine Motor Coordination    Goals:     Short term goals:  Time Frame for Short Term Goals: 7 days (2025)  Short Term Goal 1: Patient will complete a BSC transfer while using DME PRN with MIN A.  Short Term Goal 2: Patient will complete upper body dressing and/or bathing while using adaptive equipment/techniques PRN with SBA.  Short Term Goal 3: Patient will complete lower body dressing and/or bathing while using adaptive equipment/techniques PRN with MIN A.  Short Term Goal 4: To increase UE strength for ADLs and functional transfers, patient will engage in 10 minutes of BUE ther ex with no more than 3 
       [facility]  Phone: [phone]    Swingbed Occupational Therapy Plan of Care  OT Orders Received and Evaluation Complete    Date: 2025  Patient Name: Quique Wray        MRN: [id number]    : 1945  (80 y.o.)  Referring Practitioner: Dr. Bowens  Diagnosis: Weakness  Treatment Diagnosis: Weakness    Identified Problem Areas:     Performance deficits / Impairments: Decreased functional mobility , Decreased ADL status, Decreased ROM, Decreased strength, Decreased safe awareness, Decreased cognition, Decreased endurance, Decreased balance, Decreased vision/visual deficit, Decreased high-level IADLs, Decreased posture     Justification for Skilled Services:     [x] Complete Daily Tasks Safely  [x] Improve Balance   [x] Return to Prior Level of Function  [x] Family/Caregiver Education    [x] Improve UE strength  [x] Patient Education: [x] Adaptive Equipment   [x] Home Exercise Program and Progression    Treatment Plan:     Times Per Week: 2-5x.  Discharge recommendation: Home with home health services     [] Modalities:  [x] Therapeutic Exercise   [x] Therapeutic Activity  [] Splinting:     [] Home Safety Evaluation         [x] ADL Retraining                       [] Muscle Re-education [] Cognitive Retraining            [] Sensory Integration  [x] Patient Education [x] Home Exercise Program [x] Fine Motor Coordination    Goals:     Short term goals:  Time Frame for Short Term Goals: 7 days (2025)  Short Term Goal 1: Patient will complete a BSC transfer while using DME PRN with MIN A.  Short Term Goal 2: Patient will complete upper body dressing and/or bathing while using adaptive equipment/techniques PRN with SBA - MET (SUP./SETUP)  Short Term Goal 3: Patient will complete lower body dressing and/or bathing while using adaptive equipment/techniques PRN with MIN A.  Short Term Goal 4: To increase UE strength for ADLs and functional transfers, patient will engage in 10 minutes of BUE ther ex 
       [facility]  Phone: [phone]    Swingbed Occupational Therapy Plan of Care  OT Orders Received and Evaluation Complete    Date: 2025  Patient Name: Quique Wray        MRN: [id number]    : 1945  (80 y.o.)  Referring Practitioner: Dr. Bowens  Diagnosis: Weakness  Treatment Diagnosis: Weakness    Identified Problem Areas:     Performance deficits / Impairments: Decreased functional mobility , Decreased ADL status, Decreased ROM, Decreased strength, Decreased safe awareness, Decreased cognition, Decreased endurance, Decreased balance, Decreased vision/visual deficit, Decreased high-level IADLs, Decreased posture     Justification for Skilled Services:     [x] Complete Daily Tasks Safely  [x] Improve Balance   [x] Return to Prior Level of Function  [x] Family/Caregiver Education    [x] Improve UE strength  [x] Patient Education: [x] Adaptive Equipment   [x] Home Exercise Program and Progression    Treatment Plan:     Times Per Week: 2-5x.  Discharge recommendation: Home with home health services     [] Modalities:  [x] Therapeutic Exercise   [x] Therapeutic Activity  [] Splinting:     [] Home Safety Evaluation         [x] ADL Retraining                       [] Muscle Re-education [] Cognitive Retraining            [] Sensory Integration  [x] Patient Education [x] Home Exercise Program [x] Fine Motor Coordination    Goals:     Short term goals:  Time Frame for Short Term Goals: 7 days (2025)  Short Term Goal 1: Patient will complete a BSC transfer while using DME PRN with MIN A.  Short Term Goal 2: Patient will complete upper body dressing and/or bathing while using adaptive equipment/techniques PRN with SBA.  Short Term Goal 3: Patient will complete lower body dressing and/or bathing while using adaptive equipment/techniques PRN with MIN A.  Short Term Goal 4: To increase UE strength for ADLs and functional transfers, patient will engage in 10 minutes of BUE ther ex with no more than 
  Problem: Chronic Conditions and Co-morbidities  Goal: Patient's chronic conditions and co-morbidity symptoms are monitored and maintained or improved  4/12/2025 2132 by Julieth Stone RN  Outcome: Progressing     Problem: Discharge Planning  Goal: Discharge to home or other facility with appropriate resources  4/12/2025 2132 by Julieth Stone RN  Outcome: Progressing     Problem: Pain  Goal: Verbalizes/displays adequate comfort level or baseline comfort level  4/12/2025 2132 by Julieth Stone RN  Outcome: Progressing     Problem: Skin/Tissue Integrity  Goal: Skin integrity remains intact  Description: 1.  Monitor for areas of redness and/or skin breakdown  2.  Assess vascular access sites hourly  3.  Every 4-6 hours minimum:  Change oxygen saturation probe site  4.  Every 4-6 hours:  If on nasal continuous positive airway pressure, respiratory therapy assess nares and determine need for appliance change or resting period  4/12/2025 2132 by Julieth Stone RN  Outcome: Progressing     Problem: ABCDS Injury Assessment  Goal: Absence of physical injury  4/12/2025 2132 by Julieth Stone RN  Outcome: Progressing     Problem: Confusion  Goal: Confusion, delirium, dementia, or psychosis is improved or at baseline  Description: INTERVENTIONS:  1. Assess for possible contributors to thought disturbance, including medications, impaired vision or hearing, underlying metabolic abnormalities, dehydration, psychiatric diagnoses, and notify attending LIP  2. [place] high risk fall precautions, as indicated  3. Provide frequent short contacts to provide reality reorientation, refocusing and direction  4. Decrease environmental stimuli, including noise as appropriate  5. Monitor and intervene to maintain adequate nutrition, hydration, elimination, sleep and activity  6. If unable to ensure safety without constant attention obtain sitter and review sitter guidelines with assigned personnel  7. Initiate Psychosocial 
  Problem: Chronic Conditions and Co-morbidities  Goal: Patient's chronic conditions and co-morbidity symptoms are monitored and maintained or improved  4/29/2025 2307 by Tk Jay RN  Outcome: Progressing     Problem: Discharge Planning  Goal: Discharge to home or other facility with appropriate resources  4/30/2025 0756 by Eden Lucas RN  Outcome: Progressing  4/29/2025 2307 by Tk Jay RN  Outcome: Progressing     Problem: Pain  Goal: Verbalizes/displays adequate comfort level or baseline comfort level  4/30/2025 0756 by Eden Lucas RN  Outcome: Progressing  4/29/2025 2307 by Tk Jay RN  Outcome: Progressing     Problem: Skin/Tissue Integrity  Goal: Skin integrity remains intact  Description: 1.  Monitor for areas of redness and/or skin breakdown  2.  Assess vascular access sites hourly  3.  Every 4-6 hours minimum:  Change oxygen saturation probe site  4.  Every 4-6 hours:  If on nasal continuous positive airway pressure, respiratory therapy assess nares and determine need for appliance change or resting period  4/29/2025 2307 by Tk Jay RN  Outcome: Progressing     Problem: ABCDS Injury Assessment  Goal: Absence of physical injury  4/29/2025 2307 by Tk Jay RN  Outcome: Progressing     Problem: Confusion  Goal: Confusion, delirium, dementia, or psychosis is improved or at baseline  Description: INTERVENTIONS:  1. Assess for possible contributors to thought disturbance, including medications, impaired vision or hearing, underlying metabolic abnormalities, dehydration, psychiatric diagnoses, and notify attending LIP  2. [place] high risk fall precautions, as indicated  3. Provide frequent short contacts to provide reality reorientation, refocusing and direction  4. Decrease environmental stimuli, including noise as appropriate  5. Monitor and intervene to maintain adequate nutrition, hydration, elimination, sleep and activity  6. If unable to ensure safety without 
  Problem: Chronic Conditions and Co-morbidities  Goal: Patient's chronic conditions and co-morbidity symptoms are monitored and maintained or improved  5/11/2025 1321 by Sonya Argueta RN  Outcome: Progressing  Flowsheets (Taken 5/11/2025 0828)  Care Plan - Patient's Chronic Conditions and Co-Morbidity Symptoms are Monitored and Maintained or Improved: Monitor and assess patient's chronic conditions and comorbid symptoms for stability, deterioration, or improvement   Monitoring. Pt continues to be encouraged to increase movement. Pt up with therapy and with writer today. Pt currently up in chair. Will continue to encourage movement.   Problem: Discharge Planning  Goal: Discharge to home or other facility with appropriate resources  5/11/2025 1321 by Sonya Argueta, RN  Outcome: Progressing  Flowsheets (Taken 5/11/2025 0828)  Discharge to home or other facility with appropriate resources: Identify barriers to discharge with patient and caregiver   Pt to discharge home once medically cleared.   Problem: Pain  Goal: Verbalizes/displays adequate comfort level or baseline comfort level  5/11/2025 1321 by Sonya Argueta, RN  Outcome: Progressing  Flowsheets (Taken 5/11/2025 0829)  Verbalizes/displays adequate comfort level or baseline comfort level: Encourage patient to monitor pain and request assistance   Pt continues to have lower back/buttock/sciatic pain noted. PRN tylenol and lidocaine patches helpful for pain control.   Problem: Skin/Tissue Integrity  Goal: Skin integrity remains intact  Description: 1.  Monitor for areas of redness and/or skin breakdown  2.  Assess vascular access sites hourly  3.  Every 4-6 hours minimum:  Change oxygen saturation probe site  4.  Every 4-6 hours:  If on nasal continuous positive airway pressure, respiratory therapy assess nares and determine need for appliance change or resting period  5/11/2025 1321 by Sonya Argueta, RN  Outcome: Progressing  Flowsheets  Taken 5/11/2025 
  Problem: Chronic Conditions and Co-morbidities  Goal: Patient's chronic conditions and co-morbidity symptoms are monitored and maintained or improved  5/4/2025 0900 by Sandra Lucas RN  Outcome: Progressing  5/3/2025 2229 by Julieth Stone RN  Outcome: Progressing     Problem: Discharge Planning  Goal: Discharge to home or other facility with appropriate resources  5/4/2025 0900 by Sandra Lcuas RN  Outcome: Progressing  5/3/2025 2229 by Julieth Stone RN  Outcome: Progressing     Problem: Pain  Goal: Verbalizes/displays adequate comfort level or baseline comfort level  5/4/2025 0900 by Sandra Lucas RN  Outcome: Progressing  5/3/2025 2229 by Julieth Stone RN  Outcome: Progressing     Problem: Skin/Tissue Integrity  Goal: Skin integrity remains intact  Description: 1.  Monitor for areas of redness and/or skin breakdown  2.  Assess vascular access sites hourly  3.  Every 4-6 hours minimum:  Change oxygen saturation probe site  4.  Every 4-6 hours:  If on nasal continuous positive airway pressure, respiratory therapy assess nares and determine need for appliance change or resting period  5/4/2025 0900 by Sandra Lucas RN  Outcome: Progressing  5/3/2025 2229 by Julieth Stone RN  Outcome: Progressing     Problem: ABCDS Injury Assessment  Goal: Absence of physical injury  5/4/2025 0900 by Sandra Lucas RN  Outcome: Progressing  5/3/2025 2229 by Julieth Stone RN  Outcome: Progressing     Problem: Confusion  Goal: Confusion, delirium, dementia, or psychosis is improved or at baseline  Description: INTERVENTIONS:  1. Assess for possible contributors to thought disturbance, including medications, impaired vision or hearing, underlying metabolic abnormalities, dehydration, psychiatric diagnoses, and notify attending LIP  2. [place] high risk fall precautions, as indicated  3. Provide frequent short contacts to provide reality reorientation, refocusing and direction  4. Decrease environmental stimuli, 
  Problem: Chronic Conditions and Co-morbidities  Goal: Patient's chronic conditions and co-morbidity symptoms are monitored and maintained or improved  5/4/2025 2118 by Russ Bosch RN  Outcome: Progressing  Flowsheets (Taken 5/4/2025 2118)  Care Plan - Patient's Chronic Conditions and Co-Morbidity Symptoms are Monitored and Maintained or Improved:   Monitor and assess patient's chronic conditions and comorbid symptoms for stability, deterioration, or improvement   Collaborate with multidisciplinary team to address chronic and comorbid conditions and prevent exacerbation or deterioration   Update acute care plan with appropriate goals if chronic or comorbid symptoms are exacerbated and prevent overall improvement and discharge     Problem: Discharge Planning  Goal: Discharge to home or other facility with appropriate resources  5/4/2025 2118 by Russ Bosch RN  Outcome: Progressing  Flowsheets (Taken 5/4/2025 2118)  Discharge to home or other facility with appropriate resources:   Identify barriers to discharge with patient and caregiver   Arrange for needed discharge resources and transportation as appropriate   Identify discharge learning needs (meds, wound care, etc)     Problem: Pain  Goal: Verbalizes/displays adequate comfort level or baseline comfort level  5/4/2025 2118 by Russ Bosch RN  Outcome: Progressing  Flowsheets (Taken 5/4/2025 2118)  Verbalizes/displays adequate comfort level or baseline comfort level:   Encourage patient to monitor pain and request assistance   Assess pain using appropriate pain scale   Administer analgesics based on type and severity of pain and evaluate response   Implement non-pharmacological measures as appropriate and evaluate response     Problem: Skin/Tissue Integrity  Goal: Skin integrity remains intact  Description: 1.  Monitor for areas of redness and/or skin breakdown  2.  Assess vascular access sites hourly  3.  Every 4-6 hours minimum:  Change 
  Problem: Chronic Conditions and Co-morbidities  Goal: Patient's chronic conditions and co-morbidity symptoms are monitored and maintained or improved  5/6/2025 0844 by Sandra Lucas RN  Outcome: Progressing  5/6/2025 0458 by Neva Dougherty RN  Outcome: Progressing     Problem: Discharge Planning  Goal: Discharge to home or other facility with appropriate resources  5/6/2025 0844 by Sandra Lucas RN  Outcome: Progressing  5/6/2025 0458 by Neva Dougherty RN  Outcome: Progressing     Problem: Pain  Goal: Verbalizes/displays adequate comfort level or baseline comfort level  5/6/2025 0844 by Sandra Lucas RN  Outcome: Progressing  5/6/2025 0458 by Neva Dougherty RN  Outcome: Progressing     Problem: Skin/Tissue Integrity  Goal: Skin integrity remains intact  Description: 1.  Monitor for areas of redness and/or skin breakdown  2.  Assess vascular access sites hourly  3.  Every 4-6 hours minimum:  Change oxygen saturation probe site  4.  Every 4-6 hours:  If on nasal continuous positive airway pressure, respiratory therapy assess nares and determine need for appliance change or resting period  5/6/2025 0844 by Sandra Lucas RN  Outcome: Progressing  5/6/2025 0458 by Neva Dougherty RN  Outcome: Progressing     Problem: ABCDS Injury Assessment  Goal: Absence of physical injury  5/6/2025 0844 by Sandra Lucas RN  Outcome: Progressing  5/6/2025 0458 by Neva Dougherty RN  Outcome: Progressing     Problem: Confusion  Goal: Confusion, delirium, dementia, or psychosis is improved or at baseline  Description: INTERVENTIONS:  1. Assess for possible contributors to thought disturbance, including medications, impaired vision or hearing, underlying metabolic abnormalities, dehydration, psychiatric diagnoses, and notify attending LIP  2. [place] high risk fall precautions, as indicated  3. Provide frequent short contacts to provide reality reorientation, refocusing and direction  4. Decrease 
  Problem: Chronic Conditions and Co-morbidities  Goal: Patient's chronic conditions and co-morbidity symptoms are monitored and maintained or improved  5/6/2025 2226 by Tk Jay RN  Outcome: Progressing     Problem: Discharge Planning  Goal: Discharge to home or other facility with appropriate resources  5/6/2025 2226 by Tk Jay RN  Outcome: Progressing     Problem: Pain  Goal: Verbalizes/displays adequate comfort level or baseline comfort level  5/7/2025 1018 by Danita Wakefield RN  Outcome: Progressing  5/6/2025 2226 by Tk Jay RN  Outcome: Progressing     Problem: Skin/Tissue Integrity  Goal: Skin integrity remains intact  Description: 1.  Monitor for areas of redness and/or skin breakdown  2.  Assess vascular access sites hourly  3.  Every 4-6 hours minimum:  Change oxygen saturation probe site  4.  Every 4-6 hours:  If on nasal continuous positive airway pressure, respiratory therapy assess nares and determine need for appliance change or resting period  5/6/2025 2226 by Tk Jay RN  Outcome: Progressing     Problem: ABCDS Injury Assessment  Goal: Absence of physical injury  5/6/2025 2226 by Tk Jay RN  Outcome: Progressing     Problem: Confusion  Goal: Confusion, delirium, dementia, or psychosis is improved or at baseline  Description: INTERVENTIONS:  1. Assess for possible contributors to thought disturbance, including medications, impaired vision or hearing, underlying metabolic abnormalities, dehydration, psychiatric diagnoses, and notify attending LIP  2. [place] high risk fall precautions, as indicated  3. Provide frequent short contacts to provide reality reorientation, refocusing and direction  4. Decrease environmental stimuli, including noise as appropriate  5. Monitor and intervene to maintain adequate nutrition, hydration, elimination, sleep and activity  6. If unable to ensure safety without constant attention obtain sitter and review sitter guidelines 
  Problem: Chronic Conditions and Co-morbidities  Goal: Patient's chronic conditions and co-morbidity symptoms are monitored and maintained or improved  5/9/2025 0932 by Sandra Lucas RN  Outcome: Progressing  5/9/2025 0039 by Amrita Gary RN  Outcome: Progressing     Problem: Discharge Planning  Goal: Discharge to home or other facility with appropriate resources  5/9/2025 0932 by Sandra Lucas RN  Outcome: Progressing  5/9/2025 0039 by Amrita Gary RN  Outcome: Progressing     Problem: Pain  Goal: Verbalizes/displays adequate comfort level or baseline comfort level  5/9/2025 0932 by Sandra Lucas RN  Outcome: Progressing  5/9/2025 0039 by Amrita Gary RN  Outcome: Progressing     Problem: Skin/Tissue Integrity  Goal: Skin integrity remains intact  Description: 1.  Monitor for areas of redness and/or skin breakdown  2.  Assess vascular access sites hourly  3.  Every 4-6 hours minimum:  Change oxygen saturation probe site  4.  Every 4-6 hours:  If on nasal continuous positive airway pressure, respiratory therapy assess nares and determine need for appliance change or resting period  5/9/2025 0932 by Sandra Lucas RN  Outcome: Progressing  5/9/2025 0039 by Amrita Gary RN  Outcome: Progressing     Problem: ABCDS Injury Assessment  Goal: Absence of physical injury  5/9/2025 0932 by Sandra Lucas RN  Outcome: Progressing  5/9/2025 0039 by Amrita Gary RN  Outcome: Progressing     Problem: Confusion  Goal: Confusion, delirium, dementia, or psychosis is improved or at baseline  Description: INTERVENTIONS:  1. Assess for possible contributors to thought disturbance, including medications, impaired vision or hearing, underlying metabolic abnormalities, dehydration, psychiatric diagnoses, and notify attending LIP  2. [place] high risk fall precautions, as indicated  3. Provide frequent short contacts to provide reality reorientation, refocusing and direction  4. Decrease environmental stimuli, including noise as 
  Problem: Chronic Conditions and Co-morbidities  Goal: Patient's chronic conditions and co-morbidity symptoms are monitored and maintained or improved  Outcome: Progressing     Problem: Discharge Planning  Goal: Discharge to home or other facility with appropriate resources  Outcome: Progressing     Problem: Pain  Goal: Verbalizes/displays adequate comfort level or baseline comfort level  4/22/2025 1038 by Sandra Lucas RN  Outcome: Progressing  4/21/2025 2249 by Rosa Greene RN  Outcome: Progressing     Problem: Skin/Tissue Integrity  Goal: Skin integrity remains intact  Description: 1.  Monitor for areas of redness and/or skin breakdown  2.  Assess vascular access sites hourly  3.  Every 4-6 hours minimum:  Change oxygen saturation probe site  4.  Every 4-6 hours:  If on nasal continuous positive airway pressure, respiratory therapy assess nares and determine need for appliance change or resting period  Outcome: Progressing     Problem: ABCDS Injury Assessment  Goal: Absence of physical injury  Outcome: Progressing     Problem: Confusion  Goal: Confusion, delirium, dementia, or psychosis is improved or at baseline  Description: INTERVENTIONS:  1. Assess for possible contributors to thought disturbance, including medications, impaired vision or hearing, underlying metabolic abnormalities, dehydration, psychiatric diagnoses, and notify attending LIP  2. [place] high risk fall precautions, as indicated  3. Provide frequent short contacts to provide reality reorientation, refocusing and direction  4. Decrease environmental stimuli, including noise as appropriate  5. Monitor and intervene to maintain adequate nutrition, hydration, elimination, sleep and activity  6. If unable to ensure safety without constant attention obtain sitter and review sitter guidelines with assigned personnel  7. Initiate Psychosocial CNS and Spiritual Care consult, as indicated  Outcome: Progressing     Problem: Safety - 
  Problem: Chronic Conditions and Co-morbidities  Goal: Patient's chronic conditions and co-morbidity symptoms are monitored and maintained or improved  Outcome: Progressing     Problem: Discharge Planning  Goal: Discharge to home or other facility with appropriate resources  Outcome: Progressing     Problem: Pain  Goal: Verbalizes/displays adequate comfort level or baseline comfort level  5/13/2025 0924 by Sandra Lucas RN  Outcome: Progressing  5/12/2025 2107 by Rosa Greene RN  Outcome: Progressing     Problem: Skin/Tissue Integrity  Goal: Skin integrity remains intact  Description: 1.  Monitor for areas of redness and/or skin breakdown  2.  Assess vascular access sites hourly  3.  Every 4-6 hours minimum:  Change oxygen saturation probe site  4.  Every 4-6 hours:  If on nasal continuous positive airway pressure, respiratory therapy assess nares and determine need for appliance change or resting period  Outcome: Progressing     Problem: ABCDS Injury Assessment  Goal: Absence of physical injury  Outcome: Progressing     Problem: Confusion  Goal: Confusion, delirium, dementia, or psychosis is improved or at baseline  Description: INTERVENTIONS:  1. Assess for possible contributors to thought disturbance, including medications, impaired vision or hearing, underlying metabolic abnormalities, dehydration, psychiatric diagnoses, and notify attending LIP  2. [place] high risk fall precautions, as indicated  3. Provide frequent short contacts to provide reality reorientation, refocusing and direction  4. Decrease environmental stimuli, including noise as appropriate  5. Monitor and intervene to maintain adequate nutrition, hydration, elimination, sleep and activity  6. If unable to ensure safety without constant attention obtain sitter and review sitter guidelines with assigned personnel  7. Initiate Psychosocial CNS and Spiritual Care consult, as indicated  5/13/2025 0924 by Sandra Lucas RN  Outcome: 
  Problem: Chronic Conditions and Co-morbidities  Goal: Patient's chronic conditions and co-morbidity symptoms are monitored and maintained or improved  Outcome: Progressing     Problem: Discharge Planning  Goal: Discharge to home or other facility with appropriate resources  Outcome: Progressing     Problem: Pain  Goal: Verbalizes/displays adequate comfort level or baseline comfort level  Outcome: Progressing     Problem: Skin/Tissue Integrity  Goal: Skin integrity remains intact  Description: 1.  Monitor for areas of redness and/or skin breakdown  2.  Assess vascular access sites hourly  3.  Every 4-6 hours minimum:  Change oxygen saturation probe site  4.  Every 4-6 hours:  If on nasal continuous positive airway pressure, respiratory therapy assess nares and determine need for appliance change or resting period  4/27/2025 1033 by Cristy Alvarez RN  Outcome: Progressing     Problem: ABCDS Injury Assessment  Goal: Absence of physical injury  4/27/2025 1033 by Cristy Alvarez, RN  Outcome: Progressing     Problem: Confusion  Goal: Confusion, delirium, dementia, or psychosis is improved or at baseline  Description: INTERVENTIONS:  1. Assess for possible contributors to thought disturbance, including medications, impaired vision or hearing, underlying metabolic abnormalities, dehydration, psychiatric diagnoses, and notify attending LIP  2. [place] high risk fall precautions, as indicated  3. Provide frequent short contacts to provide reality reorientation, refocusing and direction  4. Decrease environmental stimuli, including noise as appropriate  5. Monitor and intervene to maintain adequate nutrition, hydration, elimination, sleep and activity  6. If unable to ensure safety without constant attention obtain sitter and review sitter guidelines with assigned personnel  7. Initiate Psychosocial CNS and Spiritual Care consult, as indicated  Outcome: Progressing     Problem: Safety - Adult  Goal: Free from fall 
  Problem: Chronic Conditions and Co-morbidities  Goal: Patient's chronic conditions and co-morbidity symptoms are monitored and maintained or improved  Outcome: Progressing     Problem: Discharge Planning  Goal: Discharge to home or other facility with appropriate resources  Outcome: Progressing     Problem: Pain  Goal: Verbalizes/displays adequate comfort level or baseline comfort level  Outcome: Progressing     Problem: Skin/Tissue Integrity  Goal: Skin integrity remains intact  Description: 1.  Monitor for areas of redness and/or skin breakdown  2.  Assess vascular access sites hourly  3.  Every 4-6 hours minimum:  Change oxygen saturation probe site  4.  Every 4-6 hours:  If on nasal continuous positive airway pressure, respiratory therapy assess nares and determine need for appliance change or resting period  Outcome: Progressing     Problem: ABCDS Injury Assessment  Goal: Absence of physical injury  Outcome: Progressing     Problem: Confusion  Goal: Confusion, delirium, dementia, or psychosis is improved or at baseline  Description: INTERVENTIONS:  1. Assess for possible contributors to thought disturbance, including medications, impaired vision or hearing, underlying metabolic abnormalities, dehydration, psychiatric diagnoses, and notify attending LIP  2. [place] high risk fall precautions, as indicated  3. Provide frequent short contacts to provide reality reorientation, refocusing and direction  4. Decrease environmental stimuli, including noise as appropriate  5. Monitor and intervene to maintain adequate nutrition, hydration, elimination, sleep and activity  6. If unable to ensure safety without constant attention obtain sitter and review sitter guidelines with assigned personnel  7. Initiate Psychosocial CNS and Spiritual Care consult, as indicated  Outcome: Progressing     Problem: Safety - Adult  Goal: Free from fall injury  Outcome: Progressing     
  Problem: Chronic Conditions and Co-morbidities  Goal: Patient's chronic conditions and co-morbidity symptoms are monitored and maintained or improved  Outcome: Progressing     Problem: Discharge Planning  Goal: Discharge to home or other facility with appropriate resources  Outcome: Progressing     Problem: Pain  Goal: Verbalizes/displays adequate comfort level or baseline comfort level  Outcome: Progressing     Problem: Skin/Tissue Integrity  Goal: Skin integrity remains intact  Description: 1.  Monitor for areas of redness and/or skin breakdown  2.  Assess vascular access sites hourly  3.  Every 4-6 hours minimum:  Change oxygen saturation probe site  4.  Every 4-6 hours:  If on nasal continuous positive airway pressure, respiratory therapy assess nares and determine need for appliance change or resting period  Outcome: Progressing     Problem: ABCDS Injury Assessment  Goal: Absence of physical injury  Outcome: Progressing     Problem: Confusion  Goal: Confusion, delirium, dementia, or psychosis is improved or at baseline  Description: INTERVENTIONS:  1. Assess for possible contributors to thought disturbance, including medications, impaired vision or hearing, underlying metabolic abnormalities, dehydration, psychiatric diagnoses, and notify attending LIP  2. [place] high risk fall precautions, as indicated  3. Provide frequent short contacts to provide reality reorientation, refocusing and direction  4. Decrease environmental stimuli, including noise as appropriate  5. Monitor and intervene to maintain adequate nutrition, hydration, elimination, sleep and activity  6. If unable to ensure safety without constant attention obtain sitter and review sitter guidelines with assigned personnel  7. Initiate Psychosocial CNS and Spiritual Care consult, as indicated  Outcome: Progressing     Problem: Safety - Adult  Goal: Free from fall injury  Outcome: Progressing     Problem: Genitourinary - Adult  Goal: Absence of 
  Problem: Chronic Conditions and Co-morbidities  Goal: Patient's chronic conditions and co-morbidity symptoms are monitored and maintained or improved  Recent Flowsheet Documentation  Taken 4/25/2025 0933 by Eden Lucas RN  Care Plan - Patient's Chronic Conditions and Co-Morbidity Symptoms are Monitored and Maintained or Improved:   Monitor and assess patient's chronic conditions and comorbid symptoms for stability, deterioration, or improvement   Collaborate with multidisciplinary team to address chronic and comorbid conditions and prevent exacerbation or deterioration   Update acute care plan with appropriate goals if chronic or comorbid symptoms are exacerbated and prevent overall improvement and discharge     Problem: Pain  Goal: Verbalizes/displays adequate comfort level or baseline comfort level  Outcome: Progressing     Problem: Skin/Tissue Integrity  Goal: Skin integrity remains intact  Description: 1.  Monitor for areas of redness and/or skin breakdown  2.  Assess vascular access sites hourly  3.  Every 4-6 hours minimum:  Change oxygen saturation probe site  4.  Every 4-6 hours:  If on nasal continuous positive airway pressure, respiratory therapy assess nares and determine need for appliance change or resting period  Recent Flowsheet Documentation  Taken 4/25/2025 0933 by Eden Lucas, RN  Skin Integrity Remains Intact:   Check visual cues for pain   Turn and reposition as indicated   Monitor for areas of redness and/or skin breakdown     
  Problem: Discharge Planning  Goal: Discharge to home or other facility with appropriate resources  4/17/2025 1309 by Eden Lucas RN  Outcome: Not Progressing  4/16/2025 2357 by Julieth Stone RN  Outcome: Progressing  4/16/2025 2345 by Julieth Stone RN  Outcome: Progressing     Problem: Pain  Goal: Verbalizes/displays adequate comfort level or baseline comfort level  4/17/2025 1309 by Eden Lucas RN  Outcome: Not Progressing  4/16/2025 2357 by Julieth Stone RN  Outcome: Progressing  4/16/2025 2345 by Julieth Stone RN  Outcome: Progressing     Problem: Chronic Conditions and Co-morbidities  Goal: Patient's chronic conditions and co-morbidity symptoms are monitored and maintained or improved  Recent Flowsheet Documentation  Taken 4/17/2025 1049 by Eden Lucas RN  Care Plan - Patient's Chronic Conditions and Co-Morbidity Symptoms are Monitored and Maintained or Improved:   Monitor and assess patient's chronic conditions and comorbid symptoms for stability, deterioration, or improvement   Collaborate with multidisciplinary team to address chronic and comorbid conditions and prevent exacerbation or deterioration   Update acute care plan with appropriate goals if chronic or comorbid symptoms are exacerbated and prevent overall improvement and discharge  4/16/2025 2357 by Julieth Stone RN  Outcome: Progressing  4/16/2025 2345 by Julieth Stone RN  Outcome: Progressing     Problem: Skin/Tissue Integrity  Goal: Skin integrity remains intact  Description: 1.  Monitor for areas of redness and/or skin breakdown  2.  Assess vascular access sites hourly  3.  Every 4-6 hours minimum:  Change oxygen saturation probe site  4.  Every 4-6 hours:  If on nasal continuous positive airway pressure, respiratory therapy assess nares and determine need for appliance change or resting period  4/16/2025 2357 by Julieth Stone RN  Outcome: Progressing  4/16/2025 2345 by Julieth Stone RN  Outcome: 
  Problem: Pain  Goal: Verbalizes/displays adequate comfort level or baseline comfort level  Outcome: Progressing     Problem: Confusion  Goal: Confusion, delirium, dementia, or psychosis is improved or at baseline  Description: INTERVENTIONS:  1. Assess for possible contributors to thought disturbance, including medications, impaired vision or hearing, underlying metabolic abnormalities, dehydration, psychiatric diagnoses, and notify attending LIP  2. [place] high risk fall precautions, as indicated  3. Provide frequent short contacts to provide reality reorientation, refocusing and direction  4. Decrease environmental stimuli, including noise as appropriate  5. Monitor and intervene to maintain adequate nutrition, hydration, elimination, sleep and activity  6. If unable to ensure safety without constant attention obtain sitter and review sitter guidelines with assigned personnel  7. Initiate Psychosocial CNS and Spiritual Care consult, as indicated  Outcome: Progressing     Problem: Safety - Adult  Goal: Free from fall injury  5/17/2025 1047 by Danita Wakefield, RN  Outcome: Progressing  5/16/2025 2112 by Rosa Greene, RN  Outcome: Progressing     Problem: Genitourinary - Adult  Goal: Absence of urinary retention  5/16/2025 2112 by Rosa Greene, RN  Outcome: Progressing     Problem: Hematologic - Adult  Goal: Maintains hematologic stability  5/16/2025 2112 by Rosa Greene, RN  Outcome: Progressing     
  Problem: Pain  Goal: Verbalizes/displays adequate comfort level or baseline comfort level  Outcome: Progressing     Problem: Safety - Adult  Goal: Free from fall injury  Outcome: Progressing     
  Problem: Pain  Goal: Verbalizes/displays adequate comfort level or baseline comfort level  Outcome: Progressing     Problem: Safety - Adult  Goal: Free from fall injury  Outcome: Progressing     
  Problem: Physical Therapy - Adult  Goal: By Discharge: Performs mobility at highest level of function for planned discharge setting.  See evaluation for individualized goals.  Outcome: Progressing     
  Problem: Safety - Adult  Goal: Free from fall injury  4/25/2025 2256 by Viola Driscoll, RN  Outcome: Progressing  4/25/2025 1530 by Eden Lucas RN  Outcome: Progressing     Problem: Genitourinary - Adult  Goal: Absence of urinary retention  Outcome: Progressing     
  Problem: Skin/Tissue Integrity  Goal: Skin integrity remains intact  Description: 1.  Monitor for areas of redness and/or skin breakdown  2.  Assess vascular access sites hourly  3.  Every 4-6 hours minimum:  Change oxygen saturation probe site  4.  Every 4-6 hours:  If on nasal continuous positive airway pressure, respiratory therapy assess nares and determine need for appliance change or resting period  4/26/2025 2220 by Viola Driscoll, RN  Outcome: Progressing  4/26/2025 1545 by Sandra Lucas, RN  Outcome: Progressing     Problem: ABCDS Injury Assessment  Goal: Absence of physical injury  4/26/2025 2220 by Viola Driscoll, RN  Outcome: Progressing  4/26/2025 1545 by Sandra Lucas, RN  Outcome: Progressing     
  Problem: Skin/Tissue Integrity  Goal: Skin integrity remains intact  Description: 1.  Monitor for areas of redness and/or skin breakdown  2.  Assess vascular access sites hourly  3.  Every 4-6 hours minimum:  Change oxygen saturation probe site  4.  Every 4-6 hours:  If on nasal continuous positive airway pressure, respiratory therapy assess nares and determine need for appliance change or resting period  Outcome: Progressing     
[facility]  Inpatient Physical Therapy  Plan of Care  Date: 2025  Patient Name: Quique Wray        : 1945  (80 y.o.)     Admission Date: 2025        Treatment Diagnosis: Weakness  PT Orders Received and Evaluation Complete  Identified Problem Areas:  Therapy Prognosis: Fair  Performance Deficits/Impairments: Decreased functional mobility , Decreased ADL status, Decreased ROM, Decreased body mechanics, Decreased tolerance to work activity, Decreased strength, Decreased safe awareness, Decreased cognition, Decreased endurance, Decreased balance, Increased pain, Decreased posture    Justification for Skilled Services:  [x] Reduce Falls   [x] Improve Ambulation  [x]  Complete Daily Tasks Safely   [x] Improve Balance   [x] Improve LE strength  [x]  Return to Prior Level of Function  [x] Improve Functional Mobility   [x]  Family/Caregiver Education  [] Patient Education: [x]Assistive Devices [x]Home Exercise Program and Progression    Plan  Frequency: 1-2x/day Daily M-F, 1x/day Sat-Sun    Duration:  10 days  [] Modalities:  [x] Therapeutic Exercise   [x] Gait Training  [x] Therapeutic Activity    [] Home Safety Evaluation         [] Massage                        [x] Neuromuscular Re-education [x] Back Education             [x] Patient Education [x] Home Exercise Program  Discharge Recommendations: Continue to assess pending progress, Subacute/Skilled Nursing Facility    Rehab Potential:  []  Good [x] Fair   []  Poor    Goals  Short Term Goals  Time Frame for Short Term Goals: 5 days (25)  Short Term Goal 1: Patient will transfer supine to sit with Mod A  Short Term Goal 2: Patient will transfer sit to supine with good safety awareness and Min A  Short Term Goal 3: Patient will transfer sit to stand with Max A    Long Term Goals  Time Frame for Long Term Goals : 10 days (25  Long Term Goal 1: Patient will transfer supine to sit with Min A  Long Term Goal 2: Patient will transfer 
urinary retention  Outcome: Progressing     Problem: Hematologic - Adult  Goal: Maintains hematologic stability  Outcome: Progressing     Problem: Nutrition Deficit:  Goal: Optimize nutritional status  Outcome: Progressing     
urinary retention  Outcome: Progressing     Problem: Hematologic - Adult  Goal: Maintains hematologic stability  Outcome: Progressing     Problem: Nutrition Deficit:  Goal: Optimize nutritional status  Outcome: Progressing     
without constant attention obtain sitter and review sitter guidelines with assigned personnel  7. Initiate Psychosocial CNS and Spiritual Care consult, as indicated  Outcome: Progressing  Flowsheets (Taken 4/13/2025 2047)  Effect of thought disturbance (confusion, delirium, dementia, or psychosis) are managed with adequate functional status:   Assess for contributors to thought disturbance, including medications, impaired vision or hearing, underlying metabolic abnormalities, dehydration, psychiatric diagnoses, notify LIP   [place] high risk fall precautions, as indicated     Problem: Safety - Adult  Goal: Free from fall injury  Outcome: Progressing

## 2025-05-19 VITALS
HEIGHT: 68 IN | OXYGEN SATURATION: 96 % | HEART RATE: 90 BPM | DIASTOLIC BLOOD PRESSURE: 70 MMHG | SYSTOLIC BLOOD PRESSURE: 126 MMHG | WEIGHT: 212.3 LBS | RESPIRATION RATE: 16 BRPM | TEMPERATURE: 98.2 F | BODY MASS INDEX: 32.18 KG/M2

## 2025-05-19 LAB
INR PPP: 2.1
PROTHROMBIN TIME: 24.1 SEC (ref 11.5–14.2)

## 2025-05-19 PROCEDURE — 85610 PROTHROMBIN TIME: CPT

## 2025-05-19 PROCEDURE — 6360000002 HC RX W HCPCS: Performed by: INTERNAL MEDICINE

## 2025-05-19 PROCEDURE — 94761 N-INVAS EAR/PLS OXIMETRY MLT: CPT

## 2025-05-19 PROCEDURE — 2580000003 HC RX 258: Performed by: INTERNAL MEDICINE

## 2025-05-19 PROCEDURE — 6370000000 HC RX 637 (ALT 250 FOR IP): Performed by: INTERNAL MEDICINE

## 2025-05-19 PROCEDURE — 36415 COLL VENOUS BLD VENIPUNCTURE: CPT

## 2025-05-19 RX ORDER — SENNA AND DOCUSATE SODIUM 50; 8.6 MG/1; MG/1
1 TABLET, FILM COATED ORAL 2 TIMES DAILY
Qty: 60 TABLET | Refills: 1 | Status: SHIPPED | OUTPATIENT
Start: 2025-05-19

## 2025-05-19 RX ORDER — TAMSULOSIN HYDROCHLORIDE 0.4 MG/1
0.4 CAPSULE ORAL DAILY
Qty: 30 CAPSULE | Refills: 3 | Status: SHIPPED | OUTPATIENT
Start: 2025-05-19

## 2025-05-19 RX ORDER — SERTRALINE HYDROCHLORIDE 25 MG/1
25 TABLET, FILM COATED ORAL DAILY
Qty: 30 TABLET | Refills: 3 | Status: SHIPPED | OUTPATIENT
Start: 2025-05-19

## 2025-05-19 RX ADMIN — TAMSULOSIN HYDROCHLORIDE 0.4 MG: 0.4 CAPSULE ORAL at 08:14

## 2025-05-19 RX ADMIN — Medication 1 CAPSULE: at 08:14

## 2025-05-19 RX ADMIN — SENNOSIDES AND DOCUSATE SODIUM 1 TABLET: 50; 8.6 TABLET ORAL at 08:14

## 2025-05-19 RX ADMIN — LANSOPRAZOLE 30 MG: 30 TABLET, ORALLY DISINTEGRATING, DELAYED RELEASE ORAL at 05:31

## 2025-05-19 RX ADMIN — ACETAMINOPHEN 650 MG: 325 TABLET, FILM COATED ORAL at 08:14

## 2025-05-19 RX ADMIN — SERTRALINE HYDROCHLORIDE 25 MG: 50 TABLET ORAL at 08:14

## 2025-05-19 RX ADMIN — AMPICILLIN SODIUM 2000 MG: 2 INJECTION, POWDER, FOR SOLUTION INTRAMUSCULAR; INTRAVENOUS at 01:07

## 2025-05-19 RX ADMIN — METOPROLOL TARTRATE 25 MG: 25 TABLET, FILM COATED ORAL at 08:14

## 2025-05-19 ASSESSMENT — PAIN DESCRIPTION - LOCATION: LOCATION: GENERALIZED

## 2025-05-19 ASSESSMENT — PAIN SCALES - GENERAL: PAINLEVEL_OUTOF10: 2

## 2025-05-19 ASSESSMENT — PAIN - FUNCTIONAL ASSESSMENT: PAIN_FUNCTIONAL_ASSESSMENT: ACTIVITIES ARE NOT PREVENTED

## 2025-05-19 ASSESSMENT — PAIN DESCRIPTION - DESCRIPTORS: DESCRIPTORS: ACHING

## 2025-05-19 NOTE — PROGRESS NOTES
[facility]  Phone: [phone]    Occupational Therapy Skilled Daily Note  Date: 2025  Patient Name: Quique Wray        MRN: [id number]    : 1945  (80 y.o.)    Subjective:     Subjective: Patient was lying supine in bed upon OT arrival. Was agreeable to OT interventions.    Discharge recommendation: Continue to assess Pending Progress    Objective:     ADLs:  N/A    Transfers:  Supine to Sit: Partial/Moderate assistance, 2 Person assistance  Sit to Supine: Partial/Moderate assistance, 2 Person assistance  Sit to stand: Moderate assistance, 2 Person assistance   Stand to sit: Moderate assistance, 2 Person assistance    Assessment:     Assessment: Patient was lying supine in bed with wife present upon OT arrival. Was agreeable to OT interventions. Completed functional transfers as documented above. Declined ADL need with wife stating that patient washed his face and brushed his teeth just prior to therapist arrival. Co-treated with PTA to maximize therapeutic outcomes. Tolerated EOB sitting throughout session without LOB, but with cues from therapist to weight bear through LUE for increased safety. Required CGA-MIN A x1 while sitting EOB, requiring increased assistance when performing exercise. To increase independence and safety with functional activities, patient tolerated static standing x2 trials at walker level (25 seconds and 35 seconds respectively) with maximum cues from therapist for encouragement/continuation/posture. Upon returning to EOB, patient requested to return to EOB. Patient remains in bed with call light/personal items within reach, wife present and bed alarm activated upon OT departure. Will continue to address goals and progres patient as able/tolerated.    Exercises:     See Flowsheets    Goals:     Short term goals:  Time Frame for Short Term Goals: 7 days (2025)  Short Term Goal 1: Patient will complete a BSC transfer while using DME PRN with MIN A.  Short 
     [facility]  Phone: [phone]    Occupational Therapy Skilled Daily Note  Date: 2025  Patient Name: Quique Wray        MRN: [id number]    : 1945  (80 y.o.)    Subjective:     Subjective: Patient was sitting in recliner with wife and daughter present upon arrival. Was agreeable to OT interventions.    Pt shows NO CHANGE toward goals and independence of Self Care this treatment session    Discharge recommendation: Continue to assess Pending Progress    Objective:     ADLs:  Feeding: None  Grooming: Supervision, Setup (applying deodorant and combing hair in sitting)  UE Bathing: Supervision, Setup (via sponge bath in sitting)  LE Bathing: Moderate assistance (via sponge bath in sitting, excluding regina area)  UE Dressing: Supervision, Setup (doffing/donning t-shirt in sitting)  LE Dressing: Moderate assistance, Dependent/Total (MOD A: donning shorts in sitting/standing; DEP: doffing/donning bilateral slipper socks in sitting)  Toileting: Maximum assistance (posterior regina care per patient's request)    Transfers:  Supine to Sit: Supervision  Sit to Supine: Partial/Moderate assistance  Sit to stand: Moderate assistance   Stand to sit: Minimal assistance  Toilet Transfer: Minimal assistance, 2 Person assistance (via BSC)       Assessment:     Assessment: Patient seated in recliner w/ daughter and spouse present. Agreeable to OT interventions. Session targeted functional transfers from chairs to address proper hand placement and safety. Patient requires MOD A to complete x3 trials of sit-stand transfers, only able to achieve fully upright posture on 1/3 trials despite MOD A and encouragement. Rest period is provided between each standing trial. Remains in recliner with family present upon departure. Chair alarm in place, will continue to address goals and progress pt as able/tolerated.    Exercises:     See Flowsheets    Goals:     Short term goals:  Time Frame for Short Term Goals: 7 days 
     [facility]  Phone: [phone]    Occupational Therapy Skilled Daily Note  Date: 2025  Patient Name: Quique Wray        MRN: [id number]    : 1945  (80 y.o.)    Subjective:     Subjective: Patient was seated in recliner upon arrival, agreeable to OT interventions.    Pt is PROGRESSING toward goals and independence of Self Care this treatment session    Discharge recommendation: Continue to assess Pending Progress    Objective:     ADLs:  LE Dressing: Maximum assistance (to isai SERJIO socks and shoes)      Transfers:  Supine to Sit: Partial/Moderate assistance (going towards L side)  Sit to stand: Minimal assistance, Moderate assistance, 2 Person assistance (depending on sitting surface height)   Stand to sit: Minimal assistance, Moderate assistance, 2 Person assistance (depending on sitting surface height)      Assessment:     Assessment: Patient seated in recliner upon arrival, agreeable to OT interventions. Agreeable to go outside this date, completes fxnl transfers as noted above. Patient is wheeled outside and around the inside/outside of [facility] on this kyle day. Patient verbalizes much enjoyment and happiness being outside. Unable to propel self in wc due to pain in L hand and SERJIO shoulders. Remains in bed upon CHRIS departure with call light and other personal items within reach.    Exercises:     See Flowsheets    Goals:     Short term goals:  Time Frame for Short Term Goals: 7 days (2025)  Short Term Goal 1: Patient will complete a BSC transfer while using DME PRN with MIN A.  Short Term Goal 2: Patient will complete upper body dressing and/or bathing while using adaptive equipment/techniques PRN with SBA.  Short Term Goal 3: Patient will complete lower body dressing and/or bathing while using adaptive equipment/techniques PRN with MIN A.  Short Term Goal 4: To increase UE strength for ADLs and functional transfers, patient will engage in 10 minutes of BUE ther ex with no more than 
     [facility]  Phone: [phone]    Occupational Therapy Skilled Daily Note  Date: 2025  Patient Name: Quique Wray        MRN: [id number]    : 1945  (80 y.o.)    Subjective:     Subjective: Patient was lying supine in bed with wife present upon OT arrival. Was agreeable to OT interventions.    Discharge recommendation: Continue to assess Pending Progress    Objective:     ADLs:  N/A    Transfers:  Supine to Sit: 2 Person assistance, Substantial/Maximal assistance  Sit to Supine: 2 Person assistance, Substantial/Maximal assistance  Sit to stand: Unable to assess (due to decreased sitting balance and increased pain)   Stand to sit: Unable to assess    Assessment:     Assessment: Patient was lying supine in bed with wife present upon OT arrival. Was agreeable to OT interventions. Completed functional transfers as documented above. Co-treated with PTA to maximize therapeutic outcomes. Tolerated static sitting at EOB x7 minutes with CGA-MIN A before returning to supine position due to increased pain/fatigue. Was noted to support self with RUE on bedrail with LUE in lap; patient required maximum prompts to weight bear through LUE as well to assist with core strengthening for ADL completion with poor carryover noted (maintained LUE support following prompts for ~10 seconds before returning to lap). Instructed wife to encourage/complete BUE ROM throughout the day to prevent stiffness/increased pain during ADL completion. Overall fair session, will continue to address goals and progress patient as able/tolerated. Patient remains in bed with call light/personal items within reach and wife present upon OT departure. Provided update to RN.    Exercises:     See Flowsheets    Goals:     Short term goals:  Time Frame for Short Term Goals: 11 days (2025)  Short Term Goal 1: Patient will complete a BSC transfer while using DME PRN with MIN A.  Short Term Goal 2: Patient will complete upper body 
     [facility]  Phone: [phone]    Occupational Therapy Skilled Daily Note  Date: 2025  Patient Name: Quique Wray        MRN: [id number]    : 1945  (80 y.o.)    Subjective:     Subjective: Patient was sitting in recliner with wife present upon OT arrival. Was agreeable to OT interventions.    Discharge recommendation: Continue to assess Pending Progress    Objective:     ADLs:  Grooming: Supervision, Setup (applying deodorant and combing hair in sitting)  UE Bathing: Supervision, Setup (via sponge bath in sitting)  LE Bathing: Moderate assistance (via sponge bath in sitting, excluding regina area)  UE Dressing: Supervision, Setup (doffing/donning t-shirt in sitting)  LE Dressing: Moderate assistance, Dependent/Total (MOD A: donning shorts in sitting/standing; DEP: doffing/donning bilateral slipper socks in sitting)  Toileting: Maximum assistance (posterior regina care per patient's request)    Transfers:  Sit to stand: Moderate assistance (1 Person assistance)   Stand to sit: Minimal assistance    Assessment:     Assessment: Patient was sitting in recliner with wife present upon OT arrival. Was agreeable to OT interventions. Completed ADLs (including sponge bath) and functional transfers as documented above. Increased independence noted during clothing management tasks AEB patient pulling shorts over right hip. Tolerated static standing at walker level x2 minutes during regina care performed by therapist. Patient requested to be cleaned due to feeling \"itchy\" and minimally attempted task before stating, \"I can't.\" Wife confirmed that patient completed this ADL at baseline. Patient remains in recliner with call light/personal items within reach and wife present upon OT departure. Will continue to address goals and progress patient as able/tolerated. Notified RN re: completion of session due to patient requiring IV antibiotics.     Exercises:     N/A    Goals:     Short term goals:  Time Frame for 
     [facility]  Phone: [phone]    Occupational Therapy Skilled Daily Note  Date: 2025  Patient Name: Quique Wray        MRN: [id number]    : 1945  (80 y.o.)    Subjective:     Subjective: Patient was seated in recliner with wife present upon arrival. Was agreeable to OT interventions.    Pt shows NO CHANGE toward goals and independence of Self Care this treatment session    Discharge recommendation: Continue to assess Pending Progress    Objective:     None    Assessment:     Assessment: Patient seated in recliner upon arrival, spouse presnt in room for duration of tx session. Engages in UB resistive tolerance w/ poor tolerance due to increased pain in SERJIO shoulders and back. Patient engages in AROM exercises in shoulder planes w/ improved tolerance. Remains in recliner upon CHRIS departure with call light and other personal items within reach. Chair alarm in place, spouse remains present. Will continue to address goals and progress pt as able/tolerated.    Exercises:     See Flowsheets    Goals:     Short term goals:  Time Frame for Short Term Goals: 7 days (2025)  Short Term Goal 1: Patient will complete a BSC transfer while using DME PRN with MIN A.  Short Term Goal 2: Patient will complete upper body dressing and/or bathing while using adaptive equipment/techniques PRN with SBA.  Short Term Goal 3: Patient will complete lower body dressing and/or bathing while using adaptive equipment/techniques PRN with MIN A.  Short Term Goal 4: To increase UE strength for ADLs and functional transfers, patient will engage in 10 minutes of BUE ther ex with no more than 3 cues for the correct technique.  Short Term Goal 5: To increase independence and safety with IADLs, patient will tolerate static/dynamic standing x2 minutes without LOB.    Long term goals:  STGs=LTGs         Call light in reach, Phone in reach, Chair alarm, Nurse Notified, Family Present, and Left in chair  Time In: 1142  Time 
     [facility]  Phone: [phone]    Occupational Therapy Skilled Daily Note  Date: 2025  Patient Name: Quique Wray        MRN: [id number]    : 1945  (80 y.o.)    Subjective:     Subjective: Patient was seated in recliner upon arrival, agreeable to OT interventions.    Pt shows NO CHANGE toward goals and independence of Self Care this treatment session    Discharge recommendation: Continue to assess Pending Progress    Objective:     ADLs:  None    Transfers:  Sit to Supine: Minimal assistance  Sit to stand: Moderate assistance, 2 Person assistance   Stand to sit: Moderate assistance, 2 Person assistance      Assessment:     Assessment: Patient seated in recliner upon arrival, agreeable to OT interventions.Co-tx with PTA is completed due to safety concerns and patients reports of chest pain and weakness this AM. Patient reports he still feels \"weak\" this afternoon, but will engage until tolerance is met. Engages in x1 trial of static standing at recliner for 1 min 47 seconds w/ CGA. Second trial is attempted, patient unable to complete STS transfer. Extended RB is provided to allow patient to prepare for t/f back to bed. MOD A x2 is required for STS from recliner, and CGA for ambulation to bed. MIN A x1 is required for bed mobility. Remains in bed upon CHRIS departure with call light and other personal items within reach. Bed alarm in place, will continue to address goals and progress pt as able/tolerated.    Exercises:     See Flowsheets    Goals:     Short term goals:  Time Frame for Short Term Goals: 7 days (2025)  Short Term Goal 1: Patient will complete a BSC transfer while using DME PRN with MIN A.  Short Term Goal 2: Patient will complete upper body dressing and/or bathing while using adaptive equipment/techniques PRN with SBA.  Short Term Goal 3: Patient will complete lower body dressing and/or bathing while using adaptive equipment/techniques PRN with MIN A.  Short Term Goal 4: 
     [facility]  Phone: [phone]    Occupational Therapy Skilled Daily Note  Date: 2025  Patient Name: Quique Wray        MRN: [id number]    : 1945  (80 y.o.)    Subjective:     Subjective: Patient was sitting in recliner upon OT arrival. Was agreeable to OT interventions.    Discharge recommendation: Continue to assess Pending Progress    Objective:     ADLs:  LE Dressing: Moderate assistance  Toileting: Dependent/Total, Maximum assistance (MAX: clothing management in standing; DEP: regina care in standing)    Transfers:  Sit to Supine: Minimal assistance  Sit to stand: 2 Person assistance, Minimal assistance   Stand to sit: Minimal assistance, 2 Person assistance  Toilet Transfer: Minimal assistance, 2 Person assistance (via BSC)    Assessment:     Assessment: Patient was sitting in recliner upon OT arrival. Was agreeable to OT interventions. Completed ADLs and functional transfers as documented above. Co-treated with PTA to maximize therapeutic outcomes. Performed functional mobility to/from BSC (located ~5 feet away from recliner) via FWW/gait belt with MIN A x2. Required extended time on commode to produce BM. Reluctantly agreed to assist with clothing management prior to sitting on commode (doffing shorts/brief off of right hip), but refused to attempt left side. Also refused to attempt regina care post BM nor LB dressing with clean brief/shorts. Emphasized the importance of ADL participation during therapy sessions, however patient reported that his wife will assist with ADLs upon discharging home and that he is \"too weak\" to attempt at this time. Declined to attempt additional functional mobility following toileting tasks and adamantly requested to return to bed stating that he will complete ambulation tomorrow. Upon standing, BSC was pulled out from underneath patient and bed was repositioned to allow him to sit. Was observed flopping backwards onto bed as opposed to lowering self 
     [facility]  Phone: [phone]    Occupational Therapy Skilled Daily Note  Date: 2025  Patient Name: Quique Wray        MRN: [id number]    : 1945  (80 y.o.)    Subjective:     Subjective: Patient was lying supine in bed upon OT arrival. Was agreeable to OT interventions.    Discharge recommendation: Continue to assess Pending Progress    Objective:     ADLs:  Grooming: Supervision, Setup, Minimal assistance, Dependent/Total (SUP/SETUP: washing face in sitting; MIN A: combing hair in sitting; DEP: applying deodorant in sitting)  UE Dressing: Minimal assistance (donning t-shirt in sitting with increased pain)  LE Dressing: Moderate assistance (donning shorts and brief in sitting/standing)  Toileting: Dependent/Total (regina care and clothing management in standing)    Transfers:  Supine to Sit: Minimal assistance  Sit to stand: Moderate assistance, 2 Person assistance, Minimal assistance   Stand to sit: Moderate assistance, 2 Person assistance, Minimal assistance (poor hand placement noted despite cues)  Toilet Transfer: Moderate assistance, 2 Person assistance, Minimal assistance (via BSC)    Assessment:     Assessment: Patient was lying supine in bed with wife present upon OT arrival. Was agreeable to OT interventions. Completed ADLs and functional transfers as documented above. Co-treated with PTA to maximize therapeutic outcomes. Tolerated static standing at walker level x1 minute/15 seconds during toileting tasks without LOB, but with cues for erect posture. Declined to attempt deodorant application in sitting nor toileting tasks in standing, stating that his wife will assist with ADLs at home. Encouraged patient to complete as much of his ADLs as possible for exercise and increased independence. Patient remains in recliner with call light/personal items within reach, wife present and chair alarm activated upon OT departure. Will continue to address goals and progress patient as 
     [facility]  Phone: [phone]    Occupational Therapy Skilled Daily Note  Date: 2025  Patient Name: Quique Wray        MRN: [id number]    : 1945  (80 y.o.)    Subjective:     Subjective: Patient was supine in bed upon arrival. Was agreeable to OT interventions.    Pt is PROGRESSING toward goals and independence of Self Care this treatment session    Discharge recommendation: Continue to assess Pending Progress    Objective:     ADLs:  LE Dressing: Maximum assistance (to isai SERJIO socks and shoes)      Transfers:  Supine to Sit: Minimal assistance  Sit to stand: Minimal assistance, 2 Person assistance   Stand to sit: Minimal assistance, 2 Person assistance    Assessment:     Assessment: Patient seated in bed upon arrival, agreeable to OT interventions. Co-tx with PTA is completed due to safety concerns as patient continues to require +2 assistance for transfers and limited endurance. Patient completes supine-sit transfer w/ MIN A. Completes LB dressing with MAX A to isai SERJIO socks, shoes, and gym shorts. Completes all sit-stand transfers with MIN A x2. Ambulates from EOB passed RN windows (~50 feet) with CGA w/ a wheelchair follow. Wheeled within ~25 feet of therapy room where patient completes second round of fxnl mobility. Patient engages in standing task using LUE support on fww to engage in crossing midline task. Patient engages in functional standing task for 42 seconds and 48 seconds with CGA. Requires several minutes of rest following standing trials. Makes reports of chest \"heaviness\", O2 and HR are assessed. O2 is 96% on room air and HR is 86 bpm. Wheeled back to room where RN is notified of patients reports. Ambulates ~10 feet to the chair. Remains in recliner w/ spouse present. Call light and other personal items are within reach. Chair alarm in place. Will continue to address goals and progress patient as able/tolerated.    Exercises:     See Flowsheets    Goals:     Short 
     [facility]  Phone: [phone]    Occupational Therapy Skilled Daily Note  Date: 2025  Patient Name: Quique Wray        MRN: [id number]    : 1945  (80 y.o.)    Subjective:     Subjective: Patient was supine in bed upon arrival. Was agreeable to OT interventions.    Pt is PROGRESSING toward goals and independence of Self Care this treatment session    Discharge recommendation: Continue to assess Pending Progress    Objective:     ADLs:  LE Dressing: Maximum assistance (donning B socks and tennis shoes)    Transfers:  Supine to Sit: Partial/Moderate assistance (going towards L side)  Sit to stand: Minimal assistance, Moderate assistance, 2 Person assistance (depending on sitting surface)   Stand to sit: Minimal assistance, Moderate assistance, 2 Person assistance (depending on sitting surface)      Assessment:     Assessment: Patient seated in recliner upon arrival, agreeable to therapy interventions. Co-tx with PT is completed due to safety concerns, limited endurance due to pain, and to address extended functional mobility and standing duration to promote improvements in ADL and IADL task engagement. Visual compensatory strategy is implemented for improved hand placement with fww during transfers, high contrast strip is applied to L and R hand . Patient ambulates from EOB to hallway, requires MIN A-MOD A x2 for STS this date, and CGA for fxnl mobility. Wheeled within x10 feet of therapy room where patient completes second trial of fxnl mobility w/ wc follow. Once in therapy room patient engages in static standing task to match names of celebrities to matching picture. Patient engages in task x2 trials, first for 2 min 04 seconds and second trial for 1 min 25 seconds. Seated RB is required between trials due to fatigue. Wheeled back to room, where patient ambulates from doorway to EOB w/ CGA. Patient remains in recliner upon CHRIS departure w/ call light and other personal items within 
     [facility]  Phone: [phone]    Occupational Therapy Skilled Daily Note  Date: 2025  Patient Name: Quique Wray        MRN: [id number]    : 1945  (80 y.o.)    Subjective:     Subjective: Patient was sitting on commode (in bathroom) with PTA and wife present upon OT arrival. Was agreeable to OT interventions.    Discharge recommendation: Continue to assess Pending Progress    Objective:     ADLs:  LE Dressing: Moderate assistance (donning brief and shorts in sitting/standing)  Toileting: Dependent/Total (regina care and clothing management in standing)    Transfers:  Stand to sit: Minimal assistance, 2 Person assistance  Toilet Transfer: Minimal assistance, 2 Person assistance (via toilet in bathroom with 3-in-1 commode placed over top)    Assessment:     Assessment: Patient was sitting on commode (in bathroom) with PTA and wife present upon OT arrival. Was agreeable to OT interventions. Completed ADLs and functional transfers as documented above. Co-treated with PTA to maximize therapeutic outcomes. Utilized PADMINI STEDY to transfer patient from commode to bedside recliner due to increased pain levels. Once seated in recliner, patient engaged in ~10 minutes of BUE ther ex as outlined in the exercise flowsheet to increase UE strength for ADLs and functional transfers. Required minimal cues for the correct technique as well as a moderate level of rest breaks during this exercise bout. Tolerated static standing x2 trials during session (1 minute/15 seconds and 1 minute) at walker level, but required cues from therapist to assume upright posture and for continuation as patient requested to sit after ~5 seconds of standing. Patient continues to require heavy BUE assist on walker during standing bouts and is unable to particiapte in any standing ADLs. Educated patient on the importance of standing and how it relates to increased independence with daily living activities. Patient remains in 
     [facility]  Phone: [phone]    Occupational Therapy Skilled Daily Note  Date: 5/10/2025  Patient Name: Quique Wray        MRN: [id number]    : 1945  (80 y.o.)    Subjective:     Subjective: Patient was lying supine in bed with wife and RN present upon OT arrival. Was agreeable to OT interventions.    Discharge recommendation: Continue to assess Pending Progress    Objective:     ADLs:  LE Dressing: Moderate assistance, Dependent/Total (MOD A: donning shorts in sitting/standing; DEP: donning Velcro shoes in sitting)  Toileting: Maximum assistance (clothing management and regina care in standing)    Transfers:  Supine to Sit: Supervision (with HOB fully elevated)  Sit to stand: Minimal assistance, 2 Person assistance   Stand to sit: 2 Person assistance, Contact guard assistance  Toilet Transfer: Minimal assistance (with 3-in-1 commode in place)    Assessment:     Assessment: Patient was lying supine in bed with RN and wife present upon OT arrival. Was agreeable to OT interventions. Completed ADLs and functional transfers as documented above. Co-treated with PTA to maximize therapeutic outcomes. Agreed to complete therapy session in therapy room, but requested to use bathroom first. Performed functional mobility to bathroom via FWW/gait belt with CGAx1-2. Required increased time on commode as well as cues to avoid straining. Was noted to attempt both clothing management and regina care tasks this date, but requested assistance for remainder stating, \"I just can't; I can't reach.\" Following toileting tasks, patient performed functional mobility from bathroom to hospital hallway (double doors area) via FWW/gait belt with CGA x2. Utilized w/c for remaining distance. Upon reaching therapy room, patient engaged in 6 minutes of BUE ther ex as outlined in the exercise flowsheet to increase UE strength for dressing and toileting tasks. Tolerated increased resistance bilaterally and did not require any 
     [facility]  Phone: [phone]    Occupational Therapy Skilled Daily Note  Date: 2025  Patient Name: Quique Wray        MRN: [id number]    : 1945  (80 y.o.)    Subjective:     Subjective: Patient was lying supine in bed with wife present upon arrival. Was agreeable to OT interventions.    Pt is PROGRESSING toward goals and independence of Self Care this treatment session    Discharge recommendation: Continue to assess Pending Progress    Objective:     ADLs:  N/A    Transfers:  Supine to Sit: Supervision   Sit to Supine: Supervision  Sit to stand: Minimal assistance   Stand to sit: Minimal assistance    Assessment:     Assessment: Patient seated in bed upon arrival, agreeable to therapy interventions. Co-tx with PTA is completed to address higher level dynamic standing tasks. Completes bed mobility with SUP, STS transfers w/ MIN A. Ambulates from EOB to elevators with wc following and CGA. Wheeled remainder of way to therapy room, once in therapy room patient engages in ascending 4\" and 6\" steps with MIN A x2 with MIN verbal prompting. Transitioned to dynamic item retrieval task using fww and AE (reacher) to retrieve x10 items from floor level, demos good utilization of AE (reacher) to complete task. Good safety awareness is noted. Patient does report troubles w/ regina-area cleaning following toileting, educated patient and spouse on toileting adaptive equipment to ensure cleanliness. Spouse provided with handout/picture. Ambulates back to room from elevators w/ wc following, remains in bed upon CHRIS departure with call light and other personal items within reach. Bed alarm in place, spouse remains present. Will continue to address goals and progress patient as able/tolerated.    Exercises:     See Flowsheets    Goals:     Short term goals:  Time Frame for Short Term Goals: 7 days (2025)  Short Term Goal 1: Patient will complete a BSC transfer while using DME PRN with MIN A - MET (MIN 
     [facility]  Phone: [phone]    Occupational Therapy Skilled Daily Note  Date: 4/15/2025  Patient Name: Quique Wray        MRN: [id number]    : 1945  (80 y.o.)    Subjective:     Subjective: Patient was lying supine in bed with wife present upon OT arrival. Was agreeable to OT interventions.    Discharge recommendation: Continue to assess Pending Progress    Objective:     ADLs:  Grooming: Contact guard assistance (brushing teeth while sitting EOB)    Transfers:  Supine to Sit: Substantial/Maximal assistance, 2 Person assistance  Sit to Supine: 2 Person assistance, Substantial/Maximal assistance  Sit to stand: Moderate assistance, 2 Person assistance   Stand to sit: Moderate assistance, 2 Person assistance    Assessment:     Assessment: Patient was lying supine in bed with wife present upon OT arrival. Was agreeable to OT interventions. Wife reported increased confusion this date. Completed ADLs and functional transfers as documented above, requiring increased assistance for bed mobility. Co-treated with PTA to maximize therapeutic outcomes. Tolerated static/dynamic sitting at EOB x14 minutes with CGA-MIN A while completing grooming tasks and BUE AROM exercise. Required intermittent cues from therapist to support self with LUE, particularly during right-handed ADLs. Tolerated static standing at walker level x2 trials (30 seconds then 20 seconds with heavy BUE support and cues for erect posture) to increase independence with LB ADLs. Overall fair session, will continue to address goals and progress patient as able/tolerated. Patient remains in bed with call light/personal items within reach, wife present and bed alarm activated upon OT departure. Provided update to RN.    Exercises:     See Flowsheets    Goals:     Short term goals:  Time Frame for Short Term Goals: 11 days (2025)  Short Term Goal 1: Patient will complete a BSC transfer while using DME PRN with MIN A.  Short Term Goal 
     [facility]  Phone: [phone]    Occupational Therapy Skilled Daily Note  Date: 2025  Patient Name: Quique Wray        MRN: [id number]    : 1945  (80 y.o.)    Subjective:     Subjective: Patient was lying supine in bed with wife present upon OT arrival. Was agreeable to OT interventions.    Discharge recommendation: Continue to assess Pending Progress    Objective:     ADLs:  Grooming: Contact guard assistance (brushing teeth and combing hair in sitting)  Toileting: Dependent/Total (clothing management and regina care in supine/side-lying positions)    Transfers:  Supine to Sit: Minimal assistance  Sit to Supine: Minimal assistance  Sit to stand: Moderate assistance, 2 Person assistance   Stand to sit: Moderate assistance, 2 Person assistance    Assessment:     Assessment: Patient was lying supine in bed using bed pan with wife present upon OT arrival. Was agreeable to OT interventions. Completed ADLs and functional transfers as documented above. Co-treated with PTA to maximize therapeutic outcomes. Tolerated dynamic sitting at EOB x10 minutes with CGA while completing grooming tasks. Initiated static standing at walker level x2 trials (~10 seconds then 2 seconds) before abruptly returning to EOB due to increased back pain. Educated wife on therapy progression and steps required before trialing ambulation. Wife verbalized understanding and stated that she is hopeful that he can walk again. Improved sitting balance noted this date, particularly during BUE ADLs requiring simultaneous use of upper extremities. Following sitting bout, patient returned himself to supine (again without warning); this therapist positioned patient for comfort with pillows. Patient remains in bed with call light/personal items within reach, wife present and bed alarm activated upon OT departure. Will continue to address goals and progress patient as able/tolerated. Provided update to RN.    Exercises:     See 
     [facility]  Phone: [phone]    Occupational Therapy Skilled Daily Note  Date: 2025  Patient Name: Quique Wray        MRN: [id number]    : 1945  (80 y.o.)    Subjective:     Subjective: Patient was lying supine in bed upon arrival. Was agreeable to OT interventions.    Pt shows NO CHANGE toward goals and independence of Self Care this treatment session    Discharge recommendation: Continue to assess Pending Progress    Objective:     ADLs:  Grooming: Maximum assistance  UE Bathing: Maximum assistance  LE Bathing: Maximum assistance  UE Dressing: Maximum assistance  LE Dressing: Maximum assistance  Toileting: Maximum assistance    Transfers:  Supine to Sit: Supervision, Stand by assistance  Sit to stand: Moderate assistance, 2 Person assistance (Utilized Clari Steady)   Stand to sit: Moderate assistance, 2 Person assistance (Utilized Clari Steady)       Assessment:     Assessment: Patient supine in bed upon arrival, agreeable to OT interventions. Seen w/ PTA for safety concerns as patient continues to require x2 assistance to complete STS. Patient demos improved bed mobility, as SBA/SUP is provided w/ prompting for hand placement. Clari Steady is utilized to t/f patient from EOB to BR. UB and LB bathing/dressing are completed w/ documented above assistance. Once in recliner requires MAX A for shampoo cap due to increased pain in SERJIO shoulders which is impacting functional ROM. Remains in recliner upon CHRIS departure with RN present in room. Call light and other personal items within reach. Chair alarm in place, will continue to address goals and progress pt as able/tolerated.    Exercises:     See Flowsheets    Goals:     Short term goals:  Time Frame for Short Term Goals: 7 days (2025)  Short Term Goal 1: Patient will complete a BSC transfer while using DME PRN with MIN A.  Short Term Goal 2: Patient will complete upper body dressing and/or bathing while using adaptive 
     [facility]  Phone: [phone]    Occupational Therapy Skilled Daily Note  Date: 2025  Patient Name: Quique Wray        MRN: [id number]    : 1945  (80 y.o.)    Subjective:     Subjective: Patient was lying supine in bed with wife present upon OT arrival. Was agreeable to OT interventions.    Discharge recommendation: Continue to assess Pending Progress    Objective:     ADLs:  Grooming: Supervision, Setup (applying deodorant and washing face in sitting)  UE Bathing: Supervision, Setup (via sponge bath in sitting)  LE Bathing: Minimal assistance (via sponge bath in sitting, excluding regina area)  UE Dressing: Supervision, Setup (doffing/donning t-shirt in sitting)  LE Dressing: Moderate assistance, Dependent/Total (MOD A: donning shorts in sitting/standing; DEP: doffing/donning bilateral slipper socks in sitting)    Transfers:  Supine to Sit: Supervision  Sit to stand: Minimal assistance, 2 Person assistance   Stand to sit: Minimal assistance (1 Person assistance)    Assessment:     Assessment: Patient was lying supine in bed with wife present upon OT arrival. Was agreeable to OT interventions. Completed ADLs (including sponge bath) and functional transfers as documented above. Required assistance to doff/don bilateral slipper socks, don shorts over bilateral feet and wash bilateral feet due to difficulty reaching toward floor level; patient reported that his wife assisted with these tasks at baseline. Demonstrated good dynamic sitting balance at EOB during sponge bathing activity despite reports of LBP; RN arrived during session and placed a Lidocaine patch on patient's lower back to assist with pain relief. Performed functional mobility from EOB to bedside recliner via FWW/gait belt with CGA. Demonstrated good safety awareness prior to sitting AEB fully backing up with the walker and reaching back for bilateral arm rests. To increase UE strength for ADLs and functional transfers, patient 
     [facility]  Phone: [phone]    Occupational Therapy Skilled Daily Note  Date: 4/15/2025  Patient Name: Quique Wray        MRN: [id number]    : 1945  (80 y.o.)    Subjective:     Subjective: Patient was lying supine in bed with wife present upon arrival. Was agreeable to OT interventions.    Pt shows NO CHANGE toward goals and independence of Self Care this treatment session    Discharge recommendation: Continue to assess Pending Progress    Objective:     ADLs:  None    Transfers:  Supine to Sit: Substantial/Maximal assistance, 2 Person assistance  Sit to Supine: 2 Person assistance, Substantial/Maximal assistance  Sit to stand: Maximum assistance, 2 Person assistance   Stand to sit: Maximum assistance, 2 Person assistance      Assessment:     Assessment: Patient supine in bed w/ HOB elevated visiting with spouse upon arrival, agreeable to OT interventions. Co-tx with PTA is performed due to safety concerns as patient continues to require 2+ heavy assistance to complete transfers. Patient requires additional time, verbal and tactile prompting to complete t/f from EOB to supine. x2 trials of sit-stand transfers were attempted, unable to stand for longer than 10 second duration with MAX A x2. Demos poor sitting tolerance due to R pelvic pain, RN aware and has provided medication. Medication patch is also applied. Becomes impulsive attempts to lay down despite being on EOB. Requires verbal cues for safety, does verbalize he would like to get to recliner this afternoon. PADMINI STEADY is utilized following several unsuccessful sit-stand attempts. Requires MOD prompting for hand placement for transfer. Fair tolerance to utilization of Padmini Steady, does verbalize he is happy to be in recliner. Remains in recliner upon CHRIS departure with call light and other personal items within reach. Chair alarm in place, spouse remains present in room. RN notified. Will continue to address goals and progress 
     [facility]  Phone: [phone]    Occupational Therapy Skilled Daily Note  Date: 2025  Patient Name: Quique Wray        MRN: [id number]    : 1945  (80 y.o.)    Subjective:     Subjective: Patient was sitting in recliner upon arrival. Was agreeable to OT interventions.    Pt is PROGRESSING toward goals and independence of Self Care this treatment session    Discharge recommendation: Continue to assess Pending Progress    Objective:     ADLs:  LE Dressing: Maximum assistance (donning B socks and tennis shoes)    Transfers:  Supine to Sit: Minimal assistance  Sit to Supine: Partial/Moderate assistance, 2 Person assistance  Sit to stand: Minimal assistance, 2 Person assistance   Stand to sit: Minimal assistance, 2 Person assistance  Toilet Transfer: Minimal assistance, 2 Person assistance (via BSC)       Assessment:     Assessment: Patient seated in recliner upon arrival, agreeable to therapy interventions. Co-tx with PT is completed due to safety concerns, limited endurance due to pain, and to address extended functional mobility and standing duration to promote improvements in ADL and IADL task engagement. Patient ambulates from recliner to curtain, requires MIN A x2 for STS this date, and CGA for fxnl mobility. Wheeled within x10 feet of therapy room where patient completes second trial of fxnl mobility w/ wc follow. Once in therapy room patient engages in static one hand support standing task to retrieve and place objects on R/L side of body while weight shifting and crossing midline. Patient engages in task for 1 min 57 seconds until seated RB is required due to fatigue. Wheeled back to room, where patient ambulates from doorway to EOB w/ CGA. Requires MIN A for sit-supine t/f as patient is entering bed on L side vs usual R side. Patient remains in bed upon CHRIS departure w/ call light and other personal items within reach. Will continue to address goals and progress pt as 
     [facility]  Phone: [phone]    Occupational Therapy Skilled Daily Note  Date: 2025  Patient Name: Quique Wray        MRN: [id number]    : 1945  (80 y.o.)    Subjective:     Subjective: Patient was lying supine in bed upon OT arrival. Was agreeable to OT interventions.    Discharge recommendation: Home with     Objective:     ADLs:  UE Dressing: Setup, Supervision (doffing/donning t-shirt in sitting)  LE Dressing: Minimal assistance, Dependent/Total (MIN A: donning elastic-waist shorts in sitting/standing; DEP: donning/doffing bilateral Velcro shoes in sitting)  *patient received assistance for ADLs at baseline    Transfers:  Supine to Sit: Supervision  Sit to stand: Contact guard assistance, 2 Person assistance   Stand to sit: Contact guard assistance, 2 Person assistance    Assessment:     Assessment: Patient was lying supine in bed upon OT arrival. Was agreeable to OT interventions. Completed ADLs and functional transfers as documented above. Co-treated with PTA to maximize therapeutic outcomes. Agreed to complete therapy session in therapy room; performed functional mobility from EOB into hospital hallway (elevator area) via FWW/gait belt with CGA before requiring w/c assistance for remaining distance. Upon arriving to therapy room, patient engaged in 11 minutes of BUE ther ex as outlined in the exercise flowsheet to increase UE strength for ADLs and functional transfers. Required moderate cues for the correct technique, but did not require any rest breaks during this exercise bout. To increase independence and safety with IADLs, patient tolerated dynamic standing x2 trials (1 minute/30 seconds and 2 minutes/45 seconds) while engaging in a functional reaching activity (involving midline crossing) unilaterally. Patient did not experience any LOB during these standing bouts, but demonstrated increased SOB requiring cues from therapist for pursed-lip breathing (with poor carryover 
    [facility]  Pharmacy Department    Clinical Pharmacy Note-Warfarin Follow-up       Patient:  Quique Wray  MRN: [id number]    Warfarin consult follow-up:    Date                      INR           Warfarin Dose Given  5/1/2025               2.4                   5 mg  4/30/2025             2.5                   3.75 mg  4/29/2025             2.6                   2.5 mg  4/28/2025             2.9                   2.5 mg  4/27/2025             3.1                   1 mg  4/26/2025             2.9                    2.5 mg  4/25/2025             2.7                    2.5 mg  4/24/2025             2.6                    5 mg  4/23/2025             2.3                    2.5 mg  4/22/2025             2.2                    5 mg  4/21/2025             2.4                    5 mg  4/20/2025             2.8                    2.5 mg  4/19/2025             3.1                    HELD  4/18/2025             2.9                    1.25 mg  4/17/2025             2.5                     5 mg  4/16/2025             2.3                     5 mg  4/15/2025             2.8                     2.5 mg  4/14/2025             2.9                     2 mg  4/13/2025             2.8                     0.5 mg  4/12/2025             2.2                     2.5 mg  4/11/2025             1.9                     5 mg      Hemoglobin (g/dL)   Date Value   04/29/2025 8.6 (L)   04/26/2025 7.8 (LL)   04/24/2025 8.4 (L)     Hematocrit (%)   Date Value   04/29/2025 27.2 (L)   04/26/2025 24.3 (L)   04/24/2025 26.3 (L)     Platelet Count (k/uL)   Date Value   11/13/2011 137     Platelets (k/uL)   Date Value   04/29/2025 227   04/26/2025 236   04/19/2025 205       Target INR Range: 2-3    Significant Drug-Drug Interactions:  New warfarin drug-drug interactions: none  Discontinued drug-drug interactions: none      Notes:  INR remains therapeutic today so will order home dose of 5 mg for this evening. Daily PT/INR until 
    [facility]  Pharmacy Department    Clinical Pharmacy Note-Warfarin Follow-up       Patient:  Quique Wray  MRN: [id number]    Warfarin consult follow-up:    INR (no units)   Date Value   04/17/2025 2.5   04/16/2025 2.3   04/15/2025 2.8   04/14/2025 2.9   04/13/2025 2.8   04/12/2025 2.2   04/11/2025 1.9       Hemoglobin (g/dL)   Date Value   04/15/2025 7.6 (LL)   04/14/2025 7.6 (LL)   04/12/2025 8.3 (L)     Hematocrit (%)   Date Value   04/15/2025 23.9 (L)   04/14/2025 23.8 (L)   04/12/2025 25.9 (L)     Platelet Count (k/uL)   Date Value   11/13/2011 137     Platelets (k/uL)   Date Value   04/14/2025 200   04/12/2025 212   03/25/2025 276       Target INR Range: 2.0-3.0    Significant Drug-Drug Interactions:  New warfarin drug-drug interactions: none  Discontinued drug-drug interactions: none      Notes:  INR remains therapeutic so will order 5 mg for this evening. Daily PT/INR until stable within therapeutic range.       Giuseppe Blakely, PharmD 4/17/2025 12:14 PM   
    [facility]  Pharmacy Department    Clinical Pharmacy Note-Warfarin Follow-up       Patient:  Quique Wray  MRN: [id number]    Warfarin consult follow-up:    INR (no units)   Date Value   04/14/2025 2.9   04/13/2025 2.8   04/12/2025 2.2   04/11/2025 1.9   03/25/2025 5.0 (HH)   03/22/2025 3.3   10/14/2024 3.3     INR,(POC) (no units)   Date Value   03/05/2025 3.8   02/19/2025 7.6   01/29/2025 1.8   01/15/2025 3.2   12/04/2024 2.9   10/25/2024 2.8       Hemoglobin (g/dL)   Date Value   04/12/2025 8.3 (L)   03/25/2025 8.6 (L)   03/22/2025 8.6 (L)     Hematocrit (%)   Date Value   04/12/2025 25.9 (L)   03/25/2025 25.8 (L)   03/22/2025 27.2 (L)     Platelet Count (k/uL)   Date Value   11/13/2011 137     Platelets (k/uL)   Date Value   04/12/2025 212   03/25/2025 276   03/22/2025 356       Target INR Range: 2-3    Significant Drug-Drug Interactions:  New warfarin drug-drug interactions: none  Discontinued drug-drug interactions: none      Notes:  INR remains therapeutic. Will order 2 mg for this evening. Daily PT/INR until stable within therapeutic range.     Giuseppe Blakely, PharmD 4/14/2025 7:32 AM   
    [facility]  Pharmacy Department    Clinical Pharmacy Note-Warfarin Follow-up       Patient:  Quique Wray  MRN: [id number]    Warfarin consult follow-up:    Date                      INR           Warfarin Dose Given  4/27/2025             3.1                    1 mg  4/26/2025             2.9                    2.5 mg  4/25/2025             2.7                    2.5 mg  4/24/2025             2.6                    5 mg  4/23/2025             2.3                    2.5 mg  4/22/2025             2.2                    5 mg  4/21/2025             2.4                    5 mg  4/20/2025             2.8                    2.5 mg  4/19/2025             3.1                    HELD  4/18/2025             2.9                    1.25 mg  4/17/2025             2.5                     5 mg  4/16/2025             2.3                     5 mg  4/15/2025             2.8                     2.5 mg  4/14/2025             2.9                     2 mg  4/13/2025             2.8                     0.5 mg  4/12/2025             2.2                     2.5 mg  4/11/2025             1.9                     5 mg      Hemoglobin (g/dL)   Date Value   04/26/2025 7.8 (LL)   04/24/2025 8.4 (L)   04/19/2025 7.9 (LL)     Hematocrit (%)   Date Value   04/26/2025 24.3 (L)   04/24/2025 26.3 (L)   04/19/2025 25.2 (L)     Platelet Count (k/uL)   Date Value   11/13/2011 137     Platelets (k/uL)   Date Value   04/26/2025 236   04/19/2025 205   04/14/2025 200       Target INR Range: 2-3    Significant Drug-Drug Interactions:  New warfarin drug-drug interactions: Zoloft  Discontinued drug-drug interactions: none      Notes:  Slightly supratherapeutic today so will order 1 mg vs normal 5 mg. Daily PT/INR until stable within therapeutic range.       Giuseppe Blakely, PharmD 4/27/2025 12:41 PM   
    [facility]  Pharmacy Department    Clinical Pharmacy Note-Warfarin Follow-up       Patient:  Quique Wray  MRN: [id number]    Warfarin consult follow-up:    Date                      INR           Warfarin Dose Given  4/22/2025             2.2                    5 mg  4/21/2025             2.4                    5 mg  4/20/2025             2.8                    2.5 mg  4/19/2025             3.1                    HELD  4/18/2025             2.9                    1.25 mg  4/17/2025             2.5                     5 mg  4/16/2025             2.3                     5 mg  4/15/2025             2.8                     2.5 mg  4/14/2025             2.9                     2 mg  4/13/2025             2.8                     0.5 mg  4/12/2025             2.2                     2.5 mg  4/11/2025             1.9                     5 mg      Hemoglobin (g/dL)   Date Value   04/19/2025 7.9 (LL)   04/18/2025 8.0 (LL)   04/15/2025 7.6 (LL)     Hematocrit (%)   Date Value   04/19/2025 25.2 (L)   04/18/2025 25.6 (L)   04/15/2025 23.9 (L)     Platelet Count (k/uL)   Date Value   11/13/2011 137     Platelets (k/uL)   Date Value   04/19/2025 205   04/14/2025 200   04/12/2025 212       Target INR Range: 2-3    Significant Drug-Drug Interactions:  New warfarin drug-drug interactions: none  Discontinued drug-drug interactions: none      Notes:  Remains therapeutic but still dropped a little. Will order home dose of 5 mg this evening. Daily PT/INR until stable within therapeutic range.     Giuseppe Blakely, PharmD 4/22/2025 11:17 AM   
    [facility]  Pharmacy Department    Clinical Pharmacy Note-Warfarin Follow-up       Patient:  Quique Wray  MRN: [id number]    Warfarin consult follow-up:    Date                      INR           Warfarin Dose Given  5/6/2025               3.0                   2.5 mg  5/5/2025               2.6                   2.5 mg  5/4/2025               2.8                   5 mg  5/3/2025               3.1                  1.25 mg  5/2/2025               2.7                   2.5 mg  5/1/2025               2.4                   5 mg  4/30/2025             2.5                   3.75 mg  4/29/2025             2.6                   2.5 mg  4/28/2025             2.9                   2.5 mg  4/27/2025             3.1                   1 mg  4/26/2025             2.9                    2.5 mg  4/25/2025             2.7                    2.5 mg  4/24/2025             2.6                    5 mg  4/23/2025             2.3                    2.5 mg  4/22/2025             2.2                    5 mg  4/21/2025             2.4                    5 mg  4/20/2025             2.8                    2.5 mg  4/19/2025             3.1                    HELD  4/18/2025             2.9                    1.25 mg  4/17/2025             2.5                     5 mg  4/16/2025             2.3                     5 mg  4/15/2025             2.8                     2.5 mg  4/14/2025             2.9                     2 mg  4/13/2025             2.8                     0.5 mg  4/12/2025             2.2                     2.5 mg  4/11/2025             1.9                     5 mg      Hemoglobin (g/dL)   Date Value   05/05/2025 7.7 (LL)   05/03/2025 7.6 (LL)   04/29/2025 8.6 (L)     Hematocrit (%)   Date Value   05/05/2025 24.7 (L)   05/03/2025 24.3 (L)   04/29/2025 27.2 (L)     Platelet Count (k/uL)   Date Value   11/13/2011 137     Platelets (k/uL)   Date Value   05/03/2025 163   04/29/2025 227   04/26/2025 236 
    [facility]  Pharmacy Department    Clinical Pharmacy Note-Warfarin Follow-up       Patient:  Quique Wray  MRN: [id number]    Warfarin consult follow-up:    INR (no units)   Date Value   05/02/2025 2.7   05/01/2025 2.4   04/30/2025 2.5   04/29/2025 2.6   04/28/2025 2.9   04/27/2025 3.1   04/26/2025 2.9       Hemoglobin (g/dL)   Date Value   04/29/2025 8.6 (L)   04/26/2025 7.8 (LL)   04/24/2025 8.4 (L)     Hematocrit (%)   Date Value   04/29/2025 27.2 (L)   04/26/2025 24.3 (L)   04/24/2025 26.3 (L)     Platelet Count (k/uL)   Date Value   11/13/2011 137     Platelets (k/uL)   Date Value   04/29/2025 227   04/26/2025 236   04/19/2025 205       Target INR Range: 2.0-3.0    Significant Drug-Drug Interactions:  New warfarin drug-drug interactions: no change  Discontinued drug-drug interactions: no change      Notes:  patient to take warfarin 2.5mg po today                   Daily PT/INR until stable within therapeutic range.     Thank you,  Estephania Pabon RP,   5/2/2025, 7:24 AM    
    [facility]  Pharmacy Department    Clinical Pharmacy Note-Warfarin Follow-up       Patient:  Quique Wray  MRN: [id number]    Warfarin consult follow-up:    INR (no units)   Date Value   04/13/2025 2.8   04/12/2025 2.2   04/11/2025 1.9   03/25/2025 5.0 (HH)   03/22/2025 3.3   10/14/2024 3.3   07/31/2024 2.5     INR,(POC) (no units)   Date Value   03/05/2025 3.8   02/19/2025 7.6   01/29/2025 1.8   01/15/2025 3.2   12/04/2024 2.9   10/25/2024 2.8   09/13/2024 2.9       Hemoglobin (g/dL)   Date Value   04/12/2025 8.3 (L)   03/25/2025 8.6 (L)   03/22/2025 8.6 (L)     Hematocrit (%)   Date Value   04/12/2025 25.9 (L)   03/25/2025 25.8 (L)   03/22/2025 27.2 (L)     Platelet Count (k/uL)   Date Value   11/13/2011 137     Platelets (k/uL)   Date Value   04/12/2025 212   03/25/2025 276   03/22/2025 356       Target INR Range: 2 - 3    Significant Drug-Drug Interactions:  New warfarin drug-drug interactions: none  Discontinued drug-drug interactions: none      Notes:   Please give warfarin 0.5 mg today.                 Daily PT/INR until stable within therapeutic range.     Thank you  Aruna Hampton, PharmD 4/13/2025 7:41 AM    
    [facility]  Pharmacy Department    Clinical Pharmacy Note-Warfarin Follow-up       Patient:  Quique Wray  MRN: [id number]    Warfarin consult follow-up:    INR (no units)   Date Value   05/05/2025 2.6   05/04/2025 2.8   05/03/2025 3.1   05/02/2025 2.7   05/01/2025 2.4   04/30/2025 2.5   04/29/2025 2.6       Hemoglobin (g/dL)   Date Value   05/05/2025 7.7 (LL)   05/03/2025 7.6 (LL)   04/29/2025 8.6 (L)     Hematocrit (%)   Date Value   05/05/2025 24.7 (L)   05/03/2025 24.3 (L)   04/29/2025 27.2 (L)     Platelet Count (k/uL)   Date Value   11/13/2011 137     Platelets (k/uL)   Date Value   05/03/2025 163   04/29/2025 227   04/26/2025 236       Target INR Range: 2.0-3.0    Significant Drug-Drug Interactions:  New warfarin drug-drug interactions: no change  Discontinued drug-drug interactions: no change      Notes:  Patient to take warfarin 2.5mg po today. Pharmacy will continue to monitor                   Daily PT/INR until stable within therapeutic range.     Thank you,  Estephania Pabon RP,   5/5/2025, 7:16 AM    
    [facility]  Pharmacy Department    Clinical Pharmacy Note-Warfarin Follow-up       Patient:  Quique Wray  MRN: [id number]    Warfarin consult follow-up:    INR (no units)   Date Value   04/28/2025 2.9   04/27/2025 3.1   04/26/2025 2.9   04/25/2025 2.7   04/24/2025 2.6   04/23/2025 2.3   04/22/2025 2.2       Hemoglobin (g/dL)   Date Value   04/26/2025 7.8 (LL)   04/24/2025 8.4 (L)   04/19/2025 7.9 (LL)     Hematocrit (%)   Date Value   04/26/2025 24.3 (L)   04/24/2025 26.3 (L)   04/19/2025 25.2 (L)     Platelet Count (k/uL)   Date Value   11/13/2011 137     Platelets (k/uL)   Date Value   04/26/2025 236   04/19/2025 205   04/14/2025 200       Target INR Range: 2.0-3.0    Significant Drug-Drug Interactions:  New warfarin drug-drug interactions: no change, continues sertraline  Discontinued drug-drug interactions: no change      Notes:    patient to take warfarin 2.5mg po today. Pharmacy will continue to monitor closely                 Daily PT/INR until stable within therapeutic range.     Thank you,  Estephania Pabon [facility],   4/28/2025, 7:12 AM    
    [facility]  Pharmacy Department    Clinical Pharmacy Note-Warfarin Follow-up       Patient:  Quique Wray  MRN: [id number]    Warfarin consult follow-up:    INR (no units)   Date Value   04/12/2025 2.2   04/11/2025 1.9   03/25/2025 5.0 (HH)   03/22/2025 3.3   10/14/2024 3.3   07/31/2024 2.5   06/18/2024 2.4     INR,(POC) (no units)   Date Value   03/05/2025 3.8   02/19/2025 7.6   01/29/2025 1.8   01/15/2025 3.2   12/04/2024 2.9   10/25/2024 2.8   09/13/2024 2.9       Hemoglobin (g/dL)   Date Value   04/12/2025 8.3 (L)   03/25/2025 8.6 (L)   03/22/2025 8.6 (L)     Hematocrit (%)   Date Value   04/12/2025 25.9 (L)   03/25/2025 25.8 (L)   03/22/2025 27.2 (L)     Platelet Count (k/uL)   Date Value   11/13/2011 137     Platelets (k/uL)   Date Value   04/12/2025 212   03/25/2025 276   03/22/2025 356       Target INR Range: 2 - 3    Significant Drug-Drug Interactions:  New warfarin drug-drug interactions: none  Discontinued drug-drug interactions: none      Notes: Please give warfarin 2.5 mg po today.                     Daily PT/INR until stable within therapeutic range.     Thank you  Aruna Hampton, PharmD 4/12/2025 9:19 AM    
    [facility]  Pharmacy Department    Clinical Pharmacy Note-Warfarin Follow-up       Patient:  Quique Wray  MRN: [id number]    Warfarin consult follow-up:    INR (no units)   Date Value   04/15/2025 2.8   04/14/2025 2.9   04/13/2025 2.8   04/12/2025 2.2   04/11/2025 1.9   03/25/2025 5.0 (HH)   03/22/2025 3.3       Hemoglobin (g/dL)   Date Value   04/15/2025 7.6 (LL)   04/14/2025 7.6 (LL)   04/12/2025 8.3 (L)     Hematocrit (%)   Date Value   04/15/2025 23.9 (L)   04/14/2025 23.8 (L)   04/12/2025 25.9 (L)     Platelet Count (k/uL)   Date Value   11/13/2011 137     Platelets (k/uL)   Date Value   04/14/2025 200   04/12/2025 212   03/25/2025 276       Target INR Range: 2-3    Significant Drug-Drug Interactions:  New warfarin drug-drug interactions: none  Discontinued drug-drug interactions: none      Notes:  INR remains therapeutic. Patient is to receive 2.5 mg this evening. Daily PT/INR until stable within therapeutic range.     Giuseppe Blakely, PharmD 4/15/2025 7:17 AM   
    [facility]  Pharmacy Department    Clinical Pharmacy Note-Warfarin Follow-up       Patient:  Quique Wray  MRN: [id number]    Warfarin consult follow-up:    INR (no units)   Date Value   04/29/2025 2.6   04/28/2025 2.9   04/27/2025 3.1   04/26/2025 2.9   04/25/2025 2.7   04/24/2025 2.6   04/23/2025 2.3       Hemoglobin (g/dL)   Date Value   04/26/2025 7.8 (LL)   04/24/2025 8.4 (L)   04/19/2025 7.9 (LL)     Hematocrit (%)   Date Value   04/26/2025 24.3 (L)   04/24/2025 26.3 (L)   04/19/2025 25.2 (L)     Platelet Count (k/uL)   Date Value   11/13/2011 137     Platelets (k/uL)   Date Value   04/26/2025 236   04/19/2025 205   04/14/2025 200       Target INR Range: 2.0-3.0    Significant Drug-Drug Interactions:  New warfarin drug-drug interactions: no change  Discontinued drug-drug interactions: no change      Notes:     patient to take warfarin 2.5mg po today. Pharmacy will continue to monitor                Daily PT/INR until stable within therapeutic range.     Thank you,  Estephania Pabon [facility],   4/29/2025, 7:19 AM    
    [facility]  Pharmacy Department    Clinical Pharmacy Note-Warfarin Follow-up       Patient:  Quique Wray  MRN: [id number]    Warfarin consult follow-up:    INR (no units)   Date Value   05/09/2025 2.7   05/08/2025 2.5   05/07/2025 2.6   05/06/2025 3.0   05/05/2025 2.6   05/04/2025 2.8   05/03/2025 3.1       Hemoglobin (g/dL)   Date Value   05/10/2025 8.1 (L)   05/08/2025 7.8 (LL)   05/05/2025 7.7 (LL)     Hematocrit (%)   Date Value   05/10/2025 25.5 (L)   05/08/2025 24.5 (L)   05/05/2025 24.7 (L)     Platelet Count (k/uL)   Date Value   11/13/2011 137     Platelets (k/uL)   Date Value   05/10/2025 208   05/03/2025 163   04/29/2025 227       Potential Warfarin Drug-Drug Interactions:  Current drug-drug interactions: Sertraline 25 mg daily (started 4/25); IV Ceftriaxone and IV Ampicillin, Acetaminophen PRN, Seroquel, Lansoprazole, Allopurinol, and Atorvastatin   Discontinued drug-drug interactions: none      Plan:                     Will give home dose of 2.5 mg warfarin tonight. To minimize frequency of lab draws, we will adjust INR checks to 3 times weekly on Mon/Wed/Fri per physician request. We will order weekly dose of 5 mg on Sunday and Thursday and 2.5 mg all other days of the week (22.5 mg weekly) and adjust accordingly, if indicated. PT/INR order changed to 3 times weekly. Pharmacy will continue to monitor.     Thank you,  Harini Puri [facility],   5/10/2025, 8:47 AM              
    [facility]  Pharmacy Department    Clinical Pharmacy Note-Warfarin Follow-up       Patient:  Quique Wray  MRN: [id number]    Warfarin consult follow-up:    Date                     INR              Warfarin Dose Given  5/15/2025                                    5 mg  5/14/2025             2.2                  2.5 mg  5/13/2025                                    2.5 mg  5/12/2025             2.4                  2.5 mg  5/11/2025                                    5 mg  5/10/2025                                    2.5 mg  5/9/2025               2.7                  2.5 mg  5/8/2025               2.5                  5 mg  5/7/2025               2.6                  3.75 mg  5/6/2025               3.0                  2.5 mg  5/5/2025               2.6                  2.5 mg  5/4/2025               2.8                   5 mg  5/3/2025               3.1                  1.25 mg  5/2/2025               2.7                   2.5 mg  5/1/2025               2.4                   5 mg  4/30/2025             2.5                   3.75 mg  4/29/2025             2.6                   2.5 mg  4/28/2025             2.9                   2.5 mg  4/27/2025             3.1                   1 mg  4/26/2025             2.9                    2.5 mg  4/25/2025             2.7                    2.5 mg  4/24/2025             2.6                    5 mg  4/23/2025             2.3                    2.5 mg  4/22/2025             2.2                    5 mg  4/21/2025             2.4                    5 mg  4/20/2025             2.8                    2.5 mg  4/19/2025             3.1                    HELD  4/18/2025             2.9                    1.25 mg  4/17/2025             2.5                     5 mg  4/16/2025             2.3                     5 mg  4/15/2025             2.8                     2.5 mg  4/14/2025             2.9                     2 mg  4/13/2025             2.8           
    [facility]  Pharmacy Department    Clinical Pharmacy Note-Warfarin Follow-up       Patient:  Quique Wray  MRN: [id number]    Warfarin consult follow-up:    Date                      INR           Warfarin Dose Given  4/24/2025             2.6                    5 mg  4/23/2025             2.3                    2.5 mg  4/22/2025             2.2                    5 mg  4/21/2025             2.4                    5 mg  4/20/2025             2.8                    2.5 mg  4/19/2025             3.1                    HELD  4/18/2025             2.9                    1.25 mg  4/17/2025             2.5                     5 mg  4/16/2025             2.3                     5 mg  4/15/2025             2.8                     2.5 mg  4/14/2025             2.9                     2 mg  4/13/2025             2.8                     0.5 mg  4/12/2025             2.2                     2.5 mg  4/11/2025             1.9                     5 mg      Hemoglobin (g/dL)   Date Value   04/24/2025 8.4 (L)   04/19/2025 7.9 (LL)   04/18/2025 8.0 (LL)     Hematocrit (%)   Date Value   04/24/2025 26.3 (L)   04/19/2025 25.2 (L)   04/18/2025 25.6 (L)     Platelet Count (k/uL)   Date Value   11/13/2011 137     Platelets (k/uL)   Date Value   04/19/2025 205   04/14/2025 200   04/12/2025 212       Target INR Range: 2-3    Significant Drug-Drug Interactions:  New warfarin drug-drug interactions: none   Discontinued drug-drug interactions: none      Notes:  Therapeutic INR. Will order home dose of 5 mg for this evening. Daily PT/INR until stable within therapeutic range.     Giuseppe Blakely, PharmD 4/24/2025 1:47 PM   
    [facility]  Pharmacy Department    Clinical Pharmacy Note-Warfarin Follow-up       Patient:  Quique Wray  MRN: [id number]    Warfarin consult follow-up:    INR (no units)   Date Value   05/09/2025 2.7   05/08/2025 2.5   05/07/2025 2.6   05/06/2025 3.0   05/05/2025 2.6   05/04/2025 2.8   05/03/2025 3.1       Hemoglobin (g/dL)   Date Value   05/10/2025 8.1 (L)   05/08/2025 7.8 (LL)   05/05/2025 7.7 (LL)     Hematocrit (%)   Date Value   05/10/2025 25.5 (L)   05/08/2025 24.5 (L)   05/05/2025 24.7 (L)     Platelet Count (k/uL)   Date Value   11/13/2011 137     Platelets (k/uL)   Date Value   05/10/2025 208   05/03/2025 163   04/29/2025 227     Potential Warfarin Drug-Drug Interactions:  Current drug-drug interactions: Sertraline 25 mg daily (started 4/25); IV Ceftriaxone and IV Ampicillin, Acetaminophen PRN, Seroquel, Lansoprazole, Allopurinol, and Atorvastatin   Discontinued drug-drug interactions: none      Plan:                     Will give home dose of 5 mg warfarin tonight. To minimize frequency of lab draws, we will adjust INR checks to 3 times weekly on Mon/Wed/Fri per physician request. We will order weekly dose of 5 mg on Sunday and Thursday and 2.5 mg all other days of the week (22.5 mg weekly) and adjust accordingly, if indicated. PT/INR order changed to 3 times weekly. Pharmacy will continue to monitor.     Thank you,  Harini Puri [facility],   5/11/2025, 8:36 AM  
    [facility]  Pharmacy Department    Clinical Pharmacy Note-Warfarin Follow-up       Patient:  Quique Wray  MRN: [id number]    Warfarin consult follow-up:    INR (no units)   Date Value   05/08/2025 2.5   05/07/2025 2.6   05/06/2025 3.0   05/05/2025 2.6   05/04/2025 2.8   05/03/2025 3.1   05/02/2025 2.7       Hemoglobin (g/dL)   Date Value   05/08/2025 7.8 (LL)   05/05/2025 7.7 (LL)   05/03/2025 7.6 (LL)     Hematocrit (%)   Date Value   05/08/2025 24.5 (L)   05/05/2025 24.7 (L)   05/03/2025 24.3 (L)     Platelet Count (k/uL)   Date Value   11/13/2011 137     Platelets (k/uL)   Date Value   05/03/2025 163   04/29/2025 227   04/26/2025 236       Target INR Range: 2-3    Significant Drug-Drug Interactions:  New warfarin drug-drug interactions: none  Discontinued drug-drug interactions: none      Notes: Therapeutic INR so will order home dose of 5 mg for this evening. Daily PT/INR until stable within therapeutic range.     Giuseppe Blakely, PharmD 5/8/2025 2:32 PM   
 Discharge instructions given, questions answered and demonstrates understanding. IV removed and belongings collected.  Out to private vehicle via wheelchair with wife.   
2nd dose cathflo given per package instructions.   
Acknowledge pt discharge to home this date with spouse and [facility] for RN, PT, OT and HHA services. SW messaged HH and notified that orders were in system for them and confirmed discharge to home today. No further needs anticipated. Abbey Bolton, MSW, LSW 5/19/2025   
Admitted to floor via stretcher. Alert and oriented x 1.  Wife at bedside.  Able to turn with 2 assist, follows cueing.  Friendly and cooperative.  Picc line D/I.  Uses bedpan, loose stool noted.  To be tested to Cdiff.  No open areas besides mild abrasions and bruising to bilat arms.  
[facility]  Physical Therapy    Date: 2025  Patient Name: Quique Wray        : 1945       [] Pt Refusal           [x] Pt Unavailable due to:  Per nurse, patient is c/o pain and he was just given pain medication. Will allow this to work and will check on patient later this morning.        Maribel Pruett, PTA Date: 2025    
[facility]  Facility/Department: [facility] 2E MED SURG TELEMETRY  Speech Language Pathology  Clinical Bedside Swallow Evaluation    NAME:Quique Wray : 1945 (80 y.o.) MRN: [id number]  ROOM: [id number]  ADMISSION DATE: 2025  PATIENT DIAGNOSIS(ES): Weakness [R53.1]    PAST MEDICAL HISTORY  Past Medical History:   Diagnosis Date    Cerebral artery occlusion with cerebral infarction (HCC)     Hypertension     Kidney stone        PAST SURGICAL HISTORY  Past Surgical History:   Procedure Laterality Date    CATARACT REMOVAL Left     INSERTABLE CARDIAC MONITOR N/A 2021    LOOP RECORDER INSERT performed by Andrea Galan MD at [facility] OR    KNEE ARTHROSCOPY Right     LITHOTRIPSY      PACEMAKER INSERTION Left 2022    PACEMAKER INSERTION PERMANENT performed by Andrea Galan MD at [facility] OR       ALLERGIES  Allergies   Allergen Reactions    Adhesive Tape Itching       DATE ONSET: 25    Date of Evaluation: 2025    Evaluating Therapist: HIPOLITO Jacobs    Subjective     Reason for Referral  Quique Wray was referred for a bedside swallow evaluation to assess the efficiency of his swallow function, identify signs and symptoms of aspiration, identify risk factors, and make recommendations regarding safe dietary consistencies, effective compensatory strategies, and safe eating environment. Pt complaints include sometimes liquids go down the wrong pipe, difficulty chewing meats when not wearing partials    General  Chart Reviewed: Yes  Behavior/Cognition: Alert, Cooperative  Temperature Spikes Noted: No  Respiratory Status: Room air  O2 Device: None (Room air)  Communication Observation: Functional  Follows Directions: Complex  Dentition: Some missing teeth (Partial)  Patient Positioning: Upright in bed  Baseline Vocal Quality: Normal  Volitional Cough: Strong  Prior Dysphagia History: coughing/choking with liquids during recent hospital stay  Consistencies Administered: Regular, Soft and 
Bhavin ordered per Dr. Ware for very difficult to flush PICC line.  
[facility]  Swing Bed Evaluation for Certification for Skilled Care    Quique Wray meets skilled criteria due to the need for skilled nursing supervision or skilled rehabilitation services listed below:   [x] Therapy; physical and occupational; for decreased strength, balance and self         care activities.   [] Tpn    [] Trach care   [x] IV therapy   [] Wound care    [] Other Skilled Need:        Quique Wray lives [] Alone      [x] With Spouse    [] Other:   and plans on returning there at discharge.     [] Pt declines list of alternate providers, pt prefers to receive skilled care at [facility]  [] List of alternate providers given to patient, pt prefers to receive skilled care at [facility]  [x] Not applicable, pt admitted to [facility] from Outside Facility    Quique Wray prefers this facility for skilled care and services can only be practically provided in a skilled nursing facility or swing bed hospital on an inpatient basis. Quique Wray will require skilled care on a daily basis beginning 4/11/2025, if medically stable.  These services are for an ongoing condition for which Quique Wray received inpatient care for at the hospital.        Sully Gomez,      Date: 4/11/2025  
[facility]  Physical Therapy    Date: 2025  Patient Name: Quique Wray        : 1945       [x] Pt Refusal        Pt up in chair upon arrival. He c/o feeling too tired this afternoon after having OT earlier this afternoon. Offered to just take a walk, pt refuses any therapy this pm. Will check back on pt tomorrow.    Allyson Noriega, PTA Date: 2025    
Comprehensive Nutrition Assessment    Type and Reason for Visit:  Reassess    Nutrition Recommendations/Plan:   Add Magic Cup tid  Continue Ensure tid.   Encourage all foods during meal times.      Malnutrition Assessment:  Malnutrition Status:  Moderate malnutrition (04/14/25 1042)    Context:  Acute Illness     Findings of the 6 clinical characteristics of malnutrition:  Energy Intake:  Mild decrease in energy intake (at least acutely)  Weight Loss:  Greater than 5% over 1 month     Body Fat Loss:  Mild body fat loss Orbital   Muscle Mass Loss:  Mild muscle mass loss Temples (temporalis)  Fluid Accumulation:  No fluid accumulation     Strength:  Not Performed    Nutrition Assessment:    Continued malnutrition with additional weight losses noted. PO still struggling despite advancement from purees to easy to chew foods. C/o early satiety and a decreased drive for protein foods. INR 2.4. While taking Ensure well, sounds to struggle with it too. Agreeable to use a Magic Cup in addition to the 4 oz Ensure to improve calorie and protein delivery.    Nutrition Related Findings:    active b/s, formed bm, no edema. Wound Type: None       Current Nutrition Intake & Therapies:    Average Meal Intake: 26-50%  Average Supplements Intake: %  ADULT ORAL NUTRITION SUPPLEMENT; Breakfast, Lunch, Dinner; Standard 4 oz Oral Supplement  ADULT DIET; Easy to Chew  ADULT ORAL NUTRITION SUPPLEMENT; Breakfast, Lunch, Dinner; Frozen Oral Supplement    Anthropometric Measures:  Height: 172.7 cm (5' 8\")  Ideal Body Weight (IBW): 154 lbs (70 kg)    Admission Body Weight: 99.8 kg (220 lb 0.3 oz)  Current Body Weight: 98.3 kg (216 lb 11.4 oz), 144 % IBW. Weight Source: Bed scale  Current BMI (kg/m2): 33  Usual Body Weight: 106.1 kg (234 lb) (a month ago)     % Weight Change (Calculated): -7.4  Weight Adjustment For: No Adjustment                 BMI Categories: Obese Class 1 (BMI 30.0-34.9)    Estimated Daily Nutrient Needs:  Energy 
Comprehensive Nutrition Assessment    Type and Reason for Visit:  Reassess    Nutrition Recommendations/Plan:   Continue current diet.   Continue current ONS.      Malnutrition Assessment:  Malnutrition Status:  Moderate malnutrition (04/14/25 1042)    Context:  Acute Illness     Findings of the 6 clinical characteristics of malnutrition:  Energy Intake:  Mild decrease in energy intake (at least acutely)  Weight Loss:  Greater than 5% over 1 month     Body Fat Loss:  Mild body fat loss Orbital   Muscle Mass Loss:  Mild muscle mass loss Temples (temporalis)  Fluid Accumulation:  No fluid accumulation     Strength:  Not Performed    Nutrition Assessment:    continued moderate malnutrition aeb weight loss, fat/muscle losses, decreased PO-26-50% of meals, takes ONS well. Pt receiving care at visit attempt.    Nutrition Related Findings:    active b/s, formed bm, no edema. Wound Type: None       Current Nutrition Intake & Therapies:    Average Meal Intake: 26-50%  Average Supplements Intake: %  ADULT ORAL NUTRITION SUPPLEMENT; Breakfast, Lunch, Dinner; Standard 4 oz Oral Supplement  ADULT DIET; Easy to Chew  ADULT ORAL NUTRITION SUPPLEMENT; Breakfast, Lunch, Dinner; Frozen Oral Supplement    Anthropometric Measures:  Height: 172.7 cm (5' 8\")  Ideal Body Weight (IBW): 154 lbs (70 kg)    Admission Body Weight: 99.8 kg (220 lb 0.3 oz)  Current Body Weight: 98.5 kg (217 lb 2.5 oz), 144 % IBW. Weight Source: Bed scale  Current BMI (kg/m2): 33  Usual Body Weight: 106.1 kg (234 lb) (a month ago)     % Weight Change (Calculated): -7.6  Weight Adjustment For: No Adjustment                 BMI Categories: Obese Class 1 (BMI 30.0-34.9)    Estimated Daily Nutrient Needs:  Energy Requirements Based On: Kcal/kg  Weight Used for Energy Requirements: Current  Energy (kcal/day): 4369-9674 (18-22)  Weight Used for Protein Requirements: Ideal  Protein (g/day):  (1.3-1.5)  Method Used for Fluid Requirements: 1 ml/kcal  Fluid 
Comprehensive Nutrition Assessment    Type and Reason for Visit:  Reassess    Nutrition Recommendations/Plan:   Encourage PO and both ONS  Desire weight stability     Malnutrition Assessment:  Malnutrition Status:  Moderate malnutrition (04/14/25 1042)    Context:  Acute Illness     Findings of the 6 clinical characteristics of malnutrition:  Energy Intake:  Mild decrease in energy intake (at least acutely)  Weight Loss:  Greater than 5% over 1 month     Body Fat Loss:  Mild body fat loss Orbital   Muscle Mass Loss:  Mild muscle mass loss Temples (temporalis)  Fluid Accumulation:  No fluid accumulation     Strength:  Not Performed    Nutrition Assessment:    Continued malnutrition with weight stability currently. PO intakes about the same as last review (26-50% meals and % of Ensure). Reports not utilizing the magic cup but I reinforced its use. Has some hesitation and fears of getting back to 275# for which I reassure him. Weight goals are for stability currently. Last INR 2.6 on coumadin. Bowels formed on probiotic and antibiotic.    Nutrition Related Findings:    active b/s, formed bm, no edema. Wound Type: None       Current Nutrition Intake & Therapies:    Average Meal Intake: 26-50%  Average Supplements Intake: %  ADULT ORAL NUTRITION SUPPLEMENT; Breakfast, Lunch, Dinner; Standard 4 oz Oral Supplement  ADULT DIET; Easy to Chew  ADULT ORAL NUTRITION SUPPLEMENT; Breakfast, Lunch, Dinner; Frozen Oral Supplement    Anthropometric Measures:  Height: 172.7 cm (5' 8\")  Ideal Body Weight (IBW): 154 lbs (70 kg)    Admission Body Weight: 99.8 kg (220 lb 0.3 oz)  Current Body Weight: 98.2 kg (216 lb 7.9 oz), 144 % IBW. Weight Source: Bed scale  Current BMI (kg/m2): 32.9  Usual Body Weight: 106.1 kg (234 lb) (a month ago)     % Weight Change (Calculated): -7.5  Weight Adjustment For: No Adjustment                 BMI Categories: Obese Class 1 (BMI 30.0-34.9)    Estimated Daily Nutrient Needs:  Energy 
Comprehensive Nutrition Assessment    Type and Reason for Visit:  Reassess    Nutrition Recommendations/Plan:   Encourage oral intakes   Encourage use of ONS at end of meals or between.     Malnutrition Assessment:  Malnutrition Status:  Moderate malnutrition (04/14/25 1042)    Context:  Acute Illness     Findings of the 6 clinical characteristics of malnutrition:  Energy Intake:  Mild decrease in energy intake (at least acutely)  Weight Loss:  Greater than 5% over 1 month     Body Fat Loss:  Mild body fat loss Orbital   Muscle Mass Loss:  Mild muscle mass loss Temples (temporalis)  Fluid Accumulation:  No fluid accumulation     Strength:  Not Performed    Nutrition Assessment:    Continued malnutrition with weight stability currently. PO intakes appear better than last review (51-75% meals and % of Ensure). Weight goals remain: stability during recovery. Last INR 2.4 on coumadin. Bowels formed on probiotic and antibiotic.    Nutrition Related Findings:    active b/s. formed bm. no edema. Wound Type: None       Current Nutrition Intake & Therapies:    Average Meal Intake: 51-75%  Average Supplements Intake: %  ADULT ORAL NUTRITION SUPPLEMENT; Breakfast, Lunch, Dinner; Standard 4 oz Oral Supplement  ADULT DIET; Easy to Chew    Anthropometric Measures:  Height: 172.7 cm (5' 8\")  Ideal Body Weight (IBW): 154 lbs (70 kg)    Admission Body Weight: 99.8 kg (220 lb 0.3 oz)  Current Body Weight: 97.9 kg (215 lb 14.4 oz), 144 % IBW. Weight Source: Bed scale  Current BMI (kg/m2): 32.8  Usual Body Weight: 106.1 kg (234 lb) (a month ago)     % Weight Change (Calculated): -7.7  Weight Adjustment For: No Adjustment                 BMI Categories: Obese Class 1 (BMI 30.0-34.9)    Estimated Daily Nutrient Needs:  Energy Requirements Based On: Kcal/kg  Weight Used for Energy Requirements: Current  Energy (kcal/day): 7647-6069 (18-22)  Weight Used for Protein Requirements: Ideal  Protein (g/day):  
Comprehensive Nutrition Assessment    Type and Reason for Visit:  Reassess    Nutrition Recommendations/Plan:   Encourage oral intakes.   Use ONS after solids at meal times.     Malnutrition Assessment:  Malnutrition Status:  Moderate malnutrition (04/14/25 1042)    Context:  Acute Illness     Findings of the 6 clinical characteristics of malnutrition:  Energy Intake:  Mild decrease in energy intake (at least acutely)  Weight Loss:  Greater than 5% over 1 month     Body Fat Loss:  Mild body fat loss Orbital   Muscle Mass Loss:  Mild muscle mass loss Temples (temporalis)  Fluid Accumulation:  No fluid accumulation     Strength:  Not Performed    Nutrition Assessment:    Continued malnutrition with weight fairly stable since last review. PO still struggling with C/o early satiety and a decreased taste acuity (after covid a few years ago).  INR 2.9 on coumadin.  Continues to use the Ensure and added Magic Cup and encouraged their use after solid foods at meals so that they don't interfere with solid food intakes.    Nutrition Related Findings:    active b/s, formed bm, no edema. Wound Type: None       Current Nutrition Intake & Therapies:    Average Meal Intake: 26-50%  Average Supplements Intake: %  ADULT ORAL NUTRITION SUPPLEMENT; Breakfast, Lunch, Dinner; Standard 4 oz Oral Supplement  ADULT DIET; Easy to Chew  ADULT ORAL NUTRITION SUPPLEMENT; Breakfast, Lunch, Dinner; Frozen Oral Supplement    Anthropometric Measures:  Height: 172.7 cm (5' 8\")  Ideal Body Weight (IBW): 154 lbs (70 kg)    Admission Body Weight: 99.8 kg (220 lb 0.3 oz)  Current Body Weight: 98.1 kg (216 lb 4.3 oz), 144 % IBW. Weight Source: Bed scale  Current BMI (kg/m2): 32.9  Usual Body Weight: 106.1 kg (234 lb) (a month ago)     % Weight Change (Calculated): -7.6  Weight Adjustment For: No Adjustment                 BMI Categories: Obese Class 1 (BMI 30.0-34.9)    Estimated Daily Nutrient Needs:  Energy Requirements Based On: 
[facility]  Swing Bed Interdisciplinary Care Plan Conference Report   Recertification for Continued Skilled Care.    Patients name:Quique Wray  Date of Conference: 5/13/2025    The Following Information was discussed and agreed upon with the patient and/or Caregivers as listed below.    Names of Team Members and Caregivers present for meeting:  : Abbey Perdomo Nursing: Rick Altamirano  Therapy: Sheri Gomez, PT; MARLEEN Blue/OTR  Dietician:   Activities: Sully Gomez  Pastoral Care:   Caregivers:    [] Spouse  [] Child/Children [] Significant Other   [] Other:     Nutrition:  Current Body Weight:   Wt Readings from Last 1 Encounters:   05/13/25 97.7 kg (215 lb 6.2 oz)     Weight Change: Fairly stable at present.   Current Diet order:ADULT ORAL NUTRITION SUPPLEMENT; Breakfast, Lunch, Dinner; Standard 4 oz Oral Supplement  ADULT DIET; Easy to Chew  Intakes: 51-75% meals and % Ensure.   Diet Education Provided: Continued encouragement for main entree (highest energy and protein)   Goal: PO >75% meals and supplements    Spiritual:  Nondenominational needs met: [x] Yes  [] No  [] N/A  Notified  or  of admission: [] Yes  [] No  [x] N/A  Patient added to Communion List: [] Yes  [] No  [x] N/A  Any other concerns or comments:   Goal: Participate 2-3 times per week    Occupational Therapy:  Current ADL Status: ADL  Feeding: None  Grooming: None  UE Bathing: None  LE Bathing: None  UE Dressing: None  LE Dressing: Dependent/Total (donning/doffing bilateral Velcro shoes in sitting)  Putting On/Taking Off Footwear: Maximum assistance  Toileting: None  Product Used : Soap and water  Safety: Good  Recommendations for adaptive equipment:    [] Long handled sponge   [] Long Handled Shoe Horn  [] Extended Tub Bench  Short Term Goals  Time Frame for Short Term Goals: 7 days (5/13/2025)  Short Term Goal 1: Patient will complete a BSC transfer while using DME PRN with MIN A - MET 
Hospitalist Progress Note  4/18/2025 8:53 AM  Subjective:   Admit Date: 4/11/2025  PCP: Vernon Cade MD    Interval History:     Patient states that he continues to have a lot of pain in his lower back preventing him from participating in therapy.  Wife is at bedside and concerned about his poor oral intake and not making sufficient progress with PT and OT.  Patient reports no chest pain or shortness of breath, no abdominal pain.  Wife states that patient took Norco at home in the past and had better control of pain and requested to change his pain meds to [place]    Diet: ADULT ORAL NUTRITION SUPPLEMENT; Breakfast, Lunch, Dinner; Standard 4 oz Oral Supplement  ADULT DIET; Easy to Chew  Medications:   Scheduled Meds:   warfarin  1.25 mg Oral Once    sterile water  2.2 mL Injection Once    ALTEplase (CATHFLO) 2 mg in sterile water 2 mL injection  2 mg IntraCATHeter Once    lidocaine  1 patch TransDERmal Daily    allopurinol  300 mg Oral Lunch    atorvastatin  20 mg Oral Nightly    balance B-50  1 tablet Oral Lunch    Vitamin D  5,000 Units Oral Lunch    ocuvite-lutein  1 tablet Oral Lunch    metoprolol tartrate  25 mg Oral BID    sodium chloride flush  5-40 mL IntraVENous 2 times per day    cefTRIAXone (ROCEPHIN) IV  2,000 mg IntraVENous Q12H    [Held by provider] polyethylene glycol  17 g Oral Daily    QUEtiapine  25 mg Oral Nightly    sennosides-docusate sodium  1 tablet Oral BID    warfarin placeholder: dosing by pharmacy   Oral RX Placeholder    lactobacillus  1 capsule Oral BID WC    lansoprazole  30 mg Oral QAM AC    ampicillin (OMNIPEN) 2,000 mg in sodium chloride 0.9 % 100 mL IVPB (addEASE)  2,000 mg IntraVENous Q6H     Continuous Infusions:   sodium chloride 50 mL/hr at 04/16/25 1059    sodium chloride       PRN Medications: traMADol, melatonin, acetaminophen, sodium chloride flush, sodium chloride    Objective:   Vitals: /69   Pulse 78   Temp 97.5 °F (36.4 °C) (Temporal)   Resp 18   Ht 1.727 
Hospitalist Progress Note  4/25/2025 5:39 AM  Subjective:   Admit Date: 4/11/2025  PCP: Vernon Cade MD    Interval History: Quique is having some difficulty with urinating this morning.  PVR around 260 ml.  Flomax ordered this morning with a UA.  He states he was on Flomax in the past but it was discontinued.  Quique states his appetite is still poor.  No nausea or vomiting.  Bowels moving.  Apparently his wife has talked with nursing and feels he is depressed.  Dr. Almodovar was consulted and performed nail care on Quique.      Diet: ADULT ORAL NUTRITION SUPPLEMENT; Breakfast, Lunch, Dinner; Standard 4 oz Oral Supplement  ADULT DIET; Easy to Chew  ADULT ORAL NUTRITION SUPPLEMENT; Breakfast, Lunch, Dinner; Frozen Oral Supplement  Medications:   Scheduled Meds:   tamsulosin  0.4 mg Oral Daily    triamcinolone   Topical BID    sodium chloride flush  5-40 mL IntraVENous 2 times per day    lidocaine 1 % injection  50 mg IntraDERmal Once    sodium chloride flush  5-40 mL IntraVENous 2 times per day    lidocaine 1 % injection  50 mg IntraDERmal Once    sterile water  2.2 mL Injection Once    ALTEplase (CATHFLO) 2 mg in sterile water 2 mL injection  2 mg IntraCATHeter Once    lidocaine  1 patch TransDERmal Daily    allopurinol  300 mg Oral Lunch    atorvastatin  20 mg Oral Nightly    balance B-50  1 tablet Oral Lunch    Vitamin D  5,000 Units Oral Lunch    ocuvite-lutein  1 tablet Oral Lunch    metoprolol tartrate  25 mg Oral BID    sodium chloride flush  5-40 mL IntraVENous 2 times per day    cefTRIAXone (ROCEPHIN) IV  2,000 mg IntraVENous Q12H    polyethylene glycol  17 g Oral Daily    QUEtiapine  25 mg Oral Nightly    sennosides-docusate sodium  1 tablet Oral BID    warfarin placeholder: dosing by pharmacy   Oral RX Placeholder    lactobacillus  1 capsule Oral BID WC    lansoprazole  30 mg Oral QAM AC    ampicillin (OMNIPEN) 2,000 mg in sodium chloride 0.9 % 100 mL IVPB (addEASE)  2,000 mg IntraVENous Q6H 
Hospitalist Progress Note  4/28/2025 7:32 AM  Subjective:   Admit Date: 4/11/2025  PCP: Vernon Cade MD    Interval History:     Patient states that he feels frustrated with his slow progress and persisting weakness.  States doing his best.  States his appetite is not the best but he is trying to drink his supplement.  Reports no chest pain, shortness of breath, nausea or vomiting.    Diet: ADULT ORAL NUTRITION SUPPLEMENT; Breakfast, Lunch, Dinner; Standard 4 oz Oral Supplement  ADULT DIET; Easy to Chew  ADULT ORAL NUTRITION SUPPLEMENT; Breakfast, Lunch, Dinner; Frozen Oral Supplement  Medications:   Scheduled Meds:   warfarin  2.5 mg Oral Once    tamsulosin  0.4 mg Oral Daily    sertraline  25 mg Oral Daily    triamcinolone   Topical BID    lidocaine 1 % injection  50 mg IntraDERmal Once    lidocaine 1 % injection  50 mg IntraDERmal Once    sterile water  2.2 mL Injection Once    ALTEplase (CATHFLO) 2 mg in sterile water 2 mL injection  2 mg IntraCATHeter Once    lidocaine  1 patch TransDERmal Daily    allopurinol  300 mg Oral Lunch    atorvastatin  20 mg Oral Nightly    balance B-50  1 tablet Oral Lunch    Vitamin D  5,000 Units Oral Lunch    ocuvite-lutein  1 tablet Oral Lunch    metoprolol tartrate  25 mg Oral BID    sodium chloride flush  5-40 mL IntraVENous 2 times per day    cefTRIAXone (ROCEPHIN) IV  2,000 mg IntraVENous Q12H    QUEtiapine  25 mg Oral Nightly    sennosides-docusate sodium  1 tablet Oral BID    warfarin placeholder: dosing by pharmacy   Oral RX Placeholder    lactobacillus  1 capsule Oral BID WC    lansoprazole  30 mg Oral QAM AC    ampicillin (OMNIPEN) 2,000 mg in sodium chloride 0.9 % 100 mL IVPB (addEASE)  2,000 mg IntraVENous Q6H     Continuous Infusions:   sodium chloride      sodium chloride      sodium chloride       PRN Medications: sodium chloride flush, sodium chloride, sodium chloride flush, sodium chloride, HYDROcodone 5 mg - acetaminophen, melatonin, acetaminophen, sodium 
Hospitalist Progress Note  5/11/2025 9:16 AM  Subjective:   Admit Date: 4/11/2025  PCP: Vernon Cade MD    Interval History:     Patient continues to make progress with PT and OT.  Per RN, his PICC line came out more than 3 cm and they have concerns with using it.  Unable to draw blood out of it.  Patient is on IV Rocephin for Enterococcus bacteremia and supposed to complete on 5/18.    Diet: ADULT ORAL NUTRITION SUPPLEMENT; Breakfast, Lunch, Dinner; Standard 4 oz Oral Supplement  ADULT DIET; Easy to Chew  ADULT ORAL NUTRITION SUPPLEMENT; Breakfast, Lunch, Dinner; Frozen Oral Supplement  Medications:   Scheduled Meds:   warfarin  5 mg Oral Once per day on Sunday Thursday    warfarin  2.5 mg Oral Once per day on Monday Tuesday Wednesday Friday Saturday    ampicillin (OMNIPEN) 2,000 mg in sodium chloride 0.9 % 100 mL IVPB (addEASE)  2,000 mg IntraVENous Q6H    cefTRIAXone (ROCEPHIN) IV  2,000 mg IntraVENous Q12H    lidocaine  2 patch TransDERmal Daily    tamsulosin  0.4 mg Oral Daily    sertraline  25 mg Oral Daily    triamcinolone   Topical BID    lidocaine 1 % injection  50 mg IntraDERmal Once    lidocaine 1 % injection  50 mg IntraDERmal Once    sterile water  2.2 mL Injection Once    allopurinol  300 mg Oral Lunch    atorvastatin  20 mg Oral Nightly    balance B-50  1 tablet Oral Lunch    Vitamin D  5,000 Units Oral Lunch    ocuvite-lutein  1 tablet Oral Lunch    metoprolol tartrate  25 mg Oral BID    sodium chloride flush  5-40 mL IntraVENous 2 times per day    QUEtiapine  25 mg Oral Nightly    sennosides-docusate sodium  1 tablet Oral BID    warfarin placeholder: dosing by pharmacy   Oral RX Placeholder    lactobacillus  1 capsule Oral BID WC    lansoprazole  30 mg Oral QAM AC     Continuous Infusions:   sodium chloride      sodium chloride      sodium chloride       PRN Medications: sodium chloride flush, sodium chloride, sodium chloride flush, sodium chloride, HYDROcodone 5 mg - acetaminophen, 
Hospitalist Progress Note  5/19/2025 5:20 AM  Subjective:   Admit Date: 4/11/2025  PCP: Vernon Cade MD    Interval History: Quique has no complaints this morning.  He is excited about going home.  No CP / SOB.  Appetite fair, no nausea.  Bowels moving and he denies any trouble urinating.     Diet: ADULT ORAL NUTRITION SUPPLEMENT; Breakfast, Lunch, Dinner; Standard 4 oz Oral Supplement  ADULT DIET; Easy to Chew  Medications:   Scheduled Meds:   warfarin  5 mg Oral Once per day on Sunday Tuesday Thursday    warfarin  2.5 mg Oral Once per day on Monday Wednesday Friday Saturday    lidocaine  2 patch TransDERmal Daily    tamsulosin  0.4 mg Oral Daily    sertraline  25 mg Oral Daily    lidocaine 1 % injection  50 mg IntraDERmal Once    lidocaine 1 % injection  50 mg IntraDERmal Once    sterile water  2.2 mL Injection Once    allopurinol  300 mg Oral Lunch    atorvastatin  20 mg Oral Nightly    balance B-50  1 tablet Oral Lunch    Vitamin D  5,000 Units Oral Lunch    ocuvite-lutein  1 tablet Oral Lunch    metoprolol tartrate  25 mg Oral BID    sodium chloride flush  5-40 mL IntraVENous 2 times per day    QUEtiapine  25 mg Oral Nightly    sennosides-docusate sodium  1 tablet Oral BID    warfarin placeholder: dosing by pharmacy   Oral RX Placeholder    lactobacillus  1 capsule Oral BID WC    lansoprazole  30 mg Oral QAM AC     Continuous Infusions:   sodium chloride      sodium chloride      sodium chloride         Patient's current medications documented, reviewed, and updated.      CBC:   Recent Labs     05/17/25  0407   WBC 4.4   HGB 8.2*        BMP:    Recent Labs     05/17/25  0407      K 3.9      CO2 29   BUN 10   CREATININE 0.8   GLUCOSE 116*     Hepatic: No results for input(s): \"AST\", \"ALT\", \"BILITOT\", \"ALKPHOS\" in the last 72 hours.    Invalid input(s): \"ALB\"  Troponin: No results for input(s): \"TROPONINI\" in the last 72 hours.  BNP: No results for input(s): \"BNP\" in the last 72 
Hospitalist Progress Note  5/2/2025 7:57 AM  Subjective:   Admit Date: 4/11/2025  PCP: Vernon Cade MD    Interval History:     Patient states that he feels he is getting stronger.  He is making progress with PT and OT.  His mood is better.  Continues to have poor appetite but tries to eat as much as he can.  Reports no chest pain, shortness of breath, no abdominal pain    Diet: ADULT ORAL NUTRITION SUPPLEMENT; Breakfast, Lunch, Dinner; Standard 4 oz Oral Supplement  ADULT DIET; Easy to Chew  ADULT ORAL NUTRITION SUPPLEMENT; Breakfast, Lunch, Dinner; Frozen Oral Supplement  Medications:   Scheduled Meds:   warfarin  2.5 mg Oral Once    ampicillin (OMNIPEN) 2,000 mg in sodium chloride 0.9 % 100 mL IVPB (addEASE)  2,000 mg IntraVENous Q6H    cefTRIAXone (ROCEPHIN) IV  2,000 mg IntraVENous Q12H    lidocaine  2 patch TransDERmal Daily    tamsulosin  0.4 mg Oral Daily    sertraline  25 mg Oral Daily    triamcinolone   Topical BID    lidocaine 1 % injection  50 mg IntraDERmal Once    lidocaine 1 % injection  50 mg IntraDERmal Once    sterile water  2.2 mL Injection Once    ALTEplase (CATHFLO) 2 mg in sterile water 2 mL injection  2 mg IntraCATHeter Once    allopurinol  300 mg Oral Lunch    atorvastatin  20 mg Oral Nightly    balance B-50  1 tablet Oral Lunch    Vitamin D  5,000 Units Oral Lunch    ocuvite-lutein  1 tablet Oral Lunch    metoprolol tartrate  25 mg Oral BID    sodium chloride flush  5-40 mL IntraVENous 2 times per day    QUEtiapine  25 mg Oral Nightly    sennosides-docusate sodium  1 tablet Oral BID    warfarin placeholder: dosing by pharmacy   Oral RX Placeholder    lactobacillus  1 capsule Oral BID WC    lansoprazole  30 mg Oral QAM AC     Continuous Infusions:   sodium chloride      sodium chloride      sodium chloride       PRN Medications: sodium chloride flush, sodium chloride, sodium chloride flush, sodium chloride, HYDROcodone 5 mg - acetaminophen, melatonin, acetaminophen, sodium chloride 
Hospitalist Progress Note  5/4/2025 7:18 AM  Subjective:   Admit Date: 4/11/2025  PCP: Vernon Cade MD    Interval History: Quique has no complaints this morning.  He feels he is doing well with therapy.  Appetite is not very good, no nausea. He is taking his nutritional drinks.  Bowels moving and his urine stream is doing better on Flomax.  Tolerating IV antibiotics well.     Diet: ADULT ORAL NUTRITION SUPPLEMENT; Breakfast, Lunch, Dinner; Standard 4 oz Oral Supplement  ADULT DIET; Easy to Chew  ADULT ORAL NUTRITION SUPPLEMENT; Breakfast, Lunch, Dinner; Frozen Oral Supplement  Medications:   Scheduled Meds:   ampicillin        ampicillin (OMNIPEN) 2,000 mg in sodium chloride 0.9 % 100 mL IVPB (addEASE)  2,000 mg IntraVENous Q6H    cefTRIAXone (ROCEPHIN) IV  2,000 mg IntraVENous Q12H    lidocaine  2 patch TransDERmal Daily    tamsulosin  0.4 mg Oral Daily    sertraline  25 mg Oral Daily    triamcinolone   Topical BID    lidocaine 1 % injection  50 mg IntraDERmal Once    lidocaine 1 % injection  50 mg IntraDERmal Once    sterile water  2.2 mL Injection Once    ALTEplase (CATHFLO) 2 mg in sterile water 2 mL injection  2 mg IntraCATHeter Once    allopurinol  300 mg Oral Lunch    atorvastatin  20 mg Oral Nightly    balance B-50  1 tablet Oral Lunch    Vitamin D  5,000 Units Oral Lunch    ocuvite-lutein  1 tablet Oral Lunch    metoprolol tartrate  25 mg Oral BID    sodium chloride flush  5-40 mL IntraVENous 2 times per day    QUEtiapine  25 mg Oral Nightly    sennosides-docusate sodium  1 tablet Oral BID    warfarin placeholder: dosing by pharmacy   Oral RX Placeholder    lactobacillus  1 capsule Oral BID     lansoprazole  30 mg Oral QAM AC     Continuous Infusions:   sodium chloride      sodium chloride      sodium chloride         Patient's current medications documented, reviewed, and updated.      CBC:   Recent Labs     05/03/25 0425   WBC 4.2   HGB 7.6*        BMP:    Recent Labs     05/03/25 0425 
Hospitalist Progress Note  5/9/2025 6:12 AM  Subjective:   Admit Date: 4/11/2025  PCP: Vernon Cade MD    Interval History: Quique has no complaints this morning.  He states he sometimes wakes up and sees things that aren't there but the vision doesn't last very long and he knows they aren't real.  He denies having any increase in confusion.  No chest pain or SOB. Appetite is fair, no nausea.  Bowels moving.  He feels he is getting stronger with therapy.     Diet: ADULT ORAL NUTRITION SUPPLEMENT; Breakfast, Lunch, Dinner; Standard 4 oz Oral Supplement  ADULT DIET; Easy to Chew  ADULT ORAL NUTRITION SUPPLEMENT; Breakfast, Lunch, Dinner; Frozen Oral Supplement  Medications:   Scheduled Meds:   ampicillin (OMNIPEN) 2,000 mg in sodium chloride 0.9 % 100 mL IVPB (addEASE)  2,000 mg IntraVENous Q6H    cefTRIAXone (ROCEPHIN) IV  2,000 mg IntraVENous Q12H    lidocaine  2 patch TransDERmal Daily    tamsulosin  0.4 mg Oral Daily    sertraline  25 mg Oral Daily    triamcinolone   Topical BID    lidocaine 1 % injection  50 mg IntraDERmal Once    lidocaine 1 % injection  50 mg IntraDERmal Once    sterile water  2.2 mL Injection Once    ALTEplase (CATHFLO) 2 mg in sterile water 2 mL injection  2 mg IntraCATHeter Once    allopurinol  300 mg Oral Lunch    atorvastatin  20 mg Oral Nightly    balance B-50  1 tablet Oral Lunch    Vitamin D  5,000 Units Oral Lunch    ocuvite-lutein  1 tablet Oral Lunch    metoprolol tartrate  25 mg Oral BID    sodium chloride flush  5-40 mL IntraVENous 2 times per day    QUEtiapine  25 mg Oral Nightly    sennosides-docusate sodium  1 tablet Oral BID    warfarin placeholder: dosing by pharmacy   Oral RX Placeholder    lactobacillus  1 capsule Oral BID WC    lansoprazole  30 mg Oral QAM AC     Continuous Infusions:   sodium chloride      sodium chloride      sodium chloride         Patient's current medications documented, reviewed, and updated.      CBC:   Recent Labs     05/08/25  0407   HGB 7.8* 
[facility]  Physical Therapy    Date: 2025  Patient Name: Quique Wray        : 1945       [x] Pt Refusal Patient in bed upon arrival just beginning to eat breakfast.  Wife is also present in room. Discussed with patient getting up to the chair to eat breakfast. Patient states he can't sit that high. When asked what he ment by that he states it hurts his back to sit up.  Encouragement provided per wife and PTA but patient then states \"I don't want to.\"  Remains in bed following and nurse notified of refusal. Will check back as able.           [] Pt Unavailable due to:          Maribel Pruett, PTA Date: 2025    
[facility]  Swing Bed Interdisciplinary Care Plan Conference Report   Recertification for Continued Skilled Care.    Patients name:Quique Wray  Date of Conference: 5/6/2025    The Following Information was discussed and agreed upon with the patient and/or Caregivers as listed below.    Names of Team Members and Caregivers present for meeting:  : Abbey Perdomo Nursing: Rick Altamirano  Therapy: Sheri Gomez, PT; MARLEEN Blue/OTR  Dietician:   Activities: Sully Gomez  Pastoral Care:   Caregivers:    [] Spouse  [] Child/Children [] Significant Other   [] Other:     Nutrition:  Current Body Weight:   Wt Readings from Last 1 Encounters:   05/06/25 97.9 kg (215 lb 14.4 oz)     Weight Change: stable currently   Current Diet order:ADULT ORAL NUTRITION SUPPLEMENT; Breakfast, Lunch, Dinner; Standard 4 oz Oral Supplement  ADULT DIET; Easy to Chew  ADULT ORAL NUTRITION SUPPLEMENT; Breakfast, Lunch, Dinner; Frozen Oral Supplement  Intakes: 26-50% meals and % Ensure (not using Magic Cup)   Diet Education Provided: Encouragement and use of keeping magic cup for between meal use once thaws (pudding consistency).   Goal: PO >75% meals and supplements    Spiritual:  Mormonism needs met: [x] Yes  [] No  [] N/A  Notified  or  of admission: [] Yes  [] No  [x] N/A  Patient added to Communion List: [] Yes  [] No  [x] N/A  Any other concerns or comments:   Goal: Participate 2-3 times per week    Occupational Therapy:  Current ADL Status: ADL  Feeding: None  Grooming: None  UE Bathing: None  LE Bathing: None  UE Dressing: None  LE Dressing: Maximum assistance (to isai SERJIO socks and shoes)  Putting On/Taking Off Footwear: Maximum assistance  Toileting: Dependent/Total, Maximum assistance (MAX: clothing management in standing; DEP: regina care in standing)  Product Used : Soap and water  Safety: Good  Recommendations for adaptive equipment:    [] Long handled sponge   [] Long 
[facility]  Swing Bed Interdisciplinary Care Plan Conference Report   Recertification for Continued Skilled Care.    Patients name:Quique Wray  Date of Conference: 4/15/2025    The Following Information was discussed and agreed upon with the patient and/or Caregivers as listed below.    Names of Team Members and Caregivers present for meeting:  : Abbey Perdomo Nursing: Rick Altamirano  Therapy: Maribel Pruett PTA; MARLEEN Blue/OTR  Dietician:   Activities: Sully Gomez  Pastoral Care:   Caregivers:    [x] Spouse  [] Child/Children [] Significant Other   [] Other:     Nutrition:  Current Body Weight:   Wt Readings from Last 1 Encounters:   04/13/25 100.6 kg (221 lb 12.5 oz)   Weight Change: 5.2% losses in less than a month   In: 120 [P.O.:120]   Out: 300 [Urine:300]   Current Diet order:ADULT DIET; Dysphagia - Pureed   ADULT ORAL NUTRITION SUPPLEMENT; Breakfast, Lunch, Dinner; Standard 4 oz Oral Supplement   Intakes: 26-50% meals and % supplement   Diet Education Provided: encouragement   Goal: PO >75% meals and supplements     Spiritual:  Congregational needs met: [x] Yes  [] No  [] N/A  Notified  or  of admission: [] Yes  [] No  [x] N/A  Patient added to Communion List: [] Yes  [] No  [x] N/A  Any other concerns or comments: Participates with  room visits.   Goal: Participate 2-3 times per week    Occupational Therapy:  Current ADL Status: ADL  Feeding: None  Grooming: Contact guard assistance (brushing teeth while sitting EOB)  UE Bathing: None  LE Bathing: None  UE Dressing: None  LE Dressing: None  Toileting: None  Product Used : Other (comment) (CHG solution)  Safety:   Recommendations for adaptive equipment:    [] Long handled sponge   [] Long Handled Shoe Horn  [] Extended Tub Bench  Short Term Goals  Time Frame for Short Term Goals: 11 days (4/22/2025)  Short Term Goal 1: Patient will complete a BSC transfer while using DME PRN with MIN 
Met with pt and spouse at their request with questions regarding Medicare and coverage. SW went over SNF benefits for Medicare in regards to days and copays and that pt must make continued progress in order to continue to receive SNF benefit. Also spoke with pt and spouse regarding possibility of pt needing an extended stay at a nursing facility to receive longer skilled service time and they express understanding. SW following and plan to re evaluate pt progress at meeting on Tuesday. Abbey Bolton, MSW, LSW 4/16/2025   
Ok per Dr. Bowens for pt to have access team place another PICC line due to pt is requiring long term antibiotics; PICC line had been removed late last week due to complications/patency.   
PETR Salmeron from infectious disease clinic called to check on patient status. Updated  provided by this RN and NP transferred in to patient room phone. No new orders at this time.   
Patient and wife given list of infectious disease appointment dates/times. RN informs wife that her cell phone number was given to the office to call for these tele-health appts. She inquires if her daughter's number could be given but her daughter would be at work. Encouraged to use cell number of family member that would be able to be present during the appointment. She voices concern of difficulty understanding provider as she experienced difficulty understanding him during Mr Wray's admission at [place]. Sherie states her cell phone is for the hearing impaired and feels others may have difficulty with its use. RN offers to change number to patient's cell number, she declines at this time. RN also offers to be present or have nursing be present if they are able, during appt time and she declines this as well.     Encouraged to see how the appt on Thurs 4/17 goes and if she experiences any difficulty, writer will contact the office to inquire different appt route. She is agreeable to try this tele-health route with her cell number as the contact number.  Writer will continue to monitor appt status.  
Patient blood pressures have been trending low. /58 this evening. HR 79bpm with a regular radial pulse.     PO Lopressor held.  
Patient c/o lower back and right hip pain. Lidocaine patch placed, PRN Tylenol given. Encouraged patient to reposition self in bed, requires significant cueing and encouragement. When prompted to move any extremity even slight adjustments, patient states, \"Can't you just move it for me.\" Discussed importance of maintaining present strength and necessity to continue activity and goals of therapy. Patient is alert and oriented to time, place, person, and situation. However, is forgetful. Medications administered as ordered, given with applesauce. VITALS:  BP (!) 120/56   Pulse 67   Temp 96.9 °F (36.1 °C) (Temporal)   Resp 20   Ht 1.727 m (5' 8\")   Wt 100.6 kg (221 lb 12.5 oz)   SpO2 96%   BMI 33.72 kg/m² . Telemetry shows normal sinus rhythm, paced beats at times . Patient is a high fall risk, discussed such with patient, as well as, fall prevention strategies. Pt updated on plan of care as well as the use of call light throughout this shift. Lights dimmed for patient comfort with no further needs at this time.     
Phone: [phone] [facility]  Date: 2025  Fax: [phone]      Physical Therapy    Daily Note    Patient Name: Quique Wray      : 1945  (80 y.o.)  MRN: [id number]      Assessment          Supine to Sit: Stand by assistance, Supervision (HOB flat but uses railing)         Sit to Stand: Minimal Assistance, Partial/Moderate assistance  Stand to Sit: Minimal Assistance          Comment: ---    WB Status: FWW and w/c follow 130 ft x2               Assessment: Patient in bed upon arrival. When asked why he is still in bed he states \"they never came to get me.\"  He is encouraged to ask to get up and not to wait for nurse or therapist when he wants to get up. Supine to sit with HOB flat is SBA/supervision with patient utilizing railing.  Multiple attempts are made by patient before he gets completely to sitting position.  Sitting balance is good and he is able to isai and doth his tshirt independently.  Requires assist to isai shorts and shoes. Sit to stand from bed is min/mod x 1 with height of bed elevated. Gait with wheeled walker and w/c follow ~130 ft x1.  Wheeled remaining distance to therapy room. Completes seated and standing exercises as outlined above.  Wife is now present in therapy room.  Patient is able to ambulate from elevators back to his room ~130 ft x1 to sit up in chair. Call light in reach and chair alarm attached. Wife remains in room with patient.  Safety Devices  Type of Devices: Call light within reach, Chair alarm in place, Gait belt, Left in chair, Nurse notified          Call light in reach, Phone in reach, Use of Gait belt, Chair alarm, Nurse Notified, Family Present, and Left in chair  Time In: 1045  Time Out: 1123  Timed Coded Minutes: 38  Total Treatment Time: 38       Exercises:  See Flowsheets    Plan  Cont Per Plan Of Care    Goals  Short Term Goals  Time Frame for Short Term Goals: 7 days - expires 25  Short Term Goal 1: Transfer consistently sit to stand 
Phone: [phone] [facility]  Date: 2025  Fax: [phone]      Physical Therapy    Daily Note    Patient Name: Quique Wray      : 1945  (80 y.o.)  MRN: [id number]      Assessment                  Assessment: Patient in bed upon arrival and had sat up in chair this morning for over 2 hours. He does not wish to get back up but does agree to complete supine exercises as outlined above. He remains in bed following with call light in reach and bed alarm turned on.  Continue to progress with transfers and gait as able.  Safety Devices  Type of Devices: Call light within reach, Bed alarm in place, Left in bed          Call light in reach, Phone in reach, and Left in bed  Time In: 1431  Time Out: 1504  Timed Coded Minutes: 33  Total Treatment Time: 33       Exercises:  See Flowsheets    Plan  Cont Per Plan Of Care    Goals  Short Term Goals  Time Frame for Short Term Goals: 5 days (25)  Short Term Goal 1: Patient will transfer supine to sit with Mod A-NOT MET  Short Term Goal 2: Patient will transfer sit to supine with good safety awareness and Min A-NOT MET  Short Term Goal 3: Patient will transfer sit to stand with Max A-NOT MET          Long Term Goals  Time Frame for Long Term Goals : 10 days (25)  Long Term Goal 1: Patient will transfer supine to sit with Min A-MET  Long Term Goal 2: Patient will transfer sit to stand with Min A-MET  Long Term Goal 3: Patient will transfer bed to chair with Min A-NOT MET          Maribel Pruett Therapy License Number: [id number]    Date: 2025    
Phone: [phone] [facility]  Date: 2025  Fax: [phone]      Physical Therapy    Daily Note    Patient Name: Quique Wray      : 1945  (80 y.o.)  MRN: [id number]      Assessment  Bed mobility  Rolling to Right: Partial/Moderate assistance  Supine to Sit: Supervision (with HOB fully elevated)  Sit to Supine: Partial/Moderate assistance  Bed Mobility Comments: Unsafe with sit to supine.  Move abruptly to lie onto right side.  Requires Max A to roll from right side to supine and to boost in bed.        Supine to Sit: Supervision         Sit to Stand: Minimal Assistance, Partial/Moderate assistance  Stand to Sit: Minimal Assistance          Comment: verbal cues primarily to reach back prior to sitting down    WB Status: FWW and w/c follow 125 ft x 2 with min/CGA               Assessment: Patient in bed upon entry to room with wife also present.  Patient agrees to work with PTA. Attempted to have patient complete supine to sit from flat bed and no railing.  He is unable to complete this even after told how to do so.  Complains of pain in his low R back.  Wife states she will have to get a railing for home.  Patient is allowed to use railing today and is able to sit up with superivsion.  Sit to stand from bed with height of bed elevated is min/mod. Ambulates with wheeled walker to elevators ~125 ft x1. Wheeled remaining distance to therapy room. Completes seated B LE exercises and then complains of beginning to get vertigo.  Does agree after resting to ambulate back to room from where he had ended.  Sit to stand from w/c is min/mod.  Ambulates back to room into bathroom with wheeled walker and CGA/min.  In bathroom on commode following with call light in reach and wife and nurse in room with patient.  Safety Devices  Type of Devices: Gait belt, Nurse notified (in bathroom on commode)          Call light in reach, Use of Gait belt, Nurse Notified, Other Staff Present  , and Family Present in 
Phone: [phone] [facility]  Date: 2025  Fax: [phone]      Physical Therapy    Daily Note    Patient Name: Quique Wray      : 1945  (80 y.o.)  MRN: [id number]      Assessment  Bed mobility  Rolling to Right: Partial/Moderate assistance  Supine to Sit: Minimal assistance  Sit to Supine: Partial/Moderate assistance, 2 Person assistance  Bed Mobility Comments: Unsafe with sit to supine.  Move abruptly to lie onto right side.  Requires Max A to roll from right side to supine and to boost in bed.    Supine to Sit: Supervision  Sit to Supine: Supervision    Sit to Stand: Minimal Assistance, Partial/Moderate assistance (with height of bed elevated)  Stand to Sit: Minimal Assistance  Bed to Chair: Minimal assistance, Partial/Moderate assistance (with wheeled walker)    Comment: cues for proper hand placement and requires 2-3 attempts to stand    WB Status: gait with wheeled walker from bed to chair ~5-6ft x1    Assessment: Patient is seated in recliner upon entry. Patient requests to get back to bed since he was sitting up since 930. First attempt sit to stand from recliner is mod A x1, but only makes it halway up before sitting back down. After repositioning and encouragement from therapist 2nd sit to stand was mod A and patient was able to fully stand. After taking a short rest break in standing patient ambulates with FWW and CGA to bed. Patient completes seated LE strengthening ex at edge bed. Sit to supine is supervision. Patient repositioned in bed for comfort. Call light in reach and bed alarm in place.  Safety Devices  Type of Devices: Call light within reach, Chair alarm in place, Gait belt, Left in chair, Nurse notified    Call light in reach, Phone in reach, Use of Gait belt, Bed alarm, Family Present, and Left in bed  Time In: 1352  Time Out: 1430  Timed Coded Minutes: 38  Total Treatment Time: 38     Exercises:  See Flowsheets    Plan  Cont Per Plan Of Care    Goals  Short Term 
Phone: [phone] [facility]  Date: 2025  Fax: [phone]      Physical Therapy    Daily Note    Patient Name: Quique Wray      : 1945  (80 y.o.)  MRN: [id number]      Assessment          Supine to Sit: Supervision (cueing on what to do with UE's)         Sit to Stand: Partial/Moderate assistance  Stand to Sit: Partial/Moderate assistance  Bed to Chair: Dependent/Total (utilized hector stedy)        Comment: ---    WB Status: bed to bathroom to chair via hector stedy                Assessment: Patient in bed upon arrival stating he needs to have a BM. Supine to sit with HOB elevated slightly is supervision. Patient does require cueing on what to do with his UE's to sit up. Sitting balance at edge of bed is good. Sit to stand from bed with height of bed elevated is mod x 1 utilizing hector stedy.  Cues are required for patient to stand up enough to have paddles placed under his bottom. Transfers to bathroom to sit on toliet via hector stedy.  Sits for short period of time. When finished, sit to stand from toliet is mod x 1.  Transfered to chair via hector stedy.  Up in chair to complete seated B LE exercises before breakfast arrives. Wife is now in room with patient along with nurse. Call light in reach and chair alarm attached.  Safety Devices  Type of Devices: Call light within reach, Chair alarm in place, Gait belt, Left in chair, Nurse notified          Call light in reach, Phone in reach, Use of Gait belt, Chair alarm, Nurse Notified, Other Staff Present  , Family Present, and Left in chair  Time In: 733  Time Out: 08  Timed Coded Minutes: 28  Total Treatment Time: 28       Exercises:  See Flowsheets    Plan  Cont Per Plan Of Care    Goals  Short Term Goals  Time Frame for Short Term Goals: 5 days (25)  Short Term Goal 1: Patient will transfer supine to sit with Mod A-NOT MET  Short Term Goal 2: Patient will transfer sit to supine with good safety awareness and Min A-NOT MET  Short Term 
Phone: [phone] [facility]  Date: 2025  Fax: [phone]      Physical Therapy    Daily Note    Patient Name: Quique Wray      : 1945  (80 y.o.)  MRN: [id number]      Assessment          Sit to Supine: Supervision       Sit to Stand: Minimal Assistance (x1-2)  Stand to Sit: Minimal Assistance  Bed to Chair: Minimal assistance, Partial/Moderate assistance (x2 with wheeled walker)        Comment: ---    WB Status: gait with wheeled walker ~5-6 ft from chair to bedside commode                Assessment: Co-treat wiht OT to work on gait and higher functional activies.  Patient is seated up in chair upon arrival and wife is just leaving room. He states he thinks he needs to use the bathroom. Bedside commode is brought out of bathroom for patient to ambulate short distance.  Sit to stand from chair requires cues for proper hand placement and min x 2 to complete. He is able to ambulate ~5-6 ft with wheeled walker and min/mod x 2.  He refuses initailly to try to pull his pants down for OT stating that he is too weak and he can't take one of his hands off the walker.  He eventually sits on commode. Sits on commode for extended period of time and beigns to have a coughing fit which he states he had earlier.  After this and having a BM he is much too weak to work with therapies.  He refuses to try to wipe himself or pull his pants up.  While these are being completed he questions multiple times when he can sit down because he is too weak to continue standing.  He then sits back down when all cleand up and refuses to do anymore therapy at this time.  States again that he is too weak and that he doesn't feel good today but tomorrow he will work.  Sit to stand from bedside commode is min x 1-2.  Able to take a few steps backward o bed to sit down. Patient does sit before he is safely close enough.  Requies cuing on how to lie down after he has done this every other time.  He flops himself backward 
Phone: [phone] [facility]  Date: 2025  Fax: [phone]      Physical Therapy    Daily Note    Patient Name: Quique Wray      : 1945  (80 y.o.)  MRN: [id number]      Assessment          Sit to Supine: Stand by assistance (for B LE's)       Sit to Stand: Minimal Assistance, Partial/Moderate assistance (x2)  Stand to Sit: Minimal Assistance  Bed to Chair: Minimal assistance, Partial/Moderate assistance (with wheeled walker)          WB Status: w.walker from chair to bed and min x 1-2                Assessment: Patient seated up in chair upon arrival to room. Co-treat with CHRIS due to safety concerns.  Patient agrees to work on standing exercises. Sit to stand from chair requires verbal cues for proper hand placement and min/mod x 2.  He completes heel raises and then attempts to complete SLR but is unable do to weakness and fatigue. Sits back in chair to rest. Sit to stand as second time is min/mod x 1-2.  Ambulates back to edge of bed ~4-5 ft x1 with wheeled walker and min  x1-2.  He then attempts to sit prior to being close enough to bed. Once he is safely on the bed he immediately tries to lie down on his R side. He is ecouraged to sit back up so the belt can be removed. States he doesn't think he can and so he requires assist from PTA to sit up. Sit to supine is SBA for B LE's. In bed following with call light in reach and bed alarm turned on.  Safety Devices  Type of Devices: Bed alarm in place, Call light within reach, Gait belt, Left in bed, Nurse notified          Call light in reach, Phone in reach, Use of Gait belt, Bed alarm, Nurse Notified, and Left in bed  Time In: 1357  Time Out: 1438  Timed Coded Minutes: 23 ( co-treat with CHRIS)  Total Treatment Time: 41       Exercises:  See Flowsheets    Plan  Cont Per Plan Of Care    Goals  Short Term Goals  Time Frame for Short Term Goals: 7 days - expires 25  Short Term Goal 1: Transfer consistently sit to stand with min assist-NOT 
Phone: [phone] [facility]  Date: 2025  Fax: [phone]      Physical Therapy    Daily Note    Patient Name: Quique Wray      : 1945  (80 y.o.)  MRN: [id number]      Assessment  Bed mobility  Rolling to Right: Partial/Moderate assistance  Supine to Sit: Supervision  Sit to Supine: Minimal assistance (for LEs)  Bed Mobility Comments: Unsafe with sit to supine.  Move abruptly to lie onto right side.  Requires Max A to roll from right side to supine and to boost in bed.        Supine to Sit: Stand by assistance  Sit to Supine: Stand by assistance       Sit to Stand: Minimal Assistance, Partial/Moderate assistance  Stand to Sit: Minimal Assistance  Bed to Chair: Minimal assistance, Partial/Moderate assistance (with wheeled walker)        Comment: ---    WB Status: FWW and w/c follow ~135 ft and 75ft with CGA           Stairs  # Steps : 16  Stairs Height: 4\" (and 6\")  Rails: Bilateral  Assistance: Minimal assistance, Partial/Moderate assistance (x2)  Comment: much improved than first attempt    Assessment: Pt in recliner upon arrival to room. Requests to use bathroom. STS from recliner modAx1. Gt to bathroom with FWW CGA. Pt transfers onto/off of toilet with CGA x1 using grab bars. Pericare provided by PTA. Pt ambulates with FWW x10ft to side of bed. Seated rest EOB x2 minutes. Pt then ambulates with FWW to elevators ~135 ft x1. Wheeled remaining distance to therapy room. Completes seated B LE exercise. Wheeled MCFP to room and then willing to amb. STS from W/C minAx1. Gt with FWW x75ft to room. Sits in recliner with CGA. Call light in reach and chair alarm attached.  Safety Devices  Type of Devices: Call light within reach, Chair alarm in place, Gait belt, Left in chair          Call light in reach, Phone in reach, Use of Gait belt, Chair alarm, Family Present, and Left in chair  Time In: 835  Time Out: 925  Timed Coded Minutes: 50  Total Treatment Time: 50       Exercises:  See 
Phone: [phone] [facility]  Date: 5/10/2025  Fax: [phone]      Physical Therapy    Daily Note    Patient Name: Quique Wray      : 1945  (80 y.o.)  MRN: [id number]      Assessment  Bed mobility  Rolling to Right: Partial/Moderate assistance  Supine to Sit: Supervision  Sit to Supine: Partial/Moderate assistance  Bed Mobility Comments: Unsafe with sit to supine.  Move abruptly to lie onto right side.  Requires Max A to roll from right side to supine and to boost in bed.    Supine to Sit: Supervision  Sit to Supine: Stand by assistance    Sit to Stand: Minimal Assistance, Partial/Moderate assistance  Stand to Sit: Minimal Assistance  Bed to Chair: Minimal assistance, Partial/Moderate assistance (with wheeled walker)    Comment: verbal cues primarily to reach back prior to sitting down    WB Status: FWW and w/c follow 60' x2    Stairs  # Steps : 16  Stairs Height: 4\" (and 6\")  Rails: Bilateral  Assistance: Minimal assistance, Partial/Moderate assistance (x2)  Comment: much improved than first attempt    Assessment: Patient in bed upon entry. Patient agrees with with PT and OT. Supine to sit is supervision. Patient has good sitting balance at edge of bed. Sit to stand from slightly elevated bed is CG-min A x2. Patient ambulates with FWW and CGA into bathroom. Patient has BM and requires min A x2 from commode and assist with pericare and pulling up underwear and shorts. Patient ambulates to opposite side of bed for nurse to give med. Sit to stand from bed is min A x2 again. Patient ambulates x60' in hallway before requesting to be wheeled rest of way to therapy room. In therapy room patient ascends and descends 6\" and 4\" stairs x1 lap with B/L HRs and CGA x2. After patient completes seated UE exercises with OT patient is wheeled to double doors and ambulates znother 60' back to his room and returns to the recliner. Call light in reach and chair alarm in place.  Safety Devices  Type of Devices: 
Phone: [phone] [facility]  Date: 2025  Fax: [phone]      Physical Therapy    Daily Note    Patient Name: Quique Wray      : 1945  (80 y.o.)  MRN: [id number]      Assessment          Supine to Sit: Supervision         Sit to Stand: Minimal Assistance (x1-2)  Stand to Sit: Minimal Assistance (x1-2)  Bed to Chair: Partial/Moderate assistance (x2 with wheeled walker)        Comment: ---    WB Status: bed to bedside commode to chair ~2-3 ft x2                Assessment: Co-treat with OT this session due to safety concerns.  Patient is in bed upon arrival with wife also in room. Supine to sit is supervision with HOB elevated slightly.  Sitting balance at edge of bed is fair.  Patient requests need for bathroom Sit to stand from bed with height of bed elevated is min x 1-2. Able to complete transfer to bedside commode ~2-3 ft x1 with wheeled walker and mod x 2.  He does attempt to sit prior to reaching back for chair. He is more impulsive today and questions multiple times what he is supposed to do next. Sits on commode for a few minutes and then decides he is not going to have success.  Sit to stand from bedside commode requires verbal cues for proper hand placement.  Completes short transfer to bedside chair ~2-3 ft x1 with mod assist x 2 and wheeled walker.  While in chair, he completes seated B LE exercies and remains up in chair with wife also present.  Call light in reach and chair alarm attached.  Safety Devices  Type of Devices: Call light within reach, Chair alarm in place, Gait belt, Left in chair, Nurse notified          Call light in reach, Phone in reach, Use of Gait belt, Chair alarm, Nurse Notified, Family Present, and Left in chair  Time In: 909  Time Out: 955  Timed Coded Minutes: 24 (co-treat with OT)  Total Treatment Time: 46       Exercises:  See Flowsheets    Plan  Cont Per Plan Of Care    Goals  Short Term Goals  Time Frame for Short Term Goals: 5 days 
Phone: [phone] [facility]  Date: 2025  Fax: [phone]      Physical Therapy    Daily Note    Patient Name: Quique Wray      : 1945  (80 y.o.)  MRN: [id number]                                 [facility]  Co-tx with OT due to functional mobility deficits and weakness/decreased endurance which affects safety.   Transfers sit to stand with min assist x2 however requires frequent directions on hand placement etc for activity planning. Emphasis  of treatment on gait and standing tolerance.  Ambulated 3 different times of approx 10ft with wh walker and min assist x 2 with wheelchair follow. Patient complains of hip/back pain and LE weakness but with encouragement is able to complete activity.  Mild forward flexed posture.Worked on standing tolerance with OT completing UE activity.Progress with ambulatory activities and LE strengthening.  Safety Devices  Type of Devices: Call light within reach, Chair alarm in place, Gait belt, Left in chair, Nurse notified          Call light in reach, Use of Gait belt, and Left in bed  Time In: 909  Time Out: 1008  Timed Coded Minutes: 30  Total Treatment Time: 59       Exercises:  See Flowsheets    Plan  Cont Per Plan Of Care    Goals  Short Term Goals  Time Frame for Short Term Goals: 7 days - expires 25  Short Term Goal 1: Transfer consistently sit to stand with min assist  Short Term Goal 2: Ambulate with wh walker 15 ft to bathroom for self-care and toileting needs consistently with min assist x 2 for safety  Short Term Goal 3: Stand up to 2 minutes to assist with self-care needs          Long Term Goals  Time Frame for Long Term Goals : 14 days - expires 25  Long Term Goal 1: Ambulate with wh walker 25ft with min assist x 1  Long Term Goal 2: Transfer sit to stand with CGA/SBA x 1  Long Term Goal 3: Patient will transfer bed to chair with Min A-NOT MET          CASSIE MOREAU, PT Therapy License Number: PT 
Phone: [phone] [facility]  Date: 2025  Fax: [phone]      Physical Therapy    Daily Note    Patient Name: Quique Wray      : 1945  (80 y.o.)  MRN: [id number]      Assessment          Sit to Supine: Minimal assistance (able to go onto R side with control and get R LE up onto bed wihtout assistance)       Sit to Stand: Substantial/Maximal assistance (x2 with hector stedy)  Stand to Sit: Substantial/Maximal assistance (x2 with hector stedy)  Bed to Chair: Dependent/Total (utilized hector stedy)        Comment: ---                     Assessment: Co-treat with CHRIS due to safety concerns and patient requiring assist of 2 for transfers.  He is currently in chair upon arrival to room with wife also present. Sit to stand from chair using hector stedy is max x 2.  He requires verbal cues for proper hand placement and has difficulty standing tall enough to place sitting flaps.  He is transfered to bed with hector stedy.  Sit to supine is min assist.  He is able to go down on his R side with control and bring his R LE up onto the bed but needs assistance for L LE.  Completes supine exercises for B LE strengthening and remains in bed following with call light in reach and bed alarm turned on. Wife remains in room with patient following.  Safety Devices  Type of Devices: Bed alarm in place, Call light within reach, Gait belt, Left in bed, Nurse notified          Call light in reach, Phone in reach, Use of Gait belt, Bed alarm, Nurse Notified, Family Present, and Left in bed  Time In: 1254  Time Out: 1320  Timed Coded Minutes: 13 ( co-treat with CHRIS)  Total Treatment Time: 26       Exercises:  See Flowsheets    Plan  Cont Per Plan Of Care    Goals  Short Term Goals  Time Frame for Short Term Goals: 5 days (25)  Short Term Goal 1: Patient will transfer supine to sit with Mod A-NOT MET  Short Term Goal 2: Patient will transfer sit to supine with good safety awareness and Min A-NOT MET  Short Term Goal 3: 
Phone: [phone] [facility]  Date: 2025  Fax: [phone]      Physical Therapy    Daily Note    Patient Name: Quique Wray      : 1945  (80 y.o.)  MRN: [id number]      Assessment          Supine to Sit: Moderate assistance (x2)         Sit to Stand: Partial/Moderate assistance (x2 with PADMINI stedy)  Stand to Sit: Partial/Moderate assistance (x2 with PADMINI stedy)  Bed to Chair: Dependent/Total (utilized PADMINI stedy)        Comment: ---                     Assessment: Patient in bed with wife in room.  Patient states he feels like he needs to have a BM.  PTA suggests trying to sit on commode in bathroom and he agrees to do so. Supine to sit with HOB elevates slightly is mod x 2. He requires verbal and tactile cues for reaching for railing with L UE to assist with this.  Sitting balance at edge of bed is fair +.  Utilized PADMINI stedy to transfer to bathroom. Sit to stand from bed to PADMINI stedy is mod x 2.  Patient complains of pain in low back on both sides during transfers.  Sits on commode for extended period of time before agreeing to sit up in chair.  Sit to stand from commode is mod x 2.  Transfers to chair via PADMINI stedy.  Up in chair following with call light in reach and chair alarm attached.  Safety Devices  Type of Devices: Call light within reach, Chair alarm in place, Gait belt, Left in chair, Nurse notified          Call light in reach, Phone in reach, Use of Gait belt, Chair alarm, Nurse Notified, Family Present, and Left in chair  Time In: 1054  Time Out: 1125  Timed Coded Minutes: 31  Total Treatment Time: 31       Exercises:  See Flowsheets    Plan  Cont Per Plan Of Care    Goals  Short Term Goals  Time Frame for Short Term Goals: 5 days (25)  Short Term Goal 1: Patient will transfer supine to sit with Mod A-NOT MET  Short Term Goal 2: Patient will transfer sit to supine with good safety awareness and Min A  Short Term Goal 3: Patient will transfer sit to stand with Max 
Phone: [phone] [facility]  Date: 2025  Fax: [phone]      Physical Therapy    Daily Note    Patient Name: Quique Wray      : 1945  (80 y.o.)  MRN: [id number]      Assessment          Supine to Sit: Stand by assistance, Supervision (to L side of bed)  Sit to Supine: Stand by assistance       Sit to Stand: Minimal Assistance, Partial/Moderate assistance (3-4 attempts are required today to complete sit to stand initally, does get less by end of session)  Stand to Sit: Minimal Assistance          Comment: cues for proper hand placement and requires 2-3 attempts to stand    WB Status: w.walker and min with w/c follow ~15 ftx1; 75 ftx1           Stairs  # Steps : 3  Stairs Height: 4\"  Rails: Bilateral  Assistance: Partial/Moderate assistance  Comment: at top of steps, patient c/o dizziness and closes his eyes    Assessment: Patient in chair after eating breakfast. Wife is also in room.  Sit to stand from chair requires 3-4 attempts this session. Patient complains of pain in his back and hips and then states he is just too weak. Once he is up, he is able to ambulate to w/c outside door with wheeled walker and min assist. Wheeled to therapy room and is able to complete seated exercises as outlined above.  Up/down 3 steps 4\" high with B handrails and mod x 2 assist. Does well ascending steps with step to gait pattern. Upon getting to the top and turning around, he complains of dizziness and closes his eyes.  He is advised to open his eyes and slow his breathing down.   Once he has done this, he is able to descend steps with mod x 2 assist with step to gait pattern.   Ambulates ~75 ft back to his room with wheeled walker and w/c follow with min assist.  Up in chair following with call light in reach and chair alarm attached. Wife remains in room with patient for full session.  Safety Devices  Type of Devices: Call light within reach, Chair alarm in place, Gait belt, Left in chair, Nurse 
Phone: [phone] [facility]  Date: 4/13/2025  Fax: [phone]      Physical Therapy    Daily Note        Assessment: [place] with OT to maximize pts potential d/t decreased strength and endurance.Pt is in bed sleeping upon arrival. He awakens and his wife is agreeable to participate. Performed supine AAROM heels slides, hip abd and AROM ankle pumps all x 10 reps.  Attempted to have pt bridge to slide hips to right, pt unable. Transerred supine to sit with +2 MAX A.  Sitting balance with MIN assist when pt supported with 1 UE , tending to lean R. When pt is supporting self with B UE he performed sitting balance with CGA.  Pt sat 7 min on EOB working on posture and core strength. Pt complained multiple times about back pain, his wife rubbed lotion on his back. Sit to supine with +2 MAX . Pt requests to stay positioned on his R side, pillow placed between knees. Wife educated on continuing to encourage him to move his LE's in bed for strerngth and flexibility.             Call light in reach, Phone in reach, Family Present, and Left in bed=  Time In: 0928  Time Out: 1049  Timed Coded Minutes: 21  Total Treatment Time: 21       Exercises:  See Flowsheets    Plan  Cont Per Plan Of Care    Goals  Short Term Goals  Time Frame for Short Term Goals: 5 days (4/16/25)  Short Term Goal 1: Patient will transfer supine to sit with Mod A  Short Term Goal 2: Patient will transfer sit to supine with good safety awareness and Min A  Short Term Goal 3: Patient will transfer sit to stand with Max A          Long Term Goals  Time Frame for Long Term Goals : 10 days (4/21/25  Long Term Goal 1: Patient will transfer supine to sit with Min A  Long Term Goal 2: Patient will transfer sit to stand with Min A  Long Term Goal 3: Patient will transfer bed to chair with Min A          Allyson Noriega     [facility]  Date: 4/13/2025    
Phone: [phone] [facility]  Date: 2025  Fax: [phone]      Physical Therapy    Daily Note    Patient Name: Quique Wray      : 1945  (80 y.o.)  MRN: [id number]      Assessment         Sit to Supine: Stand by assistance       Sit to Stand: Minimal Assistance, Partial/Moderate assistance  Stand to Sit: Minimal Assistance          Comment: verbal cues primarily to reach back prior to sitting down    WB Status: FWW and w/c follow 125 ft x 2 with min/CGA            Assessment: Patient in chair upon arrival visiting with daughter. He agrees to work with therapies.  Sit to stand from chair is min/mod x 1-2.  Ambulates with wheeled walker to elevators with w/c follow ~125 ft x 1.  Wheeled remaining distance to therapy room. Completes standing balance activies in // bars and then after working on upper body strength with OT he is ready to go back to his room since he already did LE exercises. Ambulates back to room from elevators ~125 ftx1 to lie back down. Sit to supine from L side of bed requires SBA.  Remains in bed following with call light in reach and bed alarm turned on. Daugther remains in room with patient following.  Safety Devices  Type of Devices: Bed alarm in place, Call light within reach, Gait belt, Left in bed, Nurse notified          Call light in reach, Phone in reach, Use of Gait belt, Bed alarm, Nurse Notified, Family Present, and Left in bed  Time In: 1256  Time Out: 1340  Timed Coded Minutes: 21 ( co-treat with OT)  Total Treatment Time: 44       Exercises:  See Flowsheets    Plan  Cont Per Plan Of Care    Goals  Short Term Goals  Time Frame for Short Term Goals: 7 days - expires 25  Short Term Goal 1: Transfer consistently sit to stand with min assist-NOT MET  Short Term Goal 2: Ambulate with wh walker 15 ft to bathroom for self-care and toileting needs consistently with min assist x 2 for safety-NOT MET  Short Term Goal 3: Stand up to 2 minutes to assist with 
Phone: [phone] [facility]  Date: 2025  Fax: [phone]      Physical Therapy    Daily Note    Patient Name: Quique Wray      : 1945  (80 y.o.)  MRN: [id number]      Assessment       Supine to Sit: Supervision  Sit to Supine: Stand by assistance       Sit to Stand: Minimal Assistance, Partial/Moderate assistance (depends on seat height)  Stand to Sit: Minimal Assistance          Comment: verbal cues primarily to reach back prior to sitting down    WB Status: w.walker from bed to bathroom back to bed with min/CGA             Assessment: Patient supine in bed upon entry to room. He states needs to have a BM. Supine to sit to R side of bed is supervision. Sit to stand from bed with height of bed elevated require 2 attempts. First attempt patient cries out in pain stating that his back was hurting. Second attempt he is able to stand from bed with min/mod assist. Ambulates with wheeled walker into bathroom. Is dependent for regina care and pulling pants down and up.  Able to stand at walker to be cleaned up per PTA without support.  Requests to lie back down following. Ambulates back to bed. Sit to supine is SBA. In bed following with call light in reach and bed alarm turned on.  Safety Devices  Type of Devices: Bed alarm in place, Call light within reach, Gait belt, Left in bed          Call light in reach, Phone in reach, Use of Gait belt, Bed alarm, and Left in bed  Time In: 1441  Time Out: 1458  Timed Coded Minutes: 17  Total Treatment Time: 17           Plan  Cont Per Plan Of Care    Goals  Short Term Goals  Time Frame for Short Term Goals: -  Short Term Goal 1: STG=LTG  Short Term Goal 2: -  Short Term Goal 3: -          Long Term Goals  Time Frame for Long Term Goals : Expires 25  Long Term Goal 1: Ambulate with wh walker x 50-75ft consistently with SBA to safely negotiate home environment  Long Term Goal 2: Complete sit to stand transfers from chair or commode with CG or min assist 
Phone: [phone] [facility]  Date: 2025  Fax: [phone]      Physical Therapy    Daily Note    Patient Name: Quique Wray      : 1945  (80 y.o.)  MRN: [id number]      Assessment              Sit to Stand: Minimal Assistance, Partial/Moderate assistance (depending on seat height)  Stand to Sit: Minimal Assistance          Comment: verbal cues for proper hand placement and to not \"plop\" when sitting    WB Status: w.walker and w/c follow with min/CGA ~15 ftx1; 35 ft x1           Stairs  # Steps : 16  Stairs Height: 4\" (and 6\")  Rails: Bilateral  Assistance: Minimal assistance, Partial/Moderate assistance (x2)  Comment: much improved than first attempt    Assessment: Patient in chair upon arrival. Sit to stand from bed side chair with cushion is min/mod x 1-2.  Ambulates to w/c in hallway with wheeled walker and min. Wheeled to therapy room to work on steps. Up/down 3 steps 4\" in height with B handrails and min/mod x 1.  Rests for awhile and then ambulates up/down 3 steps 4\" nikkie and 2 steps 6\" high with B handrails and min/mod.  Discussed his home situation regarding steps and possibly completeing a home safety eval.  Will talk to his wife regarding this.  Patient completes steps better than previous attempt. Wheeled back part way to his room and is then able to ambulate back to room from w/c ~35 ft x1.  Up in chair following with call light in reach and chair alarm attached.  Safety Devices  Type of Devices: Call light within reach, Chair alarm in place, Gait belt, Left in chair, Nurse notified          Call light in reach, Phone in reach, Use of Gait belt, Chair alarm, Nurse Notified, and Left in chair  Time In: 1015  Time Out: 1050  Timed Coded Minutes: 35  Total Treatment Time: 35       Exercises:  See Flowsheets    Plan  Cont Per Plan Of Care    Goals  Short Term Goals  Time Frame for Short Term Goals: 7 days - expires 25  Short Term Goal 1: Transfer consistently sit to stand with 
Phone: [phone] [facility]  Date: 2025  Fax: [phone]      Physical Therapy    Daily Note    Patient Name: Quique Wray      : 1945  (80 y.o.)  MRN: [id number]          Assessment: Pt in bed upon arrival. Transferred supine to sit with HOB elevated  and using bedrail and verbal encouragement.  Good sitting balance. Pt dependent for donning shorts d/t refusing to attempt to don himself. Is able dof and don shirt.  Transfers sit to stand from elevated bed with MIN A.Amb with FWW into hallway x 40 feet. Once in therapy rm worked on amb up down curb step with walker with +2 MIN A.  Performed seated ex as outlined. Sit to stand from WC in hallway with +1 MOD assist .Amb with WC follow to his rm x 50 ft. Fair Eccentric control to chair.       Call light in reach, Phone in reach, Use of Gait belt, and Left in chair  Time In: 115  Time Out: 1155  Timed Coded Minutes: 40  Total Treatment Time: 40       Exercises:  See Flowsheets    Plan  Cont Per Plan Of Care    Goals  Short Term Goals  Time Frame for Short Term Goals: 7 days - expires 25  Short Term Goal 1: Transfer consistently sit to stand with min assist-NOT MET  Short Term Goal 2: Ambulate with wh walker 15 ft to bathroom for self-care and toileting needs consistently with min assist x 2 for safety-NOT MET  Short Term Goal 3: Stand up to 2 minutes to assist with self-care needs-NOT MET          Long Term Goals  Time Frame for Long Term Goals : Time Frame for Long Term Goals : 2 weeks - expires 25  Long Term Goal 1: Ambulate with wh walker x 50-75ft consistently with SBA to safely negotiate home environment  Long Term Goal 2: Complete sit to stand transfers from chair or commode with CG or min assist consistently to safely return home witih spouse assist  Long Term Goal 3: Up/down steps with left handrail/grab bar and 1 min assist to safely enter/exit home  Long Term Goal 4: Good dynamic standing balance to assist with self -care 
Phone: [phone] [facility]  Date: 2025  Fax: [phone]      Physical Therapy    Daily Note    Patient Name: Quique Wray      : 1945  (80 y.o.)  MRN: [id number]      Assessment  Bed mobility  Rolling to Right: Partial/Moderate assistance  Supine to Sit: Partial/Moderate assistance (going towards L side)  Sit to Supine: Partial/Moderate assistance  Bed Mobility Comments: Unsafe with sit to supine.  Move abruptly to lie onto right side.  Requires Max A to roll from right side to supine and to boost in bed.    Supine to Sit: Supervision  Sit to Supine: Supervision    Sit to Stand: Minimal Assistance, Partial/Moderate assistance (with height of bed elevated)  Stand to Sit: Minimal Assistance  Bed to Chair: Minimal assistance, Partial/Moderate assistance (with wheeled walker)    Comment: cues for proper hand placement and requires 2-3 attempts to stand    WB Status: FWW x8-10' around foot of bed to w/c; 40' in hallway with FWW and w/c follow    Assessment: Patient laying in bed upon entry. Patient agrees to work with PTA. Patient requests to use urinal prior to treatment. Patient uses urinal and then transfers supine to sit with SBA. While seated at edge of bed patient dons pull up brief and athletic shorts. Sit to stand is mod A x1. Patient's wife assists with pulling brief and shorts up. Patient sits back down at edge of bed to take a break before he ambulates around foot of bed to w/c. Patient is wheeled into hallway and ambulates x40' with FWW, CGA, and w/c follow. Patient wheeled rest of way to therapy room and completes seated LE ex. Following ex in therapy room patient completes w/c mobility x40' before being wheeled rest of way back to room. Patient ambulates from inside doorway to recliner. Call light in reach and chair alarm in place. Patient's wife remains in room.  Safety Devices  Type of Devices: Call light within reach, Chair alarm in place, Gait belt, Left in chair, Nurse 
Phone: [phone] [facility]  Date: 2025  Fax: [phone]      Physical Therapy    Daily Note    Patient Name: Quique Wray      : 1945  (80 y.o.)  MRN: [id number]      Assessment  Bed mobility  Rolling to Right: Partial/Moderate assistance  Supine to Sit: Supervision (with HOB fully elevated)  Sit to Supine: Partial/Moderate assistance  Bed Mobility Comments: Unsafe with sit to supine.  Move abruptly to lie onto right side.  Requires Max A to roll from right side to supine and to boost in bed.    Supine to Sit: Supervision  Sit to Supine: Stand by assistance    Sit to Stand: Minimal Assistance, Partial/Moderate assistance  Stand to Sit: Minimal Assistance  Bed to Chair: Minimal assistance, Partial/Moderate assistance (with wheeled walker)    Comment: verbal cues primarily to reach back prior to sitting down    WB Status: FWW and w/c follow 125' x2    Stairs  # Steps : 16  Stairs Height: 4\" (and 6\")  Rails: Bilateral  Assistance: Minimal assistance, Partial/Moderate assistance (x2)  Comment: much improved than first attempt    Assessment: Patient in bed with wife present upon entry. Patient agrees to session with CHRIS and PTA. Supine to sit is supervision. Sit to stand from elevated edge of bed is min A x2. Patient ambulates with FWW and CGA x125' before requesting to be wheeled from elevators to therapy room. In therapy room patient completes stairs with B/L handrails and step to pattern. After a short rest break patient completes standing balance ex in // bars without LOB. Another short rest break is need for patient and then he is wheeled to elevators and ambulates back to his room x125' with FWW and CGA and returns to bed. Sit to supine is supervision. Call light in reach and patient's wifre remains in room.  Safety Devices  Type of Devices: Bed alarm in place, Call light within reach, Gait belt, Left in bed    Call light in reach, Phone in reach, Use of Gait belt, Bed alarm, Family 
Phone: [phone] [facility]  Date: 2025  Fax: [phone]      Physical Therapy    Daily Note    Patient Name: Quique Wray      : 1945  (80 y.o.)  MRN: [id number]      Assessment          Supine to Sit: Contact guard assistance (going to L side this session)  Sit to Supine: Supervision       Sit to Stand: Minimal Assistance  Stand to Sit: Minimal Assistance  Bed to Chair: Dependent/Total (utilized padmini mendosa to transfer from bed to w/c)        Comment: ---    WB Status: padmini mendosa for transfers                Assessment: Patient in bed upon arrival with wife also present in room. Patient transfers to sitting position to L side of bed today and requires min SBA. Sit to stand from bed with height of bed elevated and verbal cues for hand placement. Three attempts are made to stand with min/mod x 1 assist. Patient states that his L thumb hurts too bad and can not push up or even grab walker. PADMINI mendosa is utilized to transfer patient to w/c and is wheeled to therapy room . Completes seated exercises as outlined above. Wheeled back to room and remains in w/c per wife request.  Nurse in room with patient following along with wife.  Safety Devices  Type of Devices: Gait belt, Left in chair, Nurse notified (in w/c in room)            Time In: 858  Time Out: 934  Timed Coded Minutes: 36  Total Treatment Time: 36       Exercises:  See Flowsheets    Plan  Cont Per Plan Of Care    Goals  Short Term Goals  Time Frame for Short Term Goals: 5 days (25)  Short Term Goal 1: Patient will transfer supine to sit with Mod A-NOT MET  Short Term Goal 2: Patient will transfer sit to supine with good safety awareness and Min A-NOT MET  Short Term Goal 3: Patient will transfer sit to stand with Max A-NOT MET          Long Term Goals  Time Frame for Long Term Goals : 10 days (25)  Long Term Goal 1: Patient will transfer supine to sit with Min A-MET  Long Term Goal 2: Patient will transfer sit to stand 
Phone: [phone] [facility]  Date: 2025  Fax: [phone]      Physical Therapy    Daily Note    Patient Name: Quique Wray      : 1945  (80 y.o.)  MRN: [id number]      Assessment              Sit to Stand: Minimal Assistance, Partial/Moderate assistance  Stand to Sit: Minimal Assistance          Comment: verbal cues for proper hand placement    WB Status: w.walker and min with w/c follow ~15 ftx1; 80 ft x1            Assessment: Patient in chair upon arrival to room. Sit to stand from chair after shoes are donned per PTA is min/mod x 1.  Ambulates with wheeled walker to w/c in hallway and is wheeled to therapy room. Complains of feeling weak today. Performs seated B LE exercises as outlined above. Sit to stand from w/c is min assist. Ambulates back to room ~80 with wheeled walker and min/CGA with w/c follow.  Up in chair following with call light in reach and chair alarm attached.  Safety Devices  Type of Devices: Call light within reach, Chair alarm in place, Gait belt, Left in chair, Nurse notified          Call light in reach, Phone in reach, Use of Gait belt, Chair alarm, Nurse Notified, and Left in chair  Time In: 0958  Time Out: 1027  Timed Coded Minutes: 29  Total Treatment Time: 29       Exercises:  See Flowsheets    Plan  Cont Per Plan Of Care    Goals  Short Term Goals  Time Frame for Short Term Goals: 7 days - expires 25  Short Term Goal 1: Transfer consistently sit to stand with min assist-NOT MET  Short Term Goal 2: Ambulate with wh walker 15 ft to bathroom for self-care and toileting needs consistently with min assist x 2 for safety-NOT MET  Short Term Goal 3: Stand up to 2 minutes to assist with self-care needs-NOT MET          Long Term Goals  Time Frame for Long Term Goals : 14 days - expires 25  Long Term Goal 1: Ambulate with wh walker 25ft with min assist x 1-MET  Long Term Goal 2: Transfer sit to stand with CGA/SBA x 1  Long Term Goal 3: Patient will transfer 
Phone: [phone] [facility]  Date: 2025  Fax: [phone]      Physical Therapy    Daily Note    Patient Name: Quique Wray      : 1945  (80 y.o.)  MRN: [id number]      Assessment          Supine to Sit: Stand by assistance         Sit to Stand: Minimal Assistance, Partial/Moderate assistance  Stand to Sit: Minimal Assistance          Comment: ---    WB Status: FWW and w/c follow ~135 ft x 1 with CGA               Assessment: Patient in bed upon arrival to room. Co-treat with OT this session. Supine to sit is supervision to R side of bed. Sitting balance at  edge of bed is good. Sit to stand from bed is min/mod . Ambulates with wheeled walker to elevators ~135 ft x1. Wheeled remaining distance to therapy room. Completes seated B LE exercise. Standing tolerance is 1:30 and 2:45. Wheeled back to room following. Ambulates from doorway back to bedside chair to sit up. Call light in reach and chair alarm attached.  Safety Devices  Type of Devices: Call light within reach, Chair alarm in place, Gait belt, Left in chair          Call light in reach, Phone in reach, Use of Gait belt, Chair alarm, and Left in chair  Time In: 1015  Time Out: 1108  Timed Coded Minutes: 26 ( co-treat with OT)  Total Treatment Time: 53       Exercises:  See Flowsheets    Plan  Cont Per Plan Of Care    Goals  Short Term Goals  Time Frame for Short Term Goals: 7 days - expires 25  Short Term Goal 1: Transfer consistently sit to stand with min assist-NOT MET  Short Term Goal 2: Ambulate with wh walker 15 ft to bathroom for self-care and toileting needs consistently with min assist x 2 for safety-NOT MET  Short Term Goal 3: Stand up to 2 minutes to assist with self-care needs-NOT MET          Long Term Goals  Time Frame for Long Term Goals : 2 weeks - expires 25  Long Term Goal 1: Ambulate with wh walker x 50-75ft consistently with SBA to safely negotiate home environment  Long Term Goal 2: Complete sit to stand 
Phone: [phone] [facility]  Date: 4/17/2025  Fax: [phone]      Physical Therapy    Daily Note    Assessment: Co treat with OT for safety and to maximize pts strength/endurance. Performed LE ex: heel slides and abd AAROM and ankle pumps AROM. Attempted bridges with pt participating but unable to lift hips.  Transferred supine to sit with +2 MOD A.  Sitting balance CGA/MIN A. Pt with frequent c/o pain , RN advises he has had all the medication he can have.  Sit to stand  +2 MOD A .Stood to walker with cues to push from bed x 25 sec and 35 sec with much encouragement to stay standing. Sit to supine +2 MOD A.  RN present for session.  Safety Devices  Type of Devices: Bed alarm in place, Call light within reach, Gait belt, Left in bed, Nurse notified          Call light in reach, Use of Gait belt, Other Staff Present Rn, Family Present, and Left in bed  Time In: 1054  Time Out: 1120  Timed Coded Minutes: 13  Total Treatment Time: 26       Exercises:  See Flowsheets    Plan  Cont Per Plan Of Care    Goals  Short Term Goals  Time Frame for Short Term Goals: 5 days (4/16/25)  Short Term Goal 1: Patient will transfer supine to sit with Mod A-NOT MET  Short Term Goal 2: Patient will transfer sit to supine with good safety awareness and Min A-NOT MET  Short Term Goal 3: Patient will transfer sit to stand with Max A-NOT MET          Long Term Goals  Time Frame for Long Term Goals : 10 days (4/21/25)  Long Term Goal 1: Patient will transfer supine to sit with Min A  Long Term Goal 2: Patient will transfer sit to stand with Min A  Long Term Goal 3: Patient will transfer bed to chair with Min A          Allyson Noriega Therapy License Number: [id number]    Date: 4/17/2025    
Phone: [phone] [facility]  Date: 2025  Fax: [phone]      Physical Therapy    Daily Note    Patient Name: Quique Wray      : 1945  (80 y.o.)  MRN: [id number]      Assessment          Supine to Sit: Stand by assistance, Supervision (to L side of bed)      Sit to Stand: Minimal Assistance, Partial/Moderate assistance  Stand to Sit: Minimal Assistance          Comment: cues for proper hand placement and requires 2-3 attempts to stand    WB Status: w.walker and min x 1 with w/c follow ~40 ft x1; 60 ft x1                Assessment: Patient in bed with wife also in room. Supine to sit to L side of bed is supervision/SBA today.  C/o pain in B hips. He is encouraged by wife and PTA to work through the pain.  Sitting balance at edge of bed to have his wife isai his shoes and shorts is fair as he tends to lean back slightly due to pain in his core muscles.  Sit to stand from bed with height of bed elevated requires 2 trials and mod x 1.  Ambulates with wheeled walker to w/c in hallway ~5 ftx1 and then requests to sit down.  After restng and encouragement to ambulate more he agrees. Sit to stand from w/c with cushion on it is mod x 1. Ambulates ~40 ft x1 before stating his is done and needs to sit down.  Wheeled to therapy room.  Completes seated B LE exercises as outlined above.  States the hardest thing for him is standing.  Worked on standing at walker x 2 trials.  Wheeled back part way to room and is then able to ambulate ~60 ft back to his room with wheeled walker and min assist.  Sits down in w/c to rest prior to making it back to bedside chair.  Three attempts are made to stand up from w/c and this is finally completed on 4th attempt with mod x 1.  Ambulates ~5 ft to bedside chair to sit up. Call light in reach and chair alarm attached. Wife remains present in room with patient followign.  Safety Devices  Type of Devices: Call light within reach, Chair alarm in place, Gait belt, Left in 
Phone: [phone] [facility]  Date: 5/3/2025  Fax: [phone]      Physical Therapy    Daily Note    Patient Name: Quique Wray      : 1945  (80 y.o.)  MRN: [id number]      Assessment      Sit to Stand: Minimal Assistance, Partial/Moderate assistance (x1-2)  Stand to Sit: Minimal Assistance          Comment: verbal cues for proper hand placement but even then he does not follow directions stating that he can't    WB Status: no gait due to IV running and patient refused               Assessment: Patient in chair with nurse in room hooking up an IV.  Patient requests to only complete exercises and not ambulate due to IV.  He does not like to do more than 1 thing at a time.  He completes seated B LE exercises as outlined above and complains of pain in his hips and low back. Agrees to stand with PTA and nurse.  Sit to stand is min/mod x 1-2 for safety. Stands for short period of time and is able to complete B marching but then immediately sits down and does not follow directions of reaching back. States that he can't.  He rests and stands a second time and completes same thing.  Again he plops down due to fatigue.  Up in chair following with call light in reach and chair alarm attached. Wife is present for full session and remains in room following.  Safety Devices  Type of Devices: Call light within reach, Chair alarm in place, Gait belt, Left in chair, Nurse notified          Call light in reach, Phone in reach, Use of Gait belt, Chair alarm, Nurse Notified, Family Present, and Left in chair  Time In: 922  Time Out: 1002  Timed Coded Minutes: 30  Total Treatment Time: 40       Exercises:  See Flowsheets    Plan  Cont Per Plan Of Care    Goals  Short Term Goals  Time Frame for Short Term Goals: 7 days - expires 25  Short Term Goal 1: Transfer consistently sit to stand with min assist-NOT MET  Short Term Goal 2: Ambulate with wh walker 15 ft to bathroom for self-care and toileting needs 
Phone: [phone] [facility]  Date: 2025  Fax: [phone]      Physical Therapy    Daily Note    Patient Name: Quique Wray      : 1945  (80 y.o.)  MRN: [id number]      Assessment              Sit to Stand: Minimal Assistance, Partial/Moderate assistance  Stand to Sit: Minimal Assistance          Comment: verbal cues primarily to reach back prior to sitting down    WB Status: w.walker and min/CGA with w/c follow ~45 ft x1; 80 ft x1               Assessment: Patient in chair upon arrival .  Wife is also present in rooom with patient.  Sit to stand from chair is min/ mod. Ambulates with wheeled walker in hallway~45 ft x1 and is then wheeled to therapy room. Completes seated exercises as outlined above. Requests to walk more. Sit to stand from w/s is min/mod. Ambulates with wheeled walker and min/CGA ~80 ft x1 back to room. Sits up in chair following to visit with wife and daughter. Call light in reach and chair alarm attached.  Safety Devices  Type of Devices: Call light within reach, Chair alarm in place, Gait belt, Left in chair, Nurse notified          Call light in reach, Phone in reach, Use of Gait belt, Chair alarm, Nurse Notified, Family Present, and Left in chair  Time In: 1000  Time Out: 1033  Timed Coded Minutes: 33  Total Treatment Time: 33       Exercises:  See Flowsheets    Plan  Cont Per Plan Of Care    Goals  Short Term Goals  Time Frame for Short Term Goals: -  Short Term Goal 1: STG=LTG  Short Term Goal 2: -  Short Term Goal 3: -          Long Term Goals  Time Frame for Long Term Goals : Expires 25  Long Term Goal 1: Ambulate with wh walker x 50-75ft consistently with SBA to safely negotiate home environment  Long Term Goal 2: Complete sit to stand transfers from chair or commode with CG or min assist consistently to safely return home with spouse assist  Long Term Goal 3: Up/down steps with left handrail/grab bar and 1 min assist to safely enter/exit home  Long Term Goal 
Phone: [phone] [facility]  Date: 2025  Fax: [phone]      Physical Therapy    Daily Note    Patient Name: Quique Wray      : 1945  (80 y.o.)  MRN: [id number]       Assessment: Pt up in chair upon arrival.He c/o feeling weak and a little nauseated , RN advises he is cleared to participate. Pt transferred out of chair with MIN A.  He amb into hallway with FWW and CGA x 100 ft. WC to therapy rm.Performed ex as outlined in sitting for LE strengthening. Transferred out of WC with MIN A. Amb back to his rm, with cues good control to sit in chair. Pt up for lunch.          Call light in reach, Phone in reach, Use of Gait belt, and Left in chair  Time In: 1110  Time Out: 1140  Timed Coded Minutes: 30  Total Treatment Time: 30       Exercises:  See Flowsheets    Plan  Cont Per Plan Of Care    Goals  Short Term Goals  Time Frame for Short Term Goals: 7 days - expires 25  Short Term Goal 1: Transfer consistently sit to stand with min assist-NOT MET  Short Term Goal 2: Ambulate with wh walker 15 ft to bathroom for self-care and toileting needs consistently with min assist x 2 for safety-NOT MET  Short Term Goal 3: Stand up to 2 minutes to assist with self-care needs-NOT MET          Long Term Goals  Time Frame for Long Term Goals : 14 days - expires 25  Long Term Goal 1: Ambulate with wh walker 25ft with min assist x 1-MET  Long Term Goal 2: Transfer sit to stand with CGA/SBA x 1  Long Term Goal 3: Patient will transfer bed to chair with Min A-MET  Long Term Goal 4: Good dynamic standing balance to assist with self -care tasks       Allyson Noriega Therapy License Number: [id number]    Date: 2025    
Phone: [phone] [facility]  Date: 2025  Fax: [phone]      Physical Therapy    Daily Note    Patient Name: Quique Wray      : 1945  (80 y.o.)  MRN: [id number]      Assessment    Supine to Sit: Partial/Moderate assistance (going towards L side)  Sit to Supine: Moderate assistance             Sit to Stand: Minimal Assistance depending on seat height  Stand to Sit: Minimal Assistance     Ambulated with wh walker 10-15ft x 3 trials with min assist and wheelchair follow           Assessment: Patient requires mod assist for bed mobility and supine to sit this date with complaints of lumbar pain.  Sit to stand min assist depending on surface and seat height.  Requires cues for hand placement especially with stand to sit.  Ambulates with wh walker x 10-15 ft for 3 trials with min assist and wheelchair follow.  Co-tx with OT on standing balance and standing tolerances.  Safety Devices  Type of Devices: Call light within reach, Chair alarm in place, Gait belt, Left in chair, Nurse notified          Call light in reach, Phone in reach, Use of Gait belt, Chair alarm, and Left in chair  Time In: 930  Time Out: 1023  Timed Coded Minutes: 26  Total Treatment Time: 53       Exercises:  See Flowsheets    Plan  Cont Per Plan Of Care    Goals  Short Term Goals  Time Frame for Short Term Goals: 7 days - expires 25  Short Term Goal 1: Transfer consistently sit to stand with min assist  Short Term Goal 2: Ambulate with wh walker 15 ft to bathroom for self-care and toileting needs consistently with min assist x 2 for safety  Short Term Goal 3: Stand up to 2 minutes to assist with self-care needs          Long Term Goals  Time Frame for Long Term Goals : 14 days - expires 25  Long Term Goal 1: Ambulate with wh walker 25ft with min assist x 1  Long Term Goal 2: Transfer sit to stand with CGA/SBA x 1  Long Term Goal 3: Patient will transfer bed to chair with Min A-NOT MET          CASSIE MOREAU, PT 
Phone: [phone] [facility]  Date: 2025  Fax: [phone]      Physical Therapy    Daily Note    Patient Name: Quique Wray      : 1945  (80 y.o.)  MRN: [id number]          Assessment: Pt up in chair upon arrival. Is agreeable to participate. Performed seated LE strengthening with 2# wts but only genevieve 10 reps each ex d/t c/o knee pain and  fatigue.Transferred sit to stand with +2 MOD A. Amb with FWW into hallway x 75 ft, limited by knee fatigue.  Pt with fair eccentric control to chair reaching back with 1 UE.  Wheeled back to his rm. Transfers out of WC with +2 MOD A.  A few steps back to bed.  Req assist of LE's into bed.          Call light in reach, Phone in reach, Use of Gait belt, Other Staff Present RN, and Left in bed  Time In: 1430  Time Out: 1500  Timed Coded Minutes: 30  Total Treatment Time: 30       Exercises:  See Flowsheets    Plan  Cont Per Plan Of Care    Goals  Short Term Goals  Time Frame for Short Term Goals: 7 days - expires 25  Short Term Goal 1: Transfer consistently sit to stand with min assist-NOT MET  Short Term Goal 2: Ambulate with wh walker 15 ft to bathroom for self-care and toileting needs consistently with min assist x 2 for safety-NOT MET  Short Term Goal 3: Stand up to 2 minutes to assist with self-care needs-NOT MET          Long Term Goals  Time Frame for Long Term Goals : 14 days - expires 25  Long Term Goal 1: Ambulate with wh walker 25ft with min assist x 1-MET  Long Term Goal 2: Transfer sit to stand with CGA/SBA x 1  Long Term Goal 3: Patient will transfer bed to chair with Min A-MET          Allyson Noriega Therapy License Number: [id number]    Date: 2025    
Phone: [phone] [facility]  Date: 5/15/2025  Fax: [phone]      Physical Therapy    Daily Note    Patient Name: Quique Wray      : 1945  (80 y.o.)  MRN: [id number]      Assessment         Sit to Stand: Minimal Assistance, Partial/Moderate assistance  Stand to Sit: Minimal Assistance          Comment: ---    WB Status: FWW and w/c follow 130 ft x2             Assessment: Patient seated up in chair upon arrival to room.  Requries cathleen from PTA to isai shoes. Sit to stand from chair is min/mod x 1. Ambulates with wheeled walker to elevators without LOB and w/c follow. Requires CBA/SBA for gait.  Wheeled remaining distance to therapy room. Completes B LE and UE exercises as outlined above. Ambulates back to room as documented above to sit up in chair. Call light in reach and chair alarm attached.  Safety Devices  Type of Devices: Call light within reach, Chair alarm in place, Gait belt, Left in chair, Nurse notified          Call light in reach, Phone in reach, Use of Gait belt, Chair alarm, Nurse Notified, and Left in chair  Time In: 1006  Time Out: 1043  Timed Coded Minutes: 37  Total Treatment Time: 37       Exercises:  See Flowsheets    Plan  Cont Per Plan Of Care    Goals  Short Term Goals  Time Frame for Short Term Goals: 7 days - expires 25  Short Term Goal 1: Transfer consistently sit to stand with min assist-NOT MET  Short Term Goal 2: Ambulate with wh walker 15 ft to bathroom for self-care and toileting needs consistently with min assist x 2 for safety-NOT MET  Short Term Goal 3: Stand up to 2 minutes to assist with self-care needs-NOT MET          Long Term Goals  Time Frame for Long Term Goals : 2 weeks - expires 25  Long Term Goal 1: Ambulate with wh walker x 50-75ft consistently with SBA to safely negotiate home environment  Long Term Goal 2: Complete sit to stand transfers from chair or commode with CG or min assist consistently to safely return home witih spouse 
Phone: [phone] [facility]  Date: 2025  Fax: [phone]      Physical Therapy    Daily Note    Patient Name: Quique Wray      : 1945  (80 y.o.)  MRN: [id number]      Assessment       Supine to Sit: Stand by assistance, Supervision (to L side of bed)         Sit to Stand: Minimal Assistance, Partial/Moderate assistance  Stand to Sit: Minimal Assistance          Comment: verbal cues for proper hand placement    WB Status: gait with wheeled walker and w/c follow with min assist ~80 ft x1; 25 ft x1            Assessment: Patient in bed upon arrival to room. Supine to sit to L side of bed is superivsion.  Sit to stand from bed with height of bed elevated is min/mod assist.  Stands at walker with CGA to have his shorts pulled up. Ambulates to w/c in hallway to rest and is then able to ambulate 80 ft x1 with wheeled walker and w/c follow. Requires min assist for gait.  Wheeled remaining distance to therapy room. Completes seated and standing exercises as documented above. Requests to return to room but would like to walk more.  Sit to stand from w/c is min assist.  Ambulates ~25 ft and is then asked to sit down per PTA due to his pants needing to be fixed.  Wheeled back to room to fix his brief and is then able to ambulate ~10 ft back to bedside chair to sit up. Call light in reach and chair alarm attached.  Safety Devices  Type of Devices: Call light within reach, Chair alarm in place, Gait belt, Left in chair, Nurse notified          Call light in reach, Phone in reach, Use of Gait belt, Chair alarm, Nurse Notified, and Left in chair  Time In: 1040  Time Out: 1124  Timed Coded Minutes: 44  Total Treatment Time: 44       Exercises:  See Flowsheets    Plan  Cont Per Plan Of Care    Goals  Short Term Goals  Time Frame for Short Term Goals: 7 days - expires 25  Short Term Goal 1: Transfer consistently sit to stand with min assist-NOT MET  Short Term Goal 2: Ambulate with wh walker 15 ft to 
Phone: [phone] [facility]  Date: 2025  Fax: [phone]      Physical Therapy    Daily Note    Patient Name: Quique Wray      : 1945  (80 y.o.)  MRN: [id number]      Assessment         Sit to Supine: Stand by assistance       Sit to Stand: Minimal Assistance (x2)  Stand to Sit: Minimal Assistance (x2)  Bed to Chair: Minimal assistance, Partial/Moderate assistance (with wheeled walker)        Comment: cues for proper hand placement and requires 2-3 attempts to stand    WB Status: w.walker from chair to edge of bed ~5-6 ft x1 with min x 1-2                Assessment: Patient seated up in chair and requests to return to bed. Sit to stand from chair is min x 2. Ambulates with wheeled walker to edge of bed with min x 1-2.  Stands at edge of bed to use urinal.  Sit to supine is SBA.  Completes supine exercises as outlined above and remains in bed following with call light in reach and bed alarm turned on.  Wife in room with patient.  Safety Devices  Type of Devices: Bed alarm in place, Call light within reach, Gait belt, Left in bed, Nurse notified          Call light in reach, Phone in reach, Use of Gait belt, Bed alarm, Nurse Notified, Family Present, and Left in bed  Time In: 1405  Time Out: 1433  Timed Coded Minutes: 28  Total Treatment Time: 28       Exercises:  See Flowsheets    Plan  Cont Per Plan Of Care    Goals  Short Term Goals  Time Frame for Short Term Goals: 7 days - expires 25  Short Term Goal 1: Transfer consistently sit to stand with min assist-NOT MET  Short Term Goal 2: Ambulate with wh walker 15 ft to bathroom for self-care and toileting needs consistently with min assist x 2 for safety-NOT MET  Short Term Goal 3: Stand up to 2 minutes to assist with self-care needs-NOT MET          Long Term Goals  Time Frame for Long Term Goals : 14 days - expires 25  Long Term Goal 1: Ambulate with wh walker 25ft with min assist x 1-MET  Long Term Goal 2: Transfer sit to stand 
Phone: [phone] [facility]  Date: 2025  Fax: [phone]      Physical Therapy    Daily Note    Patient Name: Quique Wray      : 1945  (80 y.o.)  MRN: [id number]      Assessment         Sit to Supine: Stand by assistance (for B LE's)       Sit to Stand: Minimal Assistance, Partial/Moderate assistance  Stand to Sit: Minimal Assistance  Bed to Chair: Minimal assistance, Partial/Moderate assistance (with wheeled walker)          WB Status: with wheeled walker and min x 1 ~15 ftx1; 5 ftx1                Assessment: Patient seated up in chair upon arrival. With CHRIS, it is decided to take patient out of building to get some fresh air. Sit to stand from chair is min/mod assist. Ambulates with wheeled walker and min assist ~15 ft x1 to w/c. Wheeled outside for a while. Upon return to room he requests to lie down. Sit to stand from w/c is min/mod assist.  Ambulates with wheeled walker ~5 ft x1 to edge of bed. Sit to supine is SBA for B LE as he is able to go down onto his L shoulder.  In bed following with call light in reach and bed alarm turned on.  Safety Devices  Type of Devices: Call light within reach, Bed alarm in place, Gait belt, Left in bed, Nurse notified          Call light in reach, Phone in reach, Use of Gait belt, Bed alarm, Nurse Notified, and Left in bed  Time In: 1430  Time Out: 1507  Timed Coded Minutes: 10  co-treat with CHRIS)  Total Treatment Time: 37       Exercises:  See Flowsheets    Plan  Cont Per Plan Of Care    Goals  Short Term Goals  Time Frame for Short Term Goals: 7 days - expires 25  Short Term Goal 1: Transfer consistently sit to stand with min assist-NOT MET  Short Term Goal 2: Ambulate with wh walker 15 ft to bathroom for self-care and toileting needs consistently with min assist x 2 for safety-NOT MET  Short Term Goal 3: Stand up to 2 minutes to assist with self-care needs-NOT MET          Long Term Goals  Time Frame for Long Term Goals : 14 days - expires 
Phone: [phone] [facility]  Date: 2025  Fax: [phone]      Physical Therapy    Daily Note    Patient Name: Quique Wray      : 1945  (80 y.o.)  MRN: [id number]      Assessment          Supine to Sit: Supervision  Sit to Supine: Stand by assistance (for R LE up onto bed)       Sit to Stand: Partial/Moderate assistance (x2 with hector stedy)  Stand to Sit: Partial/Moderate assistance (x2 with hector stedy)  Bed to Chair: Dependent/Total (utilized hector stedy)        Comment: ---                     Assessment: Co-treat with CHRIS due to safety concerns.  Patient is in bed upon arrival to room with wife also present.  Supine to sit with HOB slightly elevated is supervision.  Patient does require cueing to push up with his elbow to come up to sitting.  Sitting balance at edge of bed is fair+.  Requests need to have BM and agrees to go in to bathroom to sit on commode. Sit to stand from bed with height of bed elevated is mod x 2 with use of hector stedy.  Patient requires cueing to pull his bottom in and stand tall to have paddles placed under his bottom.  Transfered to [facility] via hector stedy and sits for a while to bed washed up with help of CHRIS.  Standing tolerance is very limited when it is time to have him stand to have a new brief applied.  Transfers to chair via hector stedy and does complete seated B LE exercises.   Up in chair following with call light in reach, chair alarm attached, and wife in room with patient.  Returned later as pateint's wife states he is ready to return to bed. Sit to stand from chair is mod/max x 2 using hector stedy. He needs much cueing to stand tall so paddles can be placed under him safely.  He tends to bend at his waist instead of standing.  Transfered back to edge of bed. Sit to supine is SBA for lifting R LE up onto bed.  In bed following with call light in reach, bed alarm turne on, and wife in room with patient.\  Safety Devices  Type of Devices: Bed alarm in 
Phone: [phone] [facility]  Date: 2025  Fax: [phone]      Physical Therapy    Daily Note    Patient Name: Quique Wray      : 1945  (80 y.o.)  MRN: [id number]      Assessment          Supine to Sit: Supervision  Sit to Supine: Supervision       Sit to Stand: Minimal Assistance  Stand to Sit: Minimal Assistance  Bed to Chair: Dependent/Total (utilized hector stedy)        Comment: ---    WB Status: utlized hector stedy from bed to commode in bathroom to chair                Assessment: Patient in bed upon arrival to room with wife also present. Patient states he feels like he might need to use the bathroom. Supine to sit with use of railing is supervision. Sit to stand from bed with height of bed elevated slightly is min x 1 with use of hector stedy.  Patient is transfered to commode in bathroom via hector stedy.  Sits on commode for extended period of time. Sit to stand from commode is min assist. Requires much encouragement to stand straight up and to keep standing to have his bottom cleaned and his pants pulled up.  Transfered back to chair and had patient stand for a while before sitting down in chair. He stands for ~1 minute and c/o of pain and needing to sit down majority of the time. He has not really be able to stand for any longer than a minute.  While in chair, he completes seated B LE exercises and remains up in chair with call light in reach and chair alarm attached. Wife in room for full session.  Safety Devices  Type of Devices: Call light within reach, Chair alarm in place, Gait belt, Left in chair, Nurse notified          Call light in reach, Phone in reach, Use of Gait belt, Chair alarm, Nurse Notified, Family Present, and Left in chair  Time In: 913  Time Out: 1003  Timed Coded Minutes: 25 ( partial co-treat with OT)  Total Treatment Time: 50       Exercises:  See Flowsheets    Plan  Cont Per Plan Of Care    Goals  Short Term Goals  Time Frame for Short Term Goals: 5 days 
Phone: [phone] [facility]  Date: 2025  Fax: [phone]      Physical Therapy    Daily Note    Patient Name: Quique Wray      : 1945  (80 y.o.)  MRN: [id number]      Assessment  Bed mobility  Rolling to Right: Partial/Moderate assistance  Supine to Sit: Partial/Moderate assistance (going towards L side)  Sit to Supine: Partial/Moderate assistance  Bed Mobility Comments: Unsafe with sit to supine.  Move abruptly to lie onto right side.  Requires Max A to roll from right side to supine and to boost in bed.    Supine to Sit: Supervision  Sit to Supine: Stand by assistance (for B LE's)    Sit to Stand: Minimal Assistance, Partial/Moderate assistance (x2)  Stand to Sit: Minimal Assistance  Bed to Chair: Minimal assistance, Partial/Moderate assistance (with wheeled walker)    Comment: cues for proper hand placement and requires 2-3 attempts to stand    WB Status: with wheeled walker and w/c foll ~50 ft x1 with min x 1 assist    Assessment: Patient supine in bed upon entry. Patient agrees to work with PTA, but requests need to use bathroom first. Supine to sit is supervision. Sit to stand from elevated edge of bed is min-mod A. Patient ambulates with FWW and CGA into bathroom. Patient requires assist to pull down brief. Patient urinates and has a BM. Patient completes multiple sit to stands as he needs rest breaks while completing pericare. New brief and athletic shorts were donned. Patient ambulates with FWW and CGA from bathroom to recliner. At this time nurse is in to take care of IV as it is finished. Patient takes a break before walking to nurses station and back to recliner. Patient requires three attempts at sit to stand from recliner due to LE fatigue from multiple sit to stands in bathroom. Patient remains seated in recliner with call light in reach and chair alarm in place.  Safety Devices  Type of Devices: Call light within reach, Chair alarm in place, Gait belt, Left in chair, Nurse 
Phone: [phone] [facility]  Date: 2025  Fax: [phone]      Physical Therapy    Daily Note    Patient Name: Quique Wray      : 1945  (80 y.o.)  MRN: [id number]      Assessment  Bed mobility  Rolling to Right: Partial/Moderate assistance  Supine to Sit: Partial/Moderate assistance, 2 Person assistance  Sit to Supine: Partial/Moderate assistance, 2 Person assistance  Bed Mobility Comments: Unsafe with sit to supine.  Move abruptly to lie onto right side.  Requires Max A to roll from right side to supine and to boost in bed.    Supine to Sit: Moderate assistance (x2)  Sit to Supine: Minimal assistance (able to go onto R side with control and get R LE up onto bed wihtout assistance)    Sit to Stand: Substantial/Maximal assistance (x2 with hector stedy)  Stand to Sit: Substantial/Maximal assistance (x2 with hector stedy)  Bed to Chair: Dependent/Total (utilized hector stedy)    Comment: ---    Assessment: Patient supine in bed with daughter and wife present. Patient hesistant to participate initially, but with family encouragement patient agrees. Supine to sit at edge of bed is supervision. Patient has good sitting balance at edge of bed. Bed height was elevated to help with sit to stand. Sit to stand is min-mod A x2. Patient is able to stand for approx 45 sec with CGA. Patient returns to sitting at edge of bed. Patient denies attempting another sit to stand. Patient returns to bed in R sidelying with SBA. Call light in reach and no further needs. Nurse and family in room with patient.  Safety Devices  Type of Devices: Bed alarm in place, Call light within reach, Gait belt, Left in bed, Nurse notified    Call light in reach, Phone in reach, Family Present, and Left in bed  Time In: 1450  Time Out: 1500  Timed Coded Minutes: 10  Total Treatment Time: 10    Exercises:  See Flowsheets    Plan  Cont Per Plan Of Care    Goals  Short Term Goals  Time Frame for Short Term Goals: 5 days (25)  Short 
Phone: [phone] [facility]  Date: 2025  Fax: [phone]      Physical Therapy    Daily Note    Patient Name: Quique Wray      : 1945  (80 y.o.)  MRN: [id number]      Assessment          Supine to Sit: Supervision         Sit to Stand: Minimal Assistance, Partial/Moderate assistance (x2)  Stand to Sit: Minimal Assistance  Bed to Chair: Minimal assistance, Partial/Moderate assistance (with wheeled walker)        Comment: cues for proper hand placement and requires 2-3 attempts to stand    WB Status: with wheeled walker and w/c foll ~50 ft x1 with min x 1 assist                Assessment: Patient in bed upon arrival to room with wife arriving shortly after.  Co-treat with CHRIS to work on progressing distance of gait and working on standing balance/tolerance. Supine to sit to L side of bed is superivsion. Sit to stand from bed with heigh to bed elevated is min x 2. Able to ambulate with wheeled walker ~25 ft x1 before requesting to sit down. He is wheeled within 25 ft of therapy room and ambulates this distance to room. Completes seated B LE exercises without difficulty. Standing tolerance is  42 sec adn 48 seconds.  Patient begins to c/o heaviness in his chest. He is wheeled back to his room and transfers to beside chair. Nurse is notifed of his complaints which he then states is beginning to subside. Remains up in chair with call light in reach and wife in room with him.  Safety Devices  Type of Devices: Call light within reach, Chair alarm in place, Gait belt, Left in chair, Nurse notified          Call light in reach, Phone in reach, Use of Gait belt, Chair alarm, Nurse Notified, Family Present, and Left in chair  Time In: 1002  Time Out: 1058  Timed Coded Minutes: 27 ( co-treat with CHRIS)  Total Treatment Time: 56       Exercises:  See Flowsheets    Plan  Cont Per Plan Of Care    Goals  Short Term Goals  Time Frame for Short Term Goals: 7 days - expires 25  Short Term Goal 1: 
Phone: [phone] [facility]  Date: 2025  Fax: [phone]      Physical Therapy    Daily Note    Patient Name: Quique Wray      : 1945  (80 y.o.)  MRN: [id number]      Assessment          Supine to Sit: Minimal assistance  Sit to Supine: Minimal assistance (for B LE's)       Sit to Stand: Partial/Moderate assistance (x2)  Stand to Sit: Partial/Moderate assistance (x2)           Comment: Patient stands at walker for ~10 seconds and < 5 seconds with wheeled walker and min/mod x 1-2                     Assessment: Co-treat with OT due to safety concerns.  Patient in bed upon arrival with wife also present in room.  He is currently on the bed pan and nurse is in room to help him off. He requies min assist for rolling side to side.  Supine to sit with HOB slightly elevated is min assist. He is once again able to get legs off of bed without much assistance but it is not as good as previous visit.  Cueing required for upper body use to push self up to sitting position.  Once sitting, he agrees to stand to have his brief fastened.   Sit to stand from bed with height of bed elevated slightly is mod x 2. Requires cues for proper hand placement.  Stands at walker for ~10 seconds and requires cues to stand straight and hold head up.  He states that he can not stand anymore and needs to sit down.  Does not reach back just sits on bed.  Educated on the need to reach back prior to sitting down.  After resting, he agrees to stand a second time but this time he stands for < 5 seconds.  C/o pain in his low back.  He does not agree to stand anymore but logan brush his teeth and comb his hair for OT.  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   Sitting balance is fair+ but when he is ready to lay down he goes right onto his R side and requires assist for B LE's.  In bed following with call light in reach and bed alarm 
Phone: [phone] [facility]  Date: 2025  Fax: [phone]      Physical Therapy    Daily Note    Patient Name: Quique Wray      : 1945  (80 y.o.)  MRN: [id number]      Assessment       Supine to Sit: Supervision  Sit to Supine: Supervision       Sit to Stand: Minimal Assistance  Stand to Sit: Minimal Assistance          Comment: ---    WB Status: utilized hector stedy from bed to bathroom and back to bed                Assessment: Patient in bed requesting to go to the bathroom. Supine to sit is supervision. Sit to stand from bed is min x 1-2 and is transfered to bathroom via hector stedy.  Patient stands at hector [employer] to have his bottom cleaned and has a very hard time standing straight up due to pain. Transfers back to bed via hector stedy. Sit to supine is supervision as patient is larry to bring B LE's up onto bed with controlled motion.  In bed following with call light in reach.  Safety Devices  Type of Devices: Call light within reach, Gait belt, Left in bed, Nurse notified          Call light in reach, Phone in reach, Use of Gait belt, Nurse Notified, Other Staff Present  , and Left in bed  Time In: 1420  Time Out: 1437  Timed Coded Minutes: 17  Total Treatment Time: 17         Plan  Cont Per Plan Of Care    Goals  Short Term Goals  Time Frame for Short Term Goals: 5 days (25)  Short Term Goal 1: Patient will transfer supine to sit with Mod A-NOT MET  Short Term Goal 2: Patient will transfer sit to supine with good safety awareness and Min A-NOT MET  Short Term Goal 3: Patient will transfer sit to stand with Max A-NOT MET          Long Term Goals  Time Frame for Long Term Goals : 10 days (25)  Long Term Goal 1: Patient will transfer supine to sit with Min A-MET  Long Term Goal 2: Patient will transfer sit to stand with Min A-MET  Long Term Goal 3: Patient will transfer bed to chair with Min A-NOT MET          [facility] License Number: [id number]    Date: 2025    
Phone: [phone] [facility]  Date: 2025  Fax: [phone]      Physical Therapy    Daily Note    Patient Name: Quique Wray      : 1945  (80 y.o.)  MRN: [id number]      Assessment          Supine to Sit: Supervision         Sit to Stand: Minimal Assistance, Partial/Moderate assistance (with height of bed elevated)  Stand to Sit: Minimal Assistance  Bed to Chair: Minimal assistance, Partial/Moderate assistance (with wheeled walker)        Comment: cues for proper hand placement and requires 2-3 attempts to stand    WB Status: gait with wheeled walker from bed to chair ~5-6ft x1                Assessment: Patient in bed upon arrival. Supine to sit with head of bed elevated is superivision.  Sitting balance at edge of bed is good. Sit to stand from bed with height of bed elevated is min/mod x 1. Requires multiple attempts due to pateint c/o of being too weak and painful to complete.  Wife is present in room and together with PTA patient is encouraged to continue to work and things will get easier and less painful. Able to transfer to bedside chair ~5-6 ft x1 with wheeled walker and min assist.  Completes seated B LE exercises as outlined above and remains up in chair following with call light in reach and chair alarm attached. Wife in room with patient following.  Safety Devices  Type of Devices: Call light within reach, Chair alarm in place, Gait belt, Left in chair, Nurse notified          Call light in reach, Phone in reach, Use of Gait belt, Chair alarm, Nurse Notified, Family Present, and Left in chair  Time In: 851  Time Out: 927  Timed Coded Minutes: 36  Total Treatment Time: 36       Exercises:  See Flowsheets    Plan  Cont Per Plan Of Care    Goals  Short Term Goals  Time Frame for Short Term Goals: 7 days - expires 25  Short Term Goal 1: Transfer consistently sit to stand with min assist  Short Term Goal 2: Ambulate with wh walker 15 ft to bathroom for self-care and toileting 
Phone: [phone] [facility]  Date: 2025  Fax: [phone]      Physical Therapy    Daily Note    Patient Name: Quique Wray      : 1945  (80 y.o.)  MRN: [id number]      Assessment       Supine to Sit: Supervision         Sit to Stand: Partial/Moderate assistance (x2)  Stand to Sit: Partial/Moderate assistance (x2)  Bed to Chair: Partial/Moderate assistance (x2)        Comment: ---    WB Status: transfers from bed to chair with wheeled walker and mod x 2 ~3-4 ft x1                Assessment: Patient in bed upon arrival to room with wife also present.  Wife questions PTA if patient has tried to stand or walk recently.  Plan to work on it today. Supine to sit with HOB slightly elevated is supervision. Does require cueing to push up with R elbow and pull up with L UE.  Sitting balance at edge of bed is good. Sit to stand with height of bed elevated is mod x 2.  Initally patient stands long enough to have sheets straightened out under him but c/o that he needs to sit down. States he can't stand very long doing nothing.  He is encouraged to try to take steps to chair and chair is brought closer to him. Sit to stand from bed is mod x 2.  Ambulates with wheeled walker and mod x 2 ~3-4 ft to bedside chair. Does sit down prior to squaring up in front of chair safely and is educated on doing this next time. Completes seated B LE exercises as outlined above and remains up in chair with call light in reach and chair alarm attached. Wife is present for full session.  Safety Devices  Type of Devices: Call light within reach, Chair alarm in place, Gait belt, Left in chair, Nurse notified          Call light in reach, Phone in reach, Use of Gait belt, Chair alarm, Nurse Notified, Other Staff Present  , Family Present, and Left in chair  Time In: 0835  Time Out: 0900  Timed Coded Minutes: 25  Total Treatment Time: 25       Exercises:  See Flowsheets    Plan  Cont Per Plan Of Care    Goals  Short Term 
Phone: [phone] [facility]  Date: 4/15/2025  Fax: [phone]      Physical Therapy    Daily Note    Patient Name: Quique Wray      : 1945  (80 y.o.)  MRN: [id number]      Assessment       Supine to Sit: Moderate assistance (x2)  Sit to Supine: Maximal assistance (x2)       Sit to Stand: Partial/Moderate assistance (x2 with padmini stedy)  Stand to Sit: Partial/Moderate assistance (x2 with padmini stedy)  Bed to Chair: Dependent/Total (with padmini stedy)        Comment: stands at walker for less than 10 sec                     Assessment: Co-treat with CHRIS this afternoon to work on transfers as able.  Patient is in bed upon arrival with wife also present. Supine to sit is mod x 2. Requires cueing to reach for railing with L UE and is unable to slide B LE's off bed as he has previously done. Sitting balance is fair +.  Sit to stand from bed with height of bed elevated reqires mod x 2.  Patient able to stand at walker less than 10 seconds and unable to stand straight.  PADMINI stedy is utilized to transfer patient to chair.  Mod x 2 for sit to stand to sit.  Completes seated B LE exercises while sitting in chair. Up in chair following with call light in reach and chair alarm attached.  Wife remains in room with patient.  Safety Devices  Type of Devices: Call light within reach, Chair alarm in place, Gait belt, Left in chair, Nurse notified          Call light in reach, Phone in reach, Use of Gait belt, Chair alarm, Nurse Notified, Family Present, and Left in chair  Time In: 1324  Time Out: 1355  Timed Coded Minutes: 16 ( co-treat with CHRIS)  Total Treatment Time: 31       Exercises:  See Flowsheets    Plan  Cont Per Plan Of Care    Goals  Short Term Goals  Time Frame for Short Term Goals: 5 days (25)  Short Term Goal 1: Patient will transfer supine to sit with Mod A  Short Term Goal 2: Patient will transfer sit to supine with good safety awareness and Min A  Short Term Goal 3: Patient will transfer sit 
Phone: [phone] [facility]  Date: 5/15/2025  Fax: [phone]      Physical Therapy    Daily Note    Patient Name: Quique Wray      : 1945  (80 y.o.)  MRN: [id number]      Assessment: Pt up in chair upon arrival. Attempted sit to stand with pt having difficulty.Req +2 A out of chair. Amb in hallway with CGA to elevators with FWW and WC follow. Wheeled to therapy rm and performed therex as outlined for LE strengthening . Sit to stand from WC +1 MOD A. Amb back to his rm from elevators. Req MIN A of LE's to get in to bed.       Call light in reach, Phone in reach, Use of Gait belt, and Left in bed  Time In: 1315  Time Out: 1350  Timed Coded Minutes: 35  Total Treatment Time: 35       Exercises:  See Flowsheets    Plan  Cont Per Plan Of Care    Goals  Short Term Goals  Time Frame for Short Term Goals: 7 days - expires 25  Short Term Goal 1: Transfer consistently sit to stand with min assist-NOT MET  Short Term Goal 2: Ambulate with wh walker 15 ft to bathroom for self-care and toileting needs consistently with min assist x 2 for safety-NOT MET  Short Term Goal 3: Stand up to 2 minutes to assist with self-care needs-NOT MET          Long Term Goals  Time Frame for Long Term Goals : 2 weeks - expires 25  Long Term Goal 1: Ambulate with wh walker x 50-75ft consistently with SBA to safely negotiate home environment  Long Term Goal 2: Complete sit to stand transfers from chair or commode with CG or min assist consistently to safely return home witih spouse assist  Long Term Goal 3: Up/down steps with left handrail/grab bar and 1 min assist to safely enter/exit home  Long Term Goal 4: Good dynamic standing balance to assist with self -care tasks       [facility] License Number: [id number]    Date: 5/15/2025    
Phone: [phone] [facility]  Date: 2025  Fax: [phone]      Physical Therapy    Daily Note    Patient Name: Quique Wray      : 1945  (80 y.o.)  MRN: [id number]        Assessment: Pt is in bed upon arrival. He is agreeable to participate.  Pt tries to transfer supine to sit with no Assist although he is unable to push self to sit. Req +1 MOD A to sit with pt hollering out in pain. Once sitting pt  has good sitting balance. Req A to don shoes with pt helping to push foot into shoe. Pt refuses ex this pm and requests walking only. Sit to stand with +1 MOD A with cues to push from bed as pt grabs for walker before fully standing. Pt amb into hallway x 40 ft. with FWW and CGA. Pt with fair eccentric control to chair.  Pt amb again in hallway x 100 ft, req +2 MOD A sit to stand from WC.  Once back in his room he transfers sit to supin with A of LE's into bed.          Call light in reach, Phone in reach, Use of Gait belt, Nurse Notified, and Left in bed  Time In: 1330  Time Out: 1355  Timed Coded Minutes: 25  Total Treatment Time: 25       Exercises:  See Flowsheets    Plan  Cont Per Plan Of Care    Goals  Short Term Goals  Time Frame for Short Term Goals: 7 days - expires 25  Short Term Goal 1: Transfer consistently sit to stand with min assist-NOT MET  Short Term Goal 2: Ambulate with wh walker 15 ft to bathroom for self-care and toileting needs consistently with min assist x 2 for safety-NOT MET  Short Term Goal 3: Stand up to 2 minutes to assist with self-care needs-NOT MET          Long Term Goals  Time Frame for Long Term Goals : 14 days - expires 25  Long Term Goal 1: Ambulate with wh walker 25ft with min assist x 1-MET  Long Term Goal 2: Transfer sit to stand with CGA/SBA x 1  Long Term Goal 3: Patient will transfer bed to chair with Min A-MET          [facility] License Number: [id number]    Date: 2025    
Physical Therapy        A home assessment was completed with the patient and spouse to assess his abilities to negotiate the home environment and to provide recommendations for equipment and support.    The patient completed a SUV transfer with min assist for LE elevation and positioning in the car.  Patient's wife confirms that the patient required assist for LE management prior to hospitalization.    He has one 8 inch step with left grab bar to enter which he required min/mod assist to ascend.  He is able to ambulate safely within the home using wh walker however requires frequent rest breaks due to fatigue/decreased endurance.  Attempted transfer out of preferred rocker/recliner however required mod assist due to instability of the rocker.   The home is a split level with 2 -8 inch steps to enter the kitchen area; there is a left grab bar.  He required min/mod assistance to ascend these steps however due to the placement of the grab bar he required max assist to descend due to strength deficits of right LE and positioning with near fall episode.    He has an motorized chair lift to gain access to the upstairs bedroom/bath which he is able to use independently with his wife bringing up the walker.  He is able to transfer sit to stand from the chair lift independently.  He is able to transfer in/out of the bed with minimal assist to elevate the legs.      His bathroom is accessible with the walker but he required mod assist to transfer off the commode.  Walk-in shower transfers were not performed due to fatigue however was verbally discussed with wife and they do have a shower seat and 2 grab bars which this worker feels they will be able to complete safely.    There is also a down stairs half bath and discussed with wife obtaining a bedside commode for elevated seat height and stable arm rests.       The patient also has a back patio which he enjoys sitting on.  Access to this was not completed due to fatigue 
Physical Therapy    Patient seated up in chair requesting to use bathroom. Sit to stand from chair is mod x 2 utilizing hector stedy. Transferred to bedside commode in bathroom per hector steady.  Patient sits for extended period of time but is successful. Sit to stand from bedside commode is mod x  2.  Transfers to bed via hector steady. Sit to supine is SBA/supervision as patient is able to go down on his R side and lift both legs up onto bed.  In bed following on R side with call light in reach and bed alarm turned on. Wife in room following with patient.    Maribel Marin, [facility]  Time In: 0953  Time Out: 1016  Date: 4/20/2025        
Pt arrives via EMS from [facility]. Pt transferred to bed with 4 assist. Pt vital signs obtained. Pt's wife at side. Pt reports he has to have a BM. Pt placed on bedpan.   
Pt calls out to urinate, exerts force trying to push out urine and states \"it feels like this is getting harder to do\", but has frequent episodes of incontinence. Dr Bowens made aware face to face    See new orders   
Pt complains of pain 4-5/10 in back and hip at this time. No PRN medications ordered, will talk to Dr Bowens when he arrives to unit. Pt wife at bedside and requests lidocaine patches for the pt as they were effective for pain at previous facility.  
Pt continues to report  pain t/o shift report, hits call light . Reports back pain 6/10. Pt repositioned, did not help. PRN and scheduled Lido patch given.  
Pt wife comes to nurses station to ask this RN for a urinal for pt. This RN assists pt with voiding, when prompting pt to go he states \"I am\" with eyes closed. Pt seems to fall back asleep, is asked if he needs to urinate he states he's finished but has not voided at this time. Brief dry except incontinence of small stool.    
Pt wife rings call light to state pt told her he was \"having a hard time breathing\". Pt respirations 18, SpO2 96. Aroused pt from sleep and raised HOB, pt states he thins he's better now  
Rn case manager and SW met with pt and spouse to complete swing bed assessment. Pt is alert and oriented but also a little confused in some statements. Pt is a 80 year old  male admitted to swing bed on 4/11/2025 from [facility] for weakness related to enterococcus bacteremia and other medical complications. Pt lives with his spouse in their home in [place]. Pt has a cane, walker, grab bars, shower chair and stair lift at home to use. Pt was not using any community services prior to admission. Pt and spouse both drive but spouse reports that she has been doing most of the driving lately.     Pt is a full code and follows with Dr Cade as PCP. Pt has advance directives but they are not on file here. Pt reports that his wife would be his decision maker if needed. ACP note completed with pt. Pt reports that he does not have a prescription drug plan but his medication costs have been affordable so far. Pt reports no concerns with food, housing, or transportation.     SW provided swing bed packet and reviewed contents with pt and spouse. SW reviewed swing bed IDT meetings on Tuesdays as well as ability to wear own clothes if he would like to. Pt and spouse both express understanding and spouse signs acknowledgement of receipt and this is placed in paper chart. Plan to evaluate pt progress at swing bed meeting on Tuesday 4/15/2025. SW following. Abbey Bolton, MSW, LSW 4/14/2025   
SW and RN case manager met with pt and spouse this morning to discuss discharge. Also spoke with PT to be sure all in agreement. Pt and spouse are planning for his return home on Monday after his last dose of IV medication in the morning. Spouse reports that she is working to get all of the things done and DME needed by Monday for his return from the recommendations from home evaluation. Discussed HH and pt states that he prefers to use [facility] as he has used them before. SW made referral to [facility] for PT, OT, RN and aide services and await response. Discussed private duty care to give spouse a break at times and SW provided this listing to pt. Spouse reports that dtr Elizabeth has questions for SW and SW did leave dtr a message. No further needs identified at this time. SW provided Medicare notice of non coverage letter (see note). NARAYAN will follow and plan discharge to home on Monday with  services and spouse. SALO Raza, LSW 5/16/2025     NARAYAN spoke with pt's dtr Elizabeth and answered her questions in regards to private duty care and skilled HH. Dtr agreeable with discharge on Monday as well. [facility] has accepted pt for services. SALO Raza, PHILLIPW 5/16/2025   
SWINGBED PATIENT ACTIVITY PROGRAM ASSESSMENT    Patient Name: Quique Wray  : 1945   Gender: male   Diagnosis:  Weakness [R53.1] MRN: [id number]     Ability to read or write? [x] Yes   [] No  Ability to hear?   [x] Yes   [] No  Is patient confused?  [] Yes   [x] No    Enter Codes in Boxes Coding:  Very Important  Somewhat important  Not very important  Not important at all  Important but can’t do or no choice  No response or non-responsive   1 A. How important is it to you to have books, newspapers, and magazines to read?   2 B.  How important is it to you to listen to music you like?   3 C.  How important is it to you to be around animals such as pets?   1 D.  How important is it to you to keep up with the news?   2 E.  How important is it to you to do things with groups of people?   1 F.  How important is it to you to do your favorite activities?   1 G.  How important is it to you to go outside to get fresh air when the weather is good?   1 H.  How important is it to you to participate in [employer] services or practices?     Activity Care Plan:  Will participate in activity programs of choice daily with no decline throughout [facility]BU stay.          EVALUATOR’S SIGNATURE:  Sully Gomez  Date: 2025  
Spiritual Services Interventions  0268/0268-01   4/15/2025  Sr Sarah Wray  80 y.o. year old male    Encounter Summary  Encounter Overview/Reason: Initial Encounter  Service Provided For: Patient and family together  Referral/Consult From: Rounding  Support System: Spouse  Last Encounter : 04/15/25  Complexity of Encounter: Moderate  Begin Time: 1150  End Time : 1210  Total Time Calculated: 20 min     Spiritual/Emotional needs  Type: Spiritual Support                    Assessment/Intervention/Outcome  Assessment: Calm  Intervention: Active listening, Prayer (assurance of)/[place]  Outcome: Expressed Gratitude     
Spiritual Services Interventions  0268/0268-01   4/23/2025  Giuseppe Waldrop    Quique Wray  80 y.o. year old male    Encounter Summary  Encounter Overview/Reason: (P) Spiritual/Emotional Needs  Service Provided For: (P) Patient  Referral/Consult From: (P) Rounding  Support System: Spouse  Last Encounter : 04/15/25  Complexity of Encounter: (P) Moderate  Begin Time: (P) 1000  End Time : (P) 1015  Total Time Calculated: (P) 15 min     Spiritual/Emotional needs  Type: (P) Spiritual Support                    Assessment/Intervention/Outcome  Assessment: (P) Calm  Intervention: (P) Discussed belief system/Jainism practices/valerie, Discussed illness injury and it’s impact  Outcome: (P) Encouraged, Engaged in conversation     
Spiritual Services Interventions  0268/0268-01   5/14/2025  Giuseppe Waldrop    Quique Wray  80 y.o. year old male    Encounter Summary  Encounter Overview/Reason: (P) Follow-up  Service Provided For: (P) Patient and family together  Referral/Consult From: (P) Rounding  Support System: Spouse  Last Encounter : (P) 05/14/25  Complexity of Encounter: (P) Moderate  Begin Time: (P) 1000  End Time : (P) 1015  Total Time Calculated: (P) 15 min     Spiritual/Emotional needs  Type: (P) Spiritual Support                    Assessment/Intervention/Outcome  Assessment: (P) Calm  Intervention: (P) Discussed belief system/Catholic practices/valerie, Discussed illness injury and it’s impact  Outcome: (P) Encouraged, Engaged in conversation     
Spiritual Services Interventions  0268/0268-01   5/5/2025  Sr Sarah Wray  80 y.o. year old male    Encounter Summary  Encounter Overview/Reason: Follow-up  Service Provided For: Patient  Referral/Consult From: Rounding  Support System: Spouse  Last Encounter : 05/05/25  Complexity of Encounter: Moderate  Begin Time: 0900  End Time : 0915  Total Time Calculated: 15 min     Spiritual/Emotional needs  Type: Spiritual Support                    Assessment/Intervention/Outcome  Assessment: Calm  Intervention: Active listening, Prayer (assurance of)/[place]  Outcome: Expressed Gratitude     
St eval and treat order placed.  Dr. Bowens made aware.  
Swing bed IDT meeting held this date regarding pt progress in swing bed. Therapy reports that pt continues to work with them and has been doing well. Home evaluation scheduled for tomorrow with discharge planned for early next week. IVs discontinue on Monday 5/19/2025.     Swing bed coordinator, NARAYAN and RN case manager met with pt and spouse to discuss. Spouse would like to decide on which day to discharge after home evaluation is complete tomorrow. Plan to visit with pt and spouse later this week to finalize discharge plans for early next week (Monday or Tuesday). NARAYAN following. Abbey Bolton, MSW, LSW 5/13/2025   
Swing bed IDT team meeting held this date regarding pt progress in swing bed. Therapy reports that pt is making good progress with them. Therapy would like to do a home eval with pt next week and would like pictures of his steps in his home so they can work on the right ones with pt. Pt with IV antibiotics scheduled through 5/19/2025.     Swing bed coordinator, RN case manager and SW met with pt (and spouse by phone) to discuss above. Pt agreeable to stay until the end date of his IV antibiotics and feels that he is getting stronger with therapies. Pt feels that he will be able to manage at home once his IVs are done. SW spoke with pt's wife on the phone about keeping pt until the completion of IV antibiotics and also about the home eval next week. Spouse states that she is available anytime except she has an appointment on Tuesday. Spouse will take pictures of the steps as well. PT would like to shoot for Wednesday at 10 am for home eval and will check with nursing about timing of IV antibiotics to see if this will work. SW following. SALO Raza, TRAV 5/6/2025     SW checked with nursing and 10 am does not interfere with IV times. SW called to pt's spouse and she states that this time will work for her as well. PT notified that Wednesday at 10 am is good for home eval. SALO Raza, TRAV 5/6/2025   
Swing bed IDT team meeting held this date regarding pt progress with therapies. Pt is becoming more confused but also continues to exhibit signs of improvement in core strength and longer standing bouts. Pt can be difficult about continuing to follow directions with sitting up and not flopping over into the bed at times. Plan to re eval next week.     RN case manager and SW met with pt and spouse to discuss above. Pt and spouse in agreement with evaluating again next week. Much encouragement provided to pt to continue to work with therapies as much as he can to improve his strength and conditioning. Plan to re evaluate pt progress at meeting on Tuesday 4/22/2025. Abbey Bolton, MSW, LSW 4/15/2025   
Swing bed IDT team meeting held this date regarding pt progress with therapy. Therapy reports that pt is making really slow progress but there is some improvement albeit small increments. Pt is on IV antibiotics until 5/18/2025. Plan to re evaluate pt again next week at IDT meeting.     Swing bed coordinator and SW met with pt, dtr, and spouse to discuss above. Pt and family in agreement with re eval again next week. Dtr would like to take pt out in the wheelchair to get some sunshine and fresh air and SW encouraged her that whenever she would like to do this to ask nursing and they would help get him into the wheelchair for them to take down for a little while. Pt discusses not having much appetite and SW and swing bed coordinator encouraged pt to keep eating and working with therapies as much as he can each day to get stronger. SW following. Plan to re evaluate pt progress again next Tuesday 4/29/2025. Abbey Bolton, MSW, LSW 4/22/2025   
Swing bed IDT team meeting held this date regarding pt progress. Therapy reports that pt is starting to make more progress with them. Pt has been walking in the graf and getting a little farther distances. Plan to re evaluate again next week. IV antibiotics continue until 5/18/2025.     Swing bed coordinator, RN case manager and SW met with pt and spouse to discuss above. Pt and spouse in agreement with plan to re evaluate again next week at IDT meeting on 5/6/2025. SW following.   Abbey Bolton, MSW, LSW 4/29/2025   
[facility]  Swing Bed Interdisciplinary Care Plan Conference Report   Recertification for Continued Skilled Care.    Patients name:Quique Wray  Date of Conference: 4/29/2025    The Following Information was discussed and agreed upon with the patient and/or Caregivers as listed below.    Names of Team Members and Caregivers present for meeting:  : Abbey Perdomo Nursing: Rick Altamirano  Therapy: Sheri Gomez, PT; MARLEEN Blue/OTR  Dietician:   Activities: Sully Gomez  Pastoral Care:   Caregivers:    [x] Spouse  [] Child/Children [] Significant Other   [] Other:     Nutrition:  Current Body Weight:   Wt Readings from Last 1 Encounters:   04/29/25 98.3 kg (216 lb 11.4 oz)     Weight Change: Stable since last review but 7.6% down from a month ago   Current Diet order:ADULT ORAL NUTRITION SUPPLEMENT; Breakfast, Lunch, Dinner; Standard 4 oz Oral Supplement  ADULT DIET; Easy to Chew  ADULT ORAL NUTRITION SUPPLEMENT; Breakfast, Lunch, Dinner; Frozen Oral Supplement  Intakes: 26-50% meals and % supplements   Diet Education Provided: Use of supplements after solid foods at meals.   Goal: PO >75% meals and supplements    Spiritual:  Mormon needs met: [x] Yes  [] No  [] N/A  Notified  or  of admission: [] Yes  [] No  [x] N/A  Patient added to Communion List: [] Yes  [] No  [x] N/A  Any other concerns or comments:   Goal: Participate 2-3 times per week    Occupational Therapy:  Current ADL Status: ADL  Feeding: None  Grooming: None  UE Bathing: None  LE Bathing: None  UE Dressing: None  LE Dressing: Maximum assistance (to isai SERJIO socks and shoes)  Putting On/Taking Off Footwear: Maximum assistance  Toileting: Dependent/Total, Maximum assistance (MAX: clothing management in standing; DEP: regina care in standing)  Product Used : Bath wipes  Safety: Good  Recommendations for adaptive equipment:    [] Long handled sponge   [] Long Handled Shoe Horn  [] Extended Tub 
This RN assumed the care of this pt at this time  
This nurse offered to assist him with removing his shirt/shorts and apply a gown and he refused to wear and gown and requested to wear his tshirt to bed, he did allow this nurse to remove his shorts.   
VAT here for PICC line  
VAT team finished, left brachial placement.  49 cm total, 3 cm out.  
Vitals and assessment complete at this time. See flowsheets for details. Patient is resting in bed. Patient is A&O x4. Breathing is regular and unlabored. Lung sounds are clear. Vitals are WNL. Patient denies further needs. Call light is within reach. Care ongoing.   
Vitals rechecked, reassessment complete, see flowsheets for details. Vitals are WNL. Patient is resting in bed. Breathing is regular and unlabored. Lung sounds are clear. Writer offered to assist with bathing the patient at this time, patient refuses. Ice water given to the patient. Patient used the urinal at this time, tea colored urine noted, writer encouraged the patient to drink more fluids. Patient verbalizes understanding. Patient denies further needs. Call light is within reach. Care ongoing.   
Vitals:    05/12/25 0150 05/12/25 0153 05/12/25 0540 05/12/25 0554   BP: 130/64   134/73   Pulse: 94 96  99   Resp:    18   Temp:    97.7 °F (36.5 °C)   TempSrc:       SpO2:  94%  97%   Weight:   97.9 kg (215 lb 14.4 oz)    Height:          Patient vitals monitored over night. SBP stays in 130s. HR elevates to 90s. Radial pulse palpated with a regular rhythm.     
Wife departs, says she will return \"in a couple hours\",    Patient positioned on to right side, prn pain med given. Pt closes eyes. Call light in reach, bed alarm activated.  
Wife voices concern this am about 7 day event monitor that was sent to her home after Quique was discharged from [facility].  AVS from [facility] in Media tab dated 4/11/2025 does include a page of instructions for event monitor.  Clarified with Dr. Ware that it is ok for patient to  wear while in swingbed. RT to be up to explain to wife when she returns.  
Writer to bedside for afternoon assessment. See flowsheets for details. Patient is resting in bed. Patient is A&O x4. Breathing is regular and unlabored. Lung sounds are clear. Patient denies pain.  Writer assisted patient with urinal. Writer reminded patient to turn self regularly. Patient is able to turn himself side to side. Writer placed pillows to relieve pressure. Writer encouraged patient to get back up to the chair this afternoon, patient declines at this time. Patient denies further needs. Call light is within reach. Care ongoing.   
Writer to bedside for morning assessment. See flowsheets for details. Vitals reviewed, WNL. Patient is awake and sitting up in the chair. Patient's wife is at bedside. Patient is A&O x4. Breathing is regular and unlabored. Lung sounds are clear. Patient denies further needs at this time. Call light is within reach. Care ongoing.   
eSWING BED ORIENTATION    Patient Name: Quique Wray  : 1945   Gender: male   Diagnosis:  Weakness [R53.1] MRN: [id number]     [x] Goal is to provide you with very good care    [x] Insurance and Financial Responsibilities    [x] Changes to Expect           - Safely encourage you to learn to care for yourself     - Frequency of Doctor's Visits       - Family Training Likely  [x] Visiting Hours:           11:00am - 8:30 pm (or by special arrangement)  [x] Personal Phone Number          Located on Dry-Erase Board   [x] Swing Bed Team Meetings         Family encouraged to attend   [x]  Clothing            Bring what you normally wear  [x]  Prevention of Falls           Put call light on, and wait until OK'd to get up on your own.  [x] Therapy Expectations          Do your best to participate  []  Advanced Directives                          []  Pt has advanced directives and we have a copy on file                            [x]  Pt has advanced directives and we do not have a copy on file,  notified and will follow up.                             []   Pt does not have advanced directives.  [x] Educated on mail policy           Address provided for direct to hospital service                                                                                                                                                                                                                                                                                       Orientation Completed and agreed upon with Quique Wray and/or Family Members                           
                5 mg      Hemoglobin (g/dL)   Date Value   05/10/2025 8.1 (L)   05/08/2025 7.8 (LL)   05/05/2025 7.7 (LL)     Hematocrit (%)   Date Value   05/10/2025 25.5 (L)   05/08/2025 24.5 (L)   05/05/2025 24.7 (L)     Platelet Count (k/uL)   Date Value   11/13/2011 137     Platelets (k/uL)   Date Value   05/10/2025 208   05/03/2025 163   04/29/2025 227       Target INR Range: 2-3    Significant Drug-Drug Interactions:  New warfarin drug-drug interactions: none  Discontinued drug-drug interactions: none      Notes: Continue current daily dosing with 2.5 mg this evening. PT/INR scheduled for F now.      Giuseppe Blakely, PharmD 5/13/2025 7:45 AM   
  HGB 8.3*        BMP:    Recent Labs     04/12/25  0515      K 4.3      CO2 25   BUN 15   CREATININE 1.2   GLUCOSE 146*       Physical Exam:  General Appearance: alert and oriented to place, people.  In no acute distress  Cardiovascular: normal rate, regular rhythm, normal S1 and S2, 2/6 murmur  Pulmonary/Chest: clear to auscultation bilaterally- no wheezes, rales or rhonchi, normal air movementt  Abdomen: soft, non-tender, non-distended, normal bowel sounds   Extremities: no cyanosis, clubbing or edema  Skin: warm and dry, no rash   Neurological: alert, oriented, normal speech, no focal findings or movement disorder noted          Assessment and Plan:   Generalized weakness -continue PT and OT on swing bed  Enterococcus bacteremia -empirically treated as endocarditis, ID recommended Rocephin and ampicillin for 6 weeks  Acute on chronic anemia -continue monitoring H&H.  On Coumadin  History of atrial fibrillation -heart rate controlled, on Lopressor.  Anticoagulated with Coumadin, pharmacy helping with dosing.  Monitor PT and INR  Dysphagia-on puréed diet.  Speech therapy consulted  Lumbar DDD-on Lidoderm patch, Tylenol as needed  Confusion-presumed to be secondary to metabolic encephalopathy.  Started on Seroquel at [facility].  Right hip pain-evaluated by orthopedic surgeon, felt to be secondary to severe osteoarthritis and not septic joint.  Continue pain management  Hyperlipidemia-on Lipitor  Vitamin D deficiency-on replacement  History of SSS-s/p Medtronic Tori XT MRI compatible pacemaker placed on 6/29/2022  History of CVA        Code status and discussions:          DVT prophylaxis:   [] Lovenox   [] SCDs   [] SQ Heparin   [] Encourage ambulation, low risk for DVT, no chemical or mechanical    prophylaxis necessary      [] Already on Anticoagulation      Documentation of the Current Medications in the Medical Record    ( x)  I have utilized all available immediate resources to obtain, 
BMI 32.08 kg/m²   BMI: Body mass index is 32.08 kg/m².    CBC:   Recent Labs     05/17/25 0407   WBC 4.4   HGB 8.2*        BMP:    Recent Labs     05/17/25  0407      K 3.9      CO2 29   BUN 10   CREATININE 0.8   GLUCOSE 116*     Hepatic: No results for input(s): \"AST\", \"ALT\", \"BILITOT\", \"ALKPHOS\" in the last 72 hours.    Invalid input(s): \"ALB\"  Troponin: No results for input(s): \"TROPONINI\" in the last 72 hours.  BNP: No results for input(s): \"BNP\" in the last 72 hours.  Lipids: No results for input(s): \"CHOL\", \"HDL\" in the last 72 hours.    Invalid input(s): \"LDLCALCU\"  INR:   Recent Labs     05/16/25 0417   INR 2.0           Physical Exam:    General Appearance: alert and oriented to place, people.  In no acute distress  Cardiovascular: normal rate, regular rhythm, normal S1 and S2, 2/6 murmur  Pulmonary/Chest: clear to auscultation bilaterally- no wheezes, rales or rhonchi, normal air movementt  Abdomen: soft, non-tender, non-distended, normal bowel sounds   Extremities: no cyanosis, clubbing or edema  Skin: warm and dry, no rash   Neurological: alert, oriented, normal speech, no focal findings or movement disorder noted              Assessment and Plan:   Generalized weakness  - continue PT and OT on swing bed  Enterococcus bacteremia -empirically treated as endocarditis, ID recommended Rocephin and ampicillin for 6 weeks.  PICC line was discontinued on 5/11 due to malfunctioning.  Inserted peripheral IV for   1 week for IV Rocephin.  End date 5/19.  Will need to be switched to ampicillin for another 6 weeks per ID recommendation.  Acute on chronic anemia -H&H improved, continue monitoring.  On Coumadin  History of atrial fibrillation -heart rate controlled, on Lopressor.  Anticoagulated with Coumadin, pharmacy helping with dosing.  Monitor PT and INR  Dysphagia-on puréed diet.  Speech therapy evaluated on 4/14.  Recommended normal diet.  Lumbar DDD-on Lidoderm patch, Norco and Tylenol 
Flowsheets    Goals:     Short term goals:  Time Frame for Short Term Goals: 7 days (5/13/2025)  Short Term Goal 1: Patient will complete a BSC transfer while using DME PRN with MIN A - MET (MIN A VIA COMMODE IN BATHROOM WITH DME IN PLACE)  Short Term Goal 2: Patient will complete upper body dressing and/or bathing while using adaptive equipment/techniques PRN with SBA - MET (SUP./SETUP)  Short Term Goal 3: Patient will complete lower body dressing and/or bathing while using adaptive equipment/techniques PRN with MIN A.  Short Term Goal 4: To increase UE strength for ADLs and functional transfers, patient will engage in 10 minutes of BUE ther ex with no more than 3 cues for the correct technique - MET (12 MINUTES, NO CUES)  Short Term Goal 5: To increase independence and safety with IADLs, patient will tolerate static/dynamic standing x2 minutes without LOB - MET (2 MINUTES)    Long term goals:  STGs=LTGs         Call light in reach, Phone in reach, Use of Gait belt, Family Present, and Left in bed  Time In: 1256  Time Out: 1340  Timed Coded Minutes: 23 (co-treat)  Total Treatment Time: 44    PRATIMA Blue/MARLEEN      Date: 5/12/2025  
Long Term Goals : 10 days (4/21/25  Long Term Goal 1: Patient will transfer supine to sit with Min A  Long Term Goal 2: Patient will transfer sit to stand with Min A  Long Term Goal 3: Patient will transfer bed to chair with Min A          Maribel HAIR [facility] Therapy License Number: [id number]    Date: 4/14/2025    
MET          Long Term Goals  Time Frame for Long Term Goals : 14 days - expires 5/5/25  Long Term Goal 1: Ambulate with wh walker 25ft with min assist x 1-MET  Long Term Goal 2: Transfer sit to stand with CGA/SBA x 1  Long Term Goal 3: Patient will transfer bed to chair with Min A-MET          Maribel Munoz License Number: [id number]    Date: 5/5/2025    
Value   05/10/2025 8.1 (L)   05/08/2025 7.8 (LL)   05/05/2025 7.7 (LL)     Hematocrit (%)   Date Value   05/10/2025 25.5 (L)   05/08/2025 24.5 (L)   05/05/2025 24.7 (L)     Platelet Count (k/uL)   Date Value   11/13/2011 137     Platelets (k/uL)   Date Value   05/10/2025 208   05/03/2025 163   04/29/2025 227       Target INR Range: 2-3    Significant Drug-Drug Interactions:  Current drug-drug interactions: Sertraline 25 mg daily (started 4/25); IV Ceftriaxone and IV Ampicillin, Acetaminophen PRN, Seroquel, Lansoprazole, Allopurinol, and Atorvastatin   Discontinued drug-drug interactions: none      Notes: Therapeutic INR so will continue with surrent dosing of 5 mg White/Th, 2.5 mg AOD. Next INR check 5/14. Daily PT/INR until stable within therapeutic range.     Giuseppe Blakely, PharmD 5/12/2025 7:17 AM   
correct technique.  Short Term Goal 5: To increase independence and safety with IADLs, patient will tolerate static/dynamic standing x2 minutes without LOB.    Long term goals:  STGs=LTGs         Inpatient Safety: Call light in reach, Phone in reach, Use of Gait belt, Bed alarm, Nurse Notified, Family Present, and Left in bed    Time In: 1254  Time Out: 1320  Timed Coded Minutes: 13 (d/t co tx with PTA)  Total Treatment Time: 26    JOEL Winn/MARLEEN      Date: 4/16/2025    
during sitting bout to continue as opposed to lying back down, however patient ultimately returned to supine position without warning as mentioned above. Overall good session, will continue to address goals and progress patient as able/tolerated. Patient remains in bed with call light/personal items within reach and wife present upon OT departure. Provided update to RN.    Exercises:     N/A    Goals:     Short term goals:  Time Frame for Short Term Goals: 11 days (4/22/2025)  Short Term Goal 1: Patient will complete a BSC transfer while using DME PRN with MIN A.  Short Term Goal 2: Patient will complete upper body dressing and/or bathing while using adaptive equipment/techniques PRN with SBA.  Short Term Goal 3: Patient will complete lower body dressing and/or bathing while using adaptive equipment/techniques PRN with MIN A.  Short Term Goal 4: To increase UE strength for ADLs and functional transfers, patient will engage in 10 minutes of BUE ther ex with no more than 3 cues for the correct technique.  Short Term Goal 5: To increase independence and safety with IADLs, patient will tolerate static/dynamic standing x2 minutes without LOB.    Long term goals:  STGs=LTGs         Call light in reach, Phone in reach, Bed alarm, Nurse Notified, Family Present, and Left in bed  Time In: 0832  Time Out: 0900  Timed Coded Minutes: 14 (co-treat)  Total Treatment Time: 28    PRATIMA Blue/MARLEEN      Date: 4/14/2025  
return home [facility] spouse assist  Long Term Goal 3: Up/down steps with left handrail/grab bar and 1 min assist to safely enter/exit home  Long Term Goal 4: Good dynamic standing balance to assist with self -care tasks       LORENZO Velasquez License Number: [id number]    Date: 5/18/2025   
transfer supine to sit with Mod A  Short Term Goal 2: Patient will transfer sit to supine with good safety awareness and Min A  Short Term Goal 3: Patient will transfer sit to stand with Max A          Long Term Goals  Time Frame for Long Term Goals : 10 days (4/21/25)  Long Term Goal 1: Patient will transfer supine to sit with Min A  Long Term Goal 2: Patient will transfer sit to stand with Min A  Long Term Goal 3: Patient will transfer bed to chair with Min A          Maribel HAIR [facility] License Number: [id number]    Date: 4/15/2025    
LD  Contact: [phone]    
PF, Norco,Tylenol,  New Medications since admission to Swing Bed:   Anticoagulants:  Warfarin  Goals:   Pt will remain free from falls  Pt will have control of acute pain    Activities: Activity as tolerated, read, visitors, TV  Goal: Participate in 3 activities per week    :  Plan for Discharge: Plan to re evaluate pt progress at swing bed IDT meeting on Tuesday 4/29/2025.   Follow Up/Services needed:     Continued skilled needs: PT/OT IV antibiotics   Skilled Services are for the ongoing condition for which the individual received inpatient care in a hospital.    Physician signature certifies patient continued need for SNF inpatient care.  
/ exclusion)  ( )  I did not confirm, update or review the patient's current list of medications today.  (Does not satisfy MIPS Performance)        Advanced Care Plan    (x)  I confirmed that the patient's Advanced Care Plan is present, code status documented, or surrogate decision maker is listed in the patient's medical record.  ( )  The patient's advanced care plan is not present because:  (select)   ( ) I confirmed today that the patient does not wish or was not able to name a surrogate decision maker or provide an Advance Care Plan.   ( ) Hospice care is currently being provided or has been provided this calender year.  ( )  I did not confirm today the presence of an Advance Care Plan or surrogate decision maker documented within the patient's medical record. (Does not satisfy MIPS performance).            Rob Bowens MD, MD  [facility]

## 2025-05-19 NOTE — DISCHARGE SUMMARY
Hospitalist Discharge Summary    Quique Wray  :  1945  MRN:  [id number]    Admit date:  2025  Discharge date:  25    Admitting Physician:  Rob Bowens MD    Discharge Diagnoses:    Generalized weakness - admitted to swing bed with PT / OT ordered daily with improvement.  Enterococcus bacteremia - empirically treated as endocarditis with ID recommending Rocephin and Ampicillin x 6 weeks (course finished).    Recent history of GI bleed with anemia - placed on Prevacid.    Acute on chronic anemia - Hgb 7.6 on 5/3.  Coumadin restarted with aspirin on hold.  Iron sucrose 200 mg IV given on 5/3/25, to see if he would get an improvement.  Hgb 8,2 on .     H/O Atrial fibrillation - on Lopressor and Coumadin with pharmacy managing.   Confusion / metabolic encephalopathy - started on Seroquel at [facility] with improvement.  Discussed the importance of maintaining a good sleep / wake cycle with Quique and his wife Meg.   Dysphagia - Transitioned from Pureed diet to general diet, after evaluated by speech therapy, and tolerating well.  Lumbar DDD - placed on a Lidoderm patch and Tylenol as needed.  Right hip pain - Ortho felt this was secondary to severe OA and not septic joint.  On Lidoderm and Tylenol for pain.    Hyperlipidemia - on Lipitor.     Vitamin D deficiency - on replacement.     H/O SSS - Status post Medtronic Tori XT MRI compatible pacemaker placed on 2022.    H/O CVA - 20, secondary to atrial fib.     Moderate protein malnutrition - on nutritional supplements.    BPH with difficulty voiding - Flomax added on  with improvement.     Depression - started on Zoloft 25 mg daily with improvement in mood.     Moderate protein malnutrition - on supplements.      Admission Condition:  fair      Discharged Condition:  good    Hospital Course:     The patient is a 80 y.o. male who presents to our swing bed program for rehab with PT /OT daily.  Quique had been seen in our ER with

## 2025-05-19 NOTE — DISCHARGE INSTRUCTIONS
Discharge Instructions    Admission Date:  4/11/2025  Discharge Date:  5/19/25    Disposition:   Home.     Discharge Instructions:  Resume previous home medication.  Take Flomax 0.4 mg daily.   Take Sertraline 25 mg daily.   Take Senokot two times a day to keep bowels regular.  Pharmacy to determine dose of coumadin and INR follow up at discharge.  Activity:  As tolerated.   Diet:  General     [facility] will be set up for RN, PT, OT, and Aide services at discharge.     Follow up with Vernon Cade MD in 1 week.  Mon May 19 @ 3:30pm (with Dr Salmeron).  Dr. Galan-Thurs June 12 @ 10 am.

## 2025-05-28 ENCOUNTER — ANTI-COAG VISIT (OUTPATIENT)
Age: 80
End: 2025-05-28
Payer: MEDICARE

## 2025-05-28 VITALS
BODY MASS INDEX: 32.69 KG/M2 | DIASTOLIC BLOOD PRESSURE: 59 MMHG | SYSTOLIC BLOOD PRESSURE: 99 MMHG | HEART RATE: 88 BPM | WEIGHT: 215 LBS

## 2025-05-28 DIAGNOSIS — I63.9 ACUTE ISCHEMIC STROKE (HCC): Primary | ICD-10-CM

## 2025-05-28 DIAGNOSIS — I63.9 STROKE OF UNKNOWN CAUSE (HCC): ICD-10-CM

## 2025-05-28 LAB
INTERNATIONAL NORMALIZATION RATIO, POC: 1.6
PROTHROMBIN TIME, POC: 0

## 2025-05-28 PROCEDURE — 85610 PROTHROMBIN TIME: CPT | Performed by: PHARMACIST

## 2025-05-28 PROCEDURE — 99211 OFF/OP EST MAY X REQ PHY/QHP: CPT | Performed by: PHARMACIST

## 2025-05-28 NOTE — PROGRESS NOTES
[facility]  Medication Management  ANTICOAGULATION    Referring Provider: Dr Davide Galan     GOAL INR: 2.0-3.0     TODAY'S INR: 1.6     WARFARIN Dosage: 5 mg x 1, then increase dose to 2.5 mg MWF, 5 mg all other days     INR (no units)   Date Value   2025 2.1   2025 2.0   2025 2.2   2025 2.4   2025 2.7   2025 2.5   2025 2.6     INR,(POC) (no units)   Date Value   2025 1.6       Hemoglobin   Date Value Ref Range Status   2025 8.2 (L) 13.5 - 17.5 g/dL Final     Hematocrit   Date Value Ref Range Status   2025 25.8 (L) 41.0 - 53.0 % Final     ALT   Date Value Ref Range Status   2025 33 5 - 41 U/L Final     AST   Date Value Ref Range Status   2025 53 (H) <40 U/L Final       Medication changes:  Started taking melatonin 5 mg at bedtime as needed    Notes:    Fingerstick INR drawn per clinic protocol. Patient states no visible blood in urine, black tarry stool, falls or ER visits and denies any missed or extra doses of warfarin. Advises he still does not have much of an appetite but is still drinking 1 Boost daily. Also advises that he started taking melatonin 5 mg at bedtime as needed. No other changes in medication or diet. INR is subtherapeutic so will order a boost of 5 mg for this evening before increasing his dose back to 2.5 mg MWF, 5 mg all other days and will recheck INR in 2 weeks. Patient acknowledges working in consult agreement with pharmacist as referred by his/her physician.    For Pharmacy Admin Tracking Only    Intervention Detail: Adherence Monitorin and Dose Adjustment: 1, reason: Therapy Optimization  Total # of Interventions Recommended: 2  Total # of Interventions Accepted: 2  Time Spent (min): 20    Giuseppe Blakely, PharmD 2025 12:00 PM

## 2025-05-29 ENCOUNTER — HOSPITAL ENCOUNTER (EMERGENCY)
Age: 80
Discharge: ANOTHER HOSPITAL | End: 2025-05-29
Attending: FAMILY MEDICINE
Payer: MEDICARE

## 2025-05-29 ENCOUNTER — APPOINTMENT (OUTPATIENT)
Dept: GENERAL RADIOLOGY | Age: 80
End: 2025-05-29
Payer: MEDICARE

## 2025-05-29 VITALS
HEIGHT: 68 IN | BODY MASS INDEX: 32.58 KG/M2 | WEIGHT: 215 LBS | OXYGEN SATURATION: 97 % | HEART RATE: 94 BPM | DIASTOLIC BLOOD PRESSURE: 78 MMHG | TEMPERATURE: 97.7 F | RESPIRATION RATE: 21 BRPM | SYSTOLIC BLOOD PRESSURE: 136 MMHG

## 2025-05-29 DIAGNOSIS — Z79.01 LONG TERM CURRENT USE OF ANTICOAGULANT: ICD-10-CM

## 2025-05-29 DIAGNOSIS — I21.4 NSTEMI (NON-ST ELEVATED MYOCARDIAL INFARCTION) (HCC): Primary | ICD-10-CM

## 2025-05-29 DIAGNOSIS — Z95.0 CARDIAC PACEMAKER: ICD-10-CM

## 2025-05-29 LAB
ANION GAP SERPL CALCULATED.3IONS-SCNC: 12 MMOL/L (ref 9–17)
BASOPHILS # BLD: 0.03 K/UL (ref 0–0.2)
BASOPHILS NFR BLD: 0 % (ref 0–2)
BNP SERPL-MCNC: 6342 PG/ML
BUN SERPL-MCNC: 18 MG/DL (ref 8–23)
CALCIUM SERPL-MCNC: 8.9 MG/DL (ref 8.6–10.4)
CHLORIDE SERPL-SCNC: 102 MMOL/L (ref 98–107)
CO2 SERPL-SCNC: 28 MMOL/L (ref 20–31)
CREAT SERPL-MCNC: 0.9 MG/DL (ref 0.7–1.2)
EKG ATRIAL RATE: 97 BPM
EKG P AXIS: 35 DEGREES
EKG P-R INTERVAL: 162 MS
EKG Q-T INTERVAL: 448 MS
EKG QRS DURATION: 178 MS
EKG QTC CALCULATION (BAZETT): 568 MS
EKG R AXIS: -75 DEGREES
EKG T AXIS: 94 DEGREES
EKG VENTRICULAR RATE: 97 BPM
EOSINOPHIL # BLD: 0.09 K/UL (ref 0–0.4)
EOSINOPHILS RELATIVE PERCENT: 1 % (ref 0–5)
ERYTHROCYTE [DISTWIDTH] IN BLOOD BY AUTOMATED COUNT: 23.4 % (ref 12.1–15.2)
GFR, ESTIMATED: 87 ML/MIN/1.73M2
GLUCOSE SERPL-MCNC: 173 MG/DL (ref 70–99)
HCT VFR BLD AUTO: 28.8 % (ref 41–53)
HGB BLD-MCNC: 9.2 G/DL (ref 13.5–17.5)
IMM GRANULOCYTES # BLD AUTO: 0.02 K/UL (ref 0–0.3)
IMM GRANULOCYTES NFR BLD: 0 % (ref 0–5)
INR PPP: 1.8
LYMPHOCYTES NFR BLD: 1.34 K/UL (ref 1–4.8)
LYMPHOCYTES RELATIVE PERCENT: 20 % (ref 13–44)
MCH RBC QN AUTO: 20.1 PG (ref 26–34)
MCHC RBC AUTO-ENTMCNC: 31.9 G/DL (ref 31–37)
MCV RBC AUTO: 63 FL (ref 80–100)
MONOCYTES NFR BLD: 0.45 K/UL (ref 0–1)
MONOCYTES NFR BLD: 7 % (ref 5–9)
MORPHOLOGY: ABNORMAL
NEUTROPHILS NFR BLD: 72 % (ref 39–75)
NEUTS SEG NFR BLD: 4.87 K/UL (ref 2.1–6.5)
PARTIAL THROMBOPLASTIN TIME: 34.6 SEC (ref 23.9–33.8)
PARTIAL THROMBOPLASTIN TIME: >150 SEC (ref 23.9–33.8)
PLATELET # BLD AUTO: 225 K/UL (ref 140–450)
PMV BLD AUTO: ABNORMAL FL (ref 6–12)
POTASSIUM SERPL-SCNC: 4 MMOL/L (ref 3.7–5.3)
PROTHROMBIN TIME: 21.6 SEC (ref 11.5–14.2)
RBC # BLD AUTO: 4.57 M/UL (ref 4.5–5.9)
SODIUM SERPL-SCNC: 142 MMOL/L (ref 135–144)
TROPONIN I SERPL HS-MCNC: 700 NG/L (ref 0–22)
TROPONIN I SERPL HS-MCNC: 770 NG/L (ref 0–22)
TROPONIN I SERPL HS-MCNC: 991 NG/L (ref 0–22)
WBC OTHER # BLD: 6.8 K/UL (ref 3.5–11)

## 2025-05-29 PROCEDURE — 96376 TX/PRO/DX INJ SAME DRUG ADON: CPT

## 2025-05-29 PROCEDURE — 84484 ASSAY OF TROPONIN QUANT: CPT

## 2025-05-29 PROCEDURE — 80048 BASIC METABOLIC PNL TOTAL CA: CPT

## 2025-05-29 PROCEDURE — 71045 X-RAY EXAM CHEST 1 VIEW: CPT

## 2025-05-29 PROCEDURE — 96366 THER/PROPH/DIAG IV INF ADDON: CPT

## 2025-05-29 PROCEDURE — 93010 ELECTROCARDIOGRAM REPORT: CPT | Performed by: INTERNAL MEDICINE

## 2025-05-29 PROCEDURE — 36415 COLL VENOUS BLD VENIPUNCTURE: CPT

## 2025-05-29 PROCEDURE — 83880 ASSAY OF NATRIURETIC PEPTIDE: CPT

## 2025-05-29 PROCEDURE — 85610 PROTHROMBIN TIME: CPT

## 2025-05-29 PROCEDURE — 85025 COMPLETE CBC W/AUTO DIFF WBC: CPT

## 2025-05-29 PROCEDURE — 99285 EMERGENCY DEPT VISIT HI MDM: CPT

## 2025-05-29 PROCEDURE — 85730 THROMBOPLASTIN TIME PARTIAL: CPT

## 2025-05-29 PROCEDURE — 6370000000 HC RX 637 (ALT 250 FOR IP): Performed by: FAMILY MEDICINE

## 2025-05-29 PROCEDURE — 6360000002 HC RX W HCPCS: Performed by: FAMILY MEDICINE

## 2025-05-29 PROCEDURE — 96365 THER/PROPH/DIAG IV INF INIT: CPT

## 2025-05-29 PROCEDURE — 93005 ELECTROCARDIOGRAM TRACING: CPT | Performed by: FAMILY MEDICINE

## 2025-05-29 RX ORDER — ASPIRIN 81 MG/1
324 TABLET, CHEWABLE ORAL ONCE
Status: COMPLETED | OUTPATIENT
Start: 2025-05-29 | End: 2025-05-29

## 2025-05-29 RX ORDER — HEPARIN SODIUM 10000 [USP'U]/100ML
5-30 INJECTION, SOLUTION INTRAVENOUS CONTINUOUS
Status: DISCONTINUED | OUTPATIENT
Start: 2025-05-29 | End: 2025-05-29 | Stop reason: HOSPADM

## 2025-05-29 RX ORDER — HEPARIN SODIUM 1000 [USP'U]/ML
4000 INJECTION, SOLUTION INTRAVENOUS; SUBCUTANEOUS ONCE
Status: COMPLETED | OUTPATIENT
Start: 2025-05-29 | End: 2025-05-29

## 2025-05-29 RX ORDER — HEPARIN SODIUM 1000 [USP'U]/ML
4000 INJECTION, SOLUTION INTRAVENOUS; SUBCUTANEOUS PRN
Status: DISCONTINUED | OUTPATIENT
Start: 2025-05-29 | End: 2025-05-29 | Stop reason: HOSPADM

## 2025-05-29 RX ORDER — HEPARIN SODIUM 1000 [USP'U]/ML
2000 INJECTION, SOLUTION INTRAVENOUS; SUBCUTANEOUS PRN
Status: DISCONTINUED | OUTPATIENT
Start: 2025-05-29 | End: 2025-05-29 | Stop reason: HOSPADM

## 2025-05-29 RX ORDER — WARFARIN SODIUM 5 MG/1
2.5 TABLET ORAL
COMMUNITY

## 2025-05-29 RX ADMIN — ASPIRIN 324 MG: 81 TABLET, CHEWABLE ORAL at 05:46

## 2025-05-29 RX ADMIN — HEPARIN SODIUM 4000 UNITS: 1000 INJECTION INTRAVENOUS; SUBCUTANEOUS at 06:20

## 2025-05-29 RX ADMIN — HEPARIN SODIUM AND DEXTROSE 10 UNITS/KG/HR: 10000; 5 INJECTION INTRAVENOUS at 06:24

## 2025-05-29 ASSESSMENT — PAIN - FUNCTIONAL ASSESSMENT: PAIN_FUNCTIONAL_ASSESSMENT: NONE - DENIES PAIN

## 2025-05-29 ASSESSMENT — HEART SCORE: ECG: NORMAL

## 2025-05-29 ASSESSMENT — ENCOUNTER SYMPTOMS: SHORTNESS OF BREATH: 1

## 2025-05-29 NOTE — ED PROVIDER NOTES
[facility]  EMERGENCY DEPARTMENT ENCOUNTER      Pt Name: Quique Wray  MRN: [id number]  Birthdate 1945  Date of evaluation: 5/29/2025  Provider: Gurmeet Bryant MD    CHIEF COMPLAINT       Chief Complaint   Patient presents with    Shortness of Breath     C/o SOB that started around 0100. States yesterday around 1300 he started to have intermittent CP that was achy and went across his lower chest. States he had CP from 3501-5426. States he also had SOB intermittently with the CP.         HISTORY OF PRESENT ILLNESS      Quique Wray is a 80 y.o. male who presents to the emergency department via EMS from home, patient states went to his Coumadin clinic yesterday at 1100 hrs., states at that time he did feel little off but did not think much of it, when he got home by 1300 hrs. began having some chest pain describing more of a dull aching sensation going across the lower portion of his chest, intermittent nature, though by 0100 hrs. this morning was having some increasing shortness of breath and chest discomfort.  Patient rates it 4 out of 10.  Patient states has been compliant with all his medications.  Patient states when he was recently in the hospital, most recently in the swing bed program at this facility, did have an episode of chest discomfort and states they worked him up at that time.  Patient thinks he had a stress test in the last few months.    PCP: Rayo  Cards: Adama        REVIEW OF SYSTEMS       Review of Systems   Respiratory:  Positive for shortness of breath.    Cardiovascular:  Positive for chest pain. Negative for leg swelling.   All other systems reviewed and are negative.        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  There was a high probability of clinically significant/life threatening deterioration in the patient's condition which required my urgent intervention.      PROCEDURES:  Unless otherwise noted below, none     Procedures    FINAL IMPRESSION      1. NSTEMI (non-ST elevated myocardial infarction) (HCC)    2. Cardiac pacemaker    3. Long term current use of anticoagulant          DISPOSITION/PLAN     DISPOSITION Decision To Transfer 05/29/2025 06:36:54 AM   DISPOSITION CONDITION Undetermined           PATIENT REFERRED TO:  No follow-up provider specified.    DISCHARGE MEDICATIONS:  New Prescriptions    No medications on file       (Please note that portions of this note were completed with a voice recognition program.  Efforts were made to edit the dictations but occasionally words are mis-transcribed.)    Gurmeet Bryant MD (electronically signed)  Attending Emergency Physician           Bianca Werner MD  05/29/25 1656       Gurmeet Bryant MD  05/31/25 0974

## 2025-06-06 ENCOUNTER — ANTI-COAG VISIT (OUTPATIENT)
Age: 80
End: 2025-06-06
Payer: MEDICARE

## 2025-06-06 VITALS — WEIGHT: 200.8 LBS | BODY MASS INDEX: 30.53 KG/M2 | DIASTOLIC BLOOD PRESSURE: 59 MMHG | SYSTOLIC BLOOD PRESSURE: 94 MMHG

## 2025-06-06 DIAGNOSIS — I63.9 ACUTE ISCHEMIC STROKE (HCC): Primary | ICD-10-CM

## 2025-06-06 DIAGNOSIS — I63.9 STROKE OF UNKNOWN CAUSE (HCC): ICD-10-CM

## 2025-06-06 LAB
INTERNATIONAL NORMALIZATION RATIO, POC: 2.4
PROTHROMBIN TIME, POC: 0

## 2025-06-06 PROCEDURE — 85610 PROTHROMBIN TIME: CPT | Performed by: PHARMACIST

## 2025-06-06 PROCEDURE — 99211 OFF/OP EST MAY X REQ PHY/QHP: CPT | Performed by: PHARMACIST

## 2025-06-06 RX ORDER — NITROGLYCERIN 0.4 MG/1
0.4 TABLET SUBLINGUAL EVERY 5 MIN PRN
COMMUNITY

## 2025-06-06 RX ORDER — CLOPIDOGREL BISULFATE 75 MG/1
75 TABLET ORAL DAILY
COMMUNITY

## 2025-06-06 RX ORDER — ENOXAPARIN SODIUM 150 MG/ML
135 INJECTION SUBCUTANEOUS DAILY
COMMUNITY
Start: 2025-06-04 | End: 2025-06-06 | Stop reason: ALTCHOICE

## 2025-06-06 RX ORDER — ASPIRIN 81 MG/1
81 TABLET ORAL DAILY
COMMUNITY

## 2025-06-06 NOTE — PROGRESS NOTES
[facility]  ANTICOAGULATION    Referring Provider: Dr Davide Galan     GOAL INR: 2.0-3.0     TODAY'S INR: 2.4     WARFARIN Dosage: Continue 5 mg SuTT, 2.5 mg all other days     INR (no units)   Date Value   05/29/2025 1.8   05/19/2025 2.1   05/16/2025 2.0   05/14/2025 2.2   05/12/2025 2.4   05/09/2025 2.7   05/08/2025 2.5     INR,(POC) (no units)   Date Value   06/06/2025 2.4   05/28/2025 1.6       Hemoglobin   Date Value Ref Range Status   05/29/2025 9.2 (L) 13.5 - 17.5 g/dL Final     Hematocrit   Date Value Ref Range Status   05/29/2025 28.8 (L) 41.0 - 53.0 % Final     ALT   Date Value Ref Range Status   03/25/2025 33 5 - 41 U/L Final     AST   Date Value Ref Range Status   03/25/2025 53 (H) <40 U/L Final       Medication changes:   bridged with Lovenox 135 mg QD on DC (stopping today), was started on stool softeners and iron supplements but stopped both d/t diarrhea, started on ASA 81 mg QD, Plavix 75 mg QD, NTG PRN and his atorvastatin was increased from 20 mg to 40 mg daily    Notes:    Fingerstick INR drawn per clinic protocol. Patient states no visible blood in urine, black tarry stool or falls but did have an ER visit with transfer/admission for NSTEMI on 5/29. He was discharged on 6/4. His arms show increased bruising from this hospital stay but they are healing. He denies any missed or extra doses of warfarin. He is back to drinking 1 Boost daily because he is having a very diminished appetite. He was bridged with Lovenox 135 mg daily on discharge but will be stopping today as his INR is therapeutic. He was also appropriately started on ASA 81 mg daily, Plavix 75 mg daily, Nitroglycerin as needed and his atorvastatin was increased from 20 mg to 40 mg daily. He was also started on stool softeners and iron supplements but stopped both d/t diarrhea. No other changes in medication or diet. INR is therapeutic so will continue 5 mg SuTT, 2.5 mg all other days

## 2025-06-12 ENCOUNTER — HOSPITAL ENCOUNTER (OUTPATIENT)
Dept: CT IMAGING | Age: 80
Discharge: HOME | End: 2025-06-14
Payer: MEDICARE

## 2025-06-12 ENCOUNTER — HOSPITAL ENCOUNTER (OUTPATIENT)
Age: 80
Discharge: HOME | End: 2025-06-12
Payer: MEDICARE

## 2025-06-12 ENCOUNTER — OFFICE VISIT (OUTPATIENT)
Dept: CARDIOLOGY CLINIC | Age: 80
End: 2025-06-12
Payer: MEDICARE

## 2025-06-12 ENCOUNTER — HOSPITAL ENCOUNTER (INPATIENT)
Age: 80
LOS: 1 days | Discharge: HOME | DRG: 811 | End: 2025-06-14
Attending: EMERGENCY MEDICINE | Admitting: INTERNAL MEDICINE
Payer: MEDICARE

## 2025-06-12 VITALS — HEART RATE: 68 BPM | DIASTOLIC BLOOD PRESSURE: 60 MMHG | SYSTOLIC BLOOD PRESSURE: 90 MMHG | OXYGEN SATURATION: 95 %

## 2025-06-12 DIAGNOSIS — R53.1 WEAKNESS: ICD-10-CM

## 2025-06-12 DIAGNOSIS — I10 ESSENTIAL HYPERTENSION: ICD-10-CM

## 2025-06-12 DIAGNOSIS — Z95.0 ARTIFICIAL PACEMAKER: ICD-10-CM

## 2025-06-12 DIAGNOSIS — R06.02 SOB (SHORTNESS OF BREATH): ICD-10-CM

## 2025-06-12 DIAGNOSIS — I48.91 ATRIAL FIBRILLATION, NEW ONSET (HCC): ICD-10-CM

## 2025-06-12 DIAGNOSIS — Z95.0 ARTIFICIAL PACEMAKER: Primary | ICD-10-CM

## 2025-06-12 DIAGNOSIS — D64.9 ANEMIA, UNSPECIFIED TYPE: Primary | ICD-10-CM

## 2025-06-12 DIAGNOSIS — I47.29 VENTRICULAR TACHYCARDIA, NON-SUSTAINED (HCC): ICD-10-CM

## 2025-06-12 DIAGNOSIS — R06.00 DYSPNEA, UNSPECIFIED TYPE: ICD-10-CM

## 2025-06-12 PROBLEM — D64.89 OTHER SPECIFIED ANEMIAS: Status: ACTIVE | Noted: 2025-06-12

## 2025-06-12 LAB
ABSOLUTE BANDS: 0.16 K/UL (ref 0–1)
ALBUMIN SERPL-MCNC: 3.8 G/DL (ref 3.5–5.2)
ALBUMIN/GLOB SERPL: 1.1 {RATIO} (ref 1–2.5)
ALP SERPL-CCNC: 91 U/L (ref 40–129)
ALT SERPL-CCNC: 16 U/L (ref 5–41)
ANION GAP SERPL CALCULATED.3IONS-SCNC: 17 MMOL/L (ref 9–17)
AST SERPL-CCNC: 30 U/L
BANDS: 2 % (ref 0–10)
BASOPHILS # BLD: 0.08 K/UL (ref 0–0.2)
BASOPHILS NFR BLD: 1 % (ref 0–2)
BILIRUB SERPL-MCNC: 2.1 MG/DL (ref 0.3–1.2)
BNP SERPL-MCNC: ABNORMAL PG/ML
BUN SERPL-MCNC: 34 MG/DL (ref 8–23)
CALCIUM SERPL-MCNC: 9 MG/DL (ref 8.6–10.4)
CHLORIDE SERPL-SCNC: 104 MMOL/L (ref 98–107)
CO2 SERPL-SCNC: 18 MMOL/L (ref 20–31)
CREAT SERPL-MCNC: 1.3 MG/DL (ref 0.7–1.2)
CRP SERPL HS-MCNC: <3 MG/L (ref 0–5)
EOSINOPHIL # BLD: ABNORMAL K/UL (ref 0–0.4)
EOSINOPHILS RELATIVE PERCENT: ABNORMAL % (ref 0–5)
ERYTHROCYTE [DISTWIDTH] IN BLOOD BY AUTOMATED COUNT: 26.3 % (ref 12.1–15.2)
ERYTHROCYTE [SEDIMENTATION RATE] IN BLOOD BY PHOTOMETRIC METHOD: 28 MM/HR (ref 0–20)
GFR, ESTIMATED: 56 ML/MIN/1.73M2
GLUCOSE SERPL-MCNC: 199 MG/DL (ref 70–99)
HCT VFR BLD AUTO: 25.4 % (ref 41–53)
HCT VFR BLD AUTO: 25.6 % (ref 41–53)
HCT VFR BLD AUTO: 26.9 % (ref 41–53)
HGB BLD-MCNC: 7.7 G/DL (ref 13.5–17.5)
HGB BLD-MCNC: 7.8 G/DL (ref 13.5–17.5)
HGB BLD-MCNC: 8.3 G/DL (ref 13.5–17.5)
IMM GRANULOCYTES # BLD AUTO: ABNORMAL K/UL (ref 0–0.3)
IMM GRANULOCYTES NFR BLD: ABNORMAL %
INR PPP: 3
LYMPHOCYTES NFR BLD: 0.71 K/UL (ref 1–4.8)
LYMPHOCYTES RELATIVE PERCENT: 9 % (ref 13–44)
MCH RBC QN AUTO: 21.4 PG (ref 26–34)
MCHC RBC AUTO-ENTMCNC: 30.3 G/DL (ref 31–37)
MCV RBC AUTO: 70.8 FL (ref 80–100)
MONOCYTES NFR BLD: 0.39 K/UL (ref 0–1)
MONOCYTES NFR BLD: 5 % (ref 5–9)
MORPHOLOGY: ABNORMAL
NEUTROPHILS NFR BLD: 83 % (ref 39–75)
NEUTS SEG NFR BLD: 6.5 K/UL (ref 2.1–6.5)
NUCLEATED RED BLOOD CELLS: 2 PER 100 WBC
PLATELET # BLD AUTO: 318 K/UL (ref 140–450)
PMV BLD AUTO: ABNORMAL FL (ref 6–12)
POTASSIUM SERPL-SCNC: 3.9 MMOL/L (ref 3.7–5.3)
PROT SERPL-MCNC: 7.2 G/DL (ref 6.4–8.3)
PROTHROMBIN TIME: 32.2 SEC (ref 11.5–14.2)
RBC # BLD AUTO: 3.59 M/UL (ref 4.5–5.9)
SODIUM SERPL-SCNC: 139 MMOL/L (ref 135–144)
TROPONIN I SERPL HS-MCNC: 176 NG/L (ref 0–22)
TROPONIN I SERPL HS-MCNC: 192 NG/L (ref 0–22)
TSH SERPL DL<=0.05 MIU/L-ACNC: 3.43 UIU/ML (ref 0.27–4.2)
WBC OTHER # BLD: 7.8 K/UL (ref 3.5–11)

## 2025-06-12 PROCEDURE — 86850 RBC ANTIBODY SCREEN: CPT

## 2025-06-12 PROCEDURE — 85652 RBC SED RATE AUTOMATED: CPT

## 2025-06-12 PROCEDURE — 85610 PROTHROMBIN TIME: CPT

## 2025-06-12 PROCEDURE — 80053 COMPREHEN METABOLIC PANEL: CPT

## 2025-06-12 PROCEDURE — 85018 HEMOGLOBIN: CPT

## 2025-06-12 PROCEDURE — 1123F ACP DISCUSS/DSCN MKR DOCD: CPT | Performed by: INTERNAL MEDICINE

## 2025-06-12 PROCEDURE — 94761 N-INVAS EAR/PLS OXIMETRY MLT: CPT

## 2025-06-12 PROCEDURE — 1111F DSCHRG MED/CURRENT MED MERGE: CPT | Performed by: INTERNAL MEDICINE

## 2025-06-12 PROCEDURE — 84484 ASSAY OF TROPONIN QUANT: CPT

## 2025-06-12 PROCEDURE — 1036F TOBACCO NON-USER: CPT | Performed by: INTERNAL MEDICINE

## 2025-06-12 PROCEDURE — 6360000004 HC RX CONTRAST MEDICATION: Performed by: INTERNAL MEDICINE

## 2025-06-12 PROCEDURE — G8417 CALC BMI ABV UP PARAM F/U: HCPCS | Performed by: INTERNAL MEDICINE

## 2025-06-12 PROCEDURE — 3078F DIAST BP <80 MM HG: CPT | Performed by: INTERNAL MEDICINE

## 2025-06-12 PROCEDURE — 87040 BLOOD CULTURE FOR BACTERIA: CPT

## 2025-06-12 PROCEDURE — 2500000003 HC RX 250 WO HCPCS: Performed by: INTERNAL MEDICINE

## 2025-06-12 PROCEDURE — 85025 COMPLETE CBC W/AUTO DIFF WBC: CPT

## 2025-06-12 PROCEDURE — 86920 COMPATIBILITY TEST SPIN: CPT

## 2025-06-12 PROCEDURE — G8428 CUR MEDS NOT DOCUMENT: HCPCS | Performed by: INTERNAL MEDICINE

## 2025-06-12 PROCEDURE — 3074F SYST BP LT 130 MM HG: CPT | Performed by: INTERNAL MEDICINE

## 2025-06-12 PROCEDURE — 84443 ASSAY THYROID STIM HORMONE: CPT

## 2025-06-12 PROCEDURE — 71260 CT THORAX DX C+: CPT

## 2025-06-12 PROCEDURE — 86900 BLOOD TYPING SEROLOGIC ABO: CPT

## 2025-06-12 PROCEDURE — 6370000000 HC RX 637 (ALT 250 FOR IP): Performed by: INTERNAL MEDICINE

## 2025-06-12 PROCEDURE — 99285 EMERGENCY DEPT VISIT HI MDM: CPT

## 2025-06-12 PROCEDURE — 36415 COLL VENOUS BLD VENIPUNCTURE: CPT

## 2025-06-12 PROCEDURE — 83880 ASSAY OF NATRIURETIC PEPTIDE: CPT

## 2025-06-12 PROCEDURE — 99214 OFFICE O/P EST MOD 30 MIN: CPT | Performed by: INTERNAL MEDICINE

## 2025-06-12 PROCEDURE — 86140 C-REACTIVE PROTEIN: CPT

## 2025-06-12 PROCEDURE — P9016 RBC LEUKOCYTES REDUCED: HCPCS

## 2025-06-12 PROCEDURE — 86901 BLOOD TYPING SEROLOGIC RH(D): CPT

## 2025-06-12 PROCEDURE — G0378 HOSPITAL OBSERVATION PER HR: HCPCS

## 2025-06-12 PROCEDURE — 85014 HEMATOCRIT: CPT

## 2025-06-12 RX ORDER — ACETAMINOPHEN 325 MG/1
650 TABLET ORAL EVERY 6 HOURS PRN
Status: DISCONTINUED | OUTPATIENT
Start: 2025-06-12 | End: 2025-06-14 | Stop reason: HOSPADM

## 2025-06-12 RX ORDER — SODIUM CHLORIDE 0.9 % (FLUSH) 0.9 %
5-40 SYRINGE (ML) INJECTION EVERY 12 HOURS SCHEDULED
Status: DISCONTINUED | OUTPATIENT
Start: 2025-06-12 | End: 2025-06-14 | Stop reason: HOSPADM

## 2025-06-12 RX ORDER — WARFARIN SODIUM 1 MG/1
3 TABLET ORAL
Status: COMPLETED | OUTPATIENT
Start: 2025-06-12 | End: 2025-06-12

## 2025-06-12 RX ORDER — IOPAMIDOL 755 MG/ML
100 INJECTION, SOLUTION INTRAVASCULAR
Status: COMPLETED | OUTPATIENT
Start: 2025-06-12 | End: 2025-06-12

## 2025-06-12 RX ORDER — ACETAMINOPHEN 500 MG
1000 TABLET ORAL EVERY 6 HOURS PRN
Status: DISCONTINUED | OUTPATIENT
Start: 2025-06-12 | End: 2025-06-14 | Stop reason: HOSPADM

## 2025-06-12 RX ORDER — VITAMIN B COMPLEX
5000 TABLET ORAL DAILY
Status: DISCONTINUED | OUTPATIENT
Start: 2025-06-12 | End: 2025-06-14 | Stop reason: HOSPADM

## 2025-06-12 RX ORDER — METOPROLOL TARTRATE 25 MG/1
25 TABLET, FILM COATED ORAL 2 TIMES DAILY
Status: DISCONTINUED | OUTPATIENT
Start: 2025-06-12 | End: 2025-06-14

## 2025-06-12 RX ORDER — PANTOPRAZOLE SODIUM 40 MG/1
40 TABLET, DELAYED RELEASE ORAL
Status: DISCONTINUED | OUTPATIENT
Start: 2025-06-13 | End: 2025-06-14 | Stop reason: HOSPADM

## 2025-06-12 RX ORDER — VITS A,C,E/LUTEIN/MINERALS 300MCG-200
1 TABLET ORAL
Status: DISCONTINUED | OUTPATIENT
Start: 2025-06-13 | End: 2025-06-14 | Stop reason: HOSPADM

## 2025-06-12 RX ORDER — SODIUM CHLORIDE 0.9 % (FLUSH) 0.9 %
5-40 SYRINGE (ML) INJECTION PRN
Status: DISCONTINUED | OUTPATIENT
Start: 2025-06-12 | End: 2025-06-14 | Stop reason: HOSPADM

## 2025-06-12 RX ORDER — POTASSIUM CHLORIDE 7.45 MG/ML
10 INJECTION INTRAVENOUS PRN
Status: DISCONTINUED | OUTPATIENT
Start: 2025-06-12 | End: 2025-06-14 | Stop reason: HOSPADM

## 2025-06-12 RX ORDER — ONDANSETRON 4 MG/1
4 TABLET, ORALLY DISINTEGRATING ORAL EVERY 8 HOURS PRN
Status: DISCONTINUED | OUTPATIENT
Start: 2025-06-12 | End: 2025-06-14 | Stop reason: HOSPADM

## 2025-06-12 RX ORDER — ATORVASTATIN CALCIUM 40 MG/1
40 TABLET, FILM COATED ORAL DAILY
COMMUNITY
End: 2025-06-12

## 2025-06-12 RX ORDER — MAGNESIUM SULFATE IN WATER 40 MG/ML
2000 INJECTION, SOLUTION INTRAVENOUS PRN
Status: DISCONTINUED | OUTPATIENT
Start: 2025-06-12 | End: 2025-06-14 | Stop reason: HOSPADM

## 2025-06-12 RX ORDER — TAMSULOSIN HYDROCHLORIDE 0.4 MG/1
0.4 CAPSULE ORAL DAILY
Status: DISCONTINUED | OUTPATIENT
Start: 2025-06-13 | End: 2025-06-14 | Stop reason: HOSPADM

## 2025-06-12 RX ORDER — POLYETHYLENE GLYCOL 3350 17 G/17G
17 POWDER, FOR SOLUTION ORAL DAILY PRN
Status: DISCONTINUED | OUTPATIENT
Start: 2025-06-12 | End: 2025-06-14 | Stop reason: HOSPADM

## 2025-06-12 RX ORDER — ALLOPURINOL 100 MG/1
300 TABLET ORAL DAILY
Status: DISCONTINUED | OUTPATIENT
Start: 2025-06-12 | End: 2025-06-14 | Stop reason: HOSPADM

## 2025-06-12 RX ORDER — SODIUM CHLORIDE 9 MG/ML
INJECTION, SOLUTION INTRAVENOUS PRN
Status: DISCONTINUED | OUTPATIENT
Start: 2025-06-12 | End: 2025-06-12

## 2025-06-12 RX ORDER — QUETIAPINE FUMARATE 25 MG/1
25 TABLET, FILM COATED ORAL
Status: ON HOLD | COMMUNITY
End: 2025-06-14 | Stop reason: HOSPADM

## 2025-06-12 RX ORDER — SODIUM CHLORIDE 9 MG/ML
INJECTION, SOLUTION INTRAVENOUS PRN
Status: DISCONTINUED | OUTPATIENT
Start: 2025-06-12 | End: 2025-06-14 | Stop reason: HOSPADM

## 2025-06-12 RX ORDER — VITAMIN B COMPLEX/FOLIC ACID 0.4 MG
1 TABLET ORAL DAILY
Status: DISCONTINUED | OUTPATIENT
Start: 2025-06-12 | End: 2025-06-14 | Stop reason: HOSPADM

## 2025-06-12 RX ORDER — ASPIRIN 81 MG/1
81 TABLET ORAL DAILY
Status: DISCONTINUED | OUTPATIENT
Start: 2025-06-13 | End: 2025-06-14 | Stop reason: HOSPADM

## 2025-06-12 RX ORDER — CLOPIDOGREL BISULFATE 75 MG/1
75 TABLET ORAL DAILY
Status: DISCONTINUED | OUTPATIENT
Start: 2025-06-13 | End: 2025-06-14 | Stop reason: HOSPADM

## 2025-06-12 RX ORDER — ACETAMINOPHEN 650 MG/1
650 SUPPOSITORY RECTAL EVERY 6 HOURS PRN
Status: DISCONTINUED | OUTPATIENT
Start: 2025-06-12 | End: 2025-06-14 | Stop reason: HOSPADM

## 2025-06-12 RX ORDER — ATORVASTATIN CALCIUM 40 MG/1
40 TABLET, FILM COATED ORAL DAILY
Status: ON HOLD | COMMUNITY

## 2025-06-12 RX ORDER — ONDANSETRON 2 MG/ML
4 INJECTION INTRAMUSCULAR; INTRAVENOUS EVERY 6 HOURS PRN
Status: DISCONTINUED | OUTPATIENT
Start: 2025-06-12 | End: 2025-06-14 | Stop reason: HOSPADM

## 2025-06-12 RX ORDER — POTASSIUM CHLORIDE 750 MG/1
40 TABLET, EXTENDED RELEASE ORAL PRN
Status: DISCONTINUED | OUTPATIENT
Start: 2025-06-12 | End: 2025-06-14 | Stop reason: HOSPADM

## 2025-06-12 RX ORDER — ATORVASTATIN CALCIUM 40 MG/1
40 TABLET, FILM COATED ORAL DAILY
Status: DISCONTINUED | OUTPATIENT
Start: 2025-06-12 | End: 2025-06-14 | Stop reason: HOSPADM

## 2025-06-12 RX ADMIN — ATORVASTATIN CALCIUM 40 MG: 40 TABLET, FILM COATED ORAL at 16:18

## 2025-06-12 RX ADMIN — ALLOPURINOL 300 MG: 100 TABLET ORAL at 16:17

## 2025-06-12 RX ADMIN — SODIUM CHLORIDE, PRESERVATIVE FREE 10 ML: 5 INJECTION INTRAVENOUS at 19:42

## 2025-06-12 RX ADMIN — IOPAMIDOL 100 ML: 755 INJECTION, SOLUTION INTRAVENOUS at 11:31

## 2025-06-12 RX ADMIN — WARFARIN SODIUM 3 MG: 1 TABLET ORAL at 16:17

## 2025-06-12 RX ADMIN — Medication 5 MG: at 21:11

## 2025-06-12 RX ADMIN — Medication 5000 UNITS: at 16:18

## 2025-06-12 RX ADMIN — Medication 1 TABLET: at 16:18

## 2025-06-12 ASSESSMENT — PAIN - FUNCTIONAL ASSESSMENT
PAIN_FUNCTIONAL_ASSESSMENT: NONE - DENIES PAIN

## 2025-06-12 ASSESSMENT — PAIN SCALES - GENERAL: PAINLEVEL_OUTOF10: 0

## 2025-06-12 NOTE — CONSULTS
[facility]  Pharmacy Department    Clinical Pharmacy Note-Warfarin Consult    Quique Wray is a 80 y.o. male for whom pharmacy has been asked to manage warfarin therapy.     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    Cerebral artery occlusion with cerebral infarction (HCC)     Degenerative disc disease, lumbar     L3    Depression     Endocarditis     Hypertension     Kidney stone     Pacemaker     Spinal stenosis        INR (no units)   Date Value   06/12/2025 3.0   05/29/2025 1.8   05/19/2025 2.1   05/16/2025 2.0   05/14/2025 2.2   05/12/2025 2.4   05/09/2025 2.7     INR,(POC) (no units)   Date Value   06/06/2025 2.4   05/28/2025 1.6       Hemoglobin (g/dL)   Date Value   06/12/2025 7.7 (LL)   05/29/2025 9.2 (L)   05/17/2025 8.2 (L)     Hematocrit (%)   Date Value   06/12/2025 25.4 (L)   05/29/2025 28.8 (L)   05/17/2025 25.8 (L)     Platelet Count (k/uL)   Date Value   11/13/2011 137     Platelets (k/uL)   Date Value   06/12/2025 318   05/29/2025 225   05/17/2025 147       Current warfarin drug-drug interactions: allopurinol, ASA, clopidogrel, APAP, sertraline      Date             INR        Dose   6/12/2025        3.0    3 mg    Daily PT/INR until stable within therapeutic range.     Thank you for the consult.     Giuseppe Blakely, PharmD 6/12/2025 3:45 PM

## 2025-06-12 NOTE — PROGRESS NOTES
I have discussed with the patient the rationale for blood component transfusion; its benefits in treating or preventing fatigue, organ damage, or death; and its risk which includes mild transfusion reactions, rare risk of blood borne infection, or more serious but rare reactions. I have discussed the alternatives to transfusion, inlcuding the risk and consequences of not receiving transfusion. The patient had an opportunity to ask questions and had agreed to proceed with transfusion of blood components.

## 2025-06-12 NOTE — PLAN OF CARE
Problem: Chronic Conditions and Co-morbidities  Goal: Patient's chronic conditions and co-morbidity symptoms are monitored and maintained or improved  Outcome: Progressing     Problem: Discharge Planning  Goal: Discharge to home or other facility with appropriate resources  Outcome: Progressing     Problem: Neurosensory - Adult  Goal: Achieves stable or improved neurological status  Outcome: Progressing  Goal: Absence of seizures  Outcome: Progressing  Goal: Achieves maximal functionality and self care  Outcome: Progressing     Problem: Respiratory - Adult  Goal: Achieves optimal ventilation and oxygenation  Outcome: Progressing     Problem: Cardiovascular - Adult  Goal: Maintains optimal cardiac output and hemodynamic stability  Outcome: Progressing  Goal: Absence of cardiac dysrhythmias or at baseline  Outcome: Progressing     Problem: Skin/Tissue Integrity - Adult  Goal: Skin integrity remains intact  Description: 1.  Monitor for areas of redness and/or skin breakdown2.  Assess vascular access sites hourly3.  Every 4-6 hours minimum:  Change oxygen saturation probe site4.  Every 4-6 hours:  If on nasal continuous positive airway pressure, respiratory therapy assess nares and determine need for appliance change or resting period  Outcome: Progressing  Goal: Incisions, wounds, or drain sites healing without S/S of infection  Outcome: Progressing  Goal: Oral mucous membranes remain intact  Outcome: Progressing     Problem: Musculoskeletal - Adult  Goal: Return mobility to safest level of function  Outcome: Progressing  Goal: Maintain proper alignment of affected body part  Outcome: Progressing  Goal: Return ADL status to a safe level of function  Outcome: Progressing     Problem: Gastrointestinal - Adult  Goal: Minimal or absence of nausea and vomiting  Outcome: Progressing  Goal: Maintains or returns to baseline bowel function  Outcome: Progressing  Goal: Maintains adequate nutritional intake  Outcome:

## 2025-06-12 NOTE — CONSENT
Informed Consent for Blood Component Transfusion Note    I have discussed with the patient the rationale for blood component transfusion; its benefits in treating or preventing fatigue, organ damage, or death; and its risk which includes mild transfusion reactions, rare risk of blood borne infection, or more serious but rare reactions. I have discussed the alternatives to transfusion, including the risk and consequences of not receiving transfusion. The patient had an opportunity to ask questions and had agreed to proceed with transfusion of blood components.    Electronically signed by Andrea Galan MD on 6/12/25 at 1:19 PM EDT

## 2025-06-12 NOTE — PROGRESS NOTES
Cardiology    Saw him in my office.  Very sob with any activity.    CT chest abd and pelvis negative for any malignancy.    Hgb 7.7    With his recent MI, he needs to have Hg >8.  Will transfuse one unit PRBC's.    Andrea Galan MD

## 2025-06-12 NOTE — ED PROVIDER NOTES
[facility]  EMERGENCY DEPARTMENT  eMERGENCY dEPARTMENT eNCOUnter      Pt Name: Quique Wray  MRN: [id number]  Birthdate 1945  Date of evaluation: 6/12/2025  Provider: Renny Toscano MD    CHIEF COMPLAINT       Chief Complaint   Patient presents with    Anemia     Patient was sent over by Dr. Galan. Patient had CTA and bloodwork done today and was found to have hgb 7.7. SOB x 2 weeks.      Patient is a 80-year-old male who presents to the emergency department complaining of generalized weakness and shortness of breath.  Patient is a cardiac patient that was recently admitted at [place] and had a cath.  Patient was sent home after he improved.  States that he just still feels weak but is slightly more short of breath.  He states exertion seems to make him short of breath.  He denies any chest pain.  He denies any nausea or vomiting.  He saw his cardiologist today who did blood work and his hemoglobin was noted to be 7.7 and it was felt that patient needed to be transfused.  Patient denies fever or chills.  He denies any other associated symptoms.        Nursing Notes were reviewed.    REVIEW OF SYSTEMS    (2-9 systems for level 4, 10 or more for level 5)     Review of Systems    Except as noted above the remainder of the review of systems was reviewed and negative.       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    Cerebral artery occlusion with cerebral infarction (HCC)     Degenerative disc disease, lumbar     L3    Depression     Endocarditis     Hypertension     Kidney stone     Pacemaker     Spinal stenosis          SURGICAL HISTORY       Past Surgical History:   Procedure Laterality Date    CATARACT REMOVAL Left     INSERTABLE CARDIAC MONITOR N/A 05/12/2021    LOOP RECORDER INSERT performed by Andrea Galan MD at [facility] OR    KNEE ARTHROSCOPY Right 2002    LITHOTRIPSY      PACEMAKER INSERTION Left  06/29/2022    PACEMAKER INSERTION PERMANENT performed by Andrea Galan MD at [facility] OR    TOTAL HIP ARTHROPLASTY Right 2023         ALLERGIES     Adhesive tape and Wound dressing adhesive    FAMILY HISTORY     History reviewed. No pertinent family history.       SOCIAL HISTORY       Social History     Socioeconomic History    Marital status:      Spouse name: None    Number of children: None    Years of education: None    Highest education level: None   Tobacco Use    Smoking status: Never    Smokeless tobacco: Never   Vaping Use    Vaping status: Never Used   Substance and Sexual Activity    Alcohol use: Yes     Alcohol/week: 10.0 standard drinks of alcohol     Types: 10 Cans of beer per week     Comment: 1-2 beers/day states this is cut back     Drug use: Never    Sexual activity: Yes     Partners: Female     Social Drivers of Health     Food Insecurity: No Food Insecurity (5/30/2025)    Received from [facility]    Hunger Vital Sign     Worried About Running Out of Food in the Last Year: Never true     Ran Out of Food in the Last Year: Never true   Transportation Needs: No Transportation Needs (5/30/2025)    Received from [facility]    PRAPARE - Transportation     Lack of Transportation (Medical): No     Lack of Transportation (Non-Medical): No   Intimate Partner Violence: Not At Risk (5/30/2025)    Received from [facility]    Humiliation, Afraid, Rape, and Kick questionnaire     Fear of Current or Ex-Partner: No     Emotionally Abused: No     Physically Abused: No     Sexually Abused: No   Housing Stability: Low Risk  (5/30/2025)    Received from [facility]    Housing Stability Vital Sign     Unable to Pay for Housing in the Last Year: No     Number of Times Moved in the Last Year: 0     Homeless in the Last Year: No           PHYSICAL EXAM    (up to 7 for level 4, 8 ormore for level 5)     ED Triage Vitals [06/12/25 1220]   BP Systolic BP Percentile Diastolic BP Percentile Temp Temp Source Pulse

## 2025-06-13 PROBLEM — D64.9 ACUTE ANEMIA: Status: ACTIVE | Noted: 2025-06-13

## 2025-06-13 LAB
ERYTHROCYTE [DISTWIDTH] IN BLOOD BY AUTOMATED COUNT: 26.1 % (ref 12.1–15.2)
HCT VFR BLD AUTO: 25.5 % (ref 41–53)
HCT VFR BLD AUTO: 27.7 % (ref 41–53)
HGB BLD-MCNC: 7.7 G/DL (ref 13.5–17.5)
HGB BLD-MCNC: 8.7 G/DL (ref 13.5–17.5)
INR PPP: 3.3
IRON SATN MFR SERPL: 28 % (ref 20–55)
IRON SERPL-MCNC: 68 UG/DL (ref 61–157)
MCH RBC QN AUTO: 21.9 PG (ref 26–34)
MCHC RBC AUTO-ENTMCNC: 30.2 G/DL (ref 31–37)
MCV RBC AUTO: 72.6 FL (ref 80–100)
PLATELET # BLD AUTO: 264 K/UL (ref 140–450)
PMV BLD AUTO: ABNORMAL FL (ref 6–12)
PROTHROMBIN TIME: 35 SEC (ref 11.5–14.2)
RBC # BLD AUTO: 3.51 M/UL (ref 4.5–5.9)
TIBC SERPL-MCNC: 247 UG/DL (ref 250–450)
UNSATURATED IRON BINDING CAPACITY: 179 UG/DL (ref 112–347)
WBC OTHER # BLD: 7.2 K/UL (ref 3.5–11)

## 2025-06-13 PROCEDURE — 83550 IRON BINDING TEST: CPT

## 2025-06-13 PROCEDURE — 85027 COMPLETE CBC AUTOMATED: CPT

## 2025-06-13 PROCEDURE — 85018 HEMOGLOBIN: CPT

## 2025-06-13 PROCEDURE — 30233N1 TRANSFUSION OF NONAUTOLOGOUS RED BLOOD CELLS INTO PERIPHERAL VEIN, PERCUTANEOUS APPROACH: ICD-10-PCS | Performed by: INTERNAL MEDICINE

## 2025-06-13 PROCEDURE — P9016 RBC LEUKOCYTES REDUCED: HCPCS

## 2025-06-13 PROCEDURE — 94761 N-INVAS EAR/PLS OXIMETRY MLT: CPT

## 2025-06-13 PROCEDURE — 36415 COLL VENOUS BLD VENIPUNCTURE: CPT

## 2025-06-13 PROCEDURE — 85610 PROTHROMBIN TIME: CPT

## 2025-06-13 PROCEDURE — 6370000000 HC RX 637 (ALT 250 FOR IP): Performed by: INTERNAL MEDICINE

## 2025-06-13 PROCEDURE — 85014 HEMATOCRIT: CPT

## 2025-06-13 PROCEDURE — 99223 1ST HOSP IP/OBS HIGH 75: CPT | Performed by: INTERNAL MEDICINE

## 2025-06-13 PROCEDURE — 6360000002 HC RX W HCPCS: Performed by: INTERNAL MEDICINE

## 2025-06-13 PROCEDURE — 2500000003 HC RX 250 WO HCPCS: Performed by: INTERNAL MEDICINE

## 2025-06-13 PROCEDURE — 1200000000 HC SEMI PRIVATE

## 2025-06-13 PROCEDURE — 36430 TRANSFUSION BLD/BLD COMPNT: CPT

## 2025-06-13 PROCEDURE — 83540 ASSAY OF IRON: CPT

## 2025-06-13 RX ORDER — FUROSEMIDE 10 MG/ML
20 INJECTION INTRAMUSCULAR; INTRAVENOUS ONCE
Status: COMPLETED | OUTPATIENT
Start: 2025-06-13 | End: 2025-06-13

## 2025-06-13 RX ORDER — SODIUM CHLORIDE 9 MG/ML
INJECTION, SOLUTION INTRAVENOUS PRN
Status: DISCONTINUED | OUTPATIENT
Start: 2025-06-13 | End: 2025-06-14 | Stop reason: HOSPADM

## 2025-06-13 RX ADMIN — PANTOPRAZOLE SODIUM 40 MG: 40 TABLET, DELAYED RELEASE ORAL at 05:27

## 2025-06-13 RX ADMIN — TAMSULOSIN HYDROCHLORIDE 0.4 MG: 0.4 CAPSULE ORAL at 08:18

## 2025-06-13 RX ADMIN — ASPIRIN 81 MG: 81 TABLET, COATED ORAL at 08:17

## 2025-06-13 RX ADMIN — CLOPIDOGREL BISULFATE 75 MG: 75 TABLET, FILM COATED ORAL at 08:18

## 2025-06-13 RX ADMIN — ALLOPURINOL 300 MG: 100 TABLET ORAL at 08:18

## 2025-06-13 RX ADMIN — SODIUM CHLORIDE, PRESERVATIVE FREE 10 ML: 5 INJECTION INTRAVENOUS at 20:05

## 2025-06-13 RX ADMIN — ATORVASTATIN CALCIUM 40 MG: 40 TABLET, FILM COATED ORAL at 08:17

## 2025-06-13 RX ADMIN — Medication 5000 UNITS: at 08:18

## 2025-06-13 RX ADMIN — SODIUM CHLORIDE, PRESERVATIVE FREE 10 ML: 5 INJECTION INTRAVENOUS at 08:18

## 2025-06-13 RX ADMIN — Medication 5 MG: at 20:10

## 2025-06-13 RX ADMIN — Medication 1 TABLET: at 08:27

## 2025-06-13 RX ADMIN — Medication 1 TABLET: at 08:18

## 2025-06-13 RX ADMIN — SERTRALINE HYDROCHLORIDE 25 MG: 50 TABLET ORAL at 08:27

## 2025-06-13 RX ADMIN — FUROSEMIDE 20 MG: 10 INJECTION, SOLUTION INTRAMUSCULAR; INTRAVENOUS at 09:34

## 2025-06-13 ASSESSMENT — PAIN SCALES - GENERAL
PAINLEVEL_OUTOF10: 0
PAINLEVEL_OUTOF10: 0

## 2025-06-13 NOTE — DISCHARGE INSTR - COC
Continuity of Care Form    Patient Name: Quique Wray   :  1945  MRN:  [id number]    Admit date:  2025  Discharge date:  ***    Code Status Order: Full Code   Advance Directives:     Admitting Physician:  George Ware MD  PCP: Vernon Cade MD    Discharging Nurse: ***  Discharging Hospital Unit/Room#: 0255/0255-01  Discharging Unit Phone Number: ***    Emergency Contact:   Extended Emergency Contact Information  Primary Emergency Contact: Sherie Wray  Address: [address] [place] [address]  Home Phone: [phone]  Mobile Phone: [phone]  Relation: Spouse  Secondary Emergency Contact: Elizabeth Kapadia  Home Phone: [phone]  Mobile Phone: [phone]  Relation: Child    Past Surgical History:  Past Surgical History:   Procedure Laterality Date    CATARACT REMOVAL Left     INSERTABLE CARDIAC MONITOR N/A 2021    LOOP RECORDER INSERT performed by Andrea Galan MD at [facility] OR    KNEE ARTHROSCOPY Right     LITHOTRIPSY      PACEMAKER INSERTION Left 2022    PACEMAKER INSERTION PERMANENT performed by Andrea Galan MD at [facility] OR    TOTAL HIP ARTHROPLASTY Right        Immunization History:   Immunization History   Administered Date(s) Administered    COVID-19, MODERNA BLUE border, Primary or Immunocompromised, (age 12y+), IM, 100 mcg/0.5mL 2021, 2021, 2021       Active Problems:  Patient Active Problem List   Diagnosis Code    Stroke aborted by administration of thrombolytic agent ([facility]) I63.9    Acute ischemic stroke ([facility]) I63.9    Bilateral carotid artery stenosis I65.23    Received tissue plasminogen activator (t-PA) less than 24 hours prior to arrival Z92.82    Left hand weakness R29.898    Stroke of unknown cause ([facility]) I63.9    Essential hypertension I10    Left against medical advice Z53.29    Atrial fibrillation ([facility]) I48.91    SSS (sick sinus syndrome) ([facility]) I49.5    Weakness R53.1    Moderate malnutrition

## 2025-06-13 NOTE — FLOWSHEET NOTE
06/13/25 1549   Cardiac   Cardiac Rhythm 1° AV Block;Sinus rhythm   Rhythm Interpretation   Pulse 75   VA Interval 0.25   QT Interval 0.38   QRS Length 0.11   RR 0.78   RN Validation Rhythm interpretation and measurements reviewed and updated in flowsheet row     This RN assumes care at 1415. Tele shows intermittent RSR 1AVB varying with A-paced/AV paced rhythm.     Patient with questions regarding his discharge plan. Updated on recent labs and will see hospitalist in the morning. Ice water given. Denies present needs.

## 2025-06-13 NOTE — PROGRESS NOTES
Davide Galan MD  [facility]  [facility]  [address]  [place] [id number] [phone]           2025           Vernon Cade MD  [address] [place] [address]      RE:  ALVAREZ COOLEY  :  1945     Dear Dr. Cade:     CHIEF COMPLAINT:    Shortness of breath.  Anemia with a hemoglobin of 7.7.  Status post non-ST-elevation myocardial infarction on May 29, 2025, with the catheterization showing a thrombus in the large and moderate ramus of the circumflex with thrombectomy performed in [facility] in [place].     HISTORY OF PRESENT ILLNESS:  Mr. Cooley is a pleasant 80-year-old gentleman who is quite complex.  He had a right retinal artery occlusion with a negative workup 14 years ago.  He was placed on baby aspirin that had been stopped for 10 days for periodontal bleeding.  He had a retinal artery occlusion and therefore he has been very reluctant to stop his aspirin after that.     He had a left upper extremity weakness, 2020.  He was given tPA and life-flighted to [place] on 2020, and his weakness resolved.  He was placed on Lipitor, aspirin, and lisinopril.     On 2020, I recommended a loop recorder because of his TIA and CVA, in which his symptoms resolved, to rule out atrial fibrillation, which he did not do.     He had another neurologic event on 2020, with left arm numbness, and he met with Dr. Cade, who also recommended implantable loop recorder and we did that on May 12, 2021.     On 2021, loop recorder showed that he had atrial fibrillation and was converted to spontaneous sinus rhythm.  We stopped aspirin and Plavix and placed him on Eliquis and changed that to Coumadin because of finances.     He developed bradycardia on 2021, and he stopped his Lopressor.  He continued to have 3- to 4-second pauses and I placed a Medtronic Tori MRI

## 2025-06-13 NOTE — ACP (ADVANCE CARE PLANNING)
Advance Care Planning     Advance Care Planning Activator (Inpatient)  Conversation Note      Date of ACP Conversation: 6/13/2025     Conversation Conducted with: Patient with Decision Making Capacity    ACP Activator: Abbey Bolton, MSW, LSW        Health Care Decision Maker:     Current Designated Health Care Decision Maker:     Primary Decision Maker (Active): Sherie Wray - Spouse - [phone]    Secondary Decision Maker: Elizabeth Kapadia - Child - [phone]  Click here to complete Healthcare Decision Makers including section of the Healthcare Decision Maker Relationship (ie \"Primary\")  Today we documented Decision Maker(s) consistent with Legal Next of Kin hierarchy.    Care Preferences    Ventilation:  \"If you were in your present state of health and suddenly became very ill and were unable to breathe on your own, what would your preference be about the use of a ventilator (breathing machine) if it were available to you?\"      Would the patient desire the use of ventilator (breathing machine)?: yes    \"If your health worsens and it becomes clear that your chance of recovery is unlikely, what would your preference be about the use of a ventilator (breathing machine) if it were available to you?\"     Would the patient desire the use of ventilator (breathing machine)?: No      Resuscitation  \"CPR works best to restart the heart when there is a sudden event, like a heart attack, in someone who is otherwise healthy. Unfortunately, CPR does not typically restart the heart for people who have serious health conditions or who are very sick.\"    \"In the event your heart stopped as a result of an underlying serious health condition, would you want attempts to be made to restart your heart (answer \"yes\" for attempt to resuscitate) or would you prefer a natural death (answer \"no\" for do not attempt to resuscitate)?\" yes       [] Yes   [x] No   Educated Patient / Decision Maker regarding differences between

## 2025-06-13 NOTE — PLAN OF CARE
Problem: Discharge Planning  Goal: Discharge to home or other facility with appropriate resources  Outcome: Progressing     Problem: Nutrition Deficit:  Goal: Optimize nutritional status  6/13/2025 0911 by Aston Green, RD, LD  Outcome: Progressing  Flowsheets (Taken 6/13/2025 0911)  Nutrient intake appropriate for improving, restoring, or maintaining nutritional needs:   Monitor oral intake, labs, and treatment plans   Recommend appropriate diets, oral nutritional supplements, and vitamin/mineral supplements  Note: Nutrition Problem #1: Moderate malnutrition  Intervention: Food and/or Nutrient Delivery: Continue Current Diet, Continue Oral Nutrition Supplement

## 2025-06-13 NOTE — CARE COORDINATION
Case Management Assessment  Initial Evaluation    Date/Time of Evaluation: 6/13/2025 9:45 AM  Assessment Completed by: SALO Raza, PHILLIPW    If patient is discharged prior to next notation, then this note serves as note for discharge by case management.    Patient Name: Quique Wray                   YOB: 1945  Diagnosis: Anemia [D64.9]  Anemia, unspecified type [D64.9]  Dyspnea, unspecified type [R06.00]                   Date / Time: 6/12/2025 12:10 PM    Patient Admission Status: Observation   Readmission Risk (Low < 19, Mod (19-27), High > 27): Readmission Risk Score: 18.6    Current PCP: Vernon Cade MD  PCP verified by CM? Yes    Chart Reviewed: Yes      History Provided by: Patient, Spouse, Medical Record  Patient Orientation: Alert and Oriented, Person, Place, Situation    Patient Cognition: Alert    Hospitalization in the last 30 days (Readmission):  No, pt in observation here  If yes, Readmission Assessment in  Navigator will be completed.    Advance Directives:      Code Status: Full Code   Patient's Primary Decision Maker is: Legal Next of Kin    Primary Decision Maker (Active): Sherie Wray - Spouse - [phone]    Secondary Decision Maker: Elizabeth Kapadia - Child - [phone]    Discharge Planning:    Patient lives with: Spouse/Significant Other Type of Home: House  Primary Care Giver: Self  Patient Support Systems include: Spouse/Significant Other, Children, Home Care Staff   Current Financial resources: Medicare  Current community resources: ECF/Home Care  Current services prior to admission: Durable Medical Equipment            Current DME: Cane, Walker, Shower Chair, Other (Comment) (grab bars, stair lift)            Type of Home Care services:  PT, OT, Nursing Services    ADLS  Prior functional level: Assistance with the following:, Cooking, Housework, Shopping  Current functional level: Assistance with the following:, Cooking, Housework, Shopping    PT  are covered well by insurance. No reports of concerns with food, housing, or transportation for pt. Pt and spouse plan for pt to return home at discharge with resumption of HH services as before. Pt and spouse deny need for additional rehab stay, Plan pt discharge to home with [facility]. Follow up appointment with Dr Cade and Dr Galan on discharge AVS. Dr Galan's office to call pt directly with appointment.  SALO Raza, LSW 6/13/2025     The Plan for Transition of Care is related to the following treatment goals of Anemia [D64.9]  Anemia, unspecified type [D64.9]  Dyspnea, unspecified type [R06.00]    IF APPLICABLE: The Patient and/or patient representative Quique and his family were provided with a choice of provider and agrees with the discharge plan. Freedom of choice list with basic dialogue that supports the patient's individualized plan of care/goals and shares the quality data associated with the providers was provided to: Patient, Patient Representative   Patient Representative Name: spouse     The Patient and/or Patient Representative Agree with the Discharge Plan? Yes    SALO Raza, LSW  Case Management Department  Ph: [phone] Fax: [phone]

## 2025-06-13 NOTE — H&P
History & Physical    Patient:  Quique Wray  YOB: 1945  Date of Service: 6/13/2025  MRN: [id number]   Acct:   [id number]   Primary Care Physician: Vernon Cade MD    Chief Complaint:   Chief Complaint   Patient presents with    Anemia     Patient was sent over by Dr. Galan. Patient had CTA and bloodwork done today and was found to have hgb 7.7. SOB x 2 weeks.        History of Present Illness:       The patient is a 80 y.o. male with history recent non-STEMI, history of paroxysmal atrial fibrillation for which she is on Coumadin, Enterococcus bacteremia, SSS s/p pacemaker placement, recent right leg acute DVT, anemia due to GI bleed, also history of CVA, deconditioning, malnutrition, depression presented to the emergency room as advised by Dr. Galan for evaluation of anemia, shortness of breath, generalized weakness.  Patient was recently admitted to [facility] for non-STEMI from 5/29/2025 -6/4/2025.  He had left heart catheterization I5/30 revealing a bifurcating ramus intermedius thrombotic occlusion, status post thrombectomy.  Was noted to have echodensity on the aortic valve, s/p BIRD showing thickened aortic valve but no evidence of vegetation.  No thrombus in the left atrial appendage, no PFO.  Patient also developed flash pulmonary edema with acute hypoxic respiratory failure after heart catheterization and was placed on BiPAP and eventually transition to nasal cannula oxygen.  Eventually discharged on room air.  He also developed right leg acute DVT in the right gastrocnemius vein for which she was on heparin drip and eventually switched to Lovenox bridge with Coumadin on discharge.  He developed anemia and was transfused 1 unit of PRBC on 6/2 for hemoglobin of 7.1.  Patient will need endoscopy however could not be done in.  Non-STEMI phase.  He was discharged home and advised to follow-up with his PCP and have CBC rechecked.  Was evaluated by Dr. Galan yesterday in  systems:  Constitutional: no fever, no night sweats, positive for generalized weakness  Head: no headache, no head injury, no migranes.  Eye: no blurring of vision, no double vision.  Ears: no hearing difficulty, no tinnitus  Mouth/throat: no ulceration, dental caries, dysphagia  Lungs: no cough, positive shortness of breath, no wheeze  CVS: no palpitation, no chest pain, they have shortness of breath  GI: no abdominal pain, no nausea , no vomiting, no constipation  RICK: no dysuria, frequency and urgency, no hematuria, no kidney stones  Musculoskeletal: no joint pain, swelling , stiffness  Endocrine: no polyuria, polydypsia, no cold or heat intolerence  Hematology: Positive for anemia, no easy brusing or bleeding, no hx of clotting disorder  Dermatology: no skin rash, no lesions  Psychiatry: no depression, no anxiety  Neurology: no syncope, no seizures, no numbness or tingling of hands, no numbness or tingling of feet, no paresis        Vitals:   Vitals:    06/13/25 0200   BP: 103/66   Pulse: 63   Resp: 18   Temp: 96.8   SpO2: 95%      BMI: Body mass index is 30.91 kg/m².    Physical Exam:  General Appearance: alert and oriented to person, place and time, in no acute distress  Cardiovascular: normal rate, regular rhythm, normal S1 and S2, 2/6 systolic murmur  Pulmonary/Chest: clear to auscultation bilaterally- no wheezes, rales or rhonchi, normal air movement, no respiratory distress  Abdomen: soft, non-tender, non-distended, normal bowel sounds  Extremities: no cyanosis, clubbing or edema,  Skin: warm and dry, no rash   Head: normocephalic and atraumatic  Eyes: Conjunctiva without hyperemia  Neck: supple and non-tender without mass, no thyromegaly   Musculoskeletal: normal range of motion, no joint swelling, deformity or tenderness  Neurological: alert, oriented, normal speech, no focal findings or movement disorder noted    Review of Labs and Diagnostic Testing:    Recent Results (from the past 24 hours)

## 2025-06-13 NOTE — FLOWSHEET NOTE
06/13/25 0505   Treatment Team Notification   Reason for Communication Critical results   Type of Critical Result Laboratory   Critical Lab Result Type Hemoglobin and hematocrit   Critical Lab Information with Verbal Readback Hemoglobin 7.7   Person Result Received From Lab [facility]   Name of Team Member Notified Dr. Ware   Treatment Team Role Attending Provider   Method of Communication Secure Message  (Writer notified of critical hemoglobin and that patient still has a unit of PRBC's ordered that was not given on dayshift 6/12 due to repeat hemoglobin result of 8.3.)   Response No new orders;Other (Comment)  (Per Dr. Ware, okay to transfuse unit of PRBC's that was previously ordered.)   Notification Time 0503

## 2025-06-13 NOTE — CONSULTS
Clinical Pharmacy Note    Warfarin consult follow-up.    Date       INR              Warfarin Dose Given  6/13/2025      3.3                       HOLD               6/12/2025             3.0                       3 mg      Hemoglobin (g/dL)   Date Value   06/13/2025 8.7 (L)   06/13/2025 7.7 (LL)   06/12/2025 8.3 (L)     Hematocrit (%)   Date Value   06/13/2025 27.7 (L)   06/13/2025 25.5 (L)   06/12/2025 26.9 (L)     Platelets (k/uL)   Date Value   06/13/2025 264   06/12/2025 318   05/29/2025 225         Potential Warfarin Drug-Drug Interactions:  Current drug-drug interactions: Acetaminophen PRN and home medications continued on admission, including: Allopurinol, Atorvastatin, Aspirin, Clopidogrel, Sertraline, and Pantoprazole  Discontinued drug-drug interactions: none      Plan:                     Will HOLD warfarin dose this evening and continue daily PT/INR as ordered until stable and therapeutic.    Thank you for the consult. If you have any questions[place] [phone].    Orlando Mccrary, CarolineD

## 2025-06-13 NOTE — PLAN OF CARE
Problem: Chronic Conditions and Co-morbidities  Goal: Patient's chronic conditions and co-morbidity symptoms are monitored and maintained or improved  6/13/2025 0035 by Ottoniel Soriano RN  Outcome: Progressing     Problem: Discharge Planning  Goal: Discharge to home or other facility with appropriate resources  6/13/2025 0035 by Ottoniel Soriano RN  Outcome: Progressing     Problem: Cardiovascular - Adult  Goal: Maintains optimal cardiac output and hemodynamic stability  6/13/2025 0035 by Ottoniel Soriano RN  Outcome: Progressing     Problem: Cardiovascular - Adult  Goal: Absence of cardiac dysrhythmias or at baseline  6/13/2025 0035 by Ottoniel Soriano RN  Outcome: Progressing     Problem: Skin/Tissue Integrity - Adult  Goal: Skin integrity remains intact  Description: 1.  Monitor for areas of redness and/or skin breakdown2.  Assess vascular access sites hourly3.  Every 4-6 hours minimum:  Change oxygen saturation probe site4.  Every 4-6 hours:  If on nasal continuous positive airway pressure, respiratory therapy assess nares and determine need for appliance change or resting period  6/13/2025 0035 by Ottoniel Soriano RN  Outcome: Progressing     Problem: Hematologic - Adult  Goal: Maintains hematologic stability  6/13/2025 0035 by Ottoniel Soriano RN  Outcome: Progressing  Flowsheets (Taken 6/13/2025 0035)  Maintains hematologic stability:   Assess for signs and symptoms of bleeding or hemorrhage   Monitor labs for bleeding or clotting disorders   Administer blood products/factors as ordered     Problem: Safety - Adult  Goal: Free from fall injury  6/13/2025 0035 by Ottoniel Soriano RN  Outcome: Progressing     Problem: Skin/Tissue Integrity  Goal: Skin integrity remains intact  Description: 1.  Monitor for areas of redness and/or skin breakdown2.  Assess vascular access sites hourly3.  Every 4-6 hours minimum:  Change oxygen saturation probe site4.  Every 4-6 hours:  If on nasal continuous

## 2025-06-13 NOTE — PROGRESS NOTES
Comprehensive Nutrition Assessment    Type and Reason for Visit:  Initial, Positive nutrition screen    Nutrition Recommendations/Plan:   Encourage oral intakes.  Try colder foods for less aromas (gags).   Ensure tid      Malnutrition Assessment:  Malnutrition Status:  Moderate malnutrition (06/13/25 0904)    Context:  Acute Illness     Findings of the 6 clinical characteristics of malnutrition:  Energy Intake:  75% or less of estimated energy requirements for 7 or more days  Weight Loss:  Greater than 7.5% over 3 months     Body Fat Loss:  Mild body fat loss Orbital, Buccal region   Muscle Mass Loss:  Mild muscle mass loss Temples (temporalis), Clavicles (pectoralis & deltoids)  Fluid Accumulation:  No fluid accumulation     Strength:  Not Performed    Nutrition Assessment:    Moderate malnutrition r/t inadequate nutrient intakes, AEB lower PO with disinterest in foods, mild muscle and fat losses and significant weight declines. Percival of foods appear to be impacting his appetite for solids currently. Has been choosing lower nutrient foods like jello and pudding, sometimes yogurt at home but poor intakes of protein foods. Using one Ensure (or similar) daily at home which doesn't appear to be maintaining nutrition status. INR 3.3 on coumadin. On 5000 units vit D. Ordered Ensure tid and may increase to 8 oz if solid PO without improvement. Encouraged to try colder foods without aromas to see if that would help with his food aversions.    Nutrition Related Findings:    active b/s. + flatus. no edema. Wound Type: None       Current Nutrition Intake & Therapies:    Average Meal Intake: 26-50%  Average Supplements Intake: %  ADULT DIET; Regular  ADULT ORAL NUTRITION SUPPLEMENT; Breakfast, Lunch, Dinner; Standard High Calorie/High Protein Oral Supplement    Anthropometric Measures:  Height: 172.7 cm (5' 8\")  Ideal Body Weight (IBW): 154 lbs (70 kg)    Admission Body Weight: 90.7 kg (200 lb)  Current Body Weight:  Supplement Intake  Physical Signs/Symptoms Outcomes: Biochemical Data, Weight    Discharge Planning:    Continue Oral Nutrition Supplement     Aston Green RD, LD  Contact: [id number]

## 2025-06-14 ENCOUNTER — HOSPITAL ENCOUNTER (OUTPATIENT)
Age: 80
Discharge: HOME | End: 2025-06-16
Payer: MEDICARE

## 2025-06-14 VITALS
HEART RATE: 72 BPM | WEIGHT: 204.59 LBS | SYSTOLIC BLOOD PRESSURE: 115 MMHG | DIASTOLIC BLOOD PRESSURE: 75 MMHG | RESPIRATION RATE: 16 BRPM | HEIGHT: 68 IN | TEMPERATURE: 97.2 F | BODY MASS INDEX: 31.01 KG/M2 | OXYGEN SATURATION: 93 %

## 2025-06-14 DIAGNOSIS — I47.29 VENTRICULAR TACHYCARDIA, NON-SUSTAINED (HCC): ICD-10-CM

## 2025-06-14 LAB
ERYTHROCYTE [DISTWIDTH] IN BLOOD BY AUTOMATED COUNT: 25.6 % (ref 12.1–15.2)
HCT VFR BLD AUTO: 29.4 % (ref 41–53)
HGB BLD-MCNC: 9.1 G/DL (ref 13.5–17.5)
INR PPP: 3
MCH RBC QN AUTO: 22.6 PG (ref 26–34)
MCHC RBC AUTO-ENTMCNC: 31 G/DL (ref 31–37)
MCV RBC AUTO: 73 FL (ref 80–100)
PLATELET # BLD AUTO: 224 K/UL (ref 140–450)
PMV BLD AUTO: ABNORMAL FL (ref 6–12)
PROTHROMBIN TIME: 32.4 SEC (ref 11.5–14.2)
RBC # BLD AUTO: 4.03 M/UL (ref 4.5–5.9)
WBC OTHER # BLD: 7.3 K/UL (ref 3.5–11)

## 2025-06-14 PROCEDURE — 6370000000 HC RX 637 (ALT 250 FOR IP): Performed by: INTERNAL MEDICINE

## 2025-06-14 PROCEDURE — 36415 COLL VENOUS BLD VENIPUNCTURE: CPT

## 2025-06-14 PROCEDURE — 85027 COMPLETE CBC AUTOMATED: CPT

## 2025-06-14 PROCEDURE — 93270 REMOTE 30 DAY ECG REV/REPORT: CPT

## 2025-06-14 PROCEDURE — 94761 N-INVAS EAR/PLS OXIMETRY MLT: CPT

## 2025-06-14 PROCEDURE — 85610 PROTHROMBIN TIME: CPT

## 2025-06-14 RX ORDER — METOPROLOL TARTRATE 25 MG/1
12.5 TABLET, FILM COATED ORAL 2 TIMES DAILY
Status: DISCONTINUED | OUTPATIENT
Start: 2025-06-14 | End: 2025-06-14 | Stop reason: HOSPADM

## 2025-06-14 RX ORDER — METOPROLOL TARTRATE 25 MG/1
12.5 TABLET, FILM COATED ORAL 2 TIMES DAILY
Qty: 180 TABLET | Refills: 5 | Status: ON HOLD
Start: 2025-06-14

## 2025-06-14 RX ORDER — SENNOSIDES 8.6 MG/1
1 TABLET ORAL
Status: DISCONTINUED | OUTPATIENT
Start: 2025-06-14 | End: 2025-06-14 | Stop reason: HOSPADM

## 2025-06-14 RX ORDER — WARFARIN SODIUM 2.5 MG/1
2.5 TABLET ORAL
Status: DISCONTINUED | OUTPATIENT
Start: 2025-06-14 | End: 2025-06-14 | Stop reason: HOSPADM

## 2025-06-14 RX ADMIN — CLOPIDOGREL BISULFATE 75 MG: 75 TABLET, FILM COATED ORAL at 08:00

## 2025-06-14 RX ADMIN — PANTOPRAZOLE SODIUM 40 MG: 40 TABLET, DELAYED RELEASE ORAL at 06:06

## 2025-06-14 RX ADMIN — ALLOPURINOL 300 MG: 100 TABLET ORAL at 08:00

## 2025-06-14 RX ADMIN — SENNOSIDES 8.6 MG: 8.6 TABLET, FILM COATED ORAL at 06:12

## 2025-06-14 RX ADMIN — SERTRALINE HYDROCHLORIDE 25 MG: 50 TABLET ORAL at 08:05

## 2025-06-14 RX ADMIN — METOPROLOL TARTRATE 12.5 MG: 25 TABLET, FILM COATED ORAL at 08:00

## 2025-06-14 RX ADMIN — ATORVASTATIN CALCIUM 40 MG: 40 TABLET, FILM COATED ORAL at 08:00

## 2025-06-14 RX ADMIN — ASPIRIN 81 MG: 81 TABLET, COATED ORAL at 08:01

## 2025-06-14 RX ADMIN — TAMSULOSIN HYDROCHLORIDE 0.4 MG: 0.4 CAPSULE ORAL at 08:00

## 2025-06-14 ASSESSMENT — PAIN SCALES - GENERAL: PAINLEVEL_OUTOF10: 0

## 2025-06-14 NOTE — PROGRESS NOTES
10 beat run of Vtach noted on cardiac telemetry, see saved doc in results review. Writer at bedside- patient asleep but easily awakens, denies any s/s at this time. Tele currently reading NSR AV paced rhythm.

## 2025-06-14 NOTE — DISCHARGE INSTRUCTIONS
Discharge Instructions    Admission Date:  6/12/2025  Discharge Date:  6/14/25    Disposition:   Home.     Discharge Instructions:  Resume previous home medication but take Lopressor 12.5 mg BID.  Pharmacy to determine the dose of coumadin and INR follow up at discharge.     CBC on Thursday with results to Dr. Galan.  Set up with a 14 day event monitor at discharge.   Activity:  As tolerated.  Diet:  Cardiac    Follow up with Vernon Cade MD in 1 week.

## 2025-06-14 NOTE — PROGRESS NOTES
[facility]  Pharmacy Department    Clinical Pharmacy Note-Warfarin Follow-up       Patient:  Quique Wray  MRN: [id number]    Warfarin consult follow-up:    INR (no units)   Date Value   06/14/2025 3.0   06/13/2025 3.3   06/12/2025 3.0   05/29/2025 1.8   05/19/2025 2.1   05/16/2025 2.0   05/14/2025 2.2     INR,(POC) (no units)   Date Value   06/06/2025 2.4   05/28/2025 1.6       Hemoglobin (g/dL)   Date Value   06/14/2025 9.1 (L)   06/13/2025 8.7 (L)   06/13/2025 7.7 (LL)     Hematocrit (%)   Date Value   06/14/2025 29.4 (L)   06/13/2025 27.7 (L)   06/13/2025 25.5 (L)     Platelet Count (k/uL)   Date Value   11/13/2011 137     Platelets (k/uL)   Date Value   06/14/2025 224   06/13/2025 264   06/12/2025 318       Target INR Range: 2-3    Significant Drug-Drug Interactions:  New warfarin drug-drug interactions: none  Discontinued drug-drug interactions: none      Notes: Patient back to therapeutic today. Getting discharge so will recommend he take 2.5 mg x 3 days, 5 mg x 1 and then f/u w/ appt in the clinic on 6/18 @ 1:20.    Giuseppe Blakely, PharmD 6/14/2025 8:23 AM

## 2025-06-14 NOTE — PLAN OF CARE
Problem: Skin/Tissue Integrity - Adult  Goal: Skin integrity remains intact  Description: 1.  Monitor for areas of redness and/or skin breakdown2.  Assess vascular access sites hourly3.  Every 4-6 hours minimum:  Change oxygen saturation probe site4.  Every 4-6 hours:  If on nasal continuous positive airway pressure, respiratory therapy assess nares and determine need for appliance change or resting period  Outcome: Progressing     Problem: Safety - Adult  Goal: Free from fall injury  Outcome: Progressing

## 2025-06-14 NOTE — PROGRESS NOTES
IV removed and dressed.  Discharge instructions given and documented belongings collected and signed for.  To receive event monitor before physically leaving building.

## 2025-06-14 NOTE — DISCHARGE SUMMARY
Hospitalist Discharge Summary    Quique Wray  :  1945  MRN:  [id number]    Admit date:  2025  Discharge date:  25    Admitting Physician:  George Ware MD    Discharge Diagnoses:    Acute on chronic anemia - S/P 2 unit PRBC's.  Hgb 9.1 this am.  Patient unable to tolerate iron supplements.  H/O beta thalassemia.  Dyspnea - improved with improvement in anemia.  Non sustained, asymptomatic 10 beat run of V tach - started back on Lopressor 12.5 mg BID (had been  held with low BP).   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  Continue medical management with Plavix, statin, Coumadin.  Paroxysmal atrial fib - on coumadin.    Recent diagnosis of right leg DVT - on Coumadin.  HTN - Lopressor added back at 12.5 mg BID.  H/O SSS - s/p Medtronic Tori XT MRI compatible pacemaker placed on 2022.  Generalized weakness - has home care for PT / OT.    H/O CVA    Admission Condition:  poor      Discharged Condition:  good    Hospital Course:     The patient is a 80 y.o. male with history recent non-STEMI, history of paroxysmal atrial fibrillation for which she is on Coumadin, Enterococcus bacteremia, SSS s/p pacemaker placement, recent right leg acute DVT, anemia due to GI bleed, also history of CVA, deconditioning, malnutrition, depression presented to the emergency room as advised by Dr. Galan for evaluation of anemia, shortness of breath, generalized weakness.  Patient was recently admitted to [facility] for non-STEMI from 2025 -2025.  He had left heart catheterization I revealing a bifurcating ramus intermedius thrombotic occlusion, status post thrombectomy.  Was noted to have echodensity on the aortic valve, s/p BIRD showing thickened aortic valve but no evidence of vegetation.  No thrombus in the left atrial appendage, no PFO.  Patient also developed flash pulmonary edema with  NPO NPO

## 2025-06-14 NOTE — PROGRESS NOTES
Hospitalist Progress Note  6/14/2025 6:04 AM  Subjective:   Admit Date: 6/12/2025  PCP: Vernon Cade MD    Interval History: Quique states he is feeling much better.  He had a 10 beat run of non sustained, asymptomatic V tach while sleeping last night.  Lopressor 12.5 mg BID added back.  No chest pain or SOB.  Strength improving.  Appetite fair.  No BM yesterday.     Diet: ADULT DIET; Regular  ADULT ORAL NUTRITION SUPPLEMENT; Breakfast, Lunch, Dinner; Standard High Calorie/High Protein Oral Supplement  Medications:   Scheduled Meds:   metoprolol tartrate  12.5 mg Oral BID    allopurinol  300 mg Oral Daily    aspirin  81 mg Oral Daily    atorvastatin  40 mg Oral Daily    balance B-50  1 tablet Oral Daily    Vitamin D  5,000 Units Oral Daily    clopidogrel  75 mg Oral Daily    ocuvite-lutein  1 tablet Oral Daily with breakfast    pantoprazole  40 mg Oral QAM AC    sertraline  25 mg Oral Daily    tamsulosin  0.4 mg Oral Daily    sodium chloride flush  5-40 mL IntraVENous 2 times per day    warfarin placeholder: dosing by pharmacy   Oral RX Placeholder     Continuous Infusions:   sodium chloride      sodium chloride      sodium chloride         Patient's current medications documented, reviewed, and updated.      CBC:   Recent Labs     06/12/25  1052 06/12/25  1633 06/13/25  0406 06/13/25  1105 06/14/25  0401   WBC 7.8  --  7.2  --  7.3   HGB 7.7*   < > 7.7* 8.7* 9.1*     --  264  --  224    < > = values in this interval not displayed.     BMP:    Recent Labs     06/12/25  1052      K 3.9      CO2 18*   BUN 34*   CREATININE 1.3*   GLUCOSE 199*     Hepatic:   Recent Labs     06/12/25  1052   AST 30   ALT 16   BILITOT 2.1*   ALKPHOS 91     Troponin: No results for input(s): \"TROPONINI\" in the last 72 hours.  BNP: No results for input(s): \"BNP\" in the last 72 hours.  Lipids: No results for input(s): \"CHOL\", \"HDL\" in the last 72 hours.    Invalid input(s): \"LDLCALCU\"  INR:   Recent Labs      Ultrasound interpretation:  -  Decision rules / scores evaluated:  -  Management / test interpretation discussed with:   -      Assessment and Plan   Acute on chronic anemia - S/P 1 unit PRBC's.  Hgb 9.1 this am.  Patient unable to tolerate iron supplements.  H/O beta thalassemia.  Dyspnea - improved with improvement in anemia.  Non sustained, asymptomatic 10 beat run of V tach - started back on Lopressor 12.5 mg BID (had been  held with low BP).   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  Continue medical management with Plavix, statin, Coumadin.  Paroxysmal atrial fib - on coumadin.    Recent diagnosis of right leg DVT - on Coumadin.  HTN - Lopressor added back at 12.5 mg BID.  H/O SSS - s/p Medtronic Tori XT MRI compatible pacemaker placed on 6/29/2022.  Generalized weakness - has home care for PT / OT.    H/O CVA    Plan:  DC home if okay with Dr. Galan.  Continue on Lopressor 12.5 mg BID.  Set up with out patient event monitor over 2 week to rule out re-occurrence of V tach.            Orders this Encounter  Labs/imaging ordered this DOS: [Y/N]  Updates/Changes to Drug Management this DOS: [Y/N]  Medication(s) ordered/changed/continued/considered this DOS: [Lopressor]    Code status and discussions:  Full       DVT prophylaxis:   [] Lovenox   [] SCDs   [] SQ Heparin   [] Encourage ambulation, low risk for DVT, no chemical or mechanical    prophylaxis necessary      [x] Already on Anticoagulation    Patient Active Problem List:     Stroke aborted by administration of thrombolytic agent (HCC)     Acute ischemic stroke (HCC)     Bilateral carotid artery stenosis     Received tissue plasminogen activator (t-PA) less than 24 hours prior to arrival     Left hand weakness     Stroke of unknown cause (HCC)     Essential hypertension     Left against medical advice     Atrial fibrillation (HCC)     SSS (sick sinus

## 2025-06-15 LAB
ABO/RH: NORMAL
ANTIBODY SCREEN: NEGATIVE
BLOOD BANK BLOOD PRODUCT EXPIRATION DATE: NORMAL
BLOOD BANK DISPENSE STATUS: NORMAL
BLOOD BANK ISBT PRODUCT BLOOD TYPE: 600
BLOOD BANK PRODUCT CODE: NORMAL
BLOOD BANK SAMPLE EXPIRATION: NORMAL
BLOOD BANK UNIT TYPE AND RH: NORMAL
BPU ID: NORMAL
COMPONENT: NORMAL
CROSSMATCH RESULT: NORMAL
MICROORGANISM SPEC CULT: NORMAL
MICROORGANISM SPEC CULT: NORMAL
SERVICE CMNT-IMP: NORMAL
SERVICE CMNT-IMP: NORMAL
SPECIMEN DESCRIPTION: NORMAL
SPECIMEN DESCRIPTION: NORMAL
TRANSFUSION STATUS: NORMAL
UNIT DIVISION: 0
UNIT ISSUE DATE/TIME: NORMAL

## 2025-06-16 LAB
ABO/RH: NORMAL
ANTIBODY SCREEN: NEGATIVE
BLOOD BANK BLOOD PRODUCT EXPIRATION DATE: NORMAL
BLOOD BANK DISPENSE STATUS: NORMAL
BLOOD BANK ISBT PRODUCT BLOOD TYPE: 600
BLOOD BANK PRODUCT CODE: NORMAL
BLOOD BANK SAMPLE EXPIRATION: NORMAL
BLOOD BANK UNIT TYPE AND RH: NORMAL
BPU ID: NORMAL
COMPONENT: NORMAL
CROSSMATCH RESULT: NORMAL
TRANSFUSION STATUS: NORMAL
UNIT DIVISION: 0
UNIT ISSUE DATE/TIME: NORMAL

## 2025-06-17 ENCOUNTER — APPOINTMENT (OUTPATIENT)
Dept: GENERAL RADIOLOGY | Age: 80
End: 2025-06-17
Payer: MEDICARE

## 2025-06-17 ENCOUNTER — HOSPITAL ENCOUNTER (EMERGENCY)
Age: 80
Discharge: HOME | End: 2025-06-17
Payer: MEDICARE

## 2025-06-17 ENCOUNTER — APPOINTMENT (OUTPATIENT)
Dept: ULTRASOUND IMAGING | Age: 80
End: 2025-06-17
Payer: MEDICARE

## 2025-06-17 VITALS
OXYGEN SATURATION: 96 % | HEART RATE: 77 BPM | RESPIRATION RATE: 22 BRPM | SYSTOLIC BLOOD PRESSURE: 122 MMHG | BODY MASS INDEX: 31.58 KG/M2 | TEMPERATURE: 97.7 F | HEIGHT: 68 IN | DIASTOLIC BLOOD PRESSURE: 59 MMHG | WEIGHT: 208.4 LBS

## 2025-06-17 DIAGNOSIS — R07.89 ATYPICAL CHEST PAIN: Primary | ICD-10-CM

## 2025-06-17 DIAGNOSIS — K80.50 BILIARY COLIC: ICD-10-CM

## 2025-06-17 LAB
ABO + RH BLD: NORMAL
ALBUMIN SERPL-MCNC: 3.7 G/DL (ref 3.5–5.2)
ALBUMIN/GLOB SERPL: 1.1 {RATIO} (ref 1–2.5)
ALP SERPL-CCNC: 89 U/L (ref 40–129)
ALT SERPL-CCNC: 10 U/L (ref 5–41)
ANION GAP SERPL CALCULATED.3IONS-SCNC: 12 MMOL/L (ref 9–17)
ARM BAND NUMBER: NORMAL
AST SERPL-CCNC: 27 U/L
BASOPHILS # BLD: 0.03 K/UL (ref 0–0.2)
BASOPHILS NFR BLD: 0 % (ref 0–2)
BILIRUB DIRECT SERPL-MCNC: 0.9 MG/DL
BILIRUB INDIRECT SERPL-MCNC: 1.1 MG/DL (ref 0–1)
BILIRUB SERPL-MCNC: 2 MG/DL (ref 0.3–1.2)
BLOOD BANK SAMPLE EXPIRATION: NORMAL
BLOOD GROUP ANTIBODIES SERPL: NEGATIVE
BNP SERPL-MCNC: 9688 PG/ML
BUN SERPL-MCNC: 19 MG/DL (ref 8–23)
CALCIUM SERPL-MCNC: 9.2 MG/DL (ref 8.6–10.4)
CHLORIDE SERPL-SCNC: 106 MMOL/L (ref 98–107)
CO2 SERPL-SCNC: 24 MMOL/L (ref 20–31)
CREAT SERPL-MCNC: 1 MG/DL (ref 0.7–1.2)
EKG ATRIAL RATE: 77 BPM
EKG P AXIS: 72 DEGREES
EKG P-R INTERVAL: 328 MS
EKG Q-T INTERVAL: 404 MS
EKG QRS DURATION: 108 MS
EKG QTC CALCULATION (BAZETT): 457 MS
EKG R AXIS: -29 DEGREES
EKG T AXIS: -83 DEGREES
EKG VENTRICULAR RATE: 77 BPM
EOSINOPHIL # BLD: 0.21 K/UL (ref 0–0.4)
EOSINOPHILS RELATIVE PERCENT: 3 % (ref 0–5)
ERYTHROCYTE [DISTWIDTH] IN BLOOD BY AUTOMATED COUNT: 25.9 % (ref 12.1–15.2)
GFR, ESTIMATED: 77 ML/MIN/1.73M2
GLOBULIN SER CALC-MCNC: 3.3 G/DL (ref 1.5–3.8)
GLUCOSE SERPL-MCNC: 144 MG/DL (ref 70–99)
HCT VFR BLD AUTO: 33.5 % (ref 41–53)
HGB BLD-MCNC: 10.4 G/DL (ref 13.5–17.5)
IMM GRANULOCYTES # BLD AUTO: 0.02 K/UL (ref 0–0.3)
IMM GRANULOCYTES NFR BLD: 0 % (ref 0–5)
INR PPP: 2.8
LACTATE BLDV-SCNC: 2.5 MMOL/L (ref 0.5–2.2)
LIPASE SERPL-CCNC: 38 U/L (ref 13–60)
LYMPHOCYTES NFR BLD: 1.6 K/UL (ref 1–4.8)
LYMPHOCYTES RELATIVE PERCENT: 21 % (ref 13–44)
MAGNESIUM SERPL-MCNC: 1.6 MG/DL (ref 1.6–2.6)
MCH RBC QN AUTO: 22.6 PG (ref 26–34)
MCHC RBC AUTO-ENTMCNC: 31 G/DL (ref 31–37)
MCV RBC AUTO: 72.8 FL (ref 80–100)
MONOCYTES NFR BLD: 0.44 K/UL (ref 0–1)
MONOCYTES NFR BLD: 6 % (ref 5–9)
MORPHOLOGY: ABNORMAL
NEUTROPHILS NFR BLD: 70 % (ref 39–75)
NEUTS SEG NFR BLD: 5.33 K/UL (ref 2.1–6.5)
PLATELET # BLD AUTO: 259 K/UL (ref 140–450)
PMV BLD AUTO: ABNORMAL FL (ref 6–12)
POTASSIUM SERPL-SCNC: 4 MMOL/L (ref 3.7–5.3)
PROT SERPL-MCNC: 7 G/DL (ref 6.4–8.3)
PROTHROMBIN TIME: 30.2 SEC (ref 11.5–14.2)
RBC # BLD AUTO: 4.6 M/UL (ref 4.5–5.9)
SODIUM SERPL-SCNC: 142 MMOL/L (ref 135–144)
TROPONIN I SERPL HS-MCNC: 73 NG/L (ref 0–22)
TROPONIN I SERPL HS-MCNC: 86 NG/L (ref 0–22)
WBC OTHER # BLD: 7.6 K/UL (ref 3.5–11)

## 2025-06-17 PROCEDURE — 71045 X-RAY EXAM CHEST 1 VIEW: CPT

## 2025-06-17 PROCEDURE — 86850 RBC ANTIBODY SCREEN: CPT

## 2025-06-17 PROCEDURE — 80048 BASIC METABOLIC PNL TOTAL CA: CPT

## 2025-06-17 PROCEDURE — 83690 ASSAY OF LIPASE: CPT

## 2025-06-17 PROCEDURE — 80076 HEPATIC FUNCTION PANEL: CPT

## 2025-06-17 PROCEDURE — 96372 THER/PROPH/DIAG INJ SC/IM: CPT

## 2025-06-17 PROCEDURE — 76705 ECHO EXAM OF ABDOMEN: CPT

## 2025-06-17 PROCEDURE — 36415 COLL VENOUS BLD VENIPUNCTURE: CPT

## 2025-06-17 PROCEDURE — 93010 ELECTROCARDIOGRAM REPORT: CPT | Performed by: INTERNAL MEDICINE

## 2025-06-17 PROCEDURE — 84484 ASSAY OF TROPONIN QUANT: CPT

## 2025-06-17 PROCEDURE — 2580000003 HC RX 258

## 2025-06-17 PROCEDURE — 83880 ASSAY OF NATRIURETIC PEPTIDE: CPT

## 2025-06-17 PROCEDURE — 6360000002 HC RX W HCPCS

## 2025-06-17 PROCEDURE — 6370000000 HC RX 637 (ALT 250 FOR IP)

## 2025-06-17 PROCEDURE — 99285 EMERGENCY DEPT VISIT HI MDM: CPT

## 2025-06-17 PROCEDURE — 83605 ASSAY OF LACTIC ACID: CPT

## 2025-06-17 PROCEDURE — 93005 ELECTROCARDIOGRAM TRACING: CPT

## 2025-06-17 PROCEDURE — 83735 ASSAY OF MAGNESIUM: CPT

## 2025-06-17 PROCEDURE — 96374 THER/PROPH/DIAG INJ IV PUSH: CPT

## 2025-06-17 PROCEDURE — 85025 COMPLETE CBC W/AUTO DIFF WBC: CPT

## 2025-06-17 PROCEDURE — 86901 BLOOD TYPING SEROLOGIC RH(D): CPT

## 2025-06-17 PROCEDURE — 85610 PROTHROMBIN TIME: CPT

## 2025-06-17 PROCEDURE — 86900 BLOOD TYPING SEROLOGIC ABO: CPT

## 2025-06-17 RX ORDER — KETOROLAC TROMETHAMINE 15 MG/ML
15 INJECTION, SOLUTION INTRAMUSCULAR; INTRAVENOUS ONCE
Status: COMPLETED | OUTPATIENT
Start: 2025-06-17 | End: 2025-06-17

## 2025-06-17 RX ORDER — 0.9 % SODIUM CHLORIDE 0.9 %
250 INTRAVENOUS SOLUTION INTRAVENOUS ONCE
Status: COMPLETED | OUTPATIENT
Start: 2025-06-17 | End: 2025-06-17

## 2025-06-17 RX ORDER — DICYCLOMINE HYDROCHLORIDE 10 MG/ML
10 INJECTION INTRAMUSCULAR ONCE
Status: COMPLETED | OUTPATIENT
Start: 2025-06-17 | End: 2025-06-17

## 2025-06-17 RX ORDER — NITROGLYCERIN 0.4 MG/1
0.4 TABLET SUBLINGUAL EVERY 5 MIN PRN
Status: DISCONTINUED | OUTPATIENT
Start: 2025-06-17 | End: 2025-06-17 | Stop reason: HOSPADM

## 2025-06-17 RX ADMIN — LIDOCAINE HYDROCHLORIDE: 20 SOLUTION ORAL at 05:40

## 2025-06-17 RX ADMIN — KETOROLAC TROMETHAMINE 15 MG: 15 INJECTION, SOLUTION INTRAMUSCULAR; INTRAVENOUS at 08:04

## 2025-06-17 RX ADMIN — NITROGLYCERIN 0.4 MG: 0.4 TABLET SUBLINGUAL at 05:42

## 2025-06-17 RX ADMIN — SODIUM CHLORIDE 250 ML: 0.9 INJECTION, SOLUTION INTRAVENOUS at 05:38

## 2025-06-17 RX ADMIN — NITROGLYCERIN 0.4 MG: 0.4 TABLET SUBLINGUAL at 05:35

## 2025-06-17 RX ADMIN — DICYCLOMINE HYDROCHLORIDE 10 MG: 10 INJECTION, SOLUTION INTRAMUSCULAR at 08:04

## 2025-06-17 ASSESSMENT — PAIN SCALES - GENERAL
PAINLEVEL_OUTOF10: 3
PAINLEVEL_OUTOF10: 5
PAINLEVEL_OUTOF10: 6
PAINLEVEL_OUTOF10: 2
PAINLEVEL_OUTOF10: 4

## 2025-06-17 ASSESSMENT — PAIN DESCRIPTION - ORIENTATION
ORIENTATION: LEFT;LOWER
ORIENTATION: MID;LEFT
ORIENTATION: LEFT;LOWER

## 2025-06-17 ASSESSMENT — PAIN DESCRIPTION - LOCATION
LOCATION: CHEST

## 2025-06-17 ASSESSMENT — PAIN DESCRIPTION - DESCRIPTORS
DESCRIPTORS: DULL
DESCRIPTORS: PRESSURE

## 2025-06-17 ASSESSMENT — PAIN DESCRIPTION - FREQUENCY: FREQUENCY: INTERMITTENT

## 2025-06-17 ASSESSMENT — PAIN - FUNCTIONAL ASSESSMENT: PAIN_FUNCTIONAL_ASSESSMENT: 0-10

## 2025-06-17 NOTE — PROGRESS NOTES
Ov Dr. Galan for 2 month follow up  Pacer checked today by medtronic   2 months ago was in hospital for infection  Around pacer- no surgery   2 weeks ago had MI 05/29/2025-  Had cath no stents- had clot   C/o SOB  Appetite down   Not taking ferrous sulfate   Girish had kidney stone and UTI -  Had antibiotics for that   Bedside echo done       
myocardial infarction on May 29.  His INR was slightly low at 1.6 at the time of his non-STEMI which was found to be secondary to a thrombus in his ramus with a subsequent thrombectomy with no significant coronary artery disease.    He is in a difficult situation.  He does have anemia and has dropped his hemoglobin to 7.7.  He will need to be transfused to maintain a hemoglobin greater than 8.    He will most likely need a GI workup to see if he has any GI bleeding.  His indices are microcytic, hypochromic, but his iron levels were okay and therefore this will not respond to IV iron.  He cannot take oral iron.    He would be helped again by the Swing bed program to gain strength as he recovers from his non-ST-elevation myocardial infarction.    At some point as an outpatient I would further evaluate his gallbladder, with sludge apparent on the CT scan.  There was no evidence however of an acute cholecystitis, but it may be contributing to his weight loss, etc.    He is hemodynamically stable.  He will be admitted by Dr. Ware for further workup and for transfusion.    Thank you very much for allowing me the privilege of seeing Mr. Wray. If you have any questions on my thoughts, please do not hesitate to contact me.          SANTI CHAVEZ MD      D:  06/13/2025 23:14:07     T:  06/14/2025 00:32:44     PEPITO/ANGLE  Job #:  [id number]     Doc#:  [id number]

## 2025-06-17 NOTE — ED PROVIDER NOTES
Addendum note:    Received patient in signout from Dr. Morales, ED physician, at 0800 hrs., regarding patient's mentation current workup, patient presented via EMS with atypical chest pain, woke him from sleep with pain that was more epigastric in nature, does admit to eating a chili dog before going to sleep, but does have known cardiac history including niCM with EF 35% recent cardiac cath within the past month that was reported otherwise normal, AF on Coumadin, patient was recently discharged 2 days prior due to anemia did receive 2 units pRBC blood at that time.  Therefore the patient's EKG was otherwise unremarkable, hsTnT 86 and 73 on repeat, pBNP 9688 showing improvement from 5 days prior, chest x-ray negative.  Dr. Ramos have been consulted regarding patient presenting chest pain and case was discussed at that time, at this time is not felt that the discomfort is cardiac related..  Patient did get some relief with a GI cocktail more so than nitroglycerin, felt that this may be more due to gallbladder related issues, patient having had a CT abdomen pelvis on 6/12/2025 did show some gallbladder sludge and stones at that time, patient is pending ultrasound at this time, is felt that if ultrasound otherwise negative patient is comfortable can discharge home with outpatient follow-up.  Patient did recently get a dose of ketorolac 15 mg IV and dicyclomine 10 mg IM for pain control.    PCP is Dr. Cade in [facility], cardiologist Dr. Galan    ~0843 hrs -went to patient room, patient resting very low Semi-Canales's in darkened room with wife present, patient already has ultrasound, advised I will gone on to radiology to see if I can get an expedited read, acknowledged    ~0855 hrs -spoke with Dr. Gordon from radiology, we did go over the ultrasound imaging along with the ultrasound technician and performed the test, there is very a lot of sludge, there is a 1.6 cm stone noted within the sludge, though

## 2025-06-17 NOTE — ED PROVIDER NOTES
[facility]  EMERGENCY DEPARTMENT ENCOUNTER      Pt Name: Quique Wray  MRN: [id number]  Birthdate 1945  Date of evaluation: 6/17/2025  Provider: Micah Morales MD    CHIEF COMPLAINT       Chief Complaint   Patient presents with    Chest Pain     Pt arrived via [facility] with chest pain that woke up from dead sleep.       HISTORY OF PRESENT ILLNESS      Quique Wray is a 80 y.o., male ,  has a past medical history of Atrial fibrillation (HCC) on warfarin, metoprolol, Blind right eye (2010), Cerebral artery occlusion with cerebral infarction (HCC), Degenerative disc disease, lumbar, Depression, Endocarditis, Hypertension, Kidney stone, and Spinal stenosis, acute on chronic anemia, s/p 2 unit PRBCs a week ago, asymptomatic nonsustained V. 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 on Plavix, statin, paroxysmal atrial fibrillation on warfarin, recently diagnosed right gastrocnemius DVT, on warfarin, history of sick sinus syndrome status post Medtronic Isidore MRI compatible pacemaker placed 6/29/22,  who was evaluated in the emergency room for Chest Pain, he describes a pressure-like chest pain which woke him up from sleep.    Localized to epigastric/substernal region, nonradiating, worsens with movement, initially 3 out of 10 in severity, somewhat improved with sublingual nitroglycerin which she took prior to EMS arriving to his house.  Patient says he had been doing well after being discharged on Saturday, denies palpitations, fevers, chills, nausea, vomiting, abdominal pain, shortness of breath, dizziness, blood in the stool.  Since being in the emergency room, patient says his symptoms have mostly resolved.          REVIEW OF SYSTEMS       Review of system: Negative except for what is mentioned in the HPI.      PAST MEDICAL HISTORY     Past Medical History:   Diagnosis Date    Atrial fibrillation (HCC)     Blind

## 2025-06-18 ENCOUNTER — DIRECT ADMIT ORDERS (OUTPATIENT)
Dept: INTERNAL MEDICINE CLINIC | Age: 80
End: 2025-06-18

## 2025-06-18 ENCOUNTER — APPOINTMENT (OUTPATIENT)
Dept: GENERAL RADIOLOGY | Age: 80
End: 2025-06-18
Payer: MEDICARE

## 2025-06-18 ENCOUNTER — APPOINTMENT (OUTPATIENT)
Age: 80
End: 2025-06-18
Payer: MEDICARE

## 2025-06-18 ENCOUNTER — TELEPHONE (OUTPATIENT)
Age: 80
End: 2025-06-18

## 2025-06-18 ENCOUNTER — APPOINTMENT (OUTPATIENT)
Dept: CT IMAGING | Age: 80
End: 2025-06-18
Payer: MEDICARE

## 2025-06-18 ENCOUNTER — HOSPITAL ENCOUNTER (EMERGENCY)
Age: 80
Discharge: ANOTHER HOSPITAL | End: 2025-06-19
Attending: EMERGENCY MEDICINE
Payer: MEDICARE

## 2025-06-18 ENCOUNTER — OFFICE VISIT (OUTPATIENT)
Dept: SURGERY | Age: 80
End: 2025-06-18
Payer: MEDICARE

## 2025-06-18 VITALS
BODY MASS INDEX: 31.02 KG/M2 | DIASTOLIC BLOOD PRESSURE: 58 MMHG | OXYGEN SATURATION: 94 % | WEIGHT: 204 LBS | SYSTOLIC BLOOD PRESSURE: 118 MMHG | HEART RATE: 94 BPM | RESPIRATION RATE: 18 BRPM

## 2025-06-18 DIAGNOSIS — R06.02 SHORTNESS OF BREATH: ICD-10-CM

## 2025-06-18 DIAGNOSIS — R06.03 RESPIRATORY DISTRESS: ICD-10-CM

## 2025-06-18 DIAGNOSIS — I25.10 CORONARY ARTERY DISEASE INVOLVING NATIVE HEART WITHOUT ANGINA PECTORIS, UNSPECIFIED VESSEL OR LESION TYPE: ICD-10-CM

## 2025-06-18 DIAGNOSIS — R10.9 ACUTE ABDOMINAL PAIN: Primary | ICD-10-CM

## 2025-06-18 DIAGNOSIS — R10.11 RIGHT UPPER QUADRANT ABDOMINAL PAIN: Primary | ICD-10-CM

## 2025-06-18 DIAGNOSIS — I50.23 ACUTE ON CHRONIC SYSTOLIC CONGESTIVE HEART FAILURE (HCC): ICD-10-CM

## 2025-06-18 DIAGNOSIS — D50.0 IRON DEFICIENCY ANEMIA DUE TO CHRONIC BLOOD LOSS: ICD-10-CM

## 2025-06-18 DIAGNOSIS — K81.0 ACUTE CHOLECYSTITIS: ICD-10-CM

## 2025-06-18 LAB
-: NORMAL
ALBUMIN SERPL-MCNC: 3.7 G/DL (ref 3.5–5.2)
ALBUMIN/GLOB SERPL: 1.1 {RATIO} (ref 1–2.5)
ALP SERPL-CCNC: 170 U/L (ref 40–129)
ALT SERPL-CCNC: 24 U/L (ref 5–41)
ANION GAP SERPL CALCULATED.3IONS-SCNC: 13 MMOL/L (ref 9–17)
AST SERPL-CCNC: 32 U/L
BASOPHILS # BLD: 0.01 K/UL (ref 0–0.2)
BASOPHILS NFR BLD: 0 % (ref 0–2)
BILIRUB SERPL-MCNC: 2.9 MG/DL (ref 0.3–1.2)
BILIRUB UR QL STRIP: NEGATIVE
BNP SERPL-MCNC: ABNORMAL PG/ML
BUN SERPL-MCNC: 18 MG/DL (ref 8–23)
CALCIUM SERPL-MCNC: 9.2 MG/DL (ref 8.6–10.4)
CHLORIDE SERPL-SCNC: 102 MMOL/L (ref 98–107)
CLARITY UR: CLEAR
CO2 SERPL-SCNC: 23 MMOL/L (ref 20–31)
COLOR UR: ABNORMAL
COMMENT: ABNORMAL
CREAT SERPL-MCNC: 0.9 MG/DL (ref 0.7–1.2)
EKG ATRIAL RATE: 68 BPM
EKG Q-T INTERVAL: 494 MS
EKG QRS DURATION: 186 MS
EKG QTC CALCULATION (BAZETT): 509 MS
EKG R AXIS: -69 DEGREES
EKG T AXIS: 109 DEGREES
EKG VENTRICULAR RATE: 64 BPM
EOSINOPHIL # BLD: 0.02 K/UL (ref 0–0.4)
EOSINOPHILS RELATIVE PERCENT: 0 % (ref 0–5)
EPI CELLS #/AREA URNS HPF: NORMAL /HPF
ERYTHROCYTE [DISTWIDTH] IN BLOOD BY AUTOMATED COUNT: 25.4 % (ref 12.1–15.2)
GFR, ESTIMATED: 87 ML/MIN/1.73M2
GLUCOSE SERPL-MCNC: 151 MG/DL (ref 70–99)
GLUCOSE UR STRIP-MCNC: NEGATIVE MG/DL
HCT VFR BLD AUTO: 31.6 % (ref 41–53)
HGB BLD-MCNC: 10 G/DL (ref 13.5–17.5)
HGB UR QL STRIP.AUTO: ABNORMAL
IMM GRANULOCYTES # BLD AUTO: 0.03 K/UL (ref 0–0.3)
IMM GRANULOCYTES NFR BLD: 0 % (ref 0–5)
KETONES UR STRIP-MCNC: NEGATIVE MG/DL
LACTATE BLDV-SCNC: 1 MMOL/L (ref 0.5–2.2)
LACTATE BLDV-SCNC: 3.1 MMOL/L (ref 0.5–2.2)
LEUKOCYTE ESTERASE UR QL STRIP: ABNORMAL
LIPASE SERPL-CCNC: 16 U/L (ref 13–60)
LYMPHOCYTES NFR BLD: 0.57 K/UL (ref 1–4.8)
LYMPHOCYTES RELATIVE PERCENT: 4 % (ref 13–44)
MCH RBC QN AUTO: 22.6 PG (ref 26–34)
MCHC RBC AUTO-ENTMCNC: 31.6 G/DL (ref 31–37)
MCV RBC AUTO: 71.3 FL (ref 80–100)
MICROORGANISM SPEC CULT: NORMAL
MICROORGANISM SPEC CULT: NORMAL
MONOCYTES NFR BLD: 0.81 K/UL (ref 0–1)
MONOCYTES NFR BLD: 6 % (ref 5–9)
MORPHOLOGY: ABNORMAL
NEUTROPHILS NFR BLD: 89 % (ref 39–75)
NEUTS SEG NFR BLD: 11.82 K/UL (ref 2.1–6.5)
NITRITE UR QL STRIP: NEGATIVE
PH UR STRIP: 5 [PH] (ref 5–8)
PLATELET # BLD AUTO: 244 K/UL (ref 140–450)
PMV BLD AUTO: ABNORMAL FL (ref 6–12)
POTASSIUM SERPL-SCNC: 4.5 MMOL/L (ref 3.7–5.3)
PROT SERPL-MCNC: 7.2 G/DL (ref 6.4–8.3)
PROT UR STRIP-MCNC: ABNORMAL MG/DL
RBC # BLD AUTO: 4.43 M/UL (ref 4.5–5.9)
RBC #/AREA URNS HPF: NORMAL /HPF (ref 0–2)
SERVICE CMNT-IMP: NORMAL
SERVICE CMNT-IMP: NORMAL
SODIUM SERPL-SCNC: 138 MMOL/L (ref 135–144)
SP GR UR STRIP: 1.02 (ref 1–1.03)
SPECIMEN DESCRIPTION: NORMAL
SPECIMEN DESCRIPTION: NORMAL
TROPONIN I SERPL HS-MCNC: 74 NG/L (ref 0–22)
TROPONIN I SERPL HS-MCNC: 78 NG/L (ref 0–22)
UROBILINOGEN UR STRIP-ACNC: NORMAL EU/DL (ref 0–1)
WBC #/AREA URNS HPF: NORMAL /HPF
WBC OTHER # BLD: 13.3 K/UL (ref 3.5–11)

## 2025-06-18 PROCEDURE — 83880 ASSAY OF NATRIURETIC PEPTIDE: CPT

## 2025-06-18 PROCEDURE — 84484 ASSAY OF TROPONIN QUANT: CPT

## 2025-06-18 PROCEDURE — 85025 COMPLETE CBC W/AUTO DIFF WBC: CPT

## 2025-06-18 PROCEDURE — 3074F SYST BP LT 130 MM HG: CPT | Performed by: SURGERY

## 2025-06-18 PROCEDURE — 99285 EMERGENCY DEPT VISIT HI MDM: CPT

## 2025-06-18 PROCEDURE — 83690 ASSAY OF LIPASE: CPT

## 2025-06-18 PROCEDURE — 1123F ACP DISCUSS/DSCN MKR DOCD: CPT | Performed by: SURGERY

## 2025-06-18 PROCEDURE — 83605 ASSAY OF LACTIC ACID: CPT

## 2025-06-18 PROCEDURE — 87086 URINE CULTURE/COLONY COUNT: CPT

## 2025-06-18 PROCEDURE — 71045 X-RAY EXAM CHEST 1 VIEW: CPT

## 2025-06-18 PROCEDURE — 96366 THER/PROPH/DIAG IV INF ADDON: CPT

## 2025-06-18 PROCEDURE — 1036F TOBACCO NON-USER: CPT | Performed by: SURGERY

## 2025-06-18 PROCEDURE — 1159F MED LIST DOCD IN RCRD: CPT | Performed by: SURGERY

## 2025-06-18 PROCEDURE — 93005 ELECTROCARDIOGRAM TRACING: CPT | Performed by: EMERGENCY MEDICINE

## 2025-06-18 PROCEDURE — 6360000004 HC RX CONTRAST MEDICATION: Performed by: EMERGENCY MEDICINE

## 2025-06-18 PROCEDURE — 36415 COLL VENOUS BLD VENIPUNCTURE: CPT

## 2025-06-18 PROCEDURE — 93010 ELECTROCARDIOGRAM REPORT: CPT | Performed by: INTERNAL MEDICINE

## 2025-06-18 PROCEDURE — 99205 OFFICE O/P NEW HI 60 MIN: CPT | Performed by: SURGERY

## 2025-06-18 PROCEDURE — 80053 COMPREHEN METABOLIC PANEL: CPT

## 2025-06-18 PROCEDURE — 74177 CT ABD & PELVIS W/CONTRAST: CPT

## 2025-06-18 PROCEDURE — G8427 DOCREV CUR MEDS BY ELIG CLIN: HCPCS | Performed by: SURGERY

## 2025-06-18 PROCEDURE — 96365 THER/PROPH/DIAG IV INF INIT: CPT

## 2025-06-18 PROCEDURE — 2580000003 HC RX 258: Performed by: EMERGENCY MEDICINE

## 2025-06-18 PROCEDURE — G8417 CALC BMI ABV UP PARAM F/U: HCPCS | Performed by: SURGERY

## 2025-06-18 PROCEDURE — 3078F DIAST BP <80 MM HG: CPT | Performed by: SURGERY

## 2025-06-18 PROCEDURE — 1111F DSCHRG MED/CURRENT MED MERGE: CPT | Performed by: SURGERY

## 2025-06-18 PROCEDURE — 6360000002 HC RX W HCPCS: Performed by: EMERGENCY MEDICINE

## 2025-06-18 PROCEDURE — 81001 URINALYSIS AUTO W/SCOPE: CPT

## 2025-06-18 RX ORDER — SODIUM CHLORIDE 0.9 % (FLUSH) 0.9 %
5-40 SYRINGE (ML) INJECTION EVERY 12 HOURS SCHEDULED
Status: CANCELLED | OUTPATIENT
Start: 2025-06-18

## 2025-06-18 RX ORDER — SODIUM CHLORIDE 9 MG/ML
INJECTION, SOLUTION INTRAVENOUS PRN
Status: CANCELLED | OUTPATIENT
Start: 2025-06-18

## 2025-06-18 RX ORDER — FUROSEMIDE 10 MG/ML
40 INJECTION INTRAMUSCULAR; INTRAVENOUS 2 TIMES DAILY
Status: CANCELLED | OUTPATIENT
Start: 2025-06-19

## 2025-06-18 RX ORDER — IOPAMIDOL 755 MG/ML
75 INJECTION, SOLUTION INTRAVASCULAR
Status: COMPLETED | OUTPATIENT
Start: 2025-06-18 | End: 2025-06-18

## 2025-06-18 RX ORDER — POTASSIUM CHLORIDE 7.45 MG/ML
10 INJECTION INTRAVENOUS PRN
Status: CANCELLED | OUTPATIENT
Start: 2025-06-18

## 2025-06-18 RX ORDER — POLYETHYLENE GLYCOL 3350 17 G/17G
17 POWDER, FOR SOLUTION ORAL DAILY PRN
Status: CANCELLED | OUTPATIENT
Start: 2025-06-18

## 2025-06-18 RX ORDER — ACETAMINOPHEN 325 MG/1
650 TABLET ORAL EVERY 6 HOURS PRN
Status: CANCELLED | OUTPATIENT
Start: 2025-06-18

## 2025-06-18 RX ORDER — SODIUM CHLORIDE 0.9 % (FLUSH) 0.9 %
10 SYRINGE (ML) INJECTION PRN
Status: CANCELLED | OUTPATIENT
Start: 2025-06-18

## 2025-06-18 RX ORDER — POTASSIUM CHLORIDE 1500 MG/1
40 TABLET, EXTENDED RELEASE ORAL PRN
Status: CANCELLED | OUTPATIENT
Start: 2025-06-18

## 2025-06-18 RX ADMIN — IOPAMIDOL 75 ML: 755 INJECTION, SOLUTION INTRAVENOUS at 18:32

## 2025-06-18 RX ADMIN — PIPERACILLIN AND TAZOBACTAM 3375 MG: 3; .375 INJECTION, POWDER, LYOPHILIZED, FOR SOLUTION INTRAVENOUS at 21:36

## 2025-06-18 ASSESSMENT — ENCOUNTER SYMPTOMS
VOMITING: 0
ABDOMINAL PAIN: 1
BLOOD IN STOOL: 0
SHORTNESS OF BREATH: 1
COUGH: 1
ANAL BLEEDING: 0
BACK PAIN: 1
CONSTIPATION: 0
NAUSEA: 0
CHEST TIGHTNESS: 0
CHOKING: 0
TROUBLE SWALLOWING: 1
SORE THROAT: 1
ABDOMINAL DISTENTION: 0
DIARRHEA: 0

## 2025-06-18 ASSESSMENT — PAIN DESCRIPTION - ORIENTATION: ORIENTATION: RIGHT

## 2025-06-18 ASSESSMENT — PAIN SCALES - GENERAL: PAINLEVEL_OUTOF10: 2

## 2025-06-18 ASSESSMENT — PAIN - FUNCTIONAL ASSESSMENT
PAIN_FUNCTIONAL_ASSESSMENT: ACTIVITIES ARE NOT PREVENTED
PAIN_FUNCTIONAL_ASSESSMENT: 0-10

## 2025-06-18 ASSESSMENT — PAIN DESCRIPTION - LOCATION: LOCATION: ABDOMEN

## 2025-06-18 ASSESSMENT — PAIN DESCRIPTION - FREQUENCY: FREQUENCY: CONTINUOUS

## 2025-06-18 ASSESSMENT — PAIN DESCRIPTION - PAIN TYPE: TYPE: ACUTE PAIN

## 2025-06-18 ASSESSMENT — PAIN DESCRIPTION - DESCRIPTORS: DESCRIPTORS: DULL

## 2025-06-18 NOTE — TELEPHONE ENCOUNTER
Quique was scheduled for a Coumadin Clinic appointment today but he unfortunately had to be seen in the ER yesterday for chest pain. While there, an INR was drawn which was 2.8. Given the patient's weakened condition and having a therapeutic INR from yesterday, the patient was called to reschedule. He confirmed no changes that he was taking his warfarin as prescribed. He states he has a consult with Dr Ray for his gallbladder which may require surgery. He will kepp this clinic informed with any changes. He is to continue 5 mg SuTT, 2.5 mg all other days and will come in next Wednesday, 6/25. Sherie, his wife, also requested a text with the dosing and appointment info ([phone]) which was sent from writer's personal phone with her authorization. No additional questions.   Giuseppe Blakely, PharmD 6/18/2025 9:20 AM

## 2025-06-18 NOTE — PROGRESS NOTES
Patient has epigastric pain that took him to the ED yesterday.  Pain is now more in the right mid abdomen.  Pain was dull and constant. Pain is milder today.  Pain worse with both movement and eating. He is also feeling more short of breath today.  No nausea, vomiting, diarrhea, or constipation. No rectal bleeding.  Had dark stools after taking iron.  Had some melenotic stools 3 weeks ago. Stools are brown now. He was given both NTG and a GI cocktail, and ED physician felt her responded more to the GI cocktail. Was unable to sleep last night due to his abdominal pain.     Had transfusion for anemia 6/12/25, hospitalized form 6/12 to 6/15.  Back in the ED yesterday 6/17/25 with chest/epigastric pain.    NSTEMI 5/30    History of multiple thromboembolic events.  On warfarin and Plavix.    He lost 50lbs since January. No appetite.  Food tasted poorly since and episode of COVID.    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    Cerebral artery occlusion with cerebral infarction (HCC)     Degenerative disc disease, lumbar     L3    Depression     Endocarditis     Hypertension     Kidney stone     Pacemaker     Spinal stenosis      Past Surgical History:   Procedure Laterality Date    CATARACT REMOVAL Left     INSERTABLE CARDIAC MONITOR N/A 05/12/2021    LOOP RECORDER INSERT performed by Andrea Galan MD at [facility] OR    KNEE ARTHROSCOPY Right 2002    LITHOTRIPSY      PACEMAKER INSERTION Left 06/29/2022    PACEMAKER INSERTION PERMANENT performed by Andrea Galan MD at [facility] OR    TOTAL HIP ARTHROPLASTY Right 2023     Current Outpatient Medications   Medication Sig Dispense Refill    metoprolol tartrate (LOPRESSOR) 25 MG tablet Take 0.5 tablets by mouth 2 times daily 180 tablet 5    atorvastatin (LIPITOR) 40 MG tablet Take 1 tablet by mouth daily      aspirin 81 MG EC tablet Take 1 tablet by mouth daily      clopidogrel (PLAVIX) 75 MG tablet Take

## 2025-06-18 NOTE — ED PROVIDER NOTES
Neutrophils % 89 (*)     Lymphocytes % 4 (*)     Neutrophils Absolute 11.82 (*)     Lymphocytes Absolute 0.57 (*)     All other components within normal limits   COMPREHENSIVE METABOLIC PANEL - Abnormal; Notable for the following components:    Glucose 151 (*)     Total Bilirubin 2.9 (*)     Alkaline Phosphatase 170 (*)     All other components within normal limits   BRAIN NATRIURETIC PEPTIDE - Abnormal; Notable for the following components:    NT Pro-BNP 18,406 (*)     All other components within normal limits   TROPONIN - Abnormal; Notable for the following components:    Troponin, High Sensitivity 78 (*)     All other components within normal limits   LACTIC ACID - Abnormal; Notable for the following components:    Lactic Acid 3.1 (*)     All other components within normal limits   URINALYSIS - Abnormal; Notable for the following components:    Color, UA ADALBERTO (*)     Urine Hgb TRACE (*)     Protein, UA 1+ (*)     Leukocyte Esterase, Urine 1+ (*)     All other components within normal limits   TROPONIN - Abnormal; Notable for the following components:    Troponin, High Sensitivity 74 (*)     All other components within normal limits   CULTURE, URINE   LIPASE   MICROSCOPIC URINALYSIS       MIPS  Not applicable      Total Critical Care time was:    EMERGENCY DEPARTMENT COURSE and DIFFERENTIAL DIAGNOSIS/MDM:    Patient Course: 80-year-old male the presents to ED with shortness of breath and right upper quadrant abdominal pain  Patient has a history of CHF, atrial fibrillation.  Patient has been increasingly more short of breath.  Patient has been having right upper quadrant abdominal pain for several days.  Elevated white blood cell count.  CT abdomen pelvis shows acute cholecystitis with with choledocholithiasis.  Number and complexity of problems:    Differential Diagnosis: Abdominal pain, shortness of    Pertinent Comorbid Conditions: History of CHF and atrial fibrillation, cardiac pacer  2)  Data

## 2025-06-18 NOTE — PATIENT INSTRUCTIONS
SURVEY:    You may be receiving a survey from [facility] regarding your visit today.    You may get this in the mail, through your MyChart, or in your email.     Please complete the survey to enable us to provide the highest quality of care to you and your family.    If you cannot score us a very good (5 Stars) on any question, please call the office to discuss how we could of made your experience exceptional.    Thank you!    General Surgery    MD Dr. Ayah Sanchez, DO  Dr. Tk Lopez, DO  Smita Wei, LEXI-CNP    Pain Mgmt.  Dr. Augustin Putnam, DO  BRANDT Howard, KENYA Valdovinos LPN Jena Adams, MA Emily Akers, MA    Phone: [phone]  Fax: [phone]    Office Hours:   M-TH 8-5, F: 8-12

## 2025-06-19 ENCOUNTER — HOSPITAL ENCOUNTER (INPATIENT)
Age: 80
LOS: 13 days | Discharge: NURSING, REHAB OR LONG-TERM CARE | DRG: 417 | End: 2025-07-02
Attending: INTERNAL MEDICINE | Admitting: INTERNAL MEDICINE
Payer: MEDICARE

## 2025-06-19 VITALS
RESPIRATION RATE: 33 BRPM | HEIGHT: 68 IN | HEART RATE: 94 BPM | WEIGHT: 205 LBS | DIASTOLIC BLOOD PRESSURE: 71 MMHG | SYSTOLIC BLOOD PRESSURE: 103 MMHG | BODY MASS INDEX: 31.07 KG/M2 | OXYGEN SATURATION: 94 % | TEMPERATURE: 97.6 F

## 2025-06-19 DIAGNOSIS — D64.9 ANEMIA, UNSPECIFIED TYPE: ICD-10-CM

## 2025-06-19 DIAGNOSIS — I42.9 CARDIOMYOPATHY, UNSPECIFIED TYPE (HCC): Primary | ICD-10-CM

## 2025-06-19 DIAGNOSIS — K83.8 CHOLANGIECTASIS: ICD-10-CM

## 2025-06-19 PROBLEM — K81.0 ACUTE CHOLECYSTITIS: Status: ACTIVE | Noted: 2025-06-19

## 2025-06-19 LAB
ALBUMIN SERPL-MCNC: 3.2 G/DL (ref 3.5–5.2)
ALBUMIN/GLOB SERPL: 0.9 {RATIO} (ref 1–2.5)
ALP SERPL-CCNC: 155 U/L (ref 40–129)
ALT SERPL-CCNC: 18 U/L (ref 10–50)
ANION GAP SERPL CALCULATED.3IONS-SCNC: 11 MMOL/L (ref 9–16)
AST SERPL-CCNC: 28 U/L (ref 10–50)
BASOPHILS # BLD: 0 K/UL (ref 0–0.2)
BASOPHILS NFR BLD: 0 % (ref 0–2)
BILIRUB SERPL-MCNC: 2.9 MG/DL (ref 0–1.2)
BUN SERPL-MCNC: 18 MG/DL (ref 8–23)
CALCIUM SERPL-MCNC: 8.9 MG/DL (ref 8.8–10.2)
CHLORIDE SERPL-SCNC: 102 MMOL/L (ref 98–107)
CO2 SERPL-SCNC: 26 MMOL/L (ref 20–31)
CREAT SERPL-MCNC: 1 MG/DL (ref 0.7–1.2)
EKG ATRIAL RATE: 87 BPM
EKG P AXIS: 63 DEGREES
EKG P-R INTERVAL: 174 MS
EKG Q-T INTERVAL: 436 MS
EKG QRS DURATION: 176 MS
EKG QTC CALCULATION (BAZETT): 524 MS
EKG R AXIS: -68 DEGREES
EKG T AXIS: 117 DEGREES
EKG VENTRICULAR RATE: 87 BPM
EOSINOPHIL # BLD: 0.11 K/UL (ref 0–0.44)
EOSINOPHILS RELATIVE PERCENT: 1 % (ref 1–4)
ERYTHROCYTE [DISTWIDTH] IN BLOOD BY AUTOMATED COUNT: 24.3 % (ref 11.8–14.4)
FERRITIN SERPL-MCNC: 421 NG/ML
GFR, ESTIMATED: 82 ML/MIN/1.73M2
GLUCOSE SERPL-MCNC: 121 MG/DL (ref 82–115)
HCT VFR BLD AUTO: 29.2 % (ref 40.7–50.3)
HGB BLD-MCNC: 9.2 G/DL (ref 13–17)
IMM GRANULOCYTES # BLD AUTO: 0 K/UL (ref 0–0.3)
IMM GRANULOCYTES NFR BLD: 0 %
INR PPP: 3.9
IRON SATN MFR SERPL: 9 % (ref 20–55)
IRON SERPL-MCNC: 17 UG/DL (ref 61–157)
LACTATE BLDV-SCNC: 1.1 MMOL/L (ref 0.5–1.9)
LACTATE BLDV-SCNC: 1.2 MMOL/L (ref 0.5–1.9)
LYMPHOCYTES NFR BLD: 0.8 K/UL (ref 1.1–3.7)
LYMPHOCYTES RELATIVE PERCENT: 7 % (ref 24–43)
MCH RBC QN AUTO: 23.4 PG (ref 25.2–33.5)
MCHC RBC AUTO-ENTMCNC: 31.5 G/DL (ref 28.4–34.8)
MCV RBC AUTO: 74.1 FL (ref 82.6–102.9)
MONOCYTES NFR BLD: 0.8 K/UL (ref 0.1–1.2)
MONOCYTES NFR BLD: 7 % (ref 3–12)
MORPHOLOGY: ABNORMAL
MORPHOLOGY: ABNORMAL
NEUTROPHILS NFR BLD: 85 % (ref 36–65)
NEUTS SEG NFR BLD: 9.69 K/UL (ref 1.5–8.1)
NRBC BLD-RTO: 0.3 PER 100 WBC
PLATELET # BLD AUTO: 156 K/UL (ref 138–453)
PMV BLD AUTO: ABNORMAL FL (ref 8.1–13.5)
POTASSIUM SERPL-SCNC: 4.3 MMOL/L (ref 3.7–5.3)
PROT SERPL-MCNC: 6.5 G/DL (ref 6.6–8.7)
PROTHROMBIN TIME: 40.4 SEC (ref 11.5–14.2)
RBC # BLD AUTO: 3.94 M/UL (ref 4.21–5.77)
SODIUM SERPL-SCNC: 139 MMOL/L (ref 136–145)
TIBC SERPL-MCNC: 197 UG/DL (ref 250–450)
TROPONIN I SERPL HS-MCNC: 102 NG/L (ref 0–22)
TSH SERPL DL<=0.05 MIU/L-ACNC: 1.7 UIU/ML (ref 0.27–4.2)
UNSATURATED IRON BINDING CAPACITY: 180 UG/DL (ref 112–347)
WBC OTHER # BLD: 11.4 K/UL (ref 3.5–11.3)

## 2025-06-19 PROCEDURE — 6360000002 HC RX W HCPCS

## 2025-06-19 PROCEDURE — 2580000003 HC RX 258

## 2025-06-19 PROCEDURE — 2580000003 HC RX 258: Performed by: NURSE PRACTITIONER

## 2025-06-19 PROCEDURE — 99222 1ST HOSP IP/OBS MODERATE 55: CPT | Performed by: INTERNAL MEDICINE

## 2025-06-19 PROCEDURE — 6360000002 HC RX W HCPCS: Performed by: EMERGENCY MEDICINE

## 2025-06-19 PROCEDURE — 83550 IRON BINDING TEST: CPT

## 2025-06-19 PROCEDURE — 2580000003 HC RX 258: Performed by: EMERGENCY MEDICINE

## 2025-06-19 PROCEDURE — 6360000002 HC RX W HCPCS: Performed by: NURSE PRACTITIONER

## 2025-06-19 PROCEDURE — 85610 PROTHROMBIN TIME: CPT

## 2025-06-19 PROCEDURE — 2500000003 HC RX 250 WO HCPCS: Performed by: NURSE PRACTITIONER

## 2025-06-19 PROCEDURE — 80053 COMPREHEN METABOLIC PANEL: CPT

## 2025-06-19 PROCEDURE — 6370000000 HC RX 637 (ALT 250 FOR IP)

## 2025-06-19 PROCEDURE — 83605 ASSAY OF LACTIC ACID: CPT

## 2025-06-19 PROCEDURE — 83540 ASSAY OF IRON: CPT

## 2025-06-19 PROCEDURE — 36415 COLL VENOUS BLD VENIPUNCTURE: CPT

## 2025-06-19 PROCEDURE — 96375 TX/PRO/DX INJ NEW DRUG ADDON: CPT

## 2025-06-19 PROCEDURE — 6370000000 HC RX 637 (ALT 250 FOR IP): Performed by: NURSE PRACTITIONER

## 2025-06-19 PROCEDURE — 85025 COMPLETE CBC W/AUTO DIFF WBC: CPT

## 2025-06-19 PROCEDURE — 84443 ASSAY THYROID STIM HORMONE: CPT

## 2025-06-19 PROCEDURE — 2060000000 HC ICU INTERMEDIATE R&B

## 2025-06-19 PROCEDURE — 84484 ASSAY OF TROPONIN QUANT: CPT

## 2025-06-19 PROCEDURE — 93010 ELECTROCARDIOGRAM REPORT: CPT | Performed by: INTERNAL MEDICINE

## 2025-06-19 PROCEDURE — 82728 ASSAY OF FERRITIN: CPT

## 2025-06-19 PROCEDURE — 99222 1ST HOSP IP/OBS MODERATE 55: CPT

## 2025-06-19 RX ORDER — PANTOPRAZOLE SODIUM 40 MG/1
40 TABLET, DELAYED RELEASE ORAL
Status: DISCONTINUED | OUTPATIENT
Start: 2025-06-20 | End: 2025-07-02 | Stop reason: HOSPADM

## 2025-06-19 RX ORDER — SERTRALINE HYDROCHLORIDE 25 MG/1
25 TABLET, FILM COATED ORAL DAILY
Status: DISCONTINUED | OUTPATIENT
Start: 2025-06-19 | End: 2025-07-02 | Stop reason: HOSPADM

## 2025-06-19 RX ORDER — TAMSULOSIN HYDROCHLORIDE 0.4 MG/1
0.4 CAPSULE ORAL DAILY
Status: DISCONTINUED | OUTPATIENT
Start: 2025-06-19 | End: 2025-07-02 | Stop reason: HOSPADM

## 2025-06-19 RX ORDER — ACETAMINOPHEN 325 MG/1
650 TABLET ORAL EVERY 6 HOURS PRN
Status: DISCONTINUED | OUTPATIENT
Start: 2025-06-19 | End: 2025-07-02 | Stop reason: HOSPADM

## 2025-06-19 RX ORDER — NITROGLYCERIN 0.4 MG/1
0.4 TABLET SUBLINGUAL EVERY 5 MIN PRN
Status: DISCONTINUED | OUTPATIENT
Start: 2025-06-19 | End: 2025-07-02 | Stop reason: HOSPADM

## 2025-06-19 RX ORDER — HEPARIN SODIUM 1000 [USP'U]/ML
4000 INJECTION, SOLUTION INTRAVENOUS; SUBCUTANEOUS PRN
Status: DISCONTINUED | OUTPATIENT
Start: 2025-06-19 | End: 2025-06-19

## 2025-06-19 RX ORDER — POLYETHYLENE GLYCOL 3350 17 G/17G
17 POWDER, FOR SOLUTION ORAL DAILY PRN
Status: DISCONTINUED | OUTPATIENT
Start: 2025-06-19 | End: 2025-06-26

## 2025-06-19 RX ORDER — MORPHINE SULFATE 4 MG/ML
4 INJECTION, SOLUTION INTRAMUSCULAR; INTRAVENOUS
Refills: 0 | Status: DISCONTINUED | OUTPATIENT
Start: 2025-06-19 | End: 2025-06-23

## 2025-06-19 RX ORDER — MORPHINE SULFATE 2 MG/ML
2 INJECTION, SOLUTION INTRAMUSCULAR; INTRAVENOUS
Refills: 0 | Status: DISCONTINUED | OUTPATIENT
Start: 2025-06-19 | End: 2025-06-23

## 2025-06-19 RX ORDER — SODIUM CHLORIDE 0.9 % (FLUSH) 0.9 %
10 SYRINGE (ML) INJECTION PRN
Status: DISCONTINUED | OUTPATIENT
Start: 2025-06-19 | End: 2025-07-02 | Stop reason: HOSPADM

## 2025-06-19 RX ORDER — SODIUM CHLORIDE 9 MG/ML
INJECTION, SOLUTION INTRAVENOUS CONTINUOUS
Status: DISCONTINUED | OUTPATIENT
Start: 2025-06-19 | End: 2025-06-21

## 2025-06-19 RX ORDER — CLOPIDOGREL BISULFATE 75 MG/1
75 TABLET ORAL DAILY
Status: DISCONTINUED | OUTPATIENT
Start: 2025-06-19 | End: 2025-07-01

## 2025-06-19 RX ORDER — HEPARIN SODIUM 10000 [USP'U]/100ML
5-30 INJECTION, SOLUTION INTRAVENOUS CONTINUOUS
Status: DISCONTINUED | OUTPATIENT
Start: 2025-06-19 | End: 2025-06-19

## 2025-06-19 RX ORDER — MAGNESIUM SULFATE IN WATER 40 MG/ML
2000 INJECTION, SOLUTION INTRAVENOUS PRN
Status: DISCONTINUED | OUTPATIENT
Start: 2025-06-19 | End: 2025-07-02 | Stop reason: HOSPADM

## 2025-06-19 RX ORDER — POTASSIUM CHLORIDE 1500 MG/1
40 TABLET, EXTENDED RELEASE ORAL PRN
Status: DISCONTINUED | OUTPATIENT
Start: 2025-06-19 | End: 2025-07-02 | Stop reason: HOSPADM

## 2025-06-19 RX ORDER — ACETAMINOPHEN 650 MG/1
650 SUPPOSITORY RECTAL EVERY 6 HOURS PRN
Status: DISCONTINUED | OUTPATIENT
Start: 2025-06-19 | End: 2025-07-02 | Stop reason: HOSPADM

## 2025-06-19 RX ORDER — HEPARIN SODIUM 1000 [USP'U]/ML
2000 INJECTION, SOLUTION INTRAVENOUS; SUBCUTANEOUS PRN
Status: DISCONTINUED | OUTPATIENT
Start: 2025-06-19 | End: 2025-06-19

## 2025-06-19 RX ORDER — METOPROLOL TARTRATE 25 MG/1
25 TABLET, FILM COATED ORAL 2 TIMES DAILY
Status: DISCONTINUED | OUTPATIENT
Start: 2025-06-19 | End: 2025-07-02 | Stop reason: HOSPADM

## 2025-06-19 RX ORDER — POTASSIUM CHLORIDE 7.45 MG/ML
10 INJECTION INTRAVENOUS PRN
Status: DISCONTINUED | OUTPATIENT
Start: 2025-06-19 | End: 2025-07-02 | Stop reason: HOSPADM

## 2025-06-19 RX ORDER — ALLOPURINOL 300 MG/1
300 TABLET ORAL DAILY
Status: DISCONTINUED | OUTPATIENT
Start: 2025-06-19 | End: 2025-07-02 | Stop reason: HOSPADM

## 2025-06-19 RX ORDER — SODIUM CHLORIDE 9 MG/ML
INJECTION, SOLUTION INTRAVENOUS PRN
Status: DISCONTINUED | OUTPATIENT
Start: 2025-06-19 | End: 2025-07-02 | Stop reason: HOSPADM

## 2025-06-19 RX ORDER — SODIUM CHLORIDE 0.9 % (FLUSH) 0.9 %
5-40 SYRINGE (ML) INJECTION EVERY 12 HOURS SCHEDULED
Status: DISCONTINUED | OUTPATIENT
Start: 2025-06-19 | End: 2025-07-02 | Stop reason: HOSPADM

## 2025-06-19 RX ORDER — ONDANSETRON 2 MG/ML
4 INJECTION INTRAMUSCULAR; INTRAVENOUS ONCE
Status: COMPLETED | OUTPATIENT
Start: 2025-06-19 | End: 2025-06-19

## 2025-06-19 RX ORDER — ATORVASTATIN CALCIUM 40 MG/1
40 TABLET, FILM COATED ORAL DAILY
Status: DISCONTINUED | OUTPATIENT
Start: 2025-06-19 | End: 2025-07-02 | Stop reason: HOSPADM

## 2025-06-19 RX ORDER — FUROSEMIDE 10 MG/ML
40 INJECTION INTRAMUSCULAR; INTRAVENOUS 2 TIMES DAILY
Status: DISCONTINUED | OUTPATIENT
Start: 2025-06-19 | End: 2025-06-21

## 2025-06-19 RX ORDER — HEPARIN SODIUM 1000 [USP'U]/ML
4000 INJECTION, SOLUTION INTRAVENOUS; SUBCUTANEOUS ONCE
Status: DISCONTINUED | OUTPATIENT
Start: 2025-06-19 | End: 2025-06-19

## 2025-06-19 RX ORDER — KETOROLAC TROMETHAMINE 15 MG/ML
15 INJECTION, SOLUTION INTRAMUSCULAR; INTRAVENOUS ONCE
Status: COMPLETED | OUTPATIENT
Start: 2025-06-19 | End: 2025-06-19

## 2025-06-19 RX ORDER — ASPIRIN 81 MG/1
81 TABLET ORAL DAILY
Status: DISCONTINUED | OUTPATIENT
Start: 2025-06-19 | End: 2025-07-02 | Stop reason: HOSPADM

## 2025-06-19 RX ADMIN — SODIUM CHLORIDE: 0.9 INJECTION, SOLUTION INTRAVENOUS at 13:58

## 2025-06-19 RX ADMIN — ACETAMINOPHEN 650 MG: 325 TABLET ORAL at 13:48

## 2025-06-19 RX ADMIN — SERTRALINE 25 MG: 25 TABLET, FILM COATED ORAL at 13:48

## 2025-06-19 RX ADMIN — FUROSEMIDE 40 MG: 10 INJECTION, SOLUTION INTRAMUSCULAR; INTRAVENOUS at 13:55

## 2025-06-19 RX ADMIN — ATORVASTATIN CALCIUM 40 MG: 40 TABLET, FILM COATED ORAL at 13:48

## 2025-06-19 RX ADMIN — PIPERACILLIN AND TAZOBACTAM 3375 MG: 3; .375 INJECTION, POWDER, LYOPHILIZED, FOR SOLUTION INTRAVENOUS at 14:32

## 2025-06-19 RX ADMIN — KETOROLAC TROMETHAMINE 15 MG: 15 INJECTION, SOLUTION INTRAMUSCULAR; INTRAVENOUS at 22:16

## 2025-06-19 RX ADMIN — PIPERACILLIN AND TAZOBACTAM 3375 MG: 3; .375 INJECTION, POWDER, LYOPHILIZED, FOR SOLUTION INTRAVENOUS at 05:48

## 2025-06-19 RX ADMIN — PIPERACILLIN AND TAZOBACTAM 3375 MG: 3; .375 INJECTION, POWDER, LYOPHILIZED, FOR SOLUTION INTRAVENOUS at 22:18

## 2025-06-19 RX ADMIN — TAMSULOSIN HYDROCHLORIDE 0.4 MG: 0.4 CAPSULE ORAL at 13:48

## 2025-06-19 RX ADMIN — SODIUM CHLORIDE, PRESERVATIVE FREE 10 ML: 5 INJECTION INTRAVENOUS at 13:55

## 2025-06-19 RX ADMIN — ALLOPURINOL 300 MG: 300 TABLET ORAL at 13:48

## 2025-06-19 RX ADMIN — Medication 6 MG: at 22:16

## 2025-06-19 RX ADMIN — ONDANSETRON 4 MG: 2 INJECTION, SOLUTION INTRAMUSCULAR; INTRAVENOUS at 09:11

## 2025-06-19 RX ADMIN — FUROSEMIDE 40 MG: 10 INJECTION, SOLUTION INTRAMUSCULAR; INTRAVENOUS at 18:35

## 2025-06-19 ASSESSMENT — PAIN SCALES - GENERAL
PAINLEVEL_OUTOF10: 2
PAINLEVEL_OUTOF10: 2
PAINLEVEL_OUTOF10: 0
PAINLEVEL_OUTOF10: 5

## 2025-06-19 ASSESSMENT — PAIN DESCRIPTION - ORIENTATION
ORIENTATION: RIGHT
ORIENTATION: RIGHT;UPPER

## 2025-06-19 ASSESSMENT — PAIN - FUNCTIONAL ASSESSMENT
PAIN_FUNCTIONAL_ASSESSMENT: PREVENTS OR INTERFERES SOME ACTIVE ACTIVITIES AND ADLS
PAIN_FUNCTIONAL_ASSESSMENT: ACTIVITIES ARE NOT PREVENTED

## 2025-06-19 ASSESSMENT — PAIN DESCRIPTION - ONSET: ONSET: ON-GOING

## 2025-06-19 ASSESSMENT — PAIN DESCRIPTION - FREQUENCY: FREQUENCY: CONTINUOUS

## 2025-06-19 ASSESSMENT — PAIN DESCRIPTION - DESCRIPTORS
DESCRIPTORS: ACHING
DESCRIPTORS: SHARP

## 2025-06-19 ASSESSMENT — PAIN DESCRIPTION - LOCATION
LOCATION: ABDOMEN
LOCATION: ABDOMEN

## 2025-06-19 ASSESSMENT — PAIN DESCRIPTION - PAIN TYPE: TYPE: ACUTE PAIN

## 2025-06-19 NOTE — ED NOTES
Daughter Elizabeth and Wife Sherie notified the pt will be trasferred to [facility] and pickup time will be 0830.

## 2025-06-19 NOTE — ED NOTES
EMS requesting Zofran as pt gets nauseated during movement in vehicles. Dr Toscano advised and verbal ok to give.

## 2025-06-19 NOTE — ED NOTES
[facility] arrival. Report given. Assisted with transfer to EMS cot. Belongings bagged and with pt. [facility] left with pt

## 2025-06-20 ENCOUNTER — ANESTHESIA (OUTPATIENT)
Dept: OPERATING ROOM | Age: 80
DRG: 417 | End: 2025-06-20
Payer: MEDICARE

## 2025-06-20 ENCOUNTER — APPOINTMENT (OUTPATIENT)
Dept: GENERAL RADIOLOGY | Age: 80
DRG: 417 | End: 2025-06-20
Attending: INTERNAL MEDICINE
Payer: MEDICARE

## 2025-06-20 ENCOUNTER — APPOINTMENT (OUTPATIENT)
Dept: MRI IMAGING | Age: 80
DRG: 417 | End: 2025-06-20
Attending: INTERNAL MEDICINE
Payer: MEDICARE

## 2025-06-20 ENCOUNTER — ANESTHESIA EVENT (OUTPATIENT)
Dept: OPERATING ROOM | Age: 80
DRG: 417 | End: 2025-06-20
Payer: MEDICARE

## 2025-06-20 ENCOUNTER — APPOINTMENT (OUTPATIENT)
Age: 80
DRG: 417 | End: 2025-06-20
Attending: INTERNAL MEDICINE
Payer: MEDICARE

## 2025-06-20 PROBLEM — E43 SEVERE MALNUTRITION: Status: ACTIVE | Noted: 2025-06-20

## 2025-06-20 LAB
ALBUMIN SERPL-MCNC: 2.9 G/DL (ref 3.5–5.2)
ALBUMIN/GLOB SERPL: 0.8 {RATIO} (ref 1–2.5)
ALP SERPL-CCNC: 173 U/L (ref 40–129)
ALT SERPL-CCNC: 18 U/L (ref 10–50)
ANION GAP SERPL CALCULATED.3IONS-SCNC: 12 MMOL/L (ref 9–16)
AST SERPL-CCNC: 34 U/L (ref 10–50)
BASOPHILS # BLD: 0.1 K/UL (ref 0–0.2)
BASOPHILS NFR BLD: 1 % (ref 0–2)
BILIRUB DIRECT SERPL-MCNC: 1 MG/DL (ref 0–0.2)
BILIRUB INDIRECT SERPL-MCNC: 1.2 MG/DL
BILIRUB SERPL-MCNC: 2.3 MG/DL (ref 0–1.2)
BNP SERPL-MCNC: ABNORMAL PG/ML (ref 0–450)
BUN SERPL-MCNC: 23 MG/DL (ref 8–23)
CALCIUM SERPL-MCNC: 8.5 MG/DL (ref 8.8–10.2)
CHLORIDE SERPL-SCNC: 99 MMOL/L (ref 98–107)
CHOLEST SERPL-MCNC: 76 MG/DL (ref 0–199)
CHOLESTEROL/HDL RATIO: 2.2
CO2 SERPL-SCNC: 27 MMOL/L (ref 20–31)
CREAT SERPL-MCNC: 1.1 MG/DL (ref 0.7–1.2)
ECHO BSA: 2.12 M2
ECHO EST RA PRESSURE: 3 MMHG
ECHO LV EDV A2C: 104 ML
ECHO LV EDV A4C: 175 ML
ECHO LV EDV INDEX A4C: 85 ML/M2
ECHO LV EDV NDEX A2C: 50 ML/M2
ECHO LV EF PHYSICIAN: 45 %
ECHO LV EJECTION FRACTION A2C: 44 %
ECHO LV EJECTION FRACTION A4C: 51 %
ECHO LV EJECTION FRACTION BIPLANE: 50 % (ref 55–100)
ECHO LV ESV A2C: 58 ML
ECHO LV ESV A4C: 85 ML
ECHO LV ESV INDEX A2C: 28 ML/M2
ECHO LV ESV INDEX A4C: 41 ML/M2
EOSINOPHIL # BLD: 0.39 K/UL (ref 0–0.44)
EOSINOPHILS RELATIVE PERCENT: 4 % (ref 1–4)
ERYTHROCYTE [DISTWIDTH] IN BLOOD BY AUTOMATED COUNT: 24.4 % (ref 11.8–14.4)
GFR, ESTIMATED: 67 ML/MIN/1.73M2
GLUCOSE SERPL-MCNC: 118 MG/DL (ref 82–115)
HCT VFR BLD AUTO: 27.6 % (ref 40.7–50.3)
HDLC SERPL-MCNC: 35 MG/DL
HGB BLD-MCNC: 8.5 G/DL (ref 13–17)
IMM GRANULOCYTES # BLD AUTO: 0 K/UL (ref 0–0.3)
IMM GRANULOCYTES NFR BLD: 0 %
INR PPP: 3.7
LDLC SERPL CALC-MCNC: 27 MG/DL (ref 0–100)
LYMPHOCYTES NFR BLD: 0.87 K/UL (ref 1.1–3.7)
LYMPHOCYTES RELATIVE PERCENT: 9 % (ref 24–43)
MAGNESIUM SERPL-MCNC: 1.8 MG/DL (ref 1.6–2.4)
MCH RBC QN AUTO: 23 PG (ref 25.2–33.5)
MCHC RBC AUTO-ENTMCNC: 30.8 G/DL (ref 28.4–34.8)
MCV RBC AUTO: 74.6 FL (ref 82.6–102.9)
MICROORGANISM SPEC CULT: NORMAL
MONOCYTES NFR BLD: 0.68 K/UL (ref 0.1–1.2)
MONOCYTES NFR BLD: 7 % (ref 3–12)
MORPHOLOGY: ABNORMAL
MORPHOLOGY: ABNORMAL
NEUTROPHILS NFR BLD: 79 % (ref 36–65)
NEUTS SEG NFR BLD: 7.66 K/UL (ref 1.5–8.1)
NRBC BLD-RTO: 0.2 PER 100 WBC
PLATELET # BLD AUTO: 134 K/UL (ref 138–453)
PMV BLD AUTO: ABNORMAL FL (ref 8.1–13.5)
POTASSIUM SERPL-SCNC: 4.1 MMOL/L (ref 3.7–5.3)
PROT SERPL-MCNC: 6.5 G/DL (ref 6.6–8.7)
PROTHROMBIN TIME: 38.2 SEC (ref 11.5–14.2)
RBC # BLD AUTO: 3.7 M/UL (ref 4.21–5.77)
SODIUM SERPL-SCNC: 139 MMOL/L (ref 136–145)
SPECIMEN DESCRIPTION: NORMAL
TRIGL SERPL-MCNC: 70 MG/DL
VLDLC SERPL CALC-MCNC: 14 MG/DL (ref 1–30)
WBC OTHER # BLD: 9.7 K/UL (ref 3.5–11.3)

## 2025-06-20 PROCEDURE — 6370000000 HC RX 637 (ALT 250 FOR IP)

## 2025-06-20 PROCEDURE — A9579 GAD-BASE MR CONTRAST NOS,1ML: HCPCS | Performed by: NURSE PRACTITIONER

## 2025-06-20 PROCEDURE — 1200000000 HC SEMI PRIVATE

## 2025-06-20 PROCEDURE — 2580000003 HC RX 258

## 2025-06-20 PROCEDURE — 2500000003 HC RX 250 WO HCPCS: Performed by: NURSE PRACTITIONER

## 2025-06-20 PROCEDURE — 99232 SBSQ HOSP IP/OBS MODERATE 35: CPT

## 2025-06-20 PROCEDURE — 80076 HEPATIC FUNCTION PANEL: CPT

## 2025-06-20 PROCEDURE — 71045 X-RAY EXAM CHEST 1 VIEW: CPT

## 2025-06-20 PROCEDURE — 83735 ASSAY OF MAGNESIUM: CPT

## 2025-06-20 PROCEDURE — 2580000003 HC RX 258: Performed by: NURSE PRACTITIONER

## 2025-06-20 PROCEDURE — 74183 MRI ABD W/O CNTR FLWD CNTR: CPT

## 2025-06-20 PROCEDURE — 36415 COLL VENOUS BLD VENIPUNCTURE: CPT

## 2025-06-20 PROCEDURE — 6360000002 HC RX W HCPCS: Performed by: INTERNAL MEDICINE

## 2025-06-20 PROCEDURE — 6360000002 HC RX W HCPCS: Performed by: NURSE PRACTITIONER

## 2025-06-20 PROCEDURE — 85025 COMPLETE CBC W/AUTO DIFF WBC: CPT

## 2025-06-20 PROCEDURE — 85610 PROTHROMBIN TIME: CPT

## 2025-06-20 PROCEDURE — 6370000000 HC RX 637 (ALT 250 FOR IP): Performed by: NURSE PRACTITIONER

## 2025-06-20 PROCEDURE — 80048 BASIC METABOLIC PNL TOTAL CA: CPT

## 2025-06-20 PROCEDURE — 83880 ASSAY OF NATRIURETIC PEPTIDE: CPT

## 2025-06-20 PROCEDURE — 6360000004 HC RX CONTRAST MEDICATION: Performed by: NURSE PRACTITIONER

## 2025-06-20 PROCEDURE — 93308 TTE F-UP OR LMTD: CPT

## 2025-06-20 PROCEDURE — 6370000000 HC RX 637 (ALT 250 FOR IP): Performed by: INTERNAL MEDICINE

## 2025-06-20 PROCEDURE — 99232 SBSQ HOSP IP/OBS MODERATE 35: CPT | Performed by: INTERNAL MEDICINE

## 2025-06-20 PROCEDURE — 80061 LIPID PANEL: CPT

## 2025-06-20 PROCEDURE — 93308 TTE F-UP OR LMTD: CPT | Performed by: STUDENT IN AN ORGANIZED HEALTH CARE EDUCATION/TRAINING PROGRAM

## 2025-06-20 RX ORDER — MAGNESIUM SULFATE IN WATER 40 MG/ML
2000 INJECTION, SOLUTION INTRAVENOUS ONCE
Status: COMPLETED | OUTPATIENT
Start: 2025-06-20 | End: 2025-06-20

## 2025-06-20 RX ORDER — GADOTERIDOL 279.3 MG/ML
19 INJECTION INTRAVENOUS
Status: COMPLETED | OUTPATIENT
Start: 2025-06-20 | End: 2025-06-20

## 2025-06-20 RX ORDER — MORPHINE SULFATE 2 MG/ML
1 INJECTION, SOLUTION INTRAMUSCULAR; INTRAVENOUS ONCE
Status: COMPLETED | OUTPATIENT
Start: 2025-06-20 | End: 2025-06-20

## 2025-06-20 RX ORDER — OXYCODONE AND ACETAMINOPHEN 5; 325 MG/1; MG/1
1 TABLET ORAL EVERY 4 HOURS PRN
Refills: 0 | Status: DISCONTINUED | OUTPATIENT
Start: 2025-06-20 | End: 2025-07-02 | Stop reason: HOSPADM

## 2025-06-20 RX ORDER — SENNA AND DOCUSATE SODIUM 50; 8.6 MG/1; MG/1
1 TABLET, FILM COATED ORAL 2 TIMES DAILY
Status: DISCONTINUED | OUTPATIENT
Start: 2025-06-20 | End: 2025-06-26

## 2025-06-20 RX ADMIN — OXYCODONE AND ACETAMINOPHEN 1 TABLET: 5; 325 TABLET ORAL at 20:45

## 2025-06-20 RX ADMIN — ALLOPURINOL 300 MG: 300 TABLET ORAL at 09:13

## 2025-06-20 RX ADMIN — SERTRALINE 25 MG: 25 TABLET, FILM COATED ORAL at 09:13

## 2025-06-20 RX ADMIN — SODIUM CHLORIDE, PRESERVATIVE FREE 10 ML: 5 INJECTION INTRAVENOUS at 09:16

## 2025-06-20 RX ADMIN — FUROSEMIDE 40 MG: 10 INJECTION, SOLUTION INTRAMUSCULAR; INTRAVENOUS at 17:35

## 2025-06-20 RX ADMIN — PIPERACILLIN AND TAZOBACTAM 3375 MG: 3; .375 INJECTION, POWDER, LYOPHILIZED, FOR SOLUTION INTRAVENOUS at 14:05

## 2025-06-20 RX ADMIN — ATORVASTATIN CALCIUM 40 MG: 40 TABLET, FILM COATED ORAL at 09:13

## 2025-06-20 RX ADMIN — SENNOSIDES AND DOCUSATE SODIUM 1 TABLET: 50; 8.6 TABLET ORAL at 23:11

## 2025-06-20 RX ADMIN — PIPERACILLIN AND TAZOBACTAM 3375 MG: 3; .375 INJECTION, POWDER, LYOPHILIZED, FOR SOLUTION INTRAVENOUS at 22:40

## 2025-06-20 RX ADMIN — SODIUM CHLORIDE: 0.9 INJECTION, SOLUTION INTRAVENOUS at 02:20

## 2025-06-20 RX ADMIN — GADOTERIDOL 19 ML: 279.3 INJECTION, SOLUTION INTRAVENOUS at 16:15

## 2025-06-20 RX ADMIN — MORPHINE SULFATE 1 MG: 2 INJECTION, SOLUTION INTRAMUSCULAR; INTRAVENOUS at 07:30

## 2025-06-20 RX ADMIN — FUROSEMIDE 40 MG: 10 INJECTION, SOLUTION INTRAMUSCULAR; INTRAVENOUS at 09:13

## 2025-06-20 RX ADMIN — MAGNESIUM SULFATE HEPTAHYDRATE 2000 MG: 40 INJECTION, SOLUTION INTRAVENOUS at 10:36

## 2025-06-20 RX ADMIN — PIPERACILLIN AND TAZOBACTAM 3375 MG: 3; .375 INJECTION, POWDER, LYOPHILIZED, FOR SOLUTION INTRAVENOUS at 06:08

## 2025-06-20 RX ADMIN — TAMSULOSIN HYDROCHLORIDE 0.4 MG: 0.4 CAPSULE ORAL at 09:13

## 2025-06-20 RX ADMIN — SODIUM CHLORIDE, PRESERVATIVE FREE 10 ML: 5 INJECTION INTRAVENOUS at 20:45

## 2025-06-20 ASSESSMENT — PAIN DESCRIPTION - ORIENTATION
ORIENTATION: RIGHT
ORIENTATION: RIGHT;UPPER

## 2025-06-20 ASSESSMENT — PAIN SCALES - GENERAL
PAINLEVEL_OUTOF10: 0
PAINLEVEL_OUTOF10: 5
PAINLEVEL_OUTOF10: 3

## 2025-06-20 ASSESSMENT — PAIN DESCRIPTION - LOCATION
LOCATION: ABDOMEN
LOCATION: ABDOMEN

## 2025-06-20 ASSESSMENT — PAIN DESCRIPTION - DESCRIPTORS
DESCRIPTORS: DULL;ACHING;OTHER (COMMENT)
DESCRIPTORS: ACHING;SHARP

## 2025-06-20 NOTE — ACP (ADVANCE CARE PLANNING)
Advance Care Planning     Advance Care Planning Activator (Inpatient)  Conversation Note      Date of ACP Conversation: 6/20/2025     Conversation Conducted with: Patient with Decision Making Capacity    ACP Activator: FRANTZ MILIAN RN    Health Care Decision Maker:     Current Designated Health Care Decision Maker:     Primary Decision Maker (Active): Sherie Wray - Spouse - [phone]    Secondary Decision Maker: Elizabeth Kapadia - Child - [phone]  Click here to complete Healthcare Decision Makers including section of the Healthcare Decision Maker Relationship (ie \"Primary\")  Today we documented Decision Maker(s) consistent with Legal Next of Kin hierarchy.    Care Preferences    Ventilation:  \"If you were in your present state of health and suddenly became very ill and were unable to breathe on your own, what would your preference be about the use of a ventilator (breathing machine) if it were available to you?\"      Would the patient desire the use of ventilator (breathing machine)?: yes    \"If your health worsens and it becomes clear that your chance of recovery is unlikely, what would your preference be about the use of a ventilator (breathing machine) if it were available to you?\"     Would the patient desire the use of ventilator (breathing machine)?: No      Resuscitation  \"CPR works best to restart the heart when there is a sudden event, like a heart attack, in someone who is otherwise healthy. Unfortunately, CPR does not typically restart the heart for people who have serious health conditions or who are very sick.\"    \"In the event your heart stopped as a result of an underlying serious health condition, would you want attempts to be made to restart your heart (answer \"yes\" for attempt to resuscitate) or would you prefer a natural death (answer \"no\" for do not attempt to resuscitate)?\" yes       [] Yes   [] No   Educated Patient / Decision Maker regarding differences between Advance

## 2025-06-20 NOTE — ANESTHESIA PRE PROCEDURE
>150.0 05/29/2025 12:07 PM       HCG (If Applicable): No results found for: \"PREGTESTUR\", \"PREGSERUM\", \"HCG\", \"HCGQUANT\"     ABGs: No results found for: \"PHART\", \"PO2ART\", \"BXX2MJH\", \"FSO1FYE\", \"BEART\", \"P6NSALBX\"     Type & Screen (If Applicable):  Lab Results   Component Value Date    ABORH A POSITIVE 06/17/2025    LABANTI NEGATIVE 06/17/2025       Drug/Infectious Status (If Applicable):  No results found for: \"HIV\", \"HEPCAB\"    COVID-19 Screening (If Applicable):   Lab Results   Component Value Date/Time    COVID19 Not Detected 06/29/2022 05:55 AM           Anesthesia Evaluation  Patient summary reviewed and Nursing notes reviewed   no history of anesthetic complications:   Airway:           Dental:          Pulmonary:                              Cardiovascular:  Exercise tolerance: poor (<4 METS)  (+) hypertension:, valvular problems/murmurs: AI, pacemaker:, past MI:, CAD:, dysrhythmias: atrial fibrillation, CHF: systolic, diastolic and no interval change, hyperlipidemia         Beta Blocker:  Dose within 24 Hrs      ROS comment: EKG  Electronic AV pacemaker 87 bpm     BIRD 6/3/2025  No left atrial appendage thrombus  No interatrial septal defect  Thickened tricuspid aortic valve with mild aortic insufficiency     Echocardiogram 5/30/2025  Mild reduced LV systolic function with EF 40-45%     Cardiac catheterization 5/30/2025  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                 Abdominal:             Vascular:          Other Findings:             Anesthesia Plan        Vernon Salter MD   6/20/2025

## 2025-06-20 NOTE — FLOWSHEET NOTE
Initially wanted to take patient to OR, but then INR elevated, thus will be on hold.   MRCP later this afternoon.   ERCP Monday assuming patient does not develop signs suggesting cholangitis.   If becomes unstable, will reverse INR and proceed with more urgent ERCP.

## 2025-06-20 NOTE — CARE COORDINATION
Case Management Assessment  Initial Evaluation    Date/Time of Evaluation: 6/20/2025 12:20 PM  Assessment Completed by: FRANTZ MILIAN RN    If patient is discharged prior to next notation, then this note serves as note for discharge by case management.    Patient Name: Quique Wray                   YOB: 1945  Diagnosis: Acute cholecystitis [K81.0]                   Date / Time: 6/19/2025 11:14 AM    Patient Admission Status: Inpatient   Readmission Risk (Low < 19, Mod (19-27), High > 27): Readmission Risk Score: 28    Current PCP: Vernon Cade MD  PCP verified by CM? Yes    Chart Reviewed: Yes      History Provided by: Patient, Spouse  Patient Orientation: Alert and Oriented    Patient Cognition: Alert    Hospitalization in the last 30 days (Readmission):  Yes    If yes, Readmission Assessment in  Navigator will be completed.    Advance Directives:      Code Status: Full Code   Patient's Primary Decision Maker is: Legal Next of Kin    Primary Decision Maker (Active): Sherie Wray - Spouse - [phone]    Secondary Decision Maker: Elizabeth Kapadia - Child - [phone]    Discharge Planning:    Patient lives with: Spouse/Significant Other Type of Home: House  Primary Care Giver: Spouse  Patient Support Systems include: Spouse/Significant Other, Family Members, Children   Current Financial resources: Medicare  Current community resources: ECF/Home Care  Current services prior to admission: Durable Medical Equipment            Current DME: Cane, Walker, Shower Chair, Other (Comment) (stair lift and grab bars. current with [facility] for home care.)            Type of Home Care services:  PT, OT, Nursing Services    ADLS  Prior functional level: Assistance with the following:, Shopping, Housework, Cooking, Bathing  Current functional level: Assistance with the following:, Cooking, Housework, Shopping, Bathing    PT AM-PAC:   /24  OT AM-PAC:   /24    Family can provide assistance at

## 2025-06-20 NOTE — CARE COORDINATION
Name: Quique Wray : 1945 (80 yrs)   Address: [address] [place] [address] Sex: Male   City: [place] [address]         Marital Status:    Employer: "[employer]         Yarsanism: Congregational   Primary Care Provider: Vernon Cade MD         Primary Phone: [phone]   EMERGENCY CONTACT   Name Home Phone Work Phone Mobile Phone Relationship Lgl Grd   KATELYN WRAY     [phone] Spouse     ELBERT VANN [phone] Child           GUARANTOR            Guarantor: Quique Wray     : 1945   Address: [address] [place] [address] Sex: [place] [address]     Relation to Patient: Self       Home Phone: [phone]   Guarantor ID: [id number]       Work Phone:     Guarantor Employer: "[employer]         Status: RETIRED      COVERAGE        PRIMARY INSURANCE   Payor: MEDICARE Plan: [employer] MEDICARE   Payor Address: [place] [address],  [place] [address]       Group Number:   Insurance Type: INDEMNITY   Subscriber Name: QUIQUE WRAY Subscriber : 1945   Subscriber ID: [id number] Pat. Rel. to Sub: Self   SECONDARY INSURANCE   Payor: AETNA Plan: AETNA SENIOR MEDICARE BAIN*   Payor Address:  [facility] [id number], [place] [address]          Group Number:   Insurance Type: INDEMNITY   Subscriber Name: QUIQUE WRAY Subscriber : 1945   Subscriber ID: [id number] Pat. Rel. to Sub: SELF

## 2025-06-20 NOTE — CARE COORDINATION
06/20/25 0920   Readmission Assessment   Number of Days since last admission? 1-7 days   Previous Disposition Home with Home Health   Who is being Interviewed Patient   What was the patient's/caregiver's perception as to why they think they needed to return back to the hospital? Other (Comment)  (pain)   Did you visit your Primary Care Physician after you left the hospital, before you returned this time? Yes   Did you see a specialist, such as Cardiac, Pulmonary, Orthopedic Physician, etc. after you left the hospital? Yes   Who advised the patient to return to the hospital? Physician   Does the patient report anything that got in the way of taking their medications? No   In our efforts to provide the best possible care to you and others like you, can you think of anything that we could have done to help you after you left the hospital the first time, so that you might not have needed to return so soon? Other (Comment)  (none)

## 2025-06-21 LAB
ALBUMIN SERPL-MCNC: 2.7 G/DL (ref 3.5–5.2)
ALBUMIN/GLOB SERPL: 0.7 {RATIO} (ref 1–2.5)
ALP SERPL-CCNC: 209 U/L (ref 40–129)
ALT SERPL-CCNC: 31 U/L (ref 10–50)
ANION GAP SERPL CALCULATED.3IONS-SCNC: 12 MMOL/L (ref 9–16)
AST SERPL-CCNC: 59 U/L (ref 10–50)
BILIRUB DIRECT SERPL-MCNC: 0.9 MG/DL (ref 0–0.2)
BILIRUB INDIRECT SERPL-MCNC: 1 MG/DL
BILIRUB SERPL-MCNC: 1.9 MG/DL (ref 0–1.2)
BUN SERPL-MCNC: 28 MG/DL (ref 8–23)
CALCIUM SERPL-MCNC: 8.3 MG/DL (ref 8.8–10.2)
CHLORIDE SERPL-SCNC: 97 MMOL/L (ref 98–107)
CO2 SERPL-SCNC: 28 MMOL/L (ref 20–31)
CREAT SERPL-MCNC: 1.2 MG/DL (ref 0.7–1.2)
GFR, ESTIMATED: 64 ML/MIN/1.73M2
GLUCOSE SERPL-MCNC: 120 MG/DL (ref 82–115)
INR PPP: 3.5
MAGNESIUM SERPL-MCNC: 2.1 MG/DL (ref 1.6–2.4)
POTASSIUM SERPL-SCNC: 3.8 MMOL/L (ref 3.7–5.3)
PROT SERPL-MCNC: 6.5 G/DL (ref 6.6–8.7)
PROTHROMBIN TIME: 36.3 SEC (ref 11.5–14.2)
SODIUM SERPL-SCNC: 137 MMOL/L (ref 136–145)

## 2025-06-21 PROCEDURE — 83735 ASSAY OF MAGNESIUM: CPT

## 2025-06-21 PROCEDURE — 97162 PT EVAL MOD COMPLEX 30 MIN: CPT

## 2025-06-21 PROCEDURE — 2580000003 HC RX 258: Performed by: SURGERY

## 2025-06-21 PROCEDURE — 99232 SBSQ HOSP IP/OBS MODERATE 35: CPT | Performed by: INTERNAL MEDICINE

## 2025-06-21 PROCEDURE — 6370000000 HC RX 637 (ALT 250 FOR IP)

## 2025-06-21 PROCEDURE — 2580000003 HC RX 258: Performed by: NURSE PRACTITIONER

## 2025-06-21 PROCEDURE — 99232 SBSQ HOSP IP/OBS MODERATE 35: CPT

## 2025-06-21 PROCEDURE — 97535 SELF CARE MNGMENT TRAINING: CPT

## 2025-06-21 PROCEDURE — 97530 THERAPEUTIC ACTIVITIES: CPT

## 2025-06-21 PROCEDURE — 97116 GAIT TRAINING THERAPY: CPT

## 2025-06-21 PROCEDURE — 6370000000 HC RX 637 (ALT 250 FOR IP): Performed by: NURSE PRACTITIONER

## 2025-06-21 PROCEDURE — 6370000000 HC RX 637 (ALT 250 FOR IP): Performed by: INTERNAL MEDICINE

## 2025-06-21 PROCEDURE — 80048 BASIC METABOLIC PNL TOTAL CA: CPT

## 2025-06-21 PROCEDURE — 1200000000 HC SEMI PRIVATE

## 2025-06-21 PROCEDURE — 2500000003 HC RX 250 WO HCPCS: Performed by: NURSE PRACTITIONER

## 2025-06-21 PROCEDURE — 6360000002 HC RX W HCPCS: Performed by: NURSE PRACTITIONER

## 2025-06-21 PROCEDURE — 6360000002 HC RX W HCPCS: Performed by: SURGERY

## 2025-06-21 PROCEDURE — 36415 COLL VENOUS BLD VENIPUNCTURE: CPT

## 2025-06-21 PROCEDURE — 97166 OT EVAL MOD COMPLEX 45 MIN: CPT

## 2025-06-21 PROCEDURE — 85610 PROTHROMBIN TIME: CPT

## 2025-06-21 PROCEDURE — 80076 HEPATIC FUNCTION PANEL: CPT

## 2025-06-21 RX ORDER — FUROSEMIDE 10 MG/ML
20 INJECTION INTRAMUSCULAR; INTRAVENOUS DAILY
Status: DISCONTINUED | OUTPATIENT
Start: 2025-06-22 | End: 2025-06-26

## 2025-06-21 RX ORDER — POTASSIUM CHLORIDE 1500 MG/1
20 TABLET, EXTENDED RELEASE ORAL ONCE
Status: COMPLETED | OUTPATIENT
Start: 2025-06-21 | End: 2025-06-21

## 2025-06-21 RX ADMIN — SERTRALINE 25 MG: 25 TABLET, FILM COATED ORAL at 09:32

## 2025-06-21 RX ADMIN — ATORVASTATIN CALCIUM 40 MG: 40 TABLET, FILM COATED ORAL at 09:32

## 2025-06-21 RX ADMIN — SENNOSIDES AND DOCUSATE SODIUM 1 TABLET: 50; 8.6 TABLET ORAL at 21:07

## 2025-06-21 RX ADMIN — SODIUM CHLORIDE, PRESERVATIVE FREE 10 ML: 5 INJECTION INTRAVENOUS at 09:33

## 2025-06-21 RX ADMIN — TAMSULOSIN HYDROCHLORIDE 0.4 MG: 0.4 CAPSULE ORAL at 09:33

## 2025-06-21 RX ADMIN — PANTOPRAZOLE SODIUM 40 MG: 40 TABLET, DELAYED RELEASE ORAL at 05:23

## 2025-06-21 RX ADMIN — FUROSEMIDE 40 MG: 10 INJECTION, SOLUTION INTRAMUSCULAR; INTRAVENOUS at 09:33

## 2025-06-21 RX ADMIN — PIPERACILLIN AND TAZOBACTAM 3375 MG: 3; .375 INJECTION, POWDER, LYOPHILIZED, FOR SOLUTION INTRAVENOUS at 05:30

## 2025-06-21 RX ADMIN — POLYETHYLENE GLYCOL 3350 17 G: 17 POWDER, FOR SOLUTION ORAL at 09:32

## 2025-06-21 RX ADMIN — METOPROLOL TARTRATE 25 MG: 25 TABLET, FILM COATED ORAL at 09:33

## 2025-06-21 RX ADMIN — PIPERACILLIN AND TAZOBACTAM 3375 MG: 3; .375 INJECTION, POWDER, LYOPHILIZED, FOR SOLUTION INTRAVENOUS at 13:46

## 2025-06-21 RX ADMIN — ALLOPURINOL 300 MG: 300 TABLET ORAL at 09:32

## 2025-06-21 RX ADMIN — PIPERACILLIN AND TAZOBACTAM 3375 MG: 3; .375 INJECTION, POWDER, LYOPHILIZED, FOR SOLUTION INTRAVENOUS at 22:16

## 2025-06-21 RX ADMIN — PHYTONADIONE 10 MG: 10 INJECTION, EMULSION INTRAMUSCULAR; INTRAVENOUS; SUBCUTANEOUS at 09:38

## 2025-06-21 RX ADMIN — POTASSIUM CHLORIDE 20 MEQ: 1500 TABLET, EXTENDED RELEASE ORAL at 18:15

## 2025-06-21 RX ADMIN — SENNOSIDES AND DOCUSATE SODIUM 1 TABLET: 50; 8.6 TABLET ORAL at 09:32

## 2025-06-21 RX ADMIN — SODIUM CHLORIDE, PRESERVATIVE FREE 10 ML: 5 INJECTION INTRAVENOUS at 21:07

## 2025-06-21 RX ADMIN — Medication 6 MG: at 21:07

## 2025-06-21 ASSESSMENT — PAIN DESCRIPTION - ORIENTATION
ORIENTATION: RIGHT;UPPER
ORIENTATION: RIGHT;UPPER

## 2025-06-21 ASSESSMENT — PAIN DESCRIPTION - DESCRIPTORS
DESCRIPTORS: DULL
DESCRIPTORS: DULL

## 2025-06-21 ASSESSMENT — PAIN SCALES - GENERAL
PAINLEVEL_OUTOF10: 2

## 2025-06-21 ASSESSMENT — PAIN DESCRIPTION - LOCATION
LOCATION: ABDOMEN
LOCATION: ABDOMEN

## 2025-06-21 NOTE — CARE COORDINATION
Discharge planning    Per careport no response from [facility]. Sent over in [facility] with social work office number.     [facility]  Phone [phone]  Fax [phone]

## 2025-06-21 NOTE — FLOWSHEET NOTE
06/21/25 1520   Vital Signs   /60     Dr Oneill with Cardio notified. States to hold PM dose of IV Lasix.

## 2025-06-21 NOTE — CARE COORDINATION
Patient Class: I Private Enc?  No Unit RM BD: MAURA PROG 1022/1022-01   Hospital Service: MED   Encounter DX: Acute cholecystitis [K81*   ADM Provider: Mohan Jon DO   Procedure:     ATT Provider: Mohan Jon DO   REF Provider: Bianca Werner      Admission DX: Acute cholecystitis and DX codes: K81.0      PATIENT             Name: Quique Wray : 1945 (80 yrs)   Address: [address] [place] [address] Sex: Male   City: [place] [address]         Marital Status:    Employer: [employer]         Holiness: Druze   Primary Care Provider: Vernon Cade MD         Primary Phone: [phone]   EMERGENCY CONTACT   Name Home Phone Work Phone Mobile Phone Relationship Lgl Grd   KATELYN WRAY     [phone] Spouse     ELBERT VANN [phone] Child           GUARANTOR            Guarantor: Quique Wray     : 1945   Address: [address] [place] [address] Sex: [place] [address]     Relation to Patient: Self       Home Phone: [phone]   Guarantor ID: [id number]       Work Phone:     Guarantor Employer: [employer]         Status: RETIRED      COVERAGE        PRIMARY INSURANCE   Payor: MEDICARE Plan: RAILROAD MEDICARE   Payor Address: [place] [id number],  [place] [id number]       Group Number:   Insurance Type: INDEMNITY   Subscriber Name: QUIQUE WRAY Subscriber : 1945   Subscriber ID: [id number] Pat. Rel. to Sub: Self   SECONDARY INSURANCE   Payor: AETNA Plan: AETNA SENIOR MEDICARE BAIN*   Payor Address:  [facility] [id number], [place] [address]          Group Number:   Insurance Type: INDEMNITY   Subscriber Name: QUIQUE WRAY Subscriber : 1945   Subscriber ID: [id number] Pat. Rel. to Sub: SELF

## 2025-06-22 LAB
ANION GAP SERPL CALCULATED.3IONS-SCNC: 12 MMOL/L (ref 9–16)
ANTI-XA UNFRAC HEPARIN: 0.26 IU/L (ref 0.3–0.7)
ANTI-XA UNFRAC HEPARIN: 0.34 IU/L (ref 0.3–0.7)
ANTI-XA UNFRAC HEPARIN: <0.1 IU/L (ref 0.3–0.7)
BNP SERPL-MCNC: 8276 PG/ML (ref 0–450)
BUN SERPL-MCNC: 27 MG/DL (ref 8–23)
CALCIUM SERPL-MCNC: 8.7 MG/DL (ref 8.8–10.2)
CHLORIDE SERPL-SCNC: 94 MMOL/L (ref 98–107)
CO2 SERPL-SCNC: 28 MMOL/L (ref 20–31)
CREAT SERPL-MCNC: 1 MG/DL (ref 0.7–1.2)
ERYTHROCYTE [DISTWIDTH] IN BLOOD BY AUTOMATED COUNT: 22.9 % (ref 11.8–14.4)
GFR, ESTIMATED: 74 ML/MIN/1.73M2
GLUCOSE SERPL-MCNC: 122 MG/DL (ref 82–115)
HCT VFR BLD AUTO: 24.3 % (ref 40.7–50.3)
HGB BLD-MCNC: 7.8 G/DL (ref 13–17)
INR PPP: 1.3
MAGNESIUM SERPL-MCNC: 1.9 MG/DL (ref 1.6–2.4)
MCH RBC QN AUTO: 23.2 PG (ref 25.2–33.5)
MCHC RBC AUTO-ENTMCNC: 32.1 G/DL (ref 28.4–34.8)
MCV RBC AUTO: 72.3 FL (ref 82.6–102.9)
NRBC BLD-RTO: 0 PER 100 WBC
PARTIAL THROMBOPLASTIN TIME: 45.2 SEC (ref 23.9–33.8)
PLATELET # BLD AUTO: 143 K/UL (ref 138–453)
PMV BLD AUTO: ABNORMAL FL (ref 8.1–13.5)
POTASSIUM SERPL-SCNC: 3.8 MMOL/L (ref 3.7–5.3)
PROTHROMBIN TIME: 16.8 SEC (ref 11.5–14.2)
RBC # BLD AUTO: 3.36 M/UL (ref 4.21–5.77)
SODIUM SERPL-SCNC: 133 MMOL/L (ref 136–145)
WBC OTHER # BLD: 7.1 K/UL (ref 3.5–11.3)

## 2025-06-22 PROCEDURE — 6370000000 HC RX 637 (ALT 250 FOR IP)

## 2025-06-22 PROCEDURE — 80048 BASIC METABOLIC PNL TOTAL CA: CPT

## 2025-06-22 PROCEDURE — 6370000000 HC RX 637 (ALT 250 FOR IP): Performed by: INTERNAL MEDICINE

## 2025-06-22 PROCEDURE — 6360000002 HC RX W HCPCS: Performed by: INTERNAL MEDICINE

## 2025-06-22 PROCEDURE — 6360000002 HC RX W HCPCS: Performed by: NURSE PRACTITIONER

## 2025-06-22 PROCEDURE — 85610 PROTHROMBIN TIME: CPT

## 2025-06-22 PROCEDURE — 83880 ASSAY OF NATRIURETIC PEPTIDE: CPT

## 2025-06-22 PROCEDURE — 1200000000 HC SEMI PRIVATE

## 2025-06-22 PROCEDURE — 2580000003 HC RX 258: Performed by: NURSE PRACTITIONER

## 2025-06-22 PROCEDURE — 85730 THROMBOPLASTIN TIME PARTIAL: CPT

## 2025-06-22 PROCEDURE — 6370000000 HC RX 637 (ALT 250 FOR IP): Performed by: NURSE PRACTITIONER

## 2025-06-22 PROCEDURE — 83735 ASSAY OF MAGNESIUM: CPT

## 2025-06-22 PROCEDURE — 99232 SBSQ HOSP IP/OBS MODERATE 35: CPT

## 2025-06-22 PROCEDURE — 2500000003 HC RX 250 WO HCPCS: Performed by: NURSE PRACTITIONER

## 2025-06-22 PROCEDURE — 85027 COMPLETE CBC AUTOMATED: CPT

## 2025-06-22 PROCEDURE — 85520 HEPARIN ASSAY: CPT

## 2025-06-22 PROCEDURE — 36415 COLL VENOUS BLD VENIPUNCTURE: CPT

## 2025-06-22 RX ORDER — HEPARIN SODIUM 1000 [USP'U]/ML
4000 INJECTION, SOLUTION INTRAVENOUS; SUBCUTANEOUS PRN
Status: DISCONTINUED | OUTPATIENT
Start: 2025-06-22 | End: 2025-07-02

## 2025-06-22 RX ORDER — HEPARIN SODIUM 1000 [USP'U]/ML
2000 INJECTION, SOLUTION INTRAVENOUS; SUBCUTANEOUS PRN
Status: DISCONTINUED | OUTPATIENT
Start: 2025-06-22 | End: 2025-07-02

## 2025-06-22 RX ORDER — HEPARIN SODIUM 10000 [USP'U]/100ML
5-30 INJECTION, SOLUTION INTRAVENOUS CONTINUOUS
Status: DISCONTINUED | OUTPATIENT
Start: 2025-06-22 | End: 2025-07-02

## 2025-06-22 RX ORDER — HEPARIN SODIUM 1000 [USP'U]/ML
4000 INJECTION, SOLUTION INTRAVENOUS; SUBCUTANEOUS ONCE
Status: COMPLETED | OUTPATIENT
Start: 2025-06-22 | End: 2025-06-22

## 2025-06-22 RX ADMIN — FUROSEMIDE 20 MG: 10 INJECTION, SOLUTION INTRAMUSCULAR; INTRAVENOUS at 09:33

## 2025-06-22 RX ADMIN — SALINE NASAL SPRAY 1 SPRAY: 1.5 SOLUTION NASAL at 19:33

## 2025-06-22 RX ADMIN — TAMSULOSIN HYDROCHLORIDE 0.4 MG: 0.4 CAPSULE ORAL at 09:33

## 2025-06-22 RX ADMIN — SODIUM CHLORIDE, PRESERVATIVE FREE 10 ML: 5 INJECTION INTRAVENOUS at 09:33

## 2025-06-22 RX ADMIN — SENNOSIDES AND DOCUSATE SODIUM 1 TABLET: 50; 8.6 TABLET ORAL at 09:33

## 2025-06-22 RX ADMIN — HEPARIN SODIUM 10 UNITS/KG/HR: 10000 INJECTION, SOLUTION INTRAVENOUS at 09:53

## 2025-06-22 RX ADMIN — HEPARIN SODIUM 4000 UNITS: 1000 INJECTION INTRAVENOUS; SUBCUTANEOUS at 09:51

## 2025-06-22 RX ADMIN — PIPERACILLIN AND TAZOBACTAM 3375 MG: 3; .375 INJECTION, POWDER, LYOPHILIZED, FOR SOLUTION INTRAVENOUS at 05:42

## 2025-06-22 RX ADMIN — HEPARIN SODIUM 2000 UNITS: 1000 INJECTION INTRAVENOUS; SUBCUTANEOUS at 22:10

## 2025-06-22 RX ADMIN — SENNOSIDES AND DOCUSATE SODIUM 1 TABLET: 50; 8.6 TABLET ORAL at 20:51

## 2025-06-22 RX ADMIN — PANTOPRAZOLE SODIUM 40 MG: 40 TABLET, DELAYED RELEASE ORAL at 05:40

## 2025-06-22 RX ADMIN — ALLOPURINOL 300 MG: 300 TABLET ORAL at 09:32

## 2025-06-22 RX ADMIN — PIPERACILLIN AND TAZOBACTAM 3375 MG: 3; .375 INJECTION, POWDER, LYOPHILIZED, FOR SOLUTION INTRAVENOUS at 22:21

## 2025-06-22 RX ADMIN — ATORVASTATIN CALCIUM 40 MG: 40 TABLET, FILM COATED ORAL at 09:33

## 2025-06-22 RX ADMIN — SERTRALINE 25 MG: 25 TABLET, FILM COATED ORAL at 09:33

## 2025-06-22 RX ADMIN — PIPERACILLIN AND TAZOBACTAM 3375 MG: 3; .375 INJECTION, POWDER, LYOPHILIZED, FOR SOLUTION INTRAVENOUS at 15:42

## 2025-06-22 NOTE — CARE COORDINATION
Discharge planning      Chart reviewed. Patient lives with wife in 2 story home. His bed is upstairs but has a stair lift. He has Full bath upstairs and 1/2 bath downstairs. He uses his RW all the time. Also has SC< cane, grab bars.     Met with patient and family to discuss plan of care. Patient family [facility] patient is hesitant. He is agreeable to referral and sent [facility].     [facility] is [facility] with [facility].

## 2025-06-23 ENCOUNTER — ANESTHESIA (OUTPATIENT)
Dept: OPERATING ROOM | Age: 80
End: 2025-06-23
Payer: MEDICARE

## 2025-06-23 ENCOUNTER — APPOINTMENT (OUTPATIENT)
Dept: CT IMAGING | Age: 80
DRG: 417 | End: 2025-06-23
Attending: INTERNAL MEDICINE
Payer: MEDICARE

## 2025-06-23 ENCOUNTER — ANESTHESIA EVENT (OUTPATIENT)
Dept: OPERATING ROOM | Age: 80
End: 2025-06-23
Payer: MEDICARE

## 2025-06-23 LAB
ALBUMIN SERPL-MCNC: 2.7 G/DL (ref 3.5–5.2)
ALBUMIN/GLOB SERPL: 0.9 {RATIO} (ref 1–2.5)
ALP SERPL-CCNC: 157 U/L (ref 40–129)
ALT SERPL-CCNC: 49 U/L (ref 10–50)
ANION GAP SERPL CALCULATED.3IONS-SCNC: 10 MMOL/L (ref 9–16)
ANION GAP SERPL CALCULATED.3IONS-SCNC: 11 MMOL/L (ref 9–16)
ANTI-XA UNFRAC HEPARIN: 0.35 IU/L (ref 0.3–0.7)
ANTI-XA UNFRAC HEPARIN: 0.46 IU/L (ref 0.3–0.7)
AST SERPL-CCNC: 94 U/L (ref 10–50)
BILIRUB DIRECT SERPL-MCNC: 1.5 MG/DL (ref 0–0.2)
BILIRUB INDIRECT SERPL-MCNC: 1.3 MG/DL
BILIRUB SERPL-MCNC: 2.8 MG/DL (ref 0–1.2)
BUN BLD-MCNC: 23 MG/DL (ref 8–26)
BUN SERPL-MCNC: 21 MG/DL (ref 8–23)
BUN SERPL-MCNC: 22 MG/DL (ref 8–23)
CA-I BLD-SCNC: 1.12 MMOL/L (ref 1.15–1.33)
CALCIUM SERPL-MCNC: 8.4 MG/DL (ref 8.8–10.2)
CALCIUM SERPL-MCNC: 8.5 MG/DL (ref 8.8–10.2)
CHLORIDE BLD-SCNC: 106 MMOL/L (ref 98–107)
CHLORIDE SERPL-SCNC: 95 MMOL/L (ref 98–107)
CHLORIDE SERPL-SCNC: 99 MMOL/L (ref 98–107)
CO2 BLD CALC-SCNC: 21 MMOL/L (ref 22–30)
CO2 SERPL-SCNC: 28 MMOL/L (ref 20–31)
CO2 SERPL-SCNC: 29 MMOL/L (ref 20–31)
CREAT SERPL-MCNC: 0.9 MG/DL (ref 0.7–1.2)
CREAT SERPL-MCNC: 1 MG/DL (ref 0.7–1.2)
EGFR, POC: 69 ML/MIN/1.73M2
ERYTHROCYTE [DISTWIDTH] IN BLOOD BY AUTOMATED COUNT: 22.7 % (ref 11.8–14.4)
ERYTHROCYTE [DISTWIDTH] IN BLOOD BY AUTOMATED COUNT: 24.8 % (ref 11.8–14.4)
GFR, ESTIMATED: 81 ML/MIN/1.73M2
GFR, ESTIMATED: 84 ML/MIN/1.73M2
GLUCOSE BLD-MCNC: 297 MG/DL (ref 74–100)
GLUCOSE SERPL-MCNC: 135 MG/DL (ref 82–115)
GLUCOSE SERPL-MCNC: 158 MG/DL (ref 82–115)
HCT VFR BLD AUTO: 21.7 % (ref 40.7–50.3)
HCT VFR BLD AUTO: 24.7 % (ref 40.7–50.3)
HCT VFR BLD AUTO: 26.8 % (ref 40.7–50.3)
HCT VFR BLD AUTO: 27 % (ref 41–53)
HCT VFR BLD AUTO: 27.9 % (ref 40.7–50.3)
HGB BLD-MCNC: 7 G/DL (ref 13–17)
HGB BLD-MCNC: 8 G/DL (ref 13–17)
HGB BLD-MCNC: 8.7 G/DL (ref 13–17)
HGB BLD-MCNC: 8.8 G/DL (ref 13–17)
INR PPP: 1.2
INR PPP: 1.3
LACTATE BLDV-SCNC: 2.3 MMOL/L (ref 0.5–1.9)
MAGNESIUM SERPL-MCNC: 1.8 MG/DL (ref 1.6–2.4)
MCH RBC QN AUTO: 23.2 PG (ref 25.2–33.5)
MCH RBC QN AUTO: 23.9 PG (ref 25.2–33.5)
MCHC RBC AUTO-ENTMCNC: 31.2 G/DL (ref 28.4–34.8)
MCHC RBC AUTO-ENTMCNC: 32.3 G/DL (ref 28.4–34.8)
MCV RBC AUTO: 71.9 FL (ref 82.6–102.9)
MCV RBC AUTO: 76.6 FL (ref 82.6–102.9)
NEGATIVE BASE EXCESS, ART: 3.6 MMOL/L (ref 0–2)
NRBC BLD-RTO: 0 PER 100 WBC
NRBC BLD-RTO: 0.3 PER 100 WBC
PARTIAL THROMBOPLASTIN TIME: 44.2 SEC (ref 23.9–33.8)
PLATELET # BLD AUTO: 139 K/UL (ref 138–453)
PLATELET # BLD AUTO: 158 K/UL (ref 138–453)
PMV BLD AUTO: ABNORMAL FL (ref 8.1–13.5)
PMV BLD AUTO: ABNORMAL FL (ref 8.1–13.5)
POC ANION GAP: 8 MMOL/L (ref 7–16)
POC CREATININE: 1.1 MG/DL (ref 0.51–1.19)
POC HCO3: 21.7 MMOL/L (ref 21–28)
POC HEMOGLOBIN (CALC): 9.1 G/DL (ref 13.5–17.5)
POC O2 SATURATION: 96.9 % (ref 94–98)
POC PCO2: 39.6 MM HG (ref 35–48)
POC PH: 7.35 (ref 7.35–7.45)
POC PO2: 94.3 MM HG (ref 83–108)
POTASSIUM BLD-SCNC: 4.4 MMOL/L (ref 3.5–4.5)
POTASSIUM SERPL-SCNC: 3.6 MMOL/L (ref 3.7–5.3)
POTASSIUM SERPL-SCNC: 4.7 MMOL/L (ref 3.7–5.3)
PROT SERPL-MCNC: 5.6 G/DL (ref 6.6–8.7)
PROTHROMBIN TIME: 15.7 SEC (ref 11.5–14.2)
PROTHROMBIN TIME: 16.6 SEC (ref 11.5–14.2)
RBC # BLD AUTO: 3.02 M/UL (ref 4.21–5.77)
RBC # BLD AUTO: 3.64 M/UL (ref 4.21–5.77)
SODIUM BLD-SCNC: 134 MMOL/L (ref 138–146)
SODIUM SERPL-SCNC: 135 MMOL/L (ref 136–145)
SODIUM SERPL-SCNC: 137 MMOL/L (ref 136–145)
TROPONIN I SERPL HS-MCNC: 65 NG/L (ref 0–22)
WBC OTHER # BLD: 6.2 K/UL (ref 3.5–11.3)
WBC OTHER # BLD: 6.8 K/UL (ref 3.5–11.3)

## 2025-06-23 PROCEDURE — 6360000002 HC RX W HCPCS: Performed by: SURGERY

## 2025-06-23 PROCEDURE — 80076 HEPATIC FUNCTION PANEL: CPT

## 2025-06-23 PROCEDURE — 6370000000 HC RX 637 (ALT 250 FOR IP)

## 2025-06-23 PROCEDURE — 2580000003 HC RX 258: Performed by: INTERNAL MEDICINE

## 2025-06-23 PROCEDURE — 02HV33Z INSERTION OF INFUSION DEVICE INTO SUPERIOR VENA CAVA, PERCUTANEOUS APPROACH: ICD-10-PCS | Performed by: ANESTHESIOLOGY

## 2025-06-23 PROCEDURE — 2000000000 HC ICU R&B

## 2025-06-23 PROCEDURE — 36415 COLL VENOUS BLD VENIPUNCTURE: CPT

## 2025-06-23 PROCEDURE — 36430 TRANSFUSION BLD/BLD COMPNT: CPT

## 2025-06-23 PROCEDURE — 86901 BLOOD TYPING SEROLOGIC RH(D): CPT

## 2025-06-23 PROCEDURE — 82330 ASSAY OF CALCIUM: CPT

## 2025-06-23 PROCEDURE — 82947 ASSAY GLUCOSE BLOOD QUANT: CPT

## 2025-06-23 PROCEDURE — P9017 PLASMA 1 DONOR FRZ W/IN 8 HR: HCPCS

## 2025-06-23 PROCEDURE — 3700000001 HC ADD 15 MINUTES (ANESTHESIA): Performed by: SURGERY

## 2025-06-23 PROCEDURE — 83735 ASSAY OF MAGNESIUM: CPT

## 2025-06-23 PROCEDURE — 6360000002 HC RX W HCPCS: Performed by: NURSE ANESTHETIST, CERTIFIED REGISTERED

## 2025-06-23 PROCEDURE — 3700000000 HC ANESTHESIA ATTENDED CARE: Performed by: SURGERY

## 2025-06-23 PROCEDURE — 84484 ASSAY OF TROPONIN QUANT: CPT

## 2025-06-23 PROCEDURE — 84520 ASSAY OF UREA NITROGEN: CPT

## 2025-06-23 PROCEDURE — 86900 BLOOD TYPING SEROLOGIC ABO: CPT

## 2025-06-23 PROCEDURE — 85610 PROTHROMBIN TIME: CPT

## 2025-06-23 PROCEDURE — 85027 COMPLETE CBC AUTOMATED: CPT

## 2025-06-23 PROCEDURE — 80048 BASIC METABOLIC PNL TOTAL CA: CPT

## 2025-06-23 PROCEDURE — P9041 ALBUMIN (HUMAN),5%, 50ML: HCPCS | Performed by: NURSE ANESTHETIST, CERTIFIED REGISTERED

## 2025-06-23 PROCEDURE — 2709999900 HC NON-CHARGEABLE SUPPLY: Performed by: SURGERY

## 2025-06-23 PROCEDURE — 2500000003 HC RX 250 WO HCPCS: Performed by: INTERNAL MEDICINE

## 2025-06-23 PROCEDURE — 86850 RBC ANTIBODY SCREEN: CPT

## 2025-06-23 PROCEDURE — 85520 HEPARIN ASSAY: CPT

## 2025-06-23 PROCEDURE — 7100000001 HC PACU RECOVERY - ADDTL 15 MIN: Performed by: SURGERY

## 2025-06-23 PROCEDURE — 85014 HEMATOCRIT: CPT

## 2025-06-23 PROCEDURE — 30233K1 TRANSFUSION OF NONAUTOLOGOUS FROZEN PLASMA INTO PERIPHERAL VEIN, PERCUTANEOUS APPROACH: ICD-10-PCS | Performed by: INTERNAL MEDICINE

## 2025-06-23 PROCEDURE — 51702 INSERT TEMP BLADDER CATH: CPT

## 2025-06-23 PROCEDURE — 2580000003 HC RX 258: Performed by: NURSE ANESTHETIST, CERTIFIED REGISTERED

## 2025-06-23 PROCEDURE — 02HV33Z INSERTION OF INFUSION DEVICE INTO SUPERIOR VENA CAVA, PERCUTANEOUS APPROACH: ICD-10-PCS | Performed by: INTERNAL MEDICINE

## 2025-06-23 PROCEDURE — 2500000003 HC RX 250 WO HCPCS: Performed by: NURSE ANESTHETIST, CERTIFIED REGISTERED

## 2025-06-23 PROCEDURE — 99232 SBSQ HOSP IP/OBS MODERATE 35: CPT

## 2025-06-23 PROCEDURE — 82565 ASSAY OF CREATININE: CPT

## 2025-06-23 PROCEDURE — 2580000003 HC RX 258

## 2025-06-23 PROCEDURE — 03HY32Z INSERTION OF MONITORING DEVICE INTO UPPER ARTERY, PERCUTANEOUS APPROACH: ICD-10-PCS | Performed by: ANESTHESIOLOGY

## 2025-06-23 PROCEDURE — 2500000003 HC RX 250 WO HCPCS

## 2025-06-23 PROCEDURE — 2580000003 HC RX 258: Performed by: NURSE PRACTITIONER

## 2025-06-23 PROCEDURE — 80051 ELECTROLYTE PANEL: CPT

## 2025-06-23 PROCEDURE — 74174 CTA ABD&PLVS W/CONTRAST: CPT

## 2025-06-23 PROCEDURE — 30233L1 TRANSFUSION OF NONAUTOLOGOUS FRESH PLASMA INTO PERIPHERAL VEIN, PERCUTANEOUS APPROACH: ICD-10-PCS | Performed by: INTERNAL MEDICINE

## 2025-06-23 PROCEDURE — 2580000003 HC RX 258: Performed by: ANESTHESIOLOGY

## 2025-06-23 PROCEDURE — 6370000000 HC RX 637 (ALT 250 FOR IP): Performed by: INTERNAL MEDICINE

## 2025-06-23 PROCEDURE — 85018 HEMOGLOBIN: CPT

## 2025-06-23 PROCEDURE — 0FT44ZZ RESECTION OF GALLBLADDER, PERCUTANEOUS ENDOSCOPIC APPROACH: ICD-10-PCS | Performed by: SURGERY

## 2025-06-23 PROCEDURE — 3600000003 HC SURGERY LEVEL 3 BASE: Performed by: SURGERY

## 2025-06-23 PROCEDURE — 7100000000 HC PACU RECOVERY - FIRST 15 MIN: Performed by: SURGERY

## 2025-06-23 PROCEDURE — 87086 URINE CULTURE/COLONY COUNT: CPT

## 2025-06-23 PROCEDURE — 2500000003 HC RX 250 WO HCPCS: Performed by: ANESTHESIOLOGY

## 2025-06-23 PROCEDURE — 2720000010 HC SURG SUPPLY STERILE: Performed by: SURGERY

## 2025-06-23 PROCEDURE — 3E043XZ INTRODUCTION OF VASOPRESSOR INTO CENTRAL VEIN, PERCUTANEOUS APPROACH: ICD-10-PCS | Performed by: INTERNAL MEDICINE

## 2025-06-23 PROCEDURE — 6360000002 HC RX W HCPCS: Performed by: NURSE PRACTITIONER

## 2025-06-23 PROCEDURE — 6360000002 HC RX W HCPCS: Performed by: INTERNAL MEDICINE

## 2025-06-23 PROCEDURE — 88304 TISSUE EXAM BY PATHOLOGIST: CPT

## 2025-06-23 PROCEDURE — 3600000013 HC SURGERY LEVEL 3 ADDTL 15MIN: Performed by: SURGERY

## 2025-06-23 PROCEDURE — 86920 COMPATIBILITY TEST SPIN: CPT

## 2025-06-23 PROCEDURE — 87040 BLOOD CULTURE FOR BACTERIA: CPT

## 2025-06-23 PROCEDURE — 93005 ELECTROCARDIOGRAM TRACING: CPT | Performed by: INTERNAL MEDICINE

## 2025-06-23 PROCEDURE — 83605 ASSAY OF LACTIC ACID: CPT

## 2025-06-23 PROCEDURE — P9016 RBC LEUKOCYTES REDUCED: HCPCS

## 2025-06-23 PROCEDURE — 6360000004 HC RX CONTRAST MEDICATION: Performed by: INTERNAL MEDICINE

## 2025-06-23 PROCEDURE — 6360000002 HC RX W HCPCS

## 2025-06-23 PROCEDURE — 86927 PLASMA FRESH FROZEN: CPT

## 2025-06-23 PROCEDURE — 30233N1 TRANSFUSION OF NONAUTOLOGOUS RED BLOOD CELLS INTO PERIPHERAL VEIN, PERCUTANEOUS APPROACH: ICD-10-PCS | Performed by: INTERNAL MEDICINE

## 2025-06-23 PROCEDURE — 05HY33Z INSERTION OF INFUSION DEVICE INTO UPPER VEIN, PERCUTANEOUS APPROACH: ICD-10-PCS | Performed by: INTERNAL MEDICINE

## 2025-06-23 PROCEDURE — 82803 BLOOD GASES ANY COMBINATION: CPT

## 2025-06-23 PROCEDURE — 99232 SBSQ HOSP IP/OBS MODERATE 35: CPT | Performed by: INTERNAL MEDICINE

## 2025-06-23 PROCEDURE — 85730 THROMBOPLASTIN TIME PARTIAL: CPT

## 2025-06-23 RX ORDER — DIPHENHYDRAMINE HYDROCHLORIDE 50 MG/ML
12.5 INJECTION, SOLUTION INTRAMUSCULAR; INTRAVENOUS
Status: DISCONTINUED | OUTPATIENT
Start: 2025-06-23 | End: 2025-06-23 | Stop reason: HOSPADM

## 2025-06-23 RX ORDER — SODIUM CHLORIDE 9 MG/ML
INJECTION, SOLUTION INTRAVENOUS PRN
Status: DISCONTINUED | OUTPATIENT
Start: 2025-06-23 | End: 2025-06-24

## 2025-06-23 RX ORDER — MAGNESIUM SULFATE IN WATER 40 MG/ML
2000 INJECTION, SOLUTION INTRAVENOUS ONCE
Status: COMPLETED | OUTPATIENT
Start: 2025-06-23 | End: 2025-06-23

## 2025-06-23 RX ORDER — 0.9 % SODIUM CHLORIDE 0.9 %
80 INTRAVENOUS SOLUTION INTRAVENOUS ONCE
Status: COMPLETED | OUTPATIENT
Start: 2025-06-23 | End: 2025-06-23

## 2025-06-23 RX ORDER — ALBUMIN HUMAN 50 G/1000ML
SOLUTION INTRAVENOUS
Status: DISCONTINUED | OUTPATIENT
Start: 2025-06-23 | End: 2025-06-23 | Stop reason: SDUPTHER

## 2025-06-23 RX ORDER — FENTANYL CITRATE 50 UG/ML
INJECTION, SOLUTION INTRAMUSCULAR; INTRAVENOUS
Status: DISCONTINUED | OUTPATIENT
Start: 2025-06-23 | End: 2025-06-23 | Stop reason: SDUPTHER

## 2025-06-23 RX ORDER — PHENYLEPHRINE HCL IN 0.9% NACL 1 MG/10 ML
SYRINGE (ML) INTRAVENOUS
Status: DISCONTINUED | OUTPATIENT
Start: 2025-06-23 | End: 2025-06-23 | Stop reason: SDUPTHER

## 2025-06-23 RX ORDER — ONDANSETRON 2 MG/ML
INJECTION INTRAMUSCULAR; INTRAVENOUS
Status: DISCONTINUED | OUTPATIENT
Start: 2025-06-23 | End: 2025-06-23 | Stop reason: SDUPTHER

## 2025-06-23 RX ORDER — DEXAMETHASONE SODIUM PHOSPHATE 10 MG/ML
INJECTION, SOLUTION INTRAMUSCULAR; INTRAVENOUS
Status: DISCONTINUED | OUTPATIENT
Start: 2025-06-23 | End: 2025-06-23 | Stop reason: SDUPTHER

## 2025-06-23 RX ORDER — ROCURONIUM BROMIDE 10 MG/ML
INJECTION, SOLUTION INTRAVENOUS
Status: DISCONTINUED | OUTPATIENT
Start: 2025-06-23 | End: 2025-06-23 | Stop reason: SDUPTHER

## 2025-06-23 RX ORDER — ETOMIDATE 2 MG/ML
INJECTION INTRAVENOUS
Status: DISCONTINUED | OUTPATIENT
Start: 2025-06-23 | End: 2025-06-23 | Stop reason: SDUPTHER

## 2025-06-23 RX ORDER — NOREPINEPHRINE BITARTRATE 0.06 MG/ML
1-100 INJECTION, SOLUTION INTRAVENOUS CONTINUOUS
Status: DISCONTINUED | OUTPATIENT
Start: 2025-06-23 | End: 2025-06-27

## 2025-06-23 RX ORDER — OXYCODONE HYDROCHLORIDE 5 MG/1
5 TABLET ORAL
Status: DISCONTINUED | OUTPATIENT
Start: 2025-06-23 | End: 2025-06-23 | Stop reason: HOSPADM

## 2025-06-23 RX ORDER — 0.9 % SODIUM CHLORIDE 0.9 %
500 INTRAVENOUS SOLUTION INTRAVENOUS ONCE
Status: COMPLETED | OUTPATIENT
Start: 2025-06-23 | End: 2025-06-23

## 2025-06-23 RX ORDER — LIDOCAINE HYDROCHLORIDE 20 MG/ML
INJECTION, SOLUTION EPIDURAL; INFILTRATION; INTRACAUDAL; PERINEURAL
Status: DISCONTINUED | OUTPATIENT
Start: 2025-06-23 | End: 2025-06-23 | Stop reason: SDUPTHER

## 2025-06-23 RX ORDER — FENTANYL CITRATE 50 UG/ML
25 INJECTION, SOLUTION INTRAMUSCULAR; INTRAVENOUS EVERY 5 MIN PRN
Status: DISCONTINUED | OUTPATIENT
Start: 2025-06-23 | End: 2025-06-23 | Stop reason: HOSPADM

## 2025-06-23 RX ORDER — SODIUM CHLORIDE 0.9 % (FLUSH) 0.9 %
10 SYRINGE (ML) INJECTION PRN
Status: DISCONTINUED | OUTPATIENT
Start: 2025-06-23 | End: 2025-07-02 | Stop reason: HOSPADM

## 2025-06-23 RX ORDER — SODIUM CHLORIDE 9 MG/ML
INJECTION, SOLUTION INTRAVENOUS PRN
Status: COMPLETED | OUTPATIENT
Start: 2025-06-23 | End: 2025-06-23

## 2025-06-23 RX ORDER — CALCIUM CHLORIDE 100 MG/ML
INJECTION INTRAVENOUS; INTRAVENTRICULAR
Status: DISCONTINUED | OUTPATIENT
Start: 2025-06-23 | End: 2025-06-23 | Stop reason: SDUPTHER

## 2025-06-23 RX ORDER — IOPAMIDOL 755 MG/ML
100 INJECTION, SOLUTION INTRAVASCULAR
Status: COMPLETED | OUTPATIENT
Start: 2025-06-23 | End: 2025-06-23

## 2025-06-23 RX ORDER — SODIUM CHLORIDE, SODIUM LACTATE, POTASSIUM CHLORIDE, CALCIUM CHLORIDE 600; 310; 30; 20 MG/100ML; MG/100ML; MG/100ML; MG/100ML
INJECTION, SOLUTION INTRAVENOUS
Status: DISCONTINUED | OUTPATIENT
Start: 2025-06-23 | End: 2025-06-23 | Stop reason: SDUPTHER

## 2025-06-23 RX ORDER — BUPIVACAINE HYDROCHLORIDE 5 MG/ML
INJECTION, SOLUTION EPIDURAL; INTRACAUDAL; PERINEURAL PRN
Status: DISCONTINUED | OUTPATIENT
Start: 2025-06-23 | End: 2025-06-23 | Stop reason: ALTCHOICE

## 2025-06-23 RX ORDER — ONDANSETRON 2 MG/ML
4 INJECTION INTRAMUSCULAR; INTRAVENOUS
Status: DISCONTINUED | OUTPATIENT
Start: 2025-06-23 | End: 2025-06-23 | Stop reason: HOSPADM

## 2025-06-23 RX ORDER — PROCHLORPERAZINE EDISYLATE 5 MG/ML
5 INJECTION INTRAMUSCULAR; INTRAVENOUS
Status: DISCONTINUED | OUTPATIENT
Start: 2025-06-23 | End: 2025-06-23 | Stop reason: HOSPADM

## 2025-06-23 RX ORDER — NOREPINEPHRINE BITARTRATE 0.06 MG/ML
0.04 INJECTION, SOLUTION INTRAVENOUS CONTINUOUS
Status: DISCONTINUED | OUTPATIENT
Start: 2025-06-23 | End: 2025-06-23

## 2025-06-23 RX ORDER — CEFAZOLIN SODIUM 1 G/3ML
INJECTION, POWDER, FOR SOLUTION INTRAMUSCULAR; INTRAVENOUS
Status: DISCONTINUED | OUTPATIENT
Start: 2025-06-23 | End: 2025-06-23 | Stop reason: SDUPTHER

## 2025-06-23 RX ORDER — POTASSIUM CHLORIDE 1500 MG/1
40 TABLET, EXTENDED RELEASE ORAL ONCE
Status: COMPLETED | OUTPATIENT
Start: 2025-06-23 | End: 2025-06-23

## 2025-06-23 RX ORDER — VASOPRESSIN 20 [USP'U]/ML
INJECTION, SOLUTION INTRAMUSCULAR; SUBCUTANEOUS
Status: DISCONTINUED | OUTPATIENT
Start: 2025-06-23 | End: 2025-06-23 | Stop reason: SDUPTHER

## 2025-06-23 RX ORDER — FENTANYL CITRATE 50 UG/ML
50 INJECTION, SOLUTION INTRAMUSCULAR; INTRAVENOUS EVERY 5 MIN PRN
Status: DISCONTINUED | OUTPATIENT
Start: 2025-06-23 | End: 2025-06-23 | Stop reason: HOSPADM

## 2025-06-23 RX ADMIN — SODIUM CHLORIDE 80 ML: 0.9 INJECTION, SOLUTION INTRAVENOUS at 22:26

## 2025-06-23 RX ADMIN — POTASSIUM CHLORIDE 40 MEQ: 1500 TABLET, EXTENDED RELEASE ORAL at 09:42

## 2025-06-23 RX ADMIN — ATORVASTATIN CALCIUM 40 MG: 40 TABLET, FILM COATED ORAL at 09:42

## 2025-06-23 RX ADMIN — SODIUM CHLORIDE 500 ML: 0.9 INJECTION, SOLUTION INTRAVENOUS at 17:55

## 2025-06-23 RX ADMIN — Medication 200 MCG: at 13:24

## 2025-06-23 RX ADMIN — ROCURONIUM BROMIDE 20 MG: 10 INJECTION, SOLUTION INTRAVENOUS at 13:24

## 2025-06-23 RX ADMIN — SERTRALINE 25 MG: 25 TABLET, FILM COATED ORAL at 09:42

## 2025-06-23 RX ADMIN — LIDOCAINE HYDROCHLORIDE 60 MG: 20 INJECTION, SOLUTION EPIDURAL; INFILTRATION; INTRACAUDAL; PERINEURAL at 11:54

## 2025-06-23 RX ADMIN — ROCURONIUM BROMIDE 40 MG: 10 INJECTION, SOLUTION INTRAVENOUS at 11:54

## 2025-06-23 RX ADMIN — CEFAZOLIN 2 G: 1 INJECTION, POWDER, FOR SOLUTION INTRAMUSCULAR; INTRAVENOUS at 12:03

## 2025-06-23 RX ADMIN — SODIUM CHLORIDE 0.04 MCG/KG/MIN: 0.9 INJECTION, SOLUTION INTRAVENOUS at 15:34

## 2025-06-23 RX ADMIN — DEXAMETHASONE SODIUM PHOSPHATE 10 MG: 10 INJECTION INTRAMUSCULAR; INTRAVENOUS at 12:22

## 2025-06-23 RX ADMIN — VANCOMYCIN HYDROCHLORIDE 2500 MG: 5 INJECTION, POWDER, LYOPHILIZED, FOR SOLUTION INTRAVENOUS at 18:52

## 2025-06-23 RX ADMIN — FUROSEMIDE 20 MG: 10 INJECTION, SOLUTION INTRAMUSCULAR; INTRAVENOUS at 09:45

## 2025-06-23 RX ADMIN — FENTANYL CITRATE 50 MCG: 50 INJECTION INTRAMUSCULAR; INTRAVENOUS at 11:54

## 2025-06-23 RX ADMIN — MAGNESIUM SULFATE HEPTAHYDRATE 2000 MG: 40 INJECTION, SOLUTION INTRAVENOUS at 09:51

## 2025-06-23 RX ADMIN — PANTOPRAZOLE SODIUM 40 MG: 40 TABLET, DELAYED RELEASE ORAL at 05:42

## 2025-06-23 RX ADMIN — PIPERACILLIN AND TAZOBACTAM 3375 MG: 3; .375 INJECTION, POWDER, LYOPHILIZED, FOR SOLUTION INTRAVENOUS at 21:43

## 2025-06-23 RX ADMIN — Medication 25 MCG/MIN: at 18:05

## 2025-06-23 RX ADMIN — ALLOPURINOL 300 MG: 300 TABLET ORAL at 09:42

## 2025-06-23 RX ADMIN — SODIUM CHLORIDE 0.19 MCG/KG/MIN: 0.9 INJECTION, SOLUTION INTRAVENOUS at 17:16

## 2025-06-23 RX ADMIN — ONDANSETRON 4 MG: 2 INJECTION, SOLUTION INTRAMUSCULAR; INTRAVENOUS at 12:22

## 2025-06-23 RX ADMIN — HYDROMORPHONE HYDROCHLORIDE 0.5 MG: 1 INJECTION, SOLUTION INTRAMUSCULAR; INTRAVENOUS; SUBCUTANEOUS at 22:51

## 2025-06-23 RX ADMIN — VASOPRESSIN 4 UNITS: 20 INJECTION INTRAVENOUS at 13:47

## 2025-06-23 RX ADMIN — ALBUMIN (HUMAN) 25 G: 12.5 INJECTION, SOLUTION INTRAVENOUS at 13:33

## 2025-06-23 RX ADMIN — PIPERACILLIN AND TAZOBACTAM 3375 MG: 3; .375 INJECTION, POWDER, LYOPHILIZED, FOR SOLUTION INTRAVENOUS at 05:42

## 2025-06-23 RX ADMIN — SODIUM CHLORIDE: 9 INJECTION, SOLUTION INTRAVENOUS at 11:49

## 2025-06-23 RX ADMIN — SODIUM CHLORIDE, POTASSIUM CHLORIDE, SODIUM LACTATE AND CALCIUM CHLORIDE: 600; 310; 30; 20 INJECTION, SOLUTION INTRAVENOUS at 12:50

## 2025-06-23 RX ADMIN — Medication 200 MCG: at 13:28

## 2025-06-23 RX ADMIN — Medication 200 MCG: at 13:38

## 2025-06-23 RX ADMIN — Medication 200 MCG: at 13:42

## 2025-06-23 RX ADMIN — Medication 200 MCG: at 13:33

## 2025-06-23 RX ADMIN — ETOMIDATE 16 MG: 2 INJECTION, SOLUTION INTRAVENOUS at 11:54

## 2025-06-23 RX ADMIN — SODIUM CHLORIDE: 9 INJECTION, SOLUTION INTRAVENOUS at 14:11

## 2025-06-23 RX ADMIN — SUGAMMADEX 200 MG: 100 INJECTION, SOLUTION INTRAVENOUS at 14:15

## 2025-06-23 RX ADMIN — TAMSULOSIN HYDROCHLORIDE 0.4 MG: 0.4 CAPSULE ORAL at 09:42

## 2025-06-23 RX ADMIN — IOPAMIDOL 100 ML: 755 INJECTION, SOLUTION INTRAVENOUS at 22:25

## 2025-06-23 RX ADMIN — SODIUM CHLORIDE, PRESERVATIVE FREE 10 ML: 5 INJECTION INTRAVENOUS at 22:25

## 2025-06-23 RX ADMIN — CALCIUM CHLORIDE 0.5 G: 100 INJECTION INTRAVENOUS; INTRAVENTRICULAR at 13:45

## 2025-06-23 RX ADMIN — CALCIUM CHLORIDE 0.5 G: 100 INJECTION INTRAVENOUS; INTRAVENTRICULAR at 13:41

## 2025-06-23 ASSESSMENT — PAIN SCALES - GENERAL
PAINLEVEL_OUTOF10: 3
PAINLEVEL_OUTOF10: 0
PAINLEVEL_OUTOF10: 7

## 2025-06-23 ASSESSMENT — PAIN DESCRIPTION - DESCRIPTORS
DESCRIPTORS: DULL
DESCRIPTORS: ACHING;SHARP

## 2025-06-23 ASSESSMENT — PAIN - FUNCTIONAL ASSESSMENT: PAIN_FUNCTIONAL_ASSESSMENT: ACTIVITIES ARE NOT PREVENTED

## 2025-06-23 ASSESSMENT — PAIN DESCRIPTION - LOCATION
LOCATION: ABDOMEN
LOCATION: ABDOMEN

## 2025-06-23 ASSESSMENT — PAIN DESCRIPTION - PAIN TYPE: TYPE: SURGICAL PAIN

## 2025-06-23 ASSESSMENT — PAIN DESCRIPTION - ORIENTATION
ORIENTATION: MID
ORIENTATION: RIGHT;LOWER

## 2025-06-23 NOTE — CARE COORDINATION
Social Work- Received call from [facility]. They are willing to accept patient. Their phone is [phone]. [place]

## 2025-06-23 NOTE — ANESTHESIA PROCEDURE NOTES
Central Venous Line:    A central venous line was placed using surface landmarks, in the OR for the following indication(s): central venous access.6/23/2025 12:05 PM6/23/2025 12:12 PM    Sterility preparation included the following: provider used sterile gloves, gown, hat and mask, hand hygiene performed prior to central venous catheter insertion, sterile gel and probe cover used in ultrasound-guided central venous catheter insertion and maximum sterile barriers used during central venous catheter insertion.    The patient was placed in Trendelenburg position.The right internal jugular vein was prepped.    The site was prepped with Chloraprep.triple lumen was placed.    During the procedure, the following specific steps were taken: needle advanced into vein and blood aspirated and guidewire advanced into vein.    Post insertion care included: all ports aspirated, all ports flushed easily, guidewire removed intact, Biopatch applied, line sutured in place and dressing applied.        Outcomes: uncomplicated  Anesthesia type: general  Staffing  Anesthesiologist: Pb Gomez MD  Performed by: Pb Gomez MD  Authorized by: Pb Gomez MD

## 2025-06-23 NOTE — ANESTHESIA POSTPROCEDURE EVALUATION
Department of Anesthesiology  Postprocedure Note    Patient: Quique Wray  MRN: [id number]  YOB: 1945  Date of evaluation: 6/23/2025    Procedure Summary       Date: 06/23/25 Room / Location: [address]    Anesthesia Start: 1148 Anesthesia Stop: 1434    Procedure: CHOLECYSTECTOMY LAPAROSCOPIC (Abdomen) Diagnosis:       Cholangiectasis      (Cholangiectasis [K83.8])    Surgeons: Hardik Rojas MD Responsible Provider: Pb Gomez MD    Anesthesia Type: General ASA Status: 4            Anesthesia Type: General    Kiya Phase I:      Kiya Phase II:      Anesthesia Post Evaluation    Level of consciousness: awake and alert  Cardiovascular status: hemodynamically stable  Respiratory status: acceptable    No notable events documented.

## 2025-06-23 NOTE — ANESTHESIA PRE PROCEDURE
oxyCODONE-acetaminophen (PERCOCET) 5-325 MG per tablet 1 tablet  1 tablet Oral Q4H PRN Gracie Padron APRN - CNP   1 tablet at 06/20/25 2045   • sennosides-docusate sodium (SENOKOT-S) 8.6-50 MG tablet 1 tablet  1 tablet Oral BID Kim Concepcion APRN - CNP   1 tablet at 06/22/25 2051   • sodium chloride flush 0.9 % injection 5-40 mL  5-40 mL IntraVENous 2 times per day Kim Concepcion APRN - CNP   10 mL at 06/22/25 0933   • sodium chloride flush 0.9 % injection 10 mL  10 mL IntraVENous PRN Kim Concepcion APRN - CNP       • 0.9 % sodium chloride infusion   IntraVENous PRN Kim Concepcion APRN - CNP       • polyethylene glycol (GLYCOLAX) packet 17 g  17 g Oral Daily PRN Kim Concepcion APRN - CNP   17 g at 06/21/25 0932   • acetaminophen (TYLENOL) tablet 650 mg  650 mg Oral Q6H PRN Kim Concepcion APRN - CNP   650 mg at 06/19/25 1348    Or   • acetaminophen (TYLENOL) suppository 650 mg  650 mg Rectal Q6H PRN Kim Concepcion APRN - CNP       • potassium chloride (KLOR-CON M) extended release tablet 40 mEq  40 mEq Oral PRN Kim Concepcion APRN - CNP        Or   • potassium bicarb-citric acid (EFFER-K) effervescent tablet 40 mEq  40 mEq Oral PRN Kim Concepcion APRN - CNP        Or   • potassium chloride 10 mEq/100 mL IVPB (Peripheral Line)  10 mEq IntraVENous PRN Kim Concepcion APRN - CNP       • magnesium sulfate 2000 mg in 50 mL IVPB premix  2,000 mg IntraVENous PRN Kim Concepcion APRN - CNP       • sulfur hexafluoride microspheres (LUMASON) 60.7-25 MG injection 2 mL  2 mL IntraVENous ONCE PRN Kim Concepcion APRN - CNP       • piperacillin-tazobactam (ZOSYN) 3,375 mg in sodium chloride 0.9 % 50 mL IVPB (addEASE)  3,375 mg IntraVENous Q8H Kim Concepcion APRN - CNP   Stopped at 06/23/25 0942   • morphine (PF) injection 2 mg  2 mg IntraVENous Q2H PRN Gracie Padron APRN - CNP        Or   • morphine sulfate (PF) injection 4 mg  4 mg

## 2025-06-23 NOTE — ANESTHESIA PROCEDURE NOTES
Arterial Line:    An arterial line was placed using surface landmarks, in the OR for the following indication(s): .    A 20 gauge (size) (length) (type) catheter was placed, into the left radial artery, secured by tape and Tegaderm.  Anesthesia type: General6/23/2025 12:03 PM6/23/2025 12:05 PM  Anesthesiologist: Pb Gomez MD  Preanesthetic Checklist  Completed: patient identified, IV checked, site marked, risks and benefits discussed, surgical/procedural consents, equipment checked, pre-op evaluation, timeout performed, anesthesia consent given, oxygen available, monitors applied/VS acknowledged, fire risk safety assessment completed and verbalized and blood product R/B/A discussed and consented

## 2025-06-23 NOTE — CONSENT
Informed Consent for Blood Component Transfusion Note    I have discussed with the patient and wife the rationale for blood component transfusion; its benefits in treating or preventing fatigue, organ damage, or death; and its risk which includes mild transfusion reactions, rare risk of blood borne infection, or more serious but rare reactions. I have discussed the alternatives to transfusion, including the risk and consequences of not receiving transfusion. The patient and wife had an opportunity to ask questions and had agreed to proceed with transfusion of blood components.    Electronically signed by LEXI Valadez CNP on 6/23/25 at 7:03 AM EDT

## 2025-06-23 NOTE — OP NOTE
Operative Note      Patient: Quique Wray  YOB: 1945  MRN: [id number]    Date of Procedure: 6/23/2025    Preop diagnosis: Cholelithiasis and acute cholecystitis    Post-Op Diagnosis: Cholelithiasis and acute gangrenous cholecystitis       Procedure(s):  CHOLECYSTECTOMY LAPAROSCOPIC    Surgeon(s):  Hardik Rojas MD    Assistant:   Surgical Assistant: Osiel Gomez    Anesthesia: General    Estimated Blood Loss (mL): 1000    Complications: Bleeding    Specimens:   ID Type Source Tests Collected by Time Destination   A : GALLBLADDER AND CONTENTS Tissue Gallbladder SURGICAL PATHOLOGY Hardik Rojas MD 6/23/2025 1252        Implants:  * No implants in log *      Drains: * No LDAs found *    Findings:  Infection Present At Time Of Surgery (PATOS) (choose all levels that have infection present):  No infection present  Other Findings: Acutely inflamed, gangrenous, distended gallbladder with sludge and small gallstones    Detailed Description of Procedure:   The patient's hemoglobin was 7.0 on the morning prior to the surgery and he was in the process of receiving a unit of blood.  EPC cuffs were applied to his legs.  Ancef 2 g IV was administered.  A radial arterial line was placed by the anesthesiologist.  After placing the patient supine on the operating table, general endotracheal anesthesia was administered.  The anterior abdominal wall was prepped and draped in a sterile manner.  A 3 cm transverse infraumbilical incision was made and deepened through the subcutaneous fat down to the linea alba.  Stay sutures of 0 Vicryl were applied to either side of the midline, after which the linea alba was incised.  I entered the peritoneal cavity bluntly with my gloved fingertip and then introduced a 12 mm balloon port.  Gas was insufflated to establish an adequate pneumoperitoneum.  A 10 mm 30 degree laparoscope was introduced and the patient was placed in a reverse Trendelenburg position.  Under laparoscopic

## 2025-06-24 ENCOUNTER — APPOINTMENT (OUTPATIENT)
Dept: GENERAL RADIOLOGY | Age: 80
DRG: 417 | End: 2025-06-24
Attending: INTERNAL MEDICINE
Payer: MEDICARE

## 2025-06-24 PROBLEM — I42.9 CARDIOMYOPATHY (HCC): Status: ACTIVE | Noted: 2025-06-24

## 2025-06-24 PROBLEM — D62 ACUTE BLOOD LOSS ANEMIA: Status: ACTIVE | Noted: 2025-06-24

## 2025-06-24 PROBLEM — D68.69 SECONDARY HYPERCOAGULABLE STATE: Status: ACTIVE | Noted: 2025-06-24

## 2025-06-24 LAB
ANION GAP SERPL CALCULATED.3IONS-SCNC: 13 MMOL/L (ref 9–16)
BNP SERPL-MCNC: ABNORMAL PG/ML (ref 0–450)
BUN SERPL-MCNC: 28 MG/DL (ref 8–23)
CALCIUM SERPL-MCNC: 8.5 MG/DL (ref 8.8–10.2)
CHLORIDE SERPL-SCNC: 101 MMOL/L (ref 98–107)
CO2 SERPL-SCNC: 22 MMOL/L (ref 20–31)
CREAT SERPL-MCNC: 1.5 MG/DL (ref 0.7–1.2)
EKG Q-T INTERVAL: 342 MS
EKG Q-T INTERVAL: 368 MS
EKG QRS DURATION: 106 MS
EKG QRS DURATION: 112 MS
EKG QTC CALCULATION (BAZETT): 427 MS
EKG QTC CALCULATION (BAZETT): 481 MS
EKG R AXIS: -37 DEGREES
EKG R AXIS: -6 DEGREES
EKG T AXIS: 69 DEGREES
EKG T AXIS: 90 DEGREES
EKG VENTRICULAR RATE: 119 BPM
EKG VENTRICULAR RATE: 81 BPM
ERYTHROCYTE [DISTWIDTH] IN BLOOD BY AUTOMATED COUNT: 22.5 % (ref 11.8–14.4)
ERYTHROCYTE [DISTWIDTH] IN BLOOD BY AUTOMATED COUNT: 23 % (ref 11.8–14.4)
GFR, ESTIMATED: 49 ML/MIN/1.73M2
GLUCOSE BLD-MCNC: 168 MG/DL (ref 75–110)
GLUCOSE SERPL-MCNC: 231 MG/DL (ref 82–115)
HCT VFR BLD AUTO: 18.4 % (ref 40.7–50.3)
HCT VFR BLD AUTO: 20.5 % (ref 40.7–50.3)
HCT VFR BLD AUTO: 21.1 % (ref 40.7–50.3)
HCT VFR BLD AUTO: 21.9 % (ref 40.7–50.3)
HGB BLD-MCNC: 6 G/DL (ref 13–17)
HGB BLD-MCNC: 6.8 G/DL (ref 13–17)
HGB BLD-MCNC: 7.2 G/DL (ref 13–17)
HGB BLD-MCNC: 7.3 G/DL (ref 13–17)
INR PPP: 1.4
INR PPP: 1.4
LACTATE BLDV-SCNC: 1.7 MMOL/L (ref 0.5–1.9)
LIPASE SERPL-CCNC: 18 U/L (ref 13–60)
MAGNESIUM SERPL-MCNC: 2.1 MG/DL (ref 1.6–2.4)
MCH RBC QN AUTO: 25.2 PG (ref 25.2–33.5)
MCH RBC QN AUTO: 25.5 PG (ref 25.2–33.5)
MCHC RBC AUTO-ENTMCNC: 32.6 G/DL (ref 28.4–34.8)
MCHC RBC AUTO-ENTMCNC: 33.3 G/DL (ref 28.4–34.8)
MCV RBC AUTO: 76.6 FL (ref 82.6–102.9)
MCV RBC AUTO: 77.3 FL (ref 82.6–102.9)
MICROORGANISM SPEC CULT: NO GROWTH
NRBC BLD-RTO: 0.4 PER 100 WBC
NRBC BLD-RTO: 0.5 PER 100 WBC
PARTIAL THROMBOPLASTIN TIME: 35.8 SEC (ref 23.9–33.8)
PLATELET # BLD AUTO: 162 K/UL (ref 138–453)
PLATELET # BLD AUTO: 253 K/UL (ref 138–453)
PMV BLD AUTO: 12.1 FL (ref 8.1–13.5)
PMV BLD AUTO: ABNORMAL FL (ref 8.1–13.5)
POTASSIUM SERPL-SCNC: 5 MMOL/L (ref 3.7–5.3)
PROTHROMBIN TIME: 17.4 SEC (ref 11.5–14.2)
PROTHROMBIN TIME: 17.5 SEC (ref 11.5–14.2)
RBC # BLD AUTO: 2.38 M/UL (ref 4.21–5.77)
RBC # BLD AUTO: 2.86 M/UL (ref 4.21–5.77)
SERVICE CMNT-IMP: NORMAL
SODIUM SERPL-SCNC: 136 MMOL/L (ref 136–145)
SPECIMEN DESCRIPTION: NORMAL
VANCOMYCIN SERPL-MCNC: 26.3 UG/ML (ref 5–40)
WBC OTHER # BLD: 11.5 K/UL (ref 3.5–11.3)
WBC OTHER # BLD: 8.9 K/UL (ref 3.5–11.3)

## 2025-06-24 PROCEDURE — 6370000000 HC RX 637 (ALT 250 FOR IP): Performed by: SURGERY

## 2025-06-24 PROCEDURE — 99232 SBSQ HOSP IP/OBS MODERATE 35: CPT | Performed by: INTERNAL MEDICINE

## 2025-06-24 PROCEDURE — 85610 PROTHROMBIN TIME: CPT

## 2025-06-24 PROCEDURE — 2500000003 HC RX 250 WO HCPCS: Performed by: INTERNAL MEDICINE

## 2025-06-24 PROCEDURE — 2580000003 HC RX 258: Performed by: INTERNAL MEDICINE

## 2025-06-24 PROCEDURE — 85730 THROMBOPLASTIN TIME PARTIAL: CPT

## 2025-06-24 PROCEDURE — 6370000000 HC RX 637 (ALT 250 FOR IP): Performed by: INTERNAL MEDICINE

## 2025-06-24 PROCEDURE — 6370000000 HC RX 637 (ALT 250 FOR IP)

## 2025-06-24 PROCEDURE — 6360000002 HC RX W HCPCS: Performed by: INTERNAL MEDICINE

## 2025-06-24 PROCEDURE — 2700000000 HC OXYGEN THERAPY PER DAY

## 2025-06-24 PROCEDURE — 71045 X-RAY EXAM CHEST 1 VIEW: CPT

## 2025-06-24 PROCEDURE — 6360000002 HC RX W HCPCS: Performed by: SURGERY

## 2025-06-24 PROCEDURE — P9016 RBC LEUKOCYTES REDUCED: HCPCS

## 2025-06-24 PROCEDURE — 93005 ELECTROCARDIOGRAM TRACING: CPT

## 2025-06-24 PROCEDURE — P9017 PLASMA 1 DONOR FRZ W/IN 8 HR: HCPCS

## 2025-06-24 PROCEDURE — 6360000002 HC RX W HCPCS

## 2025-06-24 PROCEDURE — 83880 ASSAY OF NATRIURETIC PEPTIDE: CPT

## 2025-06-24 PROCEDURE — 83735 ASSAY OF MAGNESIUM: CPT

## 2025-06-24 PROCEDURE — 93010 ELECTROCARDIOGRAM REPORT: CPT | Performed by: INTERNAL MEDICINE

## 2025-06-24 PROCEDURE — 36430 TRANSFUSION BLD/BLD COMPNT: CPT

## 2025-06-24 PROCEDURE — 83690 ASSAY OF LIPASE: CPT

## 2025-06-24 PROCEDURE — 94761 N-INVAS EAR/PLS OXIMETRY MLT: CPT

## 2025-06-24 PROCEDURE — 85014 HEMATOCRIT: CPT

## 2025-06-24 PROCEDURE — 82947 ASSAY GLUCOSE BLOOD QUANT: CPT

## 2025-06-24 PROCEDURE — 80048 BASIC METABOLIC PNL TOTAL CA: CPT

## 2025-06-24 PROCEDURE — 2500000003 HC RX 250 WO HCPCS

## 2025-06-24 PROCEDURE — 86927 PLASMA FRESH FROZEN: CPT

## 2025-06-24 PROCEDURE — 83605 ASSAY OF LACTIC ACID: CPT

## 2025-06-24 PROCEDURE — 85018 HEMOGLOBIN: CPT

## 2025-06-24 PROCEDURE — 85027 COMPLETE CBC AUTOMATED: CPT

## 2025-06-24 PROCEDURE — 2000000000 HC ICU R&B

## 2025-06-24 PROCEDURE — 80202 ASSAY OF VANCOMYCIN: CPT

## 2025-06-24 RX ORDER — PHYTONADIONE 5 MG/1
10 TABLET ORAL ONCE
Status: COMPLETED | OUTPATIENT
Start: 2025-06-24 | End: 2025-06-24

## 2025-06-24 RX ORDER — CALCIUM CARBONATE 500 MG/1
500 TABLET, CHEWABLE ORAL ONCE
Status: COMPLETED | OUTPATIENT
Start: 2025-06-24 | End: 2025-06-24

## 2025-06-24 RX ORDER — SODIUM CHLORIDE 9 MG/ML
INJECTION, SOLUTION INTRAVENOUS PRN
Status: DISCONTINUED | OUTPATIENT
Start: 2025-06-24 | End: 2025-07-02 | Stop reason: HOSPADM

## 2025-06-24 RX ORDER — DIPHENHYDRAMINE HYDROCHLORIDE 50 MG/ML
25 INJECTION, SOLUTION INTRAMUSCULAR; INTRAVENOUS EVERY 6 HOURS PRN
Status: DISCONTINUED | OUTPATIENT
Start: 2025-06-24 | End: 2025-07-02 | Stop reason: HOSPADM

## 2025-06-24 RX ORDER — SODIUM CHLORIDE 9 MG/ML
INJECTION, SOLUTION INTRAVENOUS PRN
Status: DISCONTINUED | OUTPATIENT
Start: 2025-06-24 | End: 2025-06-24

## 2025-06-24 RX ORDER — ONDANSETRON 2 MG/ML
4 INJECTION INTRAMUSCULAR; INTRAVENOUS EVERY 6 HOURS PRN
Status: DISCONTINUED | OUTPATIENT
Start: 2025-06-24 | End: 2025-07-02 | Stop reason: HOSPADM

## 2025-06-24 RX ORDER — FUROSEMIDE 10 MG/ML
20 INJECTION INTRAMUSCULAR; INTRAVENOUS ONCE
Status: COMPLETED | OUTPATIENT
Start: 2025-06-24 | End: 2025-06-24

## 2025-06-24 RX ADMIN — METOPROLOL TARTRATE 25 MG: 25 TABLET, FILM COATED ORAL at 08:37

## 2025-06-24 RX ADMIN — FUROSEMIDE 20 MG: 10 INJECTION, SOLUTION INTRAMUSCULAR; INTRAVENOUS at 18:55

## 2025-06-24 RX ADMIN — SENNOSIDES AND DOCUSATE SODIUM 1 TABLET: 50; 8.6 TABLET ORAL at 08:37

## 2025-06-24 RX ADMIN — HYDROMORPHONE HYDROCHLORIDE 0.5 MG: 1 INJECTION, SOLUTION INTRAMUSCULAR; INTRAVENOUS; SUBCUTANEOUS at 02:01

## 2025-06-24 RX ADMIN — PIPERACILLIN AND TAZOBACTAM 3375 MG: 3; .375 INJECTION, POWDER, LYOPHILIZED, FOR SOLUTION INTRAVENOUS at 15:01

## 2025-06-24 RX ADMIN — Medication 20 MCG/MIN: at 02:43

## 2025-06-24 RX ADMIN — FUROSEMIDE 20 MG: 10 INJECTION, SOLUTION INTRAMUSCULAR; INTRAVENOUS at 08:37

## 2025-06-24 RX ADMIN — ALLOPURINOL 300 MG: 300 TABLET ORAL at 08:37

## 2025-06-24 RX ADMIN — SENNOSIDES AND DOCUSATE SODIUM 1 TABLET: 50; 8.6 TABLET ORAL at 21:43

## 2025-06-24 RX ADMIN — PIPERACILLIN AND TAZOBACTAM 3375 MG: 3; .375 INJECTION, POWDER, LYOPHILIZED, FOR SOLUTION INTRAVENOUS at 05:52

## 2025-06-24 RX ADMIN — PIPERACILLIN AND TAZOBACTAM 3375 MG: 3; .375 INJECTION, POWDER, LYOPHILIZED, FOR SOLUTION INTRAVENOUS at 21:45

## 2025-06-24 RX ADMIN — ATORVASTATIN CALCIUM 40 MG: 40 TABLET, FILM COATED ORAL at 08:36

## 2025-06-24 RX ADMIN — Medication 6 MG: at 21:42

## 2025-06-24 RX ADMIN — SODIUM CHLORIDE, PRESERVATIVE FREE 20 ML: 5 INJECTION INTRAVENOUS at 12:19

## 2025-06-24 RX ADMIN — HYDROMORPHONE HYDROCHLORIDE 0.25 MG: 1 INJECTION, SOLUTION INTRAMUSCULAR; INTRAVENOUS; SUBCUTANEOUS at 21:43

## 2025-06-24 RX ADMIN — VANCOMYCIN HYDROCHLORIDE 750 MG: 750 INJECTION, POWDER, LYOPHILIZED, FOR SOLUTION INTRAVENOUS at 06:41

## 2025-06-24 RX ADMIN — TAMSULOSIN HYDROCHLORIDE 0.4 MG: 0.4 CAPSULE ORAL at 08:37

## 2025-06-24 RX ADMIN — SODIUM CHLORIDE, PRESERVATIVE FREE 10 ML: 5 INJECTION INTRAVENOUS at 21:43

## 2025-06-24 RX ADMIN — PHYTONADIONE 10 MG: 5 TABLET ORAL at 17:25

## 2025-06-24 RX ADMIN — PANTOPRAZOLE SODIUM 40 MG: 40 TABLET, DELAYED RELEASE ORAL at 06:41

## 2025-06-24 RX ADMIN — SERTRALINE 25 MG: 25 TABLET, FILM COATED ORAL at 08:37

## 2025-06-24 RX ADMIN — Medication 500 MG: at 05:15

## 2025-06-24 ASSESSMENT — PAIN DESCRIPTION - DESCRIPTORS
DESCRIPTORS: ACHING

## 2025-06-24 ASSESSMENT — PAIN DESCRIPTION - LOCATION
LOCATION: ABDOMEN

## 2025-06-24 ASSESSMENT — PAIN SCALES - GENERAL
PAINLEVEL_OUTOF10: 3
PAINLEVEL_OUTOF10: 0
PAINLEVEL_OUTOF10: 3
PAINLEVEL_OUTOF10: 0
PAINLEVEL_OUTOF10: 3
PAINLEVEL_OUTOF10: 7
PAINLEVEL_OUTOF10: 5

## 2025-06-24 ASSESSMENT — PAIN - FUNCTIONAL ASSESSMENT
PAIN_FUNCTIONAL_ASSESSMENT: ACTIVITIES ARE NOT PREVENTED
PAIN_FUNCTIONAL_ASSESSMENT: ACTIVITIES ARE NOT PREVENTED
PAIN_FUNCTIONAL_ASSESSMENT: PREVENTS OR INTERFERES SOME ACTIVE ACTIVITIES AND ADLS
PAIN_FUNCTIONAL_ASSESSMENT: ACTIVITIES ARE NOT PREVENTED

## 2025-06-24 ASSESSMENT — PAIN DESCRIPTION - ONSET: ONSET: ON-GOING

## 2025-06-24 ASSESSMENT — PAIN DESCRIPTION - FREQUENCY: FREQUENCY: CONTINUOUS

## 2025-06-24 ASSESSMENT — PAIN DESCRIPTION - PAIN TYPE
TYPE: SURGICAL PAIN

## 2025-06-24 ASSESSMENT — PAIN DESCRIPTION - ORIENTATION
ORIENTATION: MID

## 2025-06-25 ENCOUNTER — APPOINTMENT (OUTPATIENT)
Age: 80
End: 2025-06-25
Payer: MEDICARE

## 2025-06-25 DIAGNOSIS — I63.9 ACUTE ISCHEMIC STROKE (HCC): Primary | ICD-10-CM

## 2025-06-25 DIAGNOSIS — I63.9 STROKE OF UNKNOWN CAUSE (HCC): ICD-10-CM

## 2025-06-25 LAB
ANION GAP SERPL CALCULATED.3IONS-SCNC: 12 MMOL/L (ref 9–16)
BLOOD BANK DISPENSE STATUS: NORMAL
BLOOD BANK DISPENSE STATUS: NORMAL
BPU ID: NORMAL
BPU ID: NORMAL
BUN SERPL-MCNC: 36 MG/DL (ref 8–23)
CALCIUM SERPL-MCNC: 8.5 MG/DL (ref 8.8–10.2)
CHLORIDE SERPL-SCNC: 100 MMOL/L (ref 98–107)
CO2 SERPL-SCNC: 25 MMOL/L (ref 20–31)
COMPONENT: NORMAL
COMPONENT: NORMAL
CREAT SERPL-MCNC: 2.5 MG/DL (ref 0.7–1.2)
GFR, ESTIMATED: 26 ML/MIN/1.73M2
GLUCOSE SERPL-MCNC: 150 MG/DL (ref 82–115)
HCT VFR BLD AUTO: 20.9 % (ref 40.7–50.3)
HCT VFR BLD AUTO: 22.5 % (ref 40.7–50.3)
HCT VFR BLD AUTO: 23.6 % (ref 40.7–50.3)
HGB BLD-MCNC: 6.9 G/DL (ref 13–17)
HGB BLD-MCNC: 7.4 G/DL (ref 13–17)
HGB BLD-MCNC: 7.8 G/DL (ref 13–17)
INR PPP: 1.3
INR PPP: 1.3
PARTIAL THROMBOPLASTIN TIME: 32 SEC (ref 23.9–33.8)
PARTIAL THROMBOPLASTIN TIME: 33.5 SEC (ref 23.9–33.8)
POTASSIUM SERPL-SCNC: 4.7 MMOL/L (ref 3.7–5.3)
PROTHROMBIN TIME: 16.2 SEC (ref 11.5–14.2)
PROTHROMBIN TIME: 16.4 SEC (ref 11.5–14.2)
SODIUM SERPL-SCNC: 137 MMOL/L (ref 136–145)
TRANSFUSION STATUS: NORMAL
TRANSFUSION STATUS: NORMAL
UNIT DIVISION: 0
UNIT DIVISION: 0
VANCOMYCIN SERPL-MCNC: 22 UG/ML (ref 5–40)

## 2025-06-25 PROCEDURE — 97168 OT RE-EVAL EST PLAN CARE: CPT

## 2025-06-25 PROCEDURE — 2580000003 HC RX 258: Performed by: INTERNAL MEDICINE

## 2025-06-25 PROCEDURE — 97164 PT RE-EVAL EST PLAN CARE: CPT

## 2025-06-25 PROCEDURE — 6360000002 HC RX W HCPCS

## 2025-06-25 PROCEDURE — 85018 HEMOGLOBIN: CPT

## 2025-06-25 PROCEDURE — P9016 RBC LEUKOCYTES REDUCED: HCPCS

## 2025-06-25 PROCEDURE — 2000000000 HC ICU R&B

## 2025-06-25 PROCEDURE — 2700000000 HC OXYGEN THERAPY PER DAY

## 2025-06-25 PROCEDURE — 6370000000 HC RX 637 (ALT 250 FOR IP): Performed by: INTERNAL MEDICINE

## 2025-06-25 PROCEDURE — 97110 THERAPEUTIC EXERCISES: CPT

## 2025-06-25 PROCEDURE — 99232 SBSQ HOSP IP/OBS MODERATE 35: CPT | Performed by: INTERNAL MEDICINE

## 2025-06-25 PROCEDURE — 97530 THERAPEUTIC ACTIVITIES: CPT

## 2025-06-25 PROCEDURE — 36430 TRANSFUSION BLD/BLD COMPNT: CPT

## 2025-06-25 PROCEDURE — 6360000002 HC RX W HCPCS: Performed by: INTERNAL MEDICINE

## 2025-06-25 PROCEDURE — 6370000000 HC RX 637 (ALT 250 FOR IP): Performed by: SURGERY

## 2025-06-25 PROCEDURE — 97535 SELF CARE MNGMENT TRAINING: CPT

## 2025-06-25 PROCEDURE — 2500000003 HC RX 250 WO HCPCS: Performed by: INTERNAL MEDICINE

## 2025-06-25 PROCEDURE — 80048 BASIC METABOLIC PNL TOTAL CA: CPT

## 2025-06-25 PROCEDURE — 80202 ASSAY OF VANCOMYCIN: CPT

## 2025-06-25 PROCEDURE — 94761 N-INVAS EAR/PLS OXIMETRY MLT: CPT

## 2025-06-25 PROCEDURE — 85610 PROTHROMBIN TIME: CPT

## 2025-06-25 PROCEDURE — 2580000003 HC RX 258

## 2025-06-25 PROCEDURE — 85014 HEMATOCRIT: CPT

## 2025-06-25 PROCEDURE — 85730 THROMBOPLASTIN TIME PARTIAL: CPT

## 2025-06-25 RX ORDER — SODIUM CHLORIDE 9 MG/ML
INJECTION, SOLUTION INTRAVENOUS CONTINUOUS
Status: DISCONTINUED | OUTPATIENT
Start: 2025-06-25 | End: 2025-06-26

## 2025-06-25 RX ORDER — CALCIUM CARBONATE 500 MG/1
1000 TABLET, CHEWABLE ORAL 3 TIMES DAILY PRN
Status: DISCONTINUED | OUTPATIENT
Start: 2025-06-25 | End: 2025-07-02 | Stop reason: HOSPADM

## 2025-06-25 RX ORDER — SODIUM CHLORIDE 9 MG/ML
INJECTION, SOLUTION INTRAVENOUS PRN
Status: COMPLETED | OUTPATIENT
Start: 2025-06-25 | End: 2025-06-25

## 2025-06-25 RX ADMIN — SODIUM CHLORIDE: 0.9 INJECTION, SOLUTION INTRAVENOUS at 06:39

## 2025-06-25 RX ADMIN — SERTRALINE 25 MG: 25 TABLET, FILM COATED ORAL at 09:05

## 2025-06-25 RX ADMIN — SENNOSIDES AND DOCUSATE SODIUM 1 TABLET: 50; 8.6 TABLET ORAL at 09:05

## 2025-06-25 RX ADMIN — Medication 6 MG: at 21:23

## 2025-06-25 RX ADMIN — ALLOPURINOL 300 MG: 300 TABLET ORAL at 09:04

## 2025-06-25 RX ADMIN — PIPERACILLIN AND TAZOBACTAM 3375 MG: 3; .375 INJECTION, POWDER, LYOPHILIZED, FOR SOLUTION INTRAVENOUS at 14:23

## 2025-06-25 RX ADMIN — SODIUM CHLORIDE, PRESERVATIVE FREE 10 ML: 5 INJECTION INTRAVENOUS at 09:12

## 2025-06-25 RX ADMIN — SODIUM CHLORIDE, PRESERVATIVE FREE 10 ML: 5 INJECTION INTRAVENOUS at 09:27

## 2025-06-25 RX ADMIN — SODIUM CHLORIDE: 0.9 INJECTION, SOLUTION INTRAVENOUS at 15:47

## 2025-06-25 RX ADMIN — HYDROMORPHONE HYDROCHLORIDE 0.5 MG: 1 INJECTION, SOLUTION INTRAMUSCULAR; INTRAVENOUS; SUBCUTANEOUS at 04:56

## 2025-06-25 RX ADMIN — ATORVASTATIN CALCIUM 40 MG: 40 TABLET, FILM COATED ORAL at 09:04

## 2025-06-25 RX ADMIN — PIPERACILLIN AND TAZOBACTAM 3375 MG: 3; .375 INJECTION, POWDER, LYOPHILIZED, FOR SOLUTION INTRAVENOUS at 06:34

## 2025-06-25 RX ADMIN — Medication 1000 MG: at 08:59

## 2025-06-25 RX ADMIN — SODIUM CHLORIDE, PRESERVATIVE FREE 10 ML: 5 INJECTION INTRAVENOUS at 21:24

## 2025-06-25 RX ADMIN — SODIUM CHLORIDE: 0.9 INJECTION, SOLUTION INTRAVENOUS at 21:27

## 2025-06-25 RX ADMIN — PIPERACILLIN AND TAZOBACTAM 3375 MG: 3; .375 INJECTION, POWDER, LYOPHILIZED, FOR SOLUTION INTRAVENOUS at 21:29

## 2025-06-25 RX ADMIN — PANTOPRAZOLE SODIUM 40 MG: 40 TABLET, DELAYED RELEASE ORAL at 06:43

## 2025-06-25 RX ADMIN — SODIUM CHLORIDE, PRESERVATIVE FREE 10 ML: 5 INJECTION INTRAVENOUS at 15:39

## 2025-06-25 RX ADMIN — TAMSULOSIN HYDROCHLORIDE 0.4 MG: 0.4 CAPSULE ORAL at 09:05

## 2025-06-25 RX ADMIN — SENNOSIDES AND DOCUSATE SODIUM 1 TABLET: 50; 8.6 TABLET ORAL at 21:24

## 2025-06-25 ASSESSMENT — PAIN DESCRIPTION - DESCRIPTORS: DESCRIPTORS: ACHING;DISCOMFORT

## 2025-06-25 ASSESSMENT — PAIN DESCRIPTION - LOCATION: LOCATION: ABDOMEN

## 2025-06-25 ASSESSMENT — PAIN DESCRIPTION - PAIN TYPE: TYPE: SURGICAL PAIN

## 2025-06-25 ASSESSMENT — PAIN SCALES - GENERAL
PAINLEVEL_OUTOF10: 10
PAINLEVEL_OUTOF10: 0

## 2025-06-25 ASSESSMENT — PAIN DESCRIPTION - FREQUENCY: FREQUENCY: CONTINUOUS

## 2025-06-25 ASSESSMENT — PAIN - FUNCTIONAL ASSESSMENT: PAIN_FUNCTIONAL_ASSESSMENT: PREVENTS OR INTERFERES SOME ACTIVE ACTIVITIES AND ADLS

## 2025-06-25 ASSESSMENT — PAIN DESCRIPTION - ONSET: ONSET: ON-GOING

## 2025-06-25 ASSESSMENT — PAIN DESCRIPTION - ORIENTATION: ORIENTATION: MID

## 2025-06-25 NOTE — CARE COORDINATION
Discharge planning    Accepted at [facility]. On 2L NC. Crt 2.5. HGB checks q6hrs. Lap calvin done on 6/23.

## 2025-06-26 ENCOUNTER — APPOINTMENT (OUTPATIENT)
Dept: GENERAL RADIOLOGY | Age: 80
DRG: 417 | End: 2025-06-26
Attending: INTERNAL MEDICINE
Payer: MEDICARE

## 2025-06-26 ENCOUNTER — ANESTHESIA (OUTPATIENT)
Dept: OPERATING ROOM | Age: 80
End: 2025-06-26
Payer: MEDICARE

## 2025-06-26 ENCOUNTER — ANESTHESIA EVENT (OUTPATIENT)
Dept: OPERATING ROOM | Age: 80
End: 2025-06-26
Payer: MEDICARE

## 2025-06-26 ENCOUNTER — APPOINTMENT (OUTPATIENT)
Dept: ULTRASOUND IMAGING | Age: 80
DRG: 417 | End: 2025-06-26
Attending: INTERNAL MEDICINE
Payer: MEDICARE

## 2025-06-26 PROBLEM — N17.9 AKI (ACUTE KIDNEY INJURY): Status: ACTIVE | Noted: 2025-06-26

## 2025-06-26 LAB
ABO/RH: NORMAL
ALBUMIN SERPL-MCNC: 2.7 G/DL (ref 3.5–5.2)
ALBUMIN/GLOB SERPL: 1 {RATIO} (ref 1–2.5)
ALP SERPL-CCNC: 103 U/L (ref 40–129)
ALT SERPL-CCNC: 15 U/L (ref 10–50)
ANION GAP SERPL CALCULATED.3IONS-SCNC: 12 MMOL/L (ref 9–16)
ANTIBODY SCREEN: NEGATIVE
ARM BAND NUMBER: NORMAL
AST SERPL-CCNC: 41 U/L (ref 10–50)
BASOPHILS # BLD: 0 K/UL (ref 0–0.2)
BASOPHILS NFR BLD: 0 % (ref 0–2)
BILIRUB DIRECT SERPL-MCNC: 0.5 MG/DL (ref 0–0.2)
BILIRUB INDIRECT SERPL-MCNC: 0.4 MG/DL
BILIRUB SERPL-MCNC: 0.9 MG/DL (ref 0–1.2)
BLOOD BANK BLOOD PRODUCT EXPIRATION DATE: NORMAL
BLOOD BANK DISPENSE STATUS: NORMAL
BLOOD BANK ISBT PRODUCT BLOOD TYPE: 6200
BLOOD BANK PRODUCT CODE: NORMAL
BLOOD BANK SAMPLE EXPIRATION: NORMAL
BLOOD BANK UNIT TYPE AND RH: NORMAL
BPU ID: NORMAL
BUN SERPL-MCNC: 40 MG/DL (ref 8–23)
C3 SERPL-MCNC: 117 MG/DL (ref 90–180)
C4 SERPL-MCNC: 25 MG/DL (ref 10–40)
CALCIUM SERPL-MCNC: 8.4 MG/DL (ref 8.8–10.2)
CHLORIDE SERPL-SCNC: 103 MMOL/L (ref 98–107)
CO2 SERPL-SCNC: 22 MMOL/L (ref 20–31)
COMPONENT: NORMAL
CREAT SERPL-MCNC: 3 MG/DL (ref 0.7–1.2)
CREAT UR-MCNC: 68.4 MG/DL (ref 39–259)
CROSSMATCH RESULT: NORMAL
EOSINOPHIL # BLD: 0.21 K/UL (ref 0–0.44)
EOSINOPHIL,URINE: NORMAL
EOSINOPHILS RELATIVE PERCENT: 3 % (ref 1–4)
ERYTHROCYTE [DISTWIDTH] IN BLOOD BY AUTOMATED COUNT: 21.3 % (ref 11.8–14.4)
FREE KAPPA/LAMBDA RATIO: 0.49 (ref 0.22–1.74)
GFR, ESTIMATED: 21 ML/MIN/1.73M2
GLUCOSE SERPL-MCNC: 111 MG/DL (ref 82–115)
HCT VFR BLD AUTO: 23.6 % (ref 40.7–50.3)
HCT VFR BLD AUTO: 23.7 % (ref 40.7–50.3)
HCT VFR BLD AUTO: 24.7 % (ref 40.7–50.3)
HCT VFR BLD AUTO: 26.8 % (ref 40.7–50.3)
HGB BLD-MCNC: 7.8 G/DL (ref 13–17)
HGB BLD-MCNC: 7.8 G/DL (ref 13–17)
HGB BLD-MCNC: 8.1 G/DL (ref 13–17)
HGB BLD-MCNC: 8.6 G/DL (ref 13–17)
IMM GRANULOCYTES # BLD AUTO: 0.07 K/UL (ref 0–0.3)
IMM GRANULOCYTES NFR BLD: 1 %
INR PPP: 1.2
KAPPA LC FREE SER-MCNC: 143 MG/L
LAMBDA LC FREE SERPL-MCNC: 291 MG/L (ref 4.2–27.7)
LYMPHOCYTES NFR BLD: 1.07 K/UL (ref 1.1–3.7)
LYMPHOCYTES RELATIVE PERCENT: 15 % (ref 24–43)
MCH RBC QN AUTO: 27 PG (ref 25.2–33.5)
MCHC RBC AUTO-ENTMCNC: 32.9 G/DL (ref 28.4–34.8)
MCV RBC AUTO: 82 FL (ref 82.6–102.9)
MONOCYTES NFR BLD: 0.43 K/UL (ref 0.1–1.2)
MONOCYTES NFR BLD: 6 % (ref 3–12)
MORPHOLOGY: ABNORMAL
NEUTROPHILS NFR BLD: 75 % (ref 36–65)
NEUTS SEG NFR BLD: 5.32 K/UL (ref 1.5–8.1)
NRBC BLD-RTO: 0.8 PER 100 WBC
PLATELET # BLD AUTO: 119 K/UL (ref 138–453)
PMV BLD AUTO: ABNORMAL FL (ref 8.1–13.5)
POTASSIUM SERPL-SCNC: 4.2 MMOL/L (ref 3.7–5.3)
PROT SERPL-MCNC: 5.5 G/DL (ref 6.6–8.7)
PROTHROMBIN TIME: 16.1 SEC (ref 11.5–14.2)
RBC # BLD AUTO: 2.89 M/UL (ref 4.21–5.77)
SODIUM SERPL-SCNC: 138 MMOL/L (ref 136–145)
SODIUM UR-SCNC: 74 MMOL/L
SURGICAL PATHOLOGY REPORT: NORMAL
TRANSFUSION STATUS: NORMAL
UNIT DIVISION: 0
UNIT ISSUE DATE/TIME: NORMAL
WBC OTHER # BLD: 7.1 K/UL (ref 3.5–11.3)

## 2025-06-26 PROCEDURE — 85610 PROTHROMBIN TIME: CPT

## 2025-06-26 PROCEDURE — 2700000000 HC OXYGEN THERAPY PER DAY

## 2025-06-26 PROCEDURE — 87205 SMEAR GRAM STAIN: CPT

## 2025-06-26 PROCEDURE — 2060000000 HC ICU INTERMEDIATE R&B

## 2025-06-26 PROCEDURE — 86160 COMPLEMENT ANTIGEN: CPT

## 2025-06-26 PROCEDURE — 6360000002 HC RX W HCPCS

## 2025-06-26 PROCEDURE — 85025 COMPLETE CBC W/AUTO DIFF WBC: CPT

## 2025-06-26 PROCEDURE — 3700000001 HC ADD 15 MINUTES (ANESTHESIA): Performed by: INTERNAL MEDICINE

## 2025-06-26 PROCEDURE — 0DB48ZX EXCISION OF ESOPHAGOGASTRIC JUNCTION, VIA NATURAL OR ARTIFICIAL OPENING ENDOSCOPIC, DIAGNOSTIC: ICD-10-PCS | Performed by: INTERNAL MEDICINE

## 2025-06-26 PROCEDURE — 99232 SBSQ HOSP IP/OBS MODERATE 35: CPT | Performed by: INTERNAL MEDICINE

## 2025-06-26 PROCEDURE — 3609012400 HC EGD TRANSORAL BIOPSY SINGLE/MULTIPLE: Performed by: INTERNAL MEDICINE

## 2025-06-26 PROCEDURE — 83516 IMMUNOASSAY NONANTIBODY: CPT

## 2025-06-26 PROCEDURE — 84155 ASSAY OF PROTEIN SERUM: CPT

## 2025-06-26 PROCEDURE — 97535 SELF CARE MNGMENT TRAINING: CPT

## 2025-06-26 PROCEDURE — 84300 ASSAY OF URINE SODIUM: CPT

## 2025-06-26 PROCEDURE — 97116 GAIT TRAINING THERAPY: CPT

## 2025-06-26 PROCEDURE — 3609017100 HC EGD: Performed by: INTERNAL MEDICINE

## 2025-06-26 PROCEDURE — 7100000000 HC PACU RECOVERY - FIRST 15 MIN: Performed by: INTERNAL MEDICINE

## 2025-06-26 PROCEDURE — 88305 TISSUE EXAM BY PATHOLOGIST: CPT

## 2025-06-26 PROCEDURE — 97110 THERAPEUTIC EXERCISES: CPT

## 2025-06-26 PROCEDURE — 80076 HEPATIC FUNCTION PANEL: CPT

## 2025-06-26 PROCEDURE — 36415 COLL VENOUS BLD VENIPUNCTURE: CPT

## 2025-06-26 PROCEDURE — 2500000003 HC RX 250 WO HCPCS: Performed by: INTERNAL MEDICINE

## 2025-06-26 PROCEDURE — 2709999900 HC NON-CHARGEABLE SUPPLY: Performed by: INTERNAL MEDICINE

## 2025-06-26 PROCEDURE — 82570 ASSAY OF URINE CREATININE: CPT

## 2025-06-26 PROCEDURE — 83521 IG LIGHT CHAINS FREE EACH: CPT

## 2025-06-26 PROCEDURE — 6360000002 HC RX W HCPCS: Performed by: INTERNAL MEDICINE

## 2025-06-26 PROCEDURE — 6370000000 HC RX 637 (ALT 250 FOR IP): Performed by: INTERNAL MEDICINE

## 2025-06-26 PROCEDURE — 80048 BASIC METABOLIC PNL TOTAL CA: CPT

## 2025-06-26 PROCEDURE — 0DB68ZX EXCISION OF STOMACH, VIA NATURAL OR ARTIFICIAL OPENING ENDOSCOPIC, DIAGNOSTIC: ICD-10-PCS | Performed by: INTERNAL MEDICINE

## 2025-06-26 PROCEDURE — 97530 THERAPEUTIC ACTIVITIES: CPT

## 2025-06-26 PROCEDURE — 2580000003 HC RX 258: Performed by: INTERNAL MEDICINE

## 2025-06-26 PROCEDURE — 94761 N-INVAS EAR/PLS OXIMETRY MLT: CPT

## 2025-06-26 PROCEDURE — 7100000001 HC PACU RECOVERY - ADDTL 15 MIN: Performed by: INTERNAL MEDICINE

## 2025-06-26 PROCEDURE — 6360000002 HC RX W HCPCS: Performed by: SPECIALIST

## 2025-06-26 PROCEDURE — 86225 DNA ANTIBODY NATIVE: CPT

## 2025-06-26 PROCEDURE — 85018 HEMOGLOBIN: CPT

## 2025-06-26 PROCEDURE — 71045 X-RAY EXAM CHEST 1 VIEW: CPT

## 2025-06-26 PROCEDURE — 3700000000 HC ANESTHESIA ATTENDED CARE: Performed by: INTERNAL MEDICINE

## 2025-06-26 PROCEDURE — 2580000003 HC RX 258: Performed by: SPECIALIST

## 2025-06-26 PROCEDURE — 0DB98ZX EXCISION OF DUODENUM, VIA NATURAL OR ARTIFICIAL OPENING ENDOSCOPIC, DIAGNOSTIC: ICD-10-PCS | Performed by: INTERNAL MEDICINE

## 2025-06-26 PROCEDURE — 84165 PROTEIN E-PHORESIS SERUM: CPT

## 2025-06-26 PROCEDURE — 85014 HEMATOCRIT: CPT

## 2025-06-26 PROCEDURE — 86038 ANTINUCLEAR ANTIBODIES: CPT

## 2025-06-26 PROCEDURE — 6370000000 HC RX 637 (ALT 250 FOR IP): Performed by: SURGERY

## 2025-06-26 RX ORDER — SODIUM CHLORIDE 0.9 % (FLUSH) 0.9 %
5-40 SYRINGE (ML) INJECTION EVERY 12 HOURS SCHEDULED
Status: CANCELLED | OUTPATIENT
Start: 2025-06-26

## 2025-06-26 RX ORDER — POLYETHYLENE GLYCOL 3350 17 G/17G
17 POWDER, FOR SOLUTION ORAL DAILY
Status: DISCONTINUED | OUTPATIENT
Start: 2025-06-26 | End: 2025-07-02 | Stop reason: HOSPADM

## 2025-06-26 RX ORDER — SODIUM CHLORIDE 0.9 % (FLUSH) 0.9 %
5-40 SYRINGE (ML) INJECTION PRN
Status: CANCELLED | OUTPATIENT
Start: 2025-06-26

## 2025-06-26 RX ORDER — NALOXONE HYDROCHLORIDE 0.4 MG/ML
INJECTION, SOLUTION INTRAMUSCULAR; INTRAVENOUS; SUBCUTANEOUS PRN
Status: CANCELLED | OUTPATIENT
Start: 2025-06-26

## 2025-06-26 RX ORDER — METOCLOPRAMIDE HYDROCHLORIDE 5 MG/ML
10 INJECTION INTRAMUSCULAR; INTRAVENOUS
Status: CANCELLED | OUTPATIENT
Start: 2025-06-26

## 2025-06-26 RX ORDER — SENNA AND DOCUSATE SODIUM 50; 8.6 MG/1; MG/1
2 TABLET, FILM COATED ORAL 2 TIMES DAILY
Status: DISCONTINUED | OUTPATIENT
Start: 2025-06-26 | End: 2025-07-02 | Stop reason: HOSPADM

## 2025-06-26 RX ORDER — PROPOFOL 10 MG/ML
INJECTION, EMULSION INTRAVENOUS
Status: DISCONTINUED | OUTPATIENT
Start: 2025-06-26 | End: 2025-06-26 | Stop reason: SDUPTHER

## 2025-06-26 RX ORDER — FUROSEMIDE 10 MG/ML
20 INJECTION INTRAMUSCULAR; INTRAVENOUS 2 TIMES DAILY
Status: DISCONTINUED | OUTPATIENT
Start: 2025-06-26 | End: 2025-06-26

## 2025-06-26 RX ORDER — HYDROMORPHONE HYDROCHLORIDE 1 MG/ML
0.5 INJECTION, SOLUTION INTRAMUSCULAR; INTRAVENOUS; SUBCUTANEOUS EVERY 5 MIN PRN
Refills: 0 | Status: CANCELLED | OUTPATIENT
Start: 2025-06-26

## 2025-06-26 RX ORDER — LIDOCAINE HYDROCHLORIDE 20 MG/ML
INJECTION, SOLUTION EPIDURAL; INFILTRATION; INTRACAUDAL; PERINEURAL
Status: DISCONTINUED | OUTPATIENT
Start: 2025-06-26 | End: 2025-06-26 | Stop reason: SDUPTHER

## 2025-06-26 RX ORDER — OXYCODONE HYDROCHLORIDE 5 MG/1
5 TABLET ORAL
Refills: 0 | Status: CANCELLED | OUTPATIENT
Start: 2025-06-26

## 2025-06-26 RX ORDER — BUMETANIDE 0.25 MG/ML
2 INJECTION, SOLUTION INTRAMUSCULAR; INTRAVENOUS 2 TIMES DAILY
Status: DISCONTINUED | OUTPATIENT
Start: 2025-06-26 | End: 2025-06-30

## 2025-06-26 RX ORDER — ONDANSETRON 2 MG/ML
4 INJECTION INTRAMUSCULAR; INTRAVENOUS
Status: CANCELLED | OUTPATIENT
Start: 2025-06-26

## 2025-06-26 RX ORDER — SODIUM CHLORIDE 9 MG/ML
INJECTION, SOLUTION INTRAVENOUS PRN
Status: CANCELLED | OUTPATIENT
Start: 2025-06-26

## 2025-06-26 RX ORDER — MIDODRINE HYDROCHLORIDE 5 MG/1
5 TABLET ORAL
Status: DISCONTINUED | OUTPATIENT
Start: 2025-06-26 | End: 2025-06-27

## 2025-06-26 RX ORDER — FENTANYL CITRATE 50 UG/ML
25 INJECTION, SOLUTION INTRAMUSCULAR; INTRAVENOUS EVERY 5 MIN PRN
Refills: 0 | Status: CANCELLED | OUTPATIENT
Start: 2025-06-26

## 2025-06-26 RX ORDER — SODIUM CHLORIDE 9 MG/ML
INJECTION, SOLUTION INTRAVENOUS
Status: DISCONTINUED | OUTPATIENT
Start: 2025-06-26 | End: 2025-06-26 | Stop reason: SDUPTHER

## 2025-06-26 RX ADMIN — TAMSULOSIN HYDROCHLORIDE 0.4 MG: 0.4 CAPSULE ORAL at 09:44

## 2025-06-26 RX ADMIN — PIPERACILLIN AND TAZOBACTAM 3375 MG: 3; .375 INJECTION, POWDER, LYOPHILIZED, FOR SOLUTION INTRAVENOUS at 16:36

## 2025-06-26 RX ADMIN — PIPERACILLIN AND TAZOBACTAM 3375 MG: 3; .375 INJECTION, POWDER, LYOPHILIZED, FOR SOLUTION INTRAVENOUS at 06:02

## 2025-06-26 RX ADMIN — ATORVASTATIN CALCIUM 40 MG: 40 TABLET, FILM COATED ORAL at 09:44

## 2025-06-26 RX ADMIN — PIPERACILLIN AND TAZOBACTAM 3375 MG: 3; .375 INJECTION, POWDER, LYOPHILIZED, FOR SOLUTION INTRAVENOUS at 22:25

## 2025-06-26 RX ADMIN — FUROSEMIDE 20 MG: 10 INJECTION, SOLUTION INTRAMUSCULAR; INTRAVENOUS at 09:44

## 2025-06-26 RX ADMIN — MIDODRINE HYDROCHLORIDE 5 MG: 5 TABLET ORAL at 17:51

## 2025-06-26 RX ADMIN — PROPOFOL 100 MG: 10 INJECTION, EMULSION INTRAVENOUS at 14:05

## 2025-06-26 RX ADMIN — BUMETANIDE 2 MG: 0.25 INJECTION INTRAMUSCULAR; INTRAVENOUS at 17:51

## 2025-06-26 RX ADMIN — ALLOPURINOL 300 MG: 300 TABLET ORAL at 09:44

## 2025-06-26 RX ADMIN — SENNOSIDES AND DOCUSATE SODIUM 1 TABLET: 50; 8.6 TABLET ORAL at 09:44

## 2025-06-26 RX ADMIN — SENNOSIDES AND DOCUSATE SODIUM 2 TABLET: 50; 8.6 TABLET ORAL at 21:13

## 2025-06-26 RX ADMIN — DIPHENHYDRAMINE HYDROCHLORIDE 25 MG: 50 INJECTION, SOLUTION INTRAMUSCULAR; INTRAVENOUS at 00:58

## 2025-06-26 RX ADMIN — SODIUM CHLORIDE, PRESERVATIVE FREE 10 ML: 5 INJECTION INTRAVENOUS at 09:46

## 2025-06-26 RX ADMIN — LIDOCAINE HYDROCHLORIDE 60 MG: 20 INJECTION, SOLUTION EPIDURAL; INFILTRATION; INTRACAUDAL; PERINEURAL at 14:05

## 2025-06-26 RX ADMIN — SERTRALINE 25 MG: 25 TABLET, FILM COATED ORAL at 09:44

## 2025-06-26 RX ADMIN — SODIUM CHLORIDE: 9 INJECTION, SOLUTION INTRAVENOUS at 13:50

## 2025-06-26 ASSESSMENT — PAIN SCALES - GENERAL
PAINLEVEL_OUTOF10: 0
PAINLEVEL_OUTOF10: 0

## 2025-06-26 NOTE — ANESTHESIA POSTPROCEDURE EVALUATION
Department of Anesthesiology  Postprocedure Note    Patient: Quique Wray  MRN: [id number]  YOB: 1945  Date of evaluation: 6/26/2025    Procedure Summary       Date: 06/26/25 Room / Location: [address]    Anesthesia Start: 1400 Anesthesia Stop: 1418    Procedure: ESOPHAGOGASTRODUODENOSCOPY WITH BIOPSY Diagnosis: Anemia, unspecified type    Surgeons: Mack Napier MD Responsible Provider: Wilberto Padilla MD    Anesthesia Type: MAC ASA Status: 4            Anesthesia Type: No value filed.    Kiya Phase I: Kiya Score: 9    Kiya Phase II:      Anesthesia Post Evaluation    Patient location during evaluation: PACU  Patient participation: complete - patient participated  Level of consciousness: awake and alert  Airway patency: patent  Nausea & Vomiting: no nausea and no vomiting  Cardiovascular status: hemodynamically stable  Respiratory status: acceptable  Hydration status: euvolemic  Pain management: adequate    No notable events documented.

## 2025-06-26 NOTE — OP NOTE
[facility] Endoscopy  AMENDED - EGD  Patient: Quique Wray            Date:   2025  : 1945       Med Rec#: [id number]     PROCEDURE:  EGD with biopsy  INDICATION: Anemia, dysphagia, odynophagia    FINDINGS  Irregular Z-line, not biopsied  A 3 cm hiatal hernia was noted on retroflexion.   Hemorrhagic polyp, 1 cm, at gastroesophageal junction, it appeared to be arising from gastric tissue.  Biopsied.  Multiple benign-appearing fundic gland polyps in the fundus and body.  Larger polyp, 1 to 2 cm, anterior surface with hemorrhagic features.  Biopsied.  Normal antrum and pylorus  Large polyp, hemorrhagic, biopsied, in the bulb of the duodenum originating from, I believe, the posterior surface.  The lumen was nearly occluded but we were able to slip past into the second portion of the duodenum.  Normal second portion of duodenum which was normal.    PLAN   Await pathology results resection technique  These lesions may require resection; pathology results will be required to determine   Continue PPI therapy  Note that the patient has had microcytic anemia since, at least, .   Multiple factors, including thalassemia, almost certainly contributing to his anemia.  Check serial H/H and transfuse as necessary     MEDICATIONS:  MAC    ESTIMATED BLOOD LOSS:   Minimal  Specimen(s) Removed: As stated above  COMPLICATIONS: Suboptimal distention of stomach  Prior to the procedure, the patient's history was reviewed and a directed physical examination was performed.   Informed consent was obtained.  After adequate sedation was achieved, the scope was inserted into the mouth and advanced to  the second portion of the duodenum.  As the scope was withdrawn, the anatomy and the appearance of the mucosa was noted.     Gilmer Napier M.D.

## 2025-06-26 NOTE — ANESTHESIA PRE PROCEDURE
Department of Anesthesiology  Preprocedure Note       Name:  Quique Wray   Age:  80 y.o.  :  1945                                          MRN:  [id number]         Date:  2025      Surgeon: Surgeon(s):  Mack Napier MD    Procedure: Procedure(s):  ESOPHAGOGASTRODUODENOSCOPY    Medications prior to admission:   Prior to Admission medications    Medication Sig Start Date End Date Taking? Authorizing Provider   metoprolol tartrate (LOPRESSOR) 25 MG tablet Take 0.5 tablets by mouth 2 times daily 25  Yes Rob Bowens MD   atorvastatin (LIPITOR) 40 MG tablet Take 1 tablet by mouth daily   Yes Karina Mauro MD   aspirin 81 MG EC tablet Take 1 tablet by mouth daily   Yes Karina Mauro MD   clopidogrel (PLAVIX) 75 MG tablet Take 1 tablet by mouth daily   Yes Karina Mauro MD   nitroGLYCERIN (NITROSTAT) 0.4 MG SL tablet Place 1 tablet under the tongue every 5 minutes as needed for Chest pain up to max of 3 total doses. If no relief after 1 dose, call 911.   Yes Karina Mauro MD   warfarin (COUMADIN) 5 MG tablet Take 0.5 tablets by mouth See Admin Instructions (1/2 Tablet on Saturday, Monday, Wednesday, and Friday)   Yes Karina Mauro MD   melatonin 5 MG TABS tablet Take 1 tablet by mouth nightly as needed   Yes Karina Mauro MD   tamsulosin (FLOMAX) 0.4 MG capsule Take 1 capsule by mouth daily 25  Yes Rob Bowens MD   sennosides-docusate sodium (SENOKOT-S) 8.6-50 MG tablet Take 1 tablet by mouth in the morning and at bedtime 25  Yes Rob Bowens MD   sertraline (ZOLOFT) 25 MG tablet Take 1 tablet by mouth daily 25  Yes Rob Bowens MD   Nutritional Supplements (BOOST HIGH PROTEIN) LIQD Take 1 Bottle by mouth daily   Yes Karina Mauro MD   warfarin (COUMADIN) 5 MG tablet TAKE 1 TABLET BY MOUTH DAILY  Patient taking differently: Take 1 tablet by mouth See Admin Instructions , Tuesday and 24  Yes Andrea Galan MD

## 2025-06-27 ENCOUNTER — APPOINTMENT (OUTPATIENT)
Dept: CT IMAGING | Age: 80
DRG: 417 | End: 2025-06-27
Attending: INTERNAL MEDICINE
Payer: MEDICARE

## 2025-06-27 ENCOUNTER — APPOINTMENT (OUTPATIENT)
Dept: ULTRASOUND IMAGING | Age: 80
DRG: 417 | End: 2025-06-27
Attending: INTERNAL MEDICINE
Payer: MEDICARE

## 2025-06-27 ENCOUNTER — APPOINTMENT (OUTPATIENT)
Dept: INTERVENTIONAL RADIOLOGY/VASCULAR | Age: 80
DRG: 417 | End: 2025-06-27
Attending: INTERNAL MEDICINE
Payer: MEDICARE

## 2025-06-27 PROBLEM — K31.7 POLYP OF DUODENUM: Status: ACTIVE | Noted: 2025-06-27

## 2025-06-27 PROBLEM — K31.7 GASTRIC POLYP: Status: ACTIVE | Noted: 2025-06-27

## 2025-06-27 PROBLEM — K92.2 ACUTE UPPER GASTROINTESTINAL BLEEDING: Status: ACTIVE | Noted: 2025-06-27

## 2025-06-27 LAB
ANION GAP SERPL CALCULATED.3IONS-SCNC: 13 MMOL/L (ref 9–16)
APPEARANCE FLD: ABNORMAL
BODY FLD TYPE: ABNORMAL
BUN SERPL-MCNC: 41 MG/DL (ref 8–23)
CALCIUM SERPL-MCNC: 8.6 MG/DL (ref 8.8–10.2)
CHLORIDE SERPL-SCNC: 104 MMOL/L (ref 98–107)
CLOT CHECK: ABNORMAL
CO2 SERPL-SCNC: 23 MMOL/L (ref 20–31)
COLOR FLD: ABNORMAL
CREAT SERPL-MCNC: 3.1 MG/DL (ref 0.7–1.2)
EOSINOPHIL NFR FLD: 1 %
GFR, ESTIMATED: 20 ML/MIN/1.73M2
GLUCOSE FLD-MCNC: 54 MG/DL
GLUCOSE SERPL-MCNC: 97 MG/DL (ref 82–115)
HCT VFR BLD AUTO: 24.5 % (ref 40.7–50.3)
HCT VFR BLD AUTO: 26.3 % (ref 40.7–50.3)
HGB BLD-MCNC: 8 G/DL (ref 13–17)
HGB BLD-MCNC: 8.4 G/DL (ref 13–17)
INR PPP: 1.3
LDH FLD L TO P-CCNC: 627 U/L
LYMPHOCYTES NFR FLD: 15 %
MESOTHELIAL CELLS BODY FLUID: 76 %
MONOCYTES NFR FLD: 6 %
NEUTROPHILS NFR FLD: 2 %
NUC CELL # FLD: 2154 CELLS/UL
PH FLUID: 8
POTASSIUM SERPL-SCNC: 4 MMOL/L (ref 3.7–5.3)
PROT FLD-MCNC: 2.6 G/DL
PROTHROMBIN TIME: 16.2 SEC (ref 11.5–14.2)
RBC # FLD: ABNORMAL CELLS/UL
SODIUM SERPL-SCNC: 140 MMOL/L (ref 136–145)
SPECIMEN TYPE: NORMAL

## 2025-06-27 PROCEDURE — 2700000000 HC OXYGEN THERAPY PER DAY

## 2025-06-27 PROCEDURE — 99232 SBSQ HOSP IP/OBS MODERATE 35: CPT | Performed by: INTERNAL MEDICINE

## 2025-06-27 PROCEDURE — 85018 HEMOGLOBIN: CPT

## 2025-06-27 PROCEDURE — 83986 ASSAY PH BODY FLUID NOS: CPT

## 2025-06-27 PROCEDURE — 2060000000 HC ICU INTERMEDIATE R&B

## 2025-06-27 PROCEDURE — 88112 CYTOPATH CELL ENHANCE TECH: CPT

## 2025-06-27 PROCEDURE — 6360000002 HC RX W HCPCS: Performed by: INTERNAL MEDICINE

## 2025-06-27 PROCEDURE — 87116 MYCOBACTERIA CULTURE: CPT

## 2025-06-27 PROCEDURE — 94761 N-INVAS EAR/PLS OXIMETRY MLT: CPT

## 2025-06-27 PROCEDURE — 87206 SMEAR FLUORESCENT/ACID STAI: CPT

## 2025-06-27 PROCEDURE — 89051 BODY FLUID CELL COUNT: CPT

## 2025-06-27 PROCEDURE — 87075 CULTR BACTERIA EXCEPT BLOOD: CPT

## 2025-06-27 PROCEDURE — 0W9930Z DRAINAGE OF RIGHT PLEURAL CAVITY WITH DRAINAGE DEVICE, PERCUTANEOUS APPROACH: ICD-10-PCS | Performed by: RADIOLOGY

## 2025-06-27 PROCEDURE — 87015 SPECIMEN INFECT AGNT CONCNTJ: CPT

## 2025-06-27 PROCEDURE — C1769 GUIDE WIRE: HCPCS

## 2025-06-27 PROCEDURE — P9047 ALBUMIN (HUMAN), 25%, 50ML: HCPCS | Performed by: INTERNAL MEDICINE

## 2025-06-27 PROCEDURE — 97530 THERAPEUTIC ACTIVITIES: CPT

## 2025-06-27 PROCEDURE — 85610 PROTHROMBIN TIME: CPT

## 2025-06-27 PROCEDURE — 87070 CULTURE OTHR SPECIMN AEROBIC: CPT

## 2025-06-27 PROCEDURE — 97116 GAIT TRAINING THERAPY: CPT

## 2025-06-27 PROCEDURE — 82945 GLUCOSE OTHER FLUID: CPT

## 2025-06-27 PROCEDURE — 88305 TISSUE EXAM BY PATHOLOGIST: CPT

## 2025-06-27 PROCEDURE — 0W993ZZ DRAINAGE OF RIGHT PLEURAL CAVITY, PERCUTANEOUS APPROACH: ICD-10-PCS | Performed by: RADIOLOGY

## 2025-06-27 PROCEDURE — 97535 SELF CARE MNGMENT TRAINING: CPT

## 2025-06-27 PROCEDURE — 84157 ASSAY OF PROTEIN OTHER: CPT

## 2025-06-27 PROCEDURE — 32555 ASPIRATE PLEURA W/ IMAGING: CPT

## 2025-06-27 PROCEDURE — 87205 SMEAR GRAM STAIN: CPT

## 2025-06-27 PROCEDURE — C1729 CATH, DRAINAGE: HCPCS

## 2025-06-27 PROCEDURE — 6370000000 HC RX 637 (ALT 250 FOR IP): Performed by: INTERNAL MEDICINE

## 2025-06-27 PROCEDURE — 2500000003 HC RX 250 WO HCPCS: Performed by: INTERNAL MEDICINE

## 2025-06-27 PROCEDURE — 76770 US EXAM ABDO BACK WALL COMP: CPT

## 2025-06-27 PROCEDURE — 80048 BASIC METABOLIC PNL TOTAL CA: CPT

## 2025-06-27 PROCEDURE — 36415 COLL VENOUS BLD VENIPUNCTURE: CPT

## 2025-06-27 PROCEDURE — 85014 HEMATOCRIT: CPT

## 2025-06-27 PROCEDURE — 2580000003 HC RX 258: Performed by: INTERNAL MEDICINE

## 2025-06-27 PROCEDURE — 87102 FUNGUS ISOLATION CULTURE: CPT

## 2025-06-27 PROCEDURE — 83615 LACTATE (LD) (LDH) ENZYME: CPT

## 2025-06-27 RX ORDER — CHLOROTHIAZIDE SODIUM 500 MG/1
500 INJECTION INTRAVENOUS ONCE
Status: COMPLETED | OUTPATIENT
Start: 2025-06-27 | End: 2025-06-27

## 2025-06-27 RX ORDER — ALBUMIN (HUMAN) 12.5 G/50ML
25 SOLUTION INTRAVENOUS 2 TIMES DAILY
Status: COMPLETED | OUTPATIENT
Start: 2025-06-27 | End: 2025-06-27

## 2025-06-27 RX ORDER — MIDODRINE HYDROCHLORIDE 10 MG/1
10 TABLET ORAL
Status: DISCONTINUED | OUTPATIENT
Start: 2025-06-27 | End: 2025-06-28

## 2025-06-27 RX ADMIN — ALBUMIN (HUMAN) 25 G: 0.25 INJECTION, SOLUTION INTRAVENOUS at 13:22

## 2025-06-27 RX ADMIN — ATORVASTATIN CALCIUM 40 MG: 40 TABLET, FILM COATED ORAL at 08:21

## 2025-06-27 RX ADMIN — TAMSULOSIN HYDROCHLORIDE 0.4 MG: 0.4 CAPSULE ORAL at 08:21

## 2025-06-27 RX ADMIN — SENNOSIDES AND DOCUSATE SODIUM 2 TABLET: 50; 8.6 TABLET ORAL at 08:21

## 2025-06-27 RX ADMIN — MIDODRINE HYDROCHLORIDE 10 MG: 10 TABLET ORAL at 17:05

## 2025-06-27 RX ADMIN — BUMETANIDE 2 MG: 0.25 INJECTION INTRAMUSCULAR; INTRAVENOUS at 17:05

## 2025-06-27 RX ADMIN — SODIUM CHLORIDE, PRESERVATIVE FREE 10 ML: 5 INJECTION INTRAVENOUS at 08:22

## 2025-06-27 RX ADMIN — ALBUMIN (HUMAN) 25 G: 0.25 INJECTION, SOLUTION INTRAVENOUS at 20:22

## 2025-06-27 RX ADMIN — PANTOPRAZOLE SODIUM 40 MG: 40 TABLET, DELAYED RELEASE ORAL at 06:35

## 2025-06-27 RX ADMIN — PIPERACILLIN AND TAZOBACTAM 3375 MG: 3; .375 INJECTION, POWDER, LYOPHILIZED, FOR SOLUTION INTRAVENOUS at 06:35

## 2025-06-27 RX ADMIN — MIDODRINE HYDROCHLORIDE 5 MG: 5 TABLET ORAL at 12:06

## 2025-06-27 RX ADMIN — BUMETANIDE 2 MG: 0.25 INJECTION INTRAMUSCULAR; INTRAVENOUS at 08:21

## 2025-06-27 RX ADMIN — ALLOPURINOL 300 MG: 300 TABLET ORAL at 08:21

## 2025-06-27 RX ADMIN — CHLOROTHIAZIDE SODIUM 500 MG: 500 INJECTION, POWDER, LYOPHILIZED, FOR SOLUTION INTRAVENOUS at 14:50

## 2025-06-27 RX ADMIN — Medication 6 MG: at 20:17

## 2025-06-27 RX ADMIN — PIPERACILLIN AND TAZOBACTAM 3375 MG: 3; .375 INJECTION, POWDER, LYOPHILIZED, FOR SOLUTION INTRAVENOUS at 14:56

## 2025-06-27 RX ADMIN — ACETAMINOPHEN 650 MG: 325 TABLET ORAL at 20:18

## 2025-06-27 RX ADMIN — MIDODRINE HYDROCHLORIDE 5 MG: 5 TABLET ORAL at 08:21

## 2025-06-27 RX ADMIN — SERTRALINE 25 MG: 25 TABLET, FILM COATED ORAL at 08:21

## 2025-06-27 RX ADMIN — SODIUM CHLORIDE, PRESERVATIVE FREE 10 ML: 5 INJECTION INTRAVENOUS at 20:20

## 2025-06-27 RX ADMIN — PIPERACILLIN AND TAZOBACTAM 3375 MG: 3; .375 INJECTION, POWDER, LYOPHILIZED, FOR SOLUTION INTRAVENOUS at 21:57

## 2025-06-27 ASSESSMENT — PAIN SCALES - GENERAL
PAINLEVEL_OUTOF10: 1
PAINLEVEL_OUTOF10: 2

## 2025-06-27 ASSESSMENT — PAIN DESCRIPTION - ORIENTATION: ORIENTATION: RIGHT

## 2025-06-27 ASSESSMENT — PAIN DESCRIPTION - LOCATION: LOCATION: CHEST

## 2025-06-27 ASSESSMENT — PAIN DESCRIPTION - DESCRIPTORS: DESCRIPTORS: PRESSURE

## 2025-06-27 NOTE — FLOWSHEET NOTE
Pt had right sided thoracentesis with 125 ml of dark red pleural drainage removed order from pulmonary states pt may need chest tube inserted procedure requires ct but dept is not available at this time pt returned to room spouse and pt was informed  of plan for chest tube insertion later today they responded no one had ever discussed this possibility with them and they would prefer to discuss this with their daughter and  pulmonologist before procedure is done

## 2025-06-27 NOTE — BRIEF OP NOTE
Brief Postoperative Note    Quique Wray  YOB: 1945  [id number]     Pre-operative Diagnosis:  Hemorrhagic rt effusion    Post-operative Diagnosis: Same    Procedure: Rt Chest tube insertion    Medications Given: none    Anesthesia: Local    Surgeons/Assistants: JOSE JACKSON MD    Estimated Blood Loss: minimal    Complications: none    Specimens: were not obtained    Findings: 10 F rt chest tube inserted under CT guidance. 45 mls old blood aspirated. Sutured in place. Attached to [facility]. Pt tolerated well.      Electronically signed by JOSE JACKSON MD on 6/27/2025 at 5:17 PM

## 2025-06-27 NOTE — CARE COORDINATION
Social work:  Patient accepted at [facility] unit-  left to get weekend dc information; await c/b.   #[phone]

## 2025-06-27 NOTE — BRIEF OP NOTE
Brief Postoperative Note for Thoracentesis    Quique Wray  YOB: 1945  [id number]    Pre-operative Diagnosis: Right Pleural effusion, poss hemothorax     Post-operative Diagnosis: Same    Procedure: Ultrasound guided Thoracentesis    Anesthesia: 1% Lidocaine     Surgeons/Assistants: JOSE JACKSON MD    Complications: None    Specimens: were obtained    Ultrasound guided right thoracentesis performed. 125 ml old blood obtained. Dressing applied.  Chest tube placement in CT scheduled.      Electronically signed by JOSE JACKSON MD on 6/27/2025 at 9:11 AM

## 2025-06-28 ENCOUNTER — APPOINTMENT (OUTPATIENT)
Dept: GENERAL RADIOLOGY | Age: 80
DRG: 417 | End: 2025-06-28
Attending: INTERNAL MEDICINE
Payer: MEDICARE

## 2025-06-28 LAB
ANION GAP SERPL CALCULATED.3IONS-SCNC: 14 MMOL/L (ref 9–16)
ANTI-XA UNFRAC HEPARIN: <0.1 IU/L (ref 0.3–0.7)
ANTI-XA UNFRAC HEPARIN: <0.1 IU/L (ref 0.3–0.7)
BUN SERPL-MCNC: 38 MG/DL (ref 8–23)
CALCIUM SERPL-MCNC: 8.8 MG/DL (ref 8.8–10.2)
CHLORIDE SERPL-SCNC: 103 MMOL/L (ref 98–107)
CO2 SERPL-SCNC: 25 MMOL/L (ref 20–31)
CREAT SERPL-MCNC: 2.5 MG/DL (ref 0.7–1.2)
ERYTHROCYTE [DISTWIDTH] IN BLOOD BY AUTOMATED COUNT: 21.8 % (ref 11.8–14.4)
GFR, ESTIMATED: 26 ML/MIN/1.73M2
GLUCOSE SERPL-MCNC: 126 MG/DL (ref 82–115)
HCT VFR BLD AUTO: 26.6 % (ref 40.7–50.3)
HCT VFR BLD AUTO: 27.3 % (ref 40.7–50.3)
HCT VFR BLD AUTO: 27.6 % (ref 40.7–50.3)
HGB BLD-MCNC: 8.6 G/DL (ref 13–17)
HGB BLD-MCNC: 8.9 G/DL (ref 13–17)
HGB BLD-MCNC: 9 G/DL (ref 13–17)
INR PPP: 1.3
MCH RBC QN AUTO: 26.5 PG (ref 25.2–33.5)
MCHC RBC AUTO-ENTMCNC: 32.3 G/DL (ref 28.4–34.8)
MCV RBC AUTO: 82.1 FL (ref 82.6–102.9)
MICROORGANISM SPEC CULT: NORMAL
MICROORGANISM SPEC CULT: NORMAL
NRBC BLD-RTO: 0.6 PER 100 WBC
PARTIAL THROMBOPLASTIN TIME: 31.6 SEC (ref 23.9–33.8)
PARTIAL THROMBOPLASTIN TIME: 32.8 SEC (ref 23.9–33.8)
PLATELET # BLD AUTO: 169 K/UL (ref 138–453)
PMV BLD AUTO: 11.9 FL (ref 8.1–13.5)
POTASSIUM SERPL-SCNC: 3.5 MMOL/L (ref 3.7–5.3)
PROTHROMBIN TIME: 16.7 SEC (ref 11.5–14.2)
RBC # BLD AUTO: 3.24 M/UL (ref 4.21–5.77)
SERVICE CMNT-IMP: NORMAL
SERVICE CMNT-IMP: NORMAL
SODIUM SERPL-SCNC: 142 MMOL/L (ref 136–145)
SPECIMEN DESCRIPTION: NORMAL
SPECIMEN DESCRIPTION: NORMAL
WBC OTHER # BLD: 6.9 K/UL (ref 3.5–11.3)

## 2025-06-28 PROCEDURE — 85014 HEMATOCRIT: CPT

## 2025-06-28 PROCEDURE — 97530 THERAPEUTIC ACTIVITIES: CPT

## 2025-06-28 PROCEDURE — 94761 N-INVAS EAR/PLS OXIMETRY MLT: CPT

## 2025-06-28 PROCEDURE — 97116 GAIT TRAINING THERAPY: CPT

## 2025-06-28 PROCEDURE — 2060000000 HC ICU INTERMEDIATE R&B

## 2025-06-28 PROCEDURE — P9047 ALBUMIN (HUMAN), 25%, 50ML: HCPCS | Performed by: NURSE PRACTITIONER

## 2025-06-28 PROCEDURE — 80048 BASIC METABOLIC PNL TOTAL CA: CPT

## 2025-06-28 PROCEDURE — 97535 SELF CARE MNGMENT TRAINING: CPT

## 2025-06-28 PROCEDURE — 2700000000 HC OXYGEN THERAPY PER DAY

## 2025-06-28 PROCEDURE — 6370000000 HC RX 637 (ALT 250 FOR IP): Performed by: INTERNAL MEDICINE

## 2025-06-28 PROCEDURE — 85520 HEPARIN ASSAY: CPT

## 2025-06-28 PROCEDURE — 36415 COLL VENOUS BLD VENIPUNCTURE: CPT

## 2025-06-28 PROCEDURE — 85730 THROMBOPLASTIN TIME PARTIAL: CPT

## 2025-06-28 PROCEDURE — 99232 SBSQ HOSP IP/OBS MODERATE 35: CPT | Performed by: INTERNAL MEDICINE

## 2025-06-28 PROCEDURE — 85610 PROTHROMBIN TIME: CPT

## 2025-06-28 PROCEDURE — 2580000003 HC RX 258: Performed by: INTERNAL MEDICINE

## 2025-06-28 PROCEDURE — 71045 X-RAY EXAM CHEST 1 VIEW: CPT

## 2025-06-28 PROCEDURE — 6360000002 HC RX W HCPCS: Performed by: INTERNAL MEDICINE

## 2025-06-28 PROCEDURE — 2500000003 HC RX 250 WO HCPCS: Performed by: INTERNAL MEDICINE

## 2025-06-28 PROCEDURE — 85027 COMPLETE CBC AUTOMATED: CPT

## 2025-06-28 PROCEDURE — 6360000002 HC RX W HCPCS: Performed by: NURSE PRACTITIONER

## 2025-06-28 PROCEDURE — 85018 HEMOGLOBIN: CPT

## 2025-06-28 RX ORDER — MIDODRINE HYDROCHLORIDE 5 MG/1
5 TABLET ORAL
Status: DISCONTINUED | OUTPATIENT
Start: 2025-06-28 | End: 2025-07-01

## 2025-06-28 RX ORDER — POTASSIUM CHLORIDE 1500 MG/1
20 TABLET, EXTENDED RELEASE ORAL ONCE
Status: COMPLETED | OUTPATIENT
Start: 2025-06-28 | End: 2025-06-28

## 2025-06-28 RX ORDER — ALBUMIN (HUMAN) 12.5 G/50ML
25 SOLUTION INTRAVENOUS 2 TIMES DAILY
Status: COMPLETED | OUTPATIENT
Start: 2025-06-28 | End: 2025-06-28

## 2025-06-28 RX ADMIN — POTASSIUM CHLORIDE 20 MEQ: 1500 TABLET, EXTENDED RELEASE ORAL at 10:49

## 2025-06-28 RX ADMIN — MIDODRINE HYDROCHLORIDE 10 MG: 10 TABLET ORAL at 08:57

## 2025-06-28 RX ADMIN — TAMSULOSIN HYDROCHLORIDE 0.4 MG: 0.4 CAPSULE ORAL at 08:58

## 2025-06-28 RX ADMIN — ALBUMIN (HUMAN) 25 G: 0.25 INJECTION, SOLUTION INTRAVENOUS at 10:51

## 2025-06-28 RX ADMIN — ALBUMIN (HUMAN) 25 G: 0.25 INJECTION, SOLUTION INTRAVENOUS at 19:48

## 2025-06-28 RX ADMIN — PANTOPRAZOLE SODIUM 40 MG: 40 TABLET, DELAYED RELEASE ORAL at 05:53

## 2025-06-28 RX ADMIN — MIDODRINE HYDROCHLORIDE 5 MG: 5 TABLET ORAL at 12:12

## 2025-06-28 RX ADMIN — BUMETANIDE 2 MG: 0.25 INJECTION INTRAMUSCULAR; INTRAVENOUS at 18:16

## 2025-06-28 RX ADMIN — ACETAMINOPHEN 650 MG: 325 TABLET ORAL at 09:12

## 2025-06-28 RX ADMIN — PIPERACILLIN AND TAZOBACTAM 3375 MG: 3; .375 INJECTION, POWDER, LYOPHILIZED, FOR SOLUTION INTRAVENOUS at 05:53

## 2025-06-28 RX ADMIN — ATORVASTATIN CALCIUM 40 MG: 40 TABLET, FILM COATED ORAL at 08:58

## 2025-06-28 RX ADMIN — SODIUM CHLORIDE, PRESERVATIVE FREE 10 ML: 5 INJECTION INTRAVENOUS at 19:43

## 2025-06-28 RX ADMIN — BUMETANIDE 2 MG: 0.25 INJECTION INTRAMUSCULAR; INTRAVENOUS at 08:58

## 2025-06-28 RX ADMIN — ALLOPURINOL 300 MG: 300 TABLET ORAL at 08:58

## 2025-06-28 RX ADMIN — MIDODRINE HYDROCHLORIDE 5 MG: 5 TABLET ORAL at 18:16

## 2025-06-28 RX ADMIN — SERTRALINE 25 MG: 25 TABLET, FILM COATED ORAL at 08:58

## 2025-06-28 ASSESSMENT — PAIN SCALES - GENERAL
PAINLEVEL_OUTOF10: 3
PAINLEVEL_OUTOF10: 2

## 2025-06-29 LAB
ANA SER QL IA: NEGATIVE
ANCA MYELOPEROXIDASE: <0.3 AU/ML (ref 0–3.5)
ANCA PROTEINASE 3: <0.7 AU/ML (ref 0–2)
ANION GAP SERPL CALCULATED.3IONS-SCNC: 14 MMOL/L (ref 9–16)
ANTI-XA UNFRAC HEPARIN: 0.17 IU/L (ref 0.3–0.7)
ANTI-XA UNFRAC HEPARIN: <0.1 IU/L (ref 0.3–0.7)
ANTI-XA UNFRAC HEPARIN: <0.1 IU/L (ref 0.3–0.7)
BUN SERPL-MCNC: 30 MG/DL (ref 8–23)
CALCIUM SERPL-MCNC: 8.7 MG/DL (ref 8.8–10.2)
CHLORIDE SERPL-SCNC: 102 MMOL/L (ref 98–107)
CO2 SERPL-SCNC: 27 MMOL/L (ref 20–31)
CREAT SERPL-MCNC: 1.6 MG/DL (ref 0.7–1.2)
DSDNA IGG SER QL IA: 0.9 IU/ML
GFR, ESTIMATED: 43 ML/MIN/1.73M2
GLUCOSE SERPL-MCNC: 114 MG/DL (ref 82–115)
HCT VFR BLD AUTO: 27.7 % (ref 40.7–50.3)
HGB BLD-MCNC: 9.1 G/DL (ref 13–17)
INR PPP: 1.3
NUCLEAR IGG SER IA-RTO: <0.1 U/ML
PARTIAL THROMBOPLASTIN TIME: 33.3 SEC (ref 23.9–33.8)
PARTIAL THROMBOPLASTIN TIME: 47.7 SEC (ref 23.9–33.8)
PARTIAL THROMBOPLASTIN TIME: 70.6 SEC (ref 23.9–33.8)
POTASSIUM SERPL-SCNC: 3.3 MMOL/L (ref 3.7–5.3)
PROTHROMBIN TIME: 17 SEC (ref 11.5–14.2)
SODIUM SERPL-SCNC: 143 MMOL/L (ref 136–145)

## 2025-06-29 PROCEDURE — 6360000002 HC RX W HCPCS: Performed by: INTERNAL MEDICINE

## 2025-06-29 PROCEDURE — 2060000000 HC ICU INTERMEDIATE R&B

## 2025-06-29 PROCEDURE — 97530 THERAPEUTIC ACTIVITIES: CPT

## 2025-06-29 PROCEDURE — 85014 HEMATOCRIT: CPT

## 2025-06-29 PROCEDURE — 85520 HEPARIN ASSAY: CPT

## 2025-06-29 PROCEDURE — 80048 BASIC METABOLIC PNL TOTAL CA: CPT

## 2025-06-29 PROCEDURE — 85018 HEMOGLOBIN: CPT

## 2025-06-29 PROCEDURE — 6370000000 HC RX 637 (ALT 250 FOR IP): Performed by: INTERNAL MEDICINE

## 2025-06-29 PROCEDURE — 99232 SBSQ HOSP IP/OBS MODERATE 35: CPT | Performed by: INTERNAL MEDICINE

## 2025-06-29 PROCEDURE — 97530 THERAPEUTIC ACTIVITIES: CPT | Performed by: NURSE PRACTITIONER

## 2025-06-29 PROCEDURE — 2500000003 HC RX 250 WO HCPCS: Performed by: INTERNAL MEDICINE

## 2025-06-29 PROCEDURE — 51702 INSERT TEMP BLADDER CATH: CPT

## 2025-06-29 PROCEDURE — 36415 COLL VENOUS BLD VENIPUNCTURE: CPT

## 2025-06-29 PROCEDURE — 85730 THROMBOPLASTIN TIME PARTIAL: CPT

## 2025-06-29 PROCEDURE — 85610 PROTHROMBIN TIME: CPT

## 2025-06-29 PROCEDURE — 97116 GAIT TRAINING THERAPY: CPT

## 2025-06-29 PROCEDURE — 97535 SELF CARE MNGMENT TRAINING: CPT | Performed by: NURSE PRACTITIONER

## 2025-06-29 RX ORDER — POTASSIUM CHLORIDE 1500 MG/1
40 TABLET, EXTENDED RELEASE ORAL ONCE
Status: COMPLETED | OUTPATIENT
Start: 2025-06-29 | End: 2025-06-29

## 2025-06-29 RX ADMIN — BUMETANIDE 2 MG: 0.25 INJECTION INTRAMUSCULAR; INTRAVENOUS at 16:03

## 2025-06-29 RX ADMIN — PANTOPRAZOLE SODIUM 40 MG: 40 TABLET, DELAYED RELEASE ORAL at 05:41

## 2025-06-29 RX ADMIN — ATORVASTATIN CALCIUM 40 MG: 40 TABLET, FILM COATED ORAL at 09:32

## 2025-06-29 RX ADMIN — ALLOPURINOL 300 MG: 300 TABLET ORAL at 09:31

## 2025-06-29 RX ADMIN — TAMSULOSIN HYDROCHLORIDE 0.4 MG: 0.4 CAPSULE ORAL at 09:32

## 2025-06-29 RX ADMIN — SENNOSIDES AND DOCUSATE SODIUM 2 TABLET: 50; 8.6 TABLET ORAL at 20:24

## 2025-06-29 RX ADMIN — MIDODRINE HYDROCHLORIDE 5 MG: 5 TABLET ORAL at 09:32

## 2025-06-29 RX ADMIN — POTASSIUM CHLORIDE 40 MEQ: 1500 TABLET, EXTENDED RELEASE ORAL at 12:31

## 2025-06-29 RX ADMIN — HEPARIN SODIUM 2000 UNITS: 1000 INJECTION INTRAVENOUS; SUBCUTANEOUS at 22:54

## 2025-06-29 RX ADMIN — HEPARIN SODIUM 10 UNITS/KG/HR: 10000 INJECTION, SOLUTION INTRAVENOUS at 13:41

## 2025-06-29 RX ADMIN — MIDODRINE HYDROCHLORIDE 5 MG: 5 TABLET ORAL at 16:03

## 2025-06-29 RX ADMIN — MIDODRINE HYDROCHLORIDE 5 MG: 5 TABLET ORAL at 12:31

## 2025-06-29 RX ADMIN — SERTRALINE 25 MG: 25 TABLET, FILM COATED ORAL at 09:32

## 2025-06-29 RX ADMIN — POLYETHYLENE GLYCOL (3350) 17 G: 17 POWDER, FOR SOLUTION ORAL at 09:32

## 2025-06-29 RX ADMIN — BUMETANIDE 2 MG: 0.25 INJECTION INTRAMUSCULAR; INTRAVENOUS at 09:32

## 2025-06-29 RX ADMIN — SENNOSIDES AND DOCUSATE SODIUM 2 TABLET: 50; 8.6 TABLET ORAL at 09:32

## 2025-06-29 RX ADMIN — SODIUM CHLORIDE, PRESERVATIVE FREE 10 ML: 5 INJECTION INTRAVENOUS at 09:32

## 2025-06-29 RX ADMIN — Medication 6 MG: at 23:18

## 2025-06-30 ENCOUNTER — APPOINTMENT (OUTPATIENT)
Dept: INTERVENTIONAL RADIOLOGY/VASCULAR | Age: 80
DRG: 417 | End: 2025-06-30
Attending: INTERNAL MEDICINE
Payer: MEDICARE

## 2025-06-30 ENCOUNTER — APPOINTMENT (OUTPATIENT)
Dept: GENERAL RADIOLOGY | Age: 80
DRG: 417 | End: 2025-06-30
Attending: INTERNAL MEDICINE
Payer: MEDICARE

## 2025-06-30 ENCOUNTER — APPOINTMENT (OUTPATIENT)
Dept: CT IMAGING | Age: 80
DRG: 417 | End: 2025-06-30
Attending: INTERNAL MEDICINE
Payer: MEDICARE

## 2025-06-30 LAB
ANION GAP SERPL CALCULATED.3IONS-SCNC: 12 MMOL/L (ref 9–16)
ANTI-XA UNFRAC HEPARIN: 0.31 IU/L (ref 0.3–0.7)
ANTI-XA UNFRAC HEPARIN: 0.4 IU/L (ref 0.3–0.7)
BUN SERPL-MCNC: 25 MG/DL (ref 8–23)
CALCIUM SERPL-MCNC: 8.4 MG/DL (ref 8.8–10.2)
CASE NUMBER:: NORMAL
CHLORIDE SERPL-SCNC: 98 MMOL/L (ref 98–107)
CO2 SERPL-SCNC: 27 MMOL/L (ref 20–31)
CREAT SERPL-MCNC: 1.3 MG/DL (ref 0.7–1.2)
ERYTHROCYTE [DISTWIDTH] IN BLOOD BY AUTOMATED COUNT: 21.8 % (ref 11.8–14.4)
GFR, ESTIMATED: 58 ML/MIN/1.73M2
GLUCOSE SERPL-MCNC: 129 MG/DL (ref 82–115)
HCT VFR BLD AUTO: 26.8 % (ref 40.7–50.3)
HGB BLD-MCNC: 8.9 G/DL (ref 13–17)
INR PPP: 1.2
MAGNESIUM SERPL-MCNC: 1.3 MG/DL (ref 1.6–2.4)
MCH RBC QN AUTO: 26.6 PG (ref 25.2–33.5)
MCHC RBC AUTO-ENTMCNC: 33.2 G/DL (ref 28.4–34.8)
MCV RBC AUTO: 80 FL (ref 82.6–102.9)
MICROORGANISM SPEC CULT: NORMAL
MICROORGANISM SPEC CULT: NORMAL
MICROORGANISM/AGENT SPEC: NORMAL
MICROORGANISM/AGENT SPEC: NORMAL
NRBC BLD-RTO: 0.3 PER 100 WBC
PARTIAL THROMBOPLASTIN TIME: 106.6 SEC (ref 23.9–33.8)
PLATELET # BLD AUTO: 154 K/UL (ref 138–453)
PMV BLD AUTO: ABNORMAL FL (ref 8.1–13.5)
POTASSIUM SERPL-SCNC: 3.9 MMOL/L (ref 3.7–5.3)
PROTHROMBIN TIME: 16.1 SEC (ref 11.5–14.2)
RBC # BLD AUTO: 3.35 M/UL (ref 4.21–5.77)
SERVICE CMNT-IMP: NORMAL
SERVICE CMNT-IMP: NORMAL
SODIUM SERPL-SCNC: 138 MMOL/L (ref 136–145)
SPECIMEN DESCRIPTION: NORMAL
SURGICAL PATHOLOGY REPORT: NORMAL
WBC OTHER # BLD: 7 K/UL (ref 3.5–11.3)

## 2025-06-30 PROCEDURE — 97110 THERAPEUTIC EXERCISES: CPT

## 2025-06-30 PROCEDURE — 2060000000 HC ICU INTERMEDIATE R&B

## 2025-06-30 PROCEDURE — 80048 BASIC METABOLIC PNL TOTAL CA: CPT

## 2025-06-30 PROCEDURE — 6360000002 HC RX W HCPCS: Performed by: INTERNAL MEDICINE

## 2025-06-30 PROCEDURE — 6370000000 HC RX 637 (ALT 250 FOR IP): Performed by: INTERNAL MEDICINE

## 2025-06-30 PROCEDURE — 85610 PROTHROMBIN TIME: CPT

## 2025-06-30 PROCEDURE — 71250 CT THORAX DX C-: CPT

## 2025-06-30 PROCEDURE — 85730 THROMBOPLASTIN TIME PARTIAL: CPT

## 2025-06-30 PROCEDURE — 71045 X-RAY EXAM CHEST 1 VIEW: CPT

## 2025-06-30 PROCEDURE — 97116 GAIT TRAINING THERAPY: CPT

## 2025-06-30 PROCEDURE — 36415 COLL VENOUS BLD VENIPUNCTURE: CPT

## 2025-06-30 PROCEDURE — 83735 ASSAY OF MAGNESIUM: CPT

## 2025-06-30 PROCEDURE — 2500000003 HC RX 250 WO HCPCS: Performed by: INTERNAL MEDICINE

## 2025-06-30 PROCEDURE — 97530 THERAPEUTIC ACTIVITIES: CPT

## 2025-06-30 PROCEDURE — 85520 HEPARIN ASSAY: CPT

## 2025-06-30 PROCEDURE — 85027 COMPLETE CBC AUTOMATED: CPT

## 2025-06-30 PROCEDURE — 0WP9X0Z REMOVAL OF DRAINAGE DEVICE FROM RIGHT PLEURAL CAVITY, EXTERNAL APPROACH: ICD-10-PCS | Performed by: INTERNAL MEDICINE

## 2025-06-30 PROCEDURE — 99232 SBSQ HOSP IP/OBS MODERATE 35: CPT | Performed by: INTERNAL MEDICINE

## 2025-06-30 PROCEDURE — 6360000002 HC RX W HCPCS: Performed by: NURSE PRACTITIONER

## 2025-06-30 PROCEDURE — 6360000002 HC RX W HCPCS

## 2025-06-30 PROCEDURE — 51798 US URINE CAPACITY MEASURE: CPT

## 2025-06-30 RX ORDER — MAGNESIUM SULFATE IN WATER 40 MG/ML
2000 INJECTION, SOLUTION INTRAVENOUS ONCE
Status: DISCONTINUED | OUTPATIENT
Start: 2025-06-30 | End: 2025-06-30

## 2025-06-30 RX ORDER — MAGNESIUM SULFATE IN WATER 40 MG/ML
2000 INJECTION, SOLUTION INTRAVENOUS
Status: COMPLETED | OUTPATIENT
Start: 2025-06-30 | End: 2025-06-30

## 2025-06-30 RX ORDER — PROCHLORPERAZINE EDISYLATE 5 MG/ML
5 INJECTION INTRAMUSCULAR; INTRAVENOUS ONCE
Status: COMPLETED | OUTPATIENT
Start: 2025-06-30 | End: 2025-06-30

## 2025-06-30 RX ORDER — LANOLIN ALCOHOL/MO/W.PET/CERES
400 CREAM (GRAM) TOPICAL 2 TIMES DAILY
Status: DISCONTINUED | OUTPATIENT
Start: 2025-07-01 | End: 2025-07-02 | Stop reason: HOSPADM

## 2025-06-30 RX ORDER — POTASSIUM CHLORIDE 1500 MG/1
20 TABLET, EXTENDED RELEASE ORAL ONCE
Status: COMPLETED | OUTPATIENT
Start: 2025-06-30 | End: 2025-06-30

## 2025-06-30 RX ORDER — FENTANYL CITRATE 0.05 MG/ML
50 INJECTION, SOLUTION INTRAMUSCULAR; INTRAVENOUS ONCE
Status: COMPLETED | OUTPATIENT
Start: 2025-06-30 | End: 2025-06-30

## 2025-06-30 RX ADMIN — SERTRALINE 25 MG: 25 TABLET, FILM COATED ORAL at 08:53

## 2025-06-30 RX ADMIN — HEPARIN SODIUM 12 UNITS/KG/HR: 10000 INJECTION, SOLUTION INTRAVENOUS at 14:44

## 2025-06-30 RX ADMIN — SENNOSIDES AND DOCUSATE SODIUM 2 TABLET: 50; 8.6 TABLET ORAL at 20:03

## 2025-06-30 RX ADMIN — MIDODRINE HYDROCHLORIDE 5 MG: 5 TABLET ORAL at 08:53

## 2025-06-30 RX ADMIN — TAMSULOSIN HYDROCHLORIDE 0.4 MG: 0.4 CAPSULE ORAL at 08:53

## 2025-06-30 RX ADMIN — POLYETHYLENE GLYCOL (3350) 17 G: 17 POWDER, FOR SOLUTION ORAL at 08:53

## 2025-06-30 RX ADMIN — ACETAMINOPHEN 650 MG: 325 TABLET ORAL at 01:08

## 2025-06-30 RX ADMIN — POTASSIUM CHLORIDE 20 MEQ: 1500 TABLET, EXTENDED RELEASE ORAL at 15:20

## 2025-06-30 RX ADMIN — Medication 6 MG: at 20:03

## 2025-06-30 RX ADMIN — SENNOSIDES AND DOCUSATE SODIUM 2 TABLET: 50; 8.6 TABLET ORAL at 08:53

## 2025-06-30 RX ADMIN — DIPHENHYDRAMINE HYDROCHLORIDE 25 MG: 50 INJECTION, SOLUTION INTRAMUSCULAR; INTRAVENOUS at 01:08

## 2025-06-30 RX ADMIN — PANTOPRAZOLE SODIUM 40 MG: 40 TABLET, DELAYED RELEASE ORAL at 05:52

## 2025-06-30 RX ADMIN — BUMETANIDE 2 MG: 0.25 INJECTION INTRAMUSCULAR; INTRAVENOUS at 08:53

## 2025-06-30 RX ADMIN — MAGNESIUM SULFATE HEPTAHYDRATE 2000 MG: 40 INJECTION, SOLUTION INTRAVENOUS at 09:07

## 2025-06-30 RX ADMIN — ATORVASTATIN CALCIUM 40 MG: 40 TABLET, FILM COATED ORAL at 08:53

## 2025-06-30 RX ADMIN — FENTANYL CITRATE 50 MCG: 0.05 INJECTION, SOLUTION INTRAMUSCULAR; INTRAVENOUS at 03:09

## 2025-06-30 RX ADMIN — SODIUM CHLORIDE, PRESERVATIVE FREE 10 ML: 5 INJECTION INTRAVENOUS at 08:53

## 2025-06-30 RX ADMIN — MIDODRINE HYDROCHLORIDE 5 MG: 5 TABLET ORAL at 17:18

## 2025-06-30 RX ADMIN — ALLOPURINOL 300 MG: 300 TABLET ORAL at 08:53

## 2025-06-30 RX ADMIN — MAGNESIUM SULFATE HEPTAHYDRATE 2000 MG: 40 INJECTION, SOLUTION INTRAVENOUS at 11:03

## 2025-06-30 RX ADMIN — MIDODRINE HYDROCHLORIDE 5 MG: 5 TABLET ORAL at 12:48

## 2025-06-30 RX ADMIN — PROCHLORPERAZINE EDISYLATE 5 MG: 5 INJECTION INTRAMUSCULAR; INTRAVENOUS at 22:01

## 2025-06-30 NOTE — FLOWSHEET NOTE
Patient brought down to IR for right sided chest tube removal. Patient tolerated procedure well. Single chest x-ray ordered Dr Lr to read. Patient back to room in no distress per bed.

## 2025-06-30 NOTE — FLOWSHEET NOTE
GI following peripherally in order to have close f/u on pathology from EGD last week.   Pathology remains pending

## 2025-07-01 LAB
ALBUMIN PERCENT: 46 % (ref 56–66)
ALBUMIN SERPL-MCNC: 2.6 G/DL (ref 3.2–5.2)
ALPHA 2 PERCENT: 14 % (ref 7–12)
ALPHA1 GLOB SERPL ELPH-MCNC: 0.5 G/DL (ref 0.1–0.4)
ALPHA1 GLOB SERPL ELPH-MCNC: 8 % (ref 3–5)
ALPHA2 GLOB SERPL ELPH-MCNC: 0.8 G/DL (ref 0.5–0.9)
ANION GAP SERPL CALCULATED.3IONS-SCNC: 13 MMOL/L (ref 9–16)
ANTI-XA UNFRAC HEPARIN: 0.42 IU/L (ref 0.3–0.7)
B-GLOBULIN SERPL ELPH-MCNC: 0.9 G/DL (ref 0.7–1.4)
B-GLOBULIN SERPL ELPH-MCNC: 15 % (ref 8–13)
BUN SERPL-MCNC: 22 MG/DL (ref 8–23)
CALCIUM SERPL-MCNC: 9 MG/DL (ref 8.8–10.2)
CHLORIDE SERPL-SCNC: 97 MMOL/L (ref 98–107)
CO2 SERPL-SCNC: 28 MMOL/L (ref 20–31)
CREAT SERPL-MCNC: 1.1 MG/DL (ref 0.7–1.2)
GAMMA GLOB SERPL ELPH-MCNC: 0.9 G/DL (ref 0.5–1.5)
GAMMA GLOBULIN %: 17 % (ref 11–19)
GFR, ESTIMATED: 68 ML/MIN/1.73M2
GLUCOSE SERPL-MCNC: 139 MG/DL (ref 82–115)
INR PPP: 1.2
MAGNESIUM SERPL-MCNC: 2.1 MG/DL (ref 1.6–2.4)
PATHOLOGIST: ABNORMAL
POTASSIUM SERPL-SCNC: 3.8 MMOL/L (ref 3.7–5.3)
PROT PATTERN SERPL ELPH-IMP: ABNORMAL
PROT SERPL-MCNC: 5.7 G/DL (ref 6.6–8.7)
PROTHROMBIN TIME: 15.5 SEC (ref 11.5–14.2)
SODIUM SERPL-SCNC: 137 MMOL/L (ref 136–145)
SURGICAL PATHOLOGY REPORT: NORMAL
TOTAL PROT. SUM,%: 100 % (ref 98–102)
TOTAL PROT. SUM: 5.7 G/DL (ref 6.3–8.2)

## 2025-07-01 PROCEDURE — 83735 ASSAY OF MAGNESIUM: CPT

## 2025-07-01 PROCEDURE — 80048 BASIC METABOLIC PNL TOTAL CA: CPT

## 2025-07-01 PROCEDURE — 36415 COLL VENOUS BLD VENIPUNCTURE: CPT

## 2025-07-01 PROCEDURE — 2060000000 HC ICU INTERMEDIATE R&B

## 2025-07-01 PROCEDURE — 97530 THERAPEUTIC ACTIVITIES: CPT

## 2025-07-01 PROCEDURE — 97110 THERAPEUTIC EXERCISES: CPT

## 2025-07-01 PROCEDURE — 6360000002 HC RX W HCPCS: Performed by: INTERNAL MEDICINE

## 2025-07-01 PROCEDURE — 6370000000 HC RX 637 (ALT 250 FOR IP): Performed by: INTERNAL MEDICINE

## 2025-07-01 PROCEDURE — 99232 SBSQ HOSP IP/OBS MODERATE 35: CPT | Performed by: INTERNAL MEDICINE

## 2025-07-01 PROCEDURE — 97116 GAIT TRAINING THERAPY: CPT

## 2025-07-01 PROCEDURE — 85610 PROTHROMBIN TIME: CPT

## 2025-07-01 PROCEDURE — 85520 HEPARIN ASSAY: CPT

## 2025-07-01 PROCEDURE — 82947 ASSAY GLUCOSE BLOOD QUANT: CPT

## 2025-07-01 RX ORDER — MIDODRINE HYDROCHLORIDE 5 MG/1
5 TABLET ORAL 2 TIMES DAILY WITH MEALS
Status: DISCONTINUED | OUTPATIENT
Start: 2025-07-02 | End: 2025-07-02 | Stop reason: HOSPADM

## 2025-07-01 RX ORDER — WARFARIN SODIUM 7.5 MG/1
7.5 TABLET ORAL
Status: COMPLETED | OUTPATIENT
Start: 2025-07-01 | End: 2025-07-01

## 2025-07-01 RX ADMIN — WARFARIN SODIUM 7.5 MG: 7.5 TABLET ORAL at 18:07

## 2025-07-01 RX ADMIN — PANTOPRAZOLE SODIUM 40 MG: 40 TABLET, DELAYED RELEASE ORAL at 13:07

## 2025-07-01 RX ADMIN — Medication 400 MG: at 08:57

## 2025-07-01 RX ADMIN — MIDODRINE HYDROCHLORIDE 5 MG: 5 TABLET ORAL at 08:57

## 2025-07-01 RX ADMIN — ATORVASTATIN CALCIUM 40 MG: 40 TABLET, FILM COATED ORAL at 08:57

## 2025-07-01 RX ADMIN — MIDODRINE HYDROCHLORIDE 5 MG: 5 TABLET ORAL at 13:07

## 2025-07-01 RX ADMIN — TAMSULOSIN HYDROCHLORIDE 0.4 MG: 0.4 CAPSULE ORAL at 08:57

## 2025-07-01 RX ADMIN — ALLOPURINOL 300 MG: 300 TABLET ORAL at 08:57

## 2025-07-01 RX ADMIN — SENNOSIDES AND DOCUSATE SODIUM 2 TABLET: 50; 8.6 TABLET ORAL at 08:57

## 2025-07-01 RX ADMIN — SERTRALINE 25 MG: 25 TABLET, FILM COATED ORAL at 08:57

## 2025-07-01 RX ADMIN — Medication 400 MG: at 21:20

## 2025-07-01 RX ADMIN — SENNOSIDES AND DOCUSATE SODIUM 2 TABLET: 50; 8.6 TABLET ORAL at 21:20

## 2025-07-01 RX ADMIN — HEPARIN SODIUM 12 UNITS/KG/HR: 10000 INJECTION, SOLUTION INTRAVENOUS at 14:03

## 2025-07-01 NOTE — CONSULTS
Cardiology Consultation   Alfred Villarreal MD [facility]  Steffanie Brennan MD [facility]  Loni Carson, CNP      Reason for Consult: Preoperative cardiac risk stratification  Requesting Physician: Mohan Jon DO    CHIEF COMPLAINT: Abdominal pain and shortness of breath      HISTORY OF PRESENT ILLNESS:    This is a 80-year-old gentleman with history of atherosclerotic CAD, essential hypertension, mild cardiomyopathy, dyslipidemia, paroxysmal atrial fibrillation on warfarin with goal INR 2-3, sick sinus syndrome  s/p permanent pacemaker placement, CVA, right retinal artery occlusion, chronic anemia with thalassemia trait, nephrolithiasis, BPH, depression/anxiety, gout, generalized osteoarthritis with spinal stenosis and GERD is admitted with complaints of right upper quadrant abdominal pain and is diagnosed with cholelithiasis with cholecystitis and choledocholithiasis.  General surgery and gastroenterology are considering EGD with possible ERCP and cholecystectomy.    Patient required to hold aspirin for periodontal bleeding for a few days in 2010 and developed right retinal artery occlusion.  He had CVA with left upper extremity weakness in 2020 and underwent tPA.  He underwent loop recorder placement and was diagnosed with atrial fibrillation and since then has been on anticoagulation with warfarin.  He was on rate control medications however developed marked sinus bradycardia requiring Medtronic Tori permanent pacemaker placement in 2022.    He has been having a rough year as it started with acute cystitis followed by Enterococcus bacteremia and endocarditis requiring a prolonged antibiotic therapy.  He had GI bleed requiring blood transfusion.  He was admitted with chest pain on May 29, 2025 and was diagnosed with non-STEMI and underwent cardiac catheterization on May 30, 2025 which revealed mild to moderate disease in the LAD and RCA however there was a thrombotic occlusion noted in the ramus 
       GASTROENTEROLOGY CONSULT       REASON FOR CONSULT:  abd pain, elevated lft's    REQUESTING PHYSICIAN:  Mohan Jon DO    HISTORY OF PRESENT ILLNESS:    The patient is a 80 y.o. male who presents in transfer from OSH due to acute upper abdominal pain, recent melena with stools, no hematochezia. Pain is worse post prandial. Sent to [facility] for GI and surgery consultations.   CT and labs reviewed, MRCP pending.   Patient takes aspirin and Plavix, but held, does not take any oral AC.   Overall health issues from this year noted.   Family at bedside.   Medtronic pacemaker, can have MRI he states    IMPRESSION:  Findings highly suspicious for acute cholecystitis with stones noted within the   cystic duct, common bile duct and likely the duodenum. Recommend further   evaluation with ultrasound and MRCP.           Exam Ended: 06/18/25 18:23 EDT      Latest Reference Range & Units Most Recent 10/14/24 00:08 03/25/25 09:14 06/12/25 10:52 06/17/25 05:20 06/17/25 07:15 06/18/25 16:58   Albumin 3.5 - 5.2 g/dL 3.7  6/18/25 16:58 3.9 3.1 (L) 3.8 3.7  3.7   Globulin 1.5 - 3.8 g/dL 3.3  6/17/25 05:20    3.3     Albumin/Globulin Ratio 1.0 - 2.5  1.1  6/18/25 16:58  0.7 (L) 1.1 1.1  1.1   Alkaline Phosphatase 40 - 129 U/L 170 (H)  6/18/25 16:58 83 104 91 89  170 (H)   ALT 5 - 41 U/L 24  6/18/25 16:58 14 33 16 10  24   Amylase 30 - 118 U/L 34  6/13/12 17:41         AST <40 U/L 32  6/18/25 16:58 21 53 (H) 30 27  32   Total Bilirubin 0.3 - 1.2 mg/dL 2.9 (H)  6/18/25 16:58 1.1 1.6 (H) 2.1 (H) 2.0 (H)  2.9 (H)   Bilirubin, Direct <0.3 mg/dL 0.9 (H)  6/17/25 05:20    0.9 (H)     Bilirubin, Indirect 0.0 - 1.0 mg/dL 1.1 (H)  6/17/25 05:20    1.1 (H)     Lipase 13 - 60 U/L 16  6/18/25 16:58     38 16   Total Protein 6.4 - 8.3 g/dL 7.2  6/18/25 16:58 6.9 7.5 7.2 7.0  7.2   Triglycerides <150 mg/dL 140  9/5/24 13:30           MEDICAL HISTORY:   Past Medical History:   Diagnosis Date    Atrial fibrillation (HCC)     Blind right 
       GASTROENTEROLOGY CONSULT       REASON FOR CONSULT:  anemia hx PUD    REQUESTING PHYSICIAN:  Ricardo Felix DO    HISTORY OF PRESENT ILLNESS:    The patient is a 80-year-old male who presents to [place] emergency department for complaints of abdominal pain along with shortness of breath. Patient states abdominal pain and shortness of breath in the right upper and mid abdominal area have been going on for the past couple of days. Patient states the pain originally started in his upper abdomen and then started to go down the right side of his abdomen. Patient states that shortness of breath and pain is worse when he moves around. Patient currently denying any nausea, vomiting, diarrhea, constipation, rectal bleeding or dark stools. Patient states that he did receive blood a few weeks ago. Patient does have atrial fibrillation and is on Coumadin and does have a pacemaker. 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 Patient also has degenerative disc disease history of endocarditis and hypertension. Patient also endorses having no appetite and states that food just does not taste good for the last 6 months patient states he has lost approximately 50 pounds. Patient also tells me that he has some troubles swallowing and has to take small bites.   He underwent a cholecystectomy with 1 L of intraoperative blood loss on 6/23/25.   He has burning with swallowing even liquids, and frequent burping.   He does not recall exact details of PUD in past, but knows he had the EGD perhaps over 7 years ago. He remains on supplemental O2, he is NPO. INR is 1.2 today, coumadin has been on hold.     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     
  Pulmonary Medicine and Critical Care Consult    Patient - Quique Wray   MRN -  [id number]   [id number]   - 1945      Date of Admission -  2025 11:14 AM  Date of evaluation -  2025  Room - [address] [place], [place] [address]   Hospital Day - 4  Consulting - Mohan Jon DO Primary Care Physician - Vernon Cade MD     Reason for Consult      ICU management  Assessment/recommendations   Acute hypoxic respiratory insufficiency/atelectasis  Oxygen by nasal cannula  Incentive spirometer every hour while awake  Home O2 eval for discharge    Shock/acute blood loss anemia/hemorrhagic shock/status post laparoscopic cholecystectomy for cholecystitis with 1 L EBL  Status post 3 packed RBC  Repeat hemoglobin hematocrit now  Add vasopressin 0.04/min  Wean Levophed to MAP 65  Normal saline 1 L fluid bolus wide open  Will consider CTA abdomen pelvis if hemoglobin does not stabilize  Check blood cultures times 2  Check VBG lactic acid liver function test with  Continue Zosyn/add vancomycin  Surgery on consult    Mild pulmonary edema/A-fib status post pacemaker/CHF systolic/tachycardia junctional rhythm with LAD/CAD with recent MI related to thrombus embolization s/p aspiration thrombectomy of ramus branch   Cardiology on consult  troponin  Off anticoagulation/off Coumadin  Hold off diuretics    Obesity suspected sleep apnea  Outpatient sleep evaluation    Discussed with RN RT  Discussed with family wife and daughter at bedside  Patient wants to be full code  Peptic ulcer disease prophylaxis  DVT prophylaxis  Problem List      Patient Active Problem List   Diagnosis    Stroke aborted by administration of thrombolytic agent (HCC)    Acute ischemic stroke (HCC)    Bilateral carotid artery stenosis    Received tissue plasminogen activator (t-PA) less than 24 hours prior to arrival    Left hand weakness    Stroke of unknown cause (HCC)    Essential hypertension    Left against medical advice    Atrial fibrillation 
Department of General Surgery - Adult  Surgical Servicesurgical specialists  Attending Consult Note      Reason for Consult: Gallstones and probable cholecystitis  Requesting Physician: Internal medicine    CHIEF COMPLAINT: Right upper quadrant abdominal pain    History Obtained From:  patient, EMR    HISTORY OF PRESENT ILLNESS:                The patient is a 80 y.o. male who presents with a history of epigastric pain which then moved to the right upper quadrant yesterday.  It was moderate in intensity and not associated with any nausea or vomiting.  He had mild shortness of breath.  He was evaluated at the [facility] ED, was found to have stones in his gallbladder and possibly the common bile duct on his abdominal CT scan, and then transferred to [facility] last night.  As of today the pain was mild to moderate in intensity.  The pain is worse after food.    The patient had an NSTEMI in [place] on 5/30/2025 and was transferred to [facility] where he underwent emergency cardiac catheterization and evacuation of a coronary clot without the need for any stent placement.  He has been on aspirin and Plavix ever since.  He has also been on warfarin for a long period of time in view of previous strokes and a history of atrial fibrillation.  He also has a pacemaker.    The patient has thalassemia trait and required a blood transfusion last week for anemia.  His hemoglobin usually runs between 9 and 10, according to his family.  He developed black stools while taking iron pills a few weeks ago, but since stopping the iron pills the stools reverted to a normal brown color.  No hematemesis or bright red blood per rectum.  No recent change in bowel habit.    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     
[facility]   Pharmacy Pharmacokinetic Monitoring Service - Vancomycin     Quique Wray is a 80 y.o. male starting on vancomycin therapy for an intra-abdominal infection. Pharmacy consulted by Beau Raya MD for monitoring and adjustment.    Target Concentration: Goal AUC/CATRACHITA 400-600 mg*hr/L    Additional Antimicrobials: zosyn    Pertinent Laboratory Values:   Wt Readings from Last 1 Encounters:   06/23/25 92.4 kg (203 lb 11.3 oz)     Temp Readings from Last 1 Encounters:   06/23/25 98 °F (36.7 °C) (Oral)     Estimated Creatinine Clearance: 67 mL/min (based on SCr of 1 mg/dL).  Recent Labs     06/23/25  0355 06/23/25  1308 06/23/25  1335   CREATININE 0.9  --  1.0   BUN 22  --  21   WBC 6.2 6.8  --      Procalcitonin: None    Pertinent Cultures:  Culture Date Source Results   6/23/25 Blood x 2 pending   MRSA Nasal Swab: N/A. Non-respiratory infection.    Plan:  Dosing recommendations based on Bayesian software  Start vancomycin 2500mg x 1, then 750mg Q12H  Anticipated AUC of 466 mg/L.hr and trough concentration of 14.6mg/L at steady state  Renal labs as indicated   Vancomycin concentration ordered for 6/24/25 @ 1400   Pharmacy will continue to monitor patient and adjust therapy as indicated    Thank you for the consult,  Carmela Chapa RPH  6/23/2025 6:15 PM   
Request received for right pigtail chest tube removal.  Tube was removed uneventfully.  CXR thereafter did not demonstrate an appreciable PTX.    
Warfarin Dosing - Pharmacy Consult Note  Consulting Provider: Dr. Felix  Indication:  Atrial Fibrillation  Warfarin Dose prior to admission:   5mg Sun, [place], Thu - 2.5mg all other days     Concurrent anticoagulants/antiplatelets: Heparin Infusion  Significant Drug Interactions: No hospital medication drug interactions  Recent Labs     06/28/25  2223 06/29/25  0538 06/29/25  0957 06/30/25  0532 07/01/25  0455   INR  --  1.3  --  1.2 1.2   HGB 8.9*  --  9.1* 8.9*  --    PLT  --   --   --  154  --      Recent warfarin administrations        No warfarin orders with administrations found.            Orders not given:            warfarin placeholder: dosing by pharmacy    warfarin (COUMADIN) tablet 7.5 mg                   Date   INR    Dose  7/1         1.2       Assessment/Plan  (Goal INR: 2 - 3)  Restarting warfarin at this time. Currently on heparin infusion. Will order 7.5mg as bolus dose for today.     Active problem list reviewed.  INR orders are placed.  Chart reviewed for pertinent labs, drug/diet interactions, and past doses.  Documentation of patient's clinical condition was reviewed.    Pharmacy Dosing:  Pharmacy will continue to follow.      
 7.1   RBC 2.86* 2.38*  --   --   --  2.89*   HGB 7.3* 6.0*   < > 7.8* 7.8* 7.8*   HCT 21.9* 18.4*   < > 23.6* 23.6* 23.7*   MCV 76.6* 77.3*  --   --   --  82.0*   MCH 25.5 25.2  --   --   --  27.0   MCHC 33.3 32.6  --   --   --  32.9   RDW 23.0* 22.5*  --   --   --  21.3*    162  --   --   --  119*   MPV Abnormal 12.1  --   --   --  Abnormal    < > = values in this interval not displayed.      BMP:   Recent Labs     06/24/25 0315 06/25/25  0511 06/26/25  0600    137 138   K 5.0 4.7 4.2    100 103   CO2 22 25 22   BUN 28* 36* 40*   CREATININE 1.5* 2.5* 3.0*   GLUCOSE 231* 150* 111   CALCIUM 8.5* 8.5* 8.4*        Phosphorus:  No results for input(s): \"PHOS\" in the last 72 hours.  Magnesium:   Recent Labs     06/24/25 0315   MG 2.1     IRON:    Lab Results   Component Value Date/Time    IRON 17 06/19/2025 11:58 AM     TIBC:    Lab Results   Component Value Date/Time    TIBC 197 06/19/2025 11:58 AM     FERRITIN:    Lab Results   Component Value Date/Time    FERRITIN 421 06/19/2025 11:58 AM       Urinalysis:  U/A:   Lab Results   Component Value Date/Time    NITRU NEGATIVE 06/18/2025 06:30 PM    COLORU ADALBERTO 06/18/2025 06:30 PM    PHUR 5.0 06/18/2025 06:30 PM    PHUR 6.0 06/13/2012 05:06 PM    WBCUA 5 TO 10 06/18/2025 06:30 PM    RBCUA 5 TO 10 06/18/2025 06:30 PM    MUCUS 1+ 06/13/2012 05:06 PM    TRICHOMONAS NOT REPORTED 06/13/2012 05:06 PM    YEAST NOT REPORTED 06/13/2012 05:06 PM    BACTERIA 4+ 03/25/2025 01:53 PM    LEUKOCYTESUR 1+ 06/18/2025 06:30 PM    UROBILINOGEN Normal 06/18/2025 06:30 PM    BILIRUBINUR NEGATIVE 06/18/2025 06:30 PM    GLUCOSEU NEGATIVE 06/18/2025 06:30 PM    KETUA NEGATIVE 06/18/2025 06:30 PM    AMORPHOUS NOT REPORTED 06/13/2012 05:06 PM         Radiology:  Chest x-ray from today shows evidence of right pleural effusion and vascular congestion.    CTA of the abdomen pelvis with contrast on 6/23/2025:   IMPRESSION:  1. No evidence of active colonic bleed.  2. Mildly

## 2025-07-01 NOTE — CARE COORDINATION
Social Work-Spoke with Sully at [facility]. They can accept patient on Heparin, but there must be a plan to dc Heparin. Miles

## 2025-07-01 NOTE — CARE COORDINATION
Discharge planning    Chart reviewed. Patient lives with wife in 2 story home. His bed is upstairs but has a stair lift. He has  He uses his RW all the time. Also has SC< cane, grab bars.  Current with [facility] care.     [facility]     Admitted with acute cholecystitis. GI following path from EGD. Gen surgery notes regular diet, HGB stable. Cardiology notes will resume lopressor and then coumadin once ok with surgery. IR did remove right pigtail chest tube yesterday.     Patient remains on heparin gtt. Will need to discuss transition to coumadin

## 2025-07-01 NOTE — FLOWSHEET NOTE
EGD pathology from last week reviewed with patient family. (Wife and daughter at bedside)    -- Diagnosis --   A. POLYP, DUODENUM, BIOPSY:   - HYPERPLASTIC POLYP.   B. POLYP, STOMACH, BIOPSY:   - HYPERPLASTIC POLYP.   C. POLYP, GE JUNCTION, BIOPSY:   - HEMORRHAGIC HYPERPLASTIC POLYP.     Discussed need for outpatient f/u in the office in 2-4 weeks post discharge and likely repeat EGD and possible remove polyps if passage of food remains an issues, he tolerates small meals, pureed, soft type foods well at this point in time.     No objection to using anticoagulation or resuming antiplatelet agents from GI standpoint, but would defer this timing to general surgery.     Please recall GI inpatient as needed.     Discussed with nurse practitioner.  Path reviewed.  Agree with plan as noted above.  Given the size of the polyps, he would benefit from eventual upper endoscopy and polypectomy.  Given possible need of advanced therapeutic endoscopy, would plan EGD with Dr. Loera.  We can further discuss during his outpatient visit.    Amador Ruiz DO [facility]  [address] [place] [address]  [phone]

## 2025-07-01 NOTE — CARE COORDINATION
Discharge planning    Spoke with oneyda and chris can take heparin gtt as long as there is a plan as for when to stop. INR range ext..    Perfect serve to attending to update,    REFILL REQUEST FOR LATUDA 60MG     lurasidone HCl (LATUDA) 60 MG tablet tablet (02/01/2024)     LAST OFFICE VISIT-  Telemedicine with Sherrie Oneill APRN (03/19/2024)     NEXT FOLLOW UP WITH PROVIDER-  Appointment with Sherrie Oneill APRN (05/13/2024)

## 2025-07-02 ENCOUNTER — HOSPITAL ENCOUNTER (INPATIENT)
Age: 80
LOS: 10 days | Discharge: HOME | End: 2025-07-12
Attending: INTERNAL MEDICINE | Admitting: INTERNAL MEDICINE
Payer: MEDICARE

## 2025-07-02 VITALS
HEIGHT: 68 IN | WEIGHT: 205.69 LBS | DIASTOLIC BLOOD PRESSURE: 54 MMHG | BODY MASS INDEX: 31.17 KG/M2 | RESPIRATION RATE: 16 BRPM | TEMPERATURE: 97.7 F | SYSTOLIC BLOOD PRESSURE: 110 MMHG | OXYGEN SATURATION: 93 % | HEART RATE: 67 BPM

## 2025-07-02 DIAGNOSIS — R53.1 WEAKNESS: Primary | ICD-10-CM

## 2025-07-02 LAB
ANION GAP SERPL CALCULATED.3IONS-SCNC: 12 MMOL/L (ref 9–16)
ANTI-XA UNFRAC HEPARIN: 0.29 IU/L (ref 0.3–0.7)
BUN SERPL-MCNC: 22 MG/DL (ref 8–23)
CALCIUM SERPL-MCNC: 8.7 MG/DL (ref 8.8–10.2)
CHLORIDE SERPL-SCNC: 97 MMOL/L (ref 98–107)
CO2 SERPL-SCNC: 28 MMOL/L (ref 20–31)
CREAT SERPL-MCNC: 1 MG/DL (ref 0.7–1.2)
ERYTHROCYTE [DISTWIDTH] IN BLOOD BY AUTOMATED COUNT: 22.3 % (ref 11.8–14.4)
GFR, ESTIMATED: 75 ML/MIN/1.73M2
GLUCOSE SERPL-MCNC: 132 MG/DL (ref 82–115)
HCT VFR BLD AUTO: 27 % (ref 40.7–50.3)
HGB BLD-MCNC: 8.7 G/DL (ref 13–17)
INR PPP: 1.2
MCH RBC QN AUTO: 25.7 PG (ref 25.2–33.5)
MCHC RBC AUTO-ENTMCNC: 32.2 G/DL (ref 28.4–34.8)
MCV RBC AUTO: 79.6 FL (ref 82.6–102.9)
NRBC BLD-RTO: 0.3 PER 100 WBC
PLATELET # BLD AUTO: 174 K/UL (ref 138–453)
PMV BLD AUTO: ABNORMAL FL (ref 8.1–13.5)
POTASSIUM SERPL-SCNC: 3.6 MMOL/L (ref 3.7–5.3)
PROTHROMBIN TIME: 15.4 SEC (ref 11.5–14.2)
RBC # BLD AUTO: 3.39 M/UL (ref 4.21–5.77)
SODIUM SERPL-SCNC: 137 MMOL/L (ref 136–145)
WBC OTHER # BLD: 6.6 K/UL (ref 3.5–11.3)

## 2025-07-02 PROCEDURE — 85610 PROTHROMBIN TIME: CPT

## 2025-07-02 PROCEDURE — 99239 HOSP IP/OBS DSCHRG MGMT >30: CPT | Performed by: INTERNAL MEDICINE

## 2025-07-02 PROCEDURE — 1200000002 HC SEMI PRIVATE SWING BED

## 2025-07-02 PROCEDURE — 6370000000 HC RX 637 (ALT 250 FOR IP): Performed by: INTERNAL MEDICINE

## 2025-07-02 PROCEDURE — 2500000003 HC RX 250 WO HCPCS: Performed by: INTERNAL MEDICINE

## 2025-07-02 PROCEDURE — 6360000002 HC RX W HCPCS: Performed by: INTERNAL MEDICINE

## 2025-07-02 PROCEDURE — 97110 THERAPEUTIC EXERCISES: CPT

## 2025-07-02 PROCEDURE — 80048 BASIC METABOLIC PNL TOTAL CA: CPT

## 2025-07-02 PROCEDURE — 97530 THERAPEUTIC ACTIVITIES: CPT

## 2025-07-02 PROCEDURE — 36415 COLL VENOUS BLD VENIPUNCTURE: CPT

## 2025-07-02 PROCEDURE — 94761 N-INVAS EAR/PLS OXIMETRY MLT: CPT

## 2025-07-02 PROCEDURE — 97116 GAIT TRAINING THERAPY: CPT

## 2025-07-02 PROCEDURE — 85027 COMPLETE CBC AUTOMATED: CPT

## 2025-07-02 PROCEDURE — 85520 HEPARIN ASSAY: CPT

## 2025-07-02 RX ORDER — WARFARIN SODIUM 10 MG/1
10 TABLET ORAL
Status: CANCELLED | OUTPATIENT
Start: 2025-07-02 | End: 2025-07-03

## 2025-07-02 RX ORDER — CALCIUM CARBONATE 500 MG/1
1000 TABLET, CHEWABLE ORAL 3 TIMES DAILY PRN
Status: DISCONTINUED | OUTPATIENT
Start: 2025-07-02 | End: 2025-07-12 | Stop reason: HOSPADM

## 2025-07-02 RX ORDER — LANOLIN ALCOHOL/MO/W.PET/CERES
400 CREAM (GRAM) TOPICAL 2 TIMES DAILY
Status: CANCELLED | OUTPATIENT
Start: 2025-07-02

## 2025-07-02 RX ORDER — DIPHENHYDRAMINE HYDROCHLORIDE 50 MG/ML
25 INJECTION, SOLUTION INTRAMUSCULAR; INTRAVENOUS EVERY 6 HOURS PRN
OUTPATIENT
Start: 2025-07-02

## 2025-07-02 RX ORDER — ENOXAPARIN SODIUM 100 MG/ML
1 INJECTION SUBCUTANEOUS 2 TIMES DAILY
Status: DISCONTINUED | OUTPATIENT
Start: 2025-07-02 | End: 2025-07-05

## 2025-07-02 RX ORDER — METOPROLOL TARTRATE 25 MG/1
25 TABLET, FILM COATED ORAL 2 TIMES DAILY
Status: DISCONTINUED | OUTPATIENT
Start: 2025-07-02 | End: 2025-07-12 | Stop reason: HOSPADM

## 2025-07-02 RX ORDER — LANOLIN ALCOHOL/MO/W.PET/CERES
400 CREAM (GRAM) TOPICAL 2 TIMES DAILY
Status: DISCONTINUED | OUTPATIENT
Start: 2025-07-02 | End: 2025-07-12 | Stop reason: HOSPADM

## 2025-07-02 RX ORDER — MIDODRINE HYDROCHLORIDE 5 MG/1
5 TABLET ORAL 2 TIMES DAILY WITH MEALS
Status: CANCELLED | OUTPATIENT
Start: 2025-07-02

## 2025-07-02 RX ORDER — POLYETHYLENE GLYCOL 3350 17 G/17G
17 POWDER, FOR SOLUTION ORAL DAILY
Status: DISCONTINUED | OUTPATIENT
Start: 2025-07-03 | End: 2025-07-12 | Stop reason: HOSPADM

## 2025-07-02 RX ORDER — ACETAMINOPHEN 650 MG/1
650 SUPPOSITORY RECTAL EVERY 6 HOURS PRN
Status: CANCELLED | OUTPATIENT
Start: 2025-07-02

## 2025-07-02 RX ORDER — ASPIRIN 81 MG/1
81 TABLET ORAL DAILY
Status: CANCELLED | OUTPATIENT
Start: 2025-07-03

## 2025-07-02 RX ORDER — NITROGLYCERIN 0.4 MG/1
0.4 TABLET SUBLINGUAL EVERY 5 MIN PRN
Status: CANCELLED | OUTPATIENT
Start: 2025-07-02

## 2025-07-02 RX ORDER — ALLOPURINOL 300 MG/1
300 TABLET ORAL DAILY
Status: CANCELLED | OUTPATIENT
Start: 2025-07-03

## 2025-07-02 RX ORDER — ACETAMINOPHEN 325 MG/1
650 TABLET ORAL EVERY 6 HOURS PRN
Status: CANCELLED | OUTPATIENT
Start: 2025-07-02

## 2025-07-02 RX ORDER — WARFARIN SODIUM 10 MG/1
10 TABLET ORAL
Status: DISCONTINUED | OUTPATIENT
Start: 2025-07-02 | End: 2025-07-02 | Stop reason: HOSPADM

## 2025-07-02 RX ORDER — SERTRALINE HYDROCHLORIDE 25 MG/1
25 TABLET, FILM COATED ORAL DAILY
Status: CANCELLED | OUTPATIENT
Start: 2025-07-03

## 2025-07-02 RX ORDER — SODIUM CHLORIDE 0.9 % (FLUSH) 0.9 %
5-40 SYRINGE (ML) INJECTION EVERY 12 HOURS SCHEDULED
OUTPATIENT
Start: 2025-07-02

## 2025-07-02 RX ORDER — ALLOPURINOL 100 MG/1
300 TABLET ORAL DAILY
Status: DISCONTINUED | OUTPATIENT
Start: 2025-07-03 | End: 2025-07-12 | Stop reason: HOSPADM

## 2025-07-02 RX ORDER — ATORVASTATIN CALCIUM 40 MG/1
40 TABLET, FILM COATED ORAL DAILY
Status: DISCONTINUED | OUTPATIENT
Start: 2025-07-03 | End: 2025-07-12 | Stop reason: HOSPADM

## 2025-07-02 RX ORDER — ENOXAPARIN SODIUM 100 MG/ML
1 INJECTION SUBCUTANEOUS 2 TIMES DAILY
Status: CANCELLED | OUTPATIENT
Start: 2025-07-02

## 2025-07-02 RX ORDER — DIPHENHYDRAMINE HCL 25 MG
25 TABLET ORAL NIGHTLY PRN
Status: DISCONTINUED | OUTPATIENT
Start: 2025-07-02 | End: 2025-07-12 | Stop reason: HOSPADM

## 2025-07-02 RX ORDER — CALCIUM CARBONATE 500 MG/1
1000 TABLET, CHEWABLE ORAL 3 TIMES DAILY PRN
Status: CANCELLED | OUTPATIENT
Start: 2025-07-02

## 2025-07-02 RX ORDER — DIPHENHYDRAMINE HCL 25 MG
25 CAPSULE ORAL NIGHTLY PRN
Status: DISCONTINUED | OUTPATIENT
Start: 2025-07-02 | End: 2025-07-02

## 2025-07-02 RX ORDER — PANTOPRAZOLE SODIUM 40 MG/1
40 TABLET, DELAYED RELEASE ORAL
Status: DISCONTINUED | OUTPATIENT
Start: 2025-07-03 | End: 2025-07-12 | Stop reason: HOSPADM

## 2025-07-02 RX ORDER — SODIUM CHLORIDE 0.9 % (FLUSH) 0.9 %
10 SYRINGE (ML) INJECTION PRN
Status: CANCELLED | OUTPATIENT
Start: 2025-07-02

## 2025-07-02 RX ORDER — SENNA AND DOCUSATE SODIUM 50; 8.6 MG/1; MG/1
2 TABLET, FILM COATED ORAL 2 TIMES DAILY
Status: DISCONTINUED | OUTPATIENT
Start: 2025-07-02 | End: 2025-07-12 | Stop reason: HOSPADM

## 2025-07-02 RX ORDER — POTASSIUM CHLORIDE 1500 MG/1
40 TABLET, EXTENDED RELEASE ORAL ONCE
Status: COMPLETED | OUTPATIENT
Start: 2025-07-02 | End: 2025-07-02

## 2025-07-02 RX ORDER — ACETAMINOPHEN 650 MG/1
650 SUPPOSITORY RECTAL EVERY 6 HOURS PRN
Status: DISCONTINUED | OUTPATIENT
Start: 2025-07-02 | End: 2025-07-12 | Stop reason: HOSPADM

## 2025-07-02 RX ORDER — POLYETHYLENE GLYCOL 3350 17 G/17G
17 POWDER, FOR SOLUTION ORAL DAILY
Status: CANCELLED | OUTPATIENT
Start: 2025-07-03

## 2025-07-02 RX ORDER — SODIUM CHLORIDE 0.9 % (FLUSH) 0.9 %
10 SYRINGE (ML) INJECTION PRN
OUTPATIENT
Start: 2025-07-02

## 2025-07-02 RX ORDER — METOPROLOL TARTRATE 25 MG/1
25 TABLET, FILM COATED ORAL 2 TIMES DAILY
Status: CANCELLED | OUTPATIENT
Start: 2025-07-02

## 2025-07-02 RX ORDER — ACETAMINOPHEN 325 MG/1
650 TABLET ORAL EVERY 6 HOURS PRN
Status: DISCONTINUED | OUTPATIENT
Start: 2025-07-02 | End: 2025-07-12 | Stop reason: HOSPADM

## 2025-07-02 RX ORDER — WARFARIN SODIUM 5 MG/1
10 TABLET ORAL
Status: COMPLETED | OUTPATIENT
Start: 2025-07-02 | End: 2025-07-02

## 2025-07-02 RX ORDER — SODIUM CHLORIDE 0.9 % (FLUSH) 0.9 %
10 SYRINGE (ML) INJECTION PRN
Status: DISCONTINUED | OUTPATIENT
Start: 2025-07-02 | End: 2025-07-12 | Stop reason: HOSPADM

## 2025-07-02 RX ORDER — SENNA AND DOCUSATE SODIUM 50; 8.6 MG/1; MG/1
2 TABLET, FILM COATED ORAL 2 TIMES DAILY
Status: CANCELLED | OUTPATIENT
Start: 2025-07-02

## 2025-07-02 RX ORDER — TAMSULOSIN HYDROCHLORIDE 0.4 MG/1
0.4 CAPSULE ORAL DAILY
Status: CANCELLED | OUTPATIENT
Start: 2025-07-03

## 2025-07-02 RX ORDER — TAMSULOSIN HYDROCHLORIDE 0.4 MG/1
0.4 CAPSULE ORAL DAILY
Status: DISCONTINUED | OUTPATIENT
Start: 2025-07-03 | End: 2025-07-12 | Stop reason: HOSPADM

## 2025-07-02 RX ORDER — ASPIRIN 81 MG/1
81 TABLET ORAL DAILY
Status: DISCONTINUED | OUTPATIENT
Start: 2025-07-03 | End: 2025-07-12 | Stop reason: HOSPADM

## 2025-07-02 RX ORDER — PANTOPRAZOLE SODIUM 40 MG/1
40 TABLET, DELAYED RELEASE ORAL
Status: CANCELLED | OUTPATIENT
Start: 2025-07-03

## 2025-07-02 RX ORDER — OXYCODONE AND ACETAMINOPHEN 5; 325 MG/1; MG/1
1 TABLET ORAL EVERY 4 HOURS PRN
Refills: 0 | Status: DISCONTINUED | OUTPATIENT
Start: 2025-07-02 | End: 2025-07-12 | Stop reason: HOSPADM

## 2025-07-02 RX ORDER — OXYCODONE AND ACETAMINOPHEN 5; 325 MG/1; MG/1
1 TABLET ORAL EVERY 4 HOURS PRN
Refills: 0 | Status: CANCELLED | OUTPATIENT
Start: 2025-07-02

## 2025-07-02 RX ORDER — ENOXAPARIN SODIUM 100 MG/ML
1 INJECTION SUBCUTANEOUS 2 TIMES DAILY
Status: DISCONTINUED | OUTPATIENT
Start: 2025-07-02 | End: 2025-07-02 | Stop reason: HOSPADM

## 2025-07-02 RX ORDER — MIDODRINE HYDROCHLORIDE 5 MG/1
5 TABLET ORAL 2 TIMES DAILY WITH MEALS
Status: DISCONTINUED | OUTPATIENT
Start: 2025-07-02 | End: 2025-07-12 | Stop reason: HOSPADM

## 2025-07-02 RX ORDER — NITROGLYCERIN 0.4 MG/1
0.4 TABLET SUBLINGUAL EVERY 5 MIN PRN
Status: DISCONTINUED | OUTPATIENT
Start: 2025-07-02 | End: 2025-07-12 | Stop reason: HOSPADM

## 2025-07-02 RX ORDER — ATORVASTATIN CALCIUM 40 MG/1
40 TABLET, FILM COATED ORAL DAILY
Status: CANCELLED | OUTPATIENT
Start: 2025-07-03

## 2025-07-02 RX ADMIN — MIDODRINE HYDROCHLORIDE 5 MG: 5 TABLET ORAL at 17:40

## 2025-07-02 RX ADMIN — ACETAMINOPHEN 650 MG: 325 TABLET ORAL at 21:13

## 2025-07-02 RX ADMIN — ATORVASTATIN CALCIUM 40 MG: 40 TABLET, FILM COATED ORAL at 09:26

## 2025-07-02 RX ADMIN — SERTRALINE 25 MG: 25 TABLET, FILM COATED ORAL at 09:26

## 2025-07-02 RX ADMIN — HEPARIN SODIUM 2000 UNITS: 1000 INJECTION INTRAVENOUS; SUBCUTANEOUS at 06:30

## 2025-07-02 RX ADMIN — ALLOPURINOL 300 MG: 300 TABLET ORAL at 09:26

## 2025-07-02 RX ADMIN — Medication 400 MG: at 21:13

## 2025-07-02 RX ADMIN — POTASSIUM CHLORIDE 40 MEQ: 1500 TABLET, EXTENDED RELEASE ORAL at 12:46

## 2025-07-02 RX ADMIN — SODIUM CHLORIDE, PRESERVATIVE FREE 10 ML: 5 INJECTION INTRAVENOUS at 09:27

## 2025-07-02 RX ADMIN — Medication 6 MG: at 21:13

## 2025-07-02 RX ADMIN — WARFARIN SODIUM 10 MG: 5 TABLET ORAL at 17:40

## 2025-07-02 RX ADMIN — TAMSULOSIN HYDROCHLORIDE 0.4 MG: 0.4 CAPSULE ORAL at 09:26

## 2025-07-02 RX ADMIN — MIDODRINE HYDROCHLORIDE 5 MG: 5 TABLET ORAL at 09:26

## 2025-07-02 RX ADMIN — DIPHENHYDRAMINE HYDROCHLORIDE 25 MG: 25 TABLET ORAL at 21:14

## 2025-07-02 RX ADMIN — Medication 400 MG: at 09:26

## 2025-07-02 RX ADMIN — ENOXAPARIN SODIUM 90 MG: 100 INJECTION SUBCUTANEOUS at 21:35

## 2025-07-02 RX ADMIN — SENNOSIDES AND DOCUSATE SODIUM 2 TABLET: 50; 8.6 TABLET ORAL at 09:26

## 2025-07-02 RX ADMIN — PANTOPRAZOLE SODIUM 40 MG: 40 TABLET, DELAYED RELEASE ORAL at 06:01

## 2025-07-02 RX ADMIN — ENOXAPARIN SODIUM 90 MG: 100 INJECTION SUBCUTANEOUS at 12:46

## 2025-07-02 RX ADMIN — SENNOSIDES AND DOCUSATE SODIUM 2 TABLET: 50; 8.6 TABLET ORAL at 21:13

## 2025-07-02 ASSESSMENT — PAIN SCALES - GENERAL
PAINLEVEL_OUTOF10: 1
PAINLEVEL_OUTOF10: 0

## 2025-07-02 NOTE — DISCHARGE INSTR - COC
Continuity of Care Form    Patient Name: Quique Wray   :  1945  MRN:  [id number]    Admit date:  2025  Discharge date:  2025    Code Status Order: Full Code   Advance Directives:     Admitting Physician:  No admitting provider for patient encounter.  PCP: Vernon Cade MD    Discharging Nurse: [facility]/Room[facility] 1014/1014-02  Discharging Unit Phone Number: [phone]    Emergency Contact:   Extended Emergency Contact Information  Primary Emergency Contact: Sherie Wray  Address: [address] [place] [address] [place]  Mobile Phone: [phone]  Relation: Spouse  Secondary Emergency Contact: Elizabeth Kapadia  Home Phone: [phone]  Mobile Phone: [phone]  Relation: Child    Past Surgical History:  Past Surgical History:   Procedure Laterality Date    CATARACT REMOVAL Left     CHOLECYSTECTOMY, LAPAROSCOPIC N/A 2025    CHOLECYSTECTOMY LAPAROSCOPIC performed by Hardik Rojas MD at [facility] OR    CT GUIDED CHEST TUBE  2025    CT GUIDED CHEST TUBE 2025 Jagdish Moses MD [facility] CT SCAN    INSERTABLE CARDIAC MONITOR N/A 2021    LOOP RECORDER INSERT performed by Andrea Galan MD at [facility] OR    KNEE ARTHROSCOPY Right     LITHOTRIPSY      PACEMAKER INSERTION Left 2022    PACEMAKER INSERTION PERMANENT performed by Andrea Galan MD at [facility] OR    TOTAL HIP ARTHROPLASTY Right     UPPER GASTROINTESTINAL ENDOSCOPY N/A 2025    ESOPHAGOGASTRODUODENOSCOPY WITH BIOPSY performed by Mack Napier MD at [facility] OR       Immunization History:   Immunization History   Administered Date(s) Administered    COVID-19, MODERNA BLUE border, Primary or Immunocompromised, (age 12y+), IM, 100 mcg/0.5mL 2021, 2021, 2021       Active Problems:  Patient Active Problem List   Diagnosis Code    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    Acute ischemic stroke (HCC) I63.9    Bilateral

## 2025-07-02 NOTE — CARE COORDINATION
Discharge planning    Plan is to [facility] unit. General surgery ok to discharge. GI notes would benefit from upper endoscopy and polypectomy thru EGD with Dr Loera. Can be arranged outpatient     On heparin gtt and INR is 1.2. SS did confirm they can take heparin gtt as long as parameters on MARGOT as to INR range and when to stop.

## 2025-07-02 NOTE — PLAN OF CARE
Problem: Chronic Conditions and Co-morbidities  Goal: Patient's chronic conditions and co-morbidity symptoms are monitored and maintained or improved  6/24/2025 1710 by Danita Wei, RN  Outcome: Progressing  Flowsheets (Taken 6/24/2025 0800)  Care Plan - Patient's Chronic Conditions and Co-Morbidity Symptoms are Monitored and Maintained or Improved:   Monitor and assess patient's chronic conditions and comorbid symptoms for stability, deterioration, or improvement   Collaborate with multidisciplinary team to address chronic and comorbid conditions and prevent exacerbation or deterioration   Update acute care plan with appropriate goals if chronic or comorbid symptoms are exacerbated and prevent overall improvement and discharge  6/24/2025 0610 by Cinthia De RN  Outcome: Progressing     Problem: Discharge Planning  Goal: Discharge to home or other facility with appropriate resources  6/24/2025 1710 by Danita Wei RN  Outcome: Progressing  Flowsheets (Taken 6/24/2025 0800)  Discharge to home or other facility with appropriate resources:   Identify barriers to discharge with patient and caregiver   Arrange for needed discharge resources and transportation as appropriate   Identify discharge learning needs (meds, wound care, etc)  6/24/2025 0610 by Cinthia De RN  Outcome: Progressing     Problem: Pain  Goal: Verbalizes/displays adequate comfort level or baseline comfort level  6/24/2025 1710 by Danita Wei RN  Outcome: Progressing  6/24/2025 0610 by Cinthia De RN  Outcome: Progressing  Flowsheets  Taken 6/24/2025 0400  Verbalizes/displays adequate comfort level or baseline comfort level:   Encourage patient to monitor pain and request assistance   Assess pain using appropriate pain scale  Taken 6/24/2025 0000  Verbalizes/displays adequate comfort level or baseline comfort level:   Assess pain using appropriate pain scale   Encourage patient to monitor pain and request assistance  Taken 
  Problem: Chronic Conditions and Co-morbidities  Goal: Patient's chronic conditions and co-morbidity symptoms are monitored and maintained or improved  6/25/2025 0037 by Cinthia De RN  Outcome: Progressing     Problem: Discharge Planning  Goal: Discharge to home or other facility with appropriate resources  6/25/2025 0037 by Cinthia De RN  Outcome: Progressing     Problem: Pain  Goal: Verbalizes/displays adequate comfort level or baseline comfort level  6/25/2025 0037 by Cinthia De RN  Outcome: Progressing     Problem: Skin/Tissue Integrity  Goal: Skin integrity remains intact  Description: 1.  Monitor for areas of redness and/or skin breakdown  2.  Assess vascular access sites hourly  3.  Every 4-6 hours minimum:  Change oxygen saturation probe site  4.  Every 4-6 hours:  If on nasal continuous positive airway pressure, respiratory therapy assess nares and determine need for appliance change or resting period  6/25/2025 0037 by Cinthia De RN  Outcome: Progressing     Problem: ABCDS Injury Assessment  Goal: Absence of physical injury  6/25/2025 0037 by Cinthia De RN  Outcome: Progressing     Problem: Safety - Adult  Goal: Free from fall injury  6/25/2025 0037 by Cinthia De RN  Outcome: Progressing     Problem: Respiratory - Adult  Goal: Achieves optimal ventilation and oxygenation  6/25/2025 0037 by Cinthia De RN  Outcome: Progressing     Problem: Skin/Tissue Integrity - Adult  Goal: Skin integrity remains intact  Description: 1.  Monitor for areas of redness and/or skin breakdown  2.  Assess vascular access sites hourly  3.  Every 4-6 hours minimum:  Change oxygen saturation probe site  4.  Every 4-6 hours:  If on nasal continuous positive airway pressure, respiratory therapy assess nares and determine need for appliance change or resting period  6/25/2025 0037 by Cinthia De RN  Outcome: Progressing     Problem: Metabolic/Fluid and Electrolytes - Adult  Goal: Electrolytes 
  Problem: Chronic Conditions and Co-morbidities  Goal: Patient's chronic conditions and co-morbidity symptoms are monitored and maintained or improved  6/25/2025 0615 by Cinthia De RN  Outcome: Progressing     Problem: Discharge Planning  Goal: Discharge to home or other facility with appropriate resources  6/25/2025 0615 by Cinthia De RN  Outcome: Progressing     Problem: Pain  Goal: Verbalizes/displays adequate comfort level or baseline comfort level  6/25/2025 0615 by Cinthia De RN  Outcome: Progressing     Problem: Skin/Tissue Integrity  Goal: Skin integrity remains intact  Description: 1.  Monitor for areas of redness and/or skin breakdown  2.  Assess vascular access sites hourly  3.  Every 4-6 hours minimum:  Change oxygen saturation probe site  4.  Every 4-6 hours:  If on nasal continuous positive airway pressure, respiratory therapy assess nares and determine need for appliance change or resting period  6/25/2025 0615 by Cinthia De RN  Outcome: Progressing     Problem: ABCDS Injury Assessment  Goal: Absence of physical injury  6/25/2025 0615 by Cinthia De RN  Outcome: Progressing     Problem: Safety - Adult  Goal: Free from fall injury  6/25/2025 0615 by Cinthia De RN  Outcome: Progressing     Problem: Respiratory - Adult  Goal: Achieves optimal ventilation and oxygenation  6/25/2025 0615 by Cinthia De RN  Outcome: Progressing     Problem: Skin/Tissue Integrity - Adult  Goal: Skin integrity remains intact  Description: 1.  Monitor for areas of redness and/or skin breakdown  2.  Assess vascular access sites hourly  3.  Every 4-6 hours minimum:  Change oxygen saturation probe site  4.  Every 4-6 hours:  If on nasal continuous positive airway pressure, respiratory therapy assess nares and determine need for appliance change or resting period  6/25/2025 0615 by Cinthia De RN  Outcome: Progressing     Problem: Metabolic/Fluid and Electrolytes - Adult  Goal: Electrolytes 
  Problem: Chronic Conditions and Co-morbidities  Goal: Patient's chronic conditions and co-morbidity symptoms are monitored and maintained or improved  6/25/2025 1813 by Wilma Yanes RN  Outcome: Progressing  Flowsheets (Taken 6/25/2025 0900)  Care Plan - Patient's Chronic Conditions and Co-Morbidity Symptoms are Monitored and Maintained or Improved: Monitor and assess patient's chronic conditions and comorbid symptoms for stability, deterioration, or improvement  6/25/2025 0615 by Cinthia De RN  Outcome: Progressing     Problem: Discharge Planning  Goal: Discharge to home or other facility with appropriate resources  6/25/2025 1813 by Wilma Yanes RN  Outcome: Progressing  Flowsheets (Taken 6/25/2025 0900)  Discharge to home or other facility with appropriate resources: Identify barriers to discharge with patient and caregiver  6/25/2025 0615 by Cinthia De RN  Outcome: Progressing     Problem: Pain  Goal: Verbalizes/displays adequate comfort level or baseline comfort level  6/25/2025 1813 by Wilma Yanes RN  Outcome: Progressing  Flowsheets (Taken 6/25/2025 0900)  Verbalizes/displays adequate comfort level or baseline comfort level:   Encourage patient to monitor pain and request assistance   Assess pain using appropriate pain scale   Administer analgesics based on type and severity of pain and evaluate response  6/25/2025 0615 by Cinthia De RN  Outcome: Progressing     Problem: Skin/Tissue Integrity  Goal: Skin integrity remains intact  Description: 1.  Monitor for areas of redness and/or skin breakdown  2.  Assess vascular access sites hourly  3.  Every 4-6 hours minimum:  Change oxygen saturation probe site  4.  Every 4-6 hours:  If on nasal continuous positive airway pressure, respiratory therapy assess nares and determine need for appliance change or resting period  6/25/2025 1813 by Wilma Yanes RN  Outcome: Progressing  Flowsheets (Taken 6/25/2025 0900)  Skin 
  Problem: Chronic Conditions and Co-morbidities  Goal: Patient's chronic conditions and co-morbidity symptoms are monitored and maintained or improved  6/26/2025 0117 by Cinthia De RN  Outcome: Progressing     Problem: Discharge Planning  Goal: Discharge to home or other facility with appropriate resources  6/26/2025 0117 by Cinthia De RN  Outcome: Progressing     Problem: Pain  Goal: Verbalizes/displays adequate comfort level or baseline comfort level  6/26/2025 0117 by Cinthia De RN  Outcome: Progressing  Flowsheets  Taken 6/26/2025 0000  Verbalizes/displays adequate comfort level or baseline comfort level: Encourage patient to monitor pain and request assistance  Taken 6/25/2025 2000  Verbalizes/displays adequate comfort level or baseline comfort level:   Encourage patient to monitor pain and request assistance   Assess pain using appropriate pain scale     Problem: Skin/Tissue Integrity  Goal: Skin integrity remains intact  Description: 1.  Monitor for areas of redness and/or skin breakdown  2.  Assess vascular access sites hourly  3.  Every 4-6 hours minimum:  Change oxygen saturation probe site  4.  Every 4-6 hours:  If on nasal continuous positive airway pressure, respiratory therapy assess nares and determine need for appliance change or resting period  6/26/2025 0117 by Cinthia De RN  Outcome: Progressing     Problem: ABCDS Injury Assessment  Goal: Absence of physical injury  6/26/2025 0117 by Cinthia De RN  Outcome: Progressing     Problem: Safety - Adult  Goal: Free from fall injury  6/26/2025 0117 by Cinthia De RN  Outcome: Progressing     Problem: Respiratory - Adult  Goal: Achieves optimal ventilation and oxygenation  6/26/2025 0117 by Cinthia De RN  Outcome: Progressing     Problem: Skin/Tissue Integrity - Adult  Goal: Skin integrity remains intact  Description: 1.  Monitor for areas of redness and/or skin breakdown  2.  Assess vascular access sites hourly  3.  
  Problem: Chronic Conditions and Co-morbidities  Goal: Patient's chronic conditions and co-morbidity symptoms are monitored and maintained or improved  6/26/2025 1338 by Danita Wei, RN  Outcome: Progressing  Flowsheets (Taken 6/26/2025 0800)  Care Plan - Patient's Chronic Conditions and Co-Morbidity Symptoms are Monitored and Maintained or Improved:   Monitor and assess patient's chronic conditions and comorbid symptoms for stability, deterioration, or improvement   Collaborate with multidisciplinary team to address chronic and comorbid conditions and prevent exacerbation or deterioration   Update acute care plan with appropriate goals if chronic or comorbid symptoms are exacerbated and prevent overall improvement and discharge  6/26/2025 0117 by Cinthia De RN  Outcome: Progressing     Problem: Discharge Planning  Goal: Discharge to home or other facility with appropriate resources  6/26/2025 1338 by Danita Wei RN  Outcome: Progressing  Flowsheets (Taken 6/26/2025 0800)  Discharge to home or other facility with appropriate resources:   Identify barriers to discharge with patient and caregiver   Arrange for needed discharge resources and transportation as appropriate   Identify discharge learning needs (meds, wound care, etc)  6/26/2025 0117 by Cinthia De RN  Outcome: Progressing     Problem: Pain  Goal: Verbalizes/displays adequate comfort level or baseline comfort level  6/26/2025 1338 by Danita Wei RN  Outcome: Progressing  6/26/2025 0117 by Cinthia De RN  Outcome: Progressing  Flowsheets  Taken 6/26/2025 0000  Verbalizes/displays adequate comfort level or baseline comfort level: Encourage patient to monitor pain and request assistance  Taken 6/25/2025 2000  Verbalizes/displays adequate comfort level or baseline comfort level:   Encourage patient to monitor pain and request assistance   Assess pain using appropriate pain scale     Problem: Skin/Tissue Integrity  Goal: Skin 
  Problem: Chronic Conditions and Co-morbidities  Goal: Patient's chronic conditions and co-morbidity symptoms are monitored and maintained or improved  6/27/2025 1638 by Tiffany Mac  Outcome: Progressing  6/27/2025 0558 by He Rivera RN  Outcome: Progressing     Problem: Discharge Planning  Goal: Discharge to home or other facility with appropriate resources  6/27/2025 1638 by Tiffany Mac  Outcome: Progressing  6/27/2025 0558 by He Rivera RN  Outcome: Progressing     Problem: Pain  Goal: Verbalizes/displays adequate comfort level or baseline comfort level  6/27/2025 1638 by Tiffany Mac  Outcome: Progressing  6/27/2025 0558 by He Rivera RN  Outcome: Progressing     Problem: Skin/Tissue Integrity  Goal: Skin integrity remains intact  Description: 1.  Monitor for areas of redness and/or skin breakdown  2.  Assess vascular access sites hourly  3.  Every 4-6 hours minimum:  Change oxygen saturation probe site  4.  Every 4-6 hours:  If on nasal continuous positive airway pressure, respiratory therapy assess nares and determine need for appliance change or resting period  6/27/2025 1638 by Tiffany Mac  Outcome: Progressing  6/27/2025 0558 by He Rivera RN  Outcome: Progressing     Problem: ABCDS Injury Assessment  Goal: Absence of physical injury  6/27/2025 1638 by Tiffany Mac  Outcome: Progressing  6/27/2025 0558 by He Rivera RN  Outcome: Progressing     Problem: Safety - Adult  Goal: Free from fall injury  6/27/2025 1638 by Tiffany Mac  Outcome: Progressing  6/27/2025 0558 by He Rivera RN  Outcome: Progressing     Problem: Respiratory - Adult  Goal: Achieves optimal ventilation and oxygenation  6/27/2025 1638 by Tiffany Mac  Outcome: Progressing  6/27/2025 0558 by He Rivera RN  Outcome: Progressing  Flowsheets (Taken 6/26/2025 2000)  Achieves optimal ventilation and oxygenation:   Assess for changes in respiratory status   Assess for 
  Problem: Chronic Conditions and Co-morbidities  Goal: Patient's chronic conditions and co-morbidity symptoms are monitored and maintained or improved  6/30/2025 2033 by Loni Linder RN  Outcome: Progressing  Flowsheets (Taken 6/30/2025 2000)  Care Plan - Patient's Chronic Conditions and Co-Morbidity Symptoms are Monitored and Maintained or Improved: Monitor and assess patient's chronic conditions and comorbid symptoms for stability, deterioration, or improvement  6/30/2025 1838 by Jesenia Null RN  Outcome: Progressing     Problem: Discharge Planning  Goal: Discharge to home or other facility with appropriate resources  6/30/2025 2033 by Loni Linder RN  Outcome: Progressing  Flowsheets (Taken 6/30/2025 2000)  Discharge to home or other facility with appropriate resources: Identify barriers to discharge with patient and caregiver  6/30/2025 1838 by Jesenia Null RN  Outcome: Progressing     Problem: Pain  Goal: Verbalizes/displays adequate comfort level or baseline comfort level  6/30/2025 2033 by Loni Linder RN  Outcome: Progressing  Flowsheets (Taken 6/30/2025 1911)  Verbalizes/displays adequate comfort level or baseline comfort level: Encourage patient to monitor pain and request assistance  6/30/2025 1838 by Jesenia Null RN  Outcome: Progressing     Problem: Skin/Tissue Integrity  Goal: Skin integrity remains intact  Description: 1.  Monitor for areas of redness and/or skin breakdown  2.  Assess vascular access sites hourly  3.  Every 4-6 hours minimum:  Change oxygen saturation probe site  4.  Every 4-6 hours:  If on nasal continuous positive airway pressure, respiratory therapy assess nares and determine need for appliance change or resting period  6/30/2025 2033 by Loni Linder RN  Outcome: Progressing  Flowsheets (Taken 6/30/2025 2000)  Skin Integrity Remains Intact: Monitor for areas of redness and/or skin breakdown  6/30/2025 1838 by Jesenia Null RN  Outcome: Progressing   
  Problem: Chronic Conditions and Co-morbidities  Goal: Patient's chronic conditions and co-morbidity symptoms are monitored and maintained or improved  Outcome: Progressing     Problem: Discharge Planning  Goal: Discharge to home or other facility with appropriate resources  Outcome: Progressing     Problem: Pain  Goal: Verbalizes/displays adequate comfort level or baseline comfort level  Outcome: Progressing     Problem: Skin/Tissue Integrity  Goal: Skin integrity remains intact  Description: 1.  Monitor for areas of redness and/or skin breakdown  2.  Assess vascular access sites hourly  3.  Every 4-6 hours minimum:  Change oxygen saturation probe site  4.  Every 4-6 hours:  If on nasal continuous positive airway pressure, respiratory therapy assess nares and determine need for appliance change or resting period  Outcome: Progressing     Problem: ABCDS Injury Assessment  Goal: Absence of physical injury  Outcome: Progressing     Problem: Safety - Adult  Goal: Free from fall injury  Outcome: Progressing     Problem: Respiratory - Adult  Goal: Achieves optimal ventilation and oxygenation  Outcome: Progressing     Problem: Skin/Tissue Integrity - Adult  Goal: Skin integrity remains intact  Description: 1.  Monitor for areas of redness and/or skin breakdown  2.  Assess vascular access sites hourly  3.  Every 4-6 hours minimum:  Change oxygen saturation probe site  4.  Every 4-6 hours:  If on nasal continuous positive airway pressure, respiratory therapy assess nares and determine need for appliance change or resting period  Outcome: Progressing     Problem: Metabolic/Fluid and Electrolytes - Adult  Goal: Electrolytes maintained within normal limits  Outcome: Progressing     Problem: Nutrition Deficit:  Goal: Optimize nutritional status  Outcome: Progressing     Problem: Cardiovascular - Adult  Goal: Maintains optimal cardiac output and hemodynamic stability  Outcome: Progressing     Problem: Hematologic - Adult  Goal: 
  Problem: Chronic Conditions and Co-morbidities  Goal: Patient's chronic conditions and co-morbidity symptoms are monitored and maintained or improved  Outcome: Progressing     Problem: Discharge Planning  Goal: Discharge to home or other facility with appropriate resources  Outcome: Progressing     Problem: Pain  Goal: Verbalizes/displays adequate comfort level or baseline comfort level  Outcome: Progressing     Problem: Skin/Tissue Integrity  Goal: Skin integrity remains intact  Description: 1.  Monitor for areas of redness and/or skin breakdown  2.  Assess vascular access sites hourly  3.  Every 4-6 hours minimum:  Change oxygen saturation probe site  4.  Every 4-6 hours:  If on nasal continuous positive airway pressure, respiratory therapy assess nares and determine need for appliance change or resting period  Outcome: Progressing     Problem: ABCDS Injury Assessment  Goal: Absence of physical injury  Outcome: Progressing     Problem: Safety - Adult  Goal: Free from fall injury  Outcome: Progressing     Problem: Respiratory - Adult  Goal: Achieves optimal ventilation and oxygenation  Outcome: Progressing  Flowsheets (Taken 6/26/2025 2000)  Achieves optimal ventilation and oxygenation:   Assess for changes in respiratory status   Assess for changes in mentation and behavior   Position to facilitate oxygenation and minimize respiratory effort   Oxygen supplementation based on oxygen saturation or arterial blood gases   Initiate smoking cessation protocol as indicated     Problem: Skin/Tissue Integrity - Adult  Goal: Skin integrity remains intact  Description: 1.  Monitor for areas of redness and/or skin breakdown  2.  Assess vascular access sites hourly  3.  Every 4-6 hours minimum:  Change oxygen saturation probe site  4.  Every 4-6 hours:  If on nasal continuous positive airway pressure, respiratory therapy assess nares and determine need for appliance change or resting period  Outcome: Progressing     Problem: 
  Problem: Chronic Conditions and Co-morbidities  Goal: Patient's chronic conditions and co-morbidity symptoms are monitored and maintained or improved  Outcome: Progressing  Flowsheets (Taken 7/1/2025 2000)  Care Plan - Patient's Chronic Conditions and Co-Morbidity Symptoms are Monitored and Maintained or Improved:   Monitor and assess patient's chronic conditions and comorbid symptoms for stability, deterioration, or improvement   Collaborate with multidisciplinary team to address chronic and comorbid conditions and prevent exacerbation or deterioration   Update acute care plan with appropriate goals if chronic or comorbid symptoms are exacerbated and prevent overall improvement and discharge     Problem: Discharge Planning  Goal: Discharge to home or other facility with appropriate resources  7/1/2025 2259 by Nieves Mireles RN  Outcome: Progressing  Flowsheets (Taken 7/1/2025 2000)  Discharge to home or other facility with appropriate resources:   Identify barriers to discharge with patient and caregiver   Arrange for needed discharge resources and transportation as appropriate   Identify discharge learning needs (meds, wound care, etc)     Problem: Pain  Goal: Verbalizes/displays adequate comfort level or baseline comfort level  Outcome: Progressing     Problem: Skin/Tissue Integrity  Goal: Skin integrity remains intact  Description: 1.  Monitor for areas of redness and/or skin breakdown  2.  Assess vascular access sites hourly  3.  Every 4-6 hours minimum:  Change oxygen saturation probe site  4.  Every 4-6 hours:  If on nasal continuous positive airway pressure, respiratory therapy assess nares and determine need for appliance change or resting period  7/1/2025 2259 by Nieves Mireles, RN  Outcome: Progressing  Flowsheets (Taken 7/1/2025 2000)  Skin Integrity Remains Intact: Monitor for areas of redness and/or skin breakdown     Problem: ABCDS Injury Assessment  Goal: Absence of physical injury  Outcome: 
  Problem: Discharge Planning  Goal: Discharge to home or other facility with appropriate resources  Outcome: Progressing  Discharge to home or other facility with appropriate resources: Identify barriers to discharge with patient and caregiver     Problem: Skin/Tissue Integrity  Goal: Skin integrity remains intact  Description: 1.  Monitor for areas of redness and/or skin breakdown  2.  Assess vascular access sites hourly  3.  Every 4-6 hours minimum:  Change oxygen saturation probe site  4.  Every 4-6 hours:  If on nasal continuous positive airway pressure, respiratory therapy assess nares and determine need for appliance change or resting period  Outcome: Progressing    Problem: Safety - Adult  Goal: Free from fall injury  Outcome: Progressing  Note: Siderails up x 2  Hourly rounding.  Call light in reach.  Instructed to call for assist before attempting out of bed.  Remains free from falls and accidental injury at this time.   Floor free from obstacles, and bed is locked and in lowest position. Adequate lighting provided.  Bed alarm on. Fall sticker on wristband. Fall Sign posted in doorway      
D/c plan pending ERCP tomorrow, pt on hep gtt and to be stopped at 0700 for procedure. Pt remains A&O x 4, on RA, this afternoon, pt needs 2L with exertion at present. No new s/s of skin breakdown or injury. Safety protocols in place and enforced. Pt ambulates SB with walker, and pivot to BSC. Pt has no c/o pain today. Pt had a successful BM without intervention.     Problem: Chronic Conditions and Co-morbidities  Goal: Patient's chronic conditions and co-morbidity symptoms are monitored and maintained or improved  Outcome: Progressing     Problem: Discharge Planning  Goal: Discharge to home or other facility with appropriate resources  Outcome: Progressing     Problem: Pain  Goal: Verbalizes/displays adequate comfort level or baseline comfort level  6/22/2025 1559 by Lesly Copeland RN  Outcome: Progressing     Problem: Skin/Tissue Integrity  Goal: Skin integrity remains intact  Description: 1.  Monitor for areas of redness and/or skin breakdown  2.  Assess vascular access sites hourly  3.  Every 4-6 hours minimum:  Change oxygen saturation probe site  4.  Every 4-6 hours:  If on nasal continuous positive airway pressure, respiratory therapy assess nares and determine need for appliance change or resting period  Outcome: Progressing     Problem: ABCDS Injury Assessment  Goal: Absence of physical injury  Outcome: Progressing     Problem: Safety - Adult  Goal: Free from fall injury  6/22/2025 1559 by Lesly Copeland RN  Outcome: Progressing     Problem: Respiratory - Adult  Goal: Achieves optimal ventilation and oxygenation  6/22/2025 1559 by Lesly Copeland RN  Outcome: Progressing     Problem: Skin/Tissue Integrity - Adult  Goal: Skin integrity remains intact  Description: 1.  Monitor for areas of redness and/or skin breakdown  2.  Assess vascular access sites hourly  3.  Every 4-6 hours minimum:  Change oxygen saturation probe site  4.  Every 4-6 hours:  If on nasal continuous positive airway pressure, respiratory 
Patient is A/Ox4, on RA and ambulates 2 assist with walker.  Chest tube removed, dressing in place.   Mag replaced, recheck tomorrow.   Worked with PT/OT today and tolerated well, spent several hours up to chair.   Vitals remained stable, denies pain.   Heparin gtt continues, daily check for anti xa.  Pa removed. Post void residual showed 316mL, orders for one time straight cath placed.   Plan of care reviewed, questions answered, bed alarm is on, bed in lowest position, call light within reach.   Problem: Chronic Conditions and Co-morbidities  Goal: Patient's chronic conditions and co-morbidity symptoms are monitored and maintained or improved  Outcome: Progressing     Problem: Discharge Planning  Goal: Discharge to home or other facility with appropriate resources  Outcome: Progressing     Problem: Pain  Goal: Verbalizes/displays adequate comfort level or baseline comfort level  Outcome: Progressing     Problem: Skin/Tissue Integrity  Goal: Skin integrity remains intact  Description: 1.  Monitor for areas of redness and/or skin breakdown  2.  Assess vascular access sites hourly  3.  Every 4-6 hours minimum:  Change oxygen saturation probe site  4.  Every 4-6 hours:  If on nasal continuous positive airway pressure, respiratory therapy assess nares and determine need for appliance change or resting period  Outcome: Progressing     Problem: ABCDS Injury Assessment  Goal: Absence of physical injury  Outcome: Progressing     Problem: Safety - Adult  Goal: Free from fall injury  Outcome: Progressing     Problem: Respiratory - Adult  Goal: Achieves optimal ventilation and oxygenation  Outcome: Progressing     Problem: Skin/Tissue Integrity - Adult  Goal: Skin integrity remains intact  Description: 1.  Monitor for areas of redness and/or skin breakdown  2.  Assess vascular access sites hourly  3.  Every 4-6 hours minimum:  Change oxygen saturation probe site  4.  Every 4-6 hours:  If on nasal continuous positive airway 
Patient resting in bed on room air, alert and oriented x4, uses urinal at the bedside, continuous fluids running at 75, IV cefepime administered, see MAR.   Cardiology approved patient for MRCP, see signed form in paper chart.   Patient complained of epigastric pain, 1 time dose of Toradol administered, see MAR. Writer reached out to [place] NP and 1mg of morphine ordered as patient did not want to take higher dose of morphine due to past episodes of connfusion.  Patient had run of [facility], dr ley notified, writer instructed to contact yoel suarez, daniela notified, awaiting response.   Patient safety maintained.     Problem: Chronic Conditions and Co-morbidities  Goal: Patient's chronic conditions and co-morbidity symptoms are monitored and maintained or improved  6/19/2025 2332 by Shanae Davis RN  Outcome: Progressing     Problem: Discharge Planning  Goal: Discharge to home or other facility with appropriate resources  6/19/2025 2332 by Shanae Davis RN  Outcome: Progressing     Problem: Pain  Goal: Verbalizes/displays adequate comfort level or baseline comfort level  6/19/2025 2332 by Shanae Davis RN  Outcome: Progressing   Patient having pain on their right abdomen and rates it a 2. Pain interventions includemedication and regular rest periods. Patients goal for pain relief is 0. The need for pain and symptom management will be considered in the discharge planning process to ensure patients comfort.     Problem: Skin/Tissue Integrity  Goal: Skin integrity remains intact  Description: 1.  Monitor for areas of redness and/or skin breakdown  2.  Assess vascular access sites hourly  3.  Every 4-6 hours minimum:  Change oxygen saturation probe site  4.  Every 4-6 hours:  If on nasal continuous positive airway pressure, respiratory therapy assess nares and determine need for appliance change or resting period  6/19/2025 2332 by Shanae Davis RN  Outcome: Progressing     Problem: ABCDS Injury Assessment  Goal: 
Pt 1A/walker. Oriented x 4.  Pale/mildly jaundice.  Pt rates 2/10 to upper right abdomen.   2L/NC with cont pulse ox in place.  IV Lasix and Zosyn continued.   1 x dose Vitamin K ordered, INR 3.5.   ERCP planned for Monday was canceled per GI.  Cholecystectomy planned for Monday if cardio clears. Coumadin and Plavix on hold.     Problem: Chronic Conditions and Co-morbidities  Goal: Patient's chronic conditions and co-morbidity symptoms are monitored and maintained or improved  Outcome: Progressing     Problem: Discharge Planning  Goal: Discharge to home or other facility with appropriate resources  Outcome: Progressing     Problem: Skin/Tissue Integrity  Goal: Skin integrity remains intact  Description: 1.  Monitor for areas of redness and/or skin breakdown  6/21/2025 1121 by Estephanie Austin, RN  Outcome: Progressing  Flowsheets (Taken 6/21/2025 1005)  Skin Integrity Remains Intact: Monitor for areas of redness and/or skin breakdown     Problem: ABCDS Injury Assessment  Goal: Absence of physical injury  Outcome: Progressing     Problem: Safety - Adult  Goal: Free from fall injury  6/21/2025 1121 by Estephanie Austin, RN  Outcome: Progressing     Problem: Respiratory - Adult  Goal: Achieves optimal ventilation and oxygenation  6/21/2025 1121 by Estephanie Austin, RN  Outcome: Progressing  Flowsheets (Taken 6/21/2025 0800)  Achieves optimal ventilation and oxygenation: Assess for changes in respiratory status     Problem: Musculoskeletal - Adult  Goal: Return mobility to safest level of function  Outcome: Progressing     Problem: Musculoskeletal - Adult  Goal: Maintain proper alignment of affected body part  Outcome: Progressing     Problem: Musculoskeletal - Adult  Goal: Return ADL status to a safe level of function  Outcome: Progressing     Problem: Metabolic/Fluid and Electrolytes - Adult  Goal: Electrolytes maintained within normal limits  Outcome: Progressing     Problem: Metabolic/Fluid and Electrolytes - 
Pt had an uneventful shift, remains alert and oriented X4, afib on monitor, and room air. Output in sandoval was 600ml, no output noted in chest tube.     Problem: Chronic Conditions and Co-morbidities  Goal: Patient's chronic conditions and co-morbidity symptoms are monitored and maintained or improved  Outcome: Progressing  Care Plan - Patient's Chronic Conditions and Co-Morbidity Symptoms are Monitored and Maintained or Improved:   Monitor and assess patient's chronic conditions and comorbid symptoms for stability, deterioration, or improvement   Collaborate with multidisciplinary team to address chronic and comorbid conditions and prevent exacerbation or deterioration   Update acute care plan with appropriate goals if chronic or comorbid symptoms are exacerbated and prevent overall improvement and discharge      Problem: Discharge Planning  Goal: Discharge to home or other facility with appropriate resources  Outcome: Progressing  Discharge to home or other facility with appropriate resources:   Identify barriers to discharge with patient and caregiver   Arrange for needed discharge resources and transportation as appropriate   Identify discharge learning needs (meds, wound care, etc)   Refer to discharge planning if patient needs post-hospital services based on physician order or complex needs related to functional status, cognitive ability or social support system      Problem: Pain  Goal: Verbalizes/displays adequate comfort level or baseline comfort level  Outcome: Progressing  Flowsheets  Verbalizes/displays adequate comfort level or baseline comfort level:   Encourage patient to monitor pain and request assistance   Assess pain using appropriate pain scale   Administer analgesics based on type and severity of pain and evaluate response      Problem: Skin/Tissue Integrity  Goal: Skin integrity remains intact  Description: 1.  Monitor for areas of redness and/or skin breakdown  2.  Assess vascular access sites 
Pt has been resting in bed most of the day  Pt complains of pain to the RUQ   Pacemaker and loop recorder  Tylenol given, one time dose of Toradol available to see if it works  NS running at 75 mL/hr  NPO at midnight for EGD tomorrow pending MRCP  Possible gallbladder and stone removal tomorrow  Room air  Urinal at bedside  IV zosyn given  All questions and concerns were addressed  Safety maintained    Problem: Chronic Conditions and Co-morbidities  Goal: Patient's chronic conditions and co-morbidity symptoms are monitored and maintained or improved  Outcome: Progressing  Flowsheets (Taken 6/19/2025 1116)  Care Plan - Patient's Chronic Conditions and Co-Morbidity Symptoms are Monitored and Maintained or Improved: Monitor and assess patient's chronic conditions and comorbid symptoms for stability, deterioration, or improvement     Problem: Discharge Planning  Goal: Discharge to home or other facility with appropriate resources  Outcome: Progressing  Flowsheets (Taken 6/19/2025 1116)  Discharge to home or other facility with appropriate resources: Identify barriers to discharge with patient and caregiver     Problem: Pain  Goal: Verbalizes/displays adequate comfort level or baseline comfort level  Outcome: Progressing     Problem: Skin/Tissue Integrity  Goal: Skin integrity remains intact  Description: 1.  Monitor for areas of redness and/or skin breakdown  2.  Assess vascular access sites hourly  3.  Every 4-6 hours minimum:  Change oxygen saturation probe site  4.  Every 4-6 hours:  If on nasal continuous positive airway pressure, respiratory therapy assess nares and determine need for appliance change or resting period  Outcome: Progressing  Flowsheets  Taken 6/19/2025 1600  Skin Integrity Remains Intact: Monitor for areas of redness and/or skin breakdown  Taken 6/19/2025 1116  Skin Integrity Remains Intact: Monitor for areas of redness and/or skin breakdown     Problem: ABCDS Injury Assessment  Goal: Absence of 
Pt oriented x 4. 2A hector mendosa. Pa in place, keeping in place per Nephro.   Restarted Heparin Gtt. Chest tube remains in place. Cont suction, no output this shift. IV Bumex BID. CT chest ordered for tomorrow. Pt on 2L/NC for comfort.  K replaced with 40 meq.    Problem: Chronic Conditions and Co-morbidities  Goal: Patient's chronic conditions and co-morbidity symptoms are monitored and maintained or improved  6/29/2025 1318 by Estephanie Austin RN  Outcome: Progressing     Problem: Discharge Planning  Goal: Discharge to home or other facility with appropriate resources  6/29/2025 1318 by Estephanie Austin, RN  Outcome: Progressing    Problem: Skin/Tissue Integrity  Goal: Skin integrity remains intact  Description: 1.  Monitor for areas of redness and/or skin breakdown  6/29/2025 1318 by Estephanie Austin RN  Outcome: Progressing  Flowsheets (Taken 6/29/2025 1014)  Skin Integrity Remains Intact: Monitor for areas of redness and/or skin breakdown     Problem: ABCDS Injury Assessment  Goal: Absence of physical injury  Outcome: Progressing     Problem: Safety - Adult  Goal: Free from fall injury  6/29/2025 1318 by Estephanie Austin RN  Outcome: Progressing     Problem: Respiratory - Adult  Goal: Achieves optimal ventilation and oxygenation  Outcome: Progressing     Problem: Musculoskeletal - Adult  Goal: Return mobility to safest level of function  Outcome: Progressing     Problem: Musculoskeletal - Adult  Goal: Maintain proper alignment of affected body part  Outcome: Progressing     Problem: Musculoskeletal - Adult  Goal: Return ADL status to a safe level of function  Outcome: Progressing     Problem: Metabolic/Fluid and Electrolytes - Adult  Goal: Electrolytes maintained within normal limits  Outcome: Progressing     Problem: Metabolic/Fluid and Electrolytes - Adult  Goal: Hemodynamic stability and optimal renal function maintained  Outcome: Progressing     Problem: Gastrointestinal - Adult  Goal: Minimal or 
Pt remains safe and free from falls thus far in shift  Fluids running @ 50 ml/hr  IV Zosyn   IV lasix   Urinal at bedside  Pt on 2 L O2 new for patient, pt SpO2 was at 87% on RA this   MRCP completed this shift, waiting for results  ERCP planned for Monday since pts INR levels elevated 3.7, recheck tomorrow morning   Declined to work with pt/ot today but encouraging words were spoken with pt and the benefits of working with therapy  Echo completed, see chart for results  Bilateral heels red, foam dressing in place and elevated in bed. Q2 turns with pillows.   Discharge planning in progress, will go home vs snf depending on how pt does with therapy  Call light in reach  Bed locked and lowest position  Bed alarm on for safety  Care ongoing       Problem: Chronic Conditions and Co-morbidities  Goal: Patient's chronic conditions and co-morbidity symptoms are monitored and maintained or improved  Outcome: Progressing     Problem: Discharge Planning  Goal: Discharge to home or other facility with appropriate resources  Outcome: Progressing     Problem: Pain  Goal: Verbalizes/displays adequate comfort level or baseline comfort level  Outcome: Progressing     Problem: Skin/Tissue Integrity  Goal: Skin integrity remains intact  Description: 1.  Monitor for areas of redness and/or skin breakdown  2.  Assess vascular access sites hourly  3.  Every 4-6 hours minimum:  Change oxygen saturation probe site  4.  Every 4-6 hours:  If on nasal continuous positive airway pressure, respiratory therapy assess nares and determine need for appliance change or resting period  Outcome: Progressing     Problem: ABCDS Injury Assessment  Goal: Absence of physical injury  Outcome: Progressing    Problem: Safety - Adult  Goal: Free from fall injury  6/20/2025 1551 by Rosemary Wolf RN  Outcome: Progressing     Problem: Respiratory - Adult  Goal: Achieves optimal ventilation and oxygenation  Outcome: Progressing     Problem: Skin/Tissue 
Quique slept fairly well this shift with no s/s or c/o pain. Pt is afebrile on ABT therapy for cholecystitis with no s/s of adverse reaction noted. He has supplemental O2 via nc at 2L/min on continuous pulse ox, and is maintaining saturations >90%. Heparin drip to stop at 0700. Noted downward trend in hemoglobin. NP notified and CBC collected with AM labs came back with hgb 7.0. Type and cross ordered and in-house NP to obtain signed consent shortly. Pt's wife at bedside, has call light in reach and is using appropriately; safety maintained.   Problem: Pain  Goal: Verbalizes/displays adequate comfort level or baseline comfort level  6/23/2025 0430 by Tanesha Mckeon RN  Outcome: Progressing  Flowsheets (Taken 6/22/2025 2000)  Verbalizes/displays adequate comfort level or baseline comfort level:   Encourage patient to monitor pain and request assistance   Assess pain using appropriate pain scale     Problem: Respiratory - Adult  Goal: Achieves optimal ventilation and oxygenation  6/23/2025 0430 by Tanesha Mckeon, RN  Outcome: Progressing  Flowsheets (Taken 6/22/2025 2000)  Achieves optimal ventilation and oxygenation:   Assess for changes in respiratory status   Assess for changes in mentation and behavior   Position to facilitate oxygenation and minimize respiratory effort   Oxygen supplementation based on oxygen saturation or arterial blood gases     Problem: Musculoskeletal - Adult  Goal: Return ADL status to a safe level of function  6/23/2025 0430 by Tanesha Mckeon, RN  Outcome: Progressing  Flowsheets (Taken 6/22/2025 2000)  Return ADL Status to a Safe Level of Function:   Administer medication as ordered   Assess activities of daily living deficits and provide assistive devices as needed   Assist and instruct patient to increase activity and self care as tolerated     Problem: Metabolic/Fluid and Electrolytes - Adult  Goal: Electrolytes maintained within normal limits  Outcome: Progressing  Flowsheets (Taken 
Quique slept fairly well this shift with report of pain at HS. Gave PRN Percocet to good effect. Declines further need for PRNs to this point. Pt is afebrile on ABT therapy for cholecystitis with no s/s of adverse reaction noted. He has supplemental O2 via nc at   2L/min on continuous pulse ox, and is maintaining saturations >90%. Pt's wife at bedside, has call light in reach and is using appropriately; safety maintained.    Problem: Pain  Goal: Verbalizes/displays adequate comfort level or baseline comfort level  Outcome: Progressing  Flowsheets (Taken 6/20/2025 2045)  Verbalizes/displays adequate comfort level or baseline comfort level:   Assess pain using appropriate pain scale   Encourage patient to monitor pain and request assistance   Administer analgesics based on type and severity of pain and evaluate response     Problem: Skin/Tissue Integrity  Goal: Skin integrity remains intact  Outcome: Progressing  Flowsheets (Taken 6/20/2025 2015)  Skin Integrity Remains Intact: Monitor for areas of redness and/or skin breakdown     Problem: Respiratory - Adult  Goal: Achieves optimal ventilation and oxygenation  Outcome: Progressing  Flowsheets (Taken 6/20/2025 2015)  Achieves optimal ventilation and oxygenation:   Assess for changes in respiratory status   Assess for changes in mentation and behavior   Position to facilitate oxygenation and minimize respiratory effort   Oxygen supplementation based on oxygen saturation or arterial blood gases   Respiratory therapy support as indicated     Problem: Skin/Tissue Integrity - Adult  Goal: Skin integrity remains intact  Outcome: Progressing  Flowsheets (Taken 6/20/2025 2015)  Skin Integrity Remains Intact: Monitor for areas of redness and/or skin breakdown     Problem: Safety - Adult  Goal: Free from fall injury  Outcome: Progressing     
Quique slept well this shift with report of minimal pain; he declined PRN interventions. Pt is afebrile on ABT therapy for cholecystitis with no s/s of adverse reaction noted. He has supplemental O2 via nc at 2L/min on continuous pulse ox, and is maintaining saturations >90%. Pt's wife at bedside, has call light in reach and is using appropriately; safety maintained.    Problem: Pain  Goal: Verbalizes/displays adequate comfort level or baseline comfort level  Outcome: Progressing  Flowsheets (Taken 6/21/2025 2009)  Verbalizes/displays adequate comfort level or baseline comfort level:   Encourage patient to monitor pain and request assistance   Assess pain using appropriate pain scale   Administer analgesics based on type and severity of pain and evaluate response  Patient having pain on their abdomen and rates it a 2. Pain interventions include correct body alignment/body mechanics and repositioning. Patients goal for pain relief is no pain. The need for pain and symptom management will be considered in the discharge planning process to ensure patients comfort.     Problem: Respiratory - Adult  Goal: Achieves optimal ventilation and oxygenation  Outcome: Progressing  Flowsheets (Taken 6/21/2025 2015)  Achieves optimal ventilation and oxygenation:   Assess for changes in respiratory status   Assess for changes in mentation and behavior   Position to facilitate oxygenation and minimize respiratory effort   Oxygen supplementation based on oxygen saturation or arterial blood gases     Problem: Musculoskeletal - Adult  Goal: Return ADL status to a safe level of function  Outcome: Progressing  Flowsheets (Taken 6/21/2025 2015)  Return ADL Status to a Safe Level of Function:   Administer medication as ordered   Assess activities of daily living deficits and provide assistive devices as needed   Assist and instruct patient to increase activity and self care as tolerated     Problem: Cardiovascular - Adult  Goal: Absence of 
needed   Obtain physical therapy/occupational therapy consults as needed  6/27/2025 1638 by Tiffany Mac  Outcome: Progressing     Problem: Metabolic/Fluid and Electrolytes - Adult  Goal: Electrolytes maintained within normal limits  6/28/2025 0603 by Suresh Caldera RN  Outcome: Progressing  Flowsheets (Taken 6/27/2025 2000)  Electrolytes maintained within normal limits:   Monitor labs and assess patient for signs and symptoms of electrolyte imbalances   Administer electrolyte replacement as ordered   Monitor response to electrolyte replacements, including repeat lab results as appropriate  6/27/2025 1638 by Tiffany Mac  Outcome: Progressing  Goal: Hemodynamic stability and optimal renal function maintained  6/28/2025 0603 by Suresh Caldera RN  Outcome: Progressing  Flowsheets (Taken 6/27/2025 2000)  Hemodynamic stability and optimal renal function maintained:   Monitor labs and assess for signs and symptoms of volume excess or deficit   Monitor intake, output and patient weight  6/27/2025 1638 by Tiffany Mac  Outcome: Progressing     Problem: Nutrition Deficit:  Goal: Optimize nutritional status  6/28/2025 0603 by Suresh Caldera RN  Outcome: Progressing  6/27/2025 1638 by Tiffany Mac  Outcome: Progressing     Problem: Cardiovascular - Adult  Goal: Maintains optimal cardiac output and hemodynamic stability  6/28/2025 0603 by Suresh Caldera RN  Outcome: Progressing  Flowsheets (Taken 6/27/2025 2000)  Maintains optimal cardiac output and hemodynamic stability: Monitor blood pressure and heart rate  6/27/2025 1638 by Tiffany Mac  Outcome: Progressing  Goal: Absence of cardiac dysrhythmias or at baseline  6/28/2025 0603 by Suresh Caldera RN  Outcome: Progressing  Flowsheets (Taken 6/27/2025 2000)  Absence of cardiac dysrhythmias or at baseline:   Monitor cardiac rate and rhythm   Assess for signs of decreased cardiac output   Administer antiarrhythmia medication and electrolyte replacement as ordered  6/27/2025 1638 by

## 2025-07-02 NOTE — DISCHARGE SUMMARY
[facility]  Office: [phone]  Abelardo Matias DO, Kavon Rodríguez DO, Ricardo Felix DO, Mohan Jon DO, Ann Aviles MD, Anni Anthony MD, Nahum De Santiago MD, Aliza Davies MD,  Hilton Perkins MD, Jacki Oro MD, Pam Aquino MD,  Essie Haq DO, Kary Childs MD, Delmar Aguayo MD, Mack Matias DO, Olinda Torres MD,  Brandon No DO, Jessica Centeno MD, Shirley Alatorre MD, Светлана Browne MD,  Jonnathan Ashby MD, Domo Khan MD, Teddy Montenegro MD, Cole Herrera MD, Joe Kent MD, Sy Hernadez MD, Lane Mcconnell DO, Chloe Penny MD, Celso Hsu DO, Danny Costello MD, Essie Padgett MD, Mohsin Reza, MD, Kaila Weber MD, Shirley Waterhouse, CNP,  Laura Alatorre CNP, Lane Mckeon, CNP,  Catherine Jones, EZEKIEL, Keena Savage CNP, Marianna Obrien, CNP, Lesli Farmer, CNP, Albertina Trinidad, CNP, Zakiya Langley, PA-C, Araceli Askew, CNP, Brooke Wei, CNP,  Mayi Borden, CNP, Gracie Padron, CNP, Asad Ahumada, PA-C, Meg Olguin, PA-C, Lesly Dvais, CNP,        Maribel Cochran, SERA, Amanda Leblanc, CNP, Kim Concepcion, CNP         [facility]   IN-PATIENT SERVICE   [facility]    Discharge Summary     Patient ID: Quique Wray  :  1945   MRN: [id number]     ACCOUNT:  [id number]   Patient's PCP: Vernon Cade MD  Admit Date: 2025   Discharge Date: 2025     Length of Stay: 13  Code Status:  Full Code  Admitting Physician: No admitting provider for patient encounter.  Discharge Physician: Ricardo Felix DO     Active Discharge Diagnoses:     Hospital Problem Lists:  Principal Problem:    Acute cholecystitis  Active Problems:    SSS (sick sinus syndrome) (HCC)    Acute ischemic stroke (HCC)    Essential hypertension    Atrial fibrillation (HCC)    Moderate malnutrition    Severe malnutrition    Cardiomyopathy (HCC)    Secondary hypercoagulable state    Acute blood loss anemia    ESTHER (acute kidney injury)    Gastric

## 2025-07-02 NOTE — PROGRESS NOTES
Cardiology progress note        Date:  6/20/2025  Patient: Quique Wray  Admission:  6/19/2025 11:14 AM  Admit DX: Acute cholecystitis [K81.0]  Age:  80 y.o., 1945     LOS: 1 day     Reason for evaluation:   Acute cholecystitis and recent myocardial infarction      SUBJECTIVE:     The patient was seen and examined. Notes and labs reviewed.    Patient still complains of abdominal discomfort and is noted to have paradoxical breathing this morning, we suggest getting chest x-ray done and may use supplemental oxygen as needed    He had a short 15 beat long NSVT run, we are replacing magnesium and will obtain a limited echo to evaluate LVEF    INR is 3.7 and will start heparin drip once INR is less than 2.5, aspirin and Plavix are on hold for possible upcoming cholecystectomy, MRCP is scheduled for later today    OBJECTIVE:      EXAM:   Vitals:    VITALS:  /65   Pulse 82   Temp 98.1 °F (36.7 °C) (Oral)   Resp 20   Ht 1.727 m (5' 8\")   Wt 93.9 kg (207 lb 1.6 oz)   SpO2 92%   BMI 31.49 kg/m²    24HR INTAKE/OUTPUT:    Intake/Output Summary (Last 24 hours) at 6/20/2025 0915  Last data filed at 6/20/2025 0610  Gross per 24 hour   Intake 1281.86 ml   Output 725 ml   Net 556.86 ml       General appearance: awake, alert, no apparent discomfort  HEENT: atraumatic, normocephalic, no JVD, no significant carotid bruit  Lungs: clear to auscultation bilaterally  Heart: regular rate and rhythm, S1, S2, 2/6 systolic murmur  Abdomen: mild abdominal tenderness  Extremities: no significant lower extremity edema  Neurologic: no obvious neurologic deficit    Current Inpatient Medications:   sodium chloride flush  5-40 mL IntraVENous 2 times per day    furosemide  40 mg IntraVENous BID    piperacillin-tazobactam  3,375 mg IntraVENous Q8H    allopurinol  300 mg Oral Daily    [Held by provider] aspirin  81 mg Oral Daily    atorvastatin  40 mg Oral Daily    [Held by provider] clopidogrel  75 mg Oral Daily    
              Cardiology progress note        Date:  6/21/2025  Patient: Quique Wray  Admission:  6/19/2025 11:14 AM  Admit DX: Acute cholecystitis [K81.0]  Age:  80 y.o., 1945     LOS: 2 days     Reason for evaluation:   Acute cholecystitis and recent myocardial infarction       SUBJECTIVE:     The patient was seen and examined. Notes and labs reviewed.    Patient still complains of occasional chest discomfort    MRCP has confirmed cholelithiasis with evidence of cholecystitis and no choledocholithiasis therefore cholecystectomy is being considered by general surgery, as mentioned before open cholecystectomy is a high risk procedure and laparoscopic cholecystectomy is an intermediate risk procedure;  if cholecystectomy is considered a superior option then cholecystostomy then may proceed with laparoscopic cholecystectomy knowing acceptable risk with more benefit; however the final decision is deferred to surgery who are going to perform cholecystectomy, this information is conveyed to the patient and the family at the bedside    INR remains 3.5 and if cholecystectomy is scheduled for Monday, then keep holding warfarin and start heparin drip once INR is less than 2.5, at this point there is no need to reverse the INR as patient has shown thrombotic events in the past with subtherapeutic INR    OBJECTIVE:      EXAM:   Vitals:    VITALS:  /60   Pulse 85   Temp 98.1 °F (36.7 °C)   Resp 18   Ht 1.727 m (5' 7.99\")   Wt 93.2 kg (205 lb 6.4 oz)   SpO2 100%   BMI 31.24 kg/m²    24HR INTAKE/OUTPUT:    Intake/Output Summary (Last 24 hours) at 6/21/2025 1811  Last data filed at 6/21/2025 1730  Gross per 24 hour   Intake 433.65 ml   Output 1050 ml   Net -616.35 ml       General appearance: awake, alert, no apparent discomfort, jaundiced looking skin  HEENT: atraumatic, normocephalic, no JVD, no significant carotid bruit  Lungs: clear to auscultation bilaterally  Heart: regular rate and rhythm, S1, S2, 2/6 
              Cardiology progress note        Date:  6/23/2025  Patient: Quique Wray  Admission:  6/19/2025 11:14 AM  Admit DX: Acute cholecystitis [K81.0]  Age:  80 y.o., 1945     LOS: 4 days     Reason for evaluation:   Acute cholecystitis and recent myocardial infarction       SUBJECTIVE:     The patient was seen and examined. Notes and labs reviewed.    Patient remains stable however still complains of on and off abdominal tenderness    He is scheduled for laparoscopic cholecystectomy today and we have again discussed with the family that it is an important procedure for patient's recovery, although he remains intermediate to high risk for any perioperative cardiac event and we will closely monitor him after the surgery    Please resume heparin drip in 6 hours after surgery without bolus if it is okay with general surgery    OBJECTIVE:      EXAM:   Vitals:    VITALS:  /61   Pulse 73   Temp 97.5 °F (36.4 °C) (Oral)   Resp 18   Ht 1.727 m (5' 7.99\")   Wt 92.2 kg (203 lb 4.8 oz)   SpO2 96%   BMI 30.92 kg/m²    24HR INTAKE/OUTPUT:    Intake/Output Summary (Last 24 hours) at 6/23/2025 0901  Last data filed at 6/23/2025 0733  Gross per 24 hour   Intake 1496.35 ml   Output 225 ml   Net 1271.35 ml       General appearance: awake, alert, no apparent discomfort, jaundiced looking skin  HEENT: atraumatic, normocephalic, no JVD, no significant carotid bruit  Lungs: clear to auscultation bilaterally  Heart: regular rate and rhythm, S1, S2, 2/6 systolic murmur  Abdomen: mild abdominal tenderness  Extremities: no significant lower extremity edema  Neurologic: no obvious neurologic deficit    Current Inpatient Medications:   furosemide  20 mg IntraVENous Daily    sennosides-docusate sodium  1 tablet Oral BID    sodium chloride flush  5-40 mL IntraVENous 2 times per day    piperacillin-tazobactam  3,375 mg IntraVENous Q8H    allopurinol  300 mg Oral Daily    [Held by provider] aspirin  81 mg Oral Daily    
              Cardiology progress note        Date:  6/24/2025  Patient: Quique Wray  Admission:  6/19/2025 11:14 AM  Admit DX: Acute cholecystitis [K81.0]  Age:  80 y.o., 1945     LOS: 5 days     Reason for evaluation:   Acute cholecystitis and recent myocardial infarction       SUBJECTIVE:     The patient was seen and examined. Notes and labs reviewed.    Patient is now postoperative laparoscopic cholecystectomy, had acute drop in hemoglobin yesterday, required for PRBC transfusion, currently hypotensive and requiring low-dose Levophed with stable MAP of around 75 mmHg.  Denies any active chest discomfort.  Reports epigastric pain and due to abdominal distention.   In atrial fibrillation with controlled ventricular response and with underlying demand V pacing    Urine output reduced overnight    Creatinine 1.5 this morning    OBJECTIVE:      EXAM:   Vitals:    VITALS:  /71   Pulse 80   Temp 97.8 °F (36.6 °C) (Oral)   Resp 20   Ht 1.727 m (5' 8\")   Wt 92.4 kg (203 lb 11.3 oz)   SpO2 97%   BMI 30.97 kg/m²    24HR INTAKE/OUTPUT:    Intake/Output Summary (Last 24 hours) at 6/24/2025 0818  Last data filed at 6/24/2025 0530  Gross per 24 hour   Intake 3886.02 ml   Output 2175 ml   Net 1711.02 ml       General appearance: awake, alert, no apparent discomfort, jaundiced looking skin, on 4 L nasal cannula oxygen  HEENT: atraumatic, normocephalic, no JVD, no significant carotid bruit  Lungs: Diminished air entry bilaterally with rales  Heart: regular rate and rhythm, S1, S2, 2/6 systolic murmur  Abdomen: Severely distended, tender to palpation  Extremities: Trace bilateral ankle edema  Neurologic: no obvious neurologic deficit    Current Inpatient Medications:   vancomycin (VANCOCIN) intermittent dosing (placeholder)   Other RX Placeholder    vancomycin  750 mg IntraVENous Q12H    furosemide  20 mg IntraVENous Daily    sennosides-docusate sodium  1 tablet Oral BID    sodium chloride flush  5-40 mL 
              Cardiology progress note        Date:  6/25/2025  Patient: Quique Wray  Admission:  6/19/2025 11:14 AM  Admit DX: Acute cholecystitis [K81.0]  Age:  80 y.o., 1945     LOS: 6 days     Reason for evaluation:   Acute cholecystitis and recent myocardial infarction       SUBJECTIVE:     The patient was seen and examined. Notes and labs reviewed.    Patient remains anemic requiring repeated blood transfusion, he received vitamin K yesterday and heparin remains on hold until anemia is stabilized, patient's hemoglobin was dropping even before the surgery    He also has low urine output with creatinine rising up despite gentle IV hydration, nephrology consult is requested    Blood pressure is relative stable although soft and he is off pressor support with norepinephrine at this point    OBJECTIVE:      EXAM:   Vitals:    VITALS:  BP (!) 111/59   Pulse 71   Temp 97.9 °F (36.6 °C) (Oral)   Resp 13   Ht 1.727 m (5' 8\")   Wt 92.4 kg (203 lb 11.3 oz)   SpO2 98%   BMI 30.97 kg/m²    24HR INTAKE/OUTPUT:    Intake/Output Summary (Last 24 hours) at 6/25/2025 0825  Last data filed at 6/25/2025 0500  Gross per 24 hour   Intake 2617.92 ml   Output 325 ml   Net 2292.92 ml       General appearance: awake, alert, no apparent discomfort, jaundiced looking skin, on 4 L nasal cannula oxygen  HEENT: atraumatic, normocephalic, no JVD, no significant carotid bruit  Lungs: Diminished air entry bilaterally with rales  Heart: regular rate and rhythm, S1, S2, 2/6 systolic murmur  Abdomen: Severely distended, tender to palpation  Extremities: Trace bilateral ankle edema  Neurologic: no obvious neurologic deficit    Current Inpatient Medications:   vancomycin (VANCOCIN) intermittent dosing (placeholder)   Other RX Placeholder    furosemide  20 mg IntraVENous Daily    sennosides-docusate sodium  1 tablet Oral BID    sodium chloride flush  5-40 mL IntraVENous 2 times per day    piperacillin-tazobactam  3,375 mg IntraVENous 
              Cardiology progress note        Date:  6/26/2025  Patient: Quique Wray  Admission:  6/19/2025 11:14 AM  Admit DX: Acute cholecystitis [K81.0]  Age:  80 y.o., 1945     LOS: 7 days     Reason for evaluation:   Acute cholecystitis and recent myocardial infarction       SUBJECTIVE:     The patient was seen and examined. Notes and labs reviewed.    Patient is now hemodynamically stable  In atrial fibrillation with controlled ventricular response and with underlying demand V pacing. HR is controlled.   Urine output reduced overnight  Creatinine increased to 3.0 this am   Overall he is +6.9 L since admission   Hb is 7.6       OBJECTIVE:      EXAM:   Vitals:    VITALS:  /63   Pulse 80   Temp 98.6 °F (37 °C) (Oral)   Resp 19   Ht 1.727 m (5' 8\")   Wt 103.5 kg (228 lb 2.8 oz)   SpO2 96%   BMI 34.69 kg/m²    24HR INTAKE/OUTPUT:    Intake/Output Summary (Last 24 hours) at 6/26/2025 0740  Last data filed at 6/26/2025 0600  Gross per 24 hour   Intake 1867.47 ml   Output 450 ml   Net 1417.47 ml       General appearance: lethargic but arousable, no apparent discomfort, on 1-2 L NC o2   HEENT: atraumatic, normocephalic, no JVD, no significant carotid bruit  Lungs: Diminished air entry bilaterally with rales  Heart: regular rate and rhythm, S1, S2, 2/6 systolic murmur  Abdomen: distended, mildly tender to palpation  Extremities: 1+ bilateral ankle edema  Neurologic: no obvious neurologic deficit    Current Inpatient Medications:   [Held by provider] furosemide  20 mg IntraVENous Daily    sennosides-docusate sodium  1 tablet Oral BID    sodium chloride flush  5-40 mL IntraVENous 2 times per day    piperacillin-tazobactam  3,375 mg IntraVENous Q8H    allopurinol  300 mg Oral Daily    [Held by provider] aspirin  81 mg Oral Daily    atorvastatin  40 mg Oral Daily    [Held by provider] clopidogrel  75 mg Oral Daily    pantoprazole  40 mg Oral QAM AC    sertraline  25 mg Oral Daily    tamsulosin  0.4 mg 
              Cardiology progress note        Date:  6/27/2025  Patient: Quique Wray  Admission:  6/19/2025 11:14 AM  Admit DX: Acute cholecystitis [K81.0]  Age:  80 y.o., 1945     LOS: 8 days     Reason for evaluation:   Acute cholecystitis and recent myocardial infarction       SUBJECTIVE:     The patient was seen and examined. Notes and labs reviewed.    Patient denies any chest pain or shortness of breath    Hemoglobin is stable since yesterday and he does have abdominal wall bruises, although morning labs are pending, we recommend resuming heparin drip at low rate without bolus once cleared by surgery    Serum creatinine is rising and nephrology are following    Patient underwent thoracentesis with 125 mL of hemorrhagic fluid removed and there is a plan to do chest tube placement    OBJECTIVE:      EXAM:   Vitals:    VITALS:  BP (!) 110/55   Pulse 68   Temp 97.5 °F (36.4 °C) (Oral)   Resp 18   Ht 1.727 m (5' 8\")   Wt 103.5 kg (228 lb 2.8 oz)   SpO2 96%   BMI 34.69 kg/m²    24HR INTAKE/OUTPUT:    Intake/Output Summary (Last 24 hours) at 6/27/2025 0851  Last data filed at 6/27/2025 0823  Gross per 24 hour   Intake 1365.98 ml   Output 1575 ml   Net -209.02 ml       General appearance: awake, alert, no apparent discomfort, jaundiced looking skin, on 4 L nasal cannula oxygen  HEENT: atraumatic, normocephalic, no JVD, no significant carotid bruit  Lungs: Diminished air entry bilaterally with rales  Heart: regular rate and rhythm, S1, S2, 2/6 systolic murmur  Abdomen: distended, ecchymoses present, bowel sounds present  Extremities: Trace bilateral ankle edema  Neurologic: no obvious neurologic deficit    Current Inpatient Medications:   bumetanide  2 mg IntraVENous BID    midodrine  5 mg Oral TID WC    polyethylene glycol  17 g Oral Daily    sennosides-docusate sodium  2 tablet Oral BID    sodium chloride flush  5-40 mL IntraVENous 2 times per day    piperacillin-tazobactam  3,375 mg IntraVENous Q8H    
              Cardiology progress note        Date:  6/28/2025  Patient: Quique Wray  Admission:  6/19/2025 11:14 AM  Admit DX: Acute cholecystitis [K81.0]  Age:  80 y.o., 1945     LOS: 9 days     Reason for evaluation:   Acute cholecystitis and recent myocardial infarction       SUBJECTIVE:     The patient was seen and examined. Notes and labs reviewed.    Patient states that he is feeling better and is tolerating diet and had bowel movements    He underwent chest tube placement yesterday    Hemodynamically patient remained stable and we are decreasing midodrine dose to 5 mg    Hemoglobin remains stable and we suggest to resume heparin at low dose if okay with general surgery    OBJECTIVE:      EXAM:   Vitals:    VITALS:  /64   Pulse 66   Temp 97.7 °F (36.5 °C) (Oral)   Resp 25   Ht 1.727 m (5' 8\")   Wt 103.5 kg (228 lb 2.8 oz)   SpO2 94%   BMI 34.69 kg/m²    24HR INTAKE/OUTPUT:    Intake/Output Summary (Last 24 hours) at 6/28/2025 1055  Last data filed at 6/28/2025 0628  Gross per 24 hour   Intake 331.65 ml   Output 4435 ml   Net -4103.35 ml       General appearance: awake, alert, jaundiced looking skin, on supplemental oxygen  HEENT: atraumatic, normocephalic, no JVD, no significant carotid bruit  Lungs: coarse breathing bilaterally, chest tube in place  Heart: regular rate and rhythm, S1, S2, 2/6 systolic murmur  Abdomen: distended, ecchymoses present, bowel sounds present  Extremities: Trace bilateral ankle edema  Neurologic: no obvious neurologic deficit    Current Inpatient Medications:   albumin human 25%  25 g IntraVENous BID    midodrine  5 mg Oral TID     bumetanide  2 mg IntraVENous BID    polyethylene glycol  17 g Oral Daily    sennosides-docusate sodium  2 tablet Oral BID    sodium chloride flush  5-40 mL IntraVENous 2 times per day    piperacillin-tazobactam  3,375 mg IntraVENous Q8H    allopurinol  300 mg Oral Daily    [Held by provider] aspirin  81 mg Oral Daily    atorvastatin 
              Cardiology progress note        Date:  6/29/2025  Patient: Quique Wray  Admission:  6/19/2025 11:14 AM  Admit DX: Acute cholecystitis [K81.0]  Age:  80 y.o., 1945     LOS: 10 days     Reason for evaluation:   Acute cholecystitis and recent myocardial infarction       SUBJECTIVE:     The patient was seen and examined. Notes and labs reviewed.    Patient is feeling better and denies any chest pain or shortness of breath, chest tube drain is minimal, hemoglobin remained stable and heparin infusion is started today    OBJECTIVE:      EXAM:   Vitals:    VITALS:  BP (!) 101/54   Pulse 82   Temp 98.8 °F (37.1 °C)   Resp 18   Ht 1.727 m (5' 8\")   Wt 93.7 kg (206 lb 9.6 oz)   SpO2 95%   BMI 31.41 kg/m²    24HR INTAKE/OUTPUT:    Intake/Output Summary (Last 24 hours) at 6/29/2025 1615  Last data filed at 6/29/2025 1612  Gross per 24 hour   Intake 715.96 ml   Output 3750 ml   Net -3034.04 ml       General appearance: awake, alert, jaundiced looking skin, on supplemental oxygen  HEENT: atraumatic, normocephalic, no JVD, no significant carotid bruit  Lungs: coarse breathing bilaterally, chest tube in place  Heart: regular rate and rhythm, S1, S2, 2/6 systolic murmur  Abdomen: distended, ecchymoses present, bowel sounds present  Extremities: Trace bilateral ankle edema  Neurologic: no obvious neurologic deficit    Current Inpatient Medications:   midodrine  5 mg Oral TID WC    bumetanide  2 mg IntraVENous BID    polyethylene glycol  17 g Oral Daily    sennosides-docusate sodium  2 tablet Oral BID    sodium chloride flush  5-40 mL IntraVENous 2 times per day    allopurinol  300 mg Oral Daily    [Held by provider] aspirin  81 mg Oral Daily    atorvastatin  40 mg Oral Daily    [Held by provider] clopidogrel  75 mg Oral Daily    pantoprazole  40 mg Oral QAM AC    sertraline  25 mg Oral Daily    tamsulosin  0.4 mg Oral Daily    [Held by provider] metoprolol tartrate  25 mg Oral BID       IV Infusions (if 
              Cardiology progress note        Date:  6/30/2025  Patient: Quique Wray  Admission:  6/19/2025 11:14 AM  Admit DX: Acute cholecystitis [K81.0]  Age:  80 y.o., 1945     LOS: 11 days     Reason for evaluation:   Acute cholecystitis and recent myocardial infarction       SUBJECTIVE:     The patient was seen and examined. Notes and labs reviewed.    Patient denies any chest pain, dyspnea, nausea, vomiting or abdominal discomfort    He was complaining of lower extremity muscular spasm last night    There is minimal drainage from chest tube and it may be removed    Hemoglobin remained stable and he is on heparin infusion at this point    OBJECTIVE:      EXAM:   Vitals:    VITALS:  BP (!) 111/49   Pulse 71   Temp 98.1 °F (36.7 °C) (Oral)   Resp 16   Ht 1.727 m (5' 8\")   Wt 93.4 kg (205 lb 14.4 oz)   SpO2 93%   BMI 31.31 kg/m²    24HR INTAKE/OUTPUT:    Intake/Output Summary (Last 24 hours) at 6/30/2025 1437  Last data filed at 6/30/2025 1018  Gross per 24 hour   Intake 360 ml   Output 3025 ml   Net -2665 ml       General appearance: awake, alert, jaundiced looking skin, on supplemental oxygen  HEENT: atraumatic, normocephalic, no JVD, no significant carotid bruit  Lungs: coarse breathing bilaterally, chest tube in place  Heart: regular rate and rhythm, S1, S2, 2/6 systolic murmur  Abdomen: distended, ecchymoses present, bowel sounds present  Extremities: Trace bilateral ankle edema  Neurologic: no obvious neurologic deficit    Current Inpatient Medications:   magnesium sulfate  2,000 mg IntraVENous Once    [START ON 7/1/2025] magnesium oxide  400 mg Oral BID    potassium chloride  20 mEq Oral Once    midodrine  5 mg Oral TID WC    polyethylene glycol  17 g Oral Daily    sennosides-docusate sodium  2 tablet Oral BID    sodium chloride flush  5-40 mL IntraVENous 2 times per day    allopurinol  300 mg Oral Daily    [Held by provider] aspirin  81 mg Oral Daily    atorvastatin  40 mg Oral Daily    
              Cardiology progress note        Date:  7/2/2025  Patient: Quique Wray  Admission:  6/19/2025 11:14 AM  Admit DX: Acute cholecystitis [K81.0]  Age:  80 y.o., 1945     LOS: 13 days     Reason for evaluation:   Acute cholecystitis and recent myocardial infarction       SUBJECTIVE:     The patient was seen and examined. Notes and labs reviewed.    Patient denies any chest pain or shortness of breath    His INR is 1.2 and he remains on anticoagulation with heparin and warfarin, once INR is above 2 heparin drip can be discontinued    OBJECTIVE:      EXAM:   Vitals:    VITALS:  BP (!) 110/54   Pulse 67   Temp 97.7 °F (36.5 °C) (Oral)   Resp 16   Ht 1.727 m (5' 8\")   Wt 93.3 kg (205 lb 11 oz)   SpO2 93%   BMI 31.27 kg/m²    24HR INTAKE/OUTPUT:  No intake or output data in the 24 hours ending 07/02/25 1156    General appearance: awake, alert, jaundiced looking skin, on supplemental oxygen  HEENT: atraumatic, normocephalic, no JVD, no significant carotid bruit  Lungs: coarse breathing bilaterally  Heart: regular rate and rhythm, S1, S2, 2/6 systolic murmur  Abdomen: distended, ecchymoses present, bowel sounds present  Extremities: trace bilateral ankle edema  Neurologic: no obvious neurologic deficit    Current Inpatient Medications:   warfarin  10 mg Oral Once    enoxaparin  1 mg/kg SubCUTAneous BID    potassium chloride  40 mEq Oral Once    warfarin placeholder: dosing by pharmacy   Oral RX Placeholder    midodrine  5 mg Oral BID WC    magnesium oxide  400 mg Oral BID    polyethylene glycol  17 g Oral Daily    sennosides-docusate sodium  2 tablet Oral BID    sodium chloride flush  5-40 mL IntraVENous 2 times per day    allopurinol  300 mg Oral Daily    [Held by provider] aspirin  81 mg Oral Daily    atorvastatin  40 mg Oral Daily    pantoprazole  40 mg Oral QAM AC    sertraline  25 mg Oral Daily    tamsulosin  0.4 mg Oral Daily    metoprolol tartrate  25 mg Oral BID       IV Infusions (if any):   
        Gastroenterology Progress Note      Patient:   Quique Wray   :    1945   Facility:   [facility]   Date:     2025  Consultant:   VIRGINIA ARITA      Subjective:     Patient seen and examined. Awaiting path from yesterday findings again discussed.   Working with PT, walking remains on supplemental O2, family at bedside.   Tolerating full liquid diet quite well.   No abdominal pain, no melena or hematochezia.     Had thoracentesis this am.   125 ml of dark red pleural drainage removed order from pulmonary states pt may need chest tube inserted procedure requires ct but dept is not available at this time pt returned to room spouse and pt was informed of plan for chest tube insertion later today they responded no one had ever discussed this possibility with them and they would prefer to discuss this with their daughter and pulmonologist before procedure is done   Objective:   Vital Signs:  BP (!) 99/57   Pulse 71   Temp 97.5 °F (36.4 °C) (Oral)   Resp 21   Ht 1.727 m (5' 8\")   Wt 103.5 kg (228 lb 2.8 oz)   SpO2 99%   BMI 34.69 kg/m²      Physical Exam:   General appearance: Alert, NAD  Lungs: diminished on supplemental o2  Heart:S1S2 RRR  Abdomen: Soft, NT, ND +BS  Skin:  No jaundice, no stigmata of chronic liver disease.    Lab and Imaging Review   CBC with Differential:    Lab Results   Component Value Date/Time    WBC 7.1 2025 06:00 AM    RBC 2.89 2025 06:00 AM    RBC 4.77 2011 09:16 AM    HGB 8.4 2025 10:45 AM    HCT 26.3 2025 10:45 AM     2025 06:00 AM     2011 09:16 AM    MCV 82.0 2025 06:00 AM    MCH 27.0 2025 06:00 AM    MCHC 32.9 2025 06:00 AM    RDW 21.3 2025 06:00 AM    NRBC 2 2025 10:52 AM    LYMPHOPCT 15 2025 06:00 AM    MONOPCT 6 2025 06:00 AM    EOSPCT 3 2025 06:00 AM    BASOPCT 0 2025 06:00 AM    MONOSABS 0.43 2025 06:00 AM    LYMPHSABS 1.07 
  Physician Progress Note      PATIENT:               ALVAREZ COOLEY  CSN #:                  [id number]  :                       1945  ADMIT DATE:       2025 11:14 AM  DISCH DATE:  RESPONDING  PROVIDER #:        Gracie Padron          QUERY TEXT:    Based on your medical judgment, please clarify these findings and document if   any of the following are being evaluated and/or treated:    The clinical indicators include:  - IM:\"Paroxysmal atrial fibrillation  -continue metoprolol for rate control  -[id number] score is high, warfarin is on hold, recommend to start heparin   drip once INR is less than 2\"  Options provided:  -- Secondary hypercoagulable state in a patient with atrial fibrillation  -- Other - I will add my own diagnosis  -- Disagree - Not applicable / Not valid  -- Disagree - Clinically unable to determine / Unknown  -- Refer to Clinical Documentation Reviewer    PROVIDER RESPONSE TEXT:    This patient has secondary hypercoagulable state in a patient with atrial   fibrillation.    Query created by: Allyson Dukes on 2025 10:34 AM      Electronically signed by:  Gracie Padron 2025 10:37 AM          
[facility]  Office: [phone]  Abelardo Matias, DO, Kavon Rodríguez, DO, Ricardo Felix DO, Mohan Jon, DO, Ann Aviles MD, Anni Anthony MD, Nahum De Santiago MD, Aliza Davies MD,  Hilton Perkins MD, Jacki Oro MD, Pam Aquino MD,  Essie Haq DO, Kary Childs MD, Delmar Aguayo MD, Mack Matias DO, Olinda Torres MD,  Brandon No DO, Jessica Centeno MD, Shirley Alatorre MD, Светлана Browne MD,  Jonnathan Ashby MD, Domo Khan MD, Teddy Montenegro MD, Cole Herrera MD, Joe Kent MD, Sy Hernadez MD, Lane Mcconnell, DO, Chloe Penny MD, Celso Hsu DO, Danny Costello MD, Essie Padgett MD, Mohsin Reza, MD, Kaila Weber MD, Shirley Waterhouse, CNP,  Laura Alatorre, CNP, Lane Mckeon, CNP,  Catherine Jones, EZEKIEL, Keena Savage, CNP, Marianna Obrien, CNP, Lesli Farmer, CNP, lAbertina Trinidad, CNP, Zakiya Langley, PA-C, Araceli Askew, CNP, Brooke Wei, CNP,  Mayi Borden, CNP, Gracie Padron, CNP, Asad Ahumada, PA-C, Meg Olguin, PA-C, Lesly Davis, CNP,        Maribel Cochran, SERA, Amanda Leblanc, CNP, Kim Concepcion, CNP         [facility]   IN-PATIENT SERVICE   [facility]    Progress Note    7/1/2025    9:43 AM    Name:   Quique Wray  MRN:     [id number]     Acct:      [id number]   Room:   1014/1014-02   Day:  12  Admit Date:  6/19/2025 11:14 AM    PCP:   Vernon Cade MD  Code Status:  Full Code    Subjective:     C/C: Abdominal pain    Interval History Status: improved.     Patient is resting in bed, denies any complaints of chest pain, shortness of breath, nausea or vomiting, fevers or chills.  Pathology from EGD reviewed, hyperplastic polyps of stomach and duodenum.    Brief History:     Per prior documentation  \"This is a very pleasant 80-year-old male who presents to [place] emergency department for complaints of abdominal pain along with shortness of breath. Patient states abdominal pain and shortness of breath in the 
[facility]  Office: [phone]  Abelardo Matias DO, Kavon Rodríguez, DO, Ricardo Felix DO, Mohan Jon DO, Ann Aviles MD, Anni Anthony MD, Nahum De Santiago MD, Aliza Davies MD,  Hilton Perkins MD, Jacki Oro MD, Pam Aquino MD,  Essie Haq DO, Kary Childs MD, Delmar Aguayo MD, Mack Matias DO, Olinda Torres MD,  Brandon No DO, Jessica Centeno MD, Shirley Alatorre MD, Светлана Browne MD,  Jonnathan Ashby MD, Domo Khan MD, Teddy Montenegro MD, Cole Herrera MD, Joe Kent MD, Sy Hernadez MD, Lane Mcconnell, DO, Chloe Penny MD, Celso Hsu DO, Danny Costello MD, Essie Padgett MD, Mohsin Reza, MD, Kaila Weber MD, Shirley Waterhouse, CNP,  Laura Alatorre, CNP, Lane Mckeon, CNP,  Catherine Jones, EZEKIEL, Keena Savage CNP, Marianna Obrien, CNP, Lesli Farmer, CNP, Albertina Trinidad, CNP, Zakiya Langley, PA-C, Araceli Askew, CNP, Brooke Wei, CNP,  Mayi Borden, CNP, Gracie Padron, CNP, Asad Ahumada, PA-C, Meg Olguin, PA-C, Lesly Davis, CNP,        Maribel Cochran, CNS, Amanda Leblanc, CNP, Kim Concepcion, CNP         [facility]   IN-PATIENT SERVICE   [facility]    Progress Note    6/20/2025    10:16 AM    Name:   Quique Wray  MRN:     [id number]     Acct:      [id number]   Room:   [facility] Day:  1  Admit Date:  6/19/2025 11:14 AM    PCP:   Vernon Cade MD  Code Status:  Full Code    Subjective:     C/C: No chief complaint on file.    Interval History Status: not changed.     Patient laying in bed with lights off blinds closed and trying to sleep.  Wife at bedside.  Patient states that he did not sleep well last night due to pain.  Patient states he is not able to take a very deep breath because his belly hurts.  Patient also stated he denied working with physical therapy and Occupational Therapy today because he wanted to wait until after his MRCP.  He had a very long discussion with the patient about working 
Comprehensive Nutrition Assessment    Type and Reason for Visit:  Positive nutrition screen, Consult    Nutrition Recommendations/Plan:   Continue NPO, advance diet as indicated.   Cardiac diet education needed.   Monitor NPO status, labs, and GI status.      Malnutrition Assessment:  Malnutrition Status:  Severe malnutrition (06/20/25 1555)    Context:  Chronic Illness     Findings of the 6 clinical characteristics of malnutrition:  Energy Intake:     Weight Loss:        Body Fat Loss:        Muscle Mass Loss:       Fluid Accumulation:        Strength:       Nutrition Assessment:    Pt was transferred from [facility] after been seen for abdominal pain and SOB. PMH includes HTN and endocarditis. Positive nutrition screen for weight loss and poor appetite. Pt also consulted for HF education. Pt states abdominal pain is worse after eating. CT conducted at [place] showed findings of stones in his gallbladder and possibly the common bile duct. Pt is NPO as he is scheduled to have MRCP today. He reports not eating well d/t to foods not tasting good. Pt also states he has difficulty swallowing. His weight has decreased by 45# (18%) since Janurary. Unable to complete assessment and diet education with pt d/t pt going for testing. Pt wife provide nurtition hx. She reports pt states he cannot tolerate solid food, he states it does not smell good and it does not tasted good. She notes he can tolerate sweets such as cookies, donuts, pies, etc. She states pt did express being hungry and wanting to eat today. Wife reports pt last full meal was around June 16th. She says pt ate ice cream and a coney dog, and the next morning is when pt current symptoms started. ERCP planned for Monday unless otherwise indicated by labs or MRI/MRCP. Continue NPO, advance diet as indicated. Cardiac diet education needed. Monitor NPO status, labs, and GI status.    Nutrition Related Findings:    Active bowel sounds. No edema present. Wound 
Daughter Elizabeth called and updated in pt. Status and room change. Daughter expressed understanding   
Department of General Surgery - Adult  Surgical Service [place] [facility]  Attending Progress Note      SUBJECTIVE: The patient denied any abdominal pain.  He was tolerating a regular diet and his appetite was slowly improving.  He has been passing flatus and stool.  A right chest tube was placed for his pleural effusion, with minimal drainage overnight.  He denied any shortness of breath.    OBJECTIVE      Physical    VITALS:  BP (!) 111/49   Pulse 71   Temp 98.1 °F (36.7 °C) (Oral)   Resp 16   Ht 1.727 m (5' 8\")   Wt 93.4 kg (205 lb 14.4 oz)   SpO2 93%   BMI 31.31 kg/m²   INTAKE/OUTPUT:    Intake/Output Summary (Last 24 hours) at 6/30/2025 1302  Last data filed at 6/30/2025 1018  Gross per 24 hour   Intake 360 ml   Output 3025 ml   Net -2665 ml     Right chest tube in place, with minimal serosanguineous output.  Abdomen soft, flat and mildly tender at trocar wounds.  Large bruise in right upper flank with moderate tenderness.    Data  CBC:   Lab Results   Component Value Date/Time    WBC 7.0 06/30/2025 05:32 AM    RBC 3.35 06/30/2025 05:32 AM    RBC 4.77 11/13/2011 09:16 AM    HGB 8.9 06/30/2025 05:32 AM    HCT 26.8 06/30/2025 05:32 AM    MCV 80.0 06/30/2025 05:32 AM    MCH 26.6 06/30/2025 05:32 AM    MCHC 33.2 06/30/2025 05:32 AM    RDW 21.8 06/30/2025 05:32 AM     06/30/2025 05:32 AM     11/13/2011 09:16 AM    MPV Abnormal 06/30/2025 05:32 AM     BMP:    Lab Results   Component Value Date/Time     06/30/2025 05:32 AM    K 3.9 06/30/2025 05:32 AM    CL 98 06/30/2025 05:32 AM    CO2 27 06/30/2025 05:32 AM    BUN 25 06/30/2025 05:32 AM    CREATININE 1.3 06/30/2025 05:32 AM    CALCIUM 8.4 06/30/2025 05:32 AM    GFRAA >60 06/30/2022 04:32 AM    LABGLOM 58 06/30/2025 05:32 AM    LABGLOM >60 09/07/2023 12:27 PM    GLUCOSE 129 06/30/2025 05:32 AM    GLUCOSE 156 11/13/2011 09:16 AM     Magnesium:    Lab Results   Component Value Date/Time    MG 1.3 06/30/2025 05:32 AM     Radiology 
Department of General Surgery - Adult  Surgical Service [place] [facility]  Attending Progress Note      SUBJECTIVE: The patient complained of mild right upper quadrant discomfort.  He is tolerating a low-fat diet.  He underwent an MRCP yesterday which confirmed stones in the gallbladder and cystic duct with evidence of cholecystitis.  However no stones were noted in the common bile duct.  His INR remains elevated at 3.5.    OBJECTIVE      Physical    VITALS:  /68   Pulse 86   Temp 97.5 °F (36.4 °C) (Oral)   Resp 20   Ht 1.727 m (5' 7.99\")   Wt 93.2 kg (205 lb 6.4 oz)   SpO2 98%   BMI 31.24 kg/m²   INTAKE/OUTPUT:    Intake/Output Summary (Last 24 hours) at 6/21/2025 1102  Last data filed at 6/21/2025 0959  Gross per 24 hour   Intake 1169.59 ml   Output 275 ml   Net 894.59 ml     Abdomen soft, with mild right upper quadrant tenderness.  No guarding    Data  CBC:   Lab Results   Component Value Date/Time    WBC 9.7 06/20/2025 05:23 AM    RBC 3.70 06/20/2025 05:23 AM    RBC 4.77 11/13/2011 09:16 AM    HGB 8.5 06/20/2025 05:23 AM    HCT 27.6 06/20/2025 05:23 AM    MCV 74.6 06/20/2025 05:23 AM    MCH 23.0 06/20/2025 05:23 AM    MCHC 30.8 06/20/2025 05:23 AM    RDW 24.4 06/20/2025 05:23 AM     06/20/2025 05:23 AM     11/13/2011 09:16 AM    MPV Abnormal 06/20/2025 05:23 AM     CMP:    Lab Results   Component Value Date/Time     06/21/2025 05:36 AM    K 3.8 06/21/2025 05:36 AM    CL 97 06/21/2025 05:36 AM    CO2 28 06/21/2025 05:36 AM    BUN 28 06/21/2025 05:36 AM    CREATININE 1.2 06/21/2025 05:36 AM    GFRAA >60 06/30/2022 04:32 AM    LABGLOM 64 06/21/2025 05:36 AM    LABGLOM >60 09/07/2023 12:27 PM    GLUCOSE 120 06/21/2025 05:36 AM    GLUCOSE 156 11/13/2011 09:16 AM    CALCIUM 8.3 06/21/2025 05:36 AM    BILITOT 1.9 06/21/2025 05:36 AM    ALKPHOS 209 06/21/2025 05:36 AM    AST 59 06/21/2025 05:36 AM    ALT 31 06/21/2025 05:36 AM     PT/INR:    Lab Results   Component Value 
Department of General Surgery - Adult  Surgical Service [place] [facility]  Attending Progress Note      SUBJECTIVE: The patient reported minimal right upper quadrant pain.  He tolerated a low-fat diet yesterday and is currently NPO.  Cardiology has cleared him for the surgery.  His heparin was discontinued at 7 AM this morning.  However he was noted to have dropped his hemoglobin to 7.0 and has received 1 out of 2 units of blood so far.    OBJECTIVE      Physical    VITALS:  /74   Pulse 77   Temp 98.2 °F (36.8 °C) (Oral)   Resp 18   Ht 1.727 m (5' 7.99\")   Wt 92.2 kg (203 lb 4.8 oz)   SpO2 99%   BMI 30.92 kg/m²   INTAKE/OUTPUT:    Intake/Output Summary (Last 24 hours) at 6/23/2025 1136  Last data filed at 6/23/2025 0733  Gross per 24 hour   Intake 1496.35 ml   Output 225 ml   Net 1271.35 ml     He did not appear jaundiced.  Abdomen soft, with minimal right upper quadrant tenderness.    Data  CBC:   Lab Results   Component Value Date/Time    WBC 6.2 06/23/2025 03:55 AM    RBC 3.02 06/23/2025 03:55 AM    RBC 4.77 11/13/2011 09:16 AM    HGB 7.0 06/23/2025 03:55 AM    HCT 21.7 06/23/2025 03:55 AM    MCV 71.9 06/23/2025 03:55 AM    MCH 23.2 06/23/2025 03:55 AM    MCHC 32.3 06/23/2025 03:55 AM    RDW 22.7 06/23/2025 03:55 AM     06/23/2025 03:55 AM     11/13/2011 09:16 AM    MPV Abnormal 06/23/2025 03:55 AM     CMP:    Lab Results   Component Value Date/Time     06/23/2025 03:55 AM    K 3.6 06/23/2025 03:55 AM    CL 95 06/23/2025 03:55 AM    CO2 29 06/23/2025 03:55 AM    BUN 22 06/23/2025 03:55 AM    CREATININE 0.9 06/23/2025 03:55 AM    GFRAA >60 06/30/2022 04:32 AM    LABGLOM 84 06/23/2025 03:55 AM    LABGLOM >60 09/07/2023 12:27 PM    GLUCOSE 135 06/23/2025 03:55 AM    GLUCOSE 156 11/13/2011 09:16 AM    CALCIUM 8.5 06/23/2025 03:55 AM    BILITOT 1.9 06/21/2025 05:36 AM    ALKPHOS 209 06/21/2025 05:36 AM    AST 59 06/21/2025 05:36 AM    ALT 31 06/21/2025 05:36 AM     PT/INR: 
Department of General Surgery - Adult  Surgical Service [place] [facility]  Attending Progress Note      SUBJECTIVE: The patient complained of abdominal bloating and upper abdominal pain.  He vomited at 4 AM, bringing up dark brown emesis.  He has also been belching.  No flatus.  A Pa was placed and has drained 175 mL of urine so far.    The patient received blood transfusions, plasma and has just finished a bag of platelets.  He remains on Levophed and his systolic blood pressure has improved to the 130s.  He did undergo a CT angiogram of the abdomen and pelvis last night which did not show any active intra-abdominal bleeding.  A small amount of fluid was noted around the liver.    OBJECTIVE      Physical    VITALS:  BP (!) 113/53   Pulse 73   Temp 97.8 °F (36.6 °C) (Oral)   Resp 21   Ht 1.727 m (5' 8\")   Wt 92.4 kg (203 lb 11.3 oz)   SpO2 97%   BMI 30.97 kg/m²   INTAKE/OUTPUT:    Intake/Output Summary (Last 24 hours) at 6/24/2025 0759  Last data filed at 6/24/2025 0530  Gross per 24 hour   Intake 3886.02 ml   Output 2175 ml   Net 1711.02 ml     Awake, alert and oriented.  Not in much pain.  Abdomen distended, soft and mildly tender across the epigastrium and trocar wounds.  No peritoneal signs.    Data  CBC:   Lab Results   Component Value Date/Time    WBC 11.5 06/24/2025 03:15 AM    RBC 2.86 06/24/2025 03:15 AM    RBC 4.77 11/13/2011 09:16 AM    HGB 7.3 06/24/2025 03:15 AM    HCT 21.9 06/24/2025 03:15 AM    MCV 76.6 06/24/2025 03:15 AM    MCH 25.5 06/24/2025 03:15 AM    MCHC 33.3 06/24/2025 03:15 AM    RDW 23.0 06/24/2025 03:15 AM     06/24/2025 03:15 AM     11/13/2011 09:16 AM    MPV Abnormal 06/24/2025 03:15 AM     CMP:    Lab Results   Component Value Date/Time     06/24/2025 03:15 AM    K 5.0 06/24/2025 03:15 AM     06/24/2025 03:15 AM    CO2 22 06/24/2025 03:15 AM    BUN 28 06/24/2025 03:15 AM    CREATININE 1.5 06/24/2025 03:15 AM    GFRAA >60 06/30/2022 04:32 
Department of General Surgery - Adult  Surgical Service [place] [facility]  Attending Progress Note      SUBJECTIVE: The patient stated that his mild right upper quadrant abdominal pain has almost resolved.  He has tolerated a low-fat diet.  Cardiology has cleared him for the robotic cholecystectomy, deeming him a low risk, and recommending that he be maintained on heparin, except for the immediate perioperative period.  An open cholecystectomy would make him a higher risk procedure.  His INR has come down to 1.3.    OBJECTIVE      Physical    VITALS:  BP (!) 107/54   Pulse 98   Temp 97.5 °F (36.4 °C) (Oral)   Resp 20   Ht 1.727 m (5' 7.99\")   Wt 92.2 kg (203 lb 4.8 oz)   SpO2 100%   BMI 30.92 kg/m²   INTAKE/OUTPUT:    Intake/Output Summary (Last 24 hours) at 6/22/2025 1059  Last data filed at 6/22/2025 0542  Gross per 24 hour   Intake 360.85 ml   Output 1225 ml   Net -864.15 ml     He did not appear jaundiced.    His abdomen was soft, with minimal right upper quadrant tenderness.  No guarding.    Data  CBC:   Lab Results   Component Value Date/Time    WBC 7.1 06/22/2025 08:45 AM    RBC 3.36 06/22/2025 08:45 AM    RBC 4.77 11/13/2011 09:16 AM    HGB 7.8 06/22/2025 08:45 AM    HCT 24.3 06/22/2025 08:45 AM    MCV 72.3 06/22/2025 08:45 AM    MCH 23.2 06/22/2025 08:45 AM    MCHC 32.1 06/22/2025 08:45 AM    RDW 22.9 06/22/2025 08:45 AM     06/22/2025 08:45 AM     11/13/2011 09:16 AM    MPV Abnormal 06/22/2025 08:45 AM     CMP:    Lab Results   Component Value Date/Time     06/22/2025 04:52 AM    K 3.8 06/22/2025 04:52 AM    CL 94 06/22/2025 04:52 AM    CO2 28 06/22/2025 04:52 AM    BUN 27 06/22/2025 04:52 AM    CREATININE 1.0 06/22/2025 04:52 AM    GFRAA >60 06/30/2022 04:32 AM    LABGLOM 74 06/22/2025 04:52 AM    LABGLOM >60 09/07/2023 12:27 PM    GLUCOSE 122 06/22/2025 04:52 AM    GLUCOSE 156 11/13/2011 09:16 AM    CALCIUM 8.7 06/22/2025 04:52 AM    BILITOT 1.9 06/21/2025 05:36 AM 
Dr Brennan rounded and would like to start low dose heparin gtt with no bolus with general surgeries blessing. Dr Mariscal notified and is okay with the order to start today.  
Dr Raya rounded and would not like the heparin gtt to be started yet.  
Dr Raya states ok to to restart Heparin. No bolus. Ok to restart at 10 units/kg/hr per Dr Felix.  
Dr. Mariscal updated on pt status and recent Hgb of 8.0.Next check in 6 hours. Pa placed for strict I&O. Critical care ordered 2 units of FFP for aPTT of 44.   
End Of Shift Note  [place] [facility]  Summary of shift: Bruising on abdomen increasing in size, marked and dated by RN. No PRN pain meds given on shift. Turned every 2 hours. Patient used bedpan at had small loose BM. HGB check last night 8.1, patient requesting to have morning labs draw later. Pa output 975ml. VSS.     Vitals:    Vitals:    06/27/25 0200 06/27/25 0300 06/27/25 0400 06/27/25 0428   BP:   (!) 110/55    Pulse: 70 72 65 68   Resp: 18 17 19 18   Temp:   97.9 °F (36.6 °C)    TempSrc:   Oral    SpO2:   95% 96%   Weight:       Height:            I&O:   Intake/Output Summary (Last 24 hours) at 6/27/2025 0619  Last data filed at 6/27/2025 0400  Gross per 24 hour   Intake 1365.98 ml   Output 1275 ml   Net 90.98 ml       Resp Status: 1L nasal cannula    Ventilator Settings:     / / /     Critical Care IV infusions:   sodium chloride      sodium chloride      norepinephrine Stopped (06/24/25 1404)    heparin (PORCINE) Infusion Stopped (06/23/25 0728)    sodium chloride          LDA:   Peripheral IV 06/23/25 Left Forearm (Active)   Number of days: 3       Urinary Catheter 06/23/25 (Active)   Number of days: 3       Incision 06/23/25 Abdomen (Active)   Number of days: 4          
End Of Shift Note  [place] [facility]  Summary of shift: Levo gtt weaned to 6 mcg. CT abd complete with no evidence of active colonic bleed and Right sided pleural effusion noted. 2 units Plasma given. Pa inserted with only 175 cc urine output. BUN and crt slightly elevated this am. NP notified with no new orders. Dilaudid given for pain. Pt vomitted dark brown emesis around 4am. Pt belching all night and states he feels better after this. Tums given for epigastric discomfort.     Vitals:    Vitals:    06/24/25 0401 06/24/25 0600 06/24/25 0601 06/24/25 0616   BP:  (!) 127/59  (!) 113/53   Pulse:  78 71 73   Resp:  19 17 21   Temp: 98.5 °F (36.9 °C) 97.9 °F (36.6 °C)  97.9 °F (36.6 °C)   TempSrc: Oral Oral  Axillary   SpO2:  97% 96% 97%   Weight:       Height:            I&O:   Intake/Output Summary (Last 24 hours) at 6/24/2025 0728  Last data filed at 6/24/2025 0530  Gross per 24 hour   Intake 3934.24 ml   Output 2175 ml   Net 1759.24 ml       Resp Status: 2L NC    Ventilator Settings:     / / /     Critical Care IV infusions:   sodium chloride      sodium chloride      sodium chloride      norepinephrine 20 mcg/min (06/24/25 0243)    VASOpressin 20 Units in sodium chloride 0.9 % 100 mL infusion      [Transfer Hold] heparin (PORCINE) Infusion Stopped (06/23/25 0728)    sodium chloride          LDA:   CVC  06/23/25 Right Internal jugular (Active)   Number of days: 0       Peripheral IV 06/18/25 Left Antecubital (Active)   Number of days: 5       Peripheral IV 06/23/25 Left Forearm (Active)   Number of days: 0       Urinary Catheter 06/23/25 (Active)   Number of days: 0       Arterial Line 06/23/25 Left Radial (Active)   Number of days: 0       Midline 06/23/25 Right External Jugular (Active)   Number of days: 0       Incision 06/23/25 Abdomen (Active)   Number of days: 1         
End Of Shift Note  [place] [facility]  Summary of shift: Patient had uneventful shift. Patient had 3x BM. Hematoma on R side stayed about the same size. Patient had 200 mL from chest tube,.    Vitals:    Vitals:    06/28/25 0400 06/28/25 0500 06/28/25 0555 06/28/25 0600   BP: (!) 109/57 113/65  107/65   Pulse: 65 70 69 68   Resp: 18 18 18 25   Temp:       TempSrc:       SpO2: 97% 97%  92%   Weight:       Height:            I&O:   Intake/Output Summary (Last 24 hours) at 6/28/2025 0627  Last data filed at 6/28/2025 0555  Gross per 24 hour   Intake 331.65 ml   Output 4535 ml   Net -4203.35 ml       Resp Status: 1.5L NC    Ventilator Settings:     / / /     Critical Care IV infusions:   sodium chloride      sodium chloride      heparin (PORCINE) Infusion Stopped (06/23/25 0728)    sodium chloride          LDA:   Peripheral IV 06/27/25 Right;Ventral Forearm (Active)   Number of days: 0       Chest Tube Right Pleural;Fourth intercostal space (Active)   Number of days: 0       Urinary Catheter 06/23/25 (Active)   Number of days: 4       Incision 06/23/25 Abdomen (Active)   Number of days: 5          
End Of Shift Note  [place] ICU  Summary of shift: Pt went for EGD today, biopsied polyps. Pt weak with mobility. Stable hgb through the day. Pt had low urine output 300 mL and only a smear of a bowel movement. Removed central line and ART line. Patient is made step down.       Vitals:    Vitals:    06/26/25 1445 06/26/25 1600 06/26/25 1700 06/26/25 1800   BP: (!) 99/43 110/60 118/65 124/64   Pulse: 66 66 62 69   Resp: 19 18 19 20   Temp: 97.2 °F (36.2 °C) 97.9 °F (36.6 °C)     TempSrc: Temporal Oral     SpO2: 99% 97% 100% 99%   Weight:       Height:            I&O:   Intake/Output Summary (Last 24 hours) at 6/26/2025 1910  Last data filed at 6/26/2025 1709  Gross per 24 hour   Intake 1736.83 ml   Output 525 ml   Net 1211.83 ml       Resp Status: Nasal cannula 1L    Ventilator Settings:     / / /     Critical Care IV infusions:   sodium chloride      sodium chloride      norepinephrine Stopped (06/24/25 1404)    heparin (PORCINE) Infusion Stopped (06/23/25 0728)    sodium chloride          LDA:   Peripheral IV 06/23/25 Left Forearm (Active)   Number of days: 3       Urinary Catheter 06/23/25 (Active)   Number of days: 2       Incision 06/23/25 Abdomen (Active)   Number of days: 3         
End Of Shift Note  [place] [facility]  Summary of shift: Pt rested in bed this am and then up to chair with RN to watch the arterial line site and setup, 2 PT/OT staff and hector mendosa. Pt sat in chair till washed up and then with a walker ambulated to bed with 2 assist to stand up around 1530. PRBC ordered for Hgb 6.9 and pt given 1 unit starting at 1601. No reaction noted at 1616 so then rate increased. Pt has new consult for GI and is NPO after midnight. Dr Raya informed of right lateral calvin incision has a large bruise noted and it larger than yesterday based on outline on skin. Pt has a new order for IR to do a thoracentesis for a right pleural effusion. Dr Raya orders to keep arterial line for one more day but ok to dc TLC just before midnight if pt does not need pressors between now and then.  Pts wife stayed at bedside most of the day except when she and pts daughter went to lunch. Pt only given TUMs this am for indigestion which went away and denied pain the rest of the day.    Vitals:    Vitals:    06/25/25 1830 06/25/25 1900 06/25/25 1925 06/25/25 1930   BP: 110/65 (!) 102/57  (!) 100/55   Pulse: 80 80  76   Resp: 25 27 20   Temp:   98.4 °F (36.9 °C) 98.5 °F (36.9 °C)   TempSrc:   Axillary Axillary   SpO2: 98% 95%  96%   Weight:       Height:            I&O:   Intake/Output Summary (Last 24 hours) at 6/25/2025 2009  Last data filed at 6/25/2025 1924  Gross per 24 hour   Intake 2167.47 ml   Output 400 ml   Net 1767.47 ml       Resp Status: Pt weaned down from 4L to 1L NC.    Critical Care IV infusions:   sodium chloride 75 mL/hr at 06/25/25 1600    sodium chloride      sodium chloride      norepinephrine Stopped (06/24/25 1404)    [Transfer Hold] heparin (PORCINE) Infusion Stopped (06/23/25 0728)    sodium chloride          LDA:   CVC  06/23/25 Right Internal jugular (Active)   Number of days: 2       Peripheral IV 06/23/25 Left Forearm (Active)   Number of days: 2       Urinary Catheter 06/23/25 
End Of Shift Note  [place] [facility]  Summary of shift: Hgb stable at 7.8 throughout shift. 225 mls urine o/p, BUN continues to increase at 40, crt up to 3.0 this am. No BM but passing gas. BP soft. Was made NPO at midnight. Artline to be removed today. Pain improved and pt did not need any pain meds over night. Uneventful shift.    Vitals:    Vitals:    06/26/25 0400 06/26/25 0500 06/26/25 0600 06/26/25 0700   BP: 104/63 105/69 110/66 (!) 93/54   Pulse: 80 80 80 73   Resp: 19 17 20 19   Temp:       TempSrc:       SpO2: 96% 96% 96% 97%   Weight:       Height:            I&O:   Intake/Output Summary (Last 24 hours) at 6/26/2025 0750  Last data filed at 6/26/2025 0600  Gross per 24 hour   Intake 1867.47 ml   Output 450 ml   Net 1417.47 ml       Resp Status: 1LNC    Ventilator Settings:     / / /     Critical Care IV infusions:   sodium chloride 75 mL/hr at 06/25/25 2127    sodium chloride      sodium chloride      norepinephrine Stopped (06/24/25 1404)    [Transfer Hold] heparin (PORCINE) Infusion Stopped (06/23/25 0728)    sodium chloride          LDA:   CVC  06/23/25 Right Internal jugular (Active)   Number of days: 2       Peripheral IV 06/23/25 Left Forearm (Active)   Number of days: 2       Urinary Catheter 06/23/25 (Active)   Number of days: 2       Arterial Line 06/23/25 Left Radial (Active)   Number of days: 2       Incision 06/23/25 Abdomen (Active)   Number of days: 3         
End Of Shift Note  [place] [facility]  Summary of shift: Pt finishing 2nd unit of PRBC upon initial assessment. Hgb recheck 7.2. NP messaged and ordered to check again in 6 hours. Morning check 7.4. Morning labs BUN elevated from 28 to 36. Crt 1.5 to 2.5. PT 16.4, INR 1.3, aPTT 33.5. Poor urine output in sandoval 175 mls over night. NS 0.9% at 75mls/hr started. Dilaudid given twice for pain. No BM. A&OX4 but hallucinating more. No vomiting but spitting up mucous here and there. Gen Surg mentioned consulting nephrology this am.     Vitals:    Vitals:    06/25/25 0000 06/25/25 0100 06/25/25 0200 06/25/25 0300   BP: 105/60 104/62 101/65 110/64   Pulse: 81 80 79 82   Resp: 17 16 15 15   Temp: 98.5 °F (36.9 °C)      TempSrc: Oral      SpO2: 98% 97% 98% 99%   Weight:       Height:            I&O:   Intake/Output Summary (Last 24 hours) at 6/25/2025 0314  Last data filed at 6/24/2025 2045  Gross per 24 hour   Intake 2617.92 ml   Output 325 ml   Net 2292.92 ml       Resp Status: 4LNC    Ventilator Settings:     / / /     Critical Care IV infusions:   sodium chloride      sodium chloride      norepinephrine Stopped (06/24/25 1404)    VASOpressin 20 Units in sodium chloride 0.9 % 100 mL infusion      [Transfer Hold] heparin (PORCINE) Infusion Stopped (06/23/25 0728)    sodium chloride          LDA:   CVC  06/23/25 Right Internal jugular (Active)   Number of days: 1       Peripheral IV 06/23/25 Left Forearm (Active)   Number of days: 1       Urinary Catheter 06/23/25 (Active)   Number of days: 1       Arterial Line 06/23/25 Left Radial (Active)   Number of days: 1       Incision 06/23/25 Abdomen (Active)   Number of days: 2         
End Of Shift Note  [facility]  Summary of shift: Pt continued to have low hgb and high INR, pt received 2 units PRBC, and 10 mg vitamin K. Patient is jaundice. Complaining of minimal pain. Levo turned off at 1404. Patient had low urine out put 150 mL for shift gave additional dose of lasix 20mg. No bowel movement. Pt started having visual hallucinations, provider notified. Patient changed to clear liquid diet for time being per surgery.     Vitals:    Vitals:    06/24/25 1815 06/24/25 1820 06/24/25 1825 06/24/25 1830   BP: 101/63 101/63     Pulse: 82 87 84 84   Resp: 25 25 22 24   Temp:  97.4 °F (36.3 °C)     TempSrc:  Oral     SpO2: 100% 98% 97% 98%   Weight:       Height:            I&O:   Intake/Output Summary (Last 24 hours) at 6/24/2025 1902  Last data filed at 6/24/2025 1832  Gross per 24 hour   Intake 2672.92 ml   Output 325 ml   Net 2347.92 ml       Resp Status: Nasal Cannula 4L    Ventilator Settings:     / / /     Critical Care IV infusions:   sodium chloride      sodium chloride      norepinephrine Stopped (06/24/25 1404)    VASOpressin 20 Units in sodium chloride 0.9 % 100 mL infusion      [Transfer Hold] heparin (PORCINE) Infusion Stopped (06/23/25 0728)    sodium chloride          LDA:   CVC  06/23/25 Right Internal jugular (Active)   Number of days: 1       Peripheral IV 06/23/25 Left Forearm (Active)   Number of days: 1       Urinary Catheter 06/23/25 (Active)   Number of days: 0       Arterial Line 06/23/25 Left Radial (Active)   Number of days: 1       Incision 06/23/25 Abdomen (Active)   Number of days: 1         
End Of Shift Note  [facility]  Summary of shift: Pt had eventful shift after surgery patient remained hypotensive, started Levo pt on 30 now, started vanco, got labs, obtained EKG gave liter bolus, needs CTA w/ contrast, started CVP, pt BP improved with fluids and pressors. Pt looking slightly jaundice, will need sandoval but used urinal and had 450 out with no BM but passing gas    Vitals:    Vitals:    06/23/25 1630 06/23/25 1644 06/23/25 1645 06/23/25 1700   BP: 90/62 95/62 95/62 (!) 81/56   Pulse: (!) 103 (!) 108 (!) 107 (!) 111   Resp: 26 24 25 23   Temp:  98 °F (36.7 °C)     TempSrc:  Oral     SpO2: 97% 96% 96% 96%   Weight:       Height:            I&O:   Intake/Output Summary (Last 24 hours) at 6/23/2025 1918  Last data filed at 6/23/2025 1809  Gross per 24 hour   Intake 3734.02 ml   Output 2000 ml   Net 1734.02 ml       Resp Status: 3L Nasal cannula    Ventilator Settings:     / / /     Critical Care IV infusions:   sodium chloride      sodium chloride      sodium chloride      norepinephrine 30 mcg/min (06/23/25 1847)    VASOpressin 20 Units in sodium chloride 0.9 % 100 mL infusion      [Transfer Hold] heparin (PORCINE) Infusion Stopped (06/23/25 0728)    sodium chloride          LDA:   CVC  06/23/25 Right Internal jugular (Active)   Number of days: 0       Peripheral IV 06/18/25 Left Antecubital (Active)   Number of days: 5       Peripheral IV 06/23/25 Left Forearm (Active)   Number of days: 0       Arterial Line 06/23/25 Left Radial (Active)   Number of days: 0       Midline 06/23/25 Right External Jugular (Active)   Number of days: 0       Incision 06/23/25 Abdomen (Active)   Number of days: 0         
GI addendum:  Patient's INR 3.7 today. INR needs to be less then 2 for ERCP to be done .   Patient also took Plavix 48 hours ago. This is his 3 rd day off Plavix.   As long as patient is not cholangitic I plan to do ERCP Monday . In this way his INR will improve and with off Plavix I can do the sphincterotomy.   On the other hand if patient shows signs of cholangitis then we will proceed urgently for ERCP with stent placement and reverse INR with FFP's  In the meantime patient can get the MRI MRCP done in the afternoon  Plan was communicated with the patient and his  wife.   
[facility]  Office: [phone]  Abelardo Matias, DO, Kavon Rodríguez, DO, Ricardo Felix DO, Mohan Jon, DO, Ann Aviles MD, Anni Anthony MD, Nahum De Santiago MD, Aliza Davies MD,  Hilton Perkins MD, Jacki Oro MD, Pam Aquino MD,  Essie Haq DO, Kary Childs MD, Delmar Aguayo MD, Mack Matias DO, Olinda Torres MD,  Brandon No DO, Jessica Centeno MD, Shirley Alatorre MD, Светлана Browne MD,  Jonnathan Ashby MD, Domo Khan MD, Teddy Montenegro MD, Cole Herrera MD, Joe Kent MD, Sy Hernadez MD, Lane Mcconnell, DO, Chloe Penny MD, Celso Hsu DO, Danny Costello MD, Essie Padgett MD, Mohsin Reza, MD, Kaila Weber MD, Shirley Waterhouse, CNP,  Laura Alatorre, CNP, Lane Mckeon, CNP,  Catherine Jones, EZEKIEL, Keena Savage, CNP, Marianna Obrien, CNP, Lesli Farmer, CNP, Albertina Trinidad, CNP, Zakiya Langley, PA-C, Araceli Askew, CNP, Brooke Wei, CNP,  Mayi Borden, CNP, Gracie Padron, CNP, Asad Ahumada, PA-C, Meg Olguin, PA-C, Lesly Davis, CNP,        Maribel Cochran, SERA, Amanda Leblanc, CNP, Kim Concepcion, CNP         [facility]   IN-PATIENT SERVICE   [facility]    Progress Note    6/27/2025    2:22 PM    Name:   Quique Wray  MRN:     [id number]     Acct:      [id number]   Room:   [facility] Day:  8  Admit Date:  6/19/2025 11:14 AM    PCP:   Vernon Cade MD  Code Status:  Full Code    Subjective:     C/C: Abdominal pain    Interval History Status: improved.     Patient was up to chair, denies any complaints of chest pain, shortness of breath, nausea or vomiting, fevers or chills.  Went for thoracentesis as well as 125 mL of blood-tinged fluid drained.    Brief History:     Per prior documentation  \"This is a very pleasant 80-year-old male who presents to [place] emergency department for complaints of abdominal pain along with shortness of breath. Patient states abdominal pain and shortness of breath in the right 
I attended the patient during the first 15 minutes of the blood transfusion and no signs of a blood reaction noted.   
[facility]   Pharmacy Pharmacokinetic Monitoring Service - Vancomycin    Consulting Provider: Dr. Raya   Indication: intra-abdominal infection  Target Concentration: Dosing based on anticipated concentration <15 mg/L due to renal impairment/insufficiency  Day of Therapy: 2  Additional Antimicrobials: Zosyn    Pertinent Laboratory Values:   Wt Readings from Last 1 Encounters:   06/23/25 92.4 kg (203 lb 11.3 oz)     Temp Readings from Last 1 Encounters:   06/24/25 98 °F (36.7 °C)     Estimated Creatinine Clearance: 45 mL/min (A) (based on SCr of 1.5 mg/dL (H)).  Recent Labs     06/23/25  1335 06/23/25  1831 06/24/25  0315 06/24/25  1200   CREATININE 1.0 1.1 1.5*  --    BUN 21  --  28*  --    WBC  --   --  11.5* 8.9       Pertinent Cultures:  Culture Date Source Results   6/23/25 Blood x2 No growth to date   MRSA Nasal Swab: N/A. Non-respiratory infection.    Recent vancomycin administrations                     vancomycin (VANCOCIN) 750 mg in sodium chloride 0.9 % 250 mL IVPB ()  Restarted 06/24/25 0756     750 mg New Bag  0641    vancomycin (VANCOCIN) 2500 mg in sodium chloride 0.9 % 500 mL IVPB (mg) 2,500 mg New Bag 06/23/25 1852                    Assessment:  Date/Time Current Dose Concentration Timing of Concentration (h) AUC   6/24/25 at 15:40 750 mg IV Q12H 26.3 mcg/ml 8 hrs 58 mins 749   Note: Serum concentrations collected for AUC dosing may appear elevated if collected in close proximity to the dose administered, this is not necessarily an indication of toxicity    Plan:  Current dosing regimen is supra-therapeutic  Changed to dose by levels for ESTHER- hold vancomycin until concentration is <15 mcg/ml  Repeat vancomycin concentration ordered for 6/25/25 @ 06:00   Pharmacy will continue to monitor patient and adjust therapy as indicated    Thank you for the consult,  Karen Rollins RPH  6/24/2025 5:11 PM   
[facility]  Office: [phone]  Abelardo Matias, DO, Kavon Rodríguez, DO, Ricardo Felix DO, Mohan Jon, DO, Ann Aviles MD, Anni Anthony MD, Nahum De Santiago MD, Aliza Davies MD,  Hilton Perkins MD, Jacki Oro MD, Pam Aquino MD,  Essie Haq DO, Kary Childs MD, Delmar Aguayo MD, Mack Matias DO, Olinda Torres MD,  Brandon No DO, Jessica Centeno MD, Shirley Alatorre MD, Светлана Browne MD,  Jonnathan Ashby MD, Domo Khan MD, Teddy Montenegro MD, Cole Herrera MD, Joe Kent MD, Sy Hernadez MD, Lane Mcconnell, DO, Chloe Penny MD, eClso Hsu DO, Danny Costello MD, Essie Padgett MD, Mohsin Reza, MD, Kaila Weber MD, Shirley Waterhouse, CNP,  Laura Alatorre, CNP, Lane Mckeon, CNP,  Catherine Jones, EZEKIEL, Keena Savage, CNP, Marianna Obrien, CNP, Lesli Farmer, CNP, Albertina Trinidad, CNP, Zakiya Langley, PA-C, Araceli Askew, CNP, Brooke Wei, CNP,  Mayi Borden, CNP, Gracie Padron, CNP, Asad Ahumada, PA-C, Meg Olguin, PA-C, Lesly Davis, CNP,        Maribel Cochran, SERA, Amanda Leblanc, CNP, Kim Concepcion, CNP         [facility]   IN-PATIENT SERVICE   [facility]    Progress Note    6/25/2025    12:12 PM    Name:   Quique Wray  MRN:     [id number]     Acct:      [id number]   Room:   [facility] Day:  6  Admit Date:  6/19/2025 11:14 AM    PCP:   Vernon Cade MD  Code Status:  Full Code    Subjective:     C/C: Abdominal pain    Interval History Status: improved.     Patient is resting in bed, denies chest pain, shortness of breath, nausea or vomiting, fevers or chills.  Staff reports some bruising to the right side of the abdomen.    Brief History:     Per prior documentation  \"This is a very pleasant 80-year-old male who presents to [place] emergency department for complaints of abdominal pain along with shortness of breath. Patient states abdominal pain and shortness of breath in the right upper and mid abdominal 
[facility]  Office: [phone]  Abelardo Matias DO, Kavon Rodríguez, DO, Ricardo Felix DO, Mohan Jon DO, Ann Aviles MD, Anni Antohny MD, Nahum De Santiago MD, Aliza Davies MD,  Hilton Perkins MD, Jacki Oro MD, Pam Aquino MD,  Essie Haq DO, Kary Childs MD, Delmar Aguayo MD, Mack Matias DO, Olinda Torres MD,  Brandon No DO, Jessica Centeno MD, Shirley Alatorre MD, Светлана Browne MD,  Jonnathan Ashby MD, Domo Khan MD, Teddy Montenegro MD, Cole Herrera MD, Joe Kent MD, Sy Hernadez MD, Lane Mcconnell, DO, Chloe Penny MD, Celso Hsu DO, Danny Costello MD, Essie aPdgett MD, Mohsin Reza, MD, Kaila Weber MD, Shirley Waterhouse, CNP,  Laura Alatorre, CNP, Lane Mckeon, CNP,  Catherine Jones, EZEKIEL, Keena Savage CNP, Marianna Obrien, CNP, Lesli Farmer, CNP, Albertina Trinidad, CNP, Zakiya Langley, PA-C, Araceli Askew, CNP, Brooke Wei, CNP,  Mayi Borden, CNP, Gracie Padron, CNP, Asad Ahumada, PA-C, Meg Olguin, PA-C, Lesly Davis, CNP,        Maribel Cochran, CNS, Amanda Leblanc, CNP, Kim Concepcion, CNP         [facility]   IN-PATIENT SERVICE   [facility]    Progress Note    6/22/2025    11:07 AM    Name:   Quique Wray  MRN:     [id number]     Acct:      [id number]   Room:   [facility] Day:  3  Admit Date:  6/19/2025 11:14 AM    PCP:   Vernon Cade MD  Code Status:  Full Code    Subjective:     C/C: No chief complaint on file.    Interval History Status: not changed.     Sitting in the bed with wife at bedside talking with general surgery.  Patient has decided to do a laparoscopic cholecystectomy.  Patient does tell me that his pain is getting better its about a 2 out of 10 at this time.  Patient is still working on his incentive spirometer and working with PT OT.  We will bridge with heparin and we will turn the heparin off approximately 3 to 4 hours before surgery.    Brief History:     This is a very 
[facility]  Office: [phone]  Abelardo Matias DO, Kavon Rodríguez, DO, Ricardo Felix DO, Mohan Jon, DO, Ann Aviles MD, Anni Anthony MD, Nahum De Santiago MD, Aliza Davies MD,  Hilton Perkins MD, Jacki Oro MD, Pam Aquino MD,  Essie Haq DO, Kary Childs MD, Delmar Aguayo MD, Mack Matias DO, Olinda Torres MD,  Brandon No DO, Jessica Centeno MD, Shirley Alatorre MD, Светлана Browne MD,  Jonnathan Ashby MD, Domo Khan MD, Teddy Montenegro MD, Cole Herrera MD, Joe Kent MD, Sy Hernadez MD, Lane Mcconnell, DO, Chloe Penny MD, Celso Hsu DO, Danny Costello MD, Essie Padgett MD, Mohsin Reza, MD, Kaila Weber MD, Shirley Waterhouse, CNP,  Laura Alatorre, CNP, Lane Mckeon, CNP,  Catherine Jones, EZEKIEL, Keena Savage CNP, Marianna Obrien, CNP, Lesli Farmer, CNP, Albertina Trinidad, CNP, Zakiya Langley, PA-C, Araceli Askew, CNP, Brooke Wei, CNP,  Mayi Borden, CNP, Gracie Padron, CNP, Asad Ahumada, PA-C, Meg Olguin, PA-C, Lesly Davis, CNP,        Maribel Cochran, CNS, Amanda Leblanc, CNP, Kim Concepcion, CNP         [facility]   IN-PATIENT SERVICE   [facility]    Progress Note    6/21/2025    12:18 PM    Name:   Quique Wray  MRN:     [id number]     Acct:      [id number]   Room:   [address] [place] [address] Day:  2  Admit Date:  6/19/2025 11:14 AM    PCP:   Vernon Cade MD  Code Status:  Full Code    Subjective:     C/C: No chief complaint on file.    Interval History Status: not changed.     Patient is sitting up in chair eating some breakfast with family at bedside.  Patient tells me that his pain is getting better, but it is still there.  He asked me how to use the incentive spirometer so I spent some time showing him how to use that.  He does tell me that he will start working with physical therapy and Occupational Therapy.  Continue to encourage ambulation and incentive spirometer use.  Answered all questions by family and 
[facility]  Office: [phone]  Abelardo Matias DO, Kavon Rodríguez, DO, Ricardo Felix DO, Mohan Jon, DO, Ann Aviles MD, Anni Anthony MD, Nahum De Santiago MD, Aliza Davies MD,  Hilton Perkins MD, Jacki Oro MD, Pam Aquino MD,  Essie Haq DO, Kary Childs MD, Delmar Aguayo MD, Mack Matias DO, Olinda Torres MD,  Brandon No DO, Jessica Centeno MD, Shirley Alatorre MD, Светлана Browne MD,  Jonnathan Ashby MD, Domo Khan MD, Teddy Montenegro MD, Cole Herrera MD, Joe Kent MD, Sy Hernadez MD, Lane Mcconnell, DO, Chloe Penny MD, Celso Hsu DO, Danny Costello MD, Essie Padgett MD, Mohsin Reza, MD, Kaila Weber MD, Shirley Waterhouse, CNP,  Laura Alatorre, CNP, Lane Mckeon, CNP,  Catherine Jones, EZEKIEL, Keena Savage, CNP, Marianna Obrien, CNP, Lesli Farmer, CNP, Albertina Trinidad, CNP, Zakiya Langley, PA-C, Araceli Askew, CNP, Brooke Wei, CNP,  Mayi Borden, CNP, Gracie Padron, CNP, Asad Ahumada, PA-C, Meg Olguin, PA-C, Lesly Davis, CNP,        Maribel Cochran, CNS, Amanda Leblanc, CNP, Kim Concepcion, CNP         [facility]   IN-PATIENT SERVICE   [facility]    Progress Note    7/2/2025    9:28 AM    Name:   Quique Wray  MRN:     [id number]     Acct:      [id number]   Room:   1014/1014-02   Day:  13  Admit Date:  6/19/2025 11:14 AM    PCP:   Vernon Cade MD  Code Status:  Full Code    Subjective:     C/C: Abdominal pain    Interval History Status: improved.     Patient continues to improve, denies any complaints of chest pain, shortness of breath, nausea or vomiting, fevers or chills.  Tolerating diet no further signs of any bleeding.  H&H has been stable with heparin drip    Brief History:     Per prior documentation  \"This is a very pleasant 80-year-old male who presents to [place] emergency department for complaints of abdominal pain along with shortness of breath. Patient states abdominal pain and shortness of 
[facility]  Office: [phone]  Abelardo Matias, DO, Kavon Rodríguez, DO, Ricardo Felix DO, Mohan Jon, DO, Ann Aviles MD, Anni Anthony MD, Nahum De Santiago MD, Aliza Davies MD,  Hilton Perkins MD, Jacki Oro MD, Pam Aquino MD,  Essie Haq DO, Kary Childs MD, Delmar Aguayo MD, Mack Matias DO, Olinda Torres MD,  Brandon No DO, Jessica Centeno MD, Shirley Alatorre MD, Светлана Browne MD,  Jonnathan Ashby MD, Domo Khan MD, Teddy Montenegro MD, Cole Herrera MD, Joe Kent MD, Sy Hernadez MD, Lane Mcconnell, DO, Chloe Penny MD, Celso Hsu DO, Danny Costello MD, Essie Padgett MD, Mohsin Reza, MD, Kaila Weber MD, Shirley Waterhouse, CNP,  Laura Alatorre, CNP, Lane Mckeon, CNP,  Catherine Jones, EZEKIEL, Keena Savage CNP, Marianna Obrien, CNP, Lesli Farmer, CNP, Albertina Trinidad, CNP, Zakiya Langley, PA-C, Araceli Askew, CNP, Brooke Wei, CNP,  Mayi Borden, CNP, Gracie Padron, CNP, Asad Ahumada, PA-C, Meg Olguin, PA-C, Lesly Davis, CNP,        Maribel Cochran, SERA, Amanda Leblanc, CNP, Kim Concepcion, CNP         [facility]   IN-PATIENT SERVICE   [facility]    Progress Note    6/26/2025    2:21 PM    Name:   Quique Wray  MRN:     [id number]     Acct:      [id number]   Room:   [facility] [place]/NONE  IP Day:  7  Admit Date:  6/19/2025 11:14 AM    PCP:   Vernon Cade MD  Code Status:  Full Code    Subjective:     C/C: Abdominal pain    Interval History Status: improved.     Patient is up to chair, postop pain still improving.  Denies any complaints of chest pain, shortness of breath, nausea or vomiting.    Brief History:     Per prior documentation  \"This is a very pleasant 80-year-old male who presents to [place] emergency department for complaints of abdominal pain along with shortness of breath. Patient states abdominal pain and shortness of breath in the right upper and mid abdominal area have been going on for the 
Nephrology Progress Note      SUBJECTIVE       Pt was seen and examined. No acute issues overnite. Stable hemodynamics .   Patient has stable blood pressures.  His renal function continues to improve and his serum creatinine is now down to 1.1 with normal-looking electrolytes.  He has diuresed well and overall is in negative fluid balance.  Does not have any significant lower extremity edema.  Has a large bruise/hematoma in the right abdomen which is stable.  Serum protein electrophoresis is unremarkable    History reviewed:  Known history of hypertension, coronary artery disease, atrial fibrillation.  Came into the hospital with abdominal pain.  Baseline creatinine 0.9-1.0.  He was found to have acute cholecystitis.  Underwent laparoscopic cholecystectomy on 2025.  Developed postop anemia with hemoglobin dropping into the 7 range.  Underwent CTA of the abdomen which did not show any overt bleeder.  Subsequently developed acute kidney injury creatinine went up to 1.5 and peaked up to 3.1.  Urine output decreased as well he is in positive fluid balance.  Nephrology consulted for acute kidney injury.  Serological workup negative    OBJECTIVE      CURRENT TEMPERATURE:  Temp: 98.2 °F (36.8 °C)  MAXIMUM TEMPERATURE OVER 24HRS:  Temp (24hrs), Av.2 °F (36.8 °C), Min:98.1 °F (36.7 °C), Max:98.4 °F (36.9 °C)    CURRENT RESPIRATORY RATE:  Respirations: 16  CURRENT PULSE:  Pulse: 62  CURRENT BLOOD PRESSURE:  BP: (!) 119/50  24HR BLOOD PRESSURE RANGE:  Systolic (24hrs), Av , Min:103 , Max:132   ; Diastolic (24hrs), Av, Min:49, Max:68    24HR INTAKE/OUTPUT:    Intake/Output Summary (Last 24 hours) at 2025 0936  Last data filed at 2025 1753  Gross per 24 hour   Intake --   Output 1025 ml   Net -1025 ml     WEIGHT :Patient Vitals for the past 96 hrs (Last 3 readings):   Weight   25 0348 93.3 kg (205 lb 11 oz)   25 0039 93.4 kg (205 lb 14.4 oz)   25 0613 93.7 kg (206 lb 9.6 oz) 
New IV placed left FA for blood transfusion, pt and family updated on plan of care    1110-blood transfusion started at 60ml/hr per protocol, consent obtained prior, nurse at pt beside for first 15 mins and no signs or symptoms of reaction observed, will continue to monitor     1125-vss and infusion turned up to 250ml/hr per Dr. Rojas request  
Nutrition Assessment     Type and Reason for Visit: Reassess, Patient education    Nutrition Recommendations/Plan:   Completed cardiac diet education.  Continue CLD, advanced as indicated.   Add Ensure Clear to meals.   Monitor diet advancement, po intake, GI status, and labs.      Malnutrition Assessment:  Malnutrition Status: Severe malnutrition    Nutrition Assessment:  Completed cardiac diet education with patient and his wife. Both he and spouse were receptive and asked appropriate questions. Patient underwent cholescystectomy yesterday. Patient required 3 units of PRBCs while in the OR and had to be started on a levophed drip. Patient was also administered plasma and platelets. Patient had complaints of abdominal bloating and upper abdominal pain. He vomited at 4 AM, bringing up dark brown emesis. Symptoms are noted to have improved. He has also been belching. Patient has expressed concerns about constipation, as he has had problems with it before. He is currently on CLD and tolerating. Continue CLD, advanced as indicated. Add Ensure Clear to meals. Monitor diet advancement, po intake, GI status, and labs.    Estimated Daily Nutrient Needs:  Energy (kcal):  1690-1880kcals (18-20kcals/kg) Weight Used for Energy Requirements: Current     Protein (g):  115-130g (1.2-1.4g/kg) Weight Used for Protein Requirements: Current        Fluid (ml/day):  1690-1880ml Method Used for Fluid Requirements: 1 ml/kcal    Nutrition Related Findings:   Active bowel sounds. No edema present. Wound Type: None    Current Nutrition Therapies:    ADULT DIET; Clear Liquid  ADULT ORAL NUTRITION SUPPLEMENT; Breakfast, Lunch, Dinner; Clear Liquid Oral Supplement    Anthropometric Measures:  Height: 172.7 cm (5' 8\")  Current Body Wt: 93.9 kg (207 lb 0.2 oz)   BMI: 31.5        Nutrition Diagnosis:   Inadequate oral intake related to inadequate protein-energy intake, altered taste perception as evidenced by weight loss, poor intake prior to 
Nutrition Assessment     Type and Reason for Visit: Reassess, Patient education    Nutrition Recommendations/Plan:   Continue current diet.   Add Ensure to meals TID and magic cup once daily.   Monitor po intake. GI status, and labs.      Malnutrition Assessment:  Malnutrition Status: Severe malnutrition    Nutrition Assessment:  Patient received multiple transfusions over the last couple of days d/t low hgb. HH currently being monitored. Patient had complaint of consistent burping and burning with swallowing. EGD performed 6/26 with findings of hemorrhagic polyp, at gastroesophageal junction. Multiple benign-appearing fundic gland polyps in the fundus and body. Larger polyp, 1 to 2 cm, anterior surface with hemorrhagic features. Large polyp, hemorrhagic, biopsied, in the bulb of the duodenum. Samples were collected and biopsied, awaiting results. Thoracentesis was conducted on 6/27 with 125ml of fluid collected. CXR showed pleural effusion and pulmonary edema. Patient is receiving bumex TID. Patient had chest tube placed. Patient is having bowel movements. He is reported to have decreased appetite and reduced po intake. Patient is on regular diet. Patient was sitting in chair with daughter in the room. Patient reports tolerating regular diet with slight improvement of appetite. Daughter states patient typically has Ensure at home when he does not get enough to eat. She reports patient does not prefer the tastes but will drink for extra/supplemental nutrition. Patient is agreeable to have Ensure and magic cup with meals. Continue current diet. Add Ensure to meals TID and magic cup once daily. Monitor po intake. GI status, and labs.    Estimated Daily Nutrient Needs:  Energy (kcal):  1690-1880kcals (18-20kcals/kg) Weight Used for Energy Requirements: Current     Protein (g):  115-130g (1.2-1.4g/kg) Weight Used for Protein Requirements: Current        Fluid (ml/day):  1690-1880ml Method Used for Fluid Requirements: 1 
Occupational Therapy  [facility]  Occupational Therapy Not Seen Note    Patient not available for Occupational Therapy due to:    [] Testing:    [] Hemodialysis    [] Cancelled by RN:    []Refusal by Patient:    [] Surgery:     [] Intubation:     [] Pain Medication:    [] Sedation:     [] Spine Precautions :    [] Medical Instability:    [x] Other:Cx eval again as GWEN Cervantes reports they are calling for  report and patient about to leave unit for testing/procedure; continue to follow        
Occupational Therapy  [facility]  Occupational Therapy Not Seen    DATE: 2025    NAME: Quique Wray  MRN: [id number]   : 1945    Patient not seen this date for Occupational Therapy due to:      [] Cancel by RN or physician due to:    [] Hemodialysis    [] Critical Lab Value Level     [] Blood transfusion in progress    [] Acute or unstable cardiovascular status   _MAP < 55 or more than >115  _HR < 40 or > 130    [] Acute or unstable pulmonary status   -FiO2 > 60%   _RR < 5 or >40    _O2 sats < 85%    [] Strict Bedrest    [x] Off Unit for surgery or procedure (CHOLECYSTECTOMY LAPAROSCOPIC)    [] Off Unit for testing       [] Pending imaging to R/O fracture    [] Refusal by Patient      [] Other      [] OT being discontinued at this time. Patient independent. No further needs.     [] OT being discontinued at this time as the patient has been transferred to hospice care. No further needs.      CHRIS STEEN   
Occupational Therapy  DATE: 2025    NAME: Quique Wray  MRN: [id number]   : 1945    Patient not seen this date for Occupational Therapy due to:      [x] Cancel by RN or physician due to: Pt not medically appropriate for participation in therapy session this morning. Per RN, pt is about to receive a blood transfusion. OT will continue to follow.     [] Hemodialysis    [] Critical Lab Value Level     [] Blood transfusion in progress    [] Acute or unstable cardiovascular status   _MAP < 55 or more than >115  _HR < 40 or > 130    [] Acute or unstable pulmonary status   -FiO2 > 60%   _RR < 5 or >40    _O2 sats < 85%    [] Strict Bedrest    [] Off Unit for surgery or procedure    [] Off Unit for testing       [] Pending imaging to R/O fracture    [] Refusal by Patient      [] Other      [] OT being discontinued at this time. Patient independent. No further needs.     [] OT being discontinued at this time as the patient has been transferred to hospice care. No further needs.      Abbey Grey, OT   
Occupational Therapy  Facility/Department: [facility] PROGRESSIVE CARE  Daily Treatment Note  NAME: Quique Wray  : 1945  MRN: [id number]    Date of Service: 2025    Discharge Recommendations:  Patient would benefit from continued therapy after discharge         Patient Diagnosis(es): The primary encounter diagnosis was Cardiomyopathy, unspecified type (HCC). Diagnoses of Cholangiectasis and Anemia, unspecified type were also pertinent to this visit.     Assessment   Assessment: .  Activity Tolerance: Patient tolerated treatment well  Discharge Recommendations: Patient would benefit from continued therapy after discharge     Plan  Occupational Therapy Plan  Times Per Week: 4-6x/week 1x/day as tolerated    Restrictions: chest tube, telemetry, spo2, sandoval     Subjective  Subjective  Subjective: Pt agreeable to therapy. Wife present.  Pain: No pain noted to impact function.    Orientation  Overall Orientation Status: Within Functional Limits    Cognition  Overall Cognitive Status: Exceptions  Following Commands: Follows multistep commands with repetition;Follows multistep commands with increased time  Safety Judgement: Decreased awareness of need for assistance;Decreased awareness of need for safety  Problem Solving: Assistance required to correct errors made;Decreased awareness of errors  Insights: Decreased awareness of deficits  Initiation: Requires cues for some  Sequencing: Requires cues for some     Objective  Vitals  O2 Device: Nasal cannula    Bed Mobility Training: Substantial/Maximal assistance;2 Person assistance  Supine to sit with HOB partially raised and use of bed rails. VC for tech and hand placement.  Interventions: Safety awareness training;Verbal cues;Weight shifting training/pressure relief;Visual cues  Rolling: Substantial/Maximal assistance;2 Person assistance  Supine to Sit: Substantial/Maximal assistance;2 Person assistance  Scooting: Substantial/Maximal assistance;2 Person 
Occupational Therapy  Facility/Department: [facility] ICU  Daily Treatment Note  NAME: Quique Wray  : 1945  MRN: [id number]    Date of Service: 2025    Discharge Recommendations:  Patient would benefit from continued therapy after discharge         Patient Diagnosis(es): The primary encounter diagnosis was Cardiomyopathy, unspecified type (HCC). Diagnoses of Cholangiectasis and Anemia, unspecified type were also pertinent to this visit.     Assessment   Activity Tolerance: Patient limited by endurance;Patient limited by pain  Discharge Recommendations: Patient would benefit from continued therapy after discharge     Plan  Occupational Therapy Plan  Times Per Week: 4-6x/week 1x/day as tolerated  Current Treatment Recommendations: Strengthening;Functional mobility training;Balance training;Endurance training;Safety education & training;Patient/Caregiver education & training;Self-Care / ADL;Positioning;Equipment evaluation, education, & procurement;Cognitive/Perceptual training;Co-Treatment    Restrictions   Restrictions/Precautions  Restrictions/Precautions: Fall Risk;General Precautions;Bed Alarm  Activity Level: Up with Assist  Required Braces or Orthoses?: No  Position Activity Restriction  Other Position/Activity Restrictions: Telemetry, sandoval catheter, O2 via NC, R IJ CVC, s/p laparoscopic abd sx 25, chest tube     Subjective  Subjective  Subjective: Patient agreeable to OT treatment session. Family in the room.  Orientation  Overall Orientation Status: Within Functional Limits  Orientation Level: Oriented to place;Oriented to time;Oriented to person;Oriented to situation  Cognition  Overall Cognitive Status: Exceptions  Arousal/Alertness: Appears intact  Following Commands: Follows one step commands consistently  Attention Span: Appears intact  Memory: Decreased recall of recent events  Safety Judgement: Decreased awareness of need for assistance;Decreased awareness of need for safety  Problem 
Occupational Therapy  Facility/Department: [facility] PROGRESSIVE CARE  Rehabilitation Occupational Therapy Daily Treatment Note    Date: 25  Patient Name: Quique Wray       Room: 1014/1014-02  MRN: [id number]  Account: [id number]   : 1945  (80 y.o.) Gender: male      GWEN Ba reports patient is medically stable for therapy treatment this date. Chart reviewed prior to treatment and patient is agreeable for therapy.  All lines intact and patient positioned comfortably at end of treatment.  All patient needs addressed prior to ending therapy session.      Pt currently functioning below baseline.  Recommend daily therapy at time of discharge to maximize long term outcomes and prevent re-admission. Please refer to AM-PAC score for current level of function.               Past Medical History:  has a past medical history of Atrial fibrillation (HCC), Blind right eye, Cerebral artery occlusion with cerebral infarction (HCC), Degenerative disc disease, lumbar, Depression, Endocarditis, Hypertension, Kidney stone, Pacemaker, and Spinal stenosis.  Past Surgical History:   has a past surgical history that includes Cataract removal (Left); Lithotripsy; Insertable Cardiac Monitor (N/A, 2021); Knee arthroscopy (Right, ); Pacemaker insertion (Left, 2022); Total hip arthroplasty (Right, ); Cholecystectomy, laparoscopic (N/A, 2025); Upper gastrointestinal endoscopy (N/A, 2025); and CT GUIDED CHEST TUBE (2025).    Restrictions  Restrictions/Precautions: Fall Risk, General Precautions, Bed Alarm  Other Position/Activity Restrictions: Telemetry, R IJ CVC, s/p laparoscopic abd sx 25  Required Braces or Orthoses?: No    Subjective  Subjective: Pt in bed upon arrival agreeable to tx.  Restrictions/Precautions: Fall Risk;General Precautions;Bed Alarm             Objective     Cognition  Overall Cognitive Status: Exceptions  Arousal/Alertness: Appears intact  Following Commands: Follows 
Occupational Therapy  Facility/Department: [facility] PROGRESSIVE CARE  Rehabilitation Occupational Therapy Daily Treatment Note    Date: 25  Patient Name: Quique Wray       Room: 1014/1014-02  MRN: [id number]  Account: [id number]   : 1945  (80 y.o.) Gender: male      RN reports patient is medically stable for therapy treatment this date. Chart reviewed prior to treatment and patient is agreeable for therapy.  All lines intact and patient positioned comfortably at end of treatment.  All patient needs addressed prior to ending therapy session.      Pt currently functioning below baseline.  Recommend daily therapy at time of discharge to maximize long term outcomes and prevent re-admission. Please refer to AM-PAC score for current level of function.               Past Medical History:  has a past medical history of Atrial fibrillation (HCC), Blind right eye, Cerebral artery occlusion with cerebral infarction (HCC), Degenerative disc disease, lumbar, Depression, Endocarditis, Hypertension, Kidney stone, Pacemaker, and Spinal stenosis.  Past Surgical History:   has a past surgical history that includes Cataract removal (Left); Lithotripsy; Insertable Cardiac Monitor (N/A, 2021); Knee arthroscopy (Right, ); Pacemaker insertion (Left, 2022); Total hip arthroplasty (Right, ); Cholecystectomy, laparoscopic (N/A, 2025); Upper gastrointestinal endoscopy (N/A, 2025); and CT GUIDED CHEST TUBE (2025).    Restrictions  Restrictions/Precautions: Fall Risk, General Precautions, Bed Alarm  Other Position/Activity Restrictions: Telemetry, R IJ CVC, s/p laparoscopic abd sx 25  Required Braces or Orthoses?: No    Subjective  Subjective: Pt in bed upon arrival agreeable to tx.  Restrictions/Precautions: Fall Risk;General Precautions;Bed Alarm             Objective     Cognition  Overall Cognitive Status: Exceptions  Arousal/Alertness: Appears intact  Following Commands: Follows multistep 
Occupational Therapy  [facility]  Occupational Therapy Not Seen Note    Patient not available for Occupational Therapy due to:    [] Testing:    [] Hemodialysis    [] Cancelled by RN:    [x]Refusal by Patient:Pt declined eval due to pain issues/states if he moves will increase and wants to see what MRI shows; spouse present and encouraged but respected pt's wishes to hold eval at this time     [] Surgery:     [] Intubation:     [] Pain Medication:    [] Sedation:     [] Spine Precautions :    [] Medical Instability:    [] Other:       
Occupational Therapy Daily Treatment Note  Facility/Department: [facility] ICU   Patient Name: Quique Wray        MRN: [id number]    : 1945    Date of Service: 2025    GWEN Samayoa reports patient is medically stable for therapy treatment this date.    Chart reviewed prior to treatment and patient is agreeable for therapy.  All lines intact and patient positioned comfortably at end of treatment.  All patient needs addressed prior to ending therapy session.       Pt currently functioning below baseline.  Recommend daily therapy at time of discharge to maximize long term outcomes and prevent re-admission. Please refer to AM-PAC score for current level of function.     Discharge Recommendations  Discharge Recommendations: Patient would benefit from continued therapy after discharge  OT Equipment Recommendations  Equipment Needed:  (CTA)    No chief complaint on file.    Past Medical History:  has a past medical history of Atrial fibrillation (HCC), Blind right eye, Cerebral artery occlusion with cerebral infarction (HCC), Degenerative disc disease, lumbar, Depression, Endocarditis, Hypertension, Kidney stone, Pacemaker, and Spinal stenosis.  Past Surgical History:  has a past surgical history that includes Cataract removal (Left); Lithotripsy; Insertable Cardiac Monitor (N/A, 2021); Knee arthroscopy (Right, ); Pacemaker insertion (Left, 2022); Total hip arthroplasty (Right, ); and Cholecystectomy, laparoscopic (N/A, 2025).    Assessment  Performance deficits / Impairments: Decreased functional mobility ;Decreased ADL status;Decreased safe awareness;Decreased balance;Decreased endurance;Decreased strength;Decreased sensation;Decreased cognition;Decreased posture  Assessment: Overall, pt tolerated session well and is progressing towards STGs. Pt's ADL performance continues to be most limited weakness, decreased functional endurance, decreased standing tolerance, decreased balance and pain. Pt 
Occupational Therapy Re-Evaluation  Facility/Department: [facility] ICU   Patient Name: Quique Wray        MRN: [id number]    : 1945    Date of Service: 2025    RN Wilma reports patient is medically stable for therapy treatment this date.    Chart reviewed prior to treatment and patient is agreeable for therapy.  All lines intact and patient positioned comfortably at end of treatment.  All patient needs addressed prior to ending therapy session.       Pt currently functioning below baseline.  Recommend daily therapy at time of discharge to maximize long term outcomes and prevent re-admission. Please refer to AM-PAC score for current level of function.     Discharge Recommendations  Discharge Recommendations: Patient would benefit from continued therapy after discharge  OT Equipment Recommendations  Equipment Needed:  (CTA)    No chief complaint on file.      Pt is s/p:     Date of Procedure: 2025     Preop diagnosis: Cholelithiasis and acute cholecystitis     Post-Op Diagnosis: Cholelithiasis and acute gangrenous cholecystitis       Procedure(s):  CHOLECYSTECTOMY LAPAROSCOPIC     Surgeon(s):  Hardik Rojas MD     Assistant:   Surgical Assistant: Osiel Gomez     Anesthesia: General     Estimated Blood Loss (mL): 1000     Complications: Bleeding      Past Medical History:  has a past medical history of Atrial fibrillation (HCC), Blind right eye, Cerebral artery occlusion with cerebral infarction (HCC), Degenerative disc disease, lumbar, Depression, Endocarditis, Hypertension, Kidney stone, Pacemaker, and Spinal stenosis.  Past Surgical History:  has a past surgical history that includes Cataract removal (Left); Lithotripsy; Insertable Cardiac Monitor (N/A, 2021); Knee arthroscopy (Right, ); Pacemaker insertion (Left, 2022); Total hip arthroplasty (Right, ); and Cholecystectomy, laparoscopic (N/A, 2025).    Assessment  Performance deficits / Impairments: Decreased functional 
PATIENT NAME: Quique Wray   YOB: 1945  ADMISSION DATE: 2025 11:14 AM   TODAY'S DATE: 2025      Presenting complaint:   Patient with no complaints.  Denies any abdominal pain or nausea or vomiting        Objective:   Vitals:  /68   Pulse 86   Temp 97.5 °F (36.4 °C) (Oral)   Resp 20   Ht 1.727 m (5' 7.99\")   Wt 93.2 kg (205 lb 6.4 oz)   SpO2 98%   BMI 31.24 kg/m²    Temp (24hrs), Av.7 °F (36.5 °C), Min:97.3 °F (36.3 °C), Max:98.6 °F (37 °C)      EXAM:  General: Alert, Appropriate. Not in acute distress.      LUNGS: Resp unlabored; Clear to auscultation     CV:  Normal heart Sounds, No murmurs.     ABD: Soft, non tender, Good bowel sounds    EXT x 4: without edema   Skin:No skin rash/ lesions.      Data Review:  CBC:   Recent Labs     25  1658 25  1158 25  0523   WBC 13.3* 11.4* 9.7   HGB 10.0* 9.2* 8.5*    156 134*     BMP:    Recent Labs     25  1158 25  0523 25  0536    139 137   K 4.3 4.1 3.8    99 97*   CO2 26 27 28   BUN 18 23 28*   CREATININE 1.0 1.1 1.2   GLUCOSE 121* 118* 120*     Hepatic:   Recent Labs     25  1158 25  0523 25  0536   AST 28 34 59*   ALT 18 18 31   BILITOT 2.9* 2.3* 1.9*   ALKPHOS 155* 173* 209*         IMAGING:   MRI/MRCP report noted.    ACTIVE  MEDICATIONS   sennosides-docusate sodium  1 tablet Oral BID    sodium chloride flush  5-40 mL IntraVENous 2 times per day    furosemide  40 mg IntraVENous BID    piperacillin-tazobactam  3,375 mg IntraVENous Q8H    allopurinol  300 mg Oral Daily    [Held by provider] aspirin  81 mg Oral Daily    atorvastatin  40 mg Oral Daily    [Held by provider] clopidogrel  75 mg Oral Daily    pantoprazole  40 mg Oral QAM AC    sertraline  25 mg Oral Daily    tamsulosin  0.4 mg Oral Daily    metoprolol tartrate  25 mg Oral BID       Active Problems:   Patient Active Problem List:     Stroke aborted by administration of thrombolytic agent (HCC)     
Patient 1200 hgb resulted 6.0. writer messaged Dr. Ladd and was directed to to redraw hgb, INR post transfusion.   
Patient and wife stating patient is having visual hallucinations. Seeing the walls getting closer to him, men in the room, and snake falling from the tv. Provider notified.   
Patient awake alert and oriented  States minimal to no pain  Tolerating regular diet    Chest tube for pleural effusion on the right side now out and patient appears to be breathing very comfortably.    Abdomen with incision sites clean and dry rounded not grossly distended.  Large flank bruise on the right side is starting to fade and getting green and yellow in addition to purple.    Patient's hemoglobin is stable  White count is normal  Electrolytes unremarkable creatinine now completely back to normal.    No patient will need to go to [facility] for rehab.  From general surgical viewpoint okay for discharge when okay with other services.    No pathology report back on gastric and duodenal polyps these are all benign hyperplastic polyps.  
Patient awake alert and oriented.  He looks be little bit tired  Tells me he was able to go up to a chair yesterday  Tolerating his clear liquid diet but not much appetite.  Is currently on the bedpan having some bowel movements.    Afebrile.  Heart rate in the 70s to 60s.  Blood pressure about 110-120.    Abdomen obese and rounded.  Small bruises at trocar sites and large flank bruise on the right.    Hemoglobin levels are running in the 8 range since yesterday.  White count normal  Electrolytes unremarkable.  Note creatinine level going up    Continue with serial H&H's  Or advance diet to full liquids  Coags unremarkable would not do any further treatment of these at this time.  Watch renal function      
Patient having pain on their abdomen and rates it a 5. Pain interventions includerelaxation techniques, medication, pillow support/positioning, diversional activities, and regular rest periods. Patients goal for pain relief is 2. The need for pain and symptom management will be considered in the discharge planning process to ensure patients comfort.    
Patient looks little bit more  today.  Only complaint is some discomfort where his chest tube on the right side is exiting his chest wall after he had thoracentesis and then tube placement yesterday for pleural effusion.  Noted 125 cc out initially and currently chest tube container containing about 750 cc.  This is a thin runny serosanguineous fluid.  Note fluid had been sent for cultures.  So far Gram stain is negative and all cultures are pending.    Patient states he tolerated his full liquid diet yesterday.  Also had a couple of bowel movements and he states he is feeling better after these.    On examination fairly good lung excursion  All incision sites clean and dry  Continues with large bruise on his right flank but this is not enlarged at all and with minimal discomfort here.    Note hemoglobin coming up slightly to 8.6.  White count normal at 6.9  Renal function improving with creatinine down to 2.5.    Will increase diet to regular and see how patient does with this.  Continue chest tube for drainage of pleural effusion and awaiting cultures    Continue with physical therapy patient was up in his chair for about 2 hours yesterday.  Note per case management the patient is excepted at [facility] unit once able to be discharged.  
Patient sandoval removed, informed patient has 6hrs to void.   
Patient states slept okay but not great last night  Tolerating diet but not eating huge amounts  Did have a small bowel movement yesterday.    Vital signs relatively unremarkable patient is afebrile  Abdomen obese rounded soft all incision sites clean and dry.  Large right flank bruise starting to get to green and yellow in addition to the purple.    White count normal and hemoglobin has been relatively stable over the last couple of days.    Okay for discharge to [facility] rehab per general surgery.  Patient believes this will hopefully happen today.  Discussed with him he should follow-up with Dr. Rojas in the office in [place] in about 2 weeks  
Patient states still having some right upper quadrant pain no better or worse than admission.  Denies any other symptoms such as fevers chills nausea vomiting bloating burping or belching.    Vital signs fairly stable except for blood pressure slightly low.  Afebrile.  On examination minimal if any clinical jaundice.  Moderate lung excursion  Abdomen with right upper quadrant discomfort and some mild guarding but no obvious masses or Sampson sign.    No bilirubin down slightly.  White count also down to normal range now.  Slightly anemic.    Patient for MRCP this afternoon.  Discussed with patient and wife we will see if he has any common bile duct stones over the counter for the jaundice or if these are possibly passed.  Discussed with patient and wife if he did have a common bile duct stone may need ERCP for removal.  Would then recommend consideration of cholecystectomy however with recent clotting events would have to discuss with cardiology risks from removing patient from his blood thinners for cholecystectomy versus possibility of percutaneous drain placement and then later cholecystectomy once more stable, off blood thinners with less risk.      
Patient transferred from intensive care unit to progressive unit today.  Sitting up in chair looking actually fairly   Tolerating regular diet although not eating a whole lot.  He does like the chocolate pudding however.    Vital signs fairly unremarkable and afebrile.  Minimal further output from chest tube on right side since placement and since yesterday.    On examination all incision sites clean and dry.  Large bruise on flank remains.  No further growth of the size of this.    All labs today the patient's creatinine continues to improve and his hemoglobin is actually up slightly.    Patient doing well from general surgical viewpoint.  If thoracentesis tube volume remains quite low could possibly be pulled the next day or so.  Believe patient could likely be discharged in a few days possibly to rehab before going home.      
Pharmacy Medication Review    The patient's list of current home medications has been reviewed.     PHYSICIANS AND NURSE PRACTITIONERS: please note there is no Transitions of Care/Med Rec Pharmacist available to address any discrepancies on inpatient orders. It is the responsibility of the attending provider to review the updates made to the home med list and adjust inpatient orders as appropriate.        Source(s) of information:family, Care Everywhere, Surescripts refill report, personal medication list        Based on information provided by the above source(s), I have updated the patient's home med list as described below.     Removed   Multiple Vitamins-Minerals (PRESERVISION AREDS 2 PO)     Added None      Adjusted   diphenhydrAMINE-APAP, sleep, (TYLENOL PM EXTRA STRENGTH)  MG tablet  warfarin (COUMADIN) 5 MG tablet     Other notes:   None    Are any of the medications noted above considered a 'high alert' medication? YES      Is the patient on warfarin at home? Yes, the dose is managed by   [facility] Medication Manangement          Please feel free to call me with any questions about this encounter. Thank you.      Katia Dyson, pharmacy technician  [facility]  Phone:  [phone]      Electronically signed by Katia Dyson on 6/20/2025 at 2:03 PM   Note: co-signature by the pharmacist only acknowledges that I have performed a medication review and does not attest to an evaluation of this medication review.        Prior to Admission medications    Medication Sig   metoprolol tartrate (LOPRESSOR) 25 MG tablet Take 0.5 tablets by mouth 2 times daily   atorvastatin (LIPITOR) 40 MG tablet Take 1 tablet by mouth daily   aspirin 81 MG EC tablet Take 1 tablet by mouth daily     clopidogrel (PLAVIX) 75 MG tablet Take 1 tablet by mouth daily   nitroGLYCERIN (NITROSTAT) 0.4 MG SL tablet Place 1 tablet under the tongue every 5 minutes as needed for Chest pain up to max of 3 total 
Physical Therapy  DATE: 2025    NAME: Quique Wray  MRN: [id number]   : 1945    Patient not seen this date for Physical Therapy due to:      [x] Cancel by RN or physician due to: (GWEN Allen currently cleaning patient up and reported patient would be going to IR any minute for a procedure. Will check back as able.)    [] Hemodialysis    [] Critical Lab Value Level     [] Blood transfusion in progress    [] Acute or unstable cardiovascular status   _MAP < 55 or more than >115  _HR < 40 or > 130    [] Acute or unstable pulmonary status   -FiO2 > 60%   _RR < 5 or >40    _O2 sats < 85%    [] Strict Bedrest    [] Off Unit for surgery or procedure    [] Off Unit for testing       [] Pending imaging to R/O fracture    [] Refusal by Patient      [] Other      [] PT being discontinued at this time. Patient independent. No further needs.     [] PT being discontinued at this time as the patient has been transferred to hospice care. No further needs.      Harini Martinez, PTA     
Physical Therapy  DATE: 2025    NAME: Quique Wray  MRN: [id number]   : 1945    Patient not seen this date for Physical Therapy due to:      [] Cancel by RN or physician due to:    [] Hemodialysis    [x] Critical Lab Value Level, Pt not medically appropriate hemoglobin 6.0 g/dL. Will continue to check back as able.    [] Blood transfusion in progress    [] Acute or unstable cardiovascular status   _MAP < 55 or more than >115  _HR < 40 or > 130    [] Acute or unstable pulmonary status   -FiO2 > 60%   _RR < 5 or >40    _O2 sats < 85%    [] Strict Bedrest    [] Off Unit for surgery or procedure    [] Off Unit for testing       [] Pending imaging to R/O fracture    [] Refusal by Patient      [] Other      [] PT being discontinued at this time. Patient independent. No further needs.     [] PT being discontinued at this time as the patient has been transferred to hospice care. No further needs.      Cinthia Crenshaw, PT      
Physical Therapy  DATE: 2025    NAME: Quique Wray  MRN: [id number]   : 1945    Patient not seen this date for Physical Therapy due to:      [] Cancel by RN or physician due to:    [] Hemodialysis    [] Critical Lab Value Level     [] Blood transfusion in progress    [] Acute or unstable cardiovascular status   _MAP < 55 or more than >115  _HR < 40 or > 130    [] Acute or unstable pulmonary status   -FiO2 > 60%   _RR < 5 or >40    _O2 sats < 85%    [] Strict Bedrest    [] Off Unit for surgery or procedure    [] Off Unit for testing       [] Pending imaging to R/O fracture    [x] Refusal by Patient  Patient is resting up prior to surgery.  Therapist attempted Education multiple times however patient continued to decline.  PT will continue to follow to promote return to PLO    [] Other      [] PT being discontinued at this time. Patient independent. No further needs.     [] PT being discontinued at this time as the patient has been transferred to hospice care. No further needs.      Rosa Gilbert, PTA     
Physical Therapy  DATE: 2025    NAME: Quique Wray  MRN: [id number]   : 1945    Patient not seen this date for Physical Therapy due to:      [] Cancel by RN or physician due to:    [] Hemodialysis    [] Critical Lab Value Level     [] Blood transfusion in progress    [] Acute or unstable cardiovascular status   _MAP < 55 or more than >115  _HR < 40 or > 130    [] Acute or unstable pulmonary status   -FiO2 > 60%   _RR < 5 or >40    _O2 sats < 85%    [] Strict Bedrest    [] Off Unit for surgery or procedure    [] Off Unit for testing       [] Pending imaging to R/O fracture    [] Refusal by Patient      [x] Other GWEN Cervantes reports they are calling report and patient about to leave unit for testing/procedure. Wife eager for pt to be up and out of bed.  PT continue to follow and will check back as able.    [] PT being discontinued at this time. Patient independent. No further needs.     [] PT being discontinued at this time as the patient has been transferred to hospice care. No further needs.      Elizabeth Granda, PT     
Physical Therapy  Facility/Department: [facility] ICU    Physical Therapy Reassessment s/p laparoscopic cholecystectomy     Name: Quique Wray  : 1945  MRN: [id number]  Date of Service: 2025    Discharge Recommendations:  Pt currently functioning below baseline.  Recommend daily therapy at time of discharge to maximize long term outcomes and prevent re-admission. Please refer to AM-PAC score for current level of function.           Patient Diagnosis(es): The primary encounter diagnosis was Cardiomyopathy, unspecified type (HCC). A diagnosis of Cholangiectasis was also pertinent to this visit.  Past Medical History:  has a past medical history of Atrial fibrillation (HCC), Blind right eye, Cerebral artery occlusion with cerebral infarction (HCC), Degenerative disc disease, lumbar, Depression, Endocarditis, Hypertension, Kidney stone, Pacemaker, and Spinal stenosis.  Past Surgical History:  has a past surgical history that includes Cataract removal (Left); Lithotripsy; Insertable Cardiac Monitor (N/A, 2021); Knee arthroscopy (Right, ); Pacemaker insertion (Left, 2022); Total hip arthroplasty (Right, ); and Cholecystectomy, laparoscopic (N/A, 2025).    Assessment  Assessment: Reassessment today s/p laparoscopic cholecystectomy . Pt. able to transfer to chair, Mod-Max A x2 with Clari Mendoza. RN watching art. line L UE during transfer. Will continue to progress this admission.    Special instructions for next treatment: CoTx; Clari Mendoza>RW as able  Therapy Prognosis: Good  Decision Making: High Complexity  Requires PT Follow-Up: Yes  Activity Tolerance  Activity Tolerance: Patient limited by endurance;Patient limited by pain    Plan  Physical Therapy Plan  General Plan:  (1-2x/day; 7 days/wk.)  Current Treatment Recommendations: Strengthening, Balance training, Functional mobility training, Transfer training, ADL/Self-care training, Gait training, Endurance training, Safety education & 
Physical Therapy  Facility/Department: [facility] ICU  Daily Treatment Note  NAME: Quique Wray  : 1945  MRN: [id number]    Date of Service: 2025    Discharge Recommendations:   Patient would benefit from continued therapy after discharge      Pt currently functioning below baseline.  Recommend daily therapy at time of discharge to maximize long term outcomes and prevent re-admission. Please refer to AM-PAC score for current level of function.     Patient Diagnosis(es): The primary encounter diagnosis was Cardiomyopathy, unspecified type (HCC). Diagnoses of Cholangiectasis and Anemia, unspecified type were also pertinent to this visit.    Assessment  Assessment: Patient increased assistance with STS and gait to recliner with MOD-MAX x 2 A. Patient easily fatigued with 2 STS from recliner with standing 1 mins each time. Patient resting in recliner at then end of the session. Paitent is below his baseline and would benefit from continued skilled PT services.  Activity Tolerance: Patient limited by endurance;Patient limited by pain    Plan  Physical Therapy Plan  Current Treatment Recommendations: Strengthening;Balance training;Functional mobility training;Transfer training;ADL/Self-care training;Gait training;Endurance training;Safety education & training;Home exercise program;Therapeutic activities    Restrictions  Restrictions/Precautions  Restrictions/Precautions: Fall Risk, General Precautions, Bed Alarm  Activity Level: Up with Assist  Required Braces or Orthoses?: No  Position Activity Restriction  Other Position/Activity Restrictions: Telemetry, sandoval catheter, O2 via NC, R IJ CVC, s/p laparoscopic abd sx 25     Subjective   Subjective  Subjective: Patient up in bed supine upon therapists arrival.  Patient agreeable to PT treatment. GWEN Allen stated patient is appropriate medically for PT treatment.    Objective  Bed Mobility Training  Bed Mobility Training: Yes   Overall Level of Assistance: 
Physical Therapy  Facility/Department: [facility]   Physical Therapy Initial Evaluation    Patient Name: Quique Wray        MRN: [id number]    : 1945    Date of Service: 2025    No chief complaint on file.      Per H&P: Quique Wray is a 80 y.o. Non- / non  male who presents with No chief complaint on file. and is admitted to the hospital for the management of Acute cholecystitis.     This is a very pleasant 80-year-old male who presents to [facility] emergency department for complaints of abdominal pain along with shortness of breath.  Patient states abdominal pain and shortness of breath in the right upper and mid abdominal area have been going on for the past couple of days.  Patient states the pain originally started in his upper abdomen and then started to go down the right side of his abdomen.  Patient states that shortness of breath and pain is worse when he moves around.  Patient currently denying any nausea, vomiting, diarrhea, constipation, rectal bleeding or dark stools.  Patient states that he did receive blood a few weeks ago.  Patient does have atrial fibrillation and is on Coumadin and does have a pacemaker.  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  Patient also has degenerative disc disease history of endocarditis and hypertension.  Patient also endorses having no appetite and states that food just does not taste good for the last 6 months patient states he has lost approximately 50 pounds.  Patient also tells me that he has some troubles swallowing and has to take small bites.  Patient does endorse having some difficulty with urination such as starting his stream if he does not take his Flomax.  Patient also endorses having chronic low back pain.     Past Medical History:  has a past medical history of Atrial fibrillation (HCC), Blind right eye, Cerebral artery occlusion with cerebral infarction (HCC), 
Physical Therapy  Facility/Department: [facility] PROGRESSIVE CARE  Daily Treatment Note  NAME: Quique Wray  : 1945  MRN: [id number]    Date of Service: 2025    Discharge Recommendations:  Patient would benefit from continued therapy after discharge   PT Equipment Recommendations  Equipment Needed: No  Pt currently functioning below baseline.  Recommend daily therapy at time of discharge to maximize long term outcomes and prevent re-admission. Please refer to AM-PAC score for current level of function.     Patient Diagnosis(es): The primary encounter diagnosis was Cardiomyopathy, unspecified type (HCC). Diagnoses of Cholangiectasis and Anemia, unspecified type were also pertinent to this visit.    Assessment  Assessment: Patient progressed his ambulation distance and increase his standing tolerance. Patient reports feeling stronger and motivated. Patient limited by SOB with all activity and generalized deconditioning. Patient is below his baseline and a HIGH FALL RISK, would benefit from continued skilled PT services.  Activity Tolerance: Patient tolerated treatment well;Patient limited by endurance  Equipment Needed: No    Plan  Physical Therapy Plan  Current Treatment Recommendations: Strengthening;Balance training;Functional mobility training;Transfer training;ADL/Self-care training;Gait training;Endurance training;Safety education & training;Home exercise program;Therapeutic activities    Restrictions  Restrictions/Precautions  Restrictions/Precautions: Fall Risk, General Precautions, Bed Alarm  Activity Level: Up with Assist  Required Braces or Orthoses?: No  Position Activity Restriction  Other Position/Activity Restrictions: Telemetry, R IJ CVC, s/p laparoscopic abd sx 25     Subjective   Subjective  Subjective: Patient up in bed supine upon therapists arrival.  Patient agreeable to PT treatment. GWEN Montgomery stated patient is appropriate medically for PT treatment.    Objective  Bed Mobility 
Physical Therapy  Facility/Department: [facility] PROGRESSIVE CARE  Daily Treatment Note  NAME: Quique Wray  : 1945  MRN: [id number]    Date of Service: 2025    Discharge Recommendations:  Patient would benefit from continued therapy after discharge   PT Equipment Recommendations  Equipment Needed: No  Pt currently functioning below baseline.  Recommend daily therapy at time of discharge to maximize long term outcomes and prevent re-admission. Please refer to AM-PAC score for current level of function.     Patient Diagnosis(es): The primary encounter diagnosis was Cardiomyopathy, unspecified type (HCC). Diagnoses of Cholangiectasis and Anemia, unspecified type were also pertinent to this visit.    Assessment  Assessment: Patient demo'd improve STS transfers today and tolerated exercises post gait and standing. Patient continues to demo decreased aerobic capacity with minimal activity. Would benefit from continued skilled PT services.  Activity Tolerance: Patient tolerated treatment well  Equipment Needed: No    Plan  Physical Therapy Plan  Current Treatment Recommendations: Strengthening;Balance training;Functional mobility training;Transfer training;ADL/Self-care training;Gait training;Endurance training;Safety education & training;Home exercise program;Therapeutic activities    Restrictions  Restrictions/Precautions  Restrictions/Precautions: Fall Risk, General Precautions, Bed Alarm  Activity Level: Up with Assist  Required Braces or Orthoses?: No  Position Activity Restriction  Other Position/Activity Restrictions: Telemetry, R IJ CVC, s/p laparoscopic abd sx 25     Subjective   Subjective  Subjective: Patient up in bed supine upon therapists arrival.  Patient agreeable to PT treatment. GWEN Ba stated patient is appropriate medically for PT treatment.    Objective  Bed Mobility Training  Bed Mobility Training: Yes  Overall Level of Assistance: Substantial/Maximal assistance;2 Person 
Physical Therapy  Facility/Department: [facility] ICU  Rehabilitation Physical Therapy Treatment Note    NAME: Quique Wray  : 1945 (80 y.o.)  MRN: [id number]  CODE STATUS: Full Code    Date of Service: 25     Pt currently functioning below baseline.  Recommend daily therapy at time of discharge to maximize long term outcomes and prevent re-admission. Please refer to AM-PAC score for current level of function.     Restrictions:  Restrictions/Precautions: Fall Risk, General Precautions, Bed Alarm  Position Activity Restriction  Other Position/Activity Restrictions: Telemetry, sandoval catheter, O2 via NC, R IJ CVC, s/p laparoscopic abd sx 25     SUBJECTIVE  Subjective: Patient up in bed supine upon therapists arrival.  Patient agreeable to PT treatment. RN states patient is appropriate medically for PT treatment.       OBJECTIVE  Cognition  Overall Cognitive Status: Exceptions  Arousal/Alertness: Appears intact  Following Commands: Follows one step commands consistently  Attention Span: Appears intact  Safety Judgement: Decreased awareness of need for assistance;Decreased awareness of need for safety  Problem Solving: Assistance required to correct errors made;Decreased awareness of errors  Insights: Decreased awareness of deficits  Initiation: Requires cues for some  Sequencing: Requires cues for some  Orientation  Overall Orientation Status: Within Functional Limits  Orientation Level: Oriented to place;Oriented to time;Oriented to person;Oriented to situation    Functional Mobility  Bed Mobility  Overall Assistance Level: Moderate Assistance;Requires x 2 Assistance  Additional Factors: Set-up;Verbal cues;Increased time to complete;Head of bed raised;With handrails  Roll Left  Assistance Level: Moderate assistance;Requires x 2 assistance  Roll Right  Assistance Level: Moderate assistance;Requires x 2 assistance  Skilled Clinical Factors: vc's for hand placement and progression techniques for log roll 
Physical Therapy  Facility/Department: [facility] PROGRESSIVE CARE  Daily Treatment Note  NAME: Quique Wray  : 1945  MRN: [id number]    Date of Service: 2025    Discharge Recommendations:  Patient would benefit from continued therapy after discharge   PT Equipment Recommendations  Equipment Needed: No  Pt currently functioning below baseline.  Recommend daily therapy at time of discharge to maximize long term outcomes and prevent re-admission. Please refer to AM-PAC score for current level of function.     Patient Diagnosis(es): The primary encounter diagnosis was Cardiomyopathy, unspecified type (HCC). Diagnoses of Cholangiectasis and Anemia, unspecified type were also pertinent to this visit.    Assessment  Assessment: Patient now in progressive unit and continues to require 2 assist for all mobility due to weakness and fatigued. Patient is a high fall risk and is below his baseline. Patient would benefit from continued skilled PT services with focus on stengthening, bed mobility, activity tolerance, safety awareness and transfers with RW.  Activity Tolerance: Patient tolerated treatment well  Equipment Needed: No    Plan  Physical Therapy Plan  Current Treatment Recommendations: Strengthening;Balance training;Functional mobility training;Transfer training;ADL/Self-care training;Gait training;Endurance training;Safety education & training;Home exercise program;Therapeutic activities    Restrictions  Restrictions/Precautions  Restrictions/Precautions: Fall Risk, General Precautions, Bed Alarm  Activity Level: Up with Assist  Required Braces or Orthoses?: No  Position Activity Restriction  Other Position/Activity Restrictions: Telemetry, sandoval catheter, O2 via NC, R IJ CVC, s/p laparoscopic abd sx 25; Rt side Chest tube    Subjective   Subjective  Subjective: Patient up in bed supine upon therapists arrival.  Patient agreeable to PT treatment. GWEN Hummel stated patient is appropriate medically for PT 
Pt alert and oriented X4, room air and no bowel movement during shift. He was given PRN benadryl, melatonin and tylenol to help with sleep. One time dose of fentanyl ordered for reported leg cramping, ROM, repositioning as well as warm blankets were provided to help with discomfort. Heparin drip infusing, most recent AntiXA was non therapeutic, dose was increased from 10 to  12 units/kg/hr and 2000 units bolus. Next lab draw is scheduled for 0500.Output in sandoval was 1450mL.    Patient having pain on their lower legs and rates it a 6. Pain interventions includefrequent position changes. Patients goal for pain relief is 1. The need for pain and symptom management will be considered in the discharge planning process to ensure patients comfort.      Problem: Chronic Conditions and Co-morbidities  Goal: Patient's chronic conditions and co-morbidity symptoms are monitored and maintained or improved  Outcome: Progressing  Care Plan - Patient's Chronic Conditions and Co-Morbidity Symptoms are Monitored and Maintained or Improved:   Monitor and assess patient's chronic conditions and comorbid symptoms for stability, deterioration, or improvement   Collaborate with multidisciplinary team to address chronic and comorbid conditions and prevent exacerbation or deterioration   Update acute care plan with appropriate goals if chronic or comorbid symptoms are exacerbated and prevent overall improvement and discharge      Problem: Discharge Planning  Goal: Discharge to home or other facility with appropriate resources  Outcome: Progressing  Discharge to home or other facility with appropriate resources:   Identify barriers to discharge with patient and caregiver   Arrange for needed discharge resources and transportation as appropriate   Identify discharge learning needs (meds, wound care, etc)   Refer to discharge planning if patient needs post-hospital services based on physician order or complex needs related to functional 
Pt belongings taken to room 1104, report given to Danita HIDALGO   
Pt discharged to [facility] bed by [facility] ambulance on room air with belongings, pt family to follow ambulance, report called to Rachelle pt discharged in stable condition   
Pt down for MRCP. Care ongoing.   
Pt left for EGD  
Pt remained calm and cooperative tonight and on RA. Wife at bedside. External cath draining. Metoprolol held tonight per NP orders and d/t BP and HR not meeting parameters. Heparin gtt ran at 12 units/kg/hr most of shift, until daily anti-xa came back at 0.29 this AM at 0600. Bolus administered per protocol and rate increased by 2 units. Heparin gtt currently running at 14 units. Anti-xa redraw at 1230. Will continue with care plan.  
Pt to surgery by bed with blood transfusion infusing, family at side, update given to surgery RN  
Pt transferred via bed from ICU into [facility].   
Pt will be discharged to [facility] around 1330 today, pt and family aware   
Pulmonary Critical Care Progress Note    Patient seen for the follow up of Acute cholecystitis     Subjective:    He had no significant output from pigtail catheter overnight.  CT chest abdomen pelvis was done.  He is on heparin drip.  Did previously have thoracentesis by  mL bloody fluid obtained pigtail inserted now has 600 mL of blood-tinged fluid now not increased from yesterday minimal output..  He sitting in the chair.  He is on room air..  .  He has decreased urine output.  He has no chest pain.  Abdominal pain improved.  Hemoglobin remained stable.      Examination:    Vitals: BP (!) 111/49   Pulse 71   Temp 98.1 °F (36.7 °C) (Oral)   Resp 16   Ht 1.727 m (5' 8\")   Wt 93.4 kg (205 lb 14.4 oz)   SpO2 93%   BMI 31.31 kg/m²   SpO2  Av.5 %  Min: 93 %  Max: 96 %  General appearance: alert and cooperative with exam jaundiced  Neck: No JVD  Lungs: Decreased breath sound  Heart: regular rate and rhythm, S1, S2 normal, no gallop  Abdomen: Soft, non tender, + BS mild ecchymosis right flank worsened slightly distended  Extremities: no cyanosis or clubbing. No significant edema    LABs:    CBC:   Recent Labs     25  0548 25  1001 25  0957 25  0532   WBC 6.9  --   --  7.0   HGB 8.6*   < > 9.1* 8.9*   HCT 26.6*   < > 27.7* 26.8*     --   --  154    < > = values in this interval not displayed.     BMP:   Recent Labs     25  0538 25  0532    138   K 3.3* 3.9   CO2 27 27   BUN 30* 25*   CREATININE 1.6* 1.3*   LABGLOM 43* 58*   GLUCOSE 114 129*     PT/INR:   Recent Labs     25  0538 25  0532   PROTIME 17.0* 16.1*   INR 1.3 1.2     APTT:  Recent Labs     25  2157 25  0532   APTT 70.6* 106.6*     LIVER PROFILE:  No results for input(s): \"AST\", \"ALT\", \"LABALBU\" in the last 72 hours.      Radiology:  CT chest abdomen pelvis   1. Right lower lobe atelectasis and improving small right pleural  effusion/pneumothorax  2. Right thoracostomy 
Pulmonary Critical Care Progress Note    Patient seen for the follow up of Acute cholecystitis     Subjective:    He had persistent shock yesterday Levophed and I ordered Ct abdomen and pelvis ; I called and discussed with Dr Mariscal. I ordered FFP 2 units .She got 1 more unit blood  He had decreased UO Lasix IV given. Still on O2 2liters    Examination:    Vitals: BP (!) 101/49   Pulse 86   Temp 98 °F (36.7 °C) (Oral)   Resp 19   Ht 1.727 m (5' 8\")   Wt 92.4 kg (203 lb 11.3 oz)   SpO2 97%   BMI 30.97 kg/m²   SpO2  Av.9 %  Min: 91 %  Max: 100 %  General appearance: alert and cooperative with exam jaundiced  Neck: No JVD  Lungs: Decreased breath sound  Heart: regular rate and rhythm, S1, S2 normal, no gallop  Abdomen: Soft, non tender, + BS mild ecchymosis right flank slightly distended  Extremities: no cyanosis or clubbing. No significant edema    LABs:    CBC:   Recent Labs     25  1308 25  1748 25  2054 25  0315   WBC 6.8  --   --  11.5*   HGB 8.7*   < > 8.0* 7.3*   HCT 27.9*   < > 24.7* 21.9*     --   --  253    < > = values in this interval not displayed.     BMP:   Recent Labs     25  1335 25  1831 25  0315     --  136   K 4.7  --  5.0   CO2 28  --  22   BUN 21  --  28*   CREATININE 1.0 1.1 1.5*   LABGLOM 81  --  49*   GLUCOSE 158*  --  231*     PT/INR:   Recent Labs     25  1321 25  0315   PROTIME 15.7* 17.4*   INR 1.2 1.4     APTT:  Recent Labs     25  1308 25  0315   APTT 44.2* 35.8*     LIVER PROFILE:  Recent Labs     25  1815   AST 94*   ALT 49       Radiology:  CXR   1. Cardiomegaly with mild vascular congestion.  2. Persistent elevation right hemidiaphragm with likely right basilar  atelectasis.      CT abdomen pelvis    1. No evidence of active colonic bleed.  2. Mildly thickened and edematous appearance of the 1st and 2nd portions of  the duodenum. Findings may represent duodenitis.  3. Small volume 
Pulmonary Critical Care Progress Note    Patient seen for the follow up of Acute cholecystitis     Subjective:    He had pigtail catheter removed.  Ambulated to chair.  Tolerates oral intake.  He is on room air..  .  He has decreased urine output.  He has no chest pain.  Abdominal pain improved.  Hemoglobin remained stable.      Examination:    Vitals: /62   Pulse 63   Temp 98.4 °F (36.9 °C) (Oral)   Resp 16   Ht 1.727 m (5' 8\")   Wt 93.3 kg (205 lb 11 oz)   SpO2 93%   BMI 31.27 kg/m²   SpO2  Av %  Min: 93 %  Max: 96 %  General appearance: alert and cooperative with exam jaundiced  Neck: No JVD  Lungs: Decreased breath sound  Heart: regular rate and rhythm, S1, S2 normal, no gallop  Abdomen: Soft, non tender, + BS mild ecchymosis right flank worsened slightly distended  Extremities: no cyanosis or clubbing. No significant edema    LABs:    CBC:   Recent Labs     25  0957 25  0532   WBC  --  7.0   HGB 9.1* 8.9*   HCT 27.7* 26.8*   PLT  --  154     BMP:   Recent Labs     25  0532 25  0455    137   K 3.9 3.8   CO2 27 28   BUN 25* 22   CREATININE 1.3* 1.1   LABGLOM 58* 68   GLUCOSE 129* 139*     PT/INR:   Recent Labs     25  0532 25  0455   PROTIME 16.1* 15.5*   INR 1.2 1.2     APTT:  Recent Labs     25  2157 25  0532   APTT 70.6* 106.6*     LIVER PROFILE:  No results for input(s): \"AST\", \"ALT\", \"LABALBU\" in the last 72 hours.      Radiology:  Chest x-ray   1. Interval removal of the right-sided pigtail chest tube. No appreciable  right pneumothorax.  2. Trace residual right pleural effusion.        CT chest abdomen pelvis   1. Right lower lobe atelectasis and improving small right pleural  effusion/pneumothorax  2. Right thoracostomy tube.  3. No acute intra-abdominal or pelvic process.  4. New probable soft tissue contusion versus cellulitis involving the right  lateral abdominal wall subcutaneous tissues.  5. Stable irregularity of the 
Pulmonary Critical Care Progress Note    Patient seen for the follow up of Acute cholecystitis     Subjective:    He sitting in chair.  Tolerates oral intake.  He is on room air..  .  He has decreased urine output.  He has no chest pain.  Abdominal pain improved.  Hemoglobin remained stable.      Examination:    Vitals: BP (!) 110/54   Pulse 67   Temp 97.7 °F (36.5 °C) (Oral)   Resp 16   Ht 1.727 m (5' 8\")   Wt 93.3 kg (205 lb 11 oz)   SpO2 93%   BMI 31.27 kg/m²   SpO2  Av.7 %  Min: 91 %  Max: 95 %  General appearance: alert and cooperative with exam jaundiced  Neck: No JVD  Lungs: Decreased breath sound  Heart: regular rate and rhythm, S1, S2 normal, no gallop  Abdomen: Soft, non tender, + BS mild ecchymosis right flank worsened slightly distended  Extremities: no cyanosis or clubbing. No significant edema    LABs:    CBC:   Recent Labs     25  0532 25  0526   WBC 7.0 6.6   HGB 8.9* 8.7*   HCT 26.8* 27.0*    174     BMP:   Recent Labs     25  0455 25  0526    137   K 3.8 3.6*   CO2 28 28   BUN 22 22   CREATININE 1.1 1.0   LABGLOM 68 75   GLUCOSE 139* 132*     PT/INR:   Recent Labs     25  0455 25  0526   PROTIME 15.5* 15.4*   INR 1.2 1.2     APTT:  Recent Labs     25  2157 25  0532   APTT 70.6* 106.6*     LIVER PROFILE:  No results for input(s): \"AST\", \"ALT\", \"LABALBU\" in the last 72 hours.      Radiology:  Chest x-ray   1. Interval removal of the right-sided pigtail chest tube. No appreciable  right pneumothorax.  2. Trace residual right pleural effusion.        CT chest abdomen pelvis   1. Right lower lobe atelectasis and improving small right pleural  effusion/pneumothorax  2. Right thoracostomy tube.  3. No acute intra-abdominal or pelvic process.  4. New probable soft tissue contusion versus cellulitis involving the right  lateral abdominal wall subcutaneous tissues.  5. Stable irregularity of the inferior endplate of L2 which 
Pulmonary Critical Care Progress Note    Patient seen for the follow up of Acute cholecystitis     Subjective:    History of present received  1 more unit packed RBC last evening total of 7 units for an hour..  He tolerated oral intake but has cough after liquid intake.  He has decreased urine output.  He has no chest pain.  Abdominal pain improved.  He is on oxygen at 2 L weaned down from 4 L nasal.  Passes gas but does not have a bowel movement.    Examination:    Vitals: BP (!) 93/54   Pulse 64   Temp 97.8 °F (36.6 °C) (Axillary)   Resp 21   Ht 1.727 m (5' 8\")   Wt 103.5 kg (228 lb 2.8 oz)   SpO2 98%   BMI 34.69 kg/m²   SpO2  Av.5 %  Min: 94 %  Max: 98 %  General appearance: alert and cooperative with exam jaundiced  Neck: No JVD  Lungs: Decreased breath sound  Heart: regular rate and rhythm, S1, S2 normal, no gallop  Abdomen: Soft, non tender, + BS mild ecchymosis right flank worsened slightly distended  Extremities: no cyanosis or clubbing. No significant edema    LABs:    CBC:   Recent Labs     25  1200 25  1420 25  0200 25  0600   WBC 8.9  --   --  7.1   HGB 6.0*   < > 7.8* 7.8*   HCT 18.4*   < > 23.6* 23.7*     --   --  119*    < > = values in this interval not displayed.     BMP:   Recent Labs     25  0511 25  0600    138   K 4.7 4.2   CO2 25 22   BUN 36* 40*   CREATININE 2.5* 3.0*   LABGLOM 26* 21*   GLUCOSE 150* 111     PT/INR:   Recent Labs     25  0600   PROTIME 16.2* 16.1*   INR 1.3 1.2     APTT:  Recent Labs     25  0500 25   APTT 33.5 32.0     LIVER PROFILE:  Recent Labs     25  1815 25  0600   AST 94* 41   ALT 49 15       Radiology:  Chest x-ray   1.  Hazy graded density at the right chest suggesting layering moderate  effusion.  Opacities in the lungs likely a combination of pulmonary edema and  atelectasis.     2.  Right jugular line with the tip over the SVC.  No pneumothorax 
Pulmonary Critical Care Progress Note    Patient seen for the follow up of Acute cholecystitis     Subjective:    I was contacted yesterday for persistent decrease in hemoglobin ordered 1 more unit packed RBC.  He was weaned off pressors yesterday evening.  He is clear liquids..He has no chest pain.  Abdominal pain improved.  He is on oxygen at 2 L weaned down from 4 L nasal    Examination:    Vitals: BP (!) 111/59   Pulse 81   Temp 98.1 °F (36.7 °C) (Oral)   Resp 20   Ht 1.727 m (5' 8\")   Wt 92.4 kg (203 lb 11.3 oz)   SpO2 97%   BMI 30.97 kg/m²   SpO2  Av %  Min: 93 %  Max: 100 %  General appearance: alert and cooperative with exam jaundiced  Neck: No JVD  Lungs: Decreased breath sound  Heart: regular rate and rhythm, S1, S2 normal, no gallop  Abdomen: Soft, non tender, + BS mild ecchymosis right flank worsened slightly distended  Extremities: no cyanosis or clubbing. No significant edema    LABs:    CBC:   Recent Labs     25  03125  1200 25  1420 25  0500 25  1226   WBC 11.5* 8.9  --   --   --    HGB 7.3* 6.0*   < > 7.4* 6.9*   HCT 21.9* 18.4*   < > 22.5* 20.9*    162  --   --   --     < > = values in this interval not displayed.     BMP:   Recent Labs     25  0511    137   K 5.0 4.7   CO2  25   BUN 28* 36*   CREATININE 1.5* 2.5*   LABGLOM 49* 26*   GLUCOSE 231* 150*     PT/INR:   Recent Labs     25  1420 25  0500   PROTIME 17.5* 16.4*   INR 1.4 1.3     APTT:  Recent Labs     25  0315 25  0500   APTT 35.8* 33.5     LIVER PROFILE:  Recent Labs     25  1815   AST 94*   ALT 49       Radiology:  Chest x-ray   1. Findings suggestive of congestive heart failure with small bilateral  pleural effusions, more prominent in the interval.  2. Tubing projecting over the right neck and superior right chest, question  new central line.    CXR   1. Cardiomegaly with mild vascular congestion.  2. Persistent 
Pulmonary Critical Care Progress Note    Patient seen for the follow up of Acute cholecystitis     Subjective:    Thoracentesis by  mL bloody fluid obtained pigtail inserted now has 600 mL of blood-tinged fluid now not increased from yesterday minimal output..  He sitting in the chair.  He is on room air..  .  He has decreased urine output.  He has no chest pain.  Abdominal pain improved.  Hemoglobin remained stable.      Examination:    Vitals: BP (!) 119/58   Pulse 80   Temp 97.5 °F (36.4 °C) (Axillary)   Resp 18   Ht 1.727 m (5' 8\")   Wt 93.7 kg (206 lb 9.6 oz)   SpO2 93%   BMI 31.41 kg/m²   SpO2  Av.3 %  Min: 93 %  Max: 98 %  General appearance: alert and cooperative with exam jaundiced  Neck: No JVD  Lungs: Decreased breath sound  Heart: regular rate and rhythm, S1, S2 normal, no gallop  Abdomen: Soft, non tender, + BS mild ecchymosis right flank worsened slightly distended  Extremities: no cyanosis or clubbing. No significant edema    LABs:    CBC:   Recent Labs     25  0548 25  1001 25  2223 25  0957   WBC 6.9  --   --   --    HGB 8.6*   < > 8.9* 9.1*   HCT 26.6*   < > 27.3* 27.7*     --   --   --     < > = values in this interval not displayed.     BMP:   Recent Labs     25  0548 25  0538    143   K 3.5* 3.3*   CO2 25 27   BUN 38* 30*   CREATININE 2.5* 1.6*   LABGLOM 26* 43*   GLUCOSE 126* 114     PT/INR:   Recent Labs     25  0548 25  0538   PROTIME 16.7* 17.0*   INR 1.3 1.3     APTT:  Recent Labs     25  1632 25  0957   APTT 32.8 33.3     LIVER PROFILE:  No results for input(s): \"AST\", \"ALT\", \"LABALBU\" in the last 72 hours.      Radiology:  Chest x-ray   Interval decrease in right-sided pleural effusion.     Chest x-ray     1.  Hazy graded density at the right chest suggesting layering moderate  effusion.  Opacities in the lungs likely a combination of pulmonary edema and  atelectasis.     2.  Right jugular 
Pulmonary Critical Care Progress Note    Patient seen for the follow up of Acute cholecystitis     Subjective:    Thoracentesis by  mL bloody fluid obtained pigtail inserted now has 600 mL of blood-tinged fluid now..  He still off pressors.  He is weaned off oxygen..  .  He has decreased urine output.  He has no chest pain.  Abdominal pain improved.  Hemoglobin remained stable.      Examination:    Vitals: BP (!) 112/57   Pulse 67   Temp 97.4 °F (36.3 °C) (Axillary)   Resp 24   Ht 1.727 m (5' 8\")   Wt 103.5 kg (228 lb 2.8 oz)   SpO2 94%   BMI 34.69 kg/m²   SpO2  Av.3 %  Min: 92 %  Max: 97 %  General appearance: alert and cooperative with exam jaundiced  Neck: No JVD  Lungs: Decreased breath sound  Heart: regular rate and rhythm, S1, S2 normal, no gallop  Abdomen: Soft, non tender, + BS mild ecchymosis right flank worsened slightly distended  Extremities: no cyanosis or clubbing. No significant edema    LABs:    CBC:   Recent Labs     25  0600 25  1136 25  0548 25  1001   WBC 7.1  --  6.9  --    HGB 7.8*   < > 8.6* 9.0*   HCT 23.7*   < > 26.6* 27.6*   *  --  169  --     < > = values in this interval not displayed.     BMP:   Recent Labs     25  0635 25  0548    142   K 4.0 3.5*   CO2 23 25   BUN 41* 38*   CREATININE 3.1* 2.5*   LABGLOM 20* 26*   GLUCOSE 97 126*     PT/INR:   Recent Labs     25  0635 25  0548   PROTIME 16.2* 16.7*   INR 1.3 1.3     APTT:  Recent Labs     25  1047   APTT 32.0 31.6     LIVER PROFILE:  Recent Labs     25  0600   AST 41   ALT 15       Radiology:  Chest x-ray   Interval decrease in right-sided pleural effusion.     Chest x-ray     1.  Hazy graded density at the right chest suggesting layering moderate  effusion.  Opacities in the lungs likely a combination of pulmonary edema and  atelectasis.     2.  Right jugular line with the tip over the SVC.  No pneumothorax 
Pulmonary Critical Care Progress Note    Patient seen for the follow up of Acute cholecystitis     Subjective:    Thoracentesis by  mL bloody fluid obtained.  He still off pressors.  He is weaned off oxygen..  He had swallow study done.  He has decreased urine output.  He has no chest pain.  Abdominal pain improved.   Examination:    Vitals: BP (!) 120/50   Pulse 70   Temp 97.6 °F (36.4 °C) (Oral)   Resp 23   Ht 1.727 m (5' 8\")   Wt 103.5 kg (228 lb 2.8 oz)   SpO2 97%   BMI 34.69 kg/m²   SpO2  Av %  Min: 94 %  Max: 100 %  General appearance: alert and cooperative with exam jaundiced  Neck: No JVD  Lungs: Decreased breath sound  Heart: regular rate and rhythm, S1, S2 normal, no gallop  Abdomen: Soft, non tender, + BS mild ecchymosis right flank worsened slightly distended  Extremities: no cyanosis or clubbing. No significant edema    LABs:    CBC:   Recent Labs     25  0625  1136 25  2247 25  1045   WBC 7.1  --   --   --    HGB 7.8*   < > 8.1* 8.4*   HCT 23.7*   < > 24.7* 26.3*   *  --   --   --     < > = values in this interval not displayed.     BMP:   Recent Labs     25  0625  0635    140   K 4.2 4.0   CO2 22 23   BUN 40* 41*   CREATININE 3.0* 3.1*   LABGLOM 21* 20*   GLUCOSE 111 97     PT/INR:   Recent Labs     25  0625  0635   PROTIME 16.1* 16.2*   INR 1.2 1.3     APTT:  Recent Labs     25  0500 25   APTT 33.5 32.0     LIVER PROFILE:  Recent Labs     25   AST 41   ALT 15       Radiology:    Chest x-ray   1.  Hazy graded density at the right chest suggesting layering moderate  effusion.  Opacities in the lungs likely a combination of pulmonary edema and  atelectasis.     2.  Right jugular line with the tip over the SVC.  No pneumothorax is  identified.      Chest x-ray   1. Findings suggestive of congestive heart failure with small bilateral  pleural effusions, more prominent in the 
RN called CT twice to ask when they're available for pt and they did not answer. Will call again in 10 minutes.  
Renal Progress Note    Patient :  Quique Wray; 80 y.o. MRN# [id number]  Location:  1104/1104-01  Attending:  Ricardo Felix DO  Admit Date:  6/19/2025   Hospital Day: 9      Subjective:     Patient seen and examined in the ICU, family at beside.     Appetite poor, nothing sounds good to him.   Remains off pressor, fluid balance improving, u/o picking up quite well 4075 past 24 hours   Was given albumin, Bumex 2mg BID and a dose of Diuril yesterday.     Labs today show sodium 142 potassium 3.5 chloride 103 bicarb 25 BUN 38 creatinine 2.5 calcium 8.8 hemoglobin 8.6    History reviewed  Known history of hypertension, coronary artery disease, atrial fibrillation.  Came into the hospital with abdominal pain.  Baseline creatinine 0.9-1.0.  He was found to have acute cholecystitis.  Underwent laparoscopic cholecystectomy on 23 June 2025.  Developed postop anemia with hemoglobin dropping into the 7 range.  Underwent CTA of the abdomen which did not show any overt bleeder.  Subsequently developed acute kidney injury creatinine went up to 1.5 and peaked up to 3.1.  Urine output decreased as well he is in positive fluid balance.  Nephrology consulted for acute kidney injury.  Ultrasound could not visualize right kidney left kidney was unremarkable.  Serological workup negative    Outpatient Medications:     Medications Prior to Admission: metoprolol tartrate (LOPRESSOR) 25 MG tablet, Take 0.5 tablets by mouth 2 times daily  atorvastatin (LIPITOR) 40 MG tablet, Take 1 tablet by mouth daily  aspirin 81 MG EC tablet, Take 1 tablet by mouth daily  clopidogrel (PLAVIX) 75 MG tablet, Take 1 tablet by mouth daily  nitroGLYCERIN (NITROSTAT) 0.4 MG SL tablet, Place 1 tablet under the tongue every 5 minutes as needed for Chest pain up to max of 3 total doses. If no relief after 1 dose, call 911.  warfarin (COUMADIN) 5 MG tablet, Take 0.5 tablets by mouth See Admin Instructions (1/2 Tablet on Saturday, Monday, Wednesday, and 
Renal Progress Note    Patient :  Quique Wray; 80 y.o. MRN# [id number]  Location:  1104/1104-01  Attending:  Ricardo Felix DO  Admit Date:  6/19/2025   Hospital Day: 8      Subjective:     Oral intake being advised.  Currently on clear liquids.  Pa catheter in place.  Urine output fair although in positive balance of at least 7 to 8 L weight has gone up from 94 to 103 kg.  Continues on IV Bumex twice a day.  Creatinine is plateauing around 3.1.  Chest x-ray showing pleural effusions and pulmonary edema.  Did have thoracentesis today 125 mL of fluid was removed from his right chest.  He is postop day 4 after cholecystectomy.  Hemodynamically stable although blood pressure on the softer side continues on ProAmatine.  Off all pressors.  Labs today show sodium 140 potassium 4 chloride 104 bicarb 23 BUN 41 creatinine 3.1 calcium 8.6 hemoglobin 8.4    History reviewed  Known history of hypertension, coronary artery disease, atrial fibrillation.  Came into the hospital with abdominal pain.  Baseline creatinine 0.9-1.0.  He was found to have acute cholecystitis.  Underwent laparoscopic cholecystectomy on 23 June 2025.  Developed postop anemia with hemoglobin dropping into the 7 range.  Underwent CTA of the abdomen which did not show any overt bleeder.  Subsequently developed acute kidney injury creatinine went up to 1.5 and peaked up to 3.1.  Urine output decreased as well he is in positive fluid balance.  Nephrology consulted for acute kidney injury.  Ultrasound could not visualize right kidney left kidney was unremarkable.  Serological workup negative    Outpatient Medications:     Medications Prior to Admission: metoprolol tartrate (LOPRESSOR) 25 MG tablet, Take 0.5 tablets by mouth 2 times daily  atorvastatin (LIPITOR) 40 MG tablet, Take 1 tablet by mouth daily  aspirin 81 MG EC tablet, Take 1 tablet by mouth daily  clopidogrel (PLAVIX) 75 MG tablet, Take 1 tablet by mouth daily  nitroGLYCERIN (NITROSTAT) 0.4 MG 
Speech Language Pathology  [facility] ICU    Date: 6/26/2025  Patient Name: Quique Wray  YOB: 1945   AGE: 80 y.o.  MRN: [id number]        Patient Not Available for MBSS     Due to:  [x] Testing   Patient was seated in radiology room and prepped for MBSS. ST educated pt on purpose of assessment and what to be expected during the study. Pt's nurse practitioner entered room and asked that the test be put on hold and pt remains NPO until an EGD is completed. ST to complete MBSS tomorrow if still recommended by care team.    [] Hemodialysis  [] Cancelled by RN  [] Surgery   [] Intubation/Sedation/Pain Medication  [] Medical instability  [] Refusal  [] Other    Next scheduled treatment:   Completed by: Norma Saleh M.A., CCC-SLP    
Spiritual Health History and Assessment/Progress Note  [facility]    (P) Spiritual/Emotional Needs,  , Anticipatory Grief, Adjustment to illness,      Name: Quique Wray MRN: [id number]    Age: 80 y.o.     Sex: male   Language: English   Ep[employer]opalian: [employer]   Acute cholecystitis     Date: 6/24/2025            Total Time Calculated: (P) 13 min              Spiritual Assessment continued in [facility] ICU        Referral/Consult From: (P) Nurse   Encounter Overview/Reason: (P) Spiritual/Emotional Needs  Service Provided For: (P) Patient and family together    Patient and family engaged readily with . Patient spoke at length about his recent gall bladder problem, his surgery yesterday, and his hope for continued improved health. Family indicated patient has been sick for months and has lost 50 pounds but hoping for this to be a new start. provided a supportive presence, compassionate listening, acknowledgement and validation of their feelings and concerns, encouragement and prayer. Patient and family expressed thanks for  support.    Shanna, Belief, Meaning:   Patient is connected with a shanna tradition or spiritual practice and has beliefs or practices that help with coping during difficult times  Family/Friends have beliefs or practices that help with coping during difficult times      Importance and Influence:  Patient has spiritual/personal beliefs that influence decisions regarding their health  Family/Friends have spiritual/personal beliefs that influence decisions regarding the patient's health    Community:  Patient feels well-supported. Support system includes: Spouse/Partner and Children  Family/Friends feel well-supported. Support system includes: Other: family members    Assessment and Plan of Care:     Patient Interventions include: Facilitated expression of thoughts and feelings, Explored spiritual coping/struggle/distress, Affirmed coping skills/support systems, and 
Spiritual Health History and Assessment/Progress Note  [facility]    (P) Spiritual/Emotional Needs,  , Anticipatory Grief, Adjustment to illness,      Name: Quique Wray MRN: [id number]    Age: 80 y.o.     Sex: male   Language: English   Pentecostalism: Sikhism   Acute cholecystitis     Date: 6/30/2025            Total Time Calculated: (P) 7 min              Spiritual Assessment continued in [facility]        Referral/Consult From: (P) Rounding   Encounter Overview/Reason: (P) Spiritual/Emotional Needs  Service Provided For: (P) Patient and family together    Shanna, Belief, Meaning:   Patient has beliefs or practices that help with coping during difficult times  Family/Friends have beliefs or practices that help with coping during difficult times      Importance and Influence:  Patient has spiritual/personal beliefs that influence decisions regarding their health  Family/Friends have spiritual/personal beliefs that influence decisions regarding the patient's health    Community:  Patient feels well-supported. Support system includes: Spouse/Partner and Children  Family/Friends feel well-supported. Support system includes: Children    Assessment and Plan of Care:     Patient Interventions include: Facilitated expression of thoughts and feelings and Affirmed coping skills/support systems  Family/Friends Interventions include: Facilitated expression of thoughts and feelings and Affirmed coping skills/support systems    Patient Plan of Care: Spiritual Care available upon further referral  Family/Friends Plan of Care: Spiritual Care available upon further referral    Electronically signed by Chaplain Sergio on 6/30/2025 at 4:06 PM   
Spiritual Health History and Assessment/Progress Note  [facility]    (P) Spiritual/Emotional Needs,  , (P) Anticipatory Grief, Adjustment to illness,      Name: Quique Wray MRN: [id number]    Age: 80 y.o.     Sex: male   Language: English   Temple: Judaism   Acute cholecystitis     Date: 6/23/2025            Total Time Calculated: (P) 30 min              Spiritual Assessment began in [facility] ICU        Referral/Consult From: (P) Nurse   Encounter Overview/Reason: (P) Spiritual/Emotional Needs  Service Provided For: (P) Patient and family together    Shanan, Belief, Meaning:   Patient identifies as spiritual  Family/Friends are connected with a shanna tradition or spiritual practice and have beliefs or practices that help with coping during difficult times      Importance and Influence:  Patient has spiritual/personal beliefs that influence decisions regarding their health  Family/Friends have spiritual/personal beliefs that influence decisions regarding the patient's health    Community:  Patient feels well-supported. Support system includes: Spouse/Partner, Children, Friends, and Extended family  Family/Friends are connected with a spiritual community: and feel well-supported. Support system includes: Spouse/Partner, Parent/s, Children, Shanna Community, Friends, and Extended family    Assessment and Plan of Care:     Patient Interventions include: Facilitated expression of thoughts and feelings, Affirmed coping skills/support systems, and Facilitated life review and/ or legacy  Family/Friends Interventions include: Facilitated expression of thoughts and feelings, Explored spiritual coping/struggle/distress, and Affirmed coping skills/support systems    Pt and family are struggling with the potential that this is end-of-life. Pt explored those things he would still like to do in addition to spiritual distress over where his spirit will reside, \"in the ground or in heaven.\"  guided him 
[facility]  Office: [phone]  Abelardo Matias DO, Kavon Rodríguez, DO, Ricardo Felix DO, Mohan Jon, DO, Ann Aviles MD, Anni Anthony MD, Nahum De Santiago MD, Aliza Davies MD,  Hilton Perkins MD, Jacki Oro MD, Pam Aquino MD,  Essie Haq DO, Kary Childs MD, Delmar Aguayo MD, Mack Matias DO, Olinda Torres MD,  Brandon No DO, Jessica Centeno MD, Shirley Alatorre MD, Светлана Browne MD,  Jonnathan Ashby MD, Domo Khan MD, Teddy Montenegro MD, Cole Herrera MD, Joe Kent MD, Sy Hernadez MD, Lane Mcconnell, DO, Chloe Penny MD, Celso Hsu DO, Danny Costello MD, Essie Padgett MD, Mohsin Reza, MD, Kaila Weber MD, Shirley Waterhouse, CNP,  Laura Alatorre, CNP, Lane Mckeon, CNP,  Catherine Jones, EZEKEIL, Keena Savage CNP, Marianna Obrien, CNP, Lesli Farmer, CNP, Albertina Trinidad, CNP, Zakiya Langley, PA-C, Araceli Askew, CNP, Brooke Wei, CNP,  Mayi Borden, CNP, Gracie Padron, CNP, Asad Ahumada, PA-C, Meg Olguin, PA-C, Lesly Davis, CNP,        Maribel Cochran, CNS, Amanda Leblanc, CNP, Kim Concepcion, CNP         [facility]   IN-PATIENT SERVICE   [facility]    Second Visit Note  For more detailed information please refer to the progress note of the day      6/23/2025    4:24 PM    Name:   Quique Wray  MRN:     [id number]     Acct:      [id number]   Room:   [facility]/1104-[facility] Day:  4  Admit Date:  6/19/2025 11:14 AM    PCP:   Vernon Cade MD  Code Status:  Full Code      Pt vitals were reviewed   New labs were reviewed   Patient was seen    I was notified that the patient went to ICU level of care after his procedure. Patient required 3 units of PRBCs while in the OR and had to be started on a levophed drip.     Updated plan :     We will manage care and monitor Hemoglobin every 6 hours. I did notify the critical care doctor that the patient is in ICU and if we need additional assistance we will reach 
Subjective    80-year-old that had a laparoscopic cholecystectomy that was complicated by 1 L intraoperative blood loss.  He had been on anticoagulation prior to surgery.  He did get more blood again yesterday and got vitamin K.  His hemoglobin was stable after transfusion at  7.4.    Objective    Alert and appropriate  Nonicteric  Chest symmetric excursion  Abdomen incisions healing nicely  Extremities full range of motion x 4    Assessment and plan    80-year-old after having a laparoscopic cholecystectomy on June 23 for a gangrenous cholecystitis with nearly 1 L of intraoperative blood loss from a very friable bed.  He has received transfusions and got vitamin K yesterday.  His hemoglobin has been stable since his last transfusion at 7.4.  He needs to get up to a chair.  Okay to have liquids.  Will need physical therapy.  Appreciate all consultants help and support of this patient.      
Subjective    80-year-old that had a laparoscopic cholecystectomy with 1 L of intraoperative blood loss on June 23.  He is postoperative day 3.  He feels more comfortable.  He is still burping but he is passing gas.  He is tolerating his liquids.  He did get out of bed yesterday.  His hemoglobin is stable at 7.8.  Okay to increase his diet to a cardiac diet.  Okay to continue to work with physical therapy.  Wait until his hemoglobin has been stable for 24 hours before resuming anticoagulation.    Objective    Alert and oriented  Not icteric  Chest symmetric excursion  Abdomen incisions clean dry and intact with a large bruise close to his most lateral right sided port.  No fluctuance.  No sign of an infection.  Extremities can move x 4    Assessment and plan    Postoperative day 3 after a laparoscopic cholecystectomy with 1 L of intraoperative blood loss.  He did not get transfused overnight and his hemoglobin is stable at 7.8.  Would like to hold off on anticoagulation until he is at least 24 hours out from his last blood transfusion and with a stable hemoglobin.  Okay for cardiac diet.  Okay to work with physical therapy.  
Surgery RN called back and Dr. Gomez would like pt to have 2 units of blood, order placed in epic   
Surgery called for report, report given to surgery RN, RN said pt is ok to have sips with meds now, surgery is scheduled for 1130 and she will check with anesthia about blood transfusion  
Vitamin K infusing.   
[facility]  Office: [phone]  Abelardo Matias DO, Kavon Rodríguez, DO, Ricardo Felix DO, Mohan Jon, DO, Ann Aviles MD, Anni Anthony MD, Nahum De Santiago MD, Aliza Davies MD,  Hilton Perkins MD, Jacki Oro MD, Pam Aquino MD,  Essie Haq DO, Kary Childs MD, Delmar Aguayo MD, Mack Matias DO, Olinda Torres MD,  Brandon No DO, Jessica Centeno MD, Shirley Alatorre MD, Светлана Browne MD,  Jonnathan Ashby MD, Domo Khan MD, Teddy Montenegro MD, Cole Herrera MD, Joe Kent MD, Sy Hernadez MD, Lane Mcconnell, DO, Chloe Penny MD, Celso Hsu DO, Danny Costello MD, Essie Padgett MD, Mohsin Reza, MD, Kaila Weber MD, Shirley Waterhouse, CNP,  Laura Alatorre, CNP, Lane Mckeon, CNP,  Catherine Jones, EZEKIEL, Keena Savage CNP, Marianna Obrien, CNP, Lesli Farmer, CNP, Albertina Trinidad, CNP, Zakiya Langley, PA-C, Araceli Askew, CNP, Brooke Wei, CNP,  Mayi Borden, CNP, Gracie Padron, CNP, Asad Ahumada, PA-C, Meg Olguin, PA-C, Lesly Davis, CNP,        Maribel Cochran, SERA, Amanda Leblanc, CNP, Kim Concepcion, CNP         [facility]   IN-PATIENT SERVICE   [facility]    Progress Note    6/28/2025    4:19 PM    Name:   Quique Wray  MRN:     [id number]     Acct:      [id number]   Room:   [facility] Day:  9  Admit Date:  6/19/2025 11:14 AM    PCP:   Vernon Cade MD  Code Status:  Full Code    Subjective:     C/C: Abdominal pain    Interval History Status: improved.     Patient continues to improve, currently up to chair working with therapy.  Denies any complaints of chest pain, shortness of breath, nausea or vomiting, fevers or chills    Brief History:     Per prior documentation  \"This is a very pleasant 80-year-old male who presents to [place] emergency department for complaints of abdominal pain along with shortness of breath. Patient states abdominal pain and shortness of breath in the right upper and mid abdominal 
Warfarin Dosing - Pharmacy Consult Note  Consulting Provider: Dr. Felix  Indication:  Atrial Fibrillation  Warfarin Dose prior to admission: 5 mg Sun/Tues/Thurs, 2.5 mg all other days (managed by Gianni/Dileep med nika)     Concurrent anticoagulants/antiplatelets: Heparin Infusion  Significant Drug Interactions: No hospital medication drug interactions  Recent Labs     06/29/25  0957 06/30/25  0532 07/01/25  0455 07/02/25  0526   INR  --  1.2 1.2 1.2   HGB 9.1* 8.9*  --  8.7*   PLT  --  154  --  174     Recent warfarin administrations                     warfarin (COUMADIN) tablet 7.5 mg (mg) 7.5 mg Given 07/01/25 1807                   Date   INR    Dose  7/1         1.2       7.5 mg  7/2         1.2    Assessment/Plan  (Goal INR: 2 - 3)  INR subtherapeutic (just resumed yesterday). Will order boosted dose 10mg today and recheck INR tomorrow.    Active problem list reviewed.  INR orders are placed.  Chart reviewed for pertinent labs, drug/diet interactions, and past doses.  Documentation of patient's clinical condition was reviewed.    Pharmacy Dosing:  Pharmacy will continue to follow.        
[facility]   Speech Language Pathology    Date: 6/27/2025  Patient Name: Quique Wray  YOB: 1945   AGE: 80 y.o.  MRN: [id number]        Patient Not Available for Speech Therapy     Due to:  [] Testing  [] Hemodialysis  [] Cancelled by RN  [] Surgery   [] Intubation/Sedation/Pain Medication  [] Medical instability  [x] Other: Pt is on full liquid diet. MBSS is ordered. Radiology spoke with RN pt unable to consume peaches and cracker, unsure if pt is able to consume barium. Recommend holding on MBSS until pt complete PO intake appropriate for assessment. MBSS can be completed Monday 6/30.    Next scheduled treatment:  6/30  Completed by: Yue Rodriguez M.A., CCC-SLP       
and mid abdominal area have been going on for the past couple of days. Patient states the pain originally started in his upper abdomen and then started to go down the right side of his abdomen. Patient states that shortness of breath and pain is worse when he moves around. Patient currently denying any nausea, vomiting, diarrhea, constipation, rectal bleeding or dark stools. Patient states that he did receive blood a few weeks ago. Patient does have atrial fibrillation and is on Coumadin and does have a pacemaker. 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 Patient also has degenerative disc disease history of endocarditis and hypertension. Patient also endorses having no appetite and states that food just does not taste good for the last 6 months patient states he has lost approximately 50 pounds. Patient also tells me that he has some troubles swallowing and has to take small bites. Patient does endorse having some difficulty with urination such as starting his stream if he does not take his Flomax. Patient also endorses having chronic low back pain. \"    Review of Systems:     Constitutional:  negative for chills, fevers, sweats  Respiratory:  negative for cough, dyspnea on exertion, shortness of breath, wheezing  Cardiovascular:  negative for chest pain, chest pressure/discomfort, lower extremity edema, palpitations  Gastrointestinal:  negative for abdominal pain, constipation, diarrhea, nausea, vomiting  Neurological:  negative for dizziness, headache    Medications:     Allergies:    Allergies   Allergen Reactions    Adhesive Tape Itching    Morphine Hallucinations    Wound Dressing Adhesive Itching and Rash       Current Meds:   Scheduled Meds:    vancomycin (VANCOCIN) intermittent dosing (placeholder)   Other RX Placeholder    furosemide  20 mg IntraVENous Daily    sennosides-docusate sodium  1 tablet Oral BID    sodium chloride flush  5-40 mL IntraVENous 
multistep commands with repetition;Follows multistep commands with increased time  Attention Span: Appears intact  Memory: Decreased recall of recent events  Safety Judgement: Decreased awareness of need for assistance;Decreased awareness of need for safety  Problem Solving: Assistance required to correct errors made;Decreased awareness of errors  Insights: Decreased awareness of deficits  Initiation: Requires cues for some  Sequencing: Requires cues for some  Orientation  Overall Orientation Status: Within Functional Limits         ADL  Toileting  Assistance Level: Dependent  Skilled Clinical Factors: [employer]          Functional Mobility  Device: Rolling walker  Assistance Level: Minimal assistance;Moderate assistance;Requires x 2 assistance  Skilled Clinical Factors: Pt was able to complete mobility from EOB and around bed over to recliner. Able to complete x2 more static stands for ~ 30-60 seconds each working on pt's posture and breathing. Pt required extended rest breaks after each stand.  Bed Mobility  Overall Assistance Level: Moderate Assistance;Minimal Assistance;Requires x 2 Assistance  Additional Factors: Head of bed raised;With handrails;Increased time to complete;Verbal cues  Supine to Sit  Assistance Level: Minimal assistance;Requires x 2 assistance;Moderate assistance  Skilled Clinical Factors: VCs for proper technique, use of bed rail, and pursed lip breathing. Min-Mod A x2 for mgmt of trunk.  Scooting  Assistance Level: Contact guard assist;Stand by assist  Skilled Clinical Factors: Pt able to scoot fully to EOB using both bed rails and farther back into recliner on own.  Transfers  Surface: From bed;To chair with arms  Additional Factors: Increased time to complete;Hand placement cues;Verbal cues  Device: Walker  Sit to Stand  Assistance Level: Minimal assistance;Requires x 2 assistance;Moderate assistance  Skilled Clinical Factors: VCs for squaring self/AD and reaching back prior to sitting, 
06/20/2025 0523     TSH:    Recent Labs     06/19/25  1158   TSH 1.70      HGA1C:  No results for input(s): \"LABA1C\" in the last 720 hours.     Impression:   Patient Active Problem List:     Stroke aborted by administration of thrombolytic agent (HCC)     Acute ischemic stroke (HCC)     Bilateral carotid artery stenosis     Received tissue plasminogen activator (t-PA) less than 24 hours prior to arrival     Left hand weakness     Stroke of unknown cause (HCC)     Essential hypertension     Left against medical advice     Atrial fibrillation (HCC)     SSS (sick sinus syndrome) (HCC)     Weakness     Moderate malnutrition     Other specified anemias     Anemia     Acute anemia     Acute cholecystitis     Severe malnutrition     Cardiomyopathy (HCC)     Secondary hypercoagulable state     Acute blood loss anemia     ESTHER (acute kidney injury)     Gastric polyp     Polyp of duodenum     Acute upper gastrointestinal bleeding      ASSESSMENT/PLAN:  1. Acute gangrenous cholecystitis s/p laparoscopic cholecystectomy  -management as per general surgery     2. Acute on chronic renal failure with anemia requiring blood transfusion  -management as per nephrology      3. CAD with recent MI related to thrombus embolization s/p aspiration thrombectomy of ramus branch, intravascular ultrasound did not show any atherosclerotic plaque  -resume aspirin 81 mg daily and stop Plavix since patient is on warfarin     4. Permanent atrial fibrillation  -resume metoprolol for rate control as blood pressure tolerates  -[id number] score is high, resume warfarin with goal INR 2.5-3 and stop heparin infusion once the INR is above 2      5. Acute on chronic CHF with mild mixed cardiomyopathy   -resume metoprolol and wean off midodrine  -replace and keep potassium more than 4 and magnesium more than 2     6. Dyslipidemia; continue atorvastatin     7. CVA, right retinal artery occlusion, chronic anemia with thalassemia trait, nephrolithiasis, BPH, 
belt  Interventions: Safety awareness training;Tactile cues;Verbal cues;Weight shifting training/pressure relief  Base of Support: Widened  Speed/Pham: Shuffled;Slow  Gait Abnormalities: Step to gait;Shuffling gait;Decreased step clearance    Neuromuscular Education  Neuromuscular Education: Yes  Treatment: Gait ;Lower extremity;Sitting;Standing  Neuromuscular Comments: VCs req for proper breathing celeste (pursed lip breathing) during functional mobility. Tactile and VCs req for postural control during sit<>stands & amb to promote abdominal and erector spinae mm facilitation for increased stability and balance, decreasing kyphosis of the spine. Pt req VCs to correct for forward WS with squatting in addition to pressing firmly into ground with feet, to promote the appropriate body mechanics for sit<>stand transfers.    PT Exercises  Pressure Relief Exercises: seated lateral WS  Static Standing Balance Exercises: Stood x 2 reps  Postural Correction Exercises: VCs for upright posture with fair carryover  Breathing Techniques: VCs for pursed lip breathing.     Safety Devices  Type of Devices: Call light within reach;Gait belt;Nurse notified;Left in chair;All fall risk precautions in place;All leia prominences offloaded  Restraints  Restraints Initially in Place: No         Goals  Short Term Goals  Time Frame for Short Term Goals: 12 visit  Short Term Goal 1: Pt. to require min A for bed mobility with use of bedrails to assist as needed (log roll to protect abdomen)  Short Term Goal 2: Pt. to require mod A for sit to stand transfer with RW  Short Term Goal 3: Pt. to transfer bed < > chair with RW, mod A x 2. Progress gait from here.  Short Term Goal 4: Pt. to tolerate 25+ min. of PT daily for ther ex/ gait/balance training / functional mob. training  Short Term Goal 5: to be able to walk up 2 steps in get into his home  Patient Goals   Patient Goals : To be able to DC to home    Education  Patient Education  Education 
sulfur hexafluoride microspheres, melatonin, nitroGLYCERIN  Home Meds:                Medications Prior to Admission: metoprolol tartrate (LOPRESSOR) 25 MG tablet, Take 0.5 tablets by mouth 2 times daily  atorvastatin (LIPITOR) 40 MG tablet, Take 1 tablet by mouth daily  aspirin 81 MG EC tablet, Take 1 tablet by mouth daily  clopidogrel (PLAVIX) 75 MG tablet, Take 1 tablet by mouth daily  nitroGLYCERIN (NITROSTAT) 0.4 MG SL tablet, Place 1 tablet under the tongue every 5 minutes as needed for Chest pain up to max of 3 total doses. If no relief after 1 dose, call 911.  warfarin (COUMADIN) 5 MG tablet, Take 0.5 tablets by mouth See Admin Instructions (1/2 Tablet on Saturday, Monday, Wednesday, and Friday)  melatonin 5 MG TABS tablet, Take 1 tablet by mouth nightly as needed  tamsulosin (FLOMAX) 0.4 MG capsule, Take 1 capsule by mouth daily  sennosides-docusate sodium (SENOKOT-S) 8.6-50 MG tablet, Take 1 tablet by mouth in the morning and at bedtime  sertraline (ZOLOFT) 25 MG tablet, Take 1 tablet by mouth daily  Nutritional Supplements (BOOST HIGH PROTEIN) LIQD, Take 1 Bottle by mouth daily  warfarin (COUMADIN) 5 MG tablet, TAKE 1 TABLET BY MOUTH DAILY (Patient taking differently: Take 1 tablet by mouth See Admin Instructions Sunday, Tuesday and Thursday)  acetaminophen (TYLENOL) 500 MG tablet, Take 2 tablets by mouth every 6 hours as needed for Pain  Cholecalciferol (VITAMIN D3) 125 MCG (5000 UT) TABS, Take 1 tablet by mouth daily  b complex vitamins capsule, Take 1 capsule by mouth daily  Multiple Vitamins-Minerals (SYSTANE ICAPS AREDS2) CAPS, Take 1 capsule by mouth daily  Lutein-Zeaxanthin 25-5 MG CAPS, Take 1 capsule by mouth nightly  allopurinol (ZYLOPRIM) 300 MG tablet, Take 1 tablet by mouth daily  omeprazole (PRILOSEC) 20 MG delayed release capsule, Take 1 capsule by mouth Daily  [DISCONTINUED] Multiple Vitamins-Minerals (PRESERVISION AREDS 2 PO),   diphenhydrAMINE-APAP, sleep, (TYLENOL PM EXTRA STRENGTH) 
    Recent Labs     06/28/25  0548 06/28/25  1001 06/28/25  2223 06/29/25  0957   WBC 6.9  --   --   --    RBC 3.24*  --   --   --    HGB 8.6* 9.0* 8.9* 9.1*   HCT 26.6* 27.6* 27.3* 27.7*   MCV 82.1*  --   --   --    MCH 26.5  --   --   --    MCHC 32.3  --   --   --    RDW 21.8*  --   --   --      --   --   --    MPV 11.9  --   --   --       BMP:   Recent Labs     06/27/25  0635 06/28/25  0548 06/29/25  0538    142 143   K 4.0 3.5* 3.3*    103 102   CO2 23 25 27   BUN 41* 38* 30*   CREATININE 3.1* 2.5* 1.6*   GLUCOSE 97 126* 114   CALCIUM 8.6* 8.8 8.7*      Phosphorus:   No results for input(s): \"PHOS\" in the last 72 hours.  Magnesium:  No results for input(s): \"MG\" in the last 72 hours.  Albumin:  No results for input(s): \"LABALBU\" in the last 72 hours.  BNP:    No results found for: \"BNP\"  MEENU:    No results found for: \"MEENU\"  SPEP:  Lab Results   Component Value Date/Time    ALBCAL PENDING 06/26/2025 01:01 PM    ALBPCT PENDING 06/26/2025 01:01 PM    A1PCT PENDING 06/26/2025 01:01 PM    A2PCT PENDING 06/26/2025 01:01 PM    BETAPCT PENDING 06/26/2025 01:01 PM    GAMGLOB PENDING 06/26/2025 01:01 PM    GGPCT PENDING 06/26/2025 01:01 PM    PATH PENDING 06/26/2025 01:01 PM     UPEP:   No results found for: \"LABPE\"  C3:     Lab Results   Component Value Date/Time    C3 117 06/26/2025 01:01 PM     C4:     Lab Results   Component Value Date/Time    C4 25 06/26/2025 01:01 PM     MPO ANCA:   No results found for: \"MPO\"  PR3 ANCA:   No results found for: \"PR3\"  Anti-GBM:   No results found for: \"GBMABIGG\"  Hep BsAg:       No results found for: \"HEPBSAG\"  Hep C AB:        No results found for: \"HEPCAB\"    Urinalysis/Chemistries:      Lab Results   Component Value Date/Time    NITRU NEGATIVE 06/18/2025 06:30 PM    COLORU ADALBERTO 06/18/2025 06:30 PM    PHUR 5.0 06/18/2025 06:30 PM    PHUR 6.0 06/13/2012 05:06 PM    WBCUA 5 TO 10 06/18/2025 06:30 PM    RBCUA 5 TO 10 06/18/2025 06:30 PM    MUCUS 1+ 06/13/2012 
re-eval by PRATIMA YA/L on 6/25/2025)  Short Term Goal 4: toileting routine to ModAx1 with Good safety and use of AD/grab bars/BSC as needed. (Modified at re-eval by PRATIMA YA/L on 6/25/2025)  Short Term Goal 5: active participation in > 20 minutes of therapeutic activity/therapeutic exercise/functional tasks of choice to promote functional activity tolerance required for increased safety/IND with ADLs.  Long Term Goals  Long Term Goal 1: Pt/family to be IND with abdominal/surgical precautions, pressure relief tech, fall prevention strategies, EC/WS tech, condition specific education, potential equipment/discharge recommendations and a BUE HEP with use of handouts as needed.  Patient Goals   Patient goals : To go home, when able    AM-PAC - ADL  AM-PAC Daily Activity - Inpatient   How much help is needed for putting on and taking off regular lower body clothing?: Total  How much help is needed for bathing (which includes washing, rinsing, drying)?: A Lot  How much help is needed for toileting (which includes using toilet, bedpan, or urinal)?: Total  How much help is needed for putting on and taking off regular upper body clothing?: A Lot  How much help is needed for taking care of personal grooming?: A Little  How much help for eating meals?: A Little  AM-PAC Inpatient Daily Activity Raw Score: 12  AM-PAC Inpatient ADL T-Scale Score : 30.6  ADL Inpatient CMS 0-100% Score: 66.57  ADL Inpatient CMS G-Code Modifier : CL    Therapy Time   Individual Concurrent Group Co-treatment   Time In 1127         Time Out 1205         Minutes 38          Upon writer exit, call light within reach, pt retired to chair. All lines intact and patient positioned comfortably. All patient needs addressed prior to ending therapy session. Chart reviewed prior to treatment and patient is agreeable for therapy.  RN reports patient is medically stable for therapy treatment this date.     JOEL Montaño/MARLEEN    
06/27/2025 08:30 AM    CULTURE PENDING 06/27/2025 08:30 AM    CULTURE PENDING 06/27/2025 08:30 AM       Radiology:  CTA ABDOMEN PELVIS W CONTRAST  Result Date: 6/24/2025  1. No evidence of active colonic bleed. 2. Mildly thickened and edematous appearance of the 1st and 2nd portions of the duodenum. Findings may represent duodenitis. 3. Small volume perihepatic and perisplenic ascites. 4. Scattered tiny foci of pneumoperitoneum within the anterior peritoneum. Multiple small foci of subcutaneous gas within the anterior abdominal wall. Findings may be related to recent surgery.  Correlate with history. 5. Linear filling defect within the left common iliac artery, may represent vascular web or age-indeterminate dissection. 6. Right-sided pleural effusion. 7. Irregularity involving the inferior endplate of L2, may represent degenerative changes though prior discitis osteomyelitis may appear similar.     MRI ABDOMEN W WO CONTRAST MRCP  Addendum Date: 6/20/2025  ADDENDUM: An impression point was initially excluded.  The impression should read:     Result Date: 6/20/2025  1.  Acute cholecystitis. 2.  No choledocholithiasis.  No bile duct dilation. 3.  Small right pleural effusion.     XR CHEST (SINGLE VIEW FRONTAL)  Result Date: 6/20/2025  1. Cardiomegaly with mild vascular congestion. 2. Persistent elevation right hemidiaphragm with likely right basilar atelectasis.     CT ABDOMEN PELVIS W IV CONTRAST Additional Contrast? None  Result Date: 6/18/2025  Findings highly suspicious for acute cholecystitis with stones noted within the cystic duct, common bile duct and likely the duodenum. Recommend further evaluation with ultrasound and MRCP.     XR CHEST PORTABLE  Result Date: 6/18/2025  FINDINGS/IMPRESSION: 1. Moderate cardiomegaly with dual-lead pacemaker and loop recorder. 2. Shallow breath with mild diffuse prominence of interstitial markings, chronic, unchanged. 3. No consolidating pneumonia 4. No effusion. 
Concurrent Group Co-treatment   Time In       1023   Time Out       1101   Minutes       38       Co-treatment with OT warranted secondary to decreased safety and independence requiring 2 skilled therapy professionals to address individual discipline's goals. PT addressing pre gait trunk strengthening, weight shifting prior to transfers, transfer training, and postural control in sitting/standing.     Harini Martinez, PTA           
Conjunctivae normal.      Pupils: Pupils are equal, round, and reactive to light.   Cardiovascular:      Rate and Rhythm: Normal rate and regular rhythm.      Pulses: Normal pulses.      Heart sounds: Normal heart sounds. No murmur heard.     No friction rub. No gallop.   Pulmonary:      Effort: Pulmonary effort is normal. No respiratory distress.      Breath sounds: Normal breath sounds. No wheezing, rhonchi or rales.   Abdominal:      General: Bowel sounds are normal.      Palpations: Abdomen is soft.      Tenderness: There is generalized abdominal tenderness.   Musculoskeletal:         General: Normal range of motion.      Cervical back: Normal range of motion and neck supple.      Right lower leg: Edema present.      Left lower leg: Edema present.   Skin:     General: Skin is warm and dry.      Capillary Refill: Capillary refill takes less than 2 seconds.      Findings: Bruising and ecchymosis present.      Comments: Ecchymosis noted to bilateral upper and lower extremities   Neurological:      General: No focal deficit present.      Mental Status: He is alert and oriented to person, place, and time. Mental status is at baseline.   Psychiatric:         Mood and Affect: Mood normal.         Behavior: Behavior normal.         Assessment:        Hospital Problems           Last Modified POA    * (Principal) Acute cholecystitis 6/19/2025 Yes    SSS (sick sinus syndrome) ([facility]) 6/19/2025 Yes    Acute ischemic stroke ([facility]) 6/19/2025 Yes    Essential hypertension 6/19/2025 Yes    Atrial fibrillation ([facility]) 6/19/2025 Yes    Moderate malnutrition 6/19/2025 Yes    Severe malnutrition 6/20/2025 Yes       Plan:        Acute cholelithiasis and cholecystitis  Plan surgery for today  Patient was n.p.o.   Heparin drip was discontinued at 7 AM this morning for procedure today  Patient's hemoglobin dropped from 7.8 yesterday to 7 today so patient received 1 of 2 units of PRBCs today  Continue IV Zosyn  Continue pain 
prominence of interstitial markings, chronic, unchanged. 3. No consolidating pneumonia 4. No effusion.       Physical Examination:        General appearance:  alert, cooperative and no distress  Mental Status:  oriented to person, place and time and normal affect  Lungs:  clear to auscultation bilaterally, normal effort  Heart:  regular rate and rhythm, no murmur  Abdomen:  soft, incisional tenderness, nondistended, normal bowel sounds, no masses, hepatomegaly, splenomegaly  Extremities:  no redness, tenderness in the calves, 1+ edema  Skin:  no gross lesions, rashes, induration    Assessment:        Hospital Problems           Last Modified POA    * (Principal) Acute cholecystitis 6/19/2025 Yes    SSS (sick sinus syndrome) ([facility]) 6/19/2025 Yes    Acute ischemic stroke ([facility]) 6/19/2025 Yes    Essential hypertension 6/19/2025 Yes    Atrial fibrillation ([facility]) 6/19/2025 Yes    Moderate malnutrition 6/19/2025 Yes    Severe malnutrition 6/20/2025 Yes    Cardiomyopathy ([facility]) 6/24/2025 Yes    Secondary hypercoagulable state 6/24/2025 Yes    Acute blood loss anemia 6/24/2025 Yes    ESTHER (acute kidney injury) 6/26/2025 Yes    Gastric polyp 6/27/2025 Yes    Polyp of duodenum 6/27/2025 Yes    Acute upper gastrointestinal bleeding 6/27/2025 Clinically Undetermined       Plan:        Continue present antibiotics pending cultures  Cardiac medications as ordered  Trend H&H, transfuse as needed  Continue to hold anticoagulation pending workup of gastric and duodenal polyps.  If H&H remained stable will consider resuming anticoagulation in the next 24 hours  Monitor and control blood pressure  Continue to trend H&H, transfuse as needed.  Resume heparin today without bolus and monitor progress  Await surgical pathology from gastrointestinal biopsies completed last week  Nutritional supplements  Monitor renal function, avoid nephrotoxic agents.  Supplement potassium    Ricardo Felix DO  6/29/2025  10:54 AM   
Therapy Plan  Times Per Week: 4-5x/week 1x/day as tolerated  Current Treatment Recommendations: Strengthening, Functional mobility training, Balance training, Endurance training, Safety education & training, Patient/Caregiver education & training, Self-Care / ADL, Positioning, Equipment evaluation, education, & procurement, Cognitive/Perceptual training    AM-PAC Daily Activities Inpatient  AM-PAC Daily Activity - Inpatient   How much help is needed for putting on and taking off regular lower body clothing?: A Lot  How much help is needed for bathing (which includes washing, rinsing, drying)?: A Lot  How much help is needed for toileting (which includes using toilet, bedpan, or urinal)?: A Lot  How much help is needed for putting on and taking off regular upper body clothing?: A Little  How much help is needed for taking care of personal grooming?: A Little  How much help for eating meals?: None  AM-PAC Inpatient Daily Activity Raw Score: 16  AM-PAC Inpatient ADL T-Scale Score : 35.96  ADL Inpatient CMS 0-100% Score: 53.32  ADL Inpatient CMS G-Code Modifier : CK    Minutes  OT Individual Minutes  Time In: 0802  Time Out: 0856  Minutes: 54  Tx Time: 38 minutes    Electronically signed by Abbey Grey OT on 6/21/25 at 11:09 AM [id number]

## 2025-07-02 NOTE — PROGRESS NOTES
Pharmacy Note  Warfarin Consult    Quique Wray is a 80 y.o. male for whom pharmacy has been consulted to manage warfarin therapy.     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 PT/INR ordered to start 7/3/25.    Leo Austin, PharmD, BCPS  7/2/2025  4:26 PM

## 2025-07-03 LAB
INR PPP: 1.3
MICROORGANISM SPEC CULT: NORMAL
MICROORGANISM/AGENT SPEC: NORMAL
PROTHROMBIN TIME: 16.6 SEC (ref 11.5–14.2)
SERVICE CMNT-IMP: NORMAL
SPECIMEN DESCRIPTION: NORMAL

## 2025-07-03 PROCEDURE — 97110 THERAPEUTIC EXERCISES: CPT

## 2025-07-03 PROCEDURE — 97166 OT EVAL MOD COMPLEX 45 MIN: CPT

## 2025-07-03 PROCEDURE — 36415 COLL VENOUS BLD VENIPUNCTURE: CPT

## 2025-07-03 PROCEDURE — 94761 N-INVAS EAR/PLS OXIMETRY MLT: CPT

## 2025-07-03 PROCEDURE — 1200000002 HC SEMI PRIVATE SWING BED

## 2025-07-03 PROCEDURE — 85610 PROTHROMBIN TIME: CPT

## 2025-07-03 PROCEDURE — 6370000000 HC RX 637 (ALT 250 FOR IP): Performed by: INTERNAL MEDICINE

## 2025-07-03 PROCEDURE — 97116 GAIT TRAINING THERAPY: CPT

## 2025-07-03 PROCEDURE — 6360000002 HC RX W HCPCS: Performed by: INTERNAL MEDICINE

## 2025-07-03 RX ORDER — WARFARIN SODIUM 5 MG/1
10 TABLET ORAL
Status: COMPLETED | OUTPATIENT
Start: 2025-07-03 | End: 2025-07-03

## 2025-07-03 RX ADMIN — METOPROLOL TARTRATE 25 MG: 25 TABLET, FILM COATED ORAL at 08:08

## 2025-07-03 RX ADMIN — DIPHENHYDRAMINE HYDROCHLORIDE 25 MG: 25 TABLET ORAL at 20:25

## 2025-07-03 RX ADMIN — ATORVASTATIN CALCIUM 40 MG: 40 TABLET, FILM COATED ORAL at 08:09

## 2025-07-03 RX ADMIN — ALLOPURINOL 300 MG: 100 TABLET ORAL at 08:08

## 2025-07-03 RX ADMIN — SENNOSIDES AND DOCUSATE SODIUM 2 TABLET: 50; 8.6 TABLET ORAL at 08:12

## 2025-07-03 RX ADMIN — Medication 6 MG: at 20:25

## 2025-07-03 RX ADMIN — ENOXAPARIN SODIUM 90 MG: 100 INJECTION SUBCUTANEOUS at 08:07

## 2025-07-03 RX ADMIN — POLYETHYLENE GLYCOL 3350 17 G: 17 POWDER, FOR SOLUTION ORAL at 08:07

## 2025-07-03 RX ADMIN — Medication 400 MG: at 08:08

## 2025-07-03 RX ADMIN — ENOXAPARIN SODIUM 90 MG: 100 INJECTION SUBCUTANEOUS at 20:24

## 2025-07-03 RX ADMIN — METOPROLOL TARTRATE 25 MG: 25 TABLET, FILM COATED ORAL at 20:25

## 2025-07-03 RX ADMIN — SENNOSIDES AND DOCUSATE SODIUM 2 TABLET: 50; 8.6 TABLET ORAL at 20:25

## 2025-07-03 RX ADMIN — MIDODRINE HYDROCHLORIDE 5 MG: 5 TABLET ORAL at 08:07

## 2025-07-03 RX ADMIN — Medication 400 MG: at 20:25

## 2025-07-03 RX ADMIN — PANTOPRAZOLE SODIUM 40 MG: 40 TABLET, DELAYED RELEASE ORAL at 08:08

## 2025-07-03 RX ADMIN — ASPIRIN 81 MG: 81 TABLET, COATED ORAL at 08:08

## 2025-07-03 RX ADMIN — SERTRALINE HYDROCHLORIDE 25 MG: 50 TABLET ORAL at 08:07

## 2025-07-03 RX ADMIN — WARFARIN SODIUM 10 MG: 5 TABLET ORAL at 17:44

## 2025-07-03 RX ADMIN — TAMSULOSIN HYDROCHLORIDE 0.4 MG: 0.4 CAPSULE ORAL at 08:08

## 2025-07-03 RX ADMIN — MIDODRINE HYDROCHLORIDE 5 MG: 5 TABLET ORAL at 17:44

## 2025-07-03 ASSESSMENT — PAIN SCALES - GENERAL
PAINLEVEL_OUTOF10: 0
PAINLEVEL_OUTOF10: 0

## 2025-07-03 NOTE — THERAPY EVALUATION
[facility]  Physical Therapy  Evaluation  Date: 7/3/2025  Patient Name: Quique Wray        MRN: [id number]    : 1945  (80 y.o.)  Gender: male   Referring Practitioner: Dr. Bowens  Diagnosis: Weakness  Additional Pertinent Hx: [facility] [place] following a several week course of medical issues including MI, cholecystectomy, hemithorax and blood loss.   Referred to PT to assess strength and functional mobility with goal of returning home   Past Medical History:   Diagnosis Date    Atrial fibrillation (HCC)     Blind right eye     due to retinol occlusion    Cerebral artery occlusion with cerebral infarction (HCC)     Degenerative disc disease, lumbar     L3    Depression     Endocarditis     Hypertension     Kidney stone     Pacemaker     Spinal stenosis      Past Surgical History:   Procedure Laterality Date    CATARACT REMOVAL Left     CHOLECYSTECTOMY, LAPAROSCOPIC N/A 2025    CHOLECYSTECTOMY LAPAROSCOPIC performed by Hardik Rojas MD at [facility] OR    CT GUIDED CHEST TUBE  2025    CT GUIDED CHEST TUBE 2025 Jagdish Moses MD [facility] CT SCAN    INSERTABLE CARDIAC MONITOR N/A 2021    LOOP RECORDER INSERT performed by Andrea Galan MD at [facility] OR    KNEE ARTHROSCOPY Right     LITHOTRIPSY      PACEMAKER INSERTION Left 2022    PACEMAKER INSERTION PERMANENT performed by Andrea Galan MD at [facility] OR    TOTAL HIP ARTHROPLASTY Right     UPPER GASTROINTESTINAL ENDOSCOPY N/A 2025    ESOPHAGOGASTRODUODENOSCOPY WITH BIOPSY performed by Mack Napier MD at [facility] OR       Restrictions         Subjective         Pain Level: 0    Orientation       Home Living / Prior Level of Function  Social/Functional History  Lives With: Spouse  Type of Home: House  Home Layout: Two level, Bed/Bath upstairs, 1/2 bath on main level  Home Access: Stairs to enter without rails  Entrance Stairs - Number of Steps: 2  Bathroom Shower/Tub: Walk-in

## 2025-07-03 NOTE — PROGRESS NOTES
SWINGBED PATIENT ACTIVITY PROGRAM ASSESSMENT    Patient Name: Quique Wray  : 1945   Gender: male   Diagnosis:  Weakness [R53.1] MRN: [id number]     Ability to read or write? [x] Yes   [] No  Ability to hear?   [x] Yes   [] No  Is patient confused?  [] Yes   [x] No    Enter Codes in Boxes Coding:  Very Important  Somewhat important  Not very important  Not important at all  Important but can’t do or no choice  No response or non-responsive   1 A. How important is it to you to have books, newspapers, and magazines to read?   2 B.  How important is it to you to listen to music you like?   3 C.  How important is it to you to be around animals such as pets?   1 D.  How important is it to you to keep up with the news?   2 E.  How important is it to you to do things with groups of people?   1 F.  How important is it to you to do your favorite activities?   1 G.  How important is it to you to go outside to get fresh air when the weather is good?   1 H.  How important is it to you to participate in Bahai services or practices?     Activity Care Plan:  Will participate in activity programs of choice daily with no decline throughout S[facility] stay.          EVALUATOR’S SIGNATURE:  SALO Raza, LSW  Date: 7/3/2025

## 2025-07-03 NOTE — PROGRESS NOTES
eSWING BED ORIENTATION    Patient Name: Quique Wray  : 1945   Gender: male   Diagnosis:  Weakness [R53.1] MRN: [id number]     [x] Goal is to provide you with very good care    [x] Insurance and Financial Responsibilities    [x] Changes to Expect           - Safely encourage you to learn to care for yourself     - Frequency of Doctor's Visits       - Family Training Likely  [x] Visiting Hours:           11:00am - 8:30 pm (or by special arrangement)  [x] Personal Phone Number          Located on [employer]-Er[employer] Board   [x] Swing Bed Team Meetings         Family encouraged to attend   [x]  Clothing            Bring what you normally wear  [x]  Prevention of Falls           Put call light on, and wait until OK'd to get up on your own.  [x] Therapy Expectations          Do your best to participate  [x]  Advanced Directives                          []  Pt has advanced directives and we have a copy on file                            []  Pt has advanced directives and we do not have a copy on file,  notified and will follow up.                             [x]   Pt does not have advanced directives.  [x] Educated on mail policy           Address provided for direct to hospital service                                                                                                                                                                                                                                                                                       Orientation Completed and agreed upon with Quique Wray and/or Family Members

## 2025-07-03 NOTE — PLAN OF CARE
[facility]  Phone: [phone]    Swingbed Occupational Therapy Plan of Care  OT Orders Received and Evaluation Complete    Date: 7/3/2025  Patient Name: Quique Wray        MRN: [id number]    : 1945  (80 y.o.)  Referring Practitioner: Dr. Bowens  Diagnosis: Weakness  Treatment Diagnosis: Weakness    Identified Problem Areas:     Performance deficits / Impairments: Decreased functional mobility , Decreased ADL status, Decreased safe awareness, Decreased balance, Decreased endurance, Decreased strength, Decreased sensation, Decreased cognition, Decreased posture, Decreased ROM, Decreased high-level IADLs, Decreased fine motor control, Decreased coordination     Justification for Skilled Services:     [x] Complete Daily Tasks Safely  [x] Improve Balance   [x] Return to Prior Level of Function  [x] Family/Caregiver Education    [x] Improve UE strength  [x] Patient Education: [x] Adaptive Equipment   [x] Home Exercise Program and Progression    Treatment Plan:     Times Per Day: 1-2x.  Discharge recommendation: Home with home health services     [] Modalities:  [x] Therapeutic Exercise   [x] Therapeutic Activity  [] Splinting:     [] Home Safety Evaluation         [x] ADL Retraining                       [] Muscle Re-education [] Cognitive Retraining            [] Sensory Integration  [x] Patient Education [x] Home Exercise Program [x] Fine Motor Coordination    Goals:     Short term goals:  Time Frame for Short Term Goals: 6 days (2025)  Short Term Goal 1: Patient will complete a commode transfer while using DME PRN with SBA.  Short Term Goal 2: Patient will complete upper body dressing and/or bathing while using adaptive equipment/techniques PRN with SUP/SETUP.  Short Term Goal 3: Patient will complete lower body dressing and/or bathing while using adaptive equipment/techniques PRN with MIN A.  Short Term Goal 4: To increase UE strength for ADLs and functional transfers, patient will engage

## 2025-07-03 NOTE — CONSULTS
[facility]  Pharmacy Department    Clinical Pharmacy Note-Warfarin Consult    Quique Wray is a 80 y.o. male for whom pharmacy has been asked to manage warfarin therapy.     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    Cerebral artery occlusion with cerebral infarction (HCC)     Degenerative disc disease, lumbar     L3    Depression     Endocarditis     Hypertension     Kidney stone     Pacemaker     Spinal stenosis      INR (no units)   Date Value   07/03/2025 1.3   07/02/2025 1.2   07/01/2025 1.2   06/30/2025 1.2   06/29/2025 1.3   06/28/2025 1.3   06/27/2025 1.3       Hemoglobin (g/dL)   Date Value   07/02/2025 8.7 (L)   06/30/2025 8.9 (L)   06/29/2025 9.1 (L)     Hematocrit (%)   Date Value   07/02/2025 27.0 (L)   06/30/2025 26.8 (L)   06/29/2025 27.7 (L)     Platelet Count (k/uL)   Date Value   11/13/2011 137     Platelets (k/uL)   Date Value   07/02/2025 174   06/30/2025 154   06/28/2025 169       Current warfarin drug-drug interactions: allopurinol, ASA, PRN APAP, PRN Percocet, sertraline, Lovenox (Plavix and heparin stopped)      Date             INR        Dose   7/3/2025          1.3    10 mg    Daily PT/INR until stable within therapeutic range.     Thank you for the consult.     Giuseppe Blakely, PharmD 7/3/2025 7:36 AM

## 2025-07-03 NOTE — PROGRESS NOTES
Met with Patient this p.m. to discuss Swing Bed program and complete initial discharge planning assessment.  Patient is a 80 year old , white male, admitted to [facility] with a diagnosis of Weakness.  Patient is alert and oriented, polite and cooperative with this assessment.  States that his plan is to return home after short term rehab and resume services through [facility].    Patient resides in [place] with his wife.  He is retired from the railroad and grew up in [place].  Patient uses a cane, walker, shower chair, grab bars and has a stair lift at home for assistance.  Receives assistance from his family as he needs.  Relies on his wife primarily for his transportation needs.    PCP is Dr. Cade.  Patient has medical insurance and reports having no difficulty with regards to affording his prescription medications at this time.    Discussed contents of [facility] packet of information and the IDG team meetings each Tuesday.  Wife signs in acknowledgement of receipt of folder and explanation.  They are familiar with Swing Bed program as Patient has utilized these services previously.      Patient remains a 'Full Code' status and reports that he does have medical directives in place.  Encouraged wife to bring copy in for his medical record as she is able.  All questions answered to Patient/wife satisfaction.  LSW to monitor for further needs and assist with discharge planning as appropriate.    TRAV Paige  7/3/2025

## 2025-07-03 NOTE — PROGRESS NOTES
RN called pharmacy to clarify if okay to give dose of 90mg lovenox after dose of coumadin today, INR 1.2. Pharmacy gives okay

## 2025-07-03 NOTE — PLAN OF CARE
[facility] Physical Therapy  Plan of Care  Date: 7/3/2025  Patient Name: Quique Wray        : 1945  (80 y.o.)  Referring Practitioner: Dr. Bowens  Admission Date: 2025  Referral Date : 25  Diagnosis: Weakness  Treatment Diagnosis: Weakness  PT Orders Received and Evaluation Complete  Identified Problem Areas:  Assessment: Patient admitted to [facility] for continued skilled therapy for strengthening and functional  mobility.  Patient transferred from [place] following multiple medical issues including recent MI, cholecystectomy, hemothorax and blood loss.   Patient would benefit from skilled therapy to improve his overal strength, endurance, and tolerance to ambulatory distances for negotiating the home.  Therapy Prognosis: Good    Justification for Skilled Services:  [x] Reduce Falls   [x] Improve Ambulation  [x]  Complete Daily Tasks Safely   [] Improve Balance   [x] Improve LE strength  []  Return to Prior Level of Function  [x] Improve Functional Mobility   []  Family/Caregiver Education  [x] Patient Education: []Assistive Devices []Home Exercise Program and Progression    Plan  Frequency: 1-2x/day Daily M-F, 1x/day Sat-Sun    Duration: 16 days  [] Modalities:  [x] Therapeutic Exercise   [x] Gait Training  [x] Therapeutic Activity    [] Home Safety Evaluation         [] Massage                        [] Neuromuscular Re-education [] Back Education             [x] Patient Education [] Home Exercise Program  Discharge Recommendations: Home with assist PRN, Home with Home health PT    Rehab Potential:  [x]  Good [] Fair   []  Poor    Goals  Time Frame for Short Term Goals: -  Short Term Goal 1: STG=LTG    Long Term Goals  Time Frame for Long Term Goals : 16 days - expires 25  Long Term Goal 1: Complete basic transfers in/OOB and chair/commode with supervision or CGA to safely return home at prior level  Long Term Goal 2: Ambulate with wh walker x 50-75ft with supervision to

## 2025-07-03 NOTE — PROGRESS NOTES
[facility]  Occupational Therapy    Date: 7/3/2025  Patient Name: Quique Wray        : 1945       [x] Pt Refusal:  Attempted to see pt for OT at 1336. Refuses OT interventions at this time due to just returning to bed following PT. Will resume OT interventions in the AM.            [] Pt Unavailable due to:          JEFFREY Winn  Date: 7/3/2025

## 2025-07-03 NOTE — PROGRESS NOTES
[facility]  Swing Bed Evaluation for Certification for Skilled Care    Quique Wray meets skilled criteria due to the need for skilled nursing supervision or skilled rehabilitation services listed below:   [x] Therapy; physical and occupational; for decreased strength, balance and self         care activities.   [] Tpn    [] Trach care   [] IV therapy   [x] Wound care    [] Other Skilled Need:        Quique Wray lives [] Alone      [x] With Spouse    [] Other:   and plans on returning there at discharge.     [] Pt declines list of alternate providers, pt prefers to receive skilled care at [facility]  [] List of alternate providers given to patient, pt prefers to receive skilled care at [facility]  [x] Not applicable, pt admitted to [facility] from Outside Facility    Quique Wray prefers this facility for skilled care and services can only be practically provided in a skilled nursing facility or swing bed hospital on an inpatient basis. Quique Wray will require skilled care on a daily basis beginning 7/2/2025, if medically stable.  These services are for an ongoing condition for which Quique Wray received inpatient care for at the hospital.        SALO Raza, LSW,  Date: 7/3/2025

## 2025-07-03 NOTE — PLAN OF CARE
Problem: Chronic Conditions and Co-morbidities  Goal: Patient's chronic conditions and co-morbidity symptoms are monitored and maintained or improved  7/3/2025 0947 by Sandra Lucas RN  Outcome: Progressing  7/2/2025 2328 by Julieth Stone RN  Outcome: Progressing     Problem: Discharge Planning  Goal: Discharge to home or other facility with appropriate resources  7/3/2025 0947 by Sandra Lucas RN  Outcome: Progressing  7/2/2025 2328 by Julieth Stone RN  Outcome: Progressing     Problem: ABCDS Injury Assessment  Goal: Absence of physical injury  7/3/2025 0947 by Sandra Lucas RN  Outcome: Progressing  7/2/2025 2328 by Julieth Stone RN  Outcome: Progressing     Problem: Safety - Adult  Goal: Free from fall injury  7/3/2025 0947 by Sandra Lucas RN  Outcome: Progressing  7/2/2025 2328 by Julieth Stone RN  Outcome: Progressing

## 2025-07-03 NOTE — PROGRESS NOTES
Phone: [phone] [facility]  Date: 7/3/2025  Fax: [phone]      Physical Therapy    Daily Note    Patient Name: Quique Wray      : 1945  (80 y.o.)  MRN: [id number]      Assessment             Sit to Supine: Stand by assistance (from L side of bed for B LE's)       Sit to Stand: Minimal Assistance  Stand to Sit: Contact guard assistance                WB Status: WBAT  Ambulation  Surface: Level tile  Device: Rolling Walker (w/c follow)  Assistance: Minimal assistance  Gait Deviations: None  Distance: 15 ft x1; 80 ftx1             Assessment: Patient in chair upon arrival to room with wife also present. He agrees to work with PTA. Sit to stand from chair is min assist. Ambulates with wheeled walker to w/c in hallway ~15 ft.  Wheeled to therapy room and is able to complete seated B LE exercises as outlined above. Ambulates back to room from elevators ~80 ft x1 to lie down in bed. Sit to supine on L side of bed is SBA for B LE's.  In bed following with call light in reach and bed alarm turned on.  Safety Devices  Type of Devices: Bed alarm in place, Call light within reach, Gait belt, Left in bed, Nurse notified          Call light in reach, Phone in reach, Use of Gait belt, Bed alarm, Nurse Notified, Family Present, and Left in bed  Time In: 1247  Time Out: 1324  Timed Coded Minutes: 37  Total Treatment Time: 37       Exercises:  See Flowsheets    Plan  Cont Per Plan Of Care    Goals  Short Term Goals  Time Frame for Short Term Goals: -  Short Term Goal 1: STG=LTG  Short Term Goal 2: -  Short Term Goal 3: -  Short Term Goal 4: -  Short Term Goal 5: -    Long Term Goals  Time Frame for Long Term Goals : 16 days - expires 25  Long Term Goal 1: Complete basic transfers in/OOB and chair/commode with supervision or CGA to safely return home at prior level  Long Term Goal 2: Ambulate with wh walker x 50-75ft with supervision to safely negotiate home environment  Long Term Goal 3: Up/down steps

## 2025-07-03 NOTE — H&P
Hospital Medicine  History and Physical    Patient:  Quique Wray  MRN: [id number]    CHIEF COMPLAINT:  Weakness    History Obtained From:  patient and EMR  PCP: Vernon Cade MD    HISTORY OF PRESENT ILLNESS:   The patient is a 80 y.o. male who presents to our swing bed for daily PT / OT.   According to Quique, he recently had a heart attack which was related to a thrombus embolization requiring aspiration thrombectomy of the ramus branch with US not showing any atherosclerotic plaque.  After this he developed abdominal pain and returned to the hospital to find out he had acute cholecystitis.  He was transferred to [facility] and underwent acute Cholecystectomy.  This resulted in post op blood loss with a hematoma found over the left abdominal area as well as the findings of a right hemothorax requiring a chest tube.  According to Pulmonary notes, he required a total of 7 units PRBC's and 2  units of FFP.  He also underwent EGD and was found to have some hemorrhagic hyperplastic polyps which were biopsied with the recommendations for repeat EGD in the future.  Once his bleeding stabilized he was placed on a heparin drip and started back on aspirin.  Plavix was discontinued by Cardiology.  He was placed back on Warfarin and the heparin was changed to Lovenox when he was discharged to swing bed.  Options for rehab was discussed and Quique requested to come to swing bed as he had been in our program in the past.     Quique states he is feeling better but continues to be very weak.  Appetite is fair with occasional nausea.  Bowels are moving and he is urinating well.  No chest pain or increase in SOB after the chest tube was removed.  No increase in swelling.  He states he has a large bruise over the right side of the abdomen which is improving as well as the pain.      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  ( )  The patient's advanced care plan is not present because:  (select)   ( ) I confirmed today that the patient does not wish or was not able to name a surrogate decision maker or provide an Advance Care Plan.   ( ) Hospice care is currently being provided or has been provided this calender year.  ( )  I did not confirm today the presence of an Advance Care Plan or surrogate decision maker documented within the patient's medical record. (Does not satisfy MIPS performance).            Rob Bowens MD, MD  Admitting Hospitalist

## 2025-07-03 NOTE — THERAPY EVALUATION
[facility]  Phone: [phone]    Occupational Therapy Evaluation    Date: 7/3/2025  Patient Name: Quique Wray        MRN: [id number]    : 1945  (80 y.o.)  Gender: male   Referring Practitioner: Dr. Bowens  Diagnosis: Weakness  Additional Pertinent Hx: Admitted to [facility] on 2025 due to weakness. Underwent a laparoscopic cholecystectomy on 2025. Referred to OT services to assess ability to care for self.    Past Medical History:   Diagnosis Date    Atrial fibrillation (HCC)     Blind right eye     due to retinol occlusion    Cerebral artery occlusion with cerebral infarction (HCC)     Degenerative disc disease, lumbar     L3    Depression     Endocarditis     Hypertension     Kidney stone     Pacemaker     Spinal stenosis      Past Surgical History:   Procedure Laterality Date    CATARACT REMOVAL Left     CHOLECYSTECTOMY, LAPAROSCOPIC N/A 2025    CHOLECYSTECTOMY LAPAROSCOPIC performed by Hardik Rojas MD at [facility] OR    CT GUIDED CHEST TUBE  2025    CT GUIDED CHEST TUBE 2025 Jagdish Moses MD [facility] CT SCAN    INSERTABLE CARDIAC MONITOR N/A 2021    LOOP RECORDER INSERT performed by Andrea Galan MD at [facility] OR    KNEE ARTHROSCOPY Right     LITHOTRIPSY      PACEMAKER INSERTION Left 2022    PACEMAKER INSERTION PERMANENT performed by Andrea Galan MD at [facility] OR    TOTAL HIP ARTHROPLASTY Right     UPPER GASTROINTESTINAL ENDOSCOPY N/A 2025    ESOPHAGOGASTRODUODENOSCOPY WITH BIOPSY performed by Mack Napier MD at [facility] OR     Subjective:     Subjective: Patient was lying supine in bed upon OT arrival. Was agreeable to OT evaluation.    Objective:     Social/Functional History:  Lives With: Spouse  Type of Home: House  Home Layout: Two level, Bed/Bath upstairs, 1/2 bath on main level  Home Access: Stairs to enter without rails  Bathroom Shower/Tub: Walk-in shower  Bathroom Toilet: Handicap height  Bathroom Equipment: Hand-held  shower, Shower chair, Grab bars in shower, Safety frame, Grab bars around toilet  Home Equipment: Cane, Walker - Rolling, Reacher    PLOF:   Prior Level of Assist for ADLs: Needs assistance  Prior Level of Assist for Homemaking: Needs assistance  Prior Level of Assist for Transfers: Needs assistance    ADLs:  Feeding: Setup, Supervision  Grooming: Minimal assistance  UE Bathing: Stand by assistance  LE Bathing: Maximum assistance  UE Dressing: Stand by assistance  LE Dressing: Maximum assistance  Toileting: Maximum assistance    Transfers:  Supine to Sit: Stand by assistance  Sit to stand: Minimal assistance   Stand to sit: Contact guard assistance    Assessment:     Performance deficits / Impairments: Decreased functional mobility , Decreased ADL status, Decreased safe awareness, Decreased balance, Decreased endurance, Decreased strength, Decreased sensation, Decreased cognition, Decreased posture, Decreased ROM, Decreased high-level IADLs, Decreased fine motor control, Decreased coordination    Assessment: Patient was lying supine in bed upon OT arrival. Was agreeable to OT evaluation. Completed ADLs and functional transfers as documented above (based on clinical reasoning and observation). Performed functional mobility from EOB to bedside recliner via FWW/gait belt with CGA. Limited AROM was noted during left shoulder planes and left digit flexion (was unable to form a complete composite fist); per patient, these deficits are related to his CVA that occurred in the past, but admitted to increased generalized weakness since sx. Remaining BUE planes appeared WFL. Patient reported that his wife assisted with lower body dressing tasks (donning/doffing socks and garments to/from knee level) at baseline, so this ADL was not assessed. In additon to recent laparoscopic cholecystectomy, patient is limited by right eye blindness and permanent atrial fibrillation. Patient would benefit from OT services to increase

## 2025-07-04 PROBLEM — E43 SEVERE MALNUTRITION: Status: RESOLVED | Noted: 2025-06-20 | Resolved: 2025-07-04

## 2025-07-04 LAB
INR PPP: 1.8
PROTHROMBIN TIME: 21.4 SEC (ref 11.5–14.2)

## 2025-07-04 PROCEDURE — 97116 GAIT TRAINING THERAPY: CPT

## 2025-07-04 PROCEDURE — 97110 THERAPEUTIC EXERCISES: CPT

## 2025-07-04 PROCEDURE — 36415 COLL VENOUS BLD VENIPUNCTURE: CPT

## 2025-07-04 PROCEDURE — 94761 N-INVAS EAR/PLS OXIMETRY MLT: CPT

## 2025-07-04 PROCEDURE — 6370000000 HC RX 637 (ALT 250 FOR IP): Performed by: INTERNAL MEDICINE

## 2025-07-04 PROCEDURE — 97530 THERAPEUTIC ACTIVITIES: CPT

## 2025-07-04 PROCEDURE — 85610 PROTHROMBIN TIME: CPT

## 2025-07-04 PROCEDURE — 1200000002 HC SEMI PRIVATE SWING BED

## 2025-07-04 PROCEDURE — 6360000002 HC RX W HCPCS: Performed by: INTERNAL MEDICINE

## 2025-07-04 RX ORDER — MEGESTROL ACETATE 40 MG/ML
400 SUSPENSION ORAL DAILY
Status: DISCONTINUED | OUTPATIENT
Start: 2025-07-04 | End: 2025-07-12 | Stop reason: HOSPADM

## 2025-07-04 RX ORDER — WARFARIN SODIUM 5 MG/1
5 TABLET ORAL
Status: COMPLETED | OUTPATIENT
Start: 2025-07-04 | End: 2025-07-04

## 2025-07-04 RX ADMIN — METOPROLOL TARTRATE 25 MG: 25 TABLET, FILM COATED ORAL at 09:18

## 2025-07-04 RX ADMIN — SENNOSIDES AND DOCUSATE SODIUM 2 TABLET: 50; 8.6 TABLET ORAL at 09:19

## 2025-07-04 RX ADMIN — MEGESTROL ACETATE 400 MG: 40 SUSPENSION ORAL at 09:19

## 2025-07-04 RX ADMIN — TAMSULOSIN HYDROCHLORIDE 0.4 MG: 0.4 CAPSULE ORAL at 09:18

## 2025-07-04 RX ADMIN — ENOXAPARIN SODIUM 90 MG: 100 INJECTION SUBCUTANEOUS at 21:09

## 2025-07-04 RX ADMIN — SERTRALINE HYDROCHLORIDE 25 MG: 50 TABLET ORAL at 09:21

## 2025-07-04 RX ADMIN — Medication 400 MG: at 09:18

## 2025-07-04 RX ADMIN — ASPIRIN 81 MG: 81 TABLET, COATED ORAL at 09:18

## 2025-07-04 RX ADMIN — MIDODRINE HYDROCHLORIDE 5 MG: 5 TABLET ORAL at 16:31

## 2025-07-04 RX ADMIN — WARFARIN SODIUM 5 MG: 5 TABLET ORAL at 16:31

## 2025-07-04 RX ADMIN — PANTOPRAZOLE SODIUM 40 MG: 40 TABLET, DELAYED RELEASE ORAL at 05:44

## 2025-07-04 RX ADMIN — Medication 6 MG: at 21:14

## 2025-07-04 RX ADMIN — DIPHENHYDRAMINE HYDROCHLORIDE 25 MG: 25 TABLET ORAL at 21:14

## 2025-07-04 RX ADMIN — MIDODRINE HYDROCHLORIDE 5 MG: 5 TABLET ORAL at 09:21

## 2025-07-04 RX ADMIN — Medication 400 MG: at 21:10

## 2025-07-04 RX ADMIN — ENOXAPARIN SODIUM 90 MG: 100 INJECTION SUBCUTANEOUS at 09:47

## 2025-07-04 RX ADMIN — SENNOSIDES AND DOCUSATE SODIUM 2 TABLET: 50; 8.6 TABLET ORAL at 21:09

## 2025-07-04 RX ADMIN — POLYETHYLENE GLYCOL 3350 17 G: 17 POWDER, FOR SOLUTION ORAL at 09:19

## 2025-07-04 RX ADMIN — ATORVASTATIN CALCIUM 40 MG: 40 TABLET, FILM COATED ORAL at 09:18

## 2025-07-04 RX ADMIN — ACETAMINOPHEN 650 MG: 325 TABLET ORAL at 21:14

## 2025-07-04 RX ADMIN — ALLOPURINOL 300 MG: 100 TABLET ORAL at 09:18

## 2025-07-04 ASSESSMENT — PAIN SCALES - GENERAL
PAINLEVEL_OUTOF10: 0
PAINLEVEL_OUTOF10: 1
PAINLEVEL_OUTOF10: 0
PAINLEVEL_OUTOF10: 0

## 2025-07-04 ASSESSMENT — PAIN DESCRIPTION - LOCATION: LOCATION: BUTTOCKS

## 2025-07-04 ASSESSMENT — PAIN - FUNCTIONAL ASSESSMENT: PAIN_FUNCTIONAL_ASSESSMENT: NONE - DENIES PAIN

## 2025-07-04 NOTE — PLAN OF CARE
Problem: Chronic Conditions and Co-morbidities  Goal: Patient's chronic conditions and co-morbidity symptoms are monitored and maintained or improved  Outcome: Progressing     Problem: Discharge Planning  Goal: Discharge to home or other facility with appropriate resources  Outcome: Progressing     Problem: ABCDS Injury Assessment  Goal: Absence of physical injury  Outcome: Progressing     Problem: Safety - Adult  Goal: Free from fall injury  Outcome: Progressing     Problem: Nutrition Deficit:  Goal: Optimize nutritional status  7/4/2025 1158 by Cinthia Oneil RN  Outcome: Progressing  7/4/2025 0742 by Aston Green, RD, LD  Outcome: Progressing  Flowsheets (Taken 7/4/2025 0531)  Nutrient intake appropriate for improving, restoring, or maintaining nutritional needs:   Monitor oral intake, labs, and treatment plans   Recommend appropriate diets, oral nutritional supplements, and vitamin/mineral supplements  Note: Nutrition Problem #1: Moderate malnutrition  Intervention: Food and/or Nutrient Delivery: Continue Current Diet, Continue Oral Nutrition Supplement       Problem: Skin/Tissue Integrity  Goal: Skin integrity remains intact  Description: 1.  Monitor for areas of redness and/or skin breakdown  2.  Assess vascular access sites hourly  3.  Every 4-6 hours minimum:  Change oxygen saturation probe site  4.  Every 4-6 hours:  If on nasal continuous positive airway pressure, respiratory therapy assess nares and determine need for appliance change or resting period  Outcome: Progressing

## 2025-07-04 NOTE — PROGRESS NOTES
Comprehensive Nutrition Assessment    Type and Reason for Visit:  Initial (SBU)    Nutrition Recommendations/Plan:   Encourage full use of Ensure and Magic Cup  Encourage protein foods   Monitor megace impact     Malnutrition Assessment:  Malnutrition Status:  Moderate malnutrition (07/04/25 0733)    Context:  Chronic Illness     Findings of the 6 clinical characteristics of malnutrition:  Energy Intake:     Weight Loss:        Body Fat Loss:        Muscle Mass Loss:       Fluid Accumulation:        Strength:       Nutrition Assessment:    Chronic moderate malnutrition r/t inadequate nutrient intakes, AEB unintentional weight losses, poorer PO > 1 month and moderat loss of fat/muscle. Post choly at [facility] and feels somewhat better than last stay at this facility. Moving bowels which he states was \"good\". States gagging with foods r/t taste and has had a poor appetite for many weeks. Not the biggest fan of the ONS ordered but encouraged its use for weight stability, healing and strengthening. Discussed use of Megace trial with provider to see if that will improve oral intakes.    Nutrition Related Findings:    active b/s with soft bm. trace LUE edema. Wound Type: None       Current Nutrition Intake & Therapies:    Average Meal Intake: 51-75%  Average Supplements Intake: 26-50%  ADULT ORAL NUTRITION SUPPLEMENT; Dinner; Frozen Oral Supplement  ADULT ORAL NUTRITION SUPPLEMENT; Breakfast, Lunch; Standard High Calorie/High Protein Oral Supplement  ADULT DIET; Regular; Low Fat/Low Chol/High Fiber/ANTONY    Anthropometric Measures:  Height: 172.7 cm (5' 8\")  Ideal Body Weight (IBW): 154 lbs (70 kg)    Admission Body Weight: 93.9 kg (207 lb 0.2 oz) (at [facility]'s)  Current Body Weight: 87.5 kg (192 lb 12.8 oz), 125.2 % IBW. Weight Source: Bed scale  Current BMI (kg/m2): 29.3  Usual Body Weight: 91.1 kg (200 lb 12.8 oz) (a month ago but 212.3# 2 months ago, 239.6# 4 months ago)     % Weight Change (Calculated): -4  Weight

## 2025-07-04 NOTE — PROGRESS NOTES
Phone: [phone] [facility]  Date: 2025  Fax: [phone]      Physical Therapy    Daily Note    Patient Name: Quique Wray      : 1945  (80 y.o.)  MRN: [id number]      Assessment  Bed mobility  Supine to Sit: Stand by assistance  Sit to Supine: Minimal assistance           Sit to Supine: Minimal assistance       Sit to Stand: Minimal Assistance  Stand to Sit: Contact guard assistance  Bed to Chair: Minimal assistance             WB Status: WBAT  Ambulation  Surface: Level tile  Device: Rolling Walker  Assistance: Contact guard assistance  Gait Deviations: Slow Pham (Antalgic)  Distance: 75ft with 1 standing rest break, FWW, CGA.  15ft with FWW and CGA             Assessment: Patient up in chair upon arrival.  Assisted with donning shirt, shorts and shoes.  Ambulated in hallway x75ft with 1 standing rest break.  WC for remainder of trip to PT gym.  Performed seated exercises.  WC back to door and ambulated to bed x15' with CGA.  Assist back into bed with Min A for LE.  Cues and Min A  for bed mobility.  Safety Devices  Type of Devices: Call light within reach, Bed alarm in place, Gait belt, Left in bed, Nurse notified          Call light in reach, Phone in reach, Use of Gait belt, Bed alarm, and Other Staff Present    Time In: 907  Time Out: 948  Timed Coded Minutes: 25  Total Treatment Time: 41       Exercises:  See Flowsheets    Plan  Cont Per Plan Of Care    Goals  Short Term Goals  Time Frame for Short Term Goals: -  Short Term Goal 1: STG=LTG  Short Term Goal 2: -  Short Term Goal 3: -  Short Term Goal 4: -  Short Term Goal 5: -    Long Term Goals  Time Frame for Long Term Goals : 16 days - expires 25  Long Term Goal 1: Complete basic transfers in/OOB and chair/commode with supervision or CGA to safely return home at prior level  Long Term Goal 2: Ambulate with wh walker x 50-75ft with supervision to safely negotiate home environment  Long Term Goal 3: Up/down steps with HR

## 2025-07-04 NOTE — PROGRESS NOTES
[facility]  Pharmacy Department    Clinical Pharmacy Note-Warfarin Follow-up       Patient:  Quique Wray  MRN: [id number]    Warfarin consult follow-up:    INR (no units)   Date Value   07/04/2025 1.8   07/03/2025 1.3   07/02/2025 1.2   07/01/2025 1.2   06/30/2025 1.2   06/29/2025 1.3   06/28/2025 1.3       Hemoglobin (g/dL)   Date Value   07/02/2025 8.7 (L)   06/30/2025 8.9 (L)   06/29/2025 9.1 (L)     Hematocrit (%)   Date Value   07/02/2025 27.0 (L)   06/30/2025 26.8 (L)   06/29/2025 27.7 (L)     Platelet Count (k/uL)   Date Value   11/13/2011 137     Platelets (k/uL)   Date Value   07/02/2025 174   06/30/2025 154   06/28/2025 169       Target INR Range: 2-3    Significant Drug-Drug Interactions:  New warfarin drug-drug interactions: none  Discontinued drug-drug interactions: none      Notes:  Significant jump in INR and is almost therapeutic. Will order 5 mg for this evening (vs home dose of 2.5 mg) as this should be enough to reach an INR of 2 to be able to stop Lovenox bridge. Daily PT/INR until stable within therapeutic range.     Giuseppe Blakely, PharmD 7/4/2025 7:35 AM

## 2025-07-04 NOTE — PLAN OF CARE
Problem: Safety - Adult  Goal: Free from fall injury  7/3/2025 2055 by Viola Driscoll, RN  Outcome: Progressing  7/3/2025 0947 by Sandra Lucas, RN  Outcome: Progressing

## 2025-07-04 NOTE — PROGRESS NOTES
[facility]  Phone: [phone]    Occupational Therapy Skilled Daily Note  Date: 2025  Patient Name: Quique Wray        MRN: [id number]    : 1945  (80 y.o.)    Subjective:     Subjective: Patient was seated in recliner upon arrival, agreeable to OT interventions.    Pt is PROGRESSING toward goals and independence of Self Care this treatment session    Discharge recommendation: Continue to assess Pending Progress    Objective:     ADLs:  UE Dressing: Setup  LE Dressing: Maximum assistance    Transfers:  Supine to Sit: Stand by assistance  Sit to Supine: Minimal assistance  Sit to stand: Minimal assistance   Stand to sit: Minimal assistance      Assessment:     Assessment: Patient seated in recliner upon arrival, agreeable to OT interventions. Co-tx with PT is completed to due to patients fatigue levels. Engages in UB/LB dressing tasks as noted above. Completes functional transfers as noted above, ambulates x75 feet with fww and wc follow without standing rest periods. Wheeled remainder of way to therapy room where patient engages in UB exercises using 2# straight weight and orange t- band to improve UB strength to maximize IND with ADL and IADL tasks. Returns to room via w/c. Remains in bed upon CHRIS departure with call light and other personal items within reach. Bed alarm in place, will continue to address goals and progress pt as able/tolerated.    Exercises:     See Flowsheets    Goals:     Short term goals:  Time Frame for Short Term Goals: 6 days (2025)  Short Term Goal 1: Patient will complete a commode transfer while using DME PRN with SBA.  Short Term Goal 2: Patient will complete upper body dressing and/or bathing while using adaptive equipment/techniques PRN with SUP/SETUP.  Short Term Goal 3: Patient will complete lower body dressing and/or bathing while using adaptive equipment/techniques PRN with MIN A.  Short Term Goal 4: To increase UE strength for ADLs and functional

## 2025-07-05 LAB
INR PPP: 2.5
PROTHROMBIN TIME: 27.9 SEC (ref 11.5–14.2)

## 2025-07-05 PROCEDURE — 6370000000 HC RX 637 (ALT 250 FOR IP): Performed by: INTERNAL MEDICINE

## 2025-07-05 PROCEDURE — 97110 THERAPEUTIC EXERCISES: CPT

## 2025-07-05 PROCEDURE — 94761 N-INVAS EAR/PLS OXIMETRY MLT: CPT

## 2025-07-05 PROCEDURE — 85610 PROTHROMBIN TIME: CPT

## 2025-07-05 PROCEDURE — 36415 COLL VENOUS BLD VENIPUNCTURE: CPT

## 2025-07-05 PROCEDURE — 97530 THERAPEUTIC ACTIVITIES: CPT

## 2025-07-05 PROCEDURE — 1200000002 HC SEMI PRIVATE SWING BED

## 2025-07-05 RX ORDER — WARFARIN SODIUM 2.5 MG/1
2.5 TABLET ORAL
Status: COMPLETED | OUTPATIENT
Start: 2025-07-05 | End: 2025-07-05

## 2025-07-05 RX ADMIN — ASPIRIN 81 MG: 81 TABLET, COATED ORAL at 08:01

## 2025-07-05 RX ADMIN — Medication 6 MG: at 21:01

## 2025-07-05 RX ADMIN — MIDODRINE HYDROCHLORIDE 5 MG: 5 TABLET ORAL at 15:48

## 2025-07-05 RX ADMIN — SERTRALINE HYDROCHLORIDE 25 MG: 50 TABLET ORAL at 08:01

## 2025-07-05 RX ADMIN — Medication 400 MG: at 08:01

## 2025-07-05 RX ADMIN — MEGESTROL ACETATE 400 MG: 40 SUSPENSION ORAL at 08:02

## 2025-07-05 RX ADMIN — DIPHENHYDRAMINE HYDROCHLORIDE 25 MG: 25 TABLET ORAL at 21:05

## 2025-07-05 RX ADMIN — ATORVASTATIN CALCIUM 40 MG: 40 TABLET, FILM COATED ORAL at 08:01

## 2025-07-05 RX ADMIN — Medication 400 MG: at 21:01

## 2025-07-05 RX ADMIN — METOPROLOL TARTRATE 25 MG: 25 TABLET, FILM COATED ORAL at 08:01

## 2025-07-05 RX ADMIN — TAMSULOSIN HYDROCHLORIDE 0.4 MG: 0.4 CAPSULE ORAL at 08:01

## 2025-07-05 RX ADMIN — ACETAMINOPHEN 650 MG: 325 TABLET ORAL at 21:02

## 2025-07-05 RX ADMIN — METOPROLOL TARTRATE 25 MG: 25 TABLET, FILM COATED ORAL at 21:01

## 2025-07-05 RX ADMIN — PANTOPRAZOLE SODIUM 40 MG: 40 TABLET, DELAYED RELEASE ORAL at 08:01

## 2025-07-05 RX ADMIN — SENNOSIDES AND DOCUSATE SODIUM 2 TABLET: 50; 8.6 TABLET ORAL at 21:03

## 2025-07-05 RX ADMIN — ALLOPURINOL 300 MG: 100 TABLET ORAL at 08:01

## 2025-07-05 RX ADMIN — WARFARIN SODIUM 2.5 MG: 2.5 TABLET ORAL at 17:29

## 2025-07-05 RX ADMIN — SENNOSIDES AND DOCUSATE SODIUM 2 TABLET: 50; 8.6 TABLET ORAL at 08:01

## 2025-07-05 ASSESSMENT — PAIN - FUNCTIONAL ASSESSMENT
PAIN_FUNCTIONAL_ASSESSMENT: ACTIVITIES ARE NOT PREVENTED
PAIN_FUNCTIONAL_ASSESSMENT: NONE - DENIES PAIN
PAIN_FUNCTIONAL_ASSESSMENT: NONE - DENIES PAIN

## 2025-07-05 ASSESSMENT — PAIN DESCRIPTION - FREQUENCY: FREQUENCY: CONTINUOUS

## 2025-07-05 ASSESSMENT — PAIN SCALES - GENERAL
PAINLEVEL_OUTOF10: 0
PAINLEVEL_OUTOF10: 1
PAINLEVEL_OUTOF10: 0
PAINLEVEL_OUTOF10: 0

## 2025-07-05 ASSESSMENT — PAIN DESCRIPTION - ONSET: ONSET: ON-GOING

## 2025-07-05 ASSESSMENT — PAIN DESCRIPTION - ORIENTATION: ORIENTATION: RIGHT;LEFT

## 2025-07-05 ASSESSMENT — PAIN DESCRIPTION - PAIN TYPE: TYPE: ACUTE PAIN

## 2025-07-05 ASSESSMENT — PAIN DESCRIPTION - LOCATION: LOCATION: BUTTOCKS

## 2025-07-05 ASSESSMENT — PAIN DESCRIPTION - DESCRIPTORS: DESCRIPTORS: SORE

## 2025-07-05 NOTE — PROGRESS NOTES
[facility]  Phone: [phone]    Occupational Therapy Skilled Daily Note  Date: 2025  Patient Name: Quique Wray        MRN: [id number]    : 1945  (80 y.o.)    Subjective:     Subjective: Patient was seated in recliner upon arrival, agreeable to OT interventions.    Pt is PROGRESSING toward goals and independence of Self Care this treatment session    Discharge recommendation: Continue to assess Pending Progress    Objective:     ADLs:  LE Dressing: Maximum assistance (donning and doffing SERJIO shoes)    Transfers:  Supine to Sit: Stand by assistance  Sit to Supine: Minimal assistance  Sit to stand: Minimal assistance   Stand to sit: Minimal assistance    Assessment:     Assessment: Patient seated in recliner upon arrival, agreeable to OT interventions. Co-tx with PT is completed to due to patients fatigue levels. Completes functional transfers and ADL tasks as noted above.  Ambulates from recliner to nurses station before requring seated RB due to unsteady gait. Wheeled remainder of way to therapy room. Engages in dynamic standing task x2 min 29 seconds to address standing tolerance for participation in ADL and IADL tasks. Requires CGA for completion of standing task terminated due to patient's request. Concluded session with orange t-band and 1# straight weight exercises w/o RB. Remains in bed upon departure with call light and other personal items within reach. Bed alarm in place. Will continue to address goals and progress pt as able/tolerated.     Exercises:     See Flowsheets    Goals:     Short term goals:  Time Frame for Short Term Goals: 6 days (2025)  Short Term Goal 1: Patient will complete a commode transfer while using DME PRN with SBA.  Short Term Goal 2: Patient will complete upper body dressing and/or bathing while using adaptive equipment/techniques PRN with SUP/SETUP.  Short Term Goal 3: Patient will complete lower body dressing and/or bathing while using adaptive

## 2025-07-05 NOTE — PROGRESS NOTES
Phone: [phone] [facility]  Date: 2025  Fax: [phone]      Physical Therapy    Daily Note    Patient Name: Quique Wray      : 1945  (80 y.o.)  MRN: [id number]      Assessment  Bed mobility  Supine to Sit: Stand by assistance  Sit to Supine: Minimal assistance  Scooting: Minimal assistance           Sit to Supine: Minimal assistance  Scooting: Minimal assistance    Sit to Stand: Minimal Assistance  Stand to Sit: Contact guard assistance  Bed to Chair: Minimal assistance             WB Status: WBAT  Ambulation  Surface: Level tile  Device: Rolling Walker  Assistance: Contact guard assistance  Gait Deviations: Slow Pham  Distance: 50ft with FWW and CGA  Comments: Difficulty advancing right LE.  Crossing legs today.             Assessment: Patient with increased fatigue and difficulty ambulating today.  Crossing legs at times.  SpO2 remains in mid to high 90s despite increased SOB and fatigue.  Will continue to work on strength, endurance and balance.  Safety Devices  Type of Devices: Call light within reach, Gait belt, Left in bed, Bed alarm in place          Call light in reach, Phone in reach, Use of Gait belt, and Bed alarm  Time In: 0958  Time Out: 1033  Timed Coded Minutes: 18  Total Treatment Time: 36       Exercises:  See Flowsheets    Plan  Cont Per Plan Of Care    Goals  Short Term Goals  Time Frame for Short Term Goals: -  Short Term Goal 1: STG=LTG  Short Term Goal 2: -  Short Term Goal 3: -  Short Term Goal 4: -  Short Term Goal 5: -    Long Term Goals  Time Frame for Long Term Goals : 16 days - expires 25  Long Term Goal 1: Complete basic transfers in/OOB and chair/commode with supervision or CGA to safely return home at prior level  Long Term Goal 2: Ambulate with wh walker x 50-75ft with supervision to safely negotiate home environment  Long Term Goal 3: Up/down steps with HR and supervision/CGA to safely enter/exit home (has grab bars at entry at home)  Long Term

## 2025-07-05 NOTE — CONSULTS
[facility]  Pharmacy Department    Clinical Pharmacy Note-Warfarin Follow-up       Patient:  Quique Wray  MRN: [id number]    Warfarin consult follow-up:    INR (no units)   Date Value   07/05/2025 2.5   07/04/2025 1.8   07/03/2025 1.3   07/02/2025 1.2   07/01/2025 1.2   06/30/2025 1.2   06/29/2025 1.3       Hemoglobin (g/dL)   Date Value   07/02/2025 8.7 (L)   06/30/2025 8.9 (L)   06/29/2025 9.1 (L)     Hematocrit (%)   Date Value   07/02/2025 27.0 (L)   06/30/2025 26.8 (L)   06/29/2025 27.7 (L)     Platelet Count (k/uL)   Date Value   11/13/2011 137     Platelets (k/uL)   Date Value   07/02/2025 174   06/30/2025 154   06/28/2025 169       Target INR Range: 2 - 3    Significant Drug-Drug Interactions:  New warfarin drug-drug interactions: none  Discontinued drug-drug interactions: none    Notes:      1.  Stop Lovenox (discontinued by Dr. Bowens).      2.  Warfarin 2.5 mg today.           3. Daily PT/INR until stable within therapeutic range.       Monika Pope [facility],   7/5/2025, 10:00 AM

## 2025-07-05 NOTE — PLAN OF CARE
Problem: Chronic Conditions and Co-morbidities  Goal: Patient's chronic conditions and co-morbidity symptoms are monitored and maintained or improved  7/5/2025 0835 by Cinthia Oneil RN  Outcome: Progressing  7/4/2025 2156 by Julieth Stone RN  Outcome: Progressing     Problem: Discharge Planning  Goal: Discharge to home or other facility with appropriate resources  7/5/2025 0835 by Cinthia Oneil RN  Outcome: Progressing  7/4/2025 2156 by Julieth Stone RN  Outcome: Progressing     Problem: ABCDS Injury Assessment  Goal: Absence of physical injury  7/5/2025 0835 by Cinthia Oneil RN  Outcome: Progressing  7/4/2025 2156 by Julieth Stone RN  Outcome: Progressing     Problem: Safety - Adult  Goal: Free from fall injury  7/5/2025 0835 by Cinthia Oneil RN  Outcome: Progressing  7/4/2025 2156 by Julieth Stone RN  Outcome: Progressing     Problem: Nutrition Deficit:  Goal: Optimize nutritional status  7/5/2025 0835 by Cinthia Oneil RN  Outcome: Progressing  7/4/2025 2156 by Julieth Stone RN  Outcome: Progressing     Problem: Skin/Tissue Integrity  Goal: Skin integrity remains intact  Description: 1.  Monitor for areas of redness and/or skin breakdown  2.  Assess vascular access sites hourly  3.  Every 4-6 hours minimum:  Change oxygen saturation probe site  4.  Every 4-6 hours:  If on nasal continuous positive airway pressure, respiratory therapy assess nares and determine need for appliance change or resting period  7/5/2025 0835 by Cinthia Oneil RN  Outcome: Progressing  7/4/2025 2156 by Julieth Stone RN  Outcome: Progressing     Problem: Anxiety  Goal: Will report anxiety at manageable levels  Description: INTERVENTIONS:  1. Administer medication as ordered  2. Teach and rehearse alternative coping skills  3. Provide emotional support with 1:1 interaction with staff  Outcome: Progressing     Problem: Coping  Goal: Pt/Family able to verbalize concerns and demonstrate effective  coping strategies  Description: INTERVENTIONS:  1. Assist patient/family to identify coping skills, available support systems and cultural and spiritual values  2. Provide emotional support, including active listening and acknowledgement of concerns of patient and caregivers  3. Reduce environmental stimuli, as able  4. Instruct patient/family in relaxation techniques, as appropriate  5. Assess for spiritual pain/suffering and initiate Spiritual Care, Psychosocial Clinical Specialist consults as needed  Outcome: Progressing     Problem: Change in Body Image  Goal: Pt/Family communicate acceptance of loss or change in body image and feel psychological comfort and peace  Description: INTERVENTIONS:  1. Assess patient/family anxiety and grief process related to change in body image, loss of functional status, loss of sense of self, and forgiveness  2. Provide emotional and spiritual support  3. Provide information about the patient's health status with consideration of family and cultural values  4. Communicate willingness to discuss loss and facilitate grief process with patient/family as appropriate  5. Emphasize sustaining relationships within family system and community, or valerie/spiritual traditions  6. Initiate Spiritual Care, Psychosocial Clinical Specialist consult as needed  Outcome: Progressing

## 2025-07-06 ENCOUNTER — APPOINTMENT (OUTPATIENT)
Dept: GENERAL RADIOLOGY | Age: 80
End: 2025-07-06
Attending: INTERNAL MEDICINE
Payer: MEDICARE

## 2025-07-06 LAB
ALBUMIN SERPL-MCNC: 3.2 G/DL (ref 3.5–5.2)
ALBUMIN/GLOB SERPL: 0.9 {RATIO} (ref 1–2.5)
ALP SERPL-CCNC: 94 U/L (ref 40–129)
ALT SERPL-CCNC: 17 U/L (ref 5–41)
ANION GAP SERPL CALCULATED.3IONS-SCNC: 9 MMOL/L (ref 9–17)
AST SERPL-CCNC: 37 U/L
BASOPHILS # BLD: 0.05 K/UL (ref 0–0.2)
BASOPHILS NFR BLD: 1 % (ref 0–2)
BILIRUB SERPL-MCNC: 0.8 MG/DL (ref 0.3–1.2)
BUN SERPL-MCNC: 20 MG/DL (ref 8–23)
CALCIUM SERPL-MCNC: 8.8 MG/DL (ref 8.6–10.4)
CHLORIDE SERPL-SCNC: 102 MMOL/L (ref 98–107)
CO2 SERPL-SCNC: 25 MMOL/L (ref 20–31)
CREAT SERPL-MCNC: 1 MG/DL (ref 0.7–1.2)
EOSINOPHIL # BLD: 0.2 K/UL (ref 0–0.4)
EOSINOPHILS RELATIVE PERCENT: 3 % (ref 0–5)
ERYTHROCYTE [DISTWIDTH] IN BLOOD BY AUTOMATED COUNT: ABNORMAL % (ref 12.1–15.2)
GFR, ESTIMATED: 77 ML/MIN/1.73M2
GLUCOSE SERPL-MCNC: 134 MG/DL (ref 70–99)
HCT VFR BLD AUTO: 26.2 % (ref 41–53)
HGB BLD-MCNC: 8.4 G/DL (ref 13.5–17.5)
IMM GRANULOCYTES # BLD AUTO: 0.02 K/UL (ref 0–0.3)
IMM GRANULOCYTES NFR BLD: 0 % (ref 0–5)
INR PPP: 2.7
LYMPHOCYTES NFR BLD: 1.03 K/UL (ref 1–4.8)
LYMPHOCYTES RELATIVE PERCENT: 15 % (ref 13–44)
MCH RBC QN AUTO: 24.7 PG (ref 26–34)
MCHC RBC AUTO-ENTMCNC: 32.1 G/DL (ref 31–37)
MCV RBC AUTO: 77.1 FL (ref 80–100)
MONOCYTES NFR BLD: 0.46 K/UL (ref 0–1)
MONOCYTES NFR BLD: 7 % (ref 5–9)
MORPHOLOGY: ABNORMAL
NEUTROPHILS NFR BLD: 75 % (ref 39–75)
NEUTS SEG NFR BLD: 5.32 K/UL (ref 2.1–6.5)
PLATELET # BLD AUTO: 251 K/UL (ref 140–450)
PMV BLD AUTO: ABNORMAL FL (ref 6–12)
POTASSIUM SERPL-SCNC: 4.7 MMOL/L (ref 3.7–5.3)
PROT SERPL-MCNC: 6.7 G/DL (ref 6.4–8.3)
PROTHROMBIN TIME: 29.3 SEC (ref 11.5–14.2)
RBC # BLD AUTO: 3.4 M/UL (ref 4.5–5.9)
SODIUM SERPL-SCNC: 136 MMOL/L (ref 135–144)
WBC OTHER # BLD: 7.1 K/UL (ref 3.5–11)

## 2025-07-06 PROCEDURE — 1200000002 HC SEMI PRIVATE SWING BED

## 2025-07-06 PROCEDURE — 85610 PROTHROMBIN TIME: CPT

## 2025-07-06 PROCEDURE — 97110 THERAPEUTIC EXERCISES: CPT

## 2025-07-06 PROCEDURE — 85025 COMPLETE CBC W/AUTO DIFF WBC: CPT

## 2025-07-06 PROCEDURE — 6370000000 HC RX 637 (ALT 250 FOR IP): Performed by: INTERNAL MEDICINE

## 2025-07-06 PROCEDURE — 97535 SELF CARE MNGMENT TRAINING: CPT

## 2025-07-06 PROCEDURE — 80053 COMPREHEN METABOLIC PANEL: CPT

## 2025-07-06 PROCEDURE — 97116 GAIT TRAINING THERAPY: CPT

## 2025-07-06 PROCEDURE — 94761 N-INVAS EAR/PLS OXIMETRY MLT: CPT

## 2025-07-06 PROCEDURE — 71045 X-RAY EXAM CHEST 1 VIEW: CPT

## 2025-07-06 PROCEDURE — 36415 COLL VENOUS BLD VENIPUNCTURE: CPT

## 2025-07-06 RX ORDER — WARFARIN SODIUM 2.5 MG/1
2.5 TABLET ORAL
Status: COMPLETED | OUTPATIENT
Start: 2025-07-06 | End: 2025-07-06

## 2025-07-06 RX ORDER — QUETIAPINE FUMARATE 25 MG/1
25 TABLET, FILM COATED ORAL NIGHTLY
Status: DISCONTINUED | OUTPATIENT
Start: 2025-07-06 | End: 2025-07-12 | Stop reason: HOSPADM

## 2025-07-06 RX ORDER — FUROSEMIDE 20 MG/1
20 TABLET ORAL DAILY
Status: DISCONTINUED | OUTPATIENT
Start: 2025-07-06 | End: 2025-07-12 | Stop reason: HOSPADM

## 2025-07-06 RX ORDER — POTASSIUM CHLORIDE 750 MG/1
20 TABLET, EXTENDED RELEASE ORAL ONCE
Status: COMPLETED | OUTPATIENT
Start: 2025-07-06 | End: 2025-07-06

## 2025-07-06 RX ADMIN — MIDODRINE HYDROCHLORIDE 5 MG: 5 TABLET ORAL at 17:37

## 2025-07-06 RX ADMIN — MEGESTROL ACETATE 400 MG: 40 SUSPENSION ORAL at 08:23

## 2025-07-06 RX ADMIN — Medication 6 MG: at 21:11

## 2025-07-06 RX ADMIN — Medication 400 MG: at 08:23

## 2025-07-06 RX ADMIN — METOPROLOL TARTRATE 25 MG: 25 TABLET, FILM COATED ORAL at 21:11

## 2025-07-06 RX ADMIN — ACETAMINOPHEN 650 MG: 325 TABLET ORAL at 21:11

## 2025-07-06 RX ADMIN — SENNOSIDES AND DOCUSATE SODIUM 2 TABLET: 50; 8.6 TABLET ORAL at 21:11

## 2025-07-06 RX ADMIN — TAMSULOSIN HYDROCHLORIDE 0.4 MG: 0.4 CAPSULE ORAL at 08:23

## 2025-07-06 RX ADMIN — FUROSEMIDE 20 MG: 20 TABLET ORAL at 12:45

## 2025-07-06 RX ADMIN — WARFARIN SODIUM 2.5 MG: 2.5 TABLET ORAL at 17:37

## 2025-07-06 RX ADMIN — SENNOSIDES AND DOCUSATE SODIUM 2 TABLET: 50; 8.6 TABLET ORAL at 08:23

## 2025-07-06 RX ADMIN — POTASSIUM CHLORIDE 20 MEQ: 750 TABLET, FILM COATED, EXTENDED RELEASE ORAL at 12:45

## 2025-07-06 RX ADMIN — ASPIRIN 81 MG: 81 TABLET, COATED ORAL at 08:23

## 2025-07-06 RX ADMIN — QUETIAPINE 25 MG: 25 TABLET ORAL at 21:11

## 2025-07-06 RX ADMIN — ATORVASTATIN CALCIUM 40 MG: 40 TABLET, FILM COATED ORAL at 08:22

## 2025-07-06 RX ADMIN — SERTRALINE HYDROCHLORIDE 25 MG: 50 TABLET ORAL at 08:25

## 2025-07-06 RX ADMIN — PANTOPRAZOLE SODIUM 40 MG: 40 TABLET, DELAYED RELEASE ORAL at 08:23

## 2025-07-06 RX ADMIN — MIDODRINE HYDROCHLORIDE 5 MG: 5 TABLET ORAL at 08:25

## 2025-07-06 RX ADMIN — Medication 400 MG: at 21:11

## 2025-07-06 RX ADMIN — ALLOPURINOL 300 MG: 100 TABLET ORAL at 08:22

## 2025-07-06 ASSESSMENT — PAIN SCALES - GENERAL
PAINLEVEL_OUTOF10: 2
PAINLEVEL_OUTOF10: 0

## 2025-07-06 NOTE — CONSULTS
[facility]  Pharmacy Department    Clinical Pharmacy Note-Warfarin Follow-up       Patient:  Quique Wray  MRN: [id number]    Warfarin consult follow-up:    INR (no units)   Date Value   07/06/2025 2.7   07/05/2025 2.5   07/04/2025 1.8   07/03/2025 1.3   07/02/2025 1.2   07/01/2025 1.2   06/30/2025 1.2       Hemoglobin (g/dL)   Date Value   07/06/2025 8.4 (L)   07/02/2025 8.7 (L)   06/30/2025 8.9 (L)     Hematocrit (%)   Date Value   07/06/2025 26.2 (L)   07/02/2025 27.0 (L)   06/30/2025 26.8 (L)     Platelet Count (k/uL)   Date Value   11/13/2011 137     Platelets (k/uL)   Date Value   07/06/2025 251   07/02/2025 174   06/30/2025 154       Target INR Range: 2-3    Significant Drug-Drug Interactions:  New warfarin drug-drug interactions: none  Discontinued drug-drug interactions: none      Notes:                   1.  Warfarin 2.5 mg po today.   2.  Daily PT/INR until stable within therapeutic range.     Thank you.   Monika Pope [facility],   7/6/2025, 10:01 AM

## 2025-07-06 NOTE — PROGRESS NOTES
[facility]  Phone: [phone]    Occupational Therapy Skilled Daily Note  Date: 2025  Patient Name: Quique Wray        MRN: [id number]    : 1945  (80 y.o.)    Subjective:     Subjective: Patient was in bed upon arrival, agreeable to OT interventions.    Pt is PROGRESSING toward goals and independence of Self Care this treatment session    Discharge recommendation: Continue to assess Pending Progress    Objective:     ADLs:  Grooming: Minimal assistance (hair washing w/ shower cap)  UE Dressing: Setup  LE Dressing: Moderate assistance      Transfers:  Supine to Sit: Partial/Moderate assistance  Sit to Supine: Minimal assistance  Sit to stand: Minimal assistance   Stand to sit: Minimal assistance      Assessment:     Assessment: Patient in bed upon arrival, agreeable to OT interventions. Co-tx with PT is completed due to patients diminished endurance levels and increased SOB this date. Completes hair washing via shower cap and UB/LB dressing tasks as noted above. Ambulates from EOB to nurses station w/ CGA. See assistance levels with fxnl transfers. Wheeled remainder of way to therapy room, engages in UB resistive exercises x10 minutes with x2 short rest periods. Ambulates ~150 feet back to room w/ FWW and wc follow. Does present with increased SOB, O2 assessed 96% on room air, HR 93 bpm. Remains in bed at conclusion of session w/ call light and other personal items within reach. Bed alarm in place, RN notified. Will continue to address goals and progress patient as able/tolerated.    Exercises:     See Flowsheets    Goals:     Short term goals:  Time Frame for Short Term Goals: 6 days (2025)  Short Term Goal 1: Patient will complete a commode transfer while using DME PRN with SBA.  Short Term Goal 2: Patient will complete upper body dressing and/or bathing while using adaptive equipment/techniques PRN with SUP/SETUP.  Short Term Goal 3: Patient will complete lower body dressing and/or

## 2025-07-06 NOTE — PROGRESS NOTES
Secure message sent to  Back: Fine crackles heard to bilat bases today with some dyspnea noted with rest. Only 370 ml out yersterday, will keep a close eye on this today.

## 2025-07-06 NOTE — PROGRESS NOTES
Phone: [phone] [facility]  Date: 2025  Fax: [phone]      Physical Therapy    Daily Note    Patient Name: Quique Wray      : 1945  (80 y.o.)  MRN: [id number]      Assessment  Bed mobility  Supine to Sit: Partial/Moderate assistance  Sit to Supine: Minimal assistance  Scooting: Minimal assistance        Supine to Sit: Moderate assistance  Sit to Supine: Minimal assistance  Scooting: Minimal assistance    Sit to Stand: Minimal Assistance  Stand to Sit: Contact guard assistance  Bed to Chair: Minimal assistance             WB Status: WBAT  Ambulation  Surface: Level tile  Device: Rolling Walker  Assistance: Contact guard assistance  Gait Deviations: Slow Pham  Distance: 75ft with FWW and CGA.  150ft with FWW and CGA after exercise  Comments: Improved gait mechanics today.  Less crossing of feet until fatigued.             Assessment: Patient assisted to side of bed.  MALDONADO assisted with shampoo.  Assisted with dressing.  Mod A for shoes and pants.  Ambulated in hallway x75ft with FWW and CGA.  Improved gait mechanics initially.  Fatigues then begins to cross feet, lean forward and push walker out in front.  Used WC to PT gym and performed exercises as above.  Unable to progress weight for left LE.  Challenged with march on left, relies on PF strength to compensate.  Ambulates 150ft back to room (elevator to room) and assisted back to bed.  Safety Devices  Type of Devices: Call light within reach, Gait belt, Left in bed, Bed alarm in place          Call light in reach, Phone in reach, Chair alarm, and Other Staff Present MALDONADO  Time In: 0955  Time Out: 1008  Timed Coded Minutes: 13  Total Treatment Time: 13       Exercises:  See Flowsheets    Plan  Cont Per Plan Of Care    Goals  Short Term Goals  Time Frame for Short Term Goals: -  Short Term Goal 1: STG=LTG  Short Term Goal 2: -  Short Term Goal 3: -  Short Term Goal 4: -  Short Term Goal 5: -    Long Term Goals  Time Frame for Long  Term Goals : 16 days - expires 7/18/25  Long Term Goal 1: Complete basic transfers in/OOB and chair/commode with supervision or CGA to safely return home at prior level  Long Term Goal 2: Ambulate with wh walker x 50-75ft with supervision to safely negotiate home environment  Long Term Goal 3: Up/down steps with HR and supervision/CGA to safely enter/exit home (has grab bars at entry at home)  Long Term Goal 4: Fair+/good endurance to complete above mobility to reduce falls risk associated with fatigue       Jagdish Dyson, PT Therapy License Number: [id number]    Date: 7/6/2025

## 2025-07-06 NOTE — PLAN OF CARE
Problem: Chronic Conditions and Co-morbidities  Goal: Patient's chronic conditions and co-morbidity symptoms are monitored and maintained or improved  Outcome: Progressing  Flowsheets (Taken 7/5/2025 2000)  Care Plan - Patient's Chronic Conditions and Co-Morbidity Symptoms are Monitored and Maintained or Improved: Monitor and assess patient's chronic conditions and comorbid symptoms for stability, deterioration, or improvement     Problem: Discharge Planning  Goal: Discharge to home or other facility with appropriate resources  Outcome: Progressing  Flowsheets (Taken 7/5/2025 2000)  Discharge to home or other facility with appropriate resources: Identify barriers to discharge with patient and caregiver     Problem: ABCDS Injury Assessment  Goal: Absence of physical injury  Outcome: Progressing     Problem: Safety - Adult  Goal: Free from fall injury  Outcome: Progressing     Problem: Nutrition Deficit:  Goal: Optimize nutritional status  Outcome: Progressing     Problem: Skin/Tissue Integrity  Goal: Skin integrity remains intact  Description: 1.  Monitor for areas of redness and/or skin breakdown  2.  Assess vascular access sites hourly  3.  Every 4-6 hours minimum:  Change oxygen saturation probe site  4.  Every 4-6 hours:  If on nasal continuous positive airway pressure, respiratory therapy assess nares and determine need for appliance change or resting period  Outcome: Progressing  Flowsheets (Taken 7/5/2025 2000)  Skin Integrity Remains Intact: Monitor for areas of redness and/or skin breakdown     Problem: Anxiety  Goal: Will report anxiety at manageable levels  Description: INTERVENTIONS:  1. Administer medication as ordered  2. Teach and rehearse alternative coping skills  3. Provide emotional support with 1:1 interaction with staff  Outcome: Progressing     Problem: Coping  Goal: Pt/Family able to verbalize concerns and demonstrate effective coping strategies  Description: INTERVENTIONS:  1. Assist  patient/family to identify coping skills, available support systems and cultural and spiritual values  2. Provide emotional support, including active listening and acknowledgement of concerns of patient and caregivers  3. Reduce environmental stimuli, as able  4. Instruct patient/family in relaxation techniques, as appropriate  5. Assess for spiritual pain/suffering and initiate Spiritual Care, Psychosocial Clinical Specialist consults as needed  Outcome: Progressing     Problem: Change in Body Image  Goal: Pt/Family communicate acceptance of loss or change in body image and feel psychological comfort and peace  Description: INTERVENTIONS:  1. Assess patient/family anxiety and grief process related to change in body image, loss of functional status, loss of sense of self, and forgiveness  2. Provide emotional and spiritual support  3. Provide information about the patient's health status with consideration of family and cultural values  4. Communicate willingness to discuss loss and facilitate grief process with patient/family as appropriate  5. Emphasize sustaining relationships within family system and community, or valerie/spiritual traditions  6. Initiate Spiritual Care, Psychosocial Clinical Specialist consult as needed  Outcome: Progressing     Problem: Pain  Goal: Verbalizes/displays adequate comfort level or baseline comfort level  Outcome: Progressing

## 2025-07-07 LAB
INR PPP: 2.8
MICROORGANISM SPEC CULT: NORMAL
MICROORGANISM SPEC CULT: NORMAL
MICROORGANISM/AGENT SPEC: NORMAL
MICROORGANISM/AGENT SPEC: NORMAL
PROTHROMBIN TIME: 30.6 SEC (ref 11.5–14.2)
SERVICE CMNT-IMP: NORMAL
SERVICE CMNT-IMP: NORMAL
SPECIMEN DESCRIPTION: NORMAL
SPECIMEN DESCRIPTION: NORMAL

## 2025-07-07 PROCEDURE — 97116 GAIT TRAINING THERAPY: CPT

## 2025-07-07 PROCEDURE — 85610 PROTHROMBIN TIME: CPT

## 2025-07-07 PROCEDURE — 97110 THERAPEUTIC EXERCISES: CPT

## 2025-07-07 PROCEDURE — 6370000000 HC RX 637 (ALT 250 FOR IP): Performed by: INTERNAL MEDICINE

## 2025-07-07 PROCEDURE — 97530 THERAPEUTIC ACTIVITIES: CPT

## 2025-07-07 PROCEDURE — 36415 COLL VENOUS BLD VENIPUNCTURE: CPT

## 2025-07-07 PROCEDURE — 51798 US URINE CAPACITY MEASURE: CPT

## 2025-07-07 PROCEDURE — 94761 N-INVAS EAR/PLS OXIMETRY MLT: CPT

## 2025-07-07 PROCEDURE — 1200000002 HC SEMI PRIVATE SWING BED

## 2025-07-07 RX ORDER — WARFARIN SODIUM 2.5 MG/1
2.5 TABLET ORAL
Status: COMPLETED | OUTPATIENT
Start: 2025-07-07 | End: 2025-07-07

## 2025-07-07 RX ADMIN — SERTRALINE HYDROCHLORIDE 25 MG: 50 TABLET ORAL at 08:13

## 2025-07-07 RX ADMIN — MEGESTROL ACETATE 400 MG: 40 SUSPENSION ORAL at 08:11

## 2025-07-07 RX ADMIN — Medication 400 MG: at 08:11

## 2025-07-07 RX ADMIN — WARFARIN SODIUM 2.5 MG: 2.5 TABLET ORAL at 17:42

## 2025-07-07 RX ADMIN — TAMSULOSIN HYDROCHLORIDE 0.4 MG: 0.4 CAPSULE ORAL at 08:12

## 2025-07-07 RX ADMIN — ASPIRIN 81 MG: 81 TABLET, COATED ORAL at 08:12

## 2025-07-07 RX ADMIN — ATORVASTATIN CALCIUM 40 MG: 40 TABLET, FILM COATED ORAL at 08:12

## 2025-07-07 RX ADMIN — MIDODRINE HYDROCHLORIDE 5 MG: 5 TABLET ORAL at 08:12

## 2025-07-07 RX ADMIN — ALLOPURINOL 300 MG: 100 TABLET ORAL at 08:11

## 2025-07-07 RX ADMIN — SENNOSIDES AND DOCUSATE SODIUM 2 TABLET: 50; 8.6 TABLET ORAL at 20:15

## 2025-07-07 RX ADMIN — ACETAMINOPHEN 650 MG: 325 TABLET ORAL at 20:14

## 2025-07-07 RX ADMIN — PANTOPRAZOLE SODIUM 40 MG: 40 TABLET, DELAYED RELEASE ORAL at 08:11

## 2025-07-07 RX ADMIN — METOPROLOL TARTRATE 25 MG: 25 TABLET, FILM COATED ORAL at 20:15

## 2025-07-07 RX ADMIN — Medication 400 MG: at 20:15

## 2025-07-07 RX ADMIN — FUROSEMIDE 20 MG: 20 TABLET ORAL at 08:12

## 2025-07-07 RX ADMIN — QUETIAPINE 25 MG: 25 TABLET ORAL at 20:15

## 2025-07-07 RX ADMIN — SENNOSIDES AND DOCUSATE SODIUM 2 TABLET: 50; 8.6 TABLET ORAL at 08:12

## 2025-07-07 RX ADMIN — MIDODRINE HYDROCHLORIDE 5 MG: 5 TABLET ORAL at 17:42

## 2025-07-07 ASSESSMENT — PAIN DESCRIPTION - ORIENTATION: ORIENTATION: MID

## 2025-07-07 ASSESSMENT — PAIN SCALES - GENERAL
PAINLEVEL_OUTOF10: 0
PAINLEVEL_OUTOF10: 1

## 2025-07-07 ASSESSMENT — PAIN DESCRIPTION - DESCRIPTORS: DESCRIPTORS: ACHING

## 2025-07-07 ASSESSMENT — PAIN DESCRIPTION - LOCATION: LOCATION: BACK

## 2025-07-07 NOTE — PROGRESS NOTES
[facility]  Pharmacy Department    Clinical Pharmacy Note-Warfarin Follow-up       Patient:  Quique Wray  MRN: [id number]    Warfarin consult follow-up:    INR (no units)   Date Value   07/07/2025 2.8   07/06/2025 2.7   07/05/2025 2.5   07/04/2025 1.8   07/03/2025 1.3   07/02/2025 1.2   07/01/2025 1.2       Hemoglobin (g/dL)   Date Value   07/06/2025 8.4 (L)   07/02/2025 8.7 (L)   06/30/2025 8.9 (L)     Hematocrit (%)   Date Value   07/06/2025 26.2 (L)   07/02/2025 27.0 (L)   06/30/2025 26.8 (L)     Platelet Count (k/uL)   Date Value   11/13/2011 137     Platelets (k/uL)   Date Value   07/06/2025 251   07/02/2025 174   06/30/2025 154       Target INR Range: 2-3    Significant Drug-Drug Interactions:  New warfarin drug-drug interactions: none  Discontinued drug-drug interactions: none      Notes:  Therapeutic INR so will order home dose of 2.5 mg for this evening. Daily PT/INR until stable within therapeutic range.     Giuseppe Blakely, PharmD 7/7/2025 7:16 AM

## 2025-07-07 NOTE — PROGRESS NOTES
Phone: [phone] [facility]  Date: 2025  Fax: [phone]      Physical Therapy    Daily Note    Patient Name: Quique Wray      : 1945  (80 y.o.)  MRN: [id number]      Assessment      Sit to Stand: Minimal Assistance, Contact guard assistance  Stand to Sit: Contact guard assistance               WB Status: WBAT  Ambulation  Surface: Level tile  Device: Rolling Walker  Assistance: Contact guard assistance (w/c follow)  Gait Deviations: Slow Pham  Distance: 75 ft x1; 15 ft x1  Comments: Improved gait mechanics today.  Less crossing of feet until fatigued.             Assessment: Patient in chair upon arrival to room. He agrees to work with PTA. Shoes are donned per PTA. Sit to stand from chair is min/CGA. Ambulates with wheeled walker and CGA ~75 ftx1 before requesting to sit down in w/c.  Wheeled remaining distance to therapy room. Completes seated exercises as outlined above. Added nustep x 5 minutes which fatigues patient and makes him slightly SOB. Declines ambulating any distance due to this. Wheeled back to room. Sit to stand from w/c is min/CGA. Ambulates back to chair with wheeled walker and CGA. Up in chair following with call light in reach.  Safety Devices  Type of Devices: Call light within reach, Gait belt, Left in chair, Nurse notified          Call light in reach, Phone in reach, Use of Gait belt, Nurse Notified, and Left in chair  Time In: 1005  Time Out: 1040  Timed Coded Minutes: 35  Total Treatment Time: 35       Exercises:  See Flowsheets    Plan  Cont Per Plan Of Care    Goals  Short Term Goals  Time Frame for Short Term Goals: -  Short Term Goal 1: STG=LTG  Short Term Goal 2: -  Short Term Goal 3: -  Short Term Goal 4: -  Short Term Goal 5: -    Long Term Goals  Time Frame for Long Term Goals : 16 days - expires 25  Long Term Goal 1: Complete basic transfers in/OOB and chair/commode with supervision or CGA to safely return home at prior level  Long Term Goal 2:

## 2025-07-07 NOTE — PROGRESS NOTES
[facility]  Phone: [phone]    Occupational Therapy Skilled Daily Note  Date: 2025  Patient Name: Quique Wray        MRN: [id number]    : 1945  (80 y.o.)    Subjective:     Subjective: Patient was sitting in recliner with wife present upon OT arrival. Was agreeable to OT interventions.    Discharge recommendation: Continue to assess Pending Progress    Objective:     ADLs:  Grooming: Supervision, Setup (brushing teeth in sitting)    Transfers:  Sit to stand: Minimal assistance   Stand to sit: Contact guard assistance, Stand by assistance    Assessment:     Assessment: Patient was sitting in recliner with wife present upon OT arrival. Was agreeable to OT interventions. Completed ADLs and functional transfers as documented above. Requested to remain in hospital room for therapy session. To increase UE strength for ADLs and functional transfers, patient engaged in 9 minutes of BUE ther ex as outlined in the exercise flowsheet. Required minimal cues for the correct technique, but did not require any rest breaks during this exercise bout. To increase independence and safety with IADLs, patient tolerated dynamic standing at walker level x2 trials (2 minutes/45 seconds and ~40 seconds) while engaging in a resistive fine motor activity involving functional reaching planes. Patient did not experience any LOB during this standing bout, but SOB was noted. Patient denied any further concerns or questions at this time. Patient remains in recliner with call light/personal items within reach upon OT departure. Will continue to address goals and progress patient as able/tolerated.    Exercises:     See Flowsheets    Goals:     Short term goals:  Time Frame for Short Term Goals: 6 days (2025)  Short Term Goal 1: Patient will complete a commode transfer while using DME PRN with SBA.  Short Term Goal 2: Patient will complete upper body dressing and/or bathing while using adaptive equipment/techniques

## 2025-07-07 NOTE — PROGRESS NOTES
Hospitalist Progress Note  7/7/2025 7:59 AM  Subjective:   Admit Date: 7/2/2025  PCP: Vernon Cade MD    Interval History:     The patient is up in chair, states his appetite is poor, he feels full.  Reports no abdominal pain, no nausea or vomiting.  Reports no cough, shortness of breath    Diet: ADULT ORAL NUTRITION SUPPLEMENT; Dinner; Frozen Oral Supplement  ADULT ORAL NUTRITION SUPPLEMENT; Breakfast, Lunch; Standard High Calorie/High Protein Oral Supplement  ADULT DIET; Regular; Low Fat/Low Chol/High Fiber/ANTONY  Medications:   Scheduled Meds:   warfarin  2.5 mg Oral Once    QUEtiapine  25 mg Oral Nightly    furosemide  20 mg Oral Daily    megestrol  400 mg Oral Daily    aspirin  81 mg Oral Daily    allopurinol  300 mg Oral Daily    atorvastatin  40 mg Oral Daily    pantoprazole  40 mg Oral QAM AC    sertraline  25 mg Oral Daily    tamsulosin  0.4 mg Oral Daily    metoprolol tartrate  25 mg Oral BID    polyethylene glycol  17 g Oral Daily    sennosides-docusate sodium  2 tablet Oral BID    magnesium oxide  400 mg Oral BID    warfarin placeholder: dosing by pharmacy   Oral RX Placeholder    midodrine  5 mg Oral BID WC     Continuous Infusions:  PRN Medications: acetaminophen **OR** acetaminophen, melatonin, nitroGLYCERIN, oxyCODONE-acetaminophen, sodium chloride, sodium chloride flush, calcium carbonate, diphenhydrAMINE    Objective:   Vitals: /71   Pulse 68   Temp 97.5 °F (36.4 °C) (Oral)   Resp 18   Ht 1.727 m (5' 8\")   Wt 88.5 kg (195 lb 1.6 oz)   SpO2 98%   BMI 29.66 kg/m²   BMI: Body mass index is 29.66 kg/m².          Physical Exam:  General Appearance: alert and oriented to person, place and time, in no acute distress  Cardiovascular: normal rate, regular rhythm, normal S1 and S2, 2/6 murmur  Pulmonary/Chest: Left base mild rhonchi, no wheezing, normal air movement, no respiratory distress  Abdomen: soft, non-tender, non-distended, normal bowel sounds   Extremities: no cyanosis, clubbing  or edema  Skin: warm and dry, no rash   Head: normocephalic and atraumatic  Neurological: alert, oriented, normal speech, no focal findings or movement disorder noted        Assessment and Plan:   Generalized weakness-admitted to swing bed for PT and OT  S/P laparoscopic cholecystectomy secondary to gangrenous cholecystitis  Acute blood loss anemia postop -received 7 units of PRBC and 2 units of FFP.  H&H stabilized  Right sided hemothorax -s/p chest tube placement with evacuation, resolved  Right side abdominal hematoma with bruising.  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  Recommended repeat EGD in the future when medically stable  Recent MRI related to thrombus embolization requiring aspiration thrombectomy of the ramus branch with US not showing any atherosclerotic plaque.  On aspirin  Permanent atrial fibrillation-rate stable on metoprolol.  On Coumadin, pharmacy managing  Hyperlipidemia-on Lipitor  Orthostatic hypotension-stable on midodrine  Depression-on Zoloft  BPH-on Flomax  History of CVA-on aspirin and Coumadin, Lipitor          Code status and discussions:  FULL code        DVT prophylaxis:   [] Lovenox   [] SCDs   [] SQ Heparin   [] Encourage ambulation, low risk for DVT, no chemical or mechanical    prophylaxis necessary      [x] Already on Anticoagulation        Documentation of the Current Medications in the Medical Record    ( x)  I have utilized all available immediate resources to obtain, update, or review the patient's current medications (including all prescriptions, over-the-counter products, herbals, cannabis / cannabidiol products, vitamin / mineral / dietary (nutritional) supplements).  (Satisfies MIPS Performance)  If Yes, Stop Here  ( )  The patient is not eligible for medication reconciliation; the patient is in an emergent medical situation where delaying treatment would jeopardize the patient's health.  (MIPS Performance exception / exclusion)  ( )  I

## 2025-07-07 NOTE — PROGRESS NOTES
Pt rings call light to void- unsuccessful attempt at voiding, pt straining to try to void. Pt states he feels like he has to, RN bladder scanned and found 86mL urine in bladder. Pt states he will attempt to urinate again later.    RN encourages fluids again at this time

## 2025-07-07 NOTE — PROGRESS NOTES
Phone: [phone] [facility]  Date: 2025  Fax: [phone]      Physical Therapy    Daily Note    Patient Name: Quique Wray      : 1945  (80 y.o.)  MRN: [id number]      Assessment    Sit to Supine: Stand by assistance, Contact guard assistance (from L side of bed for B LE's)      Sit to Stand: Minimal Assistance, Contact guard assistance  Stand to Sit: Contact guard assistance               WB Status: WBAT  Ambulation  Surface: Level tile  Device: Rolling Walker  Assistance: Contact guard assistance (w/c follow)  Gait Deviations: Slow Pham  Distance: 75 ftx1; 65 ftx1  Comments: Improved gait mechanics today.  Less crossing of feet until fatigued.             Assessment: Patient seated up in chair with wife also in room.  Sit to stand from chair is min.  Ambulates with wheeled walker and CGA ~75 ft x1.  He then requests to sit and be wheeled remaining distance to therapy room. Completes seated B LE exercises as outlined above.  Ambulates in // bars x 3 laps with sitting rest break after each lap. Able to ambulate ~65 ft back to room with wheeled walker and CGA and is wheeled remaining distance.  Sit to supine from L side of bed is min assist for B LE's.  In bed following with call light in reach and bed alarm turned on. Wife remains in room with patient.  Safety Devices  Type of Devices: Bed alarm in place, Call light within reach, Gait belt, Left in bed          Call light in reach, Phone in reach, Use of Gait belt, Bed alarm, Family Present, and Left in bed  Time In: 1247  Time Out: 1328  Timed Coded Minutes: 41  Total Treatment Time: 41       Exercises:  See Flowsheets    Plan  Cont Per Plan Of Care    Goals  Short Term Goals  Time Frame for Short Term Goals: -  Short Term Goal 1: STG=LTG  Short Term Goal 2: -  Short Term Goal 3: -  Short Term Goal 4: -  Short Term Goal 5: -    Long Term Goals  Time Frame for Long Term Goals : 16 days - expires 25  Long Term Goal 1: Complete basic

## 2025-07-07 NOTE — PLAN OF CARE
Problem: Chronic Conditions and Co-morbidities  Goal: Patient's chronic conditions and co-morbidity symptoms are monitored and maintained or improved  7/7/2025 0919 by Cinthia Oneil RN  Outcome: Progressing  7/6/2025 2119 by Julieth Stone RN  Outcome: Progressing     Problem: Discharge Planning  Goal: Discharge to home or other facility with appropriate resources  7/7/2025 0919 by Cinthia Oneil RN  Outcome: Progressing  7/6/2025 2119 by Julieth Stone RN  Outcome: Progressing     Problem: ABCDS Injury Assessment  Goal: Absence of physical injury  7/7/2025 0919 by Cinthia Oneil RN  Outcome: Progressing  7/6/2025 2119 by Julieth Stone RN  Outcome: Progressing     Problem: Safety - Adult  Goal: Free from fall injury  7/7/2025 0919 by Cinthia Oneil RN  Outcome: Progressing  7/6/2025 2119 by Julieth Stone RN  Outcome: Progressing     Problem: Nutrition Deficit:  Goal: Optimize nutritional status  7/7/2025 0919 by Cinthia Oneil RN  Outcome: Progressing  7/6/2025 2119 by Julieth Stone RN  Outcome: Progressing     Problem: Skin/Tissue Integrity  Goal: Skin integrity remains intact  Description: 1.  Monitor for areas of redness and/or skin breakdown  2.  Assess vascular access sites hourly  3.  Every 4-6 hours minimum:  Change oxygen saturation probe site  4.  Every 4-6 hours:  If on nasal continuous positive airway pressure, respiratory therapy assess nares and determine need for appliance change or resting period  7/7/2025 0919 by Cinthia Oneil RN  Outcome: Progressing  7/6/2025 2119 by Julieth Stone RN  Outcome: Progressing     Problem: Anxiety  Goal: Will report anxiety at manageable levels  Description: INTERVENTIONS:  1. Administer medication as ordered  2. Teach and rehearse alternative coping skills  3. Provide emotional support with 1:1 interaction with staff  7/7/2025 0919 by Cinthia Oneil RN  Outcome: Progressing  7/6/2025 2119 by Julieth Stone RN  Outcome:

## 2025-07-07 NOTE — PROGRESS NOTES
[facility]  Occupational Therapy    Date: 2025  Patient Name: Quique Wray        : 1945       [x] Pt Refusal: attempted OT afternoon interventions around 1400, however patient declined due to fatigue. Will re-attempt OT interventions tomorrow, 2025 as able.               Anna Argueta, ZOER/L Date: 2025

## 2025-07-07 NOTE — PROGRESS NOTES
Comprehensive Nutrition Assessment    Type and Reason for Visit:  Reassess    Nutrition Recommendations/Plan:   Encourage oral intakes  Prompt for food preferences  Encourage protein foods.  Mechanically alter foods as needed      Malnutrition Assessment:  Malnutrition Status:  Moderate malnutrition (07/04/25 0733)    Context:  Chronic Illness     Findings of the 6 clinical characteristics of malnutrition:  Energy Intake:  75% or less estimated energy requirements for 1 month or longer  Weight Loss:  Mild weight loss     Body Fat Loss:  Mild body fat loss Orbital, Buccal region   Muscle Mass Loss:  Mild muscle mass loss Temples (temporalis), Clavicles (pectoralis & deltoids)  Fluid Accumulation:  Mild (trace) Extremities   Strength:  Not Performed    Nutrition Assessment:    Stable malnutrition with relatively stable weight in swing bed. Thinks he ate a little better/more yesterday than he has been which may be resultant of Megace. Weight does remain lower than prior values a month ago and continues with strengthening needs. Only gags on some foods that \"sit on back of tongue\". States some difficulty with chewing some meats and offered ground/mechanically altered foods which he declines for now. He is aware of ability to ask dietary staff for food preferences. Last INR 2.8 on coumadin.    Nutrition Related Findings:    active b/d. soft bm and + flatus. trace/non pitting BLE edema. Wound Type: None       Current Nutrition Intake & Therapies:    Average Meal Intake: 26-50%  Average Supplements Intake: %  ADULT ORAL NUTRITION SUPPLEMENT; Dinner; Frozen Oral Supplement  ADULT ORAL NUTRITION SUPPLEMENT; Breakfast, Lunch; Standard High Calorie/High Protein Oral Supplement  ADULT DIET; Regular; Low Fat/Low Chol/High Fiber/ANTONY    Anthropometric Measures:  Height: 172.7 cm (5' 8\")  Ideal Body Weight (IBW): 154 lbs (70 kg)    Admission Body Weight: 93.9 kg (207 lb 0.2 oz) (at [facility])  Current Body Weight: 88.5 kg

## 2025-07-08 LAB
INR PPP: 3
PROTHROMBIN TIME: 32 SEC (ref 11.5–14.2)

## 2025-07-08 PROCEDURE — 6370000000 HC RX 637 (ALT 250 FOR IP): Performed by: INTERNAL MEDICINE

## 2025-07-08 PROCEDURE — 97110 THERAPEUTIC EXERCISES: CPT

## 2025-07-08 PROCEDURE — 97535 SELF CARE MNGMENT TRAINING: CPT

## 2025-07-08 PROCEDURE — 97116 GAIT TRAINING THERAPY: CPT

## 2025-07-08 PROCEDURE — 1200000002 HC SEMI PRIVATE SWING BED

## 2025-07-08 PROCEDURE — 36415 COLL VENOUS BLD VENIPUNCTURE: CPT

## 2025-07-08 PROCEDURE — 94761 N-INVAS EAR/PLS OXIMETRY MLT: CPT

## 2025-07-08 PROCEDURE — 85610 PROTHROMBIN TIME: CPT

## 2025-07-08 PROCEDURE — 97530 THERAPEUTIC ACTIVITIES: CPT

## 2025-07-08 RX ORDER — WARFARIN SODIUM 2.5 MG/1
2.5 TABLET ORAL
Status: COMPLETED | OUTPATIENT
Start: 2025-07-08 | End: 2025-07-08

## 2025-07-08 RX ADMIN — ASPIRIN 81 MG: 81 TABLET, COATED ORAL at 08:38

## 2025-07-08 RX ADMIN — WARFARIN SODIUM 2.5 MG: 2.5 TABLET ORAL at 17:30

## 2025-07-08 RX ADMIN — SERTRALINE HYDROCHLORIDE 25 MG: 50 TABLET ORAL at 08:38

## 2025-07-08 RX ADMIN — MIDODRINE HYDROCHLORIDE 5 MG: 5 TABLET ORAL at 08:38

## 2025-07-08 RX ADMIN — SENNOSIDES AND DOCUSATE SODIUM 2 TABLET: 50; 8.6 TABLET ORAL at 20:55

## 2025-07-08 RX ADMIN — Medication 6 MG: at 22:18

## 2025-07-08 RX ADMIN — ACETAMINOPHEN 650 MG: 325 TABLET ORAL at 22:18

## 2025-07-08 RX ADMIN — Medication 400 MG: at 08:38

## 2025-07-08 RX ADMIN — METOPROLOL TARTRATE 25 MG: 25 TABLET, FILM COATED ORAL at 20:55

## 2025-07-08 RX ADMIN — ATORVASTATIN CALCIUM 40 MG: 40 TABLET, FILM COATED ORAL at 08:38

## 2025-07-08 RX ADMIN — FUROSEMIDE 20 MG: 20 TABLET ORAL at 08:38

## 2025-07-08 RX ADMIN — PANTOPRAZOLE SODIUM 40 MG: 40 TABLET, DELAYED RELEASE ORAL at 08:38

## 2025-07-08 RX ADMIN — SENNOSIDES AND DOCUSATE SODIUM 2 TABLET: 50; 8.6 TABLET ORAL at 08:38

## 2025-07-08 RX ADMIN — MEGESTROL ACETATE 400 MG: 40 SUSPENSION ORAL at 08:37

## 2025-07-08 RX ADMIN — TAMSULOSIN HYDROCHLORIDE 0.4 MG: 0.4 CAPSULE ORAL at 08:38

## 2025-07-08 RX ADMIN — ALLOPURINOL 300 MG: 100 TABLET ORAL at 08:38

## 2025-07-08 RX ADMIN — MIDODRINE HYDROCHLORIDE 5 MG: 5 TABLET ORAL at 15:57

## 2025-07-08 RX ADMIN — QUETIAPINE 25 MG: 25 TABLET ORAL at 22:18

## 2025-07-08 RX ADMIN — Medication 400 MG: at 20:55

## 2025-07-08 ASSESSMENT — PAIN SCALES - GENERAL
PAINLEVEL_OUTOF10: 0
PAINLEVEL_OUTOF10: 1

## 2025-07-08 ASSESSMENT — PAIN DESCRIPTION - DESCRIPTORS: DESCRIPTORS: ACHING

## 2025-07-08 ASSESSMENT — PAIN DESCRIPTION - LOCATION: LOCATION: BACK

## 2025-07-08 ASSESSMENT — PAIN - FUNCTIONAL ASSESSMENT
PAIN_FUNCTIONAL_ASSESSMENT: ACTIVITIES ARE NOT PREVENTED
PAIN_FUNCTIONAL_ASSESSMENT: NONE - DENIES PAIN

## 2025-07-08 ASSESSMENT — PAIN DESCRIPTION - PAIN TYPE: TYPE: ACUTE PAIN

## 2025-07-08 ASSESSMENT — PAIN DESCRIPTION - FREQUENCY: FREQUENCY: CONTINUOUS

## 2025-07-08 ASSESSMENT — PAIN DESCRIPTION - ONSET: ONSET: ON-GOING

## 2025-07-08 ASSESSMENT — PAIN DESCRIPTION - ORIENTATION: ORIENTATION: POSTERIOR

## 2025-07-08 NOTE — PROGRESS NOTES
[facility]  Pharmacy Department    Clinical Pharmacy Note-Warfarin Follow-up       Patient:  Quique Wray  MRN: [id number]    Warfarin consult follow-up:    INR (no units)   Date Value   07/08/2025 3.0   07/07/2025 2.8   07/06/2025 2.7   07/05/2025 2.5   07/04/2025 1.8   07/03/2025 1.3   07/02/2025 1.2       Hemoglobin (g/dL)   Date Value   07/06/2025 8.4 (L)   07/02/2025 8.7 (L)   06/30/2025 8.9 (L)     Hematocrit (%)   Date Value   07/06/2025 26.2 (L)   07/02/2025 27.0 (L)   06/30/2025 26.8 (L)     Platelet Count (k/uL)   Date Value   11/13/2011 137     Platelets (k/uL)   Date Value   07/06/2025 251   07/02/2025 174   06/30/2025 154       Target INR Range: 2-3    Significant Drug-Drug Interactions:  New warfarin drug-drug interactions: none  Discontinued drug-drug interactions: none      Notes:  Therapeutic INR but on the higher end so will order 2.5 mg as opposed to home dose of 5 mg for this evening. Daily PT/INR until stable within therapeutic range.     Giuseppe Blakely, PharmD 7/8/2025 9:23 AM

## 2025-07-08 NOTE — PROGRESS NOTES
Phone: [phone] [facility]  Date: 2025  Fax: [phone]      Physical Therapy    Daily Note    Patient Name: Quique Wray      : 1945  (80 y.o.)  MRN: [id number]      Assessment          Sit to Supine: Stand by assistance, Contact guard assistance (for B LE's from L side of bed)      Sit to Stand: Minimal Assistance, Contact guard assistance  Stand to Sit: Contact guard assistance               WB Status: WBAT  Ambulation  Surface: Level tile  Device: Rolling Walker  Assistance: Contact guard assistance (with w/c follow)  Gait Deviations: Slow Pham  Distance: ~50 ft x1;10 ft x1  Comments: Improved gait mechanics today.  Less crossing of feet until fatigued.             Assessment: Co-treat this session with CHRIS to work on higher level balance activies.  Patient is in chair upon arrival to room with family members present.  Sit to stand from chair is min/CGA. Ambulates into bathroom with CHRIS. When he is finished, he requires CGA to maintain standing balance for regina care. Ambulates to chair with wheeled walker to rest and is then ready to go to therapy room. Sit to stand from chair is min/CGA. Ambulates ~50 ft x1 and then requests to ride remaining distance in w/c to therapy room.  Patient able to stand for 2:36 to sort cards with CGA/SBA for balance.  No LOB but does demonstrate fatigue. Wheeled back to room and ambulates ~10 ft back to bed to lie down. Sit to supine from L side of bed is SBA/CGA for B LE's.  In bed following with call light in reach and bed alarm turned on. Family members remain in room with patient following.  Safety Devices  Type of Devices: Bed alarm in place, Call light within reach, Gait belt, Left in chair, Nurse notified          Call light in reach, Phone in reach, Use of Gait belt, Bed alarm, Nurse Notified, Family Present, and Left in chair  Time In: 1341  Time Out: 1425  Timed Coded Minutes: 21  (co-treat with CHRIS)  Total Treatment Time: 44

## 2025-07-08 NOTE — PLAN OF CARE
Problem: Chronic Conditions and Co-morbidities  Goal: Patient's chronic conditions and co-morbidity symptoms are monitored and maintained or improved  7/8/2025 0901 by Cinthia Oneil RN  Outcome: Progressing  7/8/2025 0006 by Neva Dougherty RN  Outcome: Progressing     Problem: Discharge Planning  Goal: Discharge to home or other facility with appropriate resources  7/8/2025 0901 by Cinthia Oneil RN  Outcome: Progressing  7/8/2025 0006 by Neva Dougherty RN  Outcome: Progressing     Problem: ABCDS Injury Assessment  Goal: Absence of physical injury  7/8/2025 0901 by Cinthia Oneil RN  Outcome: Progressing  7/8/2025 0006 by Neva Dougherty RN  Outcome: Progressing     Problem: Safety - Adult  Goal: Free from fall injury  7/8/2025 0901 by Cinthia Oneil RN  Outcome: Progressing  7/8/2025 0006 by Neva Dougherty RN  Outcome: Progressing     Problem: Nutrition Deficit:  Goal: Optimize nutritional status  7/8/2025 0901 by Cinthia Oneil RN  Outcome: Progressing  7/8/2025 0006 by Neva Dougherty RN  Outcome: Progressing     Problem: Skin/Tissue Integrity  Goal: Skin integrity remains intact  Description: 1.  Monitor for areas of redness and/or skin breakdown  2.  Assess vascular access sites hourly  3.  Every 4-6 hours minimum:  Change oxygen saturation probe site  4.  Every 4-6 hours:  If on nasal continuous positive airway pressure, respiratory therapy assess nares and determine need for appliance change or resting period  7/8/2025 0901 by Cinthia Oneil RN  Outcome: Progressing  7/8/2025 0006 by Neva Dougherty RN  Outcome: Progressing     Problem: Anxiety  Goal: Will report anxiety at manageable levels  Description: INTERVENTIONS:  1. Administer medication as ordered  2. Teach and rehearse alternative coping skills  3. Provide emotional support with 1:1 interaction with staff  7/8/2025 0901 by Cinthia Oneil RN  Outcome: Progressing  7/8/2025 0006 by Neva Dougherty

## 2025-07-08 NOTE — DISCHARGE INSTR - COC
Continuity of Care Form    Patient Name: Quique Wray   :  1945  MRN:  [id number]    Admit date:  2025  Discharge date:  ***    Code Status Order: Full Code   Advance Directives:     Admitting Physician:  Rob Bowens MD  PCP: Vernon Cade MD    Discharging Nurse: ***  Discharging Hospital Unit/Room#: 0266/0266-01  Discharging Unit Phone Number: ***    Emergency Contact:   Extended Emergency Contact Information  Primary Emergency Contact: Sherie Wray  Address: [address] [place] [address]  Mobile Phone: [phone]  Relation: Spouse  Secondary Emergency Contact: Elizabeth Kapadia  Home Phone: [phone]  Mobile Phone: [phone]  Relation: Child    Past Surgical History:  Past Surgical History:   Procedure Laterality Date    CATARACT REMOVAL Left     CHOLECYSTECTOMY, LAPAROSCOPIC N/A 2025    CHOLECYSTECTOMY LAPAROSCOPIC performed by Hardik Rojas MD at [facility] OR    CT GUIDED CHEST TUBE  2025    CT GUIDED CHEST TUBE 2025 Jagdish Moses MD [facility] CT SCAN    INSERTABLE CARDIAC MONITOR N/A 2021    LOOP RECORDER INSERT performed by Andrea Galan MD at [facility] OR    KNEE ARTHROSCOPY Right     LITHOTRIPSY      PACEMAKER INSERTION Left 2022    PACEMAKER INSERTION PERMANENT performed by Andrea Galan MD at [facility] OR    TOTAL HIP ARTHROPLASTY Right     UPPER GASTROINTESTINAL ENDOSCOPY N/A 2025    ESOPHAGOGASTRODUODENOSCOPY WITH BIOPSY performed by Mack Napier MD at [facility] OR       Immunization History:   Immunization History   Administered Date(s) Administered    COVID-19, MODERNA BLUE border, Primary or Immunocompromised, (age 12y+), IM, 100 mcg/0.5mL 2021, 2021, 2021       Active Problems:  Patient Active Problem List   Diagnosis Code    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    Acute ischemic stroke (HCC) I63.9    Bilateral carotid artery stenosis I65.23    Received tissue  plasminogen activator (t-PA) less than 24 hours prior to arrival Z92.82    Left hand weakness R29.898    Stroke of unknown cause (HCC) I63.9    Essential hypertension I10    Left against medical advice Z53.29    Atrial fibrillation (HCC) I48.91    SSS (sick sinus syndrome) (HCC) I49.5    Weakness R53.1    Moderate malnutrition E44.0    Other specified anemias D64.89    Anemia D64.9    Acute anemia D64.9    Acute cholecystitis K81.0    Cardiomyopathy (HCC) I42.9    Secondary hypercoagulable state D68.69    Acute blood loss anemia D62    ESTHER (acute kidney injury) N17.9    Gastric polyp K31.7    Polyp of duodenum K31.7    Acute upper gastrointestinal bleeding K92.2       Isolation/Infection:   Isolation            No Isolation          Patient Infection Status    None to display              Nurse Assessment:  Last Vital Signs: /67   Pulse 60   Temp 98 °F (36.7 °C) (Oral)   Resp 18   Ht 1.727 m (5' 8\")   Wt 88.7 kg (195 lb 9.6 oz)   SpO2 94%   BMI 29.74 kg/m²     Last documented pain score (0-10 scale): Pain Level: 0  Last Weight:   Wt Readings from Last 1 Encounters:   07/08/25 88.7 kg (195 lb 9.6 oz)     Mental Status:  {IP PT MENTAL STATUS:00665}    IV Access:  { MARGOT IV ACCESS:295034200}    Nursing Mobility/ADLs:  Walking   {CHP DME ADLs:551395468}  Transfer  {CHP DME ADLs:835926069}  Bathing  {CHP DME ADLs:270402596}  Dressing  {CHP DME ADLs:760707295}  Toileting  {CHP DME ADLs:307423418}  Feeding  {CHP DME ADLs:080960916}  Med Admin  {CHP DME ADLs:777971499}  Med Delivery   { MARGOT MED Delivery:611898242}    Wound Care Documentation and Therapy:  Incision 06/23/25 Abdomen (Active)   Dressing Status Other (Comment) 07/08/25 0836   Incision Cleansed Not Cleansed 07/08/25 0836   Dressing/Treatment Open to air 07/08/25 0836   Closure Surgical glue 07/08/25 0836   Margins Approximated 07/08/25 0836   Incision Assessment Dry 07/08/25 0836   Drainage Amount None (dry) 07/08/25 0836   Odor None 07/08/25

## 2025-07-08 NOTE — PROGRESS NOTES
Swing bed IDT team meeting held this date regarding pt progress in swing bed. Therapy reports that pt has done very well with them and should be able to safely return home with spouse and [facility] by the end of the week.     Rn case manager and SW met with pt and spouse to discuss above. Pt feels he is doing well but states \"you can always get stronger\". Pt and spouse in agreement with discharge later this week and would like to go home on Saturday. Spouse states that getting pt in the shower has been a challenge and SW will make sure HHA is ordered with [facility] orders. Paperwork provided to spouse for follow up appointment on Monday with pt's surgeon. No further needs or concerns expressed at this time. SW notified [facility] of pt discharge date so they can plan to resume services. SW following. Abbey Bolton, MSW, LSW 7/8/2025

## 2025-07-08 NOTE — PROGRESS NOTES
[facility]  Physical Therapy    Date: 2025  Patient Name: Quique Wray        : 1945       [] Pt Refusal           [x] Pt Unavailable due to:  Patient in bed attempting to eat breakfast. States he is not hungry this morning. Will check back later and progress as able.        Maribel Pruett, PTA Date: 2025

## 2025-07-08 NOTE — PROGRESS NOTES
Phone: [phone] [facility]  Date: 2025  Fax: [phone]      Physical Therapy    Daily Note    Patient Name: Quique Wray      : 1945  (80 y.o.)  MRN: [id number]      Assessment      Sit to Stand: Minimal Assistance, Contact guard assistance  Stand to Sit: Contact guard assistance               WB Status: WBAT  Ambulation  Surface: Level tile  Device: Rolling Walker  Assistance: Contact guard assistance (with w/c follow)  Gait Deviations: Slow Pham  Distance: 15 ftx1; 75 f x1  Comments: Improved gait mechanics today.  Less crossing of feet until fatigued.             Assessment: Patient seated at edge of bed after getting washed and dressed with CHRIS.  He agrees to work with PTA.  Sit to stand from bed is CGA/min. Ambulates with wheeled walker to w/c in hallway and sits in w/c. Notes fatigue today due to taking sleeping pill last nigh. States he feels groggy today.  Wheeled to therapy room and completes seated B LE exercises as outlined above. Continues to note fatigue throughout session. Ambulates ~75 ft x1 back to room and is wheeled remaining distance.  Ambulates from doorway back to chair to sit up. Call light in reach.  Safety Devices  Type of Devices: Call light within reach, Gait belt, Left in chair, Nurse notified          Call light in reach, Phone in reach, Use of Gait belt, Nurse Notified, Other Staff Present  , and Left in chair  Time In: 0935  Time Out: 1010  Timed Coded Minutes: 35  Total Treatment Time: 35       Exercises:  See Flowsheets    Plan  Cont Per Plan Of Care    Goals  Short Term Goals  Time Frame for Short Term Goals: -  Short Term Goal 1: STG=LTG  Short Term Goal 2: -  Short Term Goal 3: -  Short Term Goal 4: -  Short Term Goal 5: -    Long Term Goals  Time Frame for Long Term Goals : 16 days - expires 25  Long Term Goal 1: Complete basic transfers in/OOB and chair/commode with supervision or CGA to safely return home at prior level  Long Term Goal 2:  Ambulate with wh walker x 50-75ft with supervision to safely negotiate home environment  Long Term Goal 3: Up/down steps with HR and supervision/CGA to safely enter/exit home (has grab bars at entry at home)  Long Term Goal 4: Fair+/good endurance to complete above mobility to reduce falls risk associated with fatigue       Maribel HAIR [facility] Therapy License Number: [id number]    Date: 7/8/2025

## 2025-07-08 NOTE — PROGRESS NOTES
attempts to see patient today although unsuccessful d/t patient working with therapy services. Per , \"I will try again tomorrow.\"

## 2025-07-08 NOTE — PROGRESS NOTES
[facility]  Phone: [phone]    Occupational Therapy Skilled Daily Note  Date: 2025  Patient Name: Quique Wray        MRN: [id number]    : 1945  (80 y.o.)    Subjective:     Subjective: Patient seated in recliner upon arrival, agreeable to OT interventions.    Pt is PROGRESSING toward goals and independence of Self Care this treatment session    Discharge recommendation: Continue to assess Pending Progress    Objective:     ADLs:  Grooming: Setup, Verbal cueing  LE Dressing: Moderate assistance  Toileting: Moderate assistance (for thoroughness w/ pericare)    Transfers:  Sit to Supine: Minimal assistance  Sit to stand: Contact guard assistance   Stand to sit: Contact guard assistance  Toilet Transfer: Contact guard assistance       Assessment:     Assessment: Patient seated in recliner upon arrival, agreeable to OT interventions. Co-tx with PTA is completed to address higher level balance activities. Assisted pt in BR w/ documented above assistance. Ambulates partially to therapy room with fww, wheeled remainder of way. Engages in dynamic one hand support standing task x2 min 35 seconds w/o LOB. Patient terminates task and session due to fatigue levels. Returned to room, remains in bed w/ call light and other personal items within reach. Bed alarm in place, will continue to address goals and progress pt as able/tolerated.    Exercises:     See Flowsheets    Goals:     Short term goals:  Time Frame for Short Term Goals: 5 days (2025)  Short Term Goal 1: Patient will complete a commode transfer while using DME PRN with SBA.  Short Term Goal 2: Patient will complete upper body dressing and/or bathing while using adaptive equipment/techniques PRN with SUP/SETUP.  Short Term Goal 3: Patient will complete lower body dressing and/or bathing while using adaptive equipment/techniques PRN with MIN A.  Short Term Goal 4: To increase UE strength for ADLs and functional transfers, patient will

## 2025-07-08 NOTE — PLAN OF CARE
[facility]  Phone: [phone]    Swingbed Occupational Therapy Plan of Care  OT Orders Received and Evaluation Complete    Date: 2025  Patient Name: Quique Wray        MRN: [id number]    : 1945  (80 y.o.)  Referring Practitioner: Dr. Bowens  Diagnosis: Weakness  Treatment Diagnosis: Weakness    Identified Problem Areas:     Performance deficits / Impairments: Decreased functional mobility , Decreased ADL status, Decreased safe awareness, Decreased balance, Decreased endurance, Decreased strength, Decreased sensation, Decreased cognition, Decreased posture, Decreased ROM, Decreased high-level IADLs, Decreased fine motor control, Decreased coordination     Justification for Skilled Services:     [x] Complete Daily Tasks Safely  [x] Improve Balance   [x] Return to Prior Level of Function  [x] Family/Caregiver Education    [x] Improve UE strength  [x] Patient Education: [x] Adaptive Equipment   [x] Home Exercise Program and Progression    Treatment Plan:     Times Per Day: 1-2x.  Discharge recommendations: Home with home health services     [] Modalities:  [x] Therapeutic Exercise   [x] Therapeutic Activity  [] Splinting:     [] Home Safety Evaluation         [x] ADL Retraining                       [] Muscle Re-education [] Cognitive Retraining            [] Sensory Integration  [x] Patient Education [x] Home Exercise Program [x] Fine Motor Coordination    Goals:     Short term goals:  Time Frame for Short Term Goals: 5 days (2025)  Short Term Goal 1: Patient will complete a commode transfer while using DME PRN with SBA.  Short Term Goal 2: Patient will complete upper body dressing and/or bathing while using adaptive equipment/techniques PRN with SUP/SETUP.  Short Term Goal 3: Patient will complete lower body dressing and/or bathing while using adaptive equipment/techniques PRN with MIN A.  Short Term Goal 4: To increase UE strength for ADLs and functional transfers, patient will

## 2025-07-08 NOTE — PROGRESS NOTES
[facility]  Swing Bed Interdisciplinary Care Plan Conference Report   Recertification for Continued Skilled Care.    Patients name:Quique Wray  Date of Conference: 7/8/2025    The Following Information was discussed and agreed upon with the patient and/or Caregivers as listed below.    Names of Team Members and Caregivers present for meeting:  : Abbey Perdomo Nursing: Rick Altamirano  Therapy: Sheri Gomez, PT; MARLEEN Bule/OTR  Dietician:   Activities:  Pastoral Care:   Caregivers:    [x] Spouse  [] Child/Children [] Significant Other   [] Other:     Nutrition:  Current Body Weight:   Wt Readings from Last 1 Encounters:  07/07/25 88.5 kg (195 lb 1.6 oz)  Weight Change: up 2# but remains below historical values  In: 240 [P.O.:240]  Out: -   Current Diet order:ADULT ORAL NUTRITION SUPPLEMENT; Dinner; Frozen Oral Supplement  ADULT ORAL NUTRITION SUPPLEMENT; Breakfast, Lunch; Standard High Calorie/High Protein Oral Supplement  ADULT DIET; Regular; Low Fat/Low Chol/High Fiber/ANTOYN  Intakes: 26-50% meals and % ONS per I/O  Diet Education Provided: informed patient about mechanically altered foods as desired  Goal: PO >75% meals and supplements    Spiritual:  Muslim needs met: [x] Yes  [] No  [] N/A  Notified  or  of admission: [] Yes  [] No  [x] N/A  Patient added to Communion List: [] Yes  [] No  [x] N/A  Any other concerns or comments:  visits often.   Goal: Participate 2-3 times per week    Occupational Therapy:  Current ADL Status: ADL  Feeding: None  Grooming: Supervision, Setup (brushing teeth in sitting)  UE Bathing: None  LE Bathing: None  UE Dressing: None  LE Dressing: None  Putting On/Taking Off Footwear: Maximum assistance  Toileting: None  Product Used : Soap and water  Safety: gait belt and chair alarm on   Recommendations for adaptive equipment:    [] Long handled sponge   [] Long Handled Shoe Horn  [] Extended Tub Bench  Short Term  Term Goal 4: Fair+/good endurance to complete above mobility to reduce falls risk associated with fatigue      Nursing:  Skin/Wound/Incisions:  Retired, Read Only Skin Color/Condition  Skin Color: Pink  Skin Condition/Temp: Warm, Dry  Skin Integumentary   Skin Color: Pink  Skin Condition/Temp: Warm, Dry  Skin Integrity: Ecchymosis  Location: Scattered  Skin Fold Management: No  Nails: Exceptions to WDL  Nail Condition: Thick  Multiple Skin Integrity Sites: Yes  Skin Integrity Site 2  Skin Integrity Location 2: Incision (see LDA)  Location 2: Abdomen  Preventative Dressing: No  Dressing Site:  (Surgical Glue)  Date Applied: 06/23/25  Assessed this shift: Yes  Skin Integrity Site 3  Skin Integrity Location 3: Redness   Location 3: Buttock/Coccyx  Preventative Dressing: No  Assessed this shift: Yes     Pain Control: percocet and tylenol   New Medications since admission to Swing Bed: NA  Anticoagulants: aspirin, coumadin   Goals:   Pt will remain free from falls  Pt will have control of acute pain    Activities:   Goal: Participate in 3 activities per week    :  Plan for Discharge: Plan discharge home on Saturday 7/12/2025 with spouse.   Follow Up/Services needed: [facility]  for PT, OT, RN and HHA services    Continued skilled needs:   Skilled Services are for the ongoing condition for which the individual received inpatient care in a hospital.    Physician signature certifies patient continued need for SNF inpatient care.

## 2025-07-08 NOTE — PROGRESS NOTES
[facility]  Phone: [phone]    Occupational Therapy Skilled Daily Note  Date: 2025  Patient Name: Quique Wray        MRN: [id number]    : 1945  (80 y.o.)    Subjective:     Subjective: Patient was supine in bed upon arrival, agreeable to OT interventions.    Pt is PROGRESSING toward goals and independence of Self Care this treatment session    Discharge recommendation: Continue to assess Pending Progress    Objective:     ADLs:    Grooming: Setup, Verbal cueing  UE Bathing: Setup, Verbal cueing  LE Bathing: Setup, Verbal cueing  UE Dressing: Setup, Verbal cueing  LE Dressing: Moderate assistance      Transfers:  Supine to Sit: Partial/Moderate assistance  Sit to Supine: Minimal assistance  Sit to stand: Minimal assistance   Stand to sit: Contact guard assistance, Stand by assistance      Assessment:     Assessment: Patient supine in bed upon arrival, agreeable to OT interventions. Completes supine-sit transfer as noted above. Once seated EOB patient engages in UB/LB sponge bathing and UB/LB dressing tasks w/ documented above assistance. Requires verbal cues for sequence of ADL tasks due to patients reports of \"feeling groggy\", RN notified. Remains in care of PTA upon CHRIS departure. Will continue to address goals and progress pt as able/tolerated.    Exercises:     See Flowsheets    Goals:     Short term goals:  Time Frame for Short Term Goals: 5 days (2025)  Short Term Goal 1: Patient will complete a commode transfer while using DME PRN with SBA.  Short Term Goal 2: Patient will complete upper body dressing and/or bathing while using adaptive equipment/techniques PRN with SUP/SETUP.  Short Term Goal 3: Patient will complete lower body dressing and/or bathing while using adaptive equipment/techniques PRN with MIN A.  Short Term Goal 4: To increase UE strength for ADLs and functional transfers, patient will engage in 10 minutes of BUE ther ex without a rest break.  Short Term Goal

## 2025-07-09 LAB
ABO/RH: NORMAL
ANTIBODY SCREEN: NEGATIVE
ARM BAND NUMBER: NORMAL
BLOOD BANK DISPENSE STATUS: NORMAL
BLOOD BANK SAMPLE EXPIRATION: NORMAL
BPU ID: NORMAL
COMPONENT: NORMAL
CROSSMATCH RESULT: NORMAL
INR PPP: 2.8
PROTHROMBIN TIME: 30.8 SEC (ref 11.5–14.2)
TRANSFUSION STATUS: NORMAL
UNIT DIVISION: 0

## 2025-07-09 PROCEDURE — 97110 THERAPEUTIC EXERCISES: CPT

## 2025-07-09 PROCEDURE — 97116 GAIT TRAINING THERAPY: CPT

## 2025-07-09 PROCEDURE — 94761 N-INVAS EAR/PLS OXIMETRY MLT: CPT

## 2025-07-09 PROCEDURE — 36415 COLL VENOUS BLD VENIPUNCTURE: CPT

## 2025-07-09 PROCEDURE — 6370000000 HC RX 637 (ALT 250 FOR IP): Performed by: INTERNAL MEDICINE

## 2025-07-09 PROCEDURE — 1200000002 HC SEMI PRIVATE SWING BED

## 2025-07-09 PROCEDURE — 85610 PROTHROMBIN TIME: CPT

## 2025-07-09 RX ORDER — WARFARIN SODIUM 2.5 MG/1
2.5 TABLET ORAL
Status: COMPLETED | OUTPATIENT
Start: 2025-07-09 | End: 2025-07-09

## 2025-07-09 RX ADMIN — Medication 400 MG: at 08:34

## 2025-07-09 RX ADMIN — Medication 400 MG: at 20:36

## 2025-07-09 RX ADMIN — METOPROLOL TARTRATE 25 MG: 25 TABLET, FILM COATED ORAL at 20:36

## 2025-07-09 RX ADMIN — ALLOPURINOL 300 MG: 100 TABLET ORAL at 08:34

## 2025-07-09 RX ADMIN — SENNOSIDES AND DOCUSATE SODIUM 2 TABLET: 50; 8.6 TABLET ORAL at 20:36

## 2025-07-09 RX ADMIN — PANTOPRAZOLE SODIUM 40 MG: 40 TABLET, DELAYED RELEASE ORAL at 08:34

## 2025-07-09 RX ADMIN — ACETAMINOPHEN 650 MG: 325 TABLET ORAL at 22:34

## 2025-07-09 RX ADMIN — TAMSULOSIN HYDROCHLORIDE 0.4 MG: 0.4 CAPSULE ORAL at 08:33

## 2025-07-09 RX ADMIN — MIDODRINE HYDROCHLORIDE 5 MG: 5 TABLET ORAL at 08:33

## 2025-07-09 RX ADMIN — ASPIRIN 81 MG: 81 TABLET, COATED ORAL at 08:33

## 2025-07-09 RX ADMIN — SENNOSIDES AND DOCUSATE SODIUM 2 TABLET: 50; 8.6 TABLET ORAL at 08:34

## 2025-07-09 RX ADMIN — Medication 6 MG: at 22:35

## 2025-07-09 RX ADMIN — WARFARIN SODIUM 2.5 MG: 2.5 TABLET ORAL at 17:57

## 2025-07-09 RX ADMIN — QUETIAPINE 25 MG: 25 TABLET ORAL at 22:35

## 2025-07-09 RX ADMIN — MEGESTROL ACETATE 400 MG: 40 SUSPENSION ORAL at 08:33

## 2025-07-09 RX ADMIN — FUROSEMIDE 20 MG: 20 TABLET ORAL at 08:34

## 2025-07-09 RX ADMIN — SERTRALINE HYDROCHLORIDE 25 MG: 50 TABLET ORAL at 08:33

## 2025-07-09 RX ADMIN — ATORVASTATIN CALCIUM 40 MG: 40 TABLET, FILM COATED ORAL at 08:33

## 2025-07-09 ASSESSMENT — PAIN SCALES - GENERAL
PAINLEVEL_OUTOF10: 0
PAINLEVEL_OUTOF10: 2
PAINLEVEL_OUTOF10: 0

## 2025-07-09 NOTE — CARE COORDINATION
07/09/25 1157   IMM Letter   Notice of Medicare Non-Coverage Letter date given: 07/09/25   Notice of Medicare Non-Coverage Time Given 1155   Notice of Medicare Non-Coverage Letter Given By Abbey FISHERW     SW met with pt and spouse to provide Medicare Notice of Non Coverage with discharge of skilled services on Friday 7/11/2025 and discharge to home on Saturday 7/12/2025 with [facility] for PT, OT, RN and HHA services. Pt and spouse in agreement with plan and pt signs letter. Copies made and placed in paper chart and given to pt. Home care plans to visit with pt either Sunday or Monday. No further discharge needs identified. SW following. Abbey Bolton, SALO, LSW 7/9/2025

## 2025-07-09 NOTE — PROGRESS NOTES
Phone: [phone] [facility]  Date: 2025  Fax: [phone]      Physical Therapy    Daily Note    Patient Name: Quique Wray      : 1945  (80 y.o.)  MRN: [id number]      Assessment    Sit to Supine: Stand by assistance (for B LE's from L side of bed)    Sit to Stand: Stand by assistance, Supervision  Stand to Sit: Stand by assistance, Supervision            WB Status: WBAT  Ambulation  Surface: Level tile  Device: Rolling Walker  Assistance: Contact guard assistance, Stand by assistance (with w/c follow)  Gait Deviations: Slow Pham  Distance: 15 ftx1; 80 ft x1  Comments: Improved gait mechanics today.  Less crossing of feet until fatigued.        Stairs  # Steps : 16  Stairs Height: 4\" (and 6\")  Rails: Bilateral  Assistance: Minimal assistance, Contact guard assistance    Assessment: Patient in chair visiting with wife upon arrival to room. Sit to stand from chair is SBA. Ambulates to w/c in hallway and is wheeled to therapy room to work on steps. Up/down 3 steps 4\" in height with B  handrails and min/CGA. Sits and rests and then completes steps 4\" and 6\" in height with B handrails and CGA/min assist.  Fatigue noted following but patient and wife feel comfortable with this. Completes seated B LE exercises. Ambulates back to room ~80 ft x1 and is then wheeled remaining distance. Sit to supine from L side of bed requires SBA for BLE's.  In bed following with call light in reach and bed alarm turned on. Wife remains in room with patient.  Safety Devices  Type of Devices: Call light within reach, Bed alarm in place, Gait belt, Left in bed, Nurse notified          Call light in reach, Phone in reach, Use of Gait belt, Bed alarm, Nurse Notified, Family Present, and Left in bed  Time In: 1304  Time Out: 1354  Timed Coded Minutes: 50  Total Treatment Time: 50       Exercises:  See Flowsheets    Plan  Cont Per Plan Of Care    Goals  Short Term Goals  Time Frame for Short Term Goals: -  Short Term

## 2025-07-09 NOTE — CONSULTS
Podiatry Consult        Reason for Consult:  DM foot care  Requesting Physician:  Dr Ware    CHIEF COMPLAINT:  Nails and callus trouble needing care    HISTORY OF PRESENT ILLNESS:      The patient is a 80 y.o. male with significant past medical history of DM with neuropathy who presents with Request for nail care. 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    Cerebral artery occlusion with cerebral infarction (HCC)     Degenerative disc disease, lumbar     L3    Depression     Endocarditis     Hypertension     Kidney stone     Pacemaker     Spinal stenosis      Past Surgical History:        Procedure Laterality Date    CATARACT REMOVAL Left     CHOLECYSTECTOMY, LAPAROSCOPIC N/A 6/23/2025    CHOLECYSTECTOMY LAPAROSCOPIC performed by Hardik Rojas MD at [facility] OR    CT GUIDED CHEST TUBE  6/27/2025    CT GUIDED CHEST TUBE 6/27/2025 Jagdish Moses MD [facility] CT SCAN    INSERTABLE CARDIAC MONITOR N/A 05/12/2021    LOOP RECORDER INSERT performed by Andrea Galan MD at [facility] OR    KNEE ARTHROSCOPY Right 2002    LITHOTRIPSY      PACEMAKER INSERTION Left 06/29/2022    PACEMAKER INSERTION PERMANENT performed by Andrea Galan MD at [facility] OR    TOTAL HIP ARTHROPLASTY Right 2023    UPPER GASTROINTESTINAL ENDOSCOPY N/A 6/26/2025    ESOPHAGOGASTRODUODENOSCOPY WITH BIOPSY performed by Mack Napier MD at [facility] OR     Current Medications:    Current Facility-Administered Medications: warfarin (COUMADIN) tablet 2.5 mg, 2.5 mg, Oral, Once  QUEtiapine (SEROQUEL) tablet 25 mg, 25 mg, Oral, Nightly  furosemide (LASIX) tablet 20 mg, 20 mg, Oral, Daily  megestrol (MEGACE) 40 MG/ML suspension 400 mg, 400 mg, Oral, Daily  aspirin EC tablet 81 mg, 81 mg, Oral, Daily  acetaminophen (TYLENOL) tablet 650 mg, 650 mg, Oral, Q6H PRN **OR** acetaminophen (TYLENOL) suppository 650 mg, 650 mg, Rectal, Q6H PRN  allopurinol (ZYLOPRIM)

## 2025-07-09 NOTE — CONSULTS
Clinical Pharmacy Note    Warfarin consult follow-up.    INR (no units)   Date Value   07/09/2025 2.8   07/08/2025 3.0   07/07/2025 2.8   07/06/2025 2.7   07/05/2025 2.5   07/04/2025 1.8   07/03/2025 1.3       Hemoglobin (g/dL)   Date Value   07/06/2025 8.4 (L)   07/02/2025 8.7 (L)   06/30/2025 8.9 (L)     Hematocrit (%)   Date Value   07/06/2025 26.2 (L)   07/02/2025 27.0 (L)   06/30/2025 26.8 (L)     Platelets (k/uL)   Date Value   07/06/2025 251   07/02/2025 174   06/30/2025 154         Potential Warfarin Drug-Drug Interactions:  Current drug-drug interactions: Acetaminophen and Percocet PRN as well as home medications continued on admission, including: Allopurinol, Aspirin, Atorvastatin, Pantoprazole, and Sertraline  Discontinued drug-drug interactions: none      Plan:                     Will give home dose of warfarin 2.5 mg tonight and continue daily PT/INR as ordered until stable and therapeutic.    Thank you for the consult. If you have any questions, please [place] pharmacy at [phone].    Orlando Mccrary, CarolineD

## 2025-07-09 NOTE — PROGRESS NOTES
[facility]  Occupational Therapy    Date: 2025  Patient Name: Quique Wray        : 1945       [] Pt Refusal           [x] Pt Unavailable due to: working with PTA. Will re-attempt OT interventions later today as able.        Anna Argueta, OTR/L Date: 2025

## 2025-07-09 NOTE — PLAN OF CARE
Problem: Chronic Conditions and Co-morbidities  Goal: Patient's chronic conditions and co-morbidity symptoms are monitored and maintained or improved  Outcome: Progressing  Flowsheets (Taken 7/8/2025 2045)  Care Plan - Patient's Chronic Conditions and Co-Morbidity Symptoms are Monitored and Maintained or Improved: Monitor and assess patient's chronic conditions and comorbid symptoms for stability, deterioration, or improvement     Problem: Discharge Planning  Goal: Discharge to home or other facility with appropriate resources  Outcome: Progressing  Flowsheets (Taken 7/8/2025 2045)  Discharge to home or other facility with appropriate resources: Identify barriers to discharge with patient and caregiver     Problem: ABCDS Injury Assessment  Goal: Absence of physical injury  Outcome: Progressing     Problem: Safety - Adult  Goal: Free from fall injury  Outcome: Progressing     Problem: Nutrition Deficit:  Goal: Optimize nutritional status  Outcome: Progressing     Problem: Skin/Tissue Integrity  Goal: Skin integrity remains intact  Description: 1.  Monitor for areas of redness and/or skin breakdown  2.  Assess vascular access sites hourly  3.  Every 4-6 hours minimum:  Change oxygen saturation probe site  4.  Every 4-6 hours:  If on nasal continuous positive airway pressure, respiratory therapy assess nares and determine need for appliance change or resting period  Outcome: Progressing  Flowsheets (Taken 7/8/2025 2045)  Skin Integrity Remains Intact: Monitor for areas of redness and/or skin breakdown     Problem: Anxiety  Goal: Will report anxiety at manageable levels  Description: INTERVENTIONS:  1. Administer medication as ordered  2. Teach and rehearse alternative coping skills  3. Provide emotional support with 1:1 interaction with staff  Outcome: Progressing     Problem: Coping  Goal: Pt/Family able to verbalize concerns and demonstrate effective coping strategies  Description: INTERVENTIONS:  1. Assist  patient/family to identify coping skills, available support systems and cultural and spiritual values  2. Provide emotional support, including active listening and acknowledgement of concerns of patient and caregivers  3. Reduce environmental stimuli, as able  4. Instruct patient/family in relaxation techniques, as appropriate  5. Assess for spiritual pain/suffering and initiate Spiritual Care, Psychosocial Clinical Specialist consults as needed  Outcome: Progressing     Problem: Change in Body Image  Goal: Pt/Family communicate acceptance of loss or change in body image and feel psychological comfort and peace  Description: INTERVENTIONS:  1. Assess patient/family anxiety and grief process related to change in body image, loss of functional status, loss of sense of self, and forgiveness  2. Provide emotional and spiritual support  3. Provide information about the patient's health status with consideration of family and cultural values  4. Communicate willingness to discuss loss and facilitate grief process with patient/family as appropriate  5. Emphasize sustaining relationships within family system and community, or valerie/spiritual traditions  6. Initiate Spiritual Care, Psychosocial Clinical Specialist consult as needed  Outcome: Progressing     Problem: Pain  Goal: Verbalizes/displays adequate comfort level or baseline comfort level  Outcome: Progressing     Problem: Neurosensory - Adult  Goal: Achieves stable or improved neurological status  Outcome: Progressing  Goal: Absence of seizures  Outcome: Progressing  Goal: Achieves maximal functionality and self care  Outcome: Progressing

## 2025-07-09 NOTE — PROGRESS NOTES
[facility]  Occupational Therapy    Date: 2025  Patient Name: Quique Wray        : 1945       [x] Pt Refusal: Attempted to see patient at 1354, refuses OT interventions due to fatigue following PT session. Will resume OT interventions in the AM.            [] Pt Unavailable due to:          JEFFREY Winn   Date: 2025

## 2025-07-09 NOTE — PLAN OF CARE
Problem: Chronic Conditions and Co-morbidities  Goal: Patient's chronic conditions and co-morbidity symptoms are monitored and maintained or improved  7/9/2025 0945 by Cinthia Oneil RN  Outcome: Progressing  7/9/2025 0032 by Tricia Rachel RN  Outcome: Progressing  Flowsheets (Taken 7/8/2025 2045)  Care Plan - Patient's Chronic Conditions and Co-Morbidity Symptoms are Monitored and Maintained or Improved: Monitor and assess patient's chronic conditions and comorbid symptoms for stability, deterioration, or improvement     Problem: Discharge Planning  Goal: Discharge to home or other facility with appropriate resources  7/9/2025 0945 by Cinthia Oneil RN  Outcome: Progressing  7/9/2025 0032 by Tricia Rachel RN  Outcome: Progressing  Flowsheets (Taken 7/8/2025 2045)  Discharge to home or other facility with appropriate resources: Identify barriers to discharge with patient and caregiver     Problem: ABCDS Injury Assessment  Goal: Absence of physical injury  7/9/2025 0945 by Cinthia Oneil RN  Outcome: Progressing  7/9/2025 0032 by rTicia Rachel RN  Outcome: Progressing     Problem: Safety - Adult  Goal: Free from fall injury  7/9/2025 0945 by Cinthia Oneil RN  Outcome: Progressing  7/9/2025 0032 by Tricia Rachel RN  Outcome: Progressing     Problem: Nutrition Deficit:  Goal: Optimize nutritional status  7/9/2025 0945 by Cinthia Oneil RN  Outcome: Progressing  7/9/2025 0032 by Tricia Rachel RN  Outcome: Progressing     Problem: Skin/Tissue Integrity  Goal: Skin integrity remains intact  Description: 1.  Monitor for areas of redness and/or skin breakdown  2.  Assess vascular access sites hourly  3.  Every 4-6 hours minimum:  Change oxygen saturation probe site  4.  Every 4-6 hours:  If on nasal continuous positive airway pressure, respiratory therapy assess nares and determine need for appliance change or resting period  7/9/2025 0945 by Cinthia Oneil RN  Outcome: Progressing  7/9/2025 0032 by

## 2025-07-09 NOTE — PROGRESS NOTES
Comprehensive Nutrition Assessment    Type and Reason for Visit:  Reassess    Nutrition Recommendations/Plan:   Continue current diet.   Continue current ONS (likes chocolate).      Malnutrition Assessment:  Malnutrition Status:  Moderate malnutrition (07/04/25 0733)    Context:  Chronic Illness     Findings of the 6 clinical characteristics of malnutrition:  Energy Intake:  75% or less estimated energy requirements for 1 month or longer  Weight Loss:  Mild weight loss     Body Fat Loss:  Mild body fat loss Orbital, Buccal region   Muscle Mass Loss:  Mild muscle mass loss Temples (temporalis), Clavicles (pectoralis & deltoids)  Fluid Accumulation:  Mild (trace) Extremities   Strength:  Not Performed    Nutrition Assessment:    Continued moderate malnutrition aeb variable intakes of meals and supplements (1-100% of meals.0-100% ONS).  Pt is on megace. Pt states PO is better. Eating breakfast. Reports difficulty chewing breads, spitting out pieces of english muffin during conversation. States bread/rolls seem to \"ball up\" in his mouth. Denied any diet texture modification needs. Prefers chocolate ONS. Pt is expected to d/c Saturday to home.    Nutrition Related Findings:    active bowel sounds, non pitting R flank edema. + BM 7/9 Wound Type: None       Current Nutrition Intake & Therapies:    Average Meal Intake: 51-75% (1-100%)  Average Supplements Intake: 51-75% (0-100%)  ADULT ORAL NUTRITION SUPPLEMENT; Dinner; Frozen Oral Supplement  ADULT ORAL NUTRITION SUPPLEMENT; Breakfast, Lunch; Standard High Calorie/High Protein Oral Supplement  ADULT DIET; Regular; Low Fat/Low Chol/High Fiber/ANTONY    Anthropometric Measures:  Height: 172.7 cm (5' 8\")  Ideal Body Weight (IBW): 154 lbs (70 kg)    Admission Body Weight: 93.9 kg (207 lb 0.2 oz) (at [facility]'s)  Current Body Weight: 89.2 kg (196 lb 10.4 oz), 125.2 % IBW. Weight Source: Bed scale  Current BMI (kg/m2): 29.9  Usual Body Weight: 91.1 kg (200 lb 12.8 oz) (a month ago

## 2025-07-09 NOTE — PROGRESS NOTES
Phone: [phone] [facility]  Date: 2025  Fax: [phone]      Physical Therapy    Daily Note    Patient Name: Quique Wray      : 1945  (80 y.o.)  MRN: [id number]      Assessment      Sit to Stand: Minimal Assistance, Contact guard assistance  Stand to Sit: Contact guard assistance, Stand by assistance               WB Status: WBAT  Ambulation  Surface: Level tile  Device: Rolling Walker  Assistance: Contact guard assistance (w/c follow)  Gait Deviations: Slow Pham  Distance: ~80 ft x1; 65 ft x1; 10 ft x1               Assessment: Patient in chair upon arrival to room. Patient is dressed but requests to have PTA isai his shoes. Sit to stand from chair is min/CGA. ambulates with wheeled walker ~80 ft x1 before requesting to sit down in w/c. Wheeled remaining distance to therapy room. Completes seated and standing exercises as outlined above with good tolerance. Patient does become  SOB during session and also c/o fatigue. Ambulates ~65 ft x 1 back to room with wheeled walker and CGA. Wheeled remaining distance to room and ambulates ~10 ft from w/c back to bedside chair. Up in chair following with call light in reach.  Safety Devices  Type of Devices: Call light within reach, Gait belt, Left in chair, Nurse notified          Call light in reach, Phone in reach, Use of Gait belt, Nurse Notified, and Left in chair  Time In: 1020  Time Out: 1158  Timed Coded Minutes: 38  Total Treatment Time: 38       Exercises:  See Flowsheets    Plan  Cont Per Plan Of Care    Goals  Short Term Goals  Time Frame for Short Term Goals: -  Short Term Goal 1: STG=LTG  Short Term Goal 2: -  Short Term Goal 3: -  Short Term Goal 4: -  Short Term Goal 5: -    Long Term Goals  Time Frame for Long Term Goals : 16 days - expires 25  Long Term Goal 1: Complete basic transfers in/OOB and chair/commode with supervision or CGA to safely return home at prior level  Long Term Goal 2: Ambulate with wh walker x 50-75ft

## 2025-07-09 NOTE — PROGRESS NOTES
[facility]  Phone: [phone]    Occupational Therapy Skilled Daily Note  Date: 2025  Patient Name: Quique Wray        MRN: [id number]    : 1945  (80 y.o.)    Subjective:     Subjective: Patient was sitting in recliner upon OT arrival. Was agreeable to OT interventions.    Discharge recommendation: Continue to assess Pending Progress    Objective:     ADLs:  N/A    Transfers:  N/A    Assessment:     Assessment: Patient was sitting in recliner upon OT arrival. Was agreeable to OT interventions. Declined ADL need and did not perform any functional transfers. Requested to remain in hospital room for therapy session due to recently returning from therapy room with PTA. To increase UE strength for ADLs and functional transfers, patient engaged in 10 minutes of BUE ther ex as outlined in the exercise flowsheet. Required minimal cues for the correct technique, but did not require any rest breaks during this exercise bout. Declined additional OT at this time. Patient remains in recliner with call light/personal items within reach and wife present upon OT departure. Will continue to address goals and progress patient as able/tolerated.    Exercises:     See Flowsheets    Goals:     Short term goals:  Time Frame for Short Term Goals: 5 days (2025)  Short Term Goal 1: Patient will complete a commode transfer while using DME PRN with SBA.  Short Term Goal 2: Patient will complete upper body dressing and/or bathing while using adaptive equipment/techniques PRN with SUP/SETUP.  Short Term Goal 3: Patient will complete lower body dressing and/or bathing while using adaptive equipment/techniques PRN with MIN A.  Short Term Goal 4: To increase UE strength for ADLs and functional transfers, patient will engage in 10 minutes of BUE ther ex without a rest break - MET (10 MINUTES, NO REST BREAK)  Short Term Goal 5: To increase independence and safety with IADLs, patient will tolerate static/dynamic

## 2025-07-10 LAB
INR PPP: 2.8
PROTHROMBIN TIME: 30.4 SEC (ref 11.5–14.2)

## 2025-07-10 PROCEDURE — 94761 N-INVAS EAR/PLS OXIMETRY MLT: CPT

## 2025-07-10 PROCEDURE — 97116 GAIT TRAINING THERAPY: CPT

## 2025-07-10 PROCEDURE — 1200000002 HC SEMI PRIVATE SWING BED

## 2025-07-10 PROCEDURE — 97110 THERAPEUTIC EXERCISES: CPT

## 2025-07-10 PROCEDURE — 85610 PROTHROMBIN TIME: CPT

## 2025-07-10 PROCEDURE — 6370000000 HC RX 637 (ALT 250 FOR IP): Performed by: INTERNAL MEDICINE

## 2025-07-10 PROCEDURE — 36415 COLL VENOUS BLD VENIPUNCTURE: CPT

## 2025-07-10 PROCEDURE — 2500000003 HC RX 250 WO HCPCS: Performed by: INTERNAL MEDICINE

## 2025-07-10 PROCEDURE — 97530 THERAPEUTIC ACTIVITIES: CPT

## 2025-07-10 RX ORDER — WARFARIN SODIUM 5 MG/1
5 TABLET ORAL
Status: COMPLETED | OUTPATIENT
Start: 2025-07-10 | End: 2025-07-10

## 2025-07-10 RX ADMIN — ATORVASTATIN CALCIUM 40 MG: 40 TABLET, FILM COATED ORAL at 07:35

## 2025-07-10 RX ADMIN — Medication 6 MG: at 21:48

## 2025-07-10 RX ADMIN — WARFARIN SODIUM 5 MG: 5 TABLET ORAL at 17:38

## 2025-07-10 RX ADMIN — Medication 400 MG: at 07:35

## 2025-07-10 RX ADMIN — Medication 400 MG: at 21:48

## 2025-07-10 RX ADMIN — SENNOSIDES AND DOCUSATE SODIUM 2 TABLET: 50; 8.6 TABLET ORAL at 07:35

## 2025-07-10 RX ADMIN — ALLOPURINOL 300 MG: 100 TABLET ORAL at 07:35

## 2025-07-10 RX ADMIN — SERTRALINE HYDROCHLORIDE 25 MG: 50 TABLET ORAL at 07:35

## 2025-07-10 RX ADMIN — MIDODRINE HYDROCHLORIDE 5 MG: 5 TABLET ORAL at 17:38

## 2025-07-10 RX ADMIN — PANTOPRAZOLE SODIUM 40 MG: 40 TABLET, DELAYED RELEASE ORAL at 07:35

## 2025-07-10 RX ADMIN — MIDODRINE HYDROCHLORIDE 5 MG: 5 TABLET ORAL at 07:35

## 2025-07-10 RX ADMIN — MICONAZOLE NITRATE: 20 POWDER TOPICAL at 21:48

## 2025-07-10 RX ADMIN — ASPIRIN 81 MG: 81 TABLET, COATED ORAL at 07:35

## 2025-07-10 RX ADMIN — MEGESTROL ACETATE 400 MG: 40 SUSPENSION ORAL at 07:35

## 2025-07-10 RX ADMIN — ACETAMINOPHEN 650 MG: 325 TABLET ORAL at 21:48

## 2025-07-10 RX ADMIN — TAMSULOSIN HYDROCHLORIDE 0.4 MG: 0.4 CAPSULE ORAL at 07:35

## 2025-07-10 RX ADMIN — FUROSEMIDE 20 MG: 20 TABLET ORAL at 07:35

## 2025-07-10 RX ADMIN — METOPROLOL TARTRATE 25 MG: 25 TABLET, FILM COATED ORAL at 21:48

## 2025-07-10 RX ADMIN — QUETIAPINE 25 MG: 25 TABLET ORAL at 21:48

## 2025-07-10 ASSESSMENT — PAIN SCALES - GENERAL
PAINLEVEL_OUTOF10: 0
PAINLEVEL_OUTOF10: 1

## 2025-07-10 ASSESSMENT — PAIN - FUNCTIONAL ASSESSMENT
PAIN_FUNCTIONAL_ASSESSMENT: NONE - DENIES PAIN
PAIN_FUNCTIONAL_ASSESSMENT: NONE - DENIES PAIN

## 2025-07-10 NOTE — PLAN OF CARE
Problem: Chronic Conditions and Co-morbidities  Goal: Patient's chronic conditions and co-morbidity symptoms are monitored and maintained or improved  7/10/2025 1137 by Cinthia Oneil RN  Outcome: Progressing  7/9/2025 2140 by Julieth Stone RN  Outcome: Progressing     Problem: Discharge Planning  Goal: Discharge to home or other facility with appropriate resources  7/10/2025 1137 by Cinthia Oneil RN  Outcome: Progressing  7/9/2025 2140 by Julieth Stone RN  Outcome: Progressing     Problem: ABCDS Injury Assessment  Goal: Absence of physical injury  7/10/2025 1137 by Cinthia Oneil RN  Outcome: Progressing  7/9/2025 2140 by Julieth Stone RN  Outcome: Progressing     Problem: Safety - Adult  Goal: Free from fall injury  7/10/2025 1137 by Cinthia Oneil RN  Outcome: Progressing  7/9/2025 2140 by Julieth Stone RN  Outcome: Progressing     Problem: Nutrition Deficit:  Goal: Optimize nutritional status  7/10/2025 1137 by Cinthia Oneil RN  Outcome: Progressing  7/9/2025 2140 by Julieth Stone RN  Outcome: Progressing     Problem: Skin/Tissue Integrity  Goal: Skin integrity remains intact  Description: 1.  Monitor for areas of redness and/or skin breakdown  2.  Assess vascular access sites hourly  3.  Every 4-6 hours minimum:  Change oxygen saturation probe site  4.  Every 4-6 hours:  If on nasal continuous positive airway pressure, respiratory therapy assess nares and determine need for appliance change or resting period  7/10/2025 1137 by Cinthia Oneil RN  Outcome: Progressing  7/9/2025 2140 by Julieth Stone RN  Outcome: Progressing     Problem: Anxiety  Goal: Will report anxiety at manageable levels  Description: INTERVENTIONS:  1. Administer medication as ordered  2. Teach and rehearse alternative coping skills  3. Provide emotional support with 1:1 interaction with staff  7/10/2025 1137 by Cinthia Oneil RN  Outcome: Progressing  7/9/2025 2140 by Julieth Stone RN  Outcome:  Cinthia Oneil RN  Outcome: Progressing  7/9/2025 2140 by Julieth Stone RN  Outcome: Progressing  Goal: Absence of seizures  7/10/2025 1137 by Cinthia Oneil RN  Outcome: Progressing  7/9/2025 2140 by Julieth Stone RN  Outcome: Progressing  Goal: Achieves maximal functionality and self care  7/10/2025 1137 by Cinthia Oneil RN  Outcome: Progressing  7/9/2025 2140 by Julieth Stone RN  Outcome: Progressing

## 2025-07-10 NOTE — PROGRESS NOTES
[facility]  Occupational Therapy    Date: 7/10/2025  Patient Name: Quique Wray        : 1945       [x] Pt Refusal: Attempted OT interventions at 1028. Refuses OT interventions at this time due to just returning to chair from shower ADL. Will check back in the AM.           [] Pt Unavailable due to:            JEFFREY Winn  Date: 7/10/2025

## 2025-07-10 NOTE — PROGRESS NOTES
[facility]  Pharmacy Department    Clinical Pharmacy Note-Warfarin Follow-up       Patient:  Quique Wray  MRN: [id number]    Warfarin consult follow-up:    INR (no units)   Date Value   07/10/2025 2.8   07/09/2025 2.8   07/08/2025 3.0   07/07/2025 2.8   07/06/2025 2.7   07/05/2025 2.5   07/04/2025 1.8       Hemoglobin (g/dL)   Date Value   07/06/2025 8.4 (L)   07/02/2025 8.7 (L)   06/30/2025 8.9 (L)     Hematocrit (%)   Date Value   07/06/2025 26.2 (L)   07/02/2025 27.0 (L)   06/30/2025 26.8 (L)     Platelet Count (k/uL)   Date Value   11/13/2011 137     Platelets (k/uL)   Date Value   07/06/2025 251   07/02/2025 174   06/30/2025 154       Target INR Range: 2-3    Significant Drug-Drug Interactions:  Current drug-drug interactions: APAP/Percocet PRN, home meds: Allopurinol, ASA, Atorvastatin, Pantoprazole, Sertraline  Discontinued drug-drug interactions: none      Notes: Therapeutic INR so will order home dose of 5 mg for this evening. Daily PT/INR until stable within therapeutic range.     Giuseppe Blakely, PharmD 7/10/2025 9:26 AM

## 2025-07-10 NOTE — PROGRESS NOTES
[facility]  Phone: [phone]    Occupational Therapy Skilled Daily Note  Date: 7/10/2025  Patient Name: Quique Wray        MRN: [id number]    : 1945  (80 y.o.)    Subjective:     Subjective: Patient was sitting in recliner upon OT arrival. Was agreeable to OT interventions.    Pt is PROGRESSING toward goals and independence of Self Care this treatment session    Discharge recommendation: Continue to assess Pending Progress    Objective:     ADLs:  None    Transfers:  Supine to Sit: Partial/Moderate assistance  Sit to Supine: Minimal assistance  Sit to stand: Stand by assistance   Stand to sit: Stand by assistance  Toilet Transfer: Contact guard assistance       Assessment:     Assessment: Patient seated in recliner upon arrival, agreeable to OT interventions. Declines ADLs at this time, as he reports nursing completed a shower with him this AM. Co-tx with PTA is completed to address higher level balance tasks via dynamic standing tasks. Completes functional transfers as noted above. Ambulates from recliner to double doors on unit (approx 100 feet) using fww w/ w.c follow. Wheeled remainder of way to therapy gym. Following a short rest period patient engages in x3 dynamic standing attempts (all with LUE support on fww). Stands for 2 min 21 seconds, 2 min 03 seconds, and 1 min 17 seconds. Requires SBA to complete all dynamic standing tasks, no LOB is noted. Patient requires extended rest periods between standing trials. Ambulates ~110 feet back to his room before needing seated RB. Wheeled back to room, ambulates from doorway to EOB. Completes bed mobility as documented above. Remains in bed upon departure w/ call light and other personal items within reach. Bed alarm in place, daughter present at bedside. Will continue to address goals and progress pt as able/tolerated.    Exercises:     See Flowsheets    Goals:     Short term goals:  Time Frame for Short Term Goals: 5 days

## 2025-07-10 NOTE — PROGRESS NOTES
[facility]  Occupational Therapy    Date: 7/10/2025  Patient Name: Quique Wray        : 1945       [x] Pt Refusal: Second attempt was made to see patient at 1139 to see patient. Continues to refuse OT interventions due to fatigue. Will attempt again this afternoon.           [] Pt Unavailable due to:          JEFFREY Winn  Date: 7/10/2025

## 2025-07-10 NOTE — PROGRESS NOTES
Phone: [phone] [facility]  Date: 7/10/2025  Fax: [phone]      Physical Therapy    Daily Note    Patient Name: Quique Wray      : 1945  (80 y.o.)  MRN: [id number]      Assessment  Bed mobility  Supine to Sit: Partial/Moderate assistance  Sit to Supine: Minimal assistance  Scooting: Minimal assistance    Supine to Sit: Moderate assistance  Sit to Supine: Stand by assistance (for B LE's from L side of bed)  Scooting: Minimal assistance    Sit to Stand: Stand by assistance, Supervision  Stand to Sit: Stand by assistance, Supervision  Bed to Chair: Minimal assistance    WB Status: WBAT  Ambulation  Surface: Level tile  Device: Rolling Walker  Assistance: Contact guard assistance, Stand by assistance (with w/c follow)  Gait Deviations: Slow Pham  Distance: 15 ftx1; 80 ft x1  Comments: Improved gait mechanics today.  Less crossing of feet until fatigued.    Stairs  # Steps : 16  Stairs Height: 4\" (and 6\")  Rails: Bilateral  Assistance: Minimal assistance, Contact guard assistance    Assessment: Patient seated in recliner visiting with daughter upon entry. Patient agrees to work with PTA and CHRIS. Sit to stand from recliner is CGA. Patient ambulates with FWW and CGA x100' to end of hallway before requesting to be wheeled rest of way to therapy room. In therapy room patient completes standing dynamic balance ex while playing cornhole and balloon volleyball. Patient was able to stand for approx 2-3' before need to sit down. Following session patient ambulates x100' to just before nurses station before requesting w/c again. Patient ambulates from just inside doorway back to bed. Sit to supine is SBA. Patient remains in bed with daughter still present in room. Call ligiht in reach and no further needs.  Safety Devices  Type of Devices: Call light within reach, Bed alarm in place, Gait belt, Left in bed, Nurse notified    Call light in reach, Phone in reach, Use of Gait belt, Family Present, and

## 2025-07-11 LAB
INR PPP: 2.7
PROTHROMBIN TIME: 29.8 SEC (ref 11.5–14.2)

## 2025-07-11 PROCEDURE — 85610 PROTHROMBIN TIME: CPT

## 2025-07-11 PROCEDURE — 94761 N-INVAS EAR/PLS OXIMETRY MLT: CPT

## 2025-07-11 PROCEDURE — 97110 THERAPEUTIC EXERCISES: CPT

## 2025-07-11 PROCEDURE — 6370000000 HC RX 637 (ALT 250 FOR IP): Performed by: INTERNAL MEDICINE

## 2025-07-11 PROCEDURE — 1200000002 HC SEMI PRIVATE SWING BED

## 2025-07-11 PROCEDURE — 36415 COLL VENOUS BLD VENIPUNCTURE: CPT

## 2025-07-11 PROCEDURE — 97535 SELF CARE MNGMENT TRAINING: CPT

## 2025-07-11 PROCEDURE — 97116 GAIT TRAINING THERAPY: CPT

## 2025-07-11 PROCEDURE — 97530 THERAPEUTIC ACTIVITIES: CPT

## 2025-07-11 RX ORDER — WARFARIN SODIUM 2.5 MG/1
2.5 TABLET ORAL
Status: COMPLETED | OUTPATIENT
Start: 2025-07-11 | End: 2025-07-11

## 2025-07-11 RX ADMIN — ALLOPURINOL 300 MG: 100 TABLET ORAL at 08:35

## 2025-07-11 RX ADMIN — ASPIRIN 81 MG: 81 TABLET, COATED ORAL at 08:35

## 2025-07-11 RX ADMIN — POLYETHYLENE GLYCOL 3350 17 G: 17 POWDER, FOR SOLUTION ORAL at 08:35

## 2025-07-11 RX ADMIN — Medication 6 MG: at 20:02

## 2025-07-11 RX ADMIN — WARFARIN SODIUM 2.5 MG: 2.5 TABLET ORAL at 17:13

## 2025-07-11 RX ADMIN — QUETIAPINE 25 MG: 25 TABLET ORAL at 20:02

## 2025-07-11 RX ADMIN — SERTRALINE HYDROCHLORIDE 25 MG: 50 TABLET ORAL at 08:38

## 2025-07-11 RX ADMIN — MEGESTROL ACETATE 400 MG: 40 SUSPENSION ORAL at 08:35

## 2025-07-11 RX ADMIN — MIDODRINE HYDROCHLORIDE 5 MG: 5 TABLET ORAL at 08:38

## 2025-07-11 RX ADMIN — MICONAZOLE NITRATE: 20 POWDER TOPICAL at 20:03

## 2025-07-11 RX ADMIN — Medication 400 MG: at 20:02

## 2025-07-11 RX ADMIN — PANTOPRAZOLE SODIUM 40 MG: 40 TABLET, DELAYED RELEASE ORAL at 08:35

## 2025-07-11 RX ADMIN — ATORVASTATIN CALCIUM 40 MG: 40 TABLET, FILM COATED ORAL at 08:35

## 2025-07-11 RX ADMIN — Medication 400 MG: at 08:35

## 2025-07-11 RX ADMIN — MICONAZOLE NITRATE: 20 POWDER TOPICAL at 08:35

## 2025-07-11 RX ADMIN — FUROSEMIDE 20 MG: 20 TABLET ORAL at 08:35

## 2025-07-11 RX ADMIN — MIDODRINE HYDROCHLORIDE 5 MG: 5 TABLET ORAL at 17:12

## 2025-07-11 RX ADMIN — TAMSULOSIN HYDROCHLORIDE 0.4 MG: 0.4 CAPSULE ORAL at 08:35

## 2025-07-11 ASSESSMENT — PAIN SCALES - GENERAL: PAINLEVEL_OUTOF10: 0

## 2025-07-11 NOTE — ACP (ADVANCE CARE PLANNING)
Advance Care Planning     Palliative Team Advance Care Planning (ACP) Conversation    Date of Conversation: 07/11/25    Individuals present for the conversation: Patient with decision making capacity     ACP documents on file prior to discussion:  -None        Healthcare Decision Maker:    Primary Decision Maker (Active): Leda Wraydona - Spouse - [phone]    Secondary Decision Maker: Elizabeth Kapadia - Child - [phone]     Conversation Summary:  Pt confirms decision makers    Resuscitation Status:   Code Status: Full Code     Documentation Completed:  -No new documents completed.    I spent 10 minutes with the patient and/or surrogate decision maker discussing the patient's wishes and goals.      Polina Alba RN

## 2025-07-11 NOTE — PROGRESS NOTES
Pt plan remains for discharge to home tomorrow with spouse and [facility]  to start services on Sunday. No further needs anticipated. Abbey Bolton, MSW, LSW 7/11/2025

## 2025-07-11 NOTE — PROGRESS NOTES
Phone: [phone] [facility]  Date: 2025  Fax: [phone]      Physical Therapy    Daily Note    Patient Name: Quique Wray      : 1945  (80 y.o.)  MRN: [id number]      Assessment      Sit to Stand: Stand by assistance, Contact guard assistance  Stand to Sit: Stand by assistance, Contact guard assistance               WB Status: WBAT  Ambulation  Surface: Level tile  Device: Rolling Walker  Assistance: Stand by assistance  Gait Deviations: Slow Pham  Distance: 10 ft x2 (chair to bathroom to chair)        Assessment: Patient seated up in chair upon arrival to room.  He c/o fatigue and blames his sleeping pill from last night.  Takes a while to decide if he wants to work with PTA.  Then states he needs to go to the bathroom. Sit to stand from chair is SBA/CGA. ambulates with wheeled walker into bathroom. Able to pull pants down and sit on commode with SBA/supervision. Sits on commode for short time. When finished, sit to stand from commode is min assist.  Also requires some assist to pull pants up as they have fallen clear to the floor. Ambulates to sink to wash hands. Demonstrates good standing balance to wash hands. Ambulates to chair and completes seated B LE exercises as outlined above.  Remains up in chair following with  kitchen in room to get dinner order.  Call light in reach and chair alarm attached.  Plans to discharge home tomorrow.  Safety Devices  Type of Devices: Call light within reach, Chair alarm in place, Gait belt, Left in chair, Nurse notified          Call light in reach, Phone in reach, Use of Gait belt, Chair alarm, Nurse Notified, Other Staff Present  , and Left in chair  Time In: 1347  Time Out: 1417  Timed Coded Minutes: 30  Total Treatment Time: 30       Exercises:  See Flowsheets    Plan  Cont Per Plan Of Care    Goals  Short Term Goals  Time Frame for Short Term Goals: -  Short Term Goal 1: STG=LTG  Short Term Goal 2: -  Short Term Goal 3: -  Short Term Goal 4:  -  Short Term Goal 5: -    Long Term Goals  Time Frame for Long Term Goals : 16 days - expires 7/18/25  Long Term Goal 1: Complete basic transfers in/OOB and chair/commode with supervision or CGA to safely return home at prior level-MET  Long Term Goal 2: Ambulate with wh walker x 50-75ft with supervision to safely negotiate home environment-NOT MET  Long Term Goal 3: Up/down steps with HR and supervision/CGA to safely enter/exit home (has grab bars at entry at home)-NOT MET  Long Term Goal 4: Fair+/good endurance to complete above mobility to reduce falls risk associated with fatigue-MET       [facility]ru[facility] Therapy License Number: [id number]    Date: 7/11/2025

## 2025-07-11 NOTE — CONSULTS
Clinical Pharmacy Note    Warfarin consult follow-up.      INR (no units)   Date Value   07/10/2025 2.8   07/09/2025 2.8   07/08/2025 3.0   07/07/2025 2.8   07/06/2025 2.7   07/05/2025 2.5   07/04/2025 1.8       Hemoglobin (g/dL)   Date Value   07/06/2025 8.4 (L)   07/02/2025 8.7 (L)   06/30/2025 8.9 (L)     Hematocrit (%)   Date Value   07/06/2025 26.2 (L)   07/02/2025 27.0 (L)   06/30/2025 26.8 (L)     Platelet Count (k/uL)   Date Value   11/13/2011 137     Platelets (k/uL)   Date Value   07/06/2025 251   07/02/2025 174   06/30/2025 154         Potential Warfarin Drug-Drug Interactions:  Current drug-drug interactions: Acetaminophen and Percocet PRN as well as home medications continued on admission, including: Allopurinol, Aspirin, Atorvastatin, Pantoprazole, and Sertraline  Discontinued drug-drug interactions: none      Plan:                        Will give home dose of warfarin 2.5 mg tonight and continue daily PT/INR as ordered until stable and therapeutic.    Thank you for the consult. If you have any questions, please call pharmacy at [phone].    Orlando Mccrary, PharmD

## 2025-07-11 NOTE — PLAN OF CARE
Problem: Chronic Conditions and Co-morbidities  Goal: Patient's chronic conditions and co-morbidity symptoms are monitored and maintained or improved  Outcome: Progressing     Problem: Discharge Planning  Goal: Discharge to home or other facility with appropriate resources  Outcome: Progressing     Problem: ABCDS Injury Assessment  Goal: Absence of physical injury  Outcome: Progressing     Problem: Safety - Adult  Goal: Free from fall injury  Outcome: Progressing     Problem: Nutrition Deficit:  Goal: Optimize nutritional status  Outcome: Progressing     Problem: Skin/Tissue Integrity  Goal: Skin integrity remains intact  Description: 1.  Monitor for areas of redness and/or skin breakdown  2.  Assess vascular access sites hourly  3.  Every 4-6 hours minimum:  Change oxygen saturation probe site  4.  Every 4-6 hours:  If on nasal continuous positive airway pressure, respiratory therapy assess nares and determine need for appliance change or resting period  Outcome: Progressing     Problem: Anxiety  Goal: Will report anxiety at manageable levels  Description: INTERVENTIONS:  1. Administer medication as ordered  2. Teach and rehearse alternative coping skills  3. Provide emotional support with 1:1 interaction with staff  Outcome: Progressing     Problem: Coping  Goal: Pt/Family able to verbalize concerns and demonstrate effective coping strategies  Description: INTERVENTIONS:  1. Assist patient/family to identify coping skills, available support systems and cultural and spiritual values  2. Provide emotional support, including active listening and acknowledgement of concerns of patient and caregivers  3. Reduce environmental stimuli, as able  4. Instruct patient/family in relaxation techniques, as appropriate  5. Assess for spiritual pain/suffering and initiate Spiritual Care, Psychosocial Clinical Specialist consults as needed  Outcome: Progressing     Problem: Change in Body Image  Goal: Pt/Family communicate

## 2025-07-11 NOTE — PROGRESS NOTES
[facility]  Phone: [phone]    Occupational Therapy Skilled Daily Note  Date: 2025  Patient Name: Quique Wray        MRN: [id number]    : 1945  (80 y.o.)    Subjective:     Subjective: Patient was in bed upon arrival, agreeable to OT interventions.    Pt shows NO CHANGE toward goals and independence of Self Care this treatment session    Discharge recommendation: Continue to assess Pending Progress    Objective:     ADLs:  Grooming: Setup (comb hair)  UE Bathing: None (Declined)  LE Bathing: None (Declined)  UE Dressing: Setup (isai t-shirt)  LE Dressing: Moderate assistance (isai underwear and shorts - initially refused to attempt himself)  Toileting: None (declined need)    Transfers:  Supine to Sit: Supervision  Sit to stand: Stand by assistance, Supervision   Stand to sit: Stand by assistance, Supervision      Assessment:     Assessment: Patient supine in bed upon arrival, agreeable to OT interventions. Co-tx with PTA is completed to address higher level standing tasks to address balance and standing endurance to assist with ADL and IADL tasks. Patient completes bed mobility as noted above. Once EOB patient engages in UB/LB dressing tasks as noted, refuses shower and sponge bath ADL at this time. Ambulates from recliner to nurses station before requiring seated RB, following rest patient ambulates from RN station to double doors w/ w.c follow. Wheeled remained of way to therapy gym. Engages in fxnl standing task x3 min 17 seconds w/o LOB. CGA=SBA provided throughout task. Does report increased fatigue at this time. Ambulates ~125 feet back to his room w/ wc follow. Remains in recliner upon departure with call light and other personal items within reach. Chair alarm in place, RN notified. Will continue to address goals and progress pt as able/tolerated.    Exercises:     See Flowsheets    Goals:     Short term goals:  Time Frame for Short Term Goals: 5 days (2025)  Short Term

## 2025-07-11 NOTE — PLAN OF CARE
Problem: Physical Therapy - Adult  Goal: By Discharge: Performs mobility at highest level of function for planned discharge setting.  See evaluation for individualized goals.  Outcome: Adequate for Discharge

## 2025-07-12 VITALS
SYSTOLIC BLOOD PRESSURE: 111 MMHG | DIASTOLIC BLOOD PRESSURE: 66 MMHG | TEMPERATURE: 97.5 F | WEIGHT: 194.22 LBS | HEART RATE: 66 BPM | BODY MASS INDEX: 29.44 KG/M2 | HEIGHT: 68 IN | RESPIRATION RATE: 16 BRPM | OXYGEN SATURATION: 96 %

## 2025-07-12 PROCEDURE — 6370000000 HC RX 637 (ALT 250 FOR IP): Performed by: INTERNAL MEDICINE

## 2025-07-12 PROCEDURE — 94761 N-INVAS EAR/PLS OXIMETRY MLT: CPT

## 2025-07-12 RX ORDER — QUETIAPINE FUMARATE 25 MG/1
25 TABLET, FILM COATED ORAL NIGHTLY
Qty: 60 TABLET | Refills: 3 | Status: SHIPPED | OUTPATIENT
Start: 2025-07-12

## 2025-07-12 RX ORDER — MEGESTROL ACETATE 40 MG/ML
400 SUSPENSION ORAL DAILY
Qty: 240 ML | Refills: 3 | Status: SHIPPED | OUTPATIENT
Start: 2025-07-13

## 2025-07-12 RX ORDER — LANOLIN ALCOHOL/MO/W.PET/CERES
400 CREAM (GRAM) TOPICAL DAILY
Qty: 30 TABLET | Refills: 1 | Status: SHIPPED | OUTPATIENT
Start: 2025-07-12

## 2025-07-12 RX ORDER — MIDODRINE HYDROCHLORIDE 5 MG/1
5 TABLET ORAL 2 TIMES DAILY WITH MEALS
Qty: 90 TABLET | Refills: 3 | Status: SHIPPED | OUTPATIENT
Start: 2025-07-12

## 2025-07-12 RX ORDER — FUROSEMIDE 20 MG/1
20 TABLET ORAL DAILY
Qty: 60 TABLET | Refills: 3 | Status: SHIPPED | OUTPATIENT
Start: 2025-07-13

## 2025-07-12 RX ORDER — SENNA AND DOCUSATE SODIUM 50; 8.6 MG/1; MG/1
2 TABLET, FILM COATED ORAL 2 TIMES DAILY
Qty: 60 TABLET | Refills: 0 | Status: SHIPPED | OUTPATIENT
Start: 2025-07-12

## 2025-07-12 RX ADMIN — SENNOSIDES AND DOCUSATE SODIUM 2 TABLET: 50; 8.6 TABLET ORAL at 08:32

## 2025-07-12 RX ADMIN — METOPROLOL TARTRATE 25 MG: 25 TABLET, FILM COATED ORAL at 08:32

## 2025-07-12 RX ADMIN — MICONAZOLE NITRATE: 20 POWDER TOPICAL at 08:33

## 2025-07-12 RX ADMIN — MIDODRINE HYDROCHLORIDE 5 MG: 5 TABLET ORAL at 08:34

## 2025-07-12 RX ADMIN — Medication 400 MG: at 08:32

## 2025-07-12 RX ADMIN — TAMSULOSIN HYDROCHLORIDE 0.4 MG: 0.4 CAPSULE ORAL at 08:32

## 2025-07-12 RX ADMIN — MEGESTROL ACETATE 400 MG: 40 SUSPENSION ORAL at 08:32

## 2025-07-12 RX ADMIN — SERTRALINE HYDROCHLORIDE 25 MG: 50 TABLET ORAL at 08:34

## 2025-07-12 RX ADMIN — FUROSEMIDE 20 MG: 20 TABLET ORAL at 08:32

## 2025-07-12 RX ADMIN — POLYETHYLENE GLYCOL 3350 17 G: 17 POWDER, FOR SOLUTION ORAL at 08:32

## 2025-07-12 RX ADMIN — ALLOPURINOL 300 MG: 100 TABLET ORAL at 08:32

## 2025-07-12 RX ADMIN — PANTOPRAZOLE SODIUM 40 MG: 40 TABLET, DELAYED RELEASE ORAL at 06:28

## 2025-07-12 RX ADMIN — ATORVASTATIN CALCIUM 40 MG: 40 TABLET, FILM COATED ORAL at 08:32

## 2025-07-12 RX ADMIN — ASPIRIN 81 MG: 81 TABLET, COATED ORAL at 08:32

## 2025-07-12 ASSESSMENT — PAIN SCALES - GENERAL
PAINLEVEL_OUTOF10: 0
PAINLEVEL_OUTOF10: 0

## 2025-07-12 NOTE — PLAN OF CARE
Problem: Chronic Conditions and Co-morbidities  Goal: Patient's chronic conditions and co-morbidity symptoms are monitored and maintained or improved  Outcome: Completed     Problem: Discharge Planning  Goal: Discharge to home or other facility with appropriate resources  Outcome: Completed     Problem: ABCDS Injury Assessment  Goal: Absence of physical injury  Outcome: Completed     Problem: Safety - Adult  Goal: Free from fall injury  Outcome: Completed     Problem: Nutrition Deficit:  Goal: Optimize nutritional status  Outcome: Completed     Problem: Skin/Tissue Integrity  Goal: Skin integrity remains intact  Description: 1.  Monitor for areas of redness and/or skin breakdown  2.  Assess vascular access sites hourly  3.  Every 4-6 hours minimum:  Change oxygen saturation probe site  4.  Every 4-6 hours:  If on nasal continuous positive airway pressure, respiratory therapy assess nares and determine need for appliance change or resting period  Outcome: Completed     Problem: Anxiety  Goal: Will report anxiety at manageable levels  Description: INTERVENTIONS:  1. Administer medication as ordered  2. Teach and rehearse alternative coping skills  3. Provide emotional support with 1:1 interaction with staff  7/12/2025 0952 by Danita Moyer, RN  Outcome: Completed  7/11/2025 2313 by Viola Driscoll, RN  Outcome: Progressing     Problem: Coping  Goal: Pt/Family able to verbalize concerns and demonstrate effective coping strategies  Description: INTERVENTIONS:  1. Assist patient/family to identify coping skills, available support systems and cultural and spiritual values  2. Provide emotional support, including active listening and acknowledgement of concerns of patient and caregivers  3. Reduce environmental stimuli, as able  4. Instruct patient/family in relaxation techniques, as appropriate  5. Assess for spiritual pain/suffering and initiate Spiritual Care, Psychosocial Clinical Specialist consults as

## 2025-07-12 NOTE — DISCHARGE SUMMARY
Hospitalist Discharge Summary    Patient:  Quique Wray  YOB: 1945    MRN: [id number]   Acct: [id number]    Primary Care Physician: Vernon Cade MD    Admit date:  7/2/2025    Discharge date:  7/12/2025       Discharge Diagnoses:   Generalized weakness  S/p laparoscopic cholecystectomy secondary to gangrenous cholecystitis  Acute blood loss anemia, s/p 2 units FFP and 7 units PRBC  Right-sided hemothorax, s/p chest tube placement with evacuation, resolved  Right side abdominal hematoma with bruising  Gastric/duodenal hemorrhagic hyperplastic polyps  Permanent atrial fibrillation  Hyperlipidemia  Orthostatic hypotension  Depression  BPH  History of CVA  Recent MRI related to thrombus embolization requiring aspiration thrombectomy of the ramus branch with US not showing any atherosclerotic plaque   Moderate malnutrition    Discharge Medications:         Medication List        START taking these medications      furosemide 20 MG tablet  Commonly known as: LASIX  Take 1 tablet by mouth daily  Start taking on: July 13, 2025     magnesium oxide 400 (240 Mg) MG tablet  Commonly known as: MAG-OX  Take 1 tablet by mouth daily     megestrol 40 MG/ML suspension  Commonly known as: MEGACE  Take 10 mLs by mouth daily  Start taking on: July 13, 2025     midodrine 5 MG tablet  Commonly known as: PROAMATINE  Take 1 tablet by mouth 2 times daily (with meals)     QUEtiapine 25 MG tablet  Commonly known as: SEROQUEL  Take 1 tablet by mouth nightly            CHANGE how you take these medications      * warfarin 5 MG tablet  Commonly known as: COUMADIN  Take as directed. If you are unsure how to take this medication, talk to your nurse or doctor.  What changed: Another medication with the same name was changed. Make sure you understand how and when to take each.     * warfarin 5 MG tablet  Commonly known as: COUMADIN  Take as directed. If you are unsure how to take this medication, talk to your nurse or  managing  Hyperlipidemia-on Lipitor  Orthostatic hypotension-stable on midodrine  Depression-on Zoloft  BPH-on Flomax  History of CVA-on aspirin and Coumadin, Lipitor           Disposition: home with home health care    Condition: Stable    Time Spent: 35 minutes      Electronically signed by George Ware MD on 7/12/2025 at 9:01 AM  Discharging Hospitalist

## 2025-07-12 NOTE — PLAN OF CARE
Problem: Anxiety  Goal: Will report anxiety at manageable levels  Description: INTERVENTIONS:  1. Administer medication as ordered  2. Teach and rehearse alternative coping skills  3. Provide emotional support with 1:1 interaction with staff  7/11/2025 2315 by Viola Driscoll RN  Outcome: Progressing  7/11/2025 1341 by Sandra Lucas RN  Outcome: Progressing     Problem: Coping  Goal: Pt/Family able to verbalize concerns and demonstrate effective coping strategies  Description: INTERVENTIONS:  1. Assist patient/family to identify coping skills, available support systems and cultural and spiritual values  2. Provide emotional support, including active listening and acknowledgement of concerns of patient and caregivers  3. Reduce environmental stimuli, as able  4. Instruct patient/family in relaxation techniques, as appropriate  5. Assess for spiritual pain/suffering and initiate Spiritual Care, Psychosocial Clinical Specialist consults as needed  7/11/2025 2315 by Viola Driscoll RN  Outcome: Progressing  7/11/2025 1341 by Sandra Lucas RN  Outcome: Progressing

## 2025-07-14 ENCOUNTER — TELEPHONE (OUTPATIENT)
Age: 80
End: 2025-07-14

## 2025-07-14 NOTE — TELEPHONE ENCOUNTER
Received a VM over the weekend from [facility] RN Casie who stated that Quique was discharged over the weekend and she did not have any orders, if needed, for an INR check. His next visit will be 7/17 so gave a verbal order for an INR check that day (per Dr Galan) and confirmed his current clinic dosing. No additional questions at this time.  Giuseppe Blakely, PharmD 7/14/2025 12:46 PM

## 2025-07-15 LAB
MICROORGANISM SPEC CULT: NORMAL
MICROORGANISM SPEC CULT: NORMAL
MICROORGANISM/AGENT SPEC: NORMAL
MICROORGANISM/AGENT SPEC: NORMAL
SERVICE CMNT-IMP: NORMAL
SERVICE CMNT-IMP: NORMAL
SPECIMEN DESCRIPTION: NORMAL
SPECIMEN DESCRIPTION: NORMAL

## 2025-07-17 ENCOUNTER — TELEPHONE (OUTPATIENT)
Dept: CARDIOLOGY CLINIC | Age: 80
End: 2025-07-17

## 2025-07-17 ENCOUNTER — ANTI-COAG VISIT (OUTPATIENT)
Age: 80
End: 2025-07-17

## 2025-07-17 DIAGNOSIS — I63.9 STROKE OF UNKNOWN CAUSE (HCC): ICD-10-CM

## 2025-07-17 DIAGNOSIS — I63.9 ACUTE ISCHEMIC STROKE (HCC): Primary | ICD-10-CM

## 2025-07-17 LAB
INTERNATIONAL NORMALIZATION RATIO, POC: 3
PROTHROMBIN TIME, POC: 0

## 2025-07-17 NOTE — TELEPHONE ENCOUNTER
Perez called from coumadin clinic regarding pt PTINR range. What INR are we looking for with this patient.  Please advise.

## 2025-07-18 NOTE — PROGRESS NOTES
[facility]  Medication Management  ANTICOAGULATION    Referring Provider: Dr Davide Galan     GOAL INR: 2.0-3.0     TODAY'S INR: 3.0     WARFARIN Dosage: Continue 5 mg SuTT, 2.5 mg all other days     INR (no units)   Date Value   07/11/2025 2.7   07/10/2025 2.8   07/09/2025 2.8   07/08/2025 3.0   07/07/2025 2.8   07/06/2025 2.7   07/05/2025 2.5     INR,(POC) (no units)   Date Value   07/17/2025 3.0       Hemoglobin   Date Value Ref Range Status   07/06/2025 8.4 (L) 13.5 - 17.5 g/dL Final     Hematocrit   Date Value Ref Range Status   07/06/2025 26.2 (L) 41.0 - 53.0 % Final     ALT   Date Value Ref Range Status   07/06/2025 17 5 - 41 U/L Final     AST   Date Value Ref Range Status   07/06/2025 37 <40 U/L Final       Medication changes:  None     Notes:   Fingerstick INR drawn per [facility] protocol and result called to our clinic for warfarin therapy management. All communication is with the patient's nurse, Casie Swanson, via phone. She had already left the patient's house so tried calling patient several times yesterday but no answer. Got through to patient today who confirmed no visible blood in urine, black tarry stool, falls or ER visits. Denies any missed or extra doses of warfarin. No change in medications or diet. INR is therapeutic so will continue 5 mg SuTT, 2.5 mg all other days and will recheck INR in 1 week with [facility]. Patient acknowledges working in consult agreement with pharmacist as referred by his/her physician.       We want to confirm that, for purposes of billing, this is a virtual visit with your provider for which we will submit a claim for reimbursement with your insurance company. You may be responsible for any copays, coinsurance amounts or other amounts not covered by your insurance company. If you do not accept this, unfortunately we will not be able to schedule a virtual visit with the provider.              For Pharmacy Admin Tracking

## 2025-07-21 LAB
MICROORGANISM SPEC CULT: NORMAL
MICROORGANISM/AGENT SPEC: NORMAL
SERVICE CMNT-IMP: NORMAL
SPECIMEN DESCRIPTION: NORMAL

## 2025-07-22 ENCOUNTER — COMMUNITY OUTREACH (OUTPATIENT)
Dept: OTHER | Age: 80
End: 2025-07-22

## 2025-07-22 NOTE — PROGRESS NOTES
Paramedicine team:  Sully Gil RN/Paramedic    Community [facility] Visit found patient alert and oriented sitting in living room with family present. We reviewed his medications and updated chart. Patient recent  gallbladder removed and was recent discharge from \"swing bed\" program at [facility]. Patient is getting some strength back. We will follow up next  week.

## 2025-07-23 ENCOUNTER — HOSPITAL ENCOUNTER (OUTPATIENT)
Dept: GENERAL RADIOLOGY | Age: 80
Discharge: HOME | End: 2025-07-25
Payer: MEDICARE

## 2025-07-23 ENCOUNTER — HOSPITAL ENCOUNTER (OUTPATIENT)
Age: 80
Discharge: HOME | End: 2025-07-23
Payer: MEDICARE

## 2025-07-23 ENCOUNTER — OFFICE VISIT (OUTPATIENT)
Dept: CARDIOLOGY CLINIC | Age: 80
End: 2025-07-23
Payer: MEDICARE

## 2025-07-23 VITALS
OXYGEN SATURATION: 91 % | RESPIRATION RATE: 16 BRPM | SYSTOLIC BLOOD PRESSURE: 112 MMHG | DIASTOLIC BLOOD PRESSURE: 46 MMHG | HEART RATE: 77 BPM

## 2025-07-23 DIAGNOSIS — D64.9 ANEMIA, UNSPECIFIED TYPE: ICD-10-CM

## 2025-07-23 DIAGNOSIS — D64.9 ANEMIA, UNSPECIFIED TYPE: Primary | ICD-10-CM

## 2025-07-23 DIAGNOSIS — R06.02 SHORTNESS OF BREATH: ICD-10-CM

## 2025-07-23 LAB
ALBUMIN SERPL-MCNC: 3.5 G/DL (ref 3.5–5.2)
ALBUMIN/GLOB SERPL: 1 {RATIO} (ref 1–2.5)
ALP SERPL-CCNC: 99 U/L (ref 40–129)
ALT SERPL-CCNC: 13 U/L (ref 5–41)
ANION GAP SERPL CALCULATED.3IONS-SCNC: 12 MMOL/L (ref 9–17)
AST SERPL-CCNC: 26 U/L
BASOPHILS # BLD: 0.03 K/UL (ref 0–0.2)
BASOPHILS NFR BLD: 1 % (ref 0–2)
BILIRUB SERPL-MCNC: 1.3 MG/DL (ref 0.3–1.2)
BUN SERPL-MCNC: 15 MG/DL (ref 8–23)
CALCIUM SERPL-MCNC: 8.5 MG/DL (ref 8.6–10.4)
CHLORIDE SERPL-SCNC: 105 MMOL/L (ref 98–107)
CO2 SERPL-SCNC: 26 MMOL/L (ref 20–31)
CREAT SERPL-MCNC: 1 MG/DL (ref 0.7–1.2)
EOSINOPHIL # BLD: 0.16 K/UL (ref 0–0.4)
EOSINOPHILS RELATIVE PERCENT: 3 % (ref 0–5)
ERYTHROCYTE [DISTWIDTH] IN BLOOD BY AUTOMATED COUNT: 24.2 % (ref 12.1–15.2)
GFR, ESTIMATED: 77 ML/MIN/1.73M2
GLUCOSE SERPL-MCNC: 190 MG/DL (ref 70–99)
HCT VFR BLD AUTO: 29.1 % (ref 41–53)
HGB BLD-MCNC: 9.2 G/DL (ref 13.5–17.5)
IMM GRANULOCYTES # BLD AUTO: 0.02 K/UL (ref 0–0.3)
IMM GRANULOCYTES NFR BLD: 0 % (ref 0–5)
LYMPHOCYTES NFR BLD: 0.88 K/UL (ref 1–4.8)
LYMPHOCYTES RELATIVE PERCENT: 17 % (ref 13–44)
MCH RBC QN AUTO: 23.1 PG (ref 26–34)
MCHC RBC AUTO-ENTMCNC: 31.6 G/DL (ref 31–37)
MCV RBC AUTO: 72.9 FL (ref 80–100)
MONOCYTES NFR BLD: 0.24 K/UL (ref 0–1)
MONOCYTES NFR BLD: 5 % (ref 5–9)
MORPHOLOGY: ABNORMAL
NEUTROPHILS NFR BLD: 74 % (ref 39–75)
NEUTS SEG NFR BLD: 3.79 K/UL (ref 2.1–6.5)
PLATELET # BLD AUTO: 193 K/UL (ref 140–450)
PMV BLD AUTO: ABNORMAL FL (ref 6–12)
POTASSIUM SERPL-SCNC: 3.8 MMOL/L (ref 3.7–5.3)
PROT SERPL-MCNC: 7 G/DL (ref 6.4–8.3)
RBC # BLD AUTO: 3.99 M/UL (ref 4.5–5.9)
SODIUM SERPL-SCNC: 143 MMOL/L (ref 135–144)
WBC OTHER # BLD: 5.1 K/UL (ref 3.5–11)

## 2025-07-23 PROCEDURE — 99214 OFFICE O/P EST MOD 30 MIN: CPT | Performed by: NURSE PRACTITIONER

## 2025-07-23 PROCEDURE — 1123F ACP DISCUSS/DSCN MKR DOCD: CPT | Performed by: NURSE PRACTITIONER

## 2025-07-23 PROCEDURE — 71046 X-RAY EXAM CHEST 2 VIEWS: CPT

## 2025-07-23 PROCEDURE — 85025 COMPLETE CBC W/AUTO DIFF WBC: CPT

## 2025-07-23 PROCEDURE — 36415 COLL VENOUS BLD VENIPUNCTURE: CPT

## 2025-07-23 PROCEDURE — 3074F SYST BP LT 130 MM HG: CPT | Performed by: NURSE PRACTITIONER

## 2025-07-23 PROCEDURE — G8417 CALC BMI ABV UP PARAM F/U: HCPCS | Performed by: NURSE PRACTITIONER

## 2025-07-23 PROCEDURE — 1111F DSCHRG MED/CURRENT MED MERGE: CPT | Performed by: NURSE PRACTITIONER

## 2025-07-23 PROCEDURE — 1036F TOBACCO NON-USER: CPT | Performed by: NURSE PRACTITIONER

## 2025-07-23 PROCEDURE — 3078F DIAST BP <80 MM HG: CPT | Performed by: NURSE PRACTITIONER

## 2025-07-23 PROCEDURE — 80053 COMPREHEN METABOLIC PANEL: CPT

## 2025-07-23 PROCEDURE — G8427 DOCREV CUR MEDS BY ELIG CLIN: HCPCS | Performed by: NURSE PRACTITIONER

## 2025-07-23 NOTE — PATIENT INSTRUCTIONS
Will stop Lasix today.  Will repeat chest x-ray, CBC and CMP today.  Will call results.  Will follow up on 09/09/2025 as scheduled with labs, chest x-ray and EKG.

## 2025-07-23 NOTE — PROGRESS NOTES
Ov with Mami for one month f/u   Hospital follow up- [facility]- gallbladder  Since release seems to be sob   Any activity will get out of breath   ? If needs to stay on lasix   No edema

## 2025-07-24 ENCOUNTER — RESULTS FOLLOW-UP (OUTPATIENT)
Dept: CARDIOLOGY CLINIC | Age: 80
End: 2025-07-24

## 2025-07-24 ENCOUNTER — ANTI-COAG VISIT (OUTPATIENT)
Age: 80
End: 2025-07-24

## 2025-07-24 DIAGNOSIS — I63.9 ACUTE ISCHEMIC STROKE (HCC): Primary | ICD-10-CM

## 2025-07-24 DIAGNOSIS — I63.9 STROKE OF UNKNOWN CAUSE (HCC): ICD-10-CM

## 2025-07-24 LAB
INTERNATIONAL NORMALIZATION RATIO, POC: 3.7
PROTHROMBIN TIME, POC: 0

## 2025-07-24 NOTE — TELEPHONE ENCOUNTER
Left message with results from CBC and CMP. Advised to call [phone] with any questions or concerns.

## 2025-07-28 RX ORDER — WARFARIN SODIUM 5 MG/1
TABLET ORAL
Qty: 90 TABLET | Refills: 3 | Status: SHIPPED | OUTPATIENT
Start: 2025-07-28

## 2025-07-29 ENCOUNTER — TELEPHONE (OUTPATIENT)
Dept: CARDIOLOGY CLINIC | Age: 80
End: 2025-07-29

## 2025-07-29 DIAGNOSIS — R06.02 SHORTNESS OF BREATH: ICD-10-CM

## 2025-07-29 DIAGNOSIS — Z95.0 ARTIFICIAL PACEMAKER: ICD-10-CM

## 2025-07-29 DIAGNOSIS — D64.9 ANEMIA, UNSPECIFIED TYPE: Primary | ICD-10-CM

## 2025-07-29 DIAGNOSIS — I48.91 ATRIAL FIBRILLATION, NEW ONSET (HCC): ICD-10-CM

## 2025-07-29 DIAGNOSIS — R53.1 WEAKNESS: ICD-10-CM

## 2025-07-29 DIAGNOSIS — I10 ESSENTIAL HYPERTENSION: ICD-10-CM

## 2025-07-29 NOTE — TELEPHONE ENCOUNTER
Continues to feel short of breath and unable to lie flat. Denies swelling to legs, ankles or feet. Denies fever but has rare dry cough. Denies bleeding. Will order H & H and PFT. Advised to go to ER if worsens. Reviewed CXR and labs from last week.

## 2025-07-29 NOTE — TELEPHONE ENCOUNTER
Pt contacted office. States that his breathing issues has gotten worse from last week. He cannot even roll over in bed. Advised ER for severe sob. Pt states that he doesn't know if it is that bad.

## 2025-07-30 ENCOUNTER — HOSPITAL ENCOUNTER (INPATIENT)
Age: 80
LOS: 3 days | Discharge: HOME | DRG: 291 | End: 2025-08-02
Attending: EMERGENCY MEDICINE | Admitting: INTERNAL MEDICINE
Payer: MEDICARE

## 2025-07-30 ENCOUNTER — APPOINTMENT (OUTPATIENT)
Dept: GENERAL RADIOLOGY | Age: 80
DRG: 291 | End: 2025-07-30
Payer: MEDICARE

## 2025-07-30 DIAGNOSIS — I50.23 ACUTE ON CHRONIC SYSTOLIC CONGESTIVE HEART FAILURE (HCC): ICD-10-CM

## 2025-07-30 DIAGNOSIS — R06.02 SHORTNESS OF BREATH: Primary | ICD-10-CM

## 2025-07-30 DIAGNOSIS — I50.23 ACUTE ON CHRONIC SYSTOLIC CHF (CONGESTIVE HEART FAILURE) (HCC): ICD-10-CM

## 2025-07-30 DIAGNOSIS — I50.20 SYSTOLIC CONGESTIVE HEART FAILURE, UNSPECIFIED HF CHRONICITY (HCC): ICD-10-CM

## 2025-07-30 LAB
ALBUMIN SERPL-MCNC: 3.6 G/DL (ref 3.5–5.2)
ALBUMIN/GLOB SERPL: 1.2 {RATIO} (ref 1–2.5)
ALP SERPL-CCNC: 86 U/L (ref 40–129)
ALT SERPL-CCNC: 11 U/L (ref 5–41)
ANION GAP SERPL CALCULATED.3IONS-SCNC: 17 MMOL/L (ref 9–17)
AST SERPL-CCNC: 24 U/L
BASOPHILS # BLD: 0.03 K/UL (ref 0–0.2)
BASOPHILS NFR BLD: 1 % (ref 0–2)
BILIRUB SERPL-MCNC: 1.3 MG/DL (ref 0.3–1.2)
BILIRUB UR QL STRIP: NEGATIVE
BNP SERPL-MCNC: ABNORMAL PG/ML
BUN SERPL-MCNC: 18 MG/DL (ref 8–23)
CALCIUM SERPL-MCNC: 8.4 MG/DL (ref 8.6–10.4)
CHLORIDE SERPL-SCNC: 107 MMOL/L (ref 98–107)
CLARITY UR: CLEAR
CO2 SERPL-SCNC: 20 MMOL/L (ref 20–31)
COLOR UR: YELLOW
COMMENT: ABNORMAL
CREAT SERPL-MCNC: 0.8 MG/DL (ref 0.7–1.2)
EOSINOPHIL # BLD: 0.13 K/UL (ref 0–0.4)
EOSINOPHILS RELATIVE PERCENT: 2 % (ref 0–5)
ERYTHROCYTE [DISTWIDTH] IN BLOOD BY AUTOMATED COUNT: 23.7 % (ref 12.1–15.2)
GFR, ESTIMATED: >90 ML/MIN/1.73M2
GLUCOSE SERPL-MCNC: 173 MG/DL (ref 70–99)
GLUCOSE UR STRIP-MCNC: NEGATIVE MG/DL
HCT VFR BLD AUTO: 28.4 % (ref 41–53)
HGB BLD-MCNC: 9 G/DL (ref 13.5–17.5)
HGB UR QL STRIP.AUTO: NEGATIVE
IMM GRANULOCYTES # BLD AUTO: 0.02 K/UL (ref 0–0.3)
IMM GRANULOCYTES NFR BLD: 0 % (ref 0–5)
INR PPP: 2.8
KETONES UR STRIP-MCNC: NEGATIVE MG/DL
LEUKOCYTE ESTERASE UR QL STRIP: NEGATIVE
LYMPHOCYTES NFR BLD: 0.94 K/UL (ref 1–4.8)
LYMPHOCYTES RELATIVE PERCENT: 18 % (ref 13–44)
MCH RBC QN AUTO: 22.4 PG (ref 26–34)
MCHC RBC AUTO-ENTMCNC: 31.7 G/DL (ref 31–37)
MCV RBC AUTO: 70.6 FL (ref 80–100)
MONOCYTES NFR BLD: 0.35 K/UL (ref 0–1)
MONOCYTES NFR BLD: 7 % (ref 5–9)
MORPHOLOGY: ABNORMAL
NEUTROPHILS NFR BLD: 72 % (ref 39–75)
NEUTS SEG NFR BLD: 3.86 K/UL (ref 2.1–6.5)
NITRITE UR QL STRIP: NEGATIVE
PH UR STRIP: 6 [PH] (ref 5–8)
PLATELET # BLD AUTO: 216 K/UL (ref 140–450)
PMV BLD AUTO: ABNORMAL FL (ref 6–12)
POTASSIUM SERPL-SCNC: 3.6 MMOL/L (ref 3.7–5.3)
PROT SERPL-MCNC: 6.7 G/DL (ref 6.4–8.3)
PROT UR STRIP-MCNC: ABNORMAL MG/DL
PROTHROMBIN TIME: 30.3 SEC (ref 11.5–14.2)
RBC # BLD AUTO: 4.02 M/UL (ref 4.5–5.9)
SODIUM SERPL-SCNC: 144 MMOL/L (ref 135–144)
SP GR UR STRIP: 1.01 (ref 1–1.03)
TROPONIN I SERPL HS-MCNC: 90 NG/L (ref 0–22)
UROBILINOGEN UR STRIP-ACNC: NORMAL EU/DL (ref 0–1)
WBC OTHER # BLD: 5.3 K/UL (ref 3.5–11)

## 2025-07-30 PROCEDURE — 81003 URINALYSIS AUTO W/O SCOPE: CPT

## 2025-07-30 PROCEDURE — 6370000000 HC RX 637 (ALT 250 FOR IP): Performed by: INTERNAL MEDICINE

## 2025-07-30 PROCEDURE — 6360000002 HC RX W HCPCS: Performed by: INTERNAL MEDICINE

## 2025-07-30 PROCEDURE — 1200000000 HC SEMI PRIVATE

## 2025-07-30 PROCEDURE — 93005 ELECTROCARDIOGRAM TRACING: CPT | Performed by: EMERGENCY MEDICINE

## 2025-07-30 PROCEDURE — 83880 ASSAY OF NATRIURETIC PEPTIDE: CPT

## 2025-07-30 PROCEDURE — 80053 COMPREHEN METABOLIC PANEL: CPT

## 2025-07-30 PROCEDURE — 85610 PROTHROMBIN TIME: CPT

## 2025-07-30 PROCEDURE — 2500000003 HC RX 250 WO HCPCS: Performed by: INTERNAL MEDICINE

## 2025-07-30 PROCEDURE — 85025 COMPLETE CBC W/AUTO DIFF WBC: CPT

## 2025-07-30 PROCEDURE — 99285 EMERGENCY DEPT VISIT HI MDM: CPT

## 2025-07-30 PROCEDURE — 96374 THER/PROPH/DIAG INJ IV PUSH: CPT

## 2025-07-30 PROCEDURE — 6360000002 HC RX W HCPCS: Performed by: EMERGENCY MEDICINE

## 2025-07-30 PROCEDURE — 71045 X-RAY EXAM CHEST 1 VIEW: CPT

## 2025-07-30 PROCEDURE — 84484 ASSAY OF TROPONIN QUANT: CPT

## 2025-07-30 PROCEDURE — 94761 N-INVAS EAR/PLS OXIMETRY MLT: CPT

## 2025-07-30 RX ORDER — ASPIRIN 81 MG/1
81 TABLET ORAL DAILY
Status: DISCONTINUED | OUTPATIENT
Start: 2025-07-31 | End: 2025-08-02 | Stop reason: HOSPADM

## 2025-07-30 RX ORDER — SODIUM CHLORIDE 0.9 % (FLUSH) 0.9 %
5-40 SYRINGE (ML) INJECTION EVERY 12 HOURS SCHEDULED
Status: DISCONTINUED | OUTPATIENT
Start: 2025-07-30 | End: 2025-08-02 | Stop reason: HOSPADM

## 2025-07-30 RX ORDER — QUETIAPINE FUMARATE 25 MG/1
25 TABLET, FILM COATED ORAL NIGHTLY
Status: DISCONTINUED | OUTPATIENT
Start: 2025-07-30 | End: 2025-08-02 | Stop reason: HOSPADM

## 2025-07-30 RX ORDER — GLUCOSAMINE/CHONDR SU A SOD 750-600 MG
1 TABLET ORAL NIGHTLY
Status: DISCONTINUED | OUTPATIENT
Start: 2025-07-30 | End: 2025-07-30 | Stop reason: CLARIF

## 2025-07-30 RX ORDER — TAMSULOSIN HYDROCHLORIDE 0.4 MG/1
0.4 CAPSULE ORAL DAILY
Status: DISCONTINUED | OUTPATIENT
Start: 2025-07-31 | End: 2025-08-02 | Stop reason: HOSPADM

## 2025-07-30 RX ORDER — NITROGLYCERIN 0.4 MG/1
0.4 TABLET SUBLINGUAL EVERY 5 MIN PRN
Status: DISCONTINUED | OUTPATIENT
Start: 2025-07-30 | End: 2025-08-02 | Stop reason: HOSPADM

## 2025-07-30 RX ORDER — ONDANSETRON 2 MG/ML
4 INJECTION INTRAMUSCULAR; INTRAVENOUS EVERY 6 HOURS PRN
Status: DISCONTINUED | OUTPATIENT
Start: 2025-07-30 | End: 2025-08-02 | Stop reason: HOSPADM

## 2025-07-30 RX ORDER — SENNA AND DOCUSATE SODIUM 50; 8.6 MG/1; MG/1
2 TABLET, FILM COATED ORAL 2 TIMES DAILY
Status: DISCONTINUED | OUTPATIENT
Start: 2025-07-30 | End: 2025-08-02 | Stop reason: HOSPADM

## 2025-07-30 RX ORDER — FUROSEMIDE 10 MG/ML
20 INJECTION INTRAMUSCULAR; INTRAVENOUS 2 TIMES DAILY
Status: DISCONTINUED | OUTPATIENT
Start: 2025-07-30 | End: 2025-08-02

## 2025-07-30 RX ORDER — MIDODRINE HYDROCHLORIDE 5 MG/1
5 TABLET ORAL 2 TIMES DAILY WITH MEALS
Status: DISCONTINUED | OUTPATIENT
Start: 2025-07-30 | End: 2025-08-02 | Stop reason: HOSPADM

## 2025-07-30 RX ORDER — METOPROLOL TARTRATE 25 MG/1
12.5 TABLET, FILM COATED ORAL 2 TIMES DAILY
Status: DISCONTINUED | OUTPATIENT
Start: 2025-07-30 | End: 2025-08-01

## 2025-07-30 RX ORDER — ATORVASTATIN CALCIUM 40 MG/1
40 TABLET, FILM COATED ORAL DAILY
Status: DISCONTINUED | OUTPATIENT
Start: 2025-07-30 | End: 2025-08-02 | Stop reason: HOSPADM

## 2025-07-30 RX ORDER — FUROSEMIDE 10 MG/ML
40 INJECTION INTRAMUSCULAR; INTRAVENOUS ONCE
Status: COMPLETED | OUTPATIENT
Start: 2025-07-30 | End: 2025-07-30

## 2025-07-30 RX ORDER — SODIUM CHLORIDE 0.9 % (FLUSH) 0.9 %
5-40 SYRINGE (ML) INJECTION PRN
Status: DISCONTINUED | OUTPATIENT
Start: 2025-07-30 | End: 2025-08-02 | Stop reason: HOSPADM

## 2025-07-30 RX ORDER — WARFARIN SODIUM 2.5 MG/1
2.5 TABLET ORAL
Status: COMPLETED | OUTPATIENT
Start: 2025-07-30 | End: 2025-07-30

## 2025-07-30 RX ORDER — PANTOPRAZOLE SODIUM 40 MG/1
40 TABLET, DELAYED RELEASE ORAL
Status: DISCONTINUED | OUTPATIENT
Start: 2025-07-31 | End: 2025-08-02 | Stop reason: HOSPADM

## 2025-07-30 RX ORDER — POLYETHYLENE GLYCOL 3350 17 G/17G
17 POWDER, FOR SOLUTION ORAL DAILY PRN
Status: DISCONTINUED | OUTPATIENT
Start: 2025-07-30 | End: 2025-08-02 | Stop reason: HOSPADM

## 2025-07-30 RX ORDER — ONDANSETRON 4 MG/1
4 TABLET, ORALLY DISINTEGRATING ORAL EVERY 8 HOURS PRN
Status: DISCONTINUED | OUTPATIENT
Start: 2025-07-30 | End: 2025-08-02 | Stop reason: HOSPADM

## 2025-07-30 RX ORDER — POTASSIUM CHLORIDE 750 MG/1
40 TABLET, EXTENDED RELEASE ORAL ONCE
Status: COMPLETED | OUTPATIENT
Start: 2025-07-30 | End: 2025-07-30

## 2025-07-30 RX ORDER — ACETAMINOPHEN 650 MG/1
650 SUPPOSITORY RECTAL EVERY 6 HOURS PRN
Status: DISCONTINUED | OUTPATIENT
Start: 2025-07-30 | End: 2025-08-02 | Stop reason: HOSPADM

## 2025-07-30 RX ORDER — SODIUM CHLORIDE 9 MG/ML
INJECTION, SOLUTION INTRAVENOUS PRN
Status: DISCONTINUED | OUTPATIENT
Start: 2025-07-30 | End: 2025-08-02 | Stop reason: HOSPADM

## 2025-07-30 RX ORDER — MEGESTROL ACETATE 40 MG/ML
400 SUSPENSION ORAL DAILY
Status: DISCONTINUED | OUTPATIENT
Start: 2025-07-31 | End: 2025-08-02 | Stop reason: HOSPADM

## 2025-07-30 RX ORDER — ALLOPURINOL 100 MG/1
300 TABLET ORAL DAILY
Status: DISCONTINUED | OUTPATIENT
Start: 2025-07-30 | End: 2025-08-02 | Stop reason: HOSPADM

## 2025-07-30 RX ORDER — LANOLIN ALCOHOL/MO/W.PET/CERES
400 CREAM (GRAM) TOPICAL DAILY
Status: DISCONTINUED | OUTPATIENT
Start: 2025-07-31 | End: 2025-08-02 | Stop reason: HOSPADM

## 2025-07-30 RX ORDER — ACETAMINOPHEN 325 MG/1
650 TABLET ORAL EVERY 6 HOURS PRN
Status: DISCONTINUED | OUTPATIENT
Start: 2025-07-30 | End: 2025-08-02 | Stop reason: HOSPADM

## 2025-07-30 RX ORDER — VITAMIN B COMPLEX
5000 TABLET ORAL DAILY
Status: DISCONTINUED | OUTPATIENT
Start: 2025-07-30 | End: 2025-08-02 | Stop reason: HOSPADM

## 2025-07-30 RX ADMIN — MIDODRINE HYDROCHLORIDE 5 MG: 5 TABLET ORAL at 17:05

## 2025-07-30 RX ADMIN — WARFARIN SODIUM 2.5 MG: 2.5 TABLET ORAL at 18:56

## 2025-07-30 RX ADMIN — SENNOSIDES AND DOCUSATE SODIUM 2 TABLET: 50; 8.6 TABLET ORAL at 20:14

## 2025-07-30 RX ADMIN — FUROSEMIDE 40 MG: 10 INJECTION, SOLUTION INTRAMUSCULAR; INTRAVENOUS at 15:22

## 2025-07-30 RX ADMIN — QUETIAPINE 25 MG: 25 TABLET ORAL at 20:15

## 2025-07-30 RX ADMIN — SODIUM CHLORIDE, PRESERVATIVE FREE 10 ML: 5 INJECTION INTRAVENOUS at 20:16

## 2025-07-30 RX ADMIN — METOPROLOL TARTRATE 12.5 MG: 25 TABLET, FILM COATED ORAL at 20:15

## 2025-07-30 RX ADMIN — FUROSEMIDE 20 MG: 10 INJECTION, SOLUTION INTRAMUSCULAR; INTRAVENOUS at 20:15

## 2025-07-30 RX ADMIN — POTASSIUM CHLORIDE 40 MEQ: 750 TABLET, FILM COATED, EXTENDED RELEASE ORAL at 17:05

## 2025-07-30 ASSESSMENT — PAIN SCALES - GENERAL
PAINLEVEL_OUTOF10: 0
PAINLEVEL_OUTOF10: 0

## 2025-07-30 ASSESSMENT — PAIN - FUNCTIONAL ASSESSMENT: PAIN_FUNCTIONAL_ASSESSMENT: NONE - DENIES PAIN

## 2025-07-30 NOTE — ED PROVIDER NOTES
EMERGENCY DEPARTMENT ENCOUNTER      CHIEF COMPLAINT    Chief Complaint   Patient presents with    Shortness of Breath     SOB x2weeks. States it got worse last night and this morning at his rehab appointment here today       HPI    Quique Wray is a 80 y.o. male who presentsto ED with shortness of breath for the past 2 weeks.  Patient states that the has been gradually getting more short of breath.  Patient has shortness of breath with trivial exertion.  Patient also has shortness of breath when laying down to  Patient has had previous history of coronary artery disease, MI, atrial fibrillation, pacemaker placement.  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    Cerebral artery occlusion with cerebral infarction (HCC)     Degenerative disc disease, lumbar     L3    Depression     Endocarditis     Hypertension     Kidney stone     Pacemaker     Spinal stenosis        SURGICAL HISTORY    Past Surgical History:   Procedure Laterality Date    CATARACT REMOVAL Left     CHOLECYSTECTOMY, LAPAROSCOPIC N/A 6/23/2025    CHOLECYSTECTOMY LAPAROSCOPIC performed by Hardik Rojas MD at [facility] OR    CT GUIDED CHEST TUBE  6/27/2025    CT GUIDED CHEST TUBE 6/27/2025 Jagdish Moses MD [facility] CT SCAN    INSERTABLE CARDIAC MONITOR N/A 05/12/2021    LOOP RECORDER INSERT performed by Andrea Galan MD at [facility] OR    KNEE ARTHROSCOPY Right 2002    LITHOTRIPSY      PACEMAKER INSERTION Left 06/29/2022    PACEMAKER INSERTION PERMANENT performed by Andrea Galan MD at [facility] OR    TOTAL HIP ARTHROPLASTY Right 2023    UPPER GASTROINTESTINAL ENDOSCOPY N/A 6/26/2025    ESOPHAGOGASTRODUODENOSCOPY WITH BIOPSY performed by Mack Napier MD at [facility] OR       CURRENT MEDICATIONS    Current Outpatient Rx   Medication Sig Dispense Refill    warfarin (COUMADIN) 5 MG tablet TAKE 1 TABLET BY MOUTH DAILY 90

## 2025-07-30 NOTE — PROGRESS NOTES
Pharmacy Note  Warfarin Consult    Quique Wray is a 80 y.o. male for whom pharmacy has been consulted to manage warfarin therapy.     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 PT/INR ordered to start 7/31/25.    Thank you for the consult.  Will continue to follow.    Leo Austin, PharmD, [facility]  7/30/2025  6:09 PM

## 2025-07-30 NOTE — ED TRIAGE NOTES
Surgical and medical hx verbally reviewed with patient. Medications verbally reviewed with patient.

## 2025-07-30 NOTE — PROGRESS NOTES
Herbal and Nutritional Product Restrictions      The following herbal, alternative, and/or nutritional/dietary supplement product(s) has been discontinued per [employer] approved policy:    Lutein-Zeaxanthin 25-5 mg caps nightly    Please reorder upon discharge if appropriate.    Thank you,  Axel Elliott [facility]  7/30/2025 4:35 PM

## 2025-07-31 ENCOUNTER — APPOINTMENT (OUTPATIENT)
Age: 80
DRG: 291 | End: 2025-07-31
Payer: MEDICARE

## 2025-07-31 LAB
ANION GAP SERPL CALCULATED.3IONS-SCNC: 18 MMOL/L (ref 9–17)
BASOPHILS # BLD: 0.03 K/UL (ref 0–0.2)
BASOPHILS NFR BLD: 1 % (ref 0–2)
BUN SERPL-MCNC: 17 MG/DL (ref 8–23)
CALCIUM SERPL-MCNC: 8.4 MG/DL (ref 8.6–10.4)
CHLORIDE SERPL-SCNC: 105 MMOL/L (ref 98–107)
CO2 SERPL-SCNC: 21 MMOL/L (ref 20–31)
CREAT SERPL-MCNC: 0.9 MG/DL (ref 0.7–1.2)
EKG Q-T INTERVAL: 410 MS
EKG QRS DURATION: 108 MS
EKG QTC CALCULATION (BAZETT): 461 MS
EKG R AXIS: -30 DEGREES
EKG T AXIS: 169 DEGREES
EKG VENTRICULAR RATE: 76 BPM
EOSINOPHIL # BLD: 0.19 K/UL (ref 0–0.4)
EOSINOPHILS RELATIVE PERCENT: 3 % (ref 0–5)
ERYTHROCYTE [DISTWIDTH] IN BLOOD BY AUTOMATED COUNT: 24 % (ref 12.1–15.2)
GFR, ESTIMATED: 87 ML/MIN/1.73M2
GLUCOSE SERPL-MCNC: 111 MG/DL (ref 70–99)
HCT VFR BLD AUTO: 27 % (ref 41–53)
HGB BLD-MCNC: 8.8 G/DL (ref 13.5–17.5)
IMM GRANULOCYTES # BLD AUTO: 0.02 K/UL (ref 0–0.3)
IMM GRANULOCYTES NFR BLD: 0 % (ref 0–5)
INR PPP: 2.8
LYMPHOCYTES NFR BLD: 1.69 K/UL (ref 1–4.8)
LYMPHOCYTES RELATIVE PERCENT: 29 % (ref 13–44)
MCH RBC QN AUTO: 22.6 PG (ref 26–34)
MCHC RBC AUTO-ENTMCNC: 32.6 G/DL (ref 31–37)
MCV RBC AUTO: 69.4 FL (ref 80–100)
MONOCYTES NFR BLD: 0.35 K/UL (ref 0–1)
MONOCYTES NFR BLD: 6 % (ref 5–9)
MORPHOLOGY: ABNORMAL
MORPHOLOGY: ABNORMAL
NEUTROPHILS NFR BLD: 61 % (ref 39–75)
NEUTS SEG NFR BLD: 3.6 K/UL (ref 2.1–6.5)
PLATELET # BLD AUTO: 208 K/UL (ref 140–450)
PMV BLD AUTO: ABNORMAL FL (ref 6–12)
POTASSIUM SERPL-SCNC: 4 MMOL/L (ref 3.7–5.3)
PROTHROMBIN TIME: 30.6 SEC (ref 11.5–14.2)
RBC # BLD AUTO: 3.89 M/UL (ref 4.5–5.9)
SODIUM SERPL-SCNC: 144 MMOL/L (ref 135–144)
WBC OTHER # BLD: 5.9 K/UL (ref 3.5–11)

## 2025-07-31 PROCEDURE — 80048 BASIC METABOLIC PNL TOTAL CA: CPT

## 2025-07-31 PROCEDURE — 6370000000 HC RX 637 (ALT 250 FOR IP): Performed by: INTERNAL MEDICINE

## 2025-07-31 PROCEDURE — 36415 COLL VENOUS BLD VENIPUNCTURE: CPT

## 2025-07-31 PROCEDURE — 6360000004 HC RX CONTRAST MEDICATION: Performed by: INTERNAL MEDICINE

## 2025-07-31 PROCEDURE — 2500000003 HC RX 250 WO HCPCS: Performed by: INTERNAL MEDICINE

## 2025-07-31 PROCEDURE — 85610 PROTHROMBIN TIME: CPT

## 2025-07-31 PROCEDURE — 6360000002 HC RX W HCPCS: Performed by: INTERNAL MEDICINE

## 2025-07-31 PROCEDURE — 94761 N-INVAS EAR/PLS OXIMETRY MLT: CPT

## 2025-07-31 PROCEDURE — 85025 COMPLETE CBC W/AUTO DIFF WBC: CPT

## 2025-07-31 PROCEDURE — 93321 DOPPLER ECHO F-UP/LMTD STD: CPT

## 2025-07-31 PROCEDURE — 1200000000 HC SEMI PRIVATE

## 2025-07-31 PROCEDURE — 93010 ELECTROCARDIOGRAM REPORT: CPT | Performed by: INTERNAL MEDICINE

## 2025-07-31 PROCEDURE — 97161 PT EVAL LOW COMPLEX 20 MIN: CPT

## 2025-07-31 RX ORDER — WARFARIN SODIUM 5 MG/1
5 TABLET ORAL
Status: COMPLETED | OUTPATIENT
Start: 2025-07-31 | End: 2025-07-31

## 2025-07-31 RX ORDER — SPIRONOLACTONE 25 MG/1
25 TABLET ORAL DAILY
Status: DISCONTINUED | OUTPATIENT
Start: 2025-07-31 | End: 2025-08-02 | Stop reason: HOSPADM

## 2025-07-31 RX ADMIN — Medication 400 MG: at 08:48

## 2025-07-31 RX ADMIN — PANTOPRAZOLE SODIUM 40 MG: 40 TABLET, DELAYED RELEASE ORAL at 06:16

## 2025-07-31 RX ADMIN — MIDODRINE HYDROCHLORIDE 5 MG: 5 TABLET ORAL at 08:47

## 2025-07-31 RX ADMIN — MEGESTROL ACETATE 400 MG: 40 SUSPENSION ORAL at 08:46

## 2025-07-31 RX ADMIN — MIDODRINE HYDROCHLORIDE 5 MG: 5 TABLET ORAL at 17:47

## 2025-07-31 RX ADMIN — FUROSEMIDE 20 MG: 10 INJECTION, SOLUTION INTRAMUSCULAR; INTRAVENOUS at 06:16

## 2025-07-31 RX ADMIN — METOPROLOL TARTRATE 12.5 MG: 25 TABLET, FILM COATED ORAL at 08:51

## 2025-07-31 RX ADMIN — ASPIRIN 81 MG: 81 TABLET, COATED ORAL at 08:47

## 2025-07-31 RX ADMIN — WARFARIN SODIUM 5 MG: 5 TABLET ORAL at 17:47

## 2025-07-31 RX ADMIN — SPIRONOLACTONE 25 MG: 25 TABLET ORAL at 08:49

## 2025-07-31 RX ADMIN — ATORVASTATIN CALCIUM 40 MG: 40 TABLET, FILM COATED ORAL at 08:51

## 2025-07-31 RX ADMIN — EMPAGLIFLOZIN 10 MG: 10 TABLET, FILM COATED ORAL at 08:48

## 2025-07-31 RX ADMIN — SERTRALINE HYDROCHLORIDE 25 MG: 50 TABLET ORAL at 08:47

## 2025-07-31 RX ADMIN — SULFUR HEXAFLUORIDE 2 ML: KIT at 11:37

## 2025-07-31 RX ADMIN — SODIUM CHLORIDE, PRESERVATIVE FREE 10 ML: 5 INJECTION INTRAVENOUS at 20:06

## 2025-07-31 RX ADMIN — QUETIAPINE 25 MG: 25 TABLET ORAL at 20:06

## 2025-07-31 RX ADMIN — ALLOPURINOL 300 MG: 100 TABLET ORAL at 08:46

## 2025-07-31 RX ADMIN — SODIUM CHLORIDE, PRESERVATIVE FREE 10 ML: 5 INJECTION INTRAVENOUS at 08:46

## 2025-07-31 RX ADMIN — METOPROLOL TARTRATE 12.5 MG: 25 TABLET, FILM COATED ORAL at 20:06

## 2025-07-31 RX ADMIN — TAMSULOSIN HYDROCHLORIDE 0.4 MG: 0.4 CAPSULE ORAL at 08:47

## 2025-07-31 RX ADMIN — Medication 5000 UNITS: at 08:46

## 2025-07-31 RX ADMIN — FUROSEMIDE 20 MG: 10 INJECTION, SOLUTION INTRAMUSCULAR; INTRAVENOUS at 17:47

## 2025-07-31 ASSESSMENT — PAIN SCALES - GENERAL
PAINLEVEL_OUTOF10: 0

## 2025-07-31 NOTE — PROGRESS NOTES
Pt calls out to nurse's station with c/o \"having a hard time breathing\" after being situated back in bed from . SpO2 93 RR 20. Dr Bowens notified at nurse's station, new verbal order to give Lasix 20mg early.

## 2025-07-31 NOTE — CONSULTS
[facility]  Pharmacy Department    Clinical Pharmacy Note-Warfarin Consult    Quique Wray is a 80 y.o. male for whom pharmacy has been asked to manage warfarin therapy.     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    Cerebral artery occlusion with cerebral infarction (HCC)     Degenerative disc disease, lumbar     L3    Depression     Endocarditis     Hypertension     Kidney stone     Pacemaker     Spinal stenosis        INR (no units)   Date Value   07/31/2025 2.8   07/30/2025 2.8   07/11/2025 2.7   07/10/2025 2.8   07/09/2025 2.8   07/08/2025 3.0   07/07/2025 2.8     INR,(POC) (no units)   Date Value   07/24/2025 3.7   07/17/2025 3.0       Hemoglobin (g/dL)   Date Value   07/31/2025 8.8 (L)   07/30/2025 9.0 (L)   07/23/2025 9.2 (L)     Hematocrit (%)   Date Value   07/31/2025 27.0 (L)   07/30/2025 28.4 (L)   07/23/2025 29.1 (L)     Platelet Count (k/uL)   Date Value   11/13/2011 137     Platelets (k/uL)   Date Value   07/31/2025 208   07/30/2025 216   07/23/2025 193       Current warfarin drug-drug interactions: APAP PRN, allopurinol, sertraline, ASA 81      Date             INR        Dose   7/31/2025        2.8    5 mg    Daily PT/INR until stable within therapeutic range.     Thank you for the consult.     Giuseppe Blakely, PharmD 7/31/2025 7:15 AM

## 2025-07-31 NOTE — PROGRESS NOTES
Cardiology    CHF both systolic and diastolic  Chronic AF  SSS s/p ppm  S/P coronary artery thrombus    Plan:  Continue diuresing as per Dr. Bowens  PFT's later  Limited echo for LV function  4.   Jardiance 10 mg daily  5.   Aldactone 25 mg daily    Thanks, Andrea Galan MD

## 2025-07-31 NOTE — H&P
atorvastatin (LIPITOR) 40 MG tablet Take 1 tablet by mouth daily   Yes Karina Mauro MD   aspirin 81 MG EC tablet Take 1 tablet by mouth daily   Yes Karina Mauro MD   nitroGLYCERIN (NITROSTAT) 0.4 MG SL tablet Place 1 tablet under the tongue every 5 minutes as needed for Chest pain up to max of 3 total doses. If no relief after 1 dose, call 911.   Yes Karina Mauro MD   warfarin (COUMADIN) 5 MG tablet Take 0.5 tablets by mouth See Admin Instructions (1/2 Tablet on Saturday, Monday, Wednesday, and Friday)   Yes Karina Mauro MD   melatonin 5 MG TABS tablet Take 6 mg by mouth nightly as needed   Yes Karina Mauro MD   tamsulosin (FLOMAX) 0.4 MG capsule Take 1 capsule by mouth daily 5/19/25  Yes Rob Bowens MD   sertraline (ZOLOFT) 25 MG tablet Take 1 tablet by mouth daily 5/19/25  Yes Rob Bowens MD   Nutritional Supplements (BOOST HIGH PROTEIN) LIQD Take 1 Bottle by mouth daily   Yes Karina Mauro MD   acetaminophen (TYLENOL) 500 MG tablet Take 2 tablets by mouth every 6 hours as needed for Pain   Yes Karina Mauro MD   Cholecalciferol (VITAMIN D3) 125 MCG (5000 UT) TABS Take 1 tablet by mouth daily   Yes Karina Mauro MD   Lutein-Zeaxanthin 25-5 MG CAPS Take 1 capsule by mouth nightly   Yes Karina Mauro MD   allopurinol (ZYLOPRIM) 300 MG tablet Take 1 tablet by mouth daily   Yes Karina Mauro MD   omeprazole (PRILOSEC) 20 MG delayed release capsule Take 1 capsule by mouth Daily   Yes Karina Mauro MD       Allergies:  Adhesive tape, Morphine, and Wound dressing adhesive    Social History:   TOBACCO:   reports that he has never smoked. He has never used smokeless tobacco.  ETOH:   reports that he does not currently use alcohol after a past usage of about 10.0 standard drinks of alcohol per week.  OCCUPATION:      Family History:   History reviewed. No pertinent family history.    REVIEW OF SYSTEMS:  Constitutional:  negative

## 2025-07-31 NOTE — CARE COORDINATION
Case Management Assessment  Initial Evaluation    Date/Time of Evaluation: 7/31/2025 10:44 AM  Assessment Completed by: Rick Altamirano RN    If patient is discharged prior to next notation, then this note serves as note for discharge by case management.    Patient Name: Quique Wray                   YOB: 1945  Diagnosis: Shortness of breath [R06.02]  Acute on chronic systolic congestive heart failure (HCC) [I50.23]  Acute on chronic systolic CHF (congestive heart failure) (HCC) [I50.23]                   Date / Time: 7/30/2025  1:27 PM    Patient Admission Status: Inpatient   Readmission Risk (Low < 19, Mod (19-27), High > 27): Readmission Risk Score: 31.3    Current PCP: Vernon Cade MD  PCP verified by CM? (P) Yes (Dr Cade)    Chart Reviewed: Yes      History Provided by: (P) Patient, Spouse  Patient Orientation: (P) Alert and Oriented, Person, Place, Situation, Self    Patient Cognition: (P) Alert    Hospitalization in the last 30 days (Readmission):  Yes    If yes, Readmission Assessment in  Navigator will be completed.    Advance Directives:      Code Status: Full Code   Patient's Primary Decision Maker is: (P) Legal Next of Kin    Primary Decision Maker (Active): Sherie Wray - Spouse - [phone]    Secondary Decision Maker: Elizabeth Kapadia - Child - [phone]    Discharge Planning:    Patient lives with: (P) Spouse/Significant Other Type of Home: (P) House  Primary Care Giver: (P) Spouse  Patient Support Systems include: (P) Spouse/Significant Other, Children, Family Members, Home Care Staff   Current Financial resources: (P) Medicare  Current community resources: (P) ECF/Home Care ([facility])  Current services prior to admission: (P) Durable Medical Equipment, Home Care ([facility])            Current DME: (P) Walker, Shower Chair            Type of Home Care services:  (P) PT, Nursing Services    ADLS  Prior functional level: (P) Assistance with the following:,

## 2025-07-31 NOTE — CARE COORDINATION
Readmission Assessment  Number of Days since last admission?: 8-30 days  Previous Disposition: SNF ([facility])  Who is being Interviewed: Patient  What was the patient's/caregiver's perception as to why they think they needed to return back to the hospital?: Other (Comment) (shortness of breath, difficulty breathing)  Did you visit your Primary Care Physician after you left the hospital, before you returned this time?: Yes  Did you see a specialist, such as Cardiac, Pulmonary, Orthopedic Physician, etc. after you left the hospital?: Yes (surgeon, cardiology)  Who advised the patient to return to the hospital?: Home Health Staff  Does the patient report anything that got in the way of taking their medications?: No  In our efforts to provide the best possible care to you and others like you, can you think of anything that we could have done to help you after you left the hospital the first time, so that you might not have needed to return so soon?: Other (Comment) (denies)      Patient at [facility] from 6/19/2025 to 7/2/2025. Was admitted to [facility] from 7/2/2025 to 7/12/2025 and discharged with [facility].

## 2025-07-31 NOTE — PROGRESS NOTES
Met with Patient and his wife this a.m. to discuss discharge planning.  Patient is a 80 year old , white male, admitted with a diagnosis of CHF.  History of A fib and depression also noted.  Patient is alert and oriented, polite and cooperative with this assessment.  States that he plans to return home with his wife at discharge and resume care through [facility].  Referral made to [facility] at this time.    [place] his wife.  He uses a cane, walker, shower chair, grab bars and a stair lift at home for assistance.  Wife reports that she is to the point of leaving him home alone for short periods while she runs errands etc.  Provided list of [employer] systems for their consideration.  Wife is open to this and reports that other family members have utilized ERS systems in the past.  Patient receives assistance from his wife and family as needed.  He relies on his wife for his transportation needs as he no longer drives due to vision impairment.    PCP is Dr. Cade.  Patient has medical insurance and reports no difficulty with regards to affording his prescription medications at this point.    Discharge plan is home when stable.  Resume home health through [facility].  Patient remains a 'Full Code' status and reports that he does have medical directives in place.  LSW to remain involved and assist with discharge planning as appropriate.    TRAV Paige  7/31/2025

## 2025-07-31 NOTE — PROGRESS NOTES
Spiritual Services Interventions  [id number]   7/31/2025  Sr Sarah Wray  80 y.o. year old male    Encounter Summary  Encounter Overview/Reason: Initial Encounter  Service Provided For: Family  Referral/Consult From: Rounding  Support System: Spouse  Last Encounter : 07/31/25  Complexity of Encounter: Low  Begin Time: 1015  End Time : 1030  Total Time Calculated: 15 min     Spiritual/Emotional needs  Type: Spiritual Support                    Assessment/Intervention/Outcome  Assessment: Unable to assess  Intervention: Prayer (assurance of)/[place]  Outcome: Did not respond

## 2025-07-31 NOTE — PROGRESS NOTES
[facility] Cardiology  [address] [place] [address]  Phone: [phone], Fax: [phone]       Patient: Quique Wray  : 1945  Date of Visit:  2025    REASON FOR VISIT / CONSULTATION: Follow-up    History of Present Illness:        Dear Rayo, Vernon Kaur MD    We had the pleasure of seeing Quique Wray and his wife, Sherie, in our office today with his last visit with Dr. Fernandez on 2025.   Mr. Wray is a pleasant 80 y.o. male with an extensive cardiac history.    He had a right retinal artery occlusion with a negative workup 14 years ago.  He was placed on baby aspirin that had been stopped for 10 days for periodontal bleeding.  He had a retinal artery occlusion and therefore he has been very reluctant to stop his aspirin after that.     He had a left upper extremity weakness, 2020.  He was given tPA and life-flighted to [facility] on 2020, and his weakness resolved.  He was placed on Lipitor, aspirin, and lisinopril.     On 2020, Dr. Galan recommended a loop recorder because of his TIA and CVA, in which his symptoms resolved, to rule out atrial fibrillation, which he did not do.     He had another neurologic event on 2020, with left arm numbness, and he met with Dr. Cade, who also recommended implantable loop recorder and we did that on May 12, 2021.     On 2021, loop recorder showed that he had atrial fibrillation and was converted to spontaneous sinus rhythm.  We stopped aspirin and Plavix and placed him on Eliquis and changed that to Coumadin because of finances.     He developed bradycardia on 2021, and he stopped his Lopressor.  He continued to have 3- to 4-second pauses and Dr. Galan placed a Medtronic Crosspointe MRI compatible pacemaker on 2022.     He has had a difficult year.  He developed fever and endocarditis with Enterococcus bacteremia, and was placed on Rocephin

## 2025-08-01 ENCOUNTER — TELEPHONE (OUTPATIENT)
Dept: CARDIOLOGY CLINIC | Age: 80
End: 2025-08-01

## 2025-08-01 DIAGNOSIS — I10 ESSENTIAL HYPERTENSION: ICD-10-CM

## 2025-08-01 DIAGNOSIS — Z95.0 ARTIFICIAL PACEMAKER: ICD-10-CM

## 2025-08-01 DIAGNOSIS — R06.02 SHORTNESS OF BREATH: ICD-10-CM

## 2025-08-01 DIAGNOSIS — D64.9 ANEMIA, UNSPECIFIED TYPE: ICD-10-CM

## 2025-08-01 DIAGNOSIS — R53.1 WEAKNESS: ICD-10-CM

## 2025-08-01 DIAGNOSIS — I48.91 ATRIAL FIBRILLATION, NEW ONSET (HCC): ICD-10-CM

## 2025-08-01 LAB
ANION GAP SERPL CALCULATED.3IONS-SCNC: 18 MMOL/L (ref 9–17)
BUN SERPL-MCNC: 22 MG/DL (ref 8–23)
CALCIUM SERPL-MCNC: 8.4 MG/DL (ref 8.6–10.4)
CHLORIDE SERPL-SCNC: 104 MMOL/L (ref 98–107)
CO2 SERPL-SCNC: 21 MMOL/L (ref 20–31)
CREAT SERPL-MCNC: 1 MG/DL (ref 0.7–1.2)
GFR, ESTIMATED: 77 ML/MIN/1.73M2
GLUCOSE SERPL-MCNC: 125 MG/DL (ref 70–99)
INR PPP: 2.9
POTASSIUM SERPL-SCNC: 3.9 MMOL/L (ref 3.7–5.3)
PROTHROMBIN TIME: 31 SEC (ref 11.5–14.2)
SODIUM SERPL-SCNC: 143 MMOL/L (ref 135–144)

## 2025-08-01 PROCEDURE — 6360000002 HC RX W HCPCS: Performed by: INTERNAL MEDICINE

## 2025-08-01 PROCEDURE — 6370000000 HC RX 637 (ALT 250 FOR IP): Performed by: INTERNAL MEDICINE

## 2025-08-01 PROCEDURE — 2500000003 HC RX 250 WO HCPCS: Performed by: INTERNAL MEDICINE

## 2025-08-01 PROCEDURE — 94761 N-INVAS EAR/PLS OXIMETRY MLT: CPT

## 2025-08-01 PROCEDURE — 36415 COLL VENOUS BLD VENIPUNCTURE: CPT

## 2025-08-01 PROCEDURE — 97116 GAIT TRAINING THERAPY: CPT

## 2025-08-01 PROCEDURE — 85610 PROTHROMBIN TIME: CPT

## 2025-08-01 PROCEDURE — 97110 THERAPEUTIC EXERCISES: CPT

## 2025-08-01 PROCEDURE — 1200000000 HC SEMI PRIVATE

## 2025-08-01 PROCEDURE — 80048 BASIC METABOLIC PNL TOTAL CA: CPT

## 2025-08-01 RX ORDER — METOPROLOL SUCCINATE 25 MG/1
12.5 TABLET, EXTENDED RELEASE ORAL DAILY
Status: DISCONTINUED | OUTPATIENT
Start: 2025-08-01 | End: 2025-08-02 | Stop reason: HOSPADM

## 2025-08-01 RX ORDER — WARFARIN SODIUM 2.5 MG/1
2.5 TABLET ORAL
Status: COMPLETED | OUTPATIENT
Start: 2025-08-01 | End: 2025-08-01

## 2025-08-01 RX ADMIN — ALLOPURINOL 300 MG: 100 TABLET ORAL at 08:24

## 2025-08-01 RX ADMIN — MEGESTROL ACETATE 400 MG: 40 SUSPENSION ORAL at 08:23

## 2025-08-01 RX ADMIN — FUROSEMIDE 20 MG: 10 INJECTION, SOLUTION INTRAMUSCULAR; INTRAVENOUS at 17:29

## 2025-08-01 RX ADMIN — SENNOSIDES AND DOCUSATE SODIUM 2 TABLET: 50; 8.6 TABLET ORAL at 08:28

## 2025-08-01 RX ADMIN — Medication 400 MG: at 08:24

## 2025-08-01 RX ADMIN — SPIRONOLACTONE 25 MG: 25 TABLET ORAL at 08:24

## 2025-08-01 RX ADMIN — EMPAGLIFLOZIN 10 MG: 10 TABLET, FILM COATED ORAL at 08:24

## 2025-08-01 RX ADMIN — METOPROLOL SUCCINATE 12.5 MG: 25 TABLET, EXTENDED RELEASE ORAL at 08:24

## 2025-08-01 RX ADMIN — TAMSULOSIN HYDROCHLORIDE 0.4 MG: 0.4 CAPSULE ORAL at 08:24

## 2025-08-01 RX ADMIN — SENNOSIDES AND DOCUSATE SODIUM 2 TABLET: 50; 8.6 TABLET ORAL at 19:55

## 2025-08-01 RX ADMIN — MIDODRINE HYDROCHLORIDE 5 MG: 5 TABLET ORAL at 08:24

## 2025-08-01 RX ADMIN — QUETIAPINE 25 MG: 25 TABLET ORAL at 19:55

## 2025-08-01 RX ADMIN — SODIUM CHLORIDE, PRESERVATIVE FREE 10 ML: 5 INJECTION INTRAVENOUS at 19:55

## 2025-08-01 RX ADMIN — SERTRALINE HYDROCHLORIDE 25 MG: 50 TABLET ORAL at 08:24

## 2025-08-01 RX ADMIN — WARFARIN SODIUM 2.5 MG: 2.5 TABLET ORAL at 17:30

## 2025-08-01 RX ADMIN — SODIUM CHLORIDE, PRESERVATIVE FREE 10 ML: 5 INJECTION INTRAVENOUS at 08:29

## 2025-08-01 RX ADMIN — FUROSEMIDE 20 MG: 10 INJECTION, SOLUTION INTRAMUSCULAR; INTRAVENOUS at 08:24

## 2025-08-01 RX ADMIN — PANTOPRAZOLE SODIUM 40 MG: 40 TABLET, DELAYED RELEASE ORAL at 06:07

## 2025-08-01 RX ADMIN — Medication 5000 UNITS: at 08:24

## 2025-08-01 RX ADMIN — ATORVASTATIN CALCIUM 40 MG: 40 TABLET, FILM COATED ORAL at 08:24

## 2025-08-01 RX ADMIN — MIDODRINE HYDROCHLORIDE 5 MG: 5 TABLET ORAL at 17:30

## 2025-08-01 RX ADMIN — ASPIRIN 81 MG: 81 TABLET, COATED ORAL at 08:24

## 2025-08-01 ASSESSMENT — PAIN SCALES - GENERAL
PAINLEVEL_OUTOF10: 0
PAINLEVEL_OUTOF10: 0

## 2025-08-01 NOTE — THERAPY EVALUATION
[facility]  Physical Therapy  Evaluation  Date: 2025  Patient Name: Quique Wray        MRN: [id number]    : 1945  (80 y.o.)  Gender: male   Referring Practitioner: Dr. Bowens  Diagnosis: CHF  Additional Pertinent Hx: Patient admitted with progressive SOB and acute on chronic CHF.  Referred to PT to progress with strengthening while in hospital.   Past Medical History:   Diagnosis Date    Atrial fibrillation (HCC)     Blind right eye     due to retinol occlusion    Cerebral artery occlusion with cerebral infarction (HCC)     Degenerative disc disease, lumbar     L3    Depression     Endocarditis     Hypertension     Kidney stone     Pacemaker     Spinal stenosis      Past Surgical History:   Procedure Laterality Date    CATARACT REMOVAL Left     CHOLECYSTECTOMY, LAPAROSCOPIC N/A 2025    CHOLECYSTECTOMY LAPAROSCOPIC performed by Hardik Rojas MD at [facility] OR    CT GUIDED CHEST TUBE  2025    CT GUIDED CHEST TUBE 2025 Jagdish Moses MD [facility] CT SCAN    INSERTABLE CARDIAC MONITOR N/A 2021    LOOP RECORDER INSERT performed by Andrea Galan MD at [facility] OR    KNEE ARTHROSCOPY Right     LITHOTRIPSY      PACEMAKER INSERTION Left 2022    PACEMAKER INSERTION PERMANENT performed by Andrea Galan MD at [facility] OR    TOTAL HIP ARTHROPLASTY Right     UPPER GASTROINTESTINAL ENDOSCOPY N/A 2025    ESOPHAGOGASTRODUODENOSCOPY WITH BIOPSY performed by Mack Napier MD at [facility] OR       Restrictions         Subjective         Pain Level: 0    Orientation  Overall Orientation Status: Within Functional Limits    Home Living / Prior Level of Function  Social/Functional History  Lives With: Spouse  Type of Home: House  Home Layout: Multi-level  Home Access: Stairs to enter without rails  Bathroom Shower/Tub: Walk-in shower  Bathroom Toilet: Standard  Bathroom Equipment: Hand-held shower, Shower chair, Grab bars in shower, Safety frame, Grab bars around toilet  Home

## 2025-08-01 NOTE — PROGRESS NOTES
Phone: [phone] [facility]  Date: 2025  Fax: [phone]      Physical Therapy    Daily Note    Patient Name: Quique Wray      : 1945  (80 y.o.)  MRN: [id number]      Assessment  Supine to Sit: Modified independent  Sit to Supine: Stand by assistance     Sit to Stand: Stand by assistance, Contact guard assistance  Stand to Sit: Stand by assistance, Contact guard assistance    WB Status:  (in room, x30ft with FWW SBA)    Assessment: Pt sitting in recliner and agreeable to PT session. BLE ex's in seated position. Pt requests to perform a few UE ex's to improve overall endurance d/t SOB. Transfers sit to stand from chair with SBA. Amb with FWW and SBA x 30 feet. Pt transfers sit to supine without A. Pt left supine with call light in reach and bed alarm turned on.  Safety Devices  Type of Devices: Nurse notified, Left in chair, Gait belt, Call light within reach, Chair alarm in place    Call light in reach, Phone in reach, Use of Gait belt, Bed alarm, and Left in bed  Time In: 1430  Time Out: 1501  Timed Coded Minutes: 31  Total Treatment Time: 31       Exercises:  See Flowsheets    Plan  Cont Per Plan Of Care    Goals  Short Term Goals  Time Frame for Short Term Goals: 5 days - expires 25  Short Term Goal 1: Complete basis transfers safely to return home at prior level of function  Short Term Goal 2: Ambulate with wh walker 50ft with supervision to safely negotiate home environment    Long Term Goals    Tomas Garcia PTA Therapy License Number: [id number]    Date: 2025

## 2025-08-01 NOTE — PLAN OF CARE
Problem: Chronic Conditions and Co-morbidities  Goal: Patient's chronic conditions and co-morbidity symptoms are monitored and maintained or improved  8/1/2025 1011 by Sandra Lucas RN  Outcome: Progressing  7/31/2025 2217 by Julieth Sands RN  Outcome: Progressing     Problem: Discharge Planning  Goal: Discharge to home or other facility with appropriate resources  8/1/2025 1011 by Sandra Lucas RN  Outcome: Progressing  7/31/2025 2217 by Julieth Sands RN  Outcome: Progressing     Problem: Skin/Tissue Integrity  Goal: Skin integrity remains intact  Description: 1.  Monitor for areas of redness and/or skin breakdown  2.  Assess vascular access sites hourly  3.  Every 4-6 hours minimum:  Change oxygen saturation probe site  4.  Every 4-6 hours:  If on nasal continuous positive airway pressure, respiratory therapy assess nares and determine need for appliance change or resting period  8/1/2025 1011 by Sandra Lucas RN  Outcome: Progressing  7/31/2025 2217 by Julieth Sands RN  Outcome: Progressing     Problem: ABCDS Injury Assessment  Goal: Absence of physical injury  8/1/2025 1011 by Sandra Lucas RN  Outcome: Progressing  7/31/2025 2217 by Julieth Sands RN  Outcome: Progressing     Problem: Safety - Adult  Goal: Free from fall injury  8/1/2025 1011 by Sandra Lucas RN  Outcome: Progressing  7/31/2025 2217 by Julieth Sands RN  Outcome: Progressing     Problem: Cardiovascular - Adult  Goal: Maintains optimal cardiac output and hemodynamic stability  8/1/2025 1011 by Sandra Lucas RN  Outcome: Progressing  7/31/2025 2217 by Julieth Sands RN  Outcome: Progressing

## 2025-08-01 NOTE — PROGRESS NOTES
[facility]  Pharmacy Department    Clinical Pharmacy Note-Warfarin Follow-up       Patient:  Quique Wray  MRN: [id number]    Warfarin consult follow-up:    INR (no units)   Date Value   08/01/2025 2.9   07/31/2025 2.8   07/30/2025 2.8   07/11/2025 2.7   07/10/2025 2.8   07/09/2025 2.8   07/08/2025 3.0     INR,(POC) (no units)   Date Value   07/24/2025 3.7   07/17/2025 3.0       Hemoglobin (g/dL)   Date Value   07/31/2025 8.8 (L)   07/30/2025 9.0 (L)   07/23/2025 9.2 (L)     Hematocrit (%)   Date Value   07/31/2025 27.0 (L)   07/30/2025 28.4 (L)   07/23/2025 29.1 (L)     Platelet Count (k/uL)   Date Value   11/13/2011 137     Platelets (k/uL)   Date Value   07/31/2025 208   07/30/2025 216   07/23/2025 193       Target INR Range: 2.5-3.5    Significant Drug-Drug Interactions:  New warfarin drug-drug interactions: spironolactone  Discontinued drug-drug interactions: no change      Notes:    patient to take warfarin 2.5mg po today. Pharmacy will continue to monitor                Daily PT/INR until stable within therapeutic range.     Thank you,  Estephania Pabon RP,   8/1/2025, 12:10 PM

## 2025-08-01 NOTE — CARE COORDINATION
08/01/25 1044   IMM Letter   IMM Letter given to Patient/Family/Significant other/Guardian/POA/by: Second IMM letter given to patient per Rick Altamirano RN   IMM Letter date given: 08/01/25   IMM Letter time given: 0850

## 2025-08-01 NOTE — PROGRESS NOTES
Reordered PFT due to original order being clinic preformed and not ancillary preformed.    Apixaban/Eliquis - Compliance/Apixaban/Eliquis - Dietary Advice/Apixaban/Eliquis - Follow up monitoring/Apixaban/Eliquis - Potential for adverse drug reactions and interactions

## 2025-08-01 NOTE — PROGRESS NOTES
Phone: [phone] [facility]  Date: 2025  Fax: [phone]      Physical Therapy    Daily Note    Patient Name: Quique Wray      : 1945  (80 y.o.)  MRN: [id number]      Assessment             Assessment: Pt in bed upon arrival. Is agreeable to participate. Transfers supine to sit with CGA. Pt panics when first attempting to sit, cues to slow breathing.  Good sitting balance. Transfers sit to stand from bed with CGA. Amb with FWW and CGA x 25 feet. Good eccentric  control to bedside chair. Cues again to slow breathing. Pt then performs therex for LE strengthening with short rest between ex's for breathing. Pt remained up in chair with LE's elevated.            Call light in reach, Phone in reach, Use of Gait belt, Chair alarm, Family Present, and Left in chair  Time In: 0940  Time Out: 1005  Timed Coded Minutes: 25  Total Treatment Time: 25       Exercises:  See Flowsheets    Plan  Cont Per Plan Of Care    Goals  Short Term Goals  Time Frame for Short Term Goals: 5 days - expires 25  Short Term Goal 1: Complete basis transfers safely to return home at prior level of function  Short Term Goal 2: Ambulate with wh walker 50ft with supervision to safely negotiate home environment             Allyson Noriega Therapy License Number: [id number]    Date: 2025

## 2025-08-01 NOTE — PROGRESS NOTES
Comprehensive Nutrition Assessment    Type and Reason for Visit:  Initial, Positive nutrition screen    Nutrition Recommendations/Plan:   Continue lower sodium in home diet (information discussed and attached to discharge instructions).   Trend weights.     Malnutrition Assessment:  Malnutrition Status:  Moderate malnutrition (08/01/25 0959)    Context:  Acute Illness     Findings of the 6 clinical characteristics of malnutrition:  Energy Intake:  Mild decrease in energy intake (with SOB)  Weight Loss:  Greater than 7.5% over 3 months     Body Fat Loss:  Mild body fat loss Orbital, Buccal region   Muscle Mass Loss:  Mild muscle mass loss Temples (temporalis), Clavicles (pectoralis & deltoids)  Fluid Accumulation:  No fluid accumulation     Strength:  Not Performed    Nutrition Assessment:    Moderate malnutrition (acute and chronic) r/t inadequate nutrient intakes, AEB significant weight losses and mild fat and muscle losses. Acute weight losses are expected with diuresis as well. Noted diuretic questions pta. Current appetite and intakes are good and he remains on Megace initiated last hospitalization. Chronic coumadin with INR 2.9. Agree with continued use of ONS as initiated per protocol from positive nutrition screening. Visually looks better than recent admissions (comparatively speaking). Less fat and muscle losses and better color as well. States avoiding added salt in home diet and discouraged high salt foods.    Nutrition Related Findings:    active b/s. no edema. loose bm. Wound Type: None       Current Nutrition Intake & Therapies:    Average Meal Intake: %  Average Supplements Intake: %  ADULT DIET; Regular; Low Fat/Low Chol/High Fiber/ANTONY  ADULT ORAL NUTRITION SUPPLEMENT; Breakfast, Lunch, Dinner; Standard High Calorie/High Protein Oral Supplement    Anthropometric Measures:  Height: 172.7 cm (5' 8\")  Ideal Body Weight (IBW): 154 lbs (70 kg)    Admission Body Weight: 91.4 kg (201 lb 6.4

## 2025-08-01 NOTE — PROGRESS NOTES
Hospitalist Progress Note  8/1/2025 6:05 AM  Subjective:   Admit Date: 7/30/2025  PCP: Vernon Cade MD    Interval History: Quique states he is feeling better but was still a little SOB yesterday with working with therapy.  No chest pain.  Appetite okay, no nausea.  Bowels moving and no trouble urinating.  Labs reviewed.      Diet: ADULT DIET; Regular; Low Fat/Low Chol/High Fiber/ANTONY  ADULT ORAL NUTRITION SUPPLEMENT; Breakfast, Lunch, Dinner; Standard High Calorie/High Protein Oral Supplement  Medications:   Scheduled Meds:   empagliflozin  10 mg Oral Daily    spironolactone  25 mg Oral Daily    allopurinol  300 mg Oral Daily    aspirin  81 mg Oral Daily    atorvastatin  40 mg Oral Daily    Vitamin D  5,000 Units Oral Daily    magnesium oxide  400 mg Oral Daily    megestrol  400 mg Oral Daily    metoprolol tartrate  12.5 mg Oral BID    midodrine  5 mg Oral BID WC    pantoprazole  40 mg Oral QAM AC    QUEtiapine  25 mg Oral Nightly    sennosides-docusate sodium  2 tablet Oral BID    sertraline  25 mg Oral Daily    tamsulosin  0.4 mg Oral Daily    sodium chloride flush  5-40 mL IntraVENous 2 times per day    furosemide  20 mg IntraVENous BID    warfarin placeholder: dosing by pharmacy   Oral RX Placeholder     Continuous Infusions:   sodium chloride         Patient's current medications documented, reviewed, and updated.      CBC:   Recent Labs     07/30/25  1427 07/31/25  0414   WBC 5.3 5.9   HGB 9.0* 8.8*    208     BMP:    Recent Labs     07/30/25  1427 07/31/25  0414 08/01/25  0418    144 143   K 3.6* 4.0 3.9    105 104   CO2 20 21 21   BUN 18 17 22   CREATININE 0.8 0.9 1.0   GLUCOSE 173* 111* 125*     Hepatic:   Recent Labs     07/30/25  1427   AST 24   ALT 11   BILITOT 1.3*   ALKPHOS 86     Troponin: No results for input(s): \"TROPONINI\" in the last 72 hours.  BNP: No results for input(s): \"BNP\" in the last 72 hours.  Lipids: No results for input(s): \"CHOL\", \"HDL\" in the last 72

## 2025-08-01 NOTE — PLAN OF CARE
[facility]  Inpatient Physical Therapy  Plan of Care  Date: 2025  Patient Name: Quique Wray        : 1945  (80 y.o.)  Referring Practitioner: Dr. Bowens  Admission Date: 2025  Referral Date : 25  Diagnosis: CHF  Treatment Diagnosis: Weakness  PT Orders Received and Evaluation Complete  Identified Problem Areas:  Therapy Prognosis: Good    Justification for Skilled Services:  [] Reduce Falls   [x] Improve Ambulation  [x]  Complete Daily Tasks Safely   [] Improve Balance   [] Improve LE strength  []  Return to Prior Level of Function  [x] Improve Functional Mobility   []  Family/Caregiver Education  [x] Patient Education: []Assistive Devices []Home Exercise Program and Progression    Plan  Frequency: 1-2x/day Daily M-F, 1x/day Sat-Sun    Duration: 5 days  [] Modalities:  [x] Therapeutic Exercise   [x] Gait Training  [x] Therapeutic Activity    [] Home Safety Evaluation         [] Massage                        [] Neuromuscular Re-education [] Back Education             [] Patient Education [] Home Exercise Program  Discharge Recommendations: Home with assist PRN, Home with Home health PT    Rehab Potential:  [x]  Good [] Fair   []  Poor    Goals  Short Term Goals  Time Frame for Short Term Goals: 5 days - expires 25  Short Term Goal 1: Complete basis transfers safely to return home at prior level of function  Short Term Goal 2: Ambulate with wh walker 50ft with supervision to safely negotiate home environment         Upon Swingbed Transfer this Plan of Care will be followed until revision is complete.    CASSIE MOREAU, PT,    Date: 2025

## 2025-08-02 VITALS
BODY MASS INDEX: 29.54 KG/M2 | HEART RATE: 88 BPM | WEIGHT: 194.89 LBS | OXYGEN SATURATION: 93 % | RESPIRATION RATE: 18 BRPM | TEMPERATURE: 98.8 F | DIASTOLIC BLOOD PRESSURE: 58 MMHG | HEIGHT: 68 IN | SYSTOLIC BLOOD PRESSURE: 103 MMHG

## 2025-08-02 LAB
ANION GAP SERPL CALCULATED.3IONS-SCNC: 17 MMOL/L (ref 9–17)
BUN SERPL-MCNC: 23 MG/DL (ref 8–23)
CALCIUM SERPL-MCNC: 8.4 MG/DL (ref 8.6–10.4)
CHLORIDE SERPL-SCNC: 99 MMOL/L (ref 98–107)
CO2 SERPL-SCNC: 26 MMOL/L (ref 20–31)
CREAT SERPL-MCNC: 1.1 MG/DL (ref 0.7–1.2)
GFR, ESTIMATED: 69 ML/MIN/1.73M2
GLUCOSE SERPL-MCNC: 115 MG/DL (ref 70–99)
INR PPP: 3
POTASSIUM SERPL-SCNC: 3.9 MMOL/L (ref 3.7–5.3)
PROTHROMBIN TIME: 32.2 SEC (ref 11.5–14.2)
SODIUM SERPL-SCNC: 142 MMOL/L (ref 135–144)

## 2025-08-02 PROCEDURE — 85610 PROTHROMBIN TIME: CPT

## 2025-08-02 PROCEDURE — 94761 N-INVAS EAR/PLS OXIMETRY MLT: CPT

## 2025-08-02 PROCEDURE — 6370000000 HC RX 637 (ALT 250 FOR IP): Performed by: INTERNAL MEDICINE

## 2025-08-02 PROCEDURE — 80048 BASIC METABOLIC PNL TOTAL CA: CPT

## 2025-08-02 PROCEDURE — 36415 COLL VENOUS BLD VENIPUNCTURE: CPT

## 2025-08-02 RX ORDER — FUROSEMIDE 20 MG/1
20 TABLET ORAL DAILY
Status: DISCONTINUED | OUTPATIENT
Start: 2025-08-02 | End: 2025-08-02 | Stop reason: HOSPADM

## 2025-08-02 RX ORDER — FUROSEMIDE 20 MG/1
20 TABLET ORAL DAILY
Qty: 30 TABLET | Refills: 3 | Status: SHIPPED | OUTPATIENT
Start: 2025-08-02

## 2025-08-02 RX ORDER — SPIRONOLACTONE 25 MG/1
25 TABLET ORAL DAILY
Qty: 30 TABLET | Refills: 3 | Status: SHIPPED | OUTPATIENT
Start: 2025-08-02

## 2025-08-02 RX ORDER — METOPROLOL SUCCINATE 25 MG/1
12.5 TABLET, EXTENDED RELEASE ORAL DAILY
Qty: 15 TABLET | Refills: 3 | Status: SHIPPED | OUTPATIENT
Start: 2025-08-02

## 2025-08-02 RX ADMIN — SENNOSIDES AND DOCUSATE SODIUM 2 TABLET: 50; 8.6 TABLET ORAL at 09:08

## 2025-08-02 RX ADMIN — METOPROLOL SUCCINATE 12.5 MG: 25 TABLET, EXTENDED RELEASE ORAL at 09:08

## 2025-08-02 RX ADMIN — SPIRONOLACTONE 25 MG: 25 TABLET ORAL at 09:07

## 2025-08-02 RX ADMIN — MIDODRINE HYDROCHLORIDE 5 MG: 5 TABLET ORAL at 09:08

## 2025-08-02 RX ADMIN — ASPIRIN 81 MG: 81 TABLET, COATED ORAL at 09:08

## 2025-08-02 RX ADMIN — SERTRALINE HYDROCHLORIDE 25 MG: 50 TABLET ORAL at 09:08

## 2025-08-02 RX ADMIN — ALLOPURINOL 300 MG: 100 TABLET ORAL at 09:08

## 2025-08-02 RX ADMIN — PANTOPRAZOLE SODIUM 40 MG: 40 TABLET, DELAYED RELEASE ORAL at 05:47

## 2025-08-02 RX ADMIN — Medication 400 MG: at 09:09

## 2025-08-02 RX ADMIN — FUROSEMIDE 20 MG: 20 TABLET ORAL at 09:08

## 2025-08-02 RX ADMIN — MEGESTROL ACETATE 400 MG: 40 SUSPENSION ORAL at 09:07

## 2025-08-02 RX ADMIN — TAMSULOSIN HYDROCHLORIDE 0.4 MG: 0.4 CAPSULE ORAL at 09:09

## 2025-08-02 RX ADMIN — Medication 5000 UNITS: at 09:07

## 2025-08-02 RX ADMIN — ATORVASTATIN CALCIUM 40 MG: 40 TABLET, FILM COATED ORAL at 09:09

## 2025-08-02 RX ADMIN — EMPAGLIFLOZIN 10 MG: 10 TABLET, FILM COATED ORAL at 09:07

## 2025-08-02 ASSESSMENT — PAIN SCALES - GENERAL
PAINLEVEL_OUTOF10: 0
PAINLEVEL_OUTOF10: 0

## 2025-08-02 NOTE — PROGRESS NOTES
IV and telemetry monitor removed without incident. Discharge instructions reviewed with and provided to patient and wife at chairside. Verbalized understanding of follow up appointments, diet, activity, daily weights, medications and reasons to return to ED/call physician. Advised to call back directly if there are further questions, or if these symptoms fail to improve as anticipated or worsen. All questions answered. Personal belongings sent with patient. Assisted to wheelchair and taken to personal car. Denies further needs.

## 2025-08-02 NOTE — PROGRESS NOTES
Hospitalist Progress Note  8/2/2025 6:32 AM  Subjective:   Admit Date: 7/30/2025  PCP: Vernon Cade MD    Interval History: Quique states his breathing is back to baseline.  He feels he did well with PT yesterday.  Appetite fair, no nausea.  Bowels did not move yesterday,  no trouble urinating. Labs reviewed this morning.     Diet: ADULT DIET; Regular; Low Fat/Low Chol/High Fiber/ANTONY  ADULT ORAL NUTRITION SUPPLEMENT; Breakfast, Lunch, Dinner; Standard High Calorie/High Protein Oral Supplement  Medications:   Scheduled Meds:   metoprolol succinate  12.5 mg Oral Daily    empagliflozin  10 mg Oral Daily    spironolactone  25 mg Oral Daily    allopurinol  300 mg Oral Daily    aspirin  81 mg Oral Daily    atorvastatin  40 mg Oral Daily    Vitamin D  5,000 Units Oral Daily    magnesium oxide  400 mg Oral Daily    megestrol  400 mg Oral Daily    midodrine  5 mg Oral BID WC    pantoprazole  40 mg Oral QAM AC    QUEtiapine  25 mg Oral Nightly    sennosides-docusate sodium  2 tablet Oral BID    sertraline  25 mg Oral Daily    tamsulosin  0.4 mg Oral Daily    sodium chloride flush  5-40 mL IntraVENous 2 times per day    furosemide  20 mg IntraVENous BID    warfarin placeholder: dosing by pharmacy   Oral RX Placeholder     Continuous Infusions:   sodium chloride         Patient's current medications documented, reviewed, and updated.      CBC:   Recent Labs     07/30/25  1427 07/31/25  0414   WBC 5.3 5.9   HGB 9.0* 8.8*    208     BMP:    Recent Labs     07/31/25  0414 08/01/25  0418 08/02/25  0408    143 142   K 4.0 3.9 3.9    104 99   CO2 21 21 26   BUN 17 22 23   CREATININE 0.9 1.0 1.1   GLUCOSE 111* 125* 115*     Hepatic:   Recent Labs     07/30/25  1427   AST 24   ALT 11   BILITOT 1.3*   ALKPHOS 86     Troponin: No results for input(s): \"TROPONINI\" in the last 72 hours.  BNP: No results for input(s): \"BNP\" in the last 72 hours.  Lipids: No results for input(s): \"CHOL\", \"HDL\" in the last 72

## 2025-08-02 NOTE — PLAN OF CARE
Problem: ABCDS Injury Assessment  Goal: Absence of physical injury  8/1/2025 2301 by Viola Driscoll RN  Outcome: Progressing  8/1/2025 1011 by Sandra Lucas RN  Outcome: Progressing     Problem: Safety - Adult  Goal: Free from fall injury  8/1/2025 2301 by Viola Driscoll, RN  Outcome: Progressing  8/1/2025 1011 by Sandra Lucas RN  Outcome: Progressing

## 2025-08-02 NOTE — DISCHARGE INSTRUCTIONS
Discharge Instructions    Admission Date:  7/30/2025  Discharge Date:  8/2/25    Disposition:   Home.    Discharge Instructions:  Resume previous home medication but change Metoprolol tartrate to Metoprolol succinate  1/2 tablet daily.  Take Lasix 20 mg daily.    Take Aldactone 25 mg daily.  Take Jardiance 10 mg daily.  Monitor weights every morning and call Cardiology more than a 3 lb wt gain in 1 day or greater than 5 lbs in a week.   Activity:  As tolerated.   Diet:  Cardiac.    Labs on Thursday ([facility]).    Pharmacy to determine the dose of coumadin and INR follow up at discharge.    Follow up with Vernon Cade MD in 1 week.  Follow up with Cardiology in 2 weeks.

## 2025-08-02 NOTE — DISCHARGE SUMMARY
Hospitalist Discharge Summary    Quique Wray  :  1945  MRN:  [id number]    Admit date:  2025  Discharge date:  25    Admitting Physician:  Rob Bowens MD    Discharge Diagnoses:    Acute on chronic systolic / diastolic CHF - Admitted on IV Lasix 20 mg BID with improvement.  Consult placed to Cardiology.  ECHO on 25 showed:  EF 45 to 50 % with mild global hypokinesis.  ECHO from 25 pending.  Not on ACE - I secondary to orthostatic hypotension which he takes Midodrine.  On Toprol XL 12.5 mg daily.  Cardiology added Jardiance and Aldactone.   Chronic anemia - stable.  Chronic atrial fib - rate controlled.  Pharmacy consulted to manage coumadin.  Orthostatic hypotension - controlled on Midodrine.  Moderate malnutrition - on Megace to stimulate appetite.  Weakness - has  home PT.  PT ordered in the hospital.   Hyperlipidemia - on Lipitor.   GERD - stable on Protonix.    Depression - stable on Zoloft and Seroquel.      BPH - controlled on Flomax.      Vitamin D deficiency - on Vitamin D replacement.     Admission Condition:  poor      Discharged Condition:  good    Hospital Course:     The patient is a 80 y.o. male who presents with increasing SOB.  According to Quique, he feels he started having increase in SOB for the past 2 weeks.  No chest pain during this time.  He states initially it was with exertion but progressed to SOB with rest.  He states about a week ago he seen Cardiology and his Lasix was discontinued.  Quique feels his breathing got a lot worse after that.  He is not sure why the lasix was discontinued.  He denies any increase in lower leg edema.  No cold symptoms and he denies a cough.  He has not had any nausea or vomiting.  Quique denies trying any new medication for the SOB.  He started noticing that he could not lay flat in bed secondary to SOB.  Yesterday, he had  home PT and they told  him to come to the ER secondary to him being a lot more SOB.       In the ER his CMP was

## 2025-08-03 LAB
ECHO AO ROOT DIAM: 3.7 CM
ECHO AO ROOT INDEX: 1.82 CM/M2
ECHO BSA: 2.07 M2
ECHO EST RA PRESSURE: 8 MMHG
ECHO LA DIAMETER INDEX: 2.71 CM/M2
ECHO LA DIAMETER: 5.5 CM
ECHO LA TO AORTIC ROOT RATIO: 1.49
ECHO LA VOL A-L A2C: 96 ML (ref 18–58)
ECHO LA VOL A-L A4C: 114 ML (ref 18–58)
ECHO LA VOL BP: 107 ML (ref 18–58)
ECHO LA VOL MOD A2C: 92 ML (ref 18–58)
ECHO LA VOL MOD A4C: 111 ML (ref 18–58)
ECHO LA VOL/BSA BIPLANE: 53 ML/M2 (ref 16–34)
ECHO LA VOLUME AREA LENGTH: 111 ML
ECHO LA VOLUME INDEX A-L A2C: 47 ML/M2 (ref 16–34)
ECHO LA VOLUME INDEX A-L A4C: 56 ML/M2 (ref 16–34)
ECHO LA VOLUME INDEX AREA LENGTH: 55 ML/M2 (ref 16–34)
ECHO LA VOLUME INDEX MOD A2C: 45 ML/M2 (ref 16–34)
ECHO LA VOLUME INDEX MOD A4C: 55 ML/M2 (ref 16–34)
ECHO LV EDV A2C: 209 ML
ECHO LV EDV A4C: 152 ML
ECHO LV EDV BP: 184 ML (ref 67–155)
ECHO LV EDV INDEX A4C: 75 ML/M2
ECHO LV EDV INDEX BP: 91 ML/M2
ECHO LV EDV NDEX A2C: 103 ML/M2
ECHO LV EF PHYSICIAN: 35 %
ECHO LV EJECTION FRACTION A2C: 43 %
ECHO LV EJECTION FRACTION A4C: 31 %
ECHO LV EJECTION FRACTION BIPLANE: 37 % (ref 55–100)
ECHO LV ESV A2C: 118 ML
ECHO LV ESV A4C: 105 ML
ECHO LV ESV BP: 115 ML (ref 22–58)
ECHO LV ESV INDEX A2C: 58 ML/M2
ECHO LV ESV INDEX A4C: 52 ML/M2
ECHO LV ESV INDEX BP: 57 ML/M2
ECHO LV FRACTIONAL SHORTENING: 20 % (ref 28–44)
ECHO LV INTERNAL DIMENSION DIASTOLE INDEX: 2.46 CM/M2
ECHO LV INTERNAL DIMENSION DIASTOLIC: 5 CM (ref 4.2–5.9)
ECHO LV INTERNAL DIMENSION SYSTOLIC INDEX: 1.97 CM/M2
ECHO LV INTERNAL DIMENSION SYSTOLIC: 4 CM
ECHO LV IVSD: 1.6 CM (ref 0.6–1)
ECHO LV MASS 2D: 355.3 G (ref 88–224)
ECHO LV MASS INDEX 2D: 175 G/M2 (ref 49–115)
ECHO LV POSTERIOR WALL DIASTOLIC: 1.6 CM (ref 0.6–1)
ECHO LV RELATIVE WALL THICKNESS RATIO: 0.64
ECHO RA END SYSTOLIC VOLUME APICAL 4 CHAMBER INDEX BSA: 44 ML/M2
ECHO RA VOLUME: 89 ML
ECHO RIGHT VENTRICULAR SYSTOLIC PRESSURE (RVSP): 55 MMHG
ECHO TV REGURGITANT MAX VELOCITY: 3.41 M/S
ECHO TV REGURGITANT PEAK GRADIENT: 47 MMHG

## 2025-08-03 PROCEDURE — 93325 DOPPLER ECHO COLOR FLOW MAPG: CPT | Performed by: INTERNAL MEDICINE

## 2025-08-03 PROCEDURE — 93308 TTE F-UP OR LMTD: CPT | Performed by: INTERNAL MEDICINE

## 2025-08-03 PROCEDURE — 93321 DOPPLER ECHO F-UP/LMTD STD: CPT | Performed by: INTERNAL MEDICINE

## 2025-08-04 ENCOUNTER — HOSPITAL ENCOUNTER (OUTPATIENT)
Dept: PULMONOLOGY | Age: 80
Discharge: HOME | End: 2025-08-04
Payer: MEDICARE

## 2025-08-04 DIAGNOSIS — I10 ESSENTIAL HYPERTENSION: ICD-10-CM

## 2025-08-04 DIAGNOSIS — D64.9 ANEMIA, UNSPECIFIED TYPE: ICD-10-CM

## 2025-08-04 DIAGNOSIS — R53.1 WEAKNESS: ICD-10-CM

## 2025-08-04 DIAGNOSIS — I48.91 ATRIAL FIBRILLATION, NEW ONSET (HCC): ICD-10-CM

## 2025-08-04 DIAGNOSIS — Z95.0 ARTIFICIAL PACEMAKER: ICD-10-CM

## 2025-08-04 DIAGNOSIS — R06.02 SHORTNESS OF BREATH: ICD-10-CM

## 2025-08-04 PROCEDURE — 94726 PLETHYSMOGRAPHY LUNG VOLUMES: CPT

## 2025-08-04 PROCEDURE — 94729 DIFFUSING CAPACITY: CPT

## 2025-08-04 PROCEDURE — 6360000002 HC RX W HCPCS: Performed by: INTERNAL MEDICINE

## 2025-08-04 PROCEDURE — 94060 EVALUATION OF WHEEZING: CPT

## 2025-08-04 RX ORDER — ALBUTEROL SULFATE 0.83 MG/ML
2.5 SOLUTION RESPIRATORY (INHALATION) ONCE
Status: COMPLETED | OUTPATIENT
Start: 2025-08-04 | End: 2025-08-04

## 2025-08-04 RX ADMIN — ALBUTEROL SULFATE 2.5 MG: 2.5 SOLUTION RESPIRATORY (INHALATION) at 14:02

## 2025-08-07 ENCOUNTER — HOSPITAL ENCOUNTER (OUTPATIENT)
Dept: LAB | Age: 80
Discharge: HOME | End: 2025-08-07
Payer: MEDICARE

## 2025-08-07 ENCOUNTER — HOSPITAL ENCOUNTER (OUTPATIENT)
Dept: GENERAL RADIOLOGY | Age: 80
Discharge: HOME | End: 2025-08-09
Payer: MEDICARE

## 2025-08-07 ENCOUNTER — HOSPITAL ENCOUNTER (OUTPATIENT)
Dept: GENERAL RADIOLOGY | Age: 80
End: 2025-08-07
Payer: MEDICARE

## 2025-08-07 ENCOUNTER — OFFICE VISIT (OUTPATIENT)
Dept: CARDIOLOGY CLINIC | Age: 80
End: 2025-08-07

## 2025-08-07 VITALS — SYSTOLIC BLOOD PRESSURE: 101 MMHG | OXYGEN SATURATION: 97 % | HEART RATE: 63 BPM | DIASTOLIC BLOOD PRESSURE: 49 MMHG

## 2025-08-07 DIAGNOSIS — R06.02 SHORTNESS OF BREATH: Primary | ICD-10-CM

## 2025-08-07 DIAGNOSIS — D64.9 ANEMIA, UNSPECIFIED TYPE: ICD-10-CM

## 2025-08-07 DIAGNOSIS — R06.02 SHORTNESS OF BREATH: ICD-10-CM

## 2025-08-07 DIAGNOSIS — Z79.01 LONG TERM (CURRENT) USE OF ANTICOAGULANTS: ICD-10-CM

## 2025-08-07 PROBLEM — J98.4 RESTRICTIVE LUNG DISEASE: Status: ACTIVE | Noted: 2025-08-07

## 2025-08-07 PROBLEM — J44.9 OBSTRUCTIVE LUNG DISEASE (HCC): Status: ACTIVE | Noted: 2025-08-07

## 2025-08-07 PROBLEM — R94.2 DECREASED DIFFUSION CAPACITY: Status: ACTIVE | Noted: 2025-08-07

## 2025-08-07 LAB
ALBUMIN SERPL-MCNC: 3.5 G/DL (ref 3.5–5.2)
ALBUMIN/GLOB SERPL: 1 {RATIO} (ref 1–2.5)
ALP SERPL-CCNC: 89 U/L (ref 40–129)
ALT SERPL-CCNC: 12 U/L (ref 5–41)
ANION GAP SERPL CALCULATED.3IONS-SCNC: 14 MMOL/L (ref 9–17)
AST SERPL-CCNC: 24 U/L
BASOPHILS # BLD: 0.05 K/UL (ref 0–0.2)
BASOPHILS NFR BLD: 1 % (ref 0–2)
BILIRUB SERPL-MCNC: 1.7 MG/DL (ref 0.3–1.2)
BUN SERPL-MCNC: 25 MG/DL (ref 8–23)
CALCIUM SERPL-MCNC: 8.7 MG/DL (ref 8.6–10.4)
CHLORIDE SERPL-SCNC: 102 MMOL/L (ref 98–107)
CO2 SERPL-SCNC: 24 MMOL/L (ref 20–31)
CREAT SERPL-MCNC: 1.2 MG/DL (ref 0.7–1.2)
EOSINOPHIL # BLD: 0.15 K/UL (ref 0–0.4)
EOSINOPHILS RELATIVE PERCENT: 2 % (ref 0–5)
ERYTHROCYTE [DISTWIDTH] IN BLOOD BY AUTOMATED COUNT: 23.6 % (ref 12.1–15.2)
GFR, ESTIMATED: 62 ML/MIN/1.73M2
GLUCOSE SERPL-MCNC: 134 MG/DL (ref 70–99)
HCT VFR BLD AUTO: 27.9 % (ref 41–53)
HGB BLD-MCNC: 9 G/DL (ref 13.5–17.5)
IMM GRANULOCYTES # BLD AUTO: 0.02 K/UL (ref 0–0.3)
IMM GRANULOCYTES NFR BLD: 0 % (ref 0–5)
INR PPP: 2.5
LYMPHOCYTES NFR BLD: 1.5 K/UL (ref 1–4.8)
LYMPHOCYTES RELATIVE PERCENT: 23 % (ref 13–44)
MCH RBC QN AUTO: 22.2 PG (ref 26–34)
MCHC RBC AUTO-ENTMCNC: 32.3 G/DL (ref 31–37)
MCV RBC AUTO: 68.7 FL (ref 80–100)
MONOCYTES NFR BLD: 0.36 K/UL (ref 0–1)
MONOCYTES NFR BLD: 6 % (ref 5–9)
MORPHOLOGY: ABNORMAL
NEUTROPHILS NFR BLD: 68 % (ref 39–75)
NEUTS SEG NFR BLD: 4.49 K/UL (ref 2.1–6.5)
PLATELET # BLD AUTO: 250 K/UL (ref 140–450)
PMV BLD AUTO: ABNORMAL FL (ref 6–12)
POTASSIUM SERPL-SCNC: 4.1 MMOL/L (ref 3.7–5.3)
PROT SERPL-MCNC: 7 G/DL (ref 6.4–8.3)
PROTHROMBIN TIME: 28 SEC (ref 11.5–14.2)
RBC # BLD AUTO: 4.06 M/UL (ref 4.5–5.9)
SODIUM SERPL-SCNC: 140 MMOL/L (ref 135–144)
WBC OTHER # BLD: 6.6 K/UL (ref 3.5–11)

## 2025-08-07 PROCEDURE — 36415 COLL VENOUS BLD VENIPUNCTURE: CPT

## 2025-08-07 PROCEDURE — 80053 COMPREHEN METABOLIC PANEL: CPT

## 2025-08-07 PROCEDURE — 85025 COMPLETE CBC W/AUTO DIFF WBC: CPT

## 2025-08-07 PROCEDURE — 85610 PROTHROMBIN TIME: CPT

## 2025-08-07 PROCEDURE — 71046 X-RAY EXAM CHEST 2 VIEWS: CPT

## 2025-08-07 RX ORDER — FUROSEMIDE 20 MG/1
20 TABLET ORAL 2 TIMES DAILY
Qty: 60 TABLET | Refills: 3 | Status: SHIPPED | OUTPATIENT
Start: 2025-08-07 | End: 2025-08-21

## 2025-08-07 RX ORDER — SPIRONOLACTONE 25 MG/1
25 TABLET ORAL 2 TIMES DAILY
Qty: 60 TABLET | Refills: 3 | Status: SHIPPED | OUTPATIENT
Start: 2025-08-07

## 2025-08-07 RX ORDER — FLUTICASONE PROPIONATE AND SALMETEROL 250; 50 UG/1; UG/1
1 POWDER RESPIRATORY (INHALATION) EVERY 12 HOURS
Qty: 60 EACH | Refills: 3 | Status: SHIPPED | OUTPATIENT
Start: 2025-08-07

## 2025-08-07 RX ORDER — METOPROLOL TARTRATE 25 MG/1
12.5 TABLET, FILM COATED ORAL 2 TIMES DAILY
COMMUNITY

## 2025-08-11 ENCOUNTER — ANTI-COAG VISIT (OUTPATIENT)
Age: 80
End: 2025-08-11

## 2025-08-11 DIAGNOSIS — I48.91 ATRIAL FIBRILLATION, UNSPECIFIED TYPE (HCC): ICD-10-CM

## 2025-08-11 DIAGNOSIS — I63.9 ACUTE ISCHEMIC STROKE (HCC): Primary | ICD-10-CM

## 2025-08-11 DIAGNOSIS — I63.9 STROKE OF UNKNOWN CAUSE (HCC): ICD-10-CM

## 2025-08-13 ENCOUNTER — HOSPITAL ENCOUNTER (OUTPATIENT)
Dept: LAB | Age: 80
Discharge: HOME | End: 2025-08-13
Payer: MEDICARE

## 2025-08-13 DIAGNOSIS — I48.91 ATRIAL FIBRILLATION, UNSPECIFIED TYPE (HCC): ICD-10-CM

## 2025-08-13 DIAGNOSIS — D64.9 ANEMIA, UNSPECIFIED TYPE: ICD-10-CM

## 2025-08-13 DIAGNOSIS — I63.9 ACUTE ISCHEMIC STROKE (HCC): ICD-10-CM

## 2025-08-13 DIAGNOSIS — I63.9 STROKE OF UNKNOWN CAUSE (HCC): ICD-10-CM

## 2025-08-13 DIAGNOSIS — R06.02 SHORTNESS OF BREATH: ICD-10-CM

## 2025-08-13 LAB
ANION GAP SERPL CALCULATED.3IONS-SCNC: 14 MMOL/L (ref 9–17)
BUN SERPL-MCNC: 29 MG/DL (ref 8–23)
CALCIUM SERPL-MCNC: 9 MG/DL (ref 8.6–10.4)
CHLORIDE SERPL-SCNC: 101 MMOL/L (ref 98–107)
CO2 SERPL-SCNC: 24 MMOL/L (ref 20–31)
CREAT SERPL-MCNC: 1.2 MG/DL (ref 0.7–1.2)
GFR, ESTIMATED: 62 ML/MIN/1.73M2
GLUCOSE SERPL-MCNC: 162 MG/DL (ref 70–99)
INR PPP: 2.2
POTASSIUM SERPL-SCNC: 4.3 MMOL/L (ref 3.7–5.3)
PROTHROMBIN TIME: 24.7 SEC (ref 11.5–14.2)
SODIUM SERPL-SCNC: 139 MMOL/L (ref 135–144)

## 2025-08-13 PROCEDURE — 80048 BASIC METABOLIC PNL TOTAL CA: CPT

## 2025-08-13 PROCEDURE — 85610 PROTHROMBIN TIME: CPT

## 2025-08-13 PROCEDURE — 36415 COLL VENOUS BLD VENIPUNCTURE: CPT

## 2025-08-14 ENCOUNTER — RESULTS FOLLOW-UP (OUTPATIENT)
Dept: CARDIOLOGY CLINIC | Age: 80
End: 2025-08-14

## 2025-08-14 ENCOUNTER — OFFICE VISIT (OUTPATIENT)
Dept: CARDIOLOGY CLINIC | Age: 80
End: 2025-08-14
Payer: MEDICARE

## 2025-08-14 ENCOUNTER — ANTI-COAG VISIT (OUTPATIENT)
Age: 80
End: 2025-08-14

## 2025-08-14 ENCOUNTER — HOSPITAL ENCOUNTER (OUTPATIENT)
Dept: CT IMAGING | Age: 80
Discharge: HOME | End: 2025-08-16
Payer: MEDICARE

## 2025-08-14 VITALS
RESPIRATION RATE: 18 BRPM | OXYGEN SATURATION: 94 % | HEART RATE: 73 BPM | DIASTOLIC BLOOD PRESSURE: 60 MMHG | SYSTOLIC BLOOD PRESSURE: 110 MMHG

## 2025-08-14 DIAGNOSIS — R06.02 SHORTNESS OF BREATH: ICD-10-CM

## 2025-08-14 DIAGNOSIS — I63.9 STROKE OF UNKNOWN CAUSE (HCC): ICD-10-CM

## 2025-08-14 DIAGNOSIS — Z79.01 LONG TERM (CURRENT) USE OF ANTICOAGULANTS: ICD-10-CM

## 2025-08-14 DIAGNOSIS — D64.9 ANEMIA, UNSPECIFIED TYPE: ICD-10-CM

## 2025-08-14 DIAGNOSIS — I63.9 ACUTE ISCHEMIC STROKE (HCC): Primary | ICD-10-CM

## 2025-08-14 DIAGNOSIS — R06.02 SHORTNESS OF BREATH: Primary | ICD-10-CM

## 2025-08-14 PROCEDURE — 1160F RVW MEDS BY RX/DR IN RCRD: CPT | Performed by: NURSE PRACTITIONER

## 2025-08-14 PROCEDURE — G8427 DOCREV CUR MEDS BY ELIG CLIN: HCPCS | Performed by: NURSE PRACTITIONER

## 2025-08-14 PROCEDURE — G8417 CALC BMI ABV UP PARAM F/U: HCPCS | Performed by: NURSE PRACTITIONER

## 2025-08-14 PROCEDURE — 71250 CT THORAX DX C-: CPT

## 2025-08-14 PROCEDURE — 1036F TOBACCO NON-USER: CPT | Performed by: NURSE PRACTITIONER

## 2025-08-14 PROCEDURE — 3074F SYST BP LT 130 MM HG: CPT | Performed by: NURSE PRACTITIONER

## 2025-08-14 PROCEDURE — 1111F DSCHRG MED/CURRENT MED MERGE: CPT | Performed by: NURSE PRACTITIONER

## 2025-08-14 PROCEDURE — 1123F ACP DISCUSS/DSCN MKR DOCD: CPT | Performed by: NURSE PRACTITIONER

## 2025-08-14 PROCEDURE — 99214 OFFICE O/P EST MOD 30 MIN: CPT | Performed by: NURSE PRACTITIONER

## 2025-08-14 PROCEDURE — 3078F DIAST BP <80 MM HG: CPT | Performed by: NURSE PRACTITIONER

## 2025-08-14 PROCEDURE — 1159F MED LIST DOCD IN RCRD: CPT | Performed by: NURSE PRACTITIONER

## 2025-08-20 ENCOUNTER — HOSPITAL ENCOUNTER (OUTPATIENT)
Dept: LAB | Age: 80
Discharge: HOME | End: 2025-08-20
Payer: MEDICARE

## 2025-08-20 DIAGNOSIS — R06.02 SHORTNESS OF BREATH: ICD-10-CM

## 2025-08-20 DIAGNOSIS — Z79.01 LONG TERM (CURRENT) USE OF ANTICOAGULANTS: ICD-10-CM

## 2025-08-20 DIAGNOSIS — D64.9 ANEMIA, UNSPECIFIED TYPE: ICD-10-CM

## 2025-08-20 LAB
ANION GAP SERPL CALCULATED.3IONS-SCNC: 14 MMOL/L (ref 9–17)
BASOPHILS # BLD: 0.04 K/UL (ref 0–0.2)
BASOPHILS NFR BLD: 1 % (ref 0–2)
BUN SERPL-MCNC: 33 MG/DL (ref 8–23)
CALCIUM SERPL-MCNC: 9.4 MG/DL (ref 8.6–10.4)
CHLORIDE SERPL-SCNC: 100 MMOL/L (ref 98–107)
CO2 SERPL-SCNC: 24 MMOL/L (ref 20–31)
CREAT SERPL-MCNC: 1.3 MG/DL (ref 0.7–1.2)
EOSINOPHIL # BLD: 0.16 K/UL (ref 0–0.4)
EOSINOPHILS RELATIVE PERCENT: 3 % (ref 0–5)
ERYTHROCYTE [DISTWIDTH] IN BLOOD BY AUTOMATED COUNT: 23.1 % (ref 12.1–15.2)
GFR, ESTIMATED: 56 ML/MIN/1.73M2
GLUCOSE SERPL-MCNC: 176 MG/DL (ref 70–99)
HCT VFR BLD AUTO: 30 % (ref 41–53)
HGB BLD-MCNC: 9.6 G/DL (ref 13.5–17.5)
IMM GRANULOCYTES # BLD AUTO: 0.01 K/UL (ref 0–0.3)
IMM GRANULOCYTES NFR BLD: 0 % (ref 0–5)
INR PPP: 2.2
LYMPHOCYTES NFR BLD: 1.31 K/UL (ref 1–4.8)
LYMPHOCYTES RELATIVE PERCENT: 24 % (ref 13–44)
MCH RBC QN AUTO: 21.2 PG (ref 26–34)
MCHC RBC AUTO-ENTMCNC: 32 G/DL (ref 31–37)
MCV RBC AUTO: 66.4 FL (ref 80–100)
MONOCYTES NFR BLD: 0.37 K/UL (ref 0–1)
MONOCYTES NFR BLD: 7 % (ref 5–9)
MORPHOLOGY: ABNORMAL
NEUTROPHILS NFR BLD: 66 % (ref 39–75)
NEUTS SEG NFR BLD: 3.69 K/UL (ref 2.1–6.5)
PLATELET # BLD AUTO: 201 K/UL (ref 140–450)
PMV BLD AUTO: ABNORMAL FL (ref 6–12)
POTASSIUM SERPL-SCNC: 4 MMOL/L (ref 3.7–5.3)
PROTHROMBIN TIME: 25.6 SEC (ref 11.5–14.2)
RBC # BLD AUTO: 4.52 M/UL (ref 4.5–5.9)
SODIUM SERPL-SCNC: 138 MMOL/L (ref 135–144)
WBC OTHER # BLD: 5.6 K/UL (ref 3.5–11)

## 2025-08-20 PROCEDURE — 36415 COLL VENOUS BLD VENIPUNCTURE: CPT

## 2025-08-20 PROCEDURE — 85610 PROTHROMBIN TIME: CPT

## 2025-08-20 PROCEDURE — 85025 COMPLETE CBC W/AUTO DIFF WBC: CPT

## 2025-08-20 PROCEDURE — 80048 BASIC METABOLIC PNL TOTAL CA: CPT

## 2025-08-21 ENCOUNTER — OFFICE VISIT (OUTPATIENT)
Dept: CARDIOLOGY CLINIC | Age: 80
End: 2025-08-21
Payer: MEDICARE

## 2025-08-21 VITALS
BODY MASS INDEX: 29.04 KG/M2 | DIASTOLIC BLOOD PRESSURE: 50 MMHG | HEART RATE: 84 BPM | SYSTOLIC BLOOD PRESSURE: 110 MMHG | WEIGHT: 191 LBS | RESPIRATION RATE: 16 BRPM | OXYGEN SATURATION: 97 %

## 2025-08-21 DIAGNOSIS — I48.91 ATRIAL FIBRILLATION, NEW ONSET (HCC): ICD-10-CM

## 2025-08-21 DIAGNOSIS — R53.1 WEAKNESS: ICD-10-CM

## 2025-08-21 DIAGNOSIS — D64.9 ANEMIA, UNSPECIFIED TYPE: ICD-10-CM

## 2025-08-21 DIAGNOSIS — R06.02 SHORTNESS OF BREATH: Primary | ICD-10-CM

## 2025-08-21 DIAGNOSIS — I10 ESSENTIAL HYPERTENSION: ICD-10-CM

## 2025-08-21 PROCEDURE — 3074F SYST BP LT 130 MM HG: CPT | Performed by: NURSE PRACTITIONER

## 2025-08-21 PROCEDURE — 1111F DSCHRG MED/CURRENT MED MERGE: CPT | Performed by: NURSE PRACTITIONER

## 2025-08-21 PROCEDURE — 1160F RVW MEDS BY RX/DR IN RCRD: CPT | Performed by: NURSE PRACTITIONER

## 2025-08-21 PROCEDURE — 3078F DIAST BP <80 MM HG: CPT | Performed by: NURSE PRACTITIONER

## 2025-08-21 PROCEDURE — 1036F TOBACCO NON-USER: CPT | Performed by: NURSE PRACTITIONER

## 2025-08-21 PROCEDURE — 1123F ACP DISCUSS/DSCN MKR DOCD: CPT | Performed by: NURSE PRACTITIONER

## 2025-08-21 PROCEDURE — G8417 CALC BMI ABV UP PARAM F/U: HCPCS | Performed by: NURSE PRACTITIONER

## 2025-08-21 PROCEDURE — 99213 OFFICE O/P EST LOW 20 MIN: CPT | Performed by: NURSE PRACTITIONER

## 2025-08-21 PROCEDURE — 1159F MED LIST DOCD IN RCRD: CPT | Performed by: NURSE PRACTITIONER

## 2025-08-21 PROCEDURE — G8427 DOCREV CUR MEDS BY ELIG CLIN: HCPCS | Performed by: NURSE PRACTITIONER

## 2025-08-21 RX ORDER — FUROSEMIDE 20 MG/1
TABLET ORAL
Qty: 45 TABLET | Refills: 3
Start: 2025-08-21

## 2025-08-22 ENCOUNTER — ANTI-COAG VISIT (OUTPATIENT)
Age: 80
End: 2025-08-22

## 2025-08-22 DIAGNOSIS — I63.9 STROKE OF UNKNOWN CAUSE (HCC): ICD-10-CM

## 2025-08-22 DIAGNOSIS — I48.91 ATRIAL FIBRILLATION, UNSPECIFIED TYPE (HCC): ICD-10-CM

## 2025-08-22 DIAGNOSIS — I63.9 ACUTE ISCHEMIC STROKE (HCC): Primary | ICD-10-CM

## 2025-08-25 ENCOUNTER — HOSPITAL ENCOUNTER (OUTPATIENT)
Age: 80
Discharge: HOME | End: 2025-08-25
Payer: MEDICARE

## 2025-08-25 DIAGNOSIS — D64.9 ANEMIA, UNSPECIFIED TYPE: ICD-10-CM

## 2025-08-25 DIAGNOSIS — I48.91 ATRIAL FIBRILLATION, NEW ONSET (HCC): ICD-10-CM

## 2025-08-25 DIAGNOSIS — R53.1 WEAKNESS: ICD-10-CM

## 2025-08-25 DIAGNOSIS — I63.9 STROKE OF UNKNOWN CAUSE (HCC): ICD-10-CM

## 2025-08-25 DIAGNOSIS — I10 ESSENTIAL HYPERTENSION: ICD-10-CM

## 2025-08-25 DIAGNOSIS — R06.02 SHORTNESS OF BREATH: ICD-10-CM

## 2025-08-25 DIAGNOSIS — I63.9 ACUTE ISCHEMIC STROKE (HCC): ICD-10-CM

## 2025-08-25 LAB
ANION GAP SERPL CALCULATED.3IONS-SCNC: 14 MMOL/L (ref 9–17)
BUN SERPL-MCNC: 29 MG/DL (ref 8–23)
CALCIUM SERPL-MCNC: 9.2 MG/DL (ref 8.6–10.4)
CHLORIDE SERPL-SCNC: 102 MMOL/L (ref 98–107)
CO2 SERPL-SCNC: 22 MMOL/L (ref 20–31)
CREAT SERPL-MCNC: 1.2 MG/DL (ref 0.7–1.2)
GFR, ESTIMATED: 62 ML/MIN/1.73M2
GLUCOSE SERPL-MCNC: 172 MG/DL (ref 70–99)
INR PPP: 1.8
POTASSIUM SERPL-SCNC: 4.4 MMOL/L (ref 3.7–5.3)
PROTHROMBIN TIME: 21.8 SEC (ref 11.5–14.2)
SODIUM SERPL-SCNC: 138 MMOL/L (ref 135–144)

## 2025-08-25 PROCEDURE — 36415 COLL VENOUS BLD VENIPUNCTURE: CPT

## 2025-08-25 PROCEDURE — 80048 BASIC METABOLIC PNL TOTAL CA: CPT

## 2025-08-25 PROCEDURE — 85610 PROTHROMBIN TIME: CPT

## 2025-08-26 ENCOUNTER — OFFICE VISIT (OUTPATIENT)
Dept: CARDIOLOGY CLINIC | Age: 80
End: 2025-08-26
Payer: MEDICARE

## 2025-08-26 ENCOUNTER — ANTI-COAG VISIT (OUTPATIENT)
Age: 80
End: 2025-08-26

## 2025-08-26 VITALS
OXYGEN SATURATION: 95 % | RESPIRATION RATE: 16 BRPM | DIASTOLIC BLOOD PRESSURE: 65 MMHG | SYSTOLIC BLOOD PRESSURE: 123 MMHG | HEART RATE: 56 BPM

## 2025-08-26 DIAGNOSIS — I48.91 ATRIAL FIBRILLATION, NEW ONSET (HCC): ICD-10-CM

## 2025-08-26 DIAGNOSIS — R06.02 SHORTNESS OF BREATH: Primary | ICD-10-CM

## 2025-08-26 DIAGNOSIS — I63.9 ACUTE ISCHEMIC STROKE (HCC): Primary | ICD-10-CM

## 2025-08-26 DIAGNOSIS — I63.9 STROKE OF UNKNOWN CAUSE (HCC): ICD-10-CM

## 2025-08-26 DIAGNOSIS — R53.1 WEAKNESS: ICD-10-CM

## 2025-08-26 DIAGNOSIS — D64.9 ANEMIA, UNSPECIFIED TYPE: ICD-10-CM

## 2025-08-26 DIAGNOSIS — Z79.01 LONG TERM (CURRENT) USE OF ANTICOAGULANTS: ICD-10-CM

## 2025-08-26 DIAGNOSIS — I48.91 ATRIAL FIBRILLATION, UNSPECIFIED TYPE (HCC): ICD-10-CM

## 2025-08-26 DIAGNOSIS — I10 ESSENTIAL HYPERTENSION: ICD-10-CM

## 2025-08-26 PROCEDURE — 3078F DIAST BP <80 MM HG: CPT | Performed by: NURSE PRACTITIONER

## 2025-08-26 PROCEDURE — G8417 CALC BMI ABV UP PARAM F/U: HCPCS | Performed by: NURSE PRACTITIONER

## 2025-08-26 PROCEDURE — 3074F SYST BP LT 130 MM HG: CPT | Performed by: NURSE PRACTITIONER

## 2025-08-26 PROCEDURE — 1111F DSCHRG MED/CURRENT MED MERGE: CPT | Performed by: NURSE PRACTITIONER

## 2025-08-26 PROCEDURE — G8428 CUR MEDS NOT DOCUMENT: HCPCS | Performed by: NURSE PRACTITIONER

## 2025-08-26 PROCEDURE — 1123F ACP DISCUSS/DSCN MKR DOCD: CPT | Performed by: NURSE PRACTITIONER

## 2025-08-26 PROCEDURE — 1036F TOBACCO NON-USER: CPT | Performed by: NURSE PRACTITIONER

## 2025-08-26 PROCEDURE — 99213 OFFICE O/P EST LOW 20 MIN: CPT | Performed by: NURSE PRACTITIONER

## 2025-08-26 RX ORDER — FUROSEMIDE 20 MG/1
20 TABLET ORAL DAILY
Qty: 30 TABLET | Refills: 3 | Status: SHIPPED | OUTPATIENT
Start: 2025-08-26

## 2025-09-03 ENCOUNTER — OFFICE VISIT (OUTPATIENT)
Dept: CARDIOLOGY CLINIC | Age: 80
End: 2025-09-03
Payer: MEDICARE

## 2025-09-03 ENCOUNTER — HOSPITAL ENCOUNTER (OUTPATIENT)
Dept: LAB | Age: 80
Discharge: HOME | End: 2025-09-03
Payer: MEDICARE

## 2025-09-03 ENCOUNTER — ANTI-COAG VISIT (OUTPATIENT)
Age: 80
End: 2025-09-03

## 2025-09-03 VITALS
DIASTOLIC BLOOD PRESSURE: 58 MMHG | SYSTOLIC BLOOD PRESSURE: 102 MMHG | HEART RATE: 84 BPM | RESPIRATION RATE: 18 BRPM | OXYGEN SATURATION: 98 %

## 2025-09-03 DIAGNOSIS — Z79.01 LONG TERM (CURRENT) USE OF ANTICOAGULANTS: ICD-10-CM

## 2025-09-03 DIAGNOSIS — D64.9 ANEMIA, UNSPECIFIED TYPE: ICD-10-CM

## 2025-09-03 DIAGNOSIS — I63.9 ACUTE ISCHEMIC STROKE (HCC): Primary | ICD-10-CM

## 2025-09-03 DIAGNOSIS — R53.1 WEAKNESS: ICD-10-CM

## 2025-09-03 DIAGNOSIS — I48.91 ATRIAL FIBRILLATION, UNSPECIFIED TYPE (HCC): ICD-10-CM

## 2025-09-03 DIAGNOSIS — R06.02 SHORTNESS OF BREATH: Primary | ICD-10-CM

## 2025-09-03 DIAGNOSIS — I48.91 ATRIAL FIBRILLATION, NEW ONSET (HCC): ICD-10-CM

## 2025-09-03 DIAGNOSIS — I63.9 STROKE OF UNKNOWN CAUSE (HCC): ICD-10-CM

## 2025-09-03 DIAGNOSIS — I10 ESSENTIAL HYPERTENSION: ICD-10-CM

## 2025-09-03 DIAGNOSIS — R06.02 SHORTNESS OF BREATH: ICD-10-CM

## 2025-09-03 LAB
ANION GAP SERPL CALCULATED.3IONS-SCNC: 14 MMOL/L (ref 9–17)
BUN SERPL-MCNC: 32 MG/DL (ref 8–23)
CALCIUM SERPL-MCNC: 9.4 MG/DL (ref 8.6–10.4)
CHLORIDE SERPL-SCNC: 102 MMOL/L (ref 98–107)
CO2 SERPL-SCNC: 20 MMOL/L (ref 20–31)
CREAT SERPL-MCNC: 1.2 MG/DL (ref 0.7–1.2)
GFR, ESTIMATED: 62 ML/MIN/1.73M2
GLUCOSE SERPL-MCNC: 196 MG/DL (ref 70–99)
INR PPP: 2.1
POTASSIUM SERPL-SCNC: 4.6 MMOL/L (ref 3.7–5.3)
PROTHROMBIN TIME: 24.6 SEC (ref 11.5–14.2)
SODIUM SERPL-SCNC: 136 MMOL/L (ref 135–144)

## 2025-09-03 PROCEDURE — 1123F ACP DISCUSS/DSCN MKR DOCD: CPT | Performed by: NURSE PRACTITIONER

## 2025-09-03 PROCEDURE — 3078F DIAST BP <80 MM HG: CPT | Performed by: NURSE PRACTITIONER

## 2025-09-03 PROCEDURE — 1160F RVW MEDS BY RX/DR IN RCRD: CPT | Performed by: NURSE PRACTITIONER

## 2025-09-03 PROCEDURE — 99213 OFFICE O/P EST LOW 20 MIN: CPT | Performed by: NURSE PRACTITIONER

## 2025-09-03 PROCEDURE — 1159F MED LIST DOCD IN RCRD: CPT | Performed by: NURSE PRACTITIONER

## 2025-09-03 PROCEDURE — 36415 COLL VENOUS BLD VENIPUNCTURE: CPT

## 2025-09-03 PROCEDURE — 80048 BASIC METABOLIC PNL TOTAL CA: CPT

## 2025-09-03 PROCEDURE — 3074F SYST BP LT 130 MM HG: CPT | Performed by: NURSE PRACTITIONER

## 2025-09-03 PROCEDURE — G8427 DOCREV CUR MEDS BY ELIG CLIN: HCPCS | Performed by: NURSE PRACTITIONER

## 2025-09-03 PROCEDURE — 1036F TOBACCO NON-USER: CPT | Performed by: NURSE PRACTITIONER

## 2025-09-03 PROCEDURE — G8417 CALC BMI ABV UP PARAM F/U: HCPCS | Performed by: NURSE PRACTITIONER

## 2025-09-03 PROCEDURE — 85610 PROTHROMBIN TIME: CPT

## 2025-09-03 RX ORDER — SPIRONOLACTONE 25 MG/1
25 TABLET ORAL 2 TIMES DAILY
Qty: 60 TABLET | Refills: 5 | Status: SHIPPED | OUTPATIENT
Start: 2025-09-03

## 2025-09-03 RX ORDER — SENNA AND DOCUSATE SODIUM 50; 8.6 MG/1; MG/1
2 TABLET, FILM COATED ORAL DAILY
Qty: 60 TABLET | Refills: 5 | Status: SHIPPED | OUTPATIENT
Start: 2025-09-03

## (undated) DEVICE — NEEDLE HYPO 25GA L1.5IN BLU POLYPR HUB S STL REG BVL STR

## (undated) DEVICE — TOWEL,OR,DSP,ST,BLUE,STD,4/PK,20PK/CS: Brand: MEDLINE

## (undated) DEVICE — LIQUIBAND RAPID ADHESIVE 36/CS 0.8ML: Brand: MEDLINE

## (undated) DEVICE — 5 MM ASPIRATION/INJECTION NEEDLE, 16 GAUGE, 40 CM LENGTH: Brand: CORE DYNAMICS

## (undated) DEVICE — SYRINGE MED 10ML LUERLOCK TIP W/O SFTY DISP

## (undated) DEVICE — ELECTRODE TRAIN EDGE SYS W/ QUIK-COMBO CONN

## (undated) DEVICE — STRIP,CLOSURE,WOUND,MEDI-STRIP,1/2X4: Brand: MEDLINE

## (undated) DEVICE — Device

## (undated) DEVICE — SUTURE PERMAHAND SZ 3-0 L30IN NONABSORBABLE BLK SH L26MM K832H

## (undated) DEVICE — DEVICE VASC CLSR 24CM FEM OR RAD ART EXT MECH PRSS DSG POST

## (undated) DEVICE — CHLORAPREP 26ML ORANGE

## (undated) DEVICE — STANDARD HYPODERMIC NEEDLE,POLYPROPYLENE HUB: Brand: MONOJECT

## (undated) DEVICE — INTENDED FOR TISSUE SEPARATION, AND OTHER PROCEDURES THAT REQUIRE A SHARP SURGICAL BLADE TO PUNCTURE OR CUT.: Brand: BARD-PARKER ® CARBON RIB-BACK BLADES

## (undated) DEVICE — SYRINGE MED 50ML LUERLOCK TIP

## (undated) DEVICE — TROCAR: Brand: KII FIOS FIRST ENTRY

## (undated) DEVICE — DECANTER FLD 9IN ST BG FOR ASEP TRNSF OF FLD

## (undated) DEVICE — SUTURE VICRYL + SZ 0 L27IN ABSRB VLT L26MM UR-6 5/8 CIR VCP603H

## (undated) DEVICE — 3M™ STERI-STRIP™ REINFORCED ADHESIVE SKIN CLOSURES, R1541, 1/4 IN X 3 IN (6 MM X 75 MM), 3 STRIPS/ENVELOPE: Brand: 3M™ STERI-STRIP™

## (undated) DEVICE — CUSTOM PACK: Brand: UNBRANDED

## (undated) DEVICE — SKIN MARKER,REGULAR TIP WITH RULER: Brand: DEVON

## (undated) DEVICE — BITE BLOCK ENDOSCP AD 60 FR W/ ADJ STRP PLAS GRN BLOX

## (undated) DEVICE — CONTROL SYRINGE LUER-LOCK TIP: Brand: MONOJECT

## (undated) DEVICE — SYRINGE 20ML LL S/C 50

## (undated) DEVICE — GAUZE,SPONGE,4"X4",16PLY,XRAY,STRL,LF: Brand: MEDLINE

## (undated) DEVICE — SUTURE VCRL SZ 4-0 L27IN ABSRB UD L19MM FS-2 3/8 CIR REV J422H

## (undated) DEVICE — SURGICEL ENDOSCP APPL

## (undated) DEVICE — AGENT HEMSTAT 3GM OXIDIZED REGENERATED CELOS ABSRB FOR CONT (ORDER MULTIPLES OF 5EA)

## (undated) DEVICE — SUTURE VCRL SZ 2-0 L27IN ABSRB UD L26MM CT-2 1/2 CIR J269H

## (undated) DEVICE — 4-PORT MANIFOLD: Brand: NEPTUNE 2

## (undated) DEVICE — ELECTRODE PT RET AD L9FT HI MOIST COND ADH HYDRGEL CORDED

## (undated) DEVICE — PAD N ADH W3XL4IN POLY COT SFT PERF FLM EASILY CUT ABSRB

## (undated) DEVICE — GOWN,SIRUS,NONRNF,SETINSLV,XL,20/CS: Brand: MEDLINE

## (undated) DEVICE — GLOVE SURG SZ 75 CRM LTX FREE POLYISOPRENE POLYMER BEAD ANTI

## (undated) DEVICE — GOWN,AURORA,NONRNF,XL,30/CS: Brand: MEDLINE

## (undated) DEVICE — SCISSOR SURG CRV ENDOCUT TIP FOR LAP DISP

## (undated) DEVICE — TROCAR: Brand: KII® SLEEVE

## (undated) DEVICE — SOLUTION IRRIG 1000ML 09% SOD CHL USP PIC PLAS CONTAINER

## (undated) DEVICE — DRESSING TRNSPAR W4XL4.8IN FLM SURESITE 123

## (undated) DEVICE — MEDICINE CUP, GRADUATED, STER: Brand: MEDLINE

## (undated) DEVICE — FORCEPS BX L240CM WRK CHN 2.8MM STD CAP W/ NDL MIC MESH

## (undated) DEVICE — GLOVE SURG SZ 65 CRM LTX FREE POLYISOPRENE POLYMER BEAD ANTI

## (undated) DEVICE — APPLIER CLP M/L SHFT DIA5MM 15 LIG LIGAMAX 5

## (undated) DEVICE — SYRINGE MED 5ML STD CLR PLAS LUERLOCK TIP N CTRL DISP

## (undated) DEVICE — CAP BX OLYMPUS AUTOCAP RX

## (undated) DEVICE — SOLUTION IV 1000ML 0.9% SOD CHL PH 5 INJ USP VIAFLX PLAS

## (undated) DEVICE — DISSECTOR LAP DIA5MM BLNT TIP ENDOPATH

## (undated) DEVICE — YANKAUER OPEN TIP WITH ON/OFF SWITCH: Brand: ARGYLE

## (undated) DEVICE — ENDOSCOPIC KIT 1.1+ 10 FT DE 2 GWN AAMI LEVEL 3

## (undated) DEVICE — SUTURE PERMA-HAND SZ 1 L30IN NONABSORBABLE BLK L36MM MH 1/2 K845H

## (undated) DEVICE — ELECTRODE LAP L36CM PTFE WIRE J HK CLEANCOAT

## (undated) DEVICE — GLOVE SURG SZ 85 CRM LTX FREE POLYISOPRENE POLYMER BEAD ANTI

## (undated) DEVICE — 35 ML SYRINGE LUER-LOCK TIP: Brand: MONOJECT

## (undated) DEVICE — NEEDLE HYPO 18GA L1.5IN PNK POLYPR HUB S STL REG BVL STR

## (undated) DEVICE — BAG SPEC REM 224ML W4XL6IN DIA10MM 1 HND GYN DISP ENDOPCH

## (undated) DEVICE — TROCARS: Brand: KII® BALLOON BLUNT TIP SYSTEM

## (undated) DEVICE — SOLUTION IV 500ML 0.9% SOD CHL PH 5 INJ USP VIAFLX PLAS

## (undated) DEVICE — SLEEVE SHTH L3.05M ANTISTATIC ISOLATN FOR PRGMR HD DISP

## (undated) DEVICE — GLOVE ORANGE PI 8 1/2   MSG9085

## (undated) DEVICE — 3M™ STERI-STRIP™ COMPOUND BENZOIN TINCTURE 40 BAGS/CARTON 4 CARTONS/CASE C1544: Brand: 3M™ STERI-STRIP™

## (undated) DEVICE — CONTAINER,SPECIMEN,OR STERILE,4OZ: Brand: MEDLINE

## (undated) DEVICE — DRESSING TRNSPAR W5XL4.5IN FLM SHT SEMIPERMEABLE WIND

## (undated) DEVICE — APPLIER CLP M L L11.4IN DIA10MM ENDOSCP ROT MULT FOR LIG

## (undated) DEVICE — SINGLE USE DISTAL COVER MAJ-2315: Brand: SINGLE USE DISTAL COVER

## (undated) DEVICE — INTRODUCER SHTH 7FR L13CM NDL 18GA GWIRE 0.035IN SYR VLV

## (undated) DEVICE — SUTURE MONOCRYL SZ 4-0 L18IN ABSRB UD L16MM PC-3 3/8 CIR PRIM Y845G

## (undated) DEVICE — TIP SUCT FLNG TIP ON OFF CTRL YANK

## (undated) DEVICE — SOLUTION ANTIFOG VIS SYS CLEARIFY LAPSCP

## (undated) DEVICE — NEEDLE,BLUNT,18GX1.5": Brand: MEDLINE

## (undated) DEVICE — UNDERPAD HOSP W30XL36IN GRN POLYPR HI ABSRB DISP

## (undated) DEVICE — SOLUTION IV 1000 ML 0.9 NACL INJ USP EXCEL PLAS CONTAINER

## (undated) DEVICE — 3M™ TEGADERM™ TRANSPARENT FILM DRESSING FIRST AID STYLE, 1620, 2-3/8 IN X 2-3/4 IN, 100 BAGS/CARTON 6 CARTONS/CASE: Brand: 3M™ TEGADERM™